# Patient Record
Sex: MALE | Race: WHITE | NOT HISPANIC OR LATINO | Employment: OTHER | URBAN - METROPOLITAN AREA
[De-identification: names, ages, dates, MRNs, and addresses within clinical notes are randomized per-mention and may not be internally consistent; named-entity substitution may affect disease eponyms.]

---

## 2017-01-06 ENCOUNTER — TELEPHONE (OUTPATIENT)
Dept: PALLIATIVE MEDICINE | Facility: CLINIC | Age: 77
End: 2017-01-06

## 2017-01-06 NOTE — TELEPHONE ENCOUNTER
12/28/16 MRI thoracic and lumbar spine results reviewed. Per radiology, there are chronic compression deformities at T6 and T12, as well as multilevel degenerative changes. Findings include disc bulging and facet arthropathy, associated with severe right and moderate-to-severe left neural foraminal narrowing at L5-S1 and likely impingement of left L4 and right L5 nerve roots.     No acute/urgent findings. Patient has follow up appointment on 01/09/17 to discuss results and management options.    Mariam Hale MD  [facility]

## 2017-01-09 ENCOUNTER — OFFICE VISIT (OUTPATIENT)
Dept: PALLIATIVE MEDICINE | Facility: CLINIC | Age: 77
End: 2017-01-09
Payer: COMMERCIAL

## 2017-01-09 VITALS
DIASTOLIC BLOOD PRESSURE: 56 MMHG | BODY MASS INDEX: 27.41 KG/M2 | HEART RATE: 61 BPM | WEIGHT: 191 LBS | SYSTOLIC BLOOD PRESSURE: 110 MMHG | OXYGEN SATURATION: 97 %

## 2017-01-09 DIAGNOSIS — M54.16 CHRONIC RADICULAR LOW BACK PAIN: Primary | ICD-10-CM

## 2017-01-09 DIAGNOSIS — G89.29 CHRONIC RADICULAR LOW BACK PAIN: Primary | ICD-10-CM

## 2017-01-09 PROCEDURE — 99214 OFFICE O/P EST MOD 30 MIN: CPT | Performed by: PAIN MEDICINE

## 2017-01-09 ASSESSMENT — PAIN SCALES - GENERAL: PAINLEVEL: NO PAIN (0)

## 2017-01-09 NOTE — PATIENT INSTRUCTIONS
"PATIENT INSTRUCTIONS:    1. We reviewed recent ultrasound of your right leg. There were no blood clots identified.     2. We reviewed imaging of your thoracic spine (mid back). You are seen to have two magnus of compression fractures (at T6, T12), which appear to be chronic/old. You were given a copy of your radiology report, for your records.      3. Will have you see orthopedics for input regarding your compression fractures.     4. We reviewed imaging of your lumbar spine (low back). You are seen to have many \"degenerative\" (arthritic) changes. 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 There is pinching of the right L5 nerve.     5. We talked about injection for your back/nerve pain, although you mentioned that you did not have significant relief with these in the past. May repeat injection in the future, if you are willing to do this.     6. Will have you do some physical therapy for your back/leg symptoms. This will be ordered through the rehabilitation department; they should call you to schedule.     7. [facility] 8 weeks to check in.    Thank you!    Scheduling number to the [facility]: [phone]. Call this number to schedule or change appointments.    Nurse Triage line: [phone]  Call this number with any questions or concerns. You can leave a message anytime. Please leave a detailed message. Calls are returned between 8 AM and 4 PM Monday through Thursday and from 8 AM to 12 Noon on Fridays. We usually get back to you within 24 to 48 hours depending on the issue/request.     We believe regular attendance is key to your success in our program.    Any time you are unable to keep your appointment we ask that you call us at least 24 hours in advance to let us know. This will allow us to offer the appointment time " to another patient.

## 2017-01-09 NOTE — MR AVS SNAPSHOT
"              After Visit Summary   1/9/2017    Vini Loya    MRN: [id number]           Patient Information     Date Of Birth          1940        Visit Information        Provider Department      1/9/2017 9:00 AM Mariam Hale MD [facility]        Today's Diagnoses     Compression fracture    -  1     Chronic radicular low back pain         History of Parkinson's disease           Care Instructions    PATIENT INSTRUCTIONS:    1. We reviewed recent ultrasound of your right leg. There were no blood clots identified.     2. We reviewed imaging of your thoracic spine (mid back). You are seen to have two magnus of compression fractures (at T6, T12), which appear to be chronic/old. You were given a copy of your radiology report, for your records.      3. Will have you see orthopedics for input regarding your compression fractures.     4. We reviewed imaging of your lumbar spine (low back). You are seen to have many \"degenerative\" (arthritic) changes. 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 There is pinching of the right L5 nerve.     5. We talked about injection for your back/nerve pain, although you mentioned that you did not have significant relief with these in the past. May repeat injection in the future, if you are willing to do this.     6. Will have you do some physical therapy for your back/leg symptoms. This will be ordered through the rehabilitation department; they should call you to schedule.     7. Return to [facility] in 8 weeks to check in.    Thank you!    Scheduling number to the [facility]: [phone]. Call this number to schedule or change appointments.    Nurse Triage line: [phone]  Call this number with any questions or concerns. You can leave a message anytime. Please leave a detailed " message. Calls are returned between 8 AM and 4 PM Monday through Thursday and from 8 AM to 12 Noon on Fridays. We usually get back to you within 24 to 48 hours depending on the issue/request.     We believe regular attendance is key to your success in our program.    Any time you are unable to keep your appointment we ask that you call us at least 24 hours in advance to let us know. This will allow us to offer the appointment time to another patient.           Follow-ups after your visit        Additional Services     ORTHO  REFERRAL       [facility] is referring you to the Orthopedic  Services at [facility].       The  Representative will assist you in the coordination of your Orthopedic and Musculoskeletal Care as prescribed by your physician.    The  Representative will call you within 1 business day to help schedule your appointment, or you may contact the  Representative at:    All areas ~ [phone]     Type of Referral : Surgical / Specialist - Orthopedics  Input regarding chronic T6, T12 compression fractures -- 60-70% loss with minimal retropulsion at T6      Timeframe requested: Routine    Coverage of these services is subject to the terms and limitations of your health insurance plan.  Please call member services at your health plan with any benefit or coverage questions.      If X-rays, CT or MRI's have been performed, please contact the facility where they were done to arrange for , prior to your scheduled appointment.  Please bring this referral request to your appointment and present it to your specialist.            PHYSICAL THERAPY REFERRAL       *This therapy referral will be filtered to a centralized scheduling office at [facility] and the patient will receive a call to schedule an appointment at a [place] location most convenient for them. *     [facility]  provides Physical Therapy evaluation and treatment and many specialty services across the [place] system.  If requesting a specialty program, please choose from the list below.    If you have not heard from the scheduling office within 2 business days, please call [phone] for all locations, with the exception of [place], please call [phone].  Treatment: Evaluation & Treatment  Special Instructions/Modalities:   Special Programs:    Chronic low back pain (nerve root compression)  History of thoracic compression fractures  Parkinson's disease    Please be aware that coverage of these services is subject to the terms and limitations of your health insurance plan.  Call member services at your health plan with any benefit or coverage questions.      **Note to Provider:  If you are referring outside of [place] for the therapy appointment, please list the name of the location in the  special instructions  above, print the referral and give to the patient to schedule the appointment.                  Your next 10 appointments already scheduled     Jan 31, 2017 11:15 AM   (Arrive by 11:00 AM)   Return Movement Disorder with KOTA Dutta [facility] ([facility])    [address]  [address]rd [facility] [address] [phone]            Oct 06, 2017 10:30 AM   RETURN GLAUCOMA with Yudith Mcdonnell MD   Eye Clinic ([facility])    Stefan Devine [address] [facility] [address] [place] [address] [phone]              Who to contact     If you have questions or need follow up information about today's clinic visit or your schedule please contact [facility] directly at [phone].  Normal or non-critical lab and imaging results will be communicated to you by MyChart, letter or phone within 4 business days after the clinic has received the results. If you do not hear from us within 7 days, please contact  "the clinic through [employer] or phone. If you have a critical or abnormal lab result, we will notify you by phone as soon as possible.  Submit refill requests through [employer] or call your pharmacy and they will forward the refill request to us. Please allow 3 business days for your refill to be completed.          Additional Information About Your Visit        [employer] Information     [employer] lets you send messages to your doctor, view your test results, renew your prescriptions, schedule appointments and more. To sign up, go to [email or web address] . Click on \"Log in\" on the left side of the screen, which will take you to the Welcome page. Then click on \"Sign up Now\" on the right side of the page.     You will be asked to enter the access code listed below, as well as some personal information. Please follow the directions to create your username and password.     Your access code is: [email or web address]  Expires: 3/21/2017  2:16 PM     Your access code will  in 90 days. If you need help or a new code, please call your [place] clinic or [phone].        Care EveryWhere ID     This is your Care EveryWhere ID. This could be used by other organizations to access your [place] medical records  [id number]        Your Vitals Were     Pulse Pulse Oximetry                61 97%           Blood Pressure from Last 3 Encounters:   17 110/56   16 118/70   16 92/59    Weight from Last 3 Encounters:   17 86.637 kg (191 lb)   16 79.379 kg (175 lb)   16 85.73 kg (189 lb)              We Performed the Following     ORTHO  REFERRAL     PHYSICAL THERAPY REFERRAL        Primary Care Provider Office Phone # Fax #    Selene Land [phone]       [facility] [address]  [facility] [address]        Thank you!     Thank you for choosing [facility]  for your care. Our goal is always to provide you with excellent care. Hearing " back from our patients is one way we can continue to improve our services. Please take a few minutes to complete the written survey that you may receive in the mail after your visit with us. Thank you!             Your Updated Medication List - Protect others around you: Learn how to safely use, store and throw away your medicines at www.disposemymeds.org.          This list is accurate as of: 1/9/17  9:41 AM.  Always use your most recent med list.                   Brand Name Dispense Instructions for use    aspirin 81 MG tablet      1 TABLET DAILY       Blood Pressure Monitor Pricilla     1 Device    1 Device daily.       carbidopa-levodopa  MG per tablet    SINEMET    540 tablet    Take 2 tablets 3 times a day 1 hour before each meal.  (Around 7 am, 11 am, & 4 pm.)       GAVILYTE-G 236 G suspension   Generic drug:  polyethylene glycol          lisinopril 40 MG tablet    PRINIVIL/ZESTRIL    90 tablet    Take 1 tablet (40 mg) by mouth daily Please profile       metoprolol 25 MG 24 hr tablet    TOPROL-XL    90 tablet    Take 1 tablet (25 mg) by mouth daily       PARoxetine 20 MG tablet    PAXIL    90 tablet    Take 1 tablet (20 mg) by mouth At Bedtime       simvastatin 40 MG tablet    ZOCOR    90 tablet    Take 1 tablet (40 mg) by mouth At Bedtime Please profile       tamsulosin 0.4 MG capsule    FLOMAX    90 capsule    Take 1 capsule (0.4 mg) by mouth daily

## 2017-01-09 NOTE — NURSING NOTE
"Chief Complaint   Patient presents with     Pain     Med Spine Follow up       Initial /56 mmHg  Pulse 61  Wt 86.637 kg (191 lb)  SpO2 97% Estimated body mass index is 27.41 kg/(m^2) as calculated from the following:    Height as of 12/21/16: 1.778 m (5' 10\").    Weight as of this encounter: 86.637 kg (191 lb).  BP completed using cuff size: jose Shea CMA   [facility]    "

## 2017-01-16 ENCOUNTER — RADIANT APPOINTMENT (OUTPATIENT)
Dept: GENERAL RADIOLOGY | Facility: CLINIC | Age: 77
End: 2017-01-16
Attending: NURSE PRACTITIONER
Payer: COMMERCIAL

## 2017-01-16 ENCOUNTER — OFFICE VISIT (OUTPATIENT)
Dept: NEUROSURGERY | Facility: CLINIC | Age: 77
End: 2017-01-16
Attending: FAMILY MEDICINE
Payer: COMMERCIAL

## 2017-01-16 VITALS
DIASTOLIC BLOOD PRESSURE: 77 MMHG | SYSTOLIC BLOOD PRESSURE: 139 MMHG | HEIGHT: 69 IN | OXYGEN SATURATION: 96 % | TEMPERATURE: 97.3 F | BODY MASS INDEX: 28.56 KG/M2 | WEIGHT: 192.8 LBS | HEART RATE: 53 BPM

## 2017-01-16 DIAGNOSIS — S22.000A THORACIC COMPRESSION FRACTURE, CLOSED, INITIAL ENCOUNTER (H): Primary | ICD-10-CM

## 2017-01-16 DIAGNOSIS — S22.000A THORACIC COMPRESSION FRACTURE, CLOSED, INITIAL ENCOUNTER (H): ICD-10-CM

## 2017-01-16 PROCEDURE — 99211 OFF/OP EST MAY X REQ PHY/QHP: CPT | Performed by: NURSE PRACTITIONER

## 2017-01-16 PROCEDURE — 99203 OFFICE O/P NEW LOW 30 MIN: CPT | Performed by: NURSE PRACTITIONER

## 2017-01-16 PROCEDURE — 72080 X-RAY EXAM THORACOLMB 2/> VW: CPT

## 2017-01-16 ASSESSMENT — PAIN SCALES - GENERAL: PAINLEVEL: NO PAIN (1)

## 2017-01-16 NOTE — MR AVS SNAPSHOT
After Visit Summary   1/16/2017    Vini Loya    MRN: [id number]           Patient Information     Date Of Birth          1940        Visit Information        Provider Department      1/16/2017 11:40 AM Bettie Mcginnis APRN CNP [facility]        Today's Diagnoses     Thoracic compression fracture, closed, initial encounter (H)    -  1       Care Instructions    1.  Orthotics for a brace. Wear for comfort as needed    2. No surgery indicated. Recommend physical therapy and injections     3.  Return to Dr. Yañez     4.  Xrays today         Follow-ups after your visit        Additional Services     [facility] PT, HAND, AND CHIROPRACTIC REFERRAL       **This order will print in the [facility] Scheduling Office**    Physical Therapy, Hand Therapy and Chiropractic Care are available through:    *[facility]  *[facility]  *[facility]    Call one number to schedule at any of the above locations: [phone].    Your provider has referred you to: Physical Therapy at [facility] or [facility]    Indication/Reason for Referral: back pain  Onset of Illness: chronic  Therapy Orders: Evaluate and Treat  Special Programs: None  Special Request: Mukesh Martin      Additional Comments for the Therapist or Chiropractor:         Please be aware that coverage of these services is subject to the terms and limitations of your health insurance plan.  Call member services at your health plan with any benefit or coverage questions.      Please bring the following to your appointment:    *Your personal calendar for scheduling future appointments  *Comfortable clothing            ORTHOTICS REFERRAL       **This referral order prints off in the [facility]  (Orthotics & Prosthetics) Central Scheduling Office**    The [facility] Scheduling Staff will contact the patient to schedule appointments.     Central Scheduling Contact  Information: [phone] ([place])    Elastic back brace    Please be aware that coverage of these services is subject to the terms and limitations of your health insurance plan.  Call member services at your health plan with any benefit or coverage questions.      Please bring the following to your appointment:    >>   Any x-rays, CTs or MRIs which have been performed.  Contact the facility where they were done to arrange for  prior to your scheduled appointment.    >>   List of current medications   >>   This referral request   >>   Any documents/labs given to you for this referral                  Your next 10 appointments already scheduled     Jan 31, 2017 11:15 AM   (Arrive by 11:00 AM)   Return Movement Disorder with KOTA Dutta [facility] ([email or web address] [place])    [address]  3rd [place] [facility] [address] [phone]            Oct 06, 2017 10:30 AM   RETURN GLAUCOMA with Yudith Mcdonnell MD   Eye Clinic ([facility])    Stefan Devine [facility]  [address]  [address] [address]  [facility] [id number] [phone]              Future tests that were ordered for you today     Open Future Orders        Priority Expected Expires Ordered    XR Thoracolumbar Spine 2 Views Routine 1/16/2017 1/16/2018 1/16/2017            Who to contact     If you have questions or need follow up information about today's clinic visit or your schedule please contact [facility] directly at [phone].  Normal or non-critical lab and imaging results will be communicated to you by MyChart, letter or phone within 4 business days after the clinic has received the results. If you do not hear from us within 7 days, please contact the clinic through MyChart or phone. If you have a critical or abnormal lab result, we will notify you by phone as soon as possible.  Submit refill requests through [employer] or call your pharmacy and they  "will forward the refill request to us. Please allow 3 business days for your refill to be completed.          Additional Information About Your Visit        [employer] Information     [employer] lets you send messages to your doctor, view your test results, renew your prescriptions, schedule appointments and more. To sign up, go to [email or web address] . Click on \"Log in\" on the left side of the screen, which will take you to the Welcome page. Then click on \"Sign up Now\" on the right side of the page.     You will be asked to enter the access code listed below, as well as some personal information. Please follow the directions to create your username and password.     Your access code is: [email or web address]  Expires: 3/21/2017  2:16 PM     Your access code will  in 90 days. If you need help or a new code, please call your [place] clinic or [phone].        Care EveryWhere ID     This is your [facility] EveryWhere ID. This could be used by other organizations to access your [place] medical records  [id number]        Your Vitals Were     Pulse Temperature Height BMI (Body Mass Index) Pulse Oximetry       53 97.3  F (36.3  C) (Oral) 5' 8.5\" (1.74 m) 28.89 kg/m2 96%        Blood Pressure from Last 3 Encounters:   17 139/77   17 110/56   16 118/70    Weight from Last 3 Encounters:   17 192 lb 12.8 oz (87.454 kg)   17 191 lb (86.637 kg)   16 175 lb (79.379 kg)              We Performed the Following     MALIKA PT, HAND, AND CHIROPRACTIC REFERRAL     ORTHOTICS REFERRAL        Primary Care Provider Office Phone # Fax #    Selene Land [phone]       [facility] [address]  [facility] [address]        Thank you!     Thank you for choosing [facility]  for your care. Our goal is always to provide you with excellent care. Hearing back from our patients is one way we can continue to improve our services. Please take a few minutes " to complete the written survey that you may receive in the mail after your visit with us. Thank you!             Your Updated Medication List - Protect others around you: Learn how to safely use, store and throw away your medicines at www.disposemymeds.org.          This list is accurate as of: 1/16/17 11:55 AM.  Always use your most recent med list.                   Brand Name Dispense Instructions for use    aspirin 81 MG tablet      1 TABLET DAILY       Blood Pressure Monitor Pricilla     1 Device    1 Device daily.       carbidopa-levodopa  MG per tablet    SINEMET    540 tablet    Take 2 tablets 3 times a day 1 hour before each meal.  (Around 7 am, 11 am, & 4 pm.)       GAVILYTE-G 236 G suspension   Generic drug:  polyethylene glycol          lisinopril 40 MG tablet    PRINIVIL/ZESTRIL    90 tablet    Take 1 tablet (40 mg) by mouth daily Please profile       metoprolol 25 MG 24 hr tablet    TOPROL-XL    90 tablet    Take 1 tablet (25 mg) by mouth daily       PARoxetine 20 MG tablet    PAXIL    90 tablet    Take 1 tablet (20 mg) by mouth At Bedtime       simvastatin 40 MG tablet    ZOCOR    90 tablet    Take 1 tablet (40 mg) by mouth At Bedtime Please profile       tamsulosin 0.4 MG capsule    FLOMAX    90 capsule    Take 1 capsule (0.4 mg) by mouth daily

## 2017-01-16 NOTE — NURSING NOTE
"Vini Loya is a 76 year old male who presents for:  Chief Complaint   Patient presents with     Neurologic Problem     Input regarding chronic T6, T12 compression fractures, standing for long patient gets right leg pain and swelling         Initial Vitals:  /77 mmHg  Pulse 53  Temp(Src) 97.3  F (36.3  C) (Oral)  Ht 5' 8.5\" (1.74 m)  Wt 192 lb 12.8 oz (87.454 kg)  BMI 28.89 kg/m2  SpO2 96% Estimated body mass index is 28.89 kg/(m^2) as calculated from the following:    Height as of this encounter: 5' 8.5\" (1.74 m).    Weight as of this encounter: 192 lb 12.8 oz (87.454 kg).. Body surface area is 2.06 meters squared. BP completed using cuff size: regular  No Pain (1)    Do you feel safe in your environment?  Yes  Do you need any refills today? No    Nursing Comments: Input regarding chronic T6, T12 compression fractures, standing for long patient gets right leg pain and swelling.  Patient rates his pain today as 1      5 min. nursing intake time  Angelica Longoria MA       Discharge plan: 1.  Orthotics for a brace. Wear for comfort as needed    2. No surgery indicated. Recommend physical therapy and injections     3.  Return to Dr. Yañez     4.  Xrays today   2 min. nursing discharge time  Angelica Longoria MA            "
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
## 2017-01-16 NOTE — PATIENT INSTRUCTIONS
1.  Orthotics for a brace. Wear for comfort as needed    2. No surgery indicated. Recommend physical therapy and injections     3.  Return to Dr. Yañez     4.  Xrays today

## 2017-01-17 ENCOUNTER — THERAPY VISIT (OUTPATIENT)
Dept: PHYSICAL THERAPY | Facility: CLINIC | Age: 77
End: 2017-01-17
Payer: COMMERCIAL

## 2017-01-17 DIAGNOSIS — G89.29 CHRONIC MIDLINE LOW BACK PAIN WITHOUT SCIATICA: Primary | ICD-10-CM

## 2017-01-17 DIAGNOSIS — M54.50 CHRONIC MIDLINE LOW BACK PAIN WITHOUT SCIATICA: Primary | ICD-10-CM

## 2017-01-17 PROCEDURE — 97530 THERAPEUTIC ACTIVITIES: CPT | Mod: GP | Performed by: PHYSICAL THERAPIST

## 2017-01-17 PROCEDURE — 97110 THERAPEUTIC EXERCISES: CPT | Mod: GP | Performed by: PHYSICAL THERAPIST

## 2017-01-17 PROCEDURE — 97161 PT EVAL LOW COMPLEX 20 MIN: CPT | Mod: GP | Performed by: PHYSICAL THERAPIST

## 2017-01-17 NOTE — PROGRESS NOTES
Physical Therapy Initial Evaluation  January 17, 2017     Precautions/Restrictions/MD instructions: PT eval and treat.     Subjective:   Date of Onset: 1/16/17 (MD orders, chronic condition)  Primary Symptoms/Location of Pain: Back pain (midline low back). Denies N/T, painful C/S/S, peripheral weakness. Quality of pain is dull and aching. Pains are described as intermittent in nature. Pain is worse: with more work. Pain is rated 1/10.   History of symptoms: Pains began gradually as the result of insidious onset. Does have history of T6 and T12 compression fractures. Long history of back pain when transferring a heavy box from one truck to another in 1967 - on and off back pain since then. Since onset, symptoms are unchanged. MD recommended trialing injections, PT, and possibly use of back brace.  Worsened by: Bending over doing yardwork and then going to stand up, picking weeds and trying to stand back up, getting up off of couch, sometimes bothered by doing crunch machine at gym, standing for a long time doing dishes.    Alleviated by: Sitting down, Advil.    General health as reported by patient: fair  Pertinent medical/surgical history: former smoker, Parkinson's disease (?), . Imaging: xray and MRI (chronic stable T6 and T12 compression fractures, diffuse degenerative changes most prominent at L5-S1). Current occupational status: Retired . Patient's goals are: decrease pain, establish home exercise program. Return to MD:  PRN.     Therapist Impression:   Vini Loya is a 76 year old male with chronic history of LBP. He presents with signs and symptoms consistent with chronic compression fractures and mechanical LBP. These impairments limit his ability to do yardwork and stand for prolonged periods. Skilled PT services are necessary in order to reduce impairments and improve independent function.    Objective:  LUMBAR EXAMINATION    Sitting posture: fair Standing posture: fair Lordosis: Red Lateral  "shift present: R lateral shift (possibly due to healed deformities). Correction of posture: NE    Movement Loss   Major Moderate Minimal Nil Pain   Flexion   X  +   Extension X X   -   Side Gliding R        Side Gliding L          Repeated movement testing:   (During: produces, abolishes, increases, decreases, no effect, centralizing, peripheralizing; After: better, worse, no better, no worse, no effect, centralized, peripheralized)  Pre-test Symptoms Standing: No sx's    Symptoms During Symptoms After ROM increased ROM decreased No Effect   FIS Prod NW      Rep FIS        EIS NE NE      Rep EIS        Pre-test Symptoms Lying: same as above    Symptoms During Symptoms After ROM increased ROM decreased No Effect   POLLO        Rep POLLO        EIL NE NE      Rep EIL NE NE   X   Lying ANNA: No effect (\"slight pulling\")    Provisional Classification: derangement vs. other  Principle of Management: trial extension    Neurological testing (myotomes, sensation, reflexes, nerve tension) not indicated at this time.    Therapeutic Activities (12 minutes): Discussed with patient postural correction with instruction in use of lumbar/towel roll and sitting posture when unsupported. Education provided regarding impact of posture and body mechanics on symptom production. Patient encouraged to monitor this correlation as part of overall self management. Also discussed goal of avoiding flexion-based postures/activities/stretches at this time as they may exacerbate current symptoms.     Assessment/Plan:    The patient is a 76 year old male with chief complaint of LBP.    The patient has the following significant findings with corresponding treatment plan.  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    Pain -  hot/cold therapy, US, electric stimulation, mechanical traction, manual therapy, splint/taping/bracing/orthotics, self management, education, directional preference exercise and home " program  Decreased ROM/flexibility - manual therapy, therapeutic exercise and home program  Decreased joint mobility - manual therapy and therapeutic exercise  Decreased strength - therapeutic exercise, therapeutic activities and home program  Impaired balance - neuro re-education and therapeutic activities  Decreased proprioception - neuro re-education and therapeutic activities  Impaired gait - gait training  Impaired muscle performance - neuro re-education  Decreased function - therapeutic activities  Impaired posture - neuro re-education    Therapy Evaluation Codes:   1) History comprised of:   Personal factors that impact the plan of care:      Time since onset of symptoms and Work status.    Comorbidity factors that impact the plan of care are:      Smoking.     Medications impacting care: None.  2) Examination of Body Systems comprised of:   Body structures and functions that impact the plan of care:      Lumbar spine.   Activity limitations that impact the plan of care are:      Standing.   Clinical presentation characteristics are:    Stable/Uncomplicated.  3) Presentation comprised of:   Presentation scored as Low complexity with uncomplicated characteristics..  4) Decision-Making    Low complexity using standardized patient assessment instrument and/or measureable assessment of functional outcome.  Cumulative Therapy Evaluation is: Low complexity.    Previous and current functional limitations:  (See Goal Flow Sheet for this information)    Short term and Long term goals: (See Goal Flow Sheet for this information)     Communication ability:  Patient appears to be able to clearly communicate and understand verbal and written communication and follow directions correctly.  Treatment Explanation - The following has been discussed with the patient: RX ordered/plan of care, anticipated outcomes, and possible risks and side effects.  This patient would benefit from PT intervention to resume normal activities.    Rehab potential is fair due to past work status and smoking history, as well as chronicity of symptoms.    Frequency:  1 X week, once daily  Duration:  for 5 weeks  Discharge Plan: Achieve all LTGs, be independent in home treatment program, and reach maximal therapeutic benefit.    Please refer to the daily flowsheet for treatment today, total treatment time and time spent performing 1:1 timed codes.

## 2017-01-17 NOTE — PROGRESS NOTES
Subjective:                                       Pertinent medical history includes:  High blood pressure.  Medical allergies: no.  Other surgeries include:  Heart surgery.  Current medications:  High blood pressure medication and pain medication.  Current occupation is Retired .        Barriers include:  None as reported by patient.    Red flags:  None as reported by patient.    Oswestry Score: 8 %                 Objective:    System    Physical Exam    General     ROS    Assessment/Plan:
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
## 2017-01-24 ENCOUNTER — THERAPY VISIT (OUTPATIENT)
Dept: PHYSICAL THERAPY | Facility: CLINIC | Age: 77
End: 2017-01-24
Payer: COMMERCIAL

## 2017-01-24 DIAGNOSIS — M54.50 CHRONIC MIDLINE LOW BACK PAIN WITHOUT SCIATICA: Primary | ICD-10-CM

## 2017-01-24 DIAGNOSIS — G89.29 CHRONIC MIDLINE LOW BACK PAIN WITHOUT SCIATICA: Primary | ICD-10-CM

## 2017-01-24 PROCEDURE — 97530 THERAPEUTIC ACTIVITIES: CPT | Mod: GP | Performed by: PHYSICAL THERAPIST

## 2017-01-24 PROCEDURE — 97112 NEUROMUSCULAR REEDUCATION: CPT | Mod: GP | Performed by: PHYSICAL THERAPIST

## 2017-01-24 PROCEDURE — 97110 THERAPEUTIC EXERCISES: CPT | Mod: GP | Performed by: PHYSICAL THERAPIST

## 2017-01-31 ENCOUNTER — OFFICE VISIT (OUTPATIENT)
Dept: NEUROLOGY | Facility: CLINIC | Age: 77
End: 2017-01-31

## 2017-01-31 VITALS — HEART RATE: 50 BPM | SYSTOLIC BLOOD PRESSURE: 125 MMHG | HEIGHT: 69 IN | DIASTOLIC BLOOD PRESSURE: 58 MMHG

## 2017-01-31 DIAGNOSIS — G20.A1 PARKINSON'S DISEASE (H): Primary | ICD-10-CM

## 2017-01-31 ASSESSMENT — PAIN SCALES - GENERAL: PAINLEVEL: NO PAIN (0)

## 2017-01-31 NOTE — MR AVS SNAPSHOT
After Visit Summary   1/31/2017    Vini Loya    MRN: [id number]           Patient Information     Date Of Birth          1940        Visit Information        Provider Department      1/31/2017 11:15 AM Dalila Staples APRN CNP [facility]        Care Instructions    January 31, 2017    Dear Mr. Vini Loya,    Thank you for coming today.  During your visit, we have discussed the following:     __  Consider doing gait study.  Call the research coordinator.   __  Stay on the same antiparkinsonian medications.   __  If you change your mid about doing the neuropsychological evaluation, call [phone].   __  Continue to exercise regularly.   __  Continue to go to the PD Support & exercise group.   __  Return in 4 months. You may return sooner as needed.      For questions, you may send us a [employer] message or call [phone]    Fax number: [phone]    KOTA Stone CNP  [facility]          Follow-ups after your visit        Your next 10 appointments already scheduled     May 31, 2017  1:15 PM   (Arrive by 1:00 PM)   Return Movement Disorder with KOTA Dutta CNP   [facility] ([facility])    [address]  [address] [facility] [address] [phone]            Oct 06, 2017 10:30 AM   RETURN GLAUCOMA with Yudith Mcdonnell MD   [facility] Clinic ([facility])    [facility]  [address]  [address]  [place] [place] [facility] [id number] [phone]              Who to contact     Please call your clinic at [phone] to:    Ask questions about your health    Make or cancel appointments    Discuss your medicines    Learn about your test results    Speak to your doctor   If you have compliments or concerns about an experience at your clinic, or if you wish to file a complaint, please contact [facility] Patient Relations at [phone] or email us at  [email or web address]         Additional Information About Your Visit        [employer] Information     [employer] is an electronic gateway that provides easy, online access to your medical records. With [employer], you can request a clinic appointment, read your test results, renew a prescription or communicate with your care team.     To sign up for [employer] visit the website at [email or web address]   You will be asked to enter the access code listed below, as well as some personal information. Please follow the directions to create your username and password.     Your access code is: [email or web address]  Expires: 3/21/2017  2:16 PM     Your access code will  in 90 days. If you need help or a new code, please contact your [facility] or call [phone] for assistance.        Care EveryWhere ID     This is your Care EveryWhere ID. This could be used by other organizations to access your [facility] medical records  [id number]        Your Vitals Were     Pulse Height                50 1.74 m (5' 8.5\")           Blood Pressure from Last 3 Encounters:   17 125/58   17 139/77   17 110/56    Weight from Last 3 Encounters:   17 87.454 kg (192 lb 12.8 oz)   17 86.637 kg (191 lb)   16 79.379 kg (175 lb)              Today, you had the following     No orders found for display       Primary Care Provider Office Phone # Fax #    Selene Land [phone]       [facility] [address]  [place] [address]        Thank you!     Thank you for choosing [facility]  for your care. Our goal is always to provide you with excellent care. Hearing back from our patients is one way we can continue to improve our services. Please take a few minutes to complete the written survey that you may receive in the mail after your visit with us. Thank you!             Your Updated Medication List - Protect others around " you: Learn how to safely use, store and throw away your medicines at www.disposemymeds.org.          This list is accurate as of: 1/31/17 11:53 AM.  Always use your most recent med list.                   Brand Name Dispense Instructions for use    aspirin 81 MG tablet      1 TABLET DAILY       Blood Pressure Monitor Pricilla     1 Device    1 Device daily.       carbidopa-levodopa  MG per tablet    SINEMET    540 tablet    Take 2 tablets 3 times a day 1 hour before each meal.  (Around 7 am, 11 am, & 4 pm.)       GAVILYTE-G 236 G suspension   Generic drug:  polyethylene glycol          lisinopril 40 MG tablet    PRINIVIL/ZESTRIL    90 tablet    Take 1 tablet (40 mg) by mouth daily Please profile       metoprolol 25 MG 24 hr tablet    TOPROL-XL    90 tablet    Take 1 tablet (25 mg) by mouth daily       PARoxetine 20 MG tablet    PAXIL    90 tablet    Take 1 tablet (20 mg) by mouth At Bedtime       simvastatin 40 MG tablet    ZOCOR    90 tablet    Take 1 tablet (40 mg) by mouth At Bedtime Please profile       tamsulosin 0.4 MG capsule    FLOMAX    90 capsule    Take 1 capsule (0.4 mg) by mouth daily

## 2017-01-31 NOTE — PROGRESS NOTES
"PATIENT: Vini Loya    : 1940    ALBINA: 2017      REASON FOR VISIT:  Parkinson's disease (PD) follow up.      HISTORY OF PRESENT ILLNESS:  Mr. Vini Loya is a 76-year-old left-handed  male who came to the [facility] accompanied by his wife for a follow up visit.  I saw him last in clinic on 2016 for a routine PD follow up visit.      Since his last visit, he reports having Right lower back & buttock has been seen by a pain clinic as well as spine clinic.  He has been told no surgery is indicated.  He is doing PT.     Rest tremor sometimes bothers him.  He has a cold that he is fighting. He reports taking his Sinemet 25/100 mg 2 tabs TID regularly. He continues to go to PD support group.  He reports getting dizzy.  No falls associated with dizziness.  He had a fall going down steps.  He didn't turn the light on & slipped.  He continues to exercise regularly.      During last visit, he was referred to do neuropsychological evaluation for a baseline.  He hasn't done the neuropsych.  \"I don't have memory problems. I've done the assessments when I did the yoga study.  They didn't see any problems.\"  His wife reports that she made the appointment, but he cancelled it.  Mood is good.     MEDICATIONS:   Medication Sig     tamsulosin (FLOMAX) 0.4 MG 24 hr Take 1 capsule (0.4 mg) by mouth daily     carbidopa-levodopa (SINEMET)  MG  Take 2 tablets 3 times a day 1 hour before each meal.  (Around 7 am, 11 am, & 4 pm.)     PARoxetine (PAXIL) 20 MG  Take 1 tablet (20 mg) by mouth At Bedtime     lisinopril (PRINIVIL,ZESTRIL) 40 MG  Take 1 tablet (40 mg) by mouth daily Please profile     simvastatin (ZOCOR) 40 MG tablet Take 1 tablet (40 mg) by mouth At Bedtime Please profile     metoprolol (TOPROL-XL) 25 MG 24 hr  Take 1 tablet (25 mg) by mouth daily     GAVILYTE-G 236 G suspension      Blood Pressure Monitor JERONIMO 1 Device daily.     ASPIRIN 81 MG OR TABS 1 TABLET DAILY " "      ALLERGIES: Vytorin    PAST MEDICAL HISTORY, PAST SURGICAL HISTORY, FAMILY HISTORY AND SOCIAL HISTORY:  Reviewed in Epic.      PHYSICAL EXAM:    VITAL SIGNS:  Blood pressure 125/58, pulse 50, height 1.74 m (5' 8.5\").      GENERAL:  Mr. Loya is a pleasant  male who is well-groomed and well-developed sitting comfortably in the exam room without any distress.  Affect is appropriate.     MOVEMENT DISORDERS ASSESSMENT:  (Last Sinemet was taken 1 hour ago.)   Speech shows mild loss of expression and volume.  Voice is congested & raspy due to cold symptoms.  He has abnormal diminution of facial expression; lips parted some of the time.   He has mild and persistent tremor in left upper and slight & persistent in lower extremity and in his mouth.  Tremor in Lt hand & mouth become moderate with mental activation.  Slight postural tremor in Rt hand.  Finger tapping shows mild slowing and reduction in amplitude in right side and moderately impaired in left.  Hand opening & closing and hand pronation & supination movements show mild slowing and reduction in amplitude bilaterally.  Leg agility shows mild slowing and reduction in amplitude in right side bilaterally with rhythm interruption in Rt.   He was able to arise from a chair slowly with arms folded across his chest.  Posture is mildly stooped & leaning to Rt side.  Gait is slow with normal steps.  Good arm swing.  He has mild body bradykinesia.        ASSESSMENT AND PLAN:     Parkinson's disease.   Mr. Loya is a 76-year-old  male with a history of tremor dominant PD since Dec 2011 who came to the clinic for a follow up visit.  He reports occasional bothersome tremor.    __  Discussed about increasing his medication.  He wanted to continue taking the same anti-parkinsonian medications as above.  __  Dicussed about participating in gait study.  Information was provided.  __  He will continue exercising regularly & participate in PD support group.   __  " Encouraged to do the formal neuropathological evaluation.  He was given the phone number to call if he changes his mind.    Dizziness.  Could be from PD or medication side effects.  __  Informed to stand up slowly & avoid making quick movements to avoid falls.     Back Pain.  __  Will do PT for back/Rt buttock pain.    Will return to clinic in 4 months.  May return sooner PRN      The total time spent with the patient was 35 minutes, greater than 50% of the time was spent in counseling and coordination of care.      Taina Staples, KOTA, CNP  [facility]

## 2017-01-31 NOTE — PATIENT INSTRUCTIONS
January 31, 2017    Dear Mr. Vini Loya,    Thank you for coming today.  During your visit, we have discussed the following:     __  Consider doing gait study.  Call the research coordinator.   __  Stay on the same antiparkinsonian medications.   __  If you change your mid about doing the neuropsychological evaluation, call [phone].   __  Continue to exercise regularly.   __  Continue to go to the PD Support & exercise group.   __  Return in 4 months. You may return sooner as needed.      For questions, you may send us a [employer] message or call [phone]    Fax number: [phone]    KOTA Stone, CNP  [facility]

## 2017-02-10 ENCOUNTER — TRANSFERRED RECORDS (OUTPATIENT)
Dept: HEALTH INFORMATION MANAGEMENT | Facility: CLINIC | Age: 77
End: 2017-02-10

## 2017-02-14 ENCOUNTER — THERAPY VISIT (OUTPATIENT)
Dept: PHYSICAL THERAPY | Facility: CLINIC | Age: 77
End: 2017-02-14
Payer: COMMERCIAL

## 2017-02-14 DIAGNOSIS — M54.50 CHRONIC MIDLINE LOW BACK PAIN WITHOUT SCIATICA: ICD-10-CM

## 2017-02-14 DIAGNOSIS — G89.29 CHRONIC MIDLINE LOW BACK PAIN WITHOUT SCIATICA: ICD-10-CM

## 2017-02-14 PROCEDURE — 97110 THERAPEUTIC EXERCISES: CPT | Mod: GP | Performed by: PHYSICAL THERAPIST

## 2017-02-14 PROCEDURE — 97112 NEUROMUSCULAR REEDUCATION: CPT | Mod: GP | Performed by: PHYSICAL THERAPIST

## 2017-02-14 NOTE — PROGRESS NOTES
Discharge Note    Progress reporting period is from initial eval to Feb 14, 2017.     Vini failed to return for next follow up visit and current status is unknown.  Please see information below for last relevant information on current status.  Patient seen for Rxs Used: 3 visits.    SUBJECTIVE  Subjective changes noted by patient:  Subjective: Reports overall his back is feeling much better unless he does something stupid. Occasionally bothered by standing too long or trying to bend forward to aggressively. .  Current pain level is Current Pain level: 0/10.     Previous pain level was  Initial Pain level: 1/10.   Changes in function:  Yes (See Goal flowsheet attached for changes in current functional level)  Adverse reaction to treatment or activity: None    OBJECTIVE  Changes noted in objective findings: Objective: Able to perform full flexion in standing painfree. Extension still limited but not painful. SEGUN 2%, Mario 0. .    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  Updated problem list and treatment plan:     Decreased ROM/flexibility - HEP  Decreased strength - HEP  Impaired posture - HEP    STG/LTGs have been met or progress has been made towards goals:  Yes, please see goal flowsheet for most current information.    Assessment of Progress: current status is unknown.  Last current status: Assessment of progress: Pt is progressing as expected.  Self Management Plans:  HEP    I have re-evaluated this patient and find that the nature, scope, duration and intensity of the therapy is appropriate for the medical condition of the patient.  Vini continues to require the following intervention to meet STG and LTG's:  HEP.    Recommendations:  Discharge with current home program.  Patient to follow up with MD as needed. Episode to be closed at this time and patient formally  discharged from therapy.    Ruddy Smith, PT, DPT      Please refer to the daily flowsheet for treatment today, total treatment time and time spent performing 1:1 timed codes.

## 2017-04-11 DIAGNOSIS — I10 HYPERTENSION, GOAL BELOW 150/90: ICD-10-CM

## 2017-04-11 RX ORDER — LISINOPRIL 40 MG/1
TABLET ORAL
Qty: 90 TABLET | Refills: 1 | Status: SHIPPED | OUTPATIENT
Start: 2017-04-11 | End: 2017-10-11
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
## 2017-04-25 DIAGNOSIS — I10 HYPERTENSION, GOAL BELOW 150/90: ICD-10-CM
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
## 2017-04-26 RX ORDER — METOPROLOL SUCCINATE 25 MG/1
TABLET, EXTENDED RELEASE ORAL
Qty: 90 TABLET | Refills: 0 | Status: SHIPPED | OUTPATIENT
Start: 2017-04-26 | End: 2017-07-24

## 2017-04-26 NOTE — TELEPHONE ENCOUNTER
Scheduled for CPE 5/4/17.    Last office visit : 12/6/16.      BP Readings from Last 6 Encounters:   01/31/17 125/58   01/16/17 139/77   01/09/17 110/56   12/21/16 118/70   12/06/16 92/59   11/26/16 139/70      Prescription approved per [facility] Refill Protocol.  Kristin Young RN
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
## 2017-05-03 NOTE — PATIENT INSTRUCTIONS
Low Back Pain:  Take Tylenol 1000 mg every night  Use heat pad in the morning    Start Calcitonin spray to see if this reduces pain.  Consider switching to a softer mattress (or back to a water bed).     Preventive Health Recommendations:   Male Ages 65 and over    Yearly exam:             See your health care provider every year in order to  o   Review health changes.   o   Discuss preventive care.    o   Review your medicines if your doctor has prescribed any.    Talk with your health care provider about whether you should have a test to screen for prostate cancer (PSA).    Every 3 years, have a diabetes test (fasting glucose). If you are at risk for diabetes, you should have this test more often.    Every 5 years, have a cholesterol test. Have this test more often if you are at risk for high cholesterol or heart disease.     Every 10 years, have a colonoscopy. Or, have a yearly FIT test (stool test). These exams will check for colon cancer.    Talk to with your health care provider about screening for Abdominal Aortic Aneurysm if you have a family history of AAA or have a history of smoking.    Shots:     Get a flu shot each year.     Get a tetanus shot every 10 years.     Talk to your doctor about your pneumonia vaccines. There are now two you should receive - Pneumovax (PPSV 23) and Prevnar (PCV 13).     Talk to your doctor about a shingles vaccine.     Talk to your doctor about the hepatitis B vaccine.  Nutrition:     Eat at least 5 servings of fruits and vegetables each day.     Eat whole-grain bread, whole-wheat pasta and brown rice instead of white grains and rice.     Talk to your provider about Calcium and Vitamin D.   Lifestyle    Exercise for at least 150 minutes a week (30 minutes a day, 5 days a week). This will help you control your weight and prevent disease.     Limit alcohol to one drink per day.     No smoking.     Wear sunscreen to prevent skin cancer.     See your dentist every six months for  an exam and cleaning.     See your eye doctor every 1 to 2 years to screen for conditions such as glaucoma, macular degeneration, cataracts, etc   Preventive Health Recommendations:       Male Ages 65 and over    Yearly exam:             See your health care provider every year in order to  o   Review health changes.   o   Discuss preventive care.    o   Review your medicines if your doctor has prescribed any.  Talk with your health care provider about whether you should have a test to screen for prostate cancer (PSA).  Every 3 years, have a diabetes test (fasting glucose). If you are at risk for diabetes, you should have this test more often.  Every 5 years, have a cholesterol test. Have this test more often if you are at risk for high cholesterol or heart disease.   Every 10 years, have a colonoscopy. Or, have a yearly FIT test (stool test). These exams will check for colon cancer.  Talk to with your health care provider about screening for Abdominal Aortic Aneurysm if you have a family history of AAA or have a history of smoking.  Shots:   Get a flu shot each year.   Get a tetanus shot every 10 years.   Talk to your doctor about your pneumonia vaccines. There are now two you should receive - Pneumovax (PPSV 23) and Prevnar (PCV 13).  Talk to your doctor about a shingles vaccine.   Talk to your doctor about the hepatitis B vaccine.  Nutrition:   Eat at least 5 servings of fruits and vegetables each day.   Eat whole-grain bread, whole-wheat pasta and brown rice instead of white grains and rice.   Talk to your doctor about Calcium and Vitamin D.   Lifestyle  Exercise for at least 150 minutes a week (30 minutes a day, 5 days a week). This will help you control your weight and prevent disease.   Limit alcohol to one drink per day.   No smoking.   Wear sunscreen to prevent skin cancer.   See your dentist every six months for an exam and cleaning.   See your eye doctor every 1 to 2 years to screen for conditions such as  glaucoma, macular degeneration and cataracts.

## 2017-05-04 ENCOUNTER — OFFICE VISIT (OUTPATIENT)
Dept: FAMILY MEDICINE | Facility: CLINIC | Age: 77
End: 2017-05-04
Payer: COMMERCIAL

## 2017-05-04 VITALS
OXYGEN SATURATION: 93 % | TEMPERATURE: 98.2 F | WEIGHT: 188.5 LBS | DIASTOLIC BLOOD PRESSURE: 70 MMHG | BODY MASS INDEX: 27.92 KG/M2 | HEART RATE: 61 BPM | HEIGHT: 69 IN | RESPIRATION RATE: 18 BRPM | SYSTOLIC BLOOD PRESSURE: 102 MMHG

## 2017-05-04 DIAGNOSIS — I10 HYPERTENSION, GOAL BELOW 150/90: Primary | ICD-10-CM

## 2017-05-04 DIAGNOSIS — M51.369 DEGENERATION OF L4-L5 INTERVERTEBRAL DISC: ICD-10-CM

## 2017-05-04 DIAGNOSIS — S22.000D COMPRESSION FRACTURE OF THORACIC VERTEBRA, WITH ROUTINE HEALING, SUBSEQUENT ENCOUNTER: ICD-10-CM

## 2017-05-04 DIAGNOSIS — R26.9 GAIT DISTURBANCE, POST-STROKE: ICD-10-CM

## 2017-05-04 DIAGNOSIS — I69.398 GAIT DISTURBANCE, POST-STROKE: ICD-10-CM

## 2017-05-04 DIAGNOSIS — M54.50 CHRONIC MIDLINE LOW BACK PAIN WITHOUT SCIATICA: ICD-10-CM

## 2017-05-04 DIAGNOSIS — R25.1 TREMOR: ICD-10-CM

## 2017-05-04 DIAGNOSIS — G20.A1 PARKINSON'S DISEASE (H): ICD-10-CM

## 2017-05-04 DIAGNOSIS — Z00.01 ENCOUNTER FOR ROUTINE ADULT PHYSICAL EXAM WITH ABNORMAL FINDINGS: ICD-10-CM

## 2017-05-04 DIAGNOSIS — G89.29 CHRONIC MIDLINE LOW BACK PAIN WITHOUT SCIATICA: ICD-10-CM

## 2017-05-04 PROCEDURE — 99213 OFFICE O/P EST LOW 20 MIN: CPT | Mod: 25 | Performed by: FAMILY MEDICINE

## 2017-05-04 PROCEDURE — 99397 PER PM REEVAL EST PAT 65+ YR: CPT | Performed by: FAMILY MEDICINE

## 2017-05-04 RX ORDER — CALCITONIN SALMON 200 [IU]/.09ML
1 SPRAY, METERED NASAL DAILY
Qty: 1 BOTTLE | Refills: 1 | Status: ON HOLD | OUTPATIENT
Start: 2017-05-04 | End: 2017-10-16

## 2017-05-04 NOTE — MR AVS SNAPSHOT
After Visit Summary   5/4/2017    Vini Loya    MRN: [id number]           Patient Information     Date Of Birth          1940        Visit Information        Provider Department      5/4/2017 8:40 AM Selene Land MD [facility]        Today's Diagnoses     Hypertension, goal below 150/90    -  1    Encounter for routine adult physical exam with abnormal findings        Parkinson's disease (H)        Tremors        Gait disturbance, post-stroke        Chronic midline low back pain without sciatica        Degeneration of L4-L5 intervertebral disc        Compression fracture of thoracic vertebra, with routine healing, subsequent encounter          Care Instructions    Low Back Pain:  Take Tylenol 1000 mg every night  Use heat pad in the morning    Start Calcitonin spray to see if this reduces pain.  Consider switching to a softer mattress (or back to a water bed).     Preventive Health Recommendations:   Male Ages 65 and over    Yearly exam:             See your health care provider every year in order to  o   Review health changes.   o   Discuss preventive care.    o   Review your medicines if your doctor has prescribed any.    Talk with your health care provider about whether you should have a test to screen for prostate cancer (PSA).    Every 3 years, have a diabetes test (fasting glucose). If you are at risk for diabetes, you should have this test more often.    Every 5 years, have a cholesterol test. Have this test more often if you are at risk for high cholesterol or heart disease.     Every 10 years, have a colonoscopy. Or, have a yearly FIT test (stool test). These exams will check for colon cancer.    Talk to with your health care provider about screening for Abdominal Aortic Aneurysm if you have a family history of AAA or have a history of smoking.    Shots:     Get a flu shot each year.     Get a tetanus shot every 10 years.     Talk to your doctor about your  pneumonia vaccines. There are now two you should receive - Pneumovax (PPSV 23) and Prevnar (PCV 13).     Talk to your doctor about a shingles vaccine.     Talk to your doctor about the hepatitis B vaccine.  Nutrition:     Eat at least 5 servings of fruits and vegetables each day.     Eat whole-grain bread, whole-wheat pasta and brown rice instead of white grains and rice.     Talk to your provider about Calcium and Vitamin D.   Lifestyle    Exercise for at least 150 minutes a week (30 minutes a day, 5 days a week). This will help you control your weight and prevent disease.     Limit alcohol to one drink per day.     No smoking.     Wear sunscreen to prevent skin cancer.     See your dentist every six months for an exam and cleaning.     See your eye doctor every 1 to 2 years to screen for conditions such as glaucoma, macular degeneration, cataracts, etc   Preventive Health Recommendations:       Male Ages 65 and over    Yearly exam:             See your health care provider every year in order to  o   Review health changes.   o   Discuss preventive care.    o   Review your medicines if your doctor has prescribed any.  Talk with your health care provider about whether you should have a test to screen for prostate cancer (PSA).  Every 3 years, have a diabetes test (fasting glucose). If you are at risk for diabetes, you should have this test more often.  Every 5 years, have a cholesterol test. Have this test more often if you are at risk for high cholesterol or heart disease.   Every 10 years, have a colonoscopy. Or, have a yearly FIT test (stool test). These exams will check for colon cancer.  Talk to with your health care provider about screening for Abdominal Aortic Aneurysm if you have a family history of AAA or have a history of smoking.  Shots:   Get a flu shot each year.   Get a tetanus shot every 10 years.   Talk to your doctor about your pneumonia vaccines. There are now two you should receive - Pneumovax  (PPSV 23) and Prevnar (PCV 13).  Talk to your doctor about a shingles vaccine.   Talk to your doctor about the hepatitis B vaccine.  Nutrition:   Eat at least 5 servings of fruits and vegetables each day.   Eat whole-grain bread, whole-wheat pasta and brown rice instead of white grains and rice.   Talk to your doctor about Calcium and Vitamin D.   Lifestyle  Exercise for at least 150 minutes a week (30 minutes a day, 5 days a week). This will help you control your weight and prevent disease.   Limit alcohol to one drink per day.   No smoking.   Wear sunscreen to prevent skin cancer.   See your dentist every six months for an exam and cleaning.   See your eye doctor every 1 to 2 years to screen for conditions such as glaucoma, macular degeneration and cataracts.        Follow-ups after your visit        Your next 10 appointments already scheduled     May 31, 2017  1:15 PM CDT   (Arrive by 1:00 PM)   Return Movement Disorder with KOTA Dutta [facility] ([facility])    [address]  [address]rd [facility] [id number] [phone]            Oct 06, 2017 10:30 AM CDT   RETURN GLAUCOMA with Yudith Mcdonnell MD   Eye Clinic ([facility])    [facility]  [address]  [facility] [id number] [phone]              Who to contact     If you have questions or need follow up information about today's clinic visit or your schedule please contact [facility] directly at [phone].  Normal or non-critical lab and imaging results will be communicated to you by MyChart, letter or phone within 4 business days after the clinic has received the results. If you do not hear from us within 7 days, please contact the clinic through MyChart or phone. If you have a critical or abnormal lab result, we will notify you by phone as soon as possible.  Submit refill requests through [employer] or call your pharmacy and  "they will forward the refill request to us. Please allow 3 business days for your refill to be completed.          Additional Information About Your Visit        InCytuharTractive Information     [employer] lets you send messages to your doctor, view your test results, renew your prescriptions, schedule appointments and more. To sign up, go to [email or web address] . Click on \"Log in\" on the left side of the screen, which will take you to the Welcome page. Then click on \"Sign up Now\" on the right side of the page.     You will be asked to enter the access code listed below, as well as some personal information. Please follow the directions to create your username and password.     Your access code is: [email or web address]  Expires: 2017  9:45 AM     Your access code will  in 90 days. If you need help or a new code, please call your [facility] clinic or [phone].        Care EveryWhere ID     This is your [facility] EveryWhere ID. This could be used by other organizations to access your [facility] medical records  [id number]        Your Vitals Were     Pulse Temperature Respirations Height Pulse Oximetry BMI (Body Mass Index)    61 98.2  F (36.8  C) (Tympanic) 18 5' 8.5\" (1.74 m) 93% 28.24 kg/m2       Blood Pressure from Last 3 Encounters:   17 102/70   17 125/58   17 139/77    Weight from Last 3 Encounters:   17 188 lb 8 oz (85.5 kg)   17 192 lb 12.8 oz (87.5 kg)   17 191 lb (86.6 kg)              We Performed the Following     Albumin Random Urine Quantitative          Today's Medication Changes          These changes are accurate as of: 17  9:45 AM.  If you have any questions, ask your nurse or doctor.               Start taking these medicines.        Dose/Directions    calcitonin (salmon) 200 UNIT/ACT nasal spray   Commonly known as:  MIACALCIN   Used for:  Compression fracture of thoracic vertebra, with routine healing, subsequent encounter   Started by:  Selene Land MD    "     Dose:  1 spray   Spray 1 spray into one nostril alternating nostrils daily Alternate nostril each day.   Quantity:  1 Bottle   Refills:  1            Where to get your medicines      These medications were sent to [facility] [place], [place] - [address]  [address], [facility] [address]     Phone:  [phone]     calcitonin (salmon) 200 UNIT/ACT nasal spray                Primary Care Provider Office Phone # Fax #    Selene Land [phone]       [facility] [address]  [facility] [address]        Thank you!     Thank you for choosing [facility]  for your care. Our goal is always to provide you with excellent care. Hearing back from our patients is one way we can continue to improve our services. Please take a few minutes to complete the written survey that you may receive in the mail after your visit with us. Thank you!             Your Updated Medication List - Protect others around you: Learn how to safely use, store and throw away your medicines at www.disposemymeds.org.          This list is accurate as of: 5/4/17  9:45 AM.  Always use your most recent med list.                   Brand Name Dispense Instructions for use    aspirin 81 MG tablet      1 TABLET DAILY       Blood Pressure Monitor Pricilla     1 Device    1 Device daily.       calcitonin (salmon) 200 UNIT/ACT nasal spray    MIACALCIN    1 Bottle    Spray 1 spray into one nostril alternating nostrils daily Alternate nostril each day.       carbidopa-levodopa  MG per tablet    SINEMET    540 tablet    Take 2 tablets 3 times a day 1 hour before each meal.  (Around 7 am, 11 am, & 4 pm.)       GAVILYTE-G 236 G suspension   Generic drug:  polyethylene glycol          lisinopril 40 MG tablet    PRINIVIL/ZESTRIL    90 tablet    TAKE ONE TABLET BY MOUTH EVERY DAY       metoprolol 25 MG 24 hr tablet    TOPROL-XL    90 tablet    TAKE ONE TABLET BY MOUTH EVERY DAY        PARoxetine 20 MG tablet    PAXIL    90 tablet    Take 1 tablet (20 mg) by mouth At Bedtime       simvastatin 40 MG tablet    ZOCOR    90 tablet    Take 1 tablet (40 mg) by mouth At Bedtime Please profile       tamsulosin 0.4 MG capsule    FLOMAX    90 capsule    Take 1 capsule (0.4 mg) by mouth daily

## 2017-05-04 NOTE — NURSING NOTE
"Chief Complaint   Patient presents with     Physical       Initial /70 (BP Location: Left arm, Patient Position: Chair, Cuff Size: Adult Regular)  Pulse 61  Temp 98.2  F (36.8  C) (Tympanic)  Resp 18  Ht 5' 8.5\" (1.74 m)  Wt 188 lb 8 oz (85.5 kg)  SpO2 93%  BMI 28.24 kg/m2 Estimated body mass index is 28.24 kg/(m^2) as calculated from the following:    Height as of this encounter: 5' 8.5\" (1.74 m).    Weight as of this encounter: 188 lb 8 oz (85.5 kg).  Medication Reconciliation: complete Bina Mcmullen/RAMAKRISHNA    "

## 2017-05-31 ENCOUNTER — OFFICE VISIT (OUTPATIENT)
Dept: NEUROLOGY | Facility: CLINIC | Age: 77
End: 2017-05-31

## 2017-05-31 VITALS
HEIGHT: 69 IN | HEART RATE: 49 BPM | BODY MASS INDEX: 27.85 KG/M2 | WEIGHT: 188 LBS | SYSTOLIC BLOOD PRESSURE: 140 MMHG | DIASTOLIC BLOOD PRESSURE: 70 MMHG

## 2017-05-31 DIAGNOSIS — R42 DIZZINESS: ICD-10-CM

## 2017-05-31 DIAGNOSIS — G20.A1 PARKINSON'S DISEASE (H): Primary | ICD-10-CM

## 2017-05-31 ASSESSMENT — PAIN SCALES - GENERAL: PAINLEVEL: NO PAIN (0)

## 2017-05-31 NOTE — PROGRESS NOTES
PATIENT: Vini Loya    : 1940    ALBINA: May 31, 2017    REASON FOR VISIT:  Follow up for Parkinson's disease (PD).      HISTORY OF PRESENT ILLNESS:  Mr. Vini Loya is a 76-year-old left-handed  male who came [facility] accompanied by his wife for a follow up visit.  I saw him last in clinic about 4 months ago for a routine PD follow up visit.     The patient reports that this past Memorial weekend he has been painting his garage.  He has been having frequent dizziness.  His wife reports noticing that he was having dizziness when he was bending down to get the paint and standing up to paint or scrape the wall.  She has told him to take breaks after working for an hour; but, he continued to work once he started working.  One day he stayed in bed due to dizziness & fatigue.  The patient reports that he also gets dizzy when he goes up stairs.  He has been on metoprolol for awhile and takes 25 mg daily.  He believes his dizziness gets worse after taking carbidopa/levodopa.  Currently, he is taking carbidopa/levodopa 25/100 mg 2 tablets t.i.d.  He has not had any syncope episode.  His wife reports that it is dangerous for him to go up and down ladder but he continues to do that regardless of her encouragement.     MEDICATIONS:   Medication Sig     calcitonin, salmon, (MIACALCIN) 200 UNIT/ACT nasal spray Spray 1 spray into one nostril alternating nostrils daily Alternate nostril each day.     metoprolol (TOPROL-XL) 25 MG 24 hr TAKE ONE TABLET BY MOUTH EVERY DAY     lisinopril (PRINIVIL/ZESTRIL) 40 MG TAKE ONE TABLET BY MOUTH EVERY DAY     tamsulosin (FLOMAX) 0.4 MG 24 hr Take 1 capsule (0.4 mg) by mouth daily     carbidopa-levodopa (SINEMET)  MG Take 2 tablets 3 times a day 1 hour before each meal.  (Around 7 am, 11 am, & 4 pm.)     PARoxetine (PAXIL) 20 MG  Take 1 tablet (20 mg) by mouth At Bedtime     simvastatin (ZOCOR) 40 MG  Take 1 tablet (40 mg) by mouth At Bedtime Please  "profile     GAVILYTE-G 236 G suspension      Blood Pressure Monitor JERONIMO 1 Device daily.     ASPIRIN 81 MG OR TABS 1 TABLET DAILY       ALLERGIES: Vytorin    PHYSICAL EXAM:    VITAL SIGNS:  Blood pressure 140/70, pulse (!) 49, height 1.74 m (5' 8.5\"), weight 85.3 kg (188 lb)., Body mass index is 28.17 kg/(m^2).     Rt arm orthostatic BP & Pulse:   Sitting BP = 117/67, Pulse = 48  Standing after 2 min BP =  131/69 Pulse = 48    GENERAL:  Mr. Loya is a pleasant  male who is well-groomed and well-developed, sitting comfortably in the exam room without any distress.  Affect is appropriate.     MOVEMENT DISORDERS ASSESSMENT:  (Last Sinemet was taken about 2 hours ago).   Speech shows slight loss of expression and volume; monotone.  Moderate hypomimia with lips parted some of the time.  Mild rest tremor in both hands, which become moderate with mental activation in left hand.  Mild chin tremor that becomes moderate with mental activation.  No rest tremor noted in right body.  No postural or action tremor bilaterally.  Slight to mild rigidity on both upper extremities and absent in lower extremities.  Finger tapping is normal in right side and shows mild slowing and reduction in amplitude in left.  Hand opening and closing and hand pronation and supination show mild slowing and reduction in amplitude bilaterally.  Leg agility is normal.  He was able to arise from a chair with arms folded across his chest quickly without difficulty.  Posture is slightly stooped leaning to one side.  Gait is slow with short steps.  He has mild body bradykinesia.        ASSESSMENT AND PLAN:      Parkinson's disease.  Overall tremor seems to be worse, however, he has had more dizzy spells as noted above.  His orthostatic blood pressure did not show significant systolic blood pressure drop.  It appears that he does not compensate as his heart rates has remained 48 when standing as well as sitting.     __  I recommended going back to " see his primary care to address low heart rate & dizziness and possibly adjusting metoprolol dose.   __  Will wait on increasing his carbidopa/levodopa as this might cause more dizziness.   __  I encouraged him to go back to exercising and resuming his routine.  I told him to be very cautious and avoid strenuous exercise on days that he is dizzy.      The total time spent with the patient was 45 minutes, greater than 50% of the time was spent in counseling and coordination of care.      KOTA Stone, CNP  [facility]           D: 2017 14:06   T: 2017 18:08   MT: nh      Name:     GRAZYNA POTTS   MRN:      [id number]        Account:      [id number]   :      1940           Service Date: 2017      Document: [id number]

## 2017-05-31 NOTE — PATIENT INSTRUCTIONS
May 31, 2017    Dear Mr. Vini Loya,    Thank you for coming today.  During your visit, we have discussed the following:     __  For now stay on the same dose of Sinemet.   __  Consider seeing Dr. Land to discuss low heart rate (46 - 49) & dizziness and if Metoprolol dose needs adjustment.   __  Resume going to the gym & exercise regularly.   __  Return in 3 months. You may return sooner as needed.    For questions, you may send us a [employer] message or call [phone]    Fax number: [phone]    KOTA Stone, CNP  [facility]

## 2017-05-31 NOTE — NURSING NOTE
Chief Complaint   Patient presents with     RECHECK     movement disorder    Maxim Carranza CMA

## 2017-05-31 NOTE — MR AVS SNAPSHOT
After Visit Summary   5/31/2017    Vini Loya    MRN: [id number]           Patient Information     Date Of Birth          1940        Visit Information        Provider Department      5/31/2017 1:15 PM Dalila Staples APRN CNP [facility]        Today's Diagnoses     Parkinson's disease (H)    -  1    Dizziness          Care Instructions    May 31, 2017    Dear Mr. Vini Loya,    Thank you for coming today.  During your visit, we have discussed the following:     __  For now stay on the same dose of Sinemet.   __  Consider seeing Dr. Land to discuss low heart rate (46 - 49) & dizziness and if Metoprolol dose needs adjustment.   __  Resume going to the gym & exercise regularly.   __  Return in 3 months. You may return sooner as needed.    For questions, you may send us a [employer] message or call [phone]    Fax number: [phone]    KOTA Stone CNP  [facility]            Follow-ups after your visit        Your next 10 appointments already scheduled     Sep 27, 2017  2:00 PM CDT   (Arrive by 1:45 PM)   Return Movement Disorder with KOTA Dutta CNP [facility] ([facility])    [address]  3rd [facility] [address] [phone]            Oct 06, 2017 10:30 AM CDT   RETURN GLAUCOMA with Yudith Mcdonnell MD   Eye Clinic ([facility])    [facility]  [address]  [address] [address]  [facility] [id number] [phone]              Who to contact     Please call your clinic at [phone] to:    Ask questions about your health    Make or cancel appointments    Discuss your medicines    Learn about your test results    Speak to your doctor   If you have compliments or concerns about an experience at your clinic, or if you wish to file a complaint, please contact [facility] Patient Relations at [phone] or email us at  "[email or web address]         Additional Information About Your Visit        [employer] Information     [employer] is an electronic gateway that provides easy, online access to your medical records. With [employer], you can request a clinic appointment, read your test results, renew a prescription or communicate with your care team.     To sign up for [employer] visit the website at [email or web address]   You will be asked to enter the access code listed below, as well as some personal information. Please follow the directions to create your username and password.     Your access code is: [email or web address]  Expires: 2017  9:45 AM     Your access code will  in 90 days. If you need help or a new code, please contact your [facility] or call [phone] for assistance.        Care EveryWhere ID     This is your Care EveryWhere ID. This could be used by other organizations to access your [facility] medical records  [id number]        Your Vitals Were     Pulse Height BMI (Body Mass Index)             49 1.74 m (5' 8.5\") 28.17 kg/m2          Blood Pressure from Last 3 Encounters:   17 140/70   17 102/70   17 125/58    Weight from Last 3 Encounters:   17 85.3 kg (188 lb)   17 85.5 kg (188 lb 8 oz)   17 87.5 kg (192 lb 12.8 oz)              Today, you had the following     No orders found for display       Primary Care Provider Office Phone # Fax #    Selene Land [phone]       [facility] [address]  [facility] [address]        Thank you!     Thank you for choosing [facility]  for your care. Our goal is always to provide you with excellent care. Hearing back from our patients is one way we can continue to improve our services. Please take a few minutes to complete the written survey that you may receive in the mail after your visit with us. Thank you!             Your Updated Medication " List - Protect others around you: Learn how to safely use, store and throw away your medicines at www.disposemymeds.org.          This list is accurate as of: 5/31/17  2:01 PM.  Always use your most recent med list.                   Brand Name Dispense Instructions for use    aspirin 81 MG tablet      1 TABLET DAILY       Blood Pressure Monitor Pricilla     1 Device    1 Device daily.       calcitonin (salmon) 200 UNIT/ACT nasal spray    MIACALCIN    1 Bottle    Spray 1 spray into one nostril alternating nostrils daily Alternate nostril each day.       carbidopa-levodopa  MG per tablet    SINEMET    540 tablet    Take 2 tablets 3 times a day 1 hour before each meal.  (Around 7 am, 11 am, & 4 pm.)       GAVILYTE-G 236 G suspension   Generic drug:  polyethylene glycol          lisinopril 40 MG tablet    PRINIVIL/ZESTRIL    90 tablet    TAKE ONE TABLET BY MOUTH EVERY DAY       metoprolol 25 MG 24 hr tablet    TOPROL-XL    90 tablet    TAKE ONE TABLET BY MOUTH EVERY DAY       PARoxetine 20 MG tablet    PAXIL    90 tablet    Take 1 tablet (20 mg) by mouth At Bedtime       simvastatin 40 MG tablet    ZOCOR    90 tablet    Take 1 tablet (40 mg) by mouth At Bedtime Please profile       tamsulosin 0.4 MG capsule    FLOMAX    90 capsule    Take 1 capsule (0.4 mg) by mouth daily

## 2017-06-20 ENCOUNTER — OFFICE VISIT (OUTPATIENT)
Dept: FAMILY MEDICINE | Facility: CLINIC | Age: 77
End: 2017-06-20
Payer: COMMERCIAL

## 2017-06-20 VITALS
DIASTOLIC BLOOD PRESSURE: 79 MMHG | OXYGEN SATURATION: 96 % | HEART RATE: 54 BPM | BODY MASS INDEX: 27.55 KG/M2 | HEIGHT: 69 IN | TEMPERATURE: 98.1 F | SYSTOLIC BLOOD PRESSURE: 139 MMHG | WEIGHT: 186 LBS

## 2017-06-20 DIAGNOSIS — I10 HYPERTENSION, GOAL BELOW 150/90: Primary | ICD-10-CM

## 2017-06-20 DIAGNOSIS — F41.1 GENERALIZED ANXIETY DISORDER: ICD-10-CM

## 2017-06-20 DIAGNOSIS — M51.27 HERNIATED NUCLEUS PULPOSUS, L5-S1, RIGHT: ICD-10-CM

## 2017-06-20 DIAGNOSIS — M51.369 DEGENERATION OF L4-L5 INTERVERTEBRAL DISC: ICD-10-CM

## 2017-06-20 DIAGNOSIS — S22.000D COMPRESSION FRACTURE OF THORACIC VERTEBRA, WITH ROUTINE HEALING, SUBSEQUENT ENCOUNTER: ICD-10-CM

## 2017-06-20 PROCEDURE — 99207 C PAF COMPLETED  NO CHARGE: CPT | Performed by: FAMILY MEDICINE

## 2017-06-20 PROCEDURE — 99213 OFFICE O/P EST LOW 20 MIN: CPT | Performed by: FAMILY MEDICINE

## 2017-06-20 ASSESSMENT — ANXIETY QUESTIONNAIRES
7. FEELING AFRAID AS IF SOMETHING AWFUL MIGHT HAPPEN: NOT AT ALL
2. NOT BEING ABLE TO STOP OR CONTROL WORRYING: NOT AT ALL
6. BECOMING EASILY ANNOYED OR IRRITABLE: NOT AT ALL
IF YOU CHECKED OFF ANY PROBLEMS ON THIS QUESTIONNAIRE, HOW DIFFICULT HAVE THESE PROBLEMS MADE IT FOR YOU TO DO YOUR WORK, TAKE CARE OF THINGS AT HOME, OR GET ALONG WITH OTHER PEOPLE: NOT DIFFICULT AT ALL
GAD7 TOTAL SCORE: 0
5. BEING SO RESTLESS THAT IT IS HARD TO SIT STILL: NOT AT ALL
1. FEELING NERVOUS, ANXIOUS, OR ON EDGE: NOT AT ALL
3. WORRYING TOO MUCH ABOUT DIFFERENT THINGS: NOT AT ALL

## 2017-06-20 ASSESSMENT — PATIENT HEALTH QUESTIONNAIRE - PHQ9: 5. POOR APPETITE OR OVEREATING: NOT AT ALL

## 2017-06-20 NOTE — NURSING NOTE
"Chief Complaint   Patient presents with     Hypertension       Initial /79  Pulse 54  Temp 98.1  F (36.7  C) (Tympanic)  Ht 5' 8.5\" (1.74 m)  Wt 186 lb (84.4 kg)  SpO2 96%  BMI 27.87 kg/m2 Estimated body mass index is 27.87 kg/(m^2) as calculated from the following:    Height as of this encounter: 5' 8.5\" (1.74 m).    Weight as of this encounter: 186 lb (84.4 kg).  Medication Reconciliation: complete     Antonieta Parada MA    "

## 2017-06-20 NOTE — MR AVS SNAPSHOT
After Visit Summary   6/20/2017    Vini Loya    MRN: [id number]           Patient Information     Date Of Birth          1940        Visit Information        Provider Department      6/20/2017 2:00 PM Selene Land MD [facility]        Today's Diagnoses     Hypertension, goal below 150/90    -  1    Compression fracture of thoracic vertebra, with routine healing, subsequent encounter        Herniated nucleus pulposus, L5-S1, right        Degeneration of L4-L5 intervertebral disc        Generalized anxiety disorder           Follow-ups after your visit        Additional Services     ORTHO  REFERRAL       [facility] is referring you to the Orthopedic  Services at [facility].       The  Representative will assist you in the coordination of your Orthopedic and Musculoskeletal Care as prescribed by your physician.    The  Representative will call you within 1 business day to help schedule your appointment, or you may contact the  Representative at:    All areas ~ [phone]     Type of Referral : Spine: Cervical / Thoracic: Medical Spine Specialist        Timeframe requested: Routine    Coverage of these services is subject to the terms and limitations of your health insurance plan.  Please call member services at your health plan with any benefit or coverage questions.      If X-rays, CT or MRI's have been performed, please contact the facility where they were done to arrange for , prior to your scheduled appointment.  Please bring this referral request to your appointment and present it to your specialist.                  Your next 10 appointments already scheduled     Sep 27, 2017  2:00 PM CDT   (Arrive by 1:45 PM)   Return Movement Disorder with KOTA [facility] Neurology ([facility])    [address] [place] [address]  "Floor  [facility] [address]   [phone]            Oct 06, 2017 10:30 AM CDT   RETURN GLAUCOMA with Yudith Mcdonnell MD   Eye Clinic ([facility])    [address]  [address] [facility]  [address]th [address]  [facility] [address]   [phone]              Who to contact     If you have questions or need follow up information about today's clinic visit or your schedule please contact [facility] directly at [phone].  Normal or non-critical lab and imaging results will be communicated to you by [employer], letter or phone within 4 business days after the clinic has received the results. If you do not hear from us within 7 days, please contact the clinic through [employer] or phone. If you have a critical or abnormal lab result, we will notify you by phone as soon as possible.  Submit refill requests through [employer] or call your pharmacy and they will forward the refill request to us. Please allow 3 business days for your refill to be completed.          Additional Information About Your Visit        [employer] Information     [employer] lets you send messages to your doctor, view your test results, renew your prescriptions, schedule appointments and more. To sign up, go to [email or web address] . Click on \"Log in\" on the left side of the screen, which will take you to the Welcome page. Then click on \"Sign up Now\" on the right side of the page.     You will be asked to enter the access code listed below, as well as some personal information. Please follow the directions to create your username and password.     Your access code is: [email or web address]  Expires: 2017  9:45 AM     Your access code will  in 90 days. If you need help or a new code, please call your [facility] clinic or [phone].        Care EveryWhere ID     This is your Care EveryWhere ID. This could be used by other organizations to access your [facility] medical records  [id number]        Your Vitals Were     Pulse " "Temperature Height Pulse Oximetry BMI (Body Mass Index)       54 98.1  F (36.7  C) (Tympanic) 5' 8.5\" (1.74 m) 96% 27.87 kg/m2        Blood Pressure from Last 3 Encounters:   06/20/17 139/79   05/31/17 140/70   05/04/17 102/70    Weight from Last 3 Encounters:   06/20/17 186 lb (84.4 kg)   05/31/17 188 lb (85.3 kg)   05/04/17 188 lb 8 oz (85.5 kg)              We Performed the Following     ORTHO  REFERRAL        Primary Care Provider Office Phone # Fax #    Selene Land [phone]       [facility] [address]  [facility] [address]        Thank you!     Thank you for choosing [facility]  for your care. Our goal is always to provide you with excellent care. Hearing back from our patients is one way we can continue to improve our services. Please take a few minutes to complete the written survey that you may receive in the mail after your visit with us. Thank you!             Your Updated Medication List - Protect others around you: Learn how to safely use, store and throw away your medicines at www.disposemymeds.org.          This list is accurate as of: 6/20/17  2:53 PM.  Always use your most recent med list.                   Brand Name Dispense Instructions for use    aspirin 81 MG tablet      1 TABLET DAILY       Blood Pressure Monitor Pricilla     1 Device    1 Device daily.       calcitonin (salmon) 200 UNIT/ACT nasal spray    MIACALCIN    1 Bottle    Spray 1 spray into one nostril alternating nostrils daily Alternate nostril each day.       carbidopa-levodopa  MG per tablet    SINEMET    540 tablet    Take 2 tablets 3 times a day 1 hour before each meal.  (Around 7 am, 11 am, & 4 pm.)       lisinopril 40 MG tablet    PRINIVIL/ZESTRIL    90 tablet    TAKE ONE TABLET BY MOUTH EVERY DAY       metoprolol 25 MG 24 hr tablet    TOPROL-XL    90 tablet    TAKE ONE TABLET BY MOUTH EVERY DAY       PARoxetine 20 MG tablet    PAXIL    90 tablet    " Take 1 tablet (20 mg) by mouth At Bedtime       simvastatin 40 MG tablet    ZOCOR    90 tablet    Take 1 tablet (40 mg) by mouth At Bedtime Please profile       tamsulosin 0.4 MG capsule    FLOMAX    90 capsule    Take 1 capsule (0.4 mg) by mouth daily

## 2017-06-20 NOTE — LETTER
My Depression Action Plan  Name: Vini Loya   Date of Birth 1940  Date: 6/20/2017    My doctor: Selene Land   My clinic: [address] [address] [phone]          GREEN    ZONE   Good Control    What it looks like:     Things are going generally well. You have normal up s and down s. You may even feel depressed from time to time, but bad moods usually last less than a day.   What you need to do:  1. Continue to care for yourself (see self care plan)  2. Check your depression survival kit and update it as needed  3. Follow your physician s recommendations including any medication.  4. Do not stop taking medication unless you consult with your physician first.           YELLOW         ZONE Getting Worse    What it looks like:     Depression is starting to interfere with your life.     It may be hard to get out of bed; you may be starting to isolate yourself from others.    Symptoms of depression are starting to last most all day and this has happened for several days.     You may have suicidal thoughts but they are not constant.   What you need to do:     1. Call your care team, your response to treatment will improve if you keep your care team informed of your progress. Yellow periods are signs an adjustment may need to be made.     2. Continue your self-care, even if you have to fake it!    3. Talk to someone in your support network    4. Open up your depression survival kit           RED    ZONE Medical Alert - Get Help    What it looks like:     Depression is seriously interfering with your life.     You may experience these or other symptoms: You can t get out of bed most days, can t work or engage in other necessary activities, you have trouble taking care of basic hygiene, or basic responsibilities, thoughts of suicide or death that will not go away, self-injurious behavior.     What you need to do:  1. Call your care  team and request a same-day appointment. If they are not available (weekends or after hours) call your local crisis line, emergency room or 911.      Electronically signed by: Antonieta Parada, June 20, 2017    Depression Self Care Plan / Survival Kit    Self-Care for Depression  Here s the deal. Your body and mind are really not as separate as most people think.  What you do and think affects how you feel and how you feel influences what you do and think. This means if you do things that people who feel good do, it will help you feel better.  Sometimes this is all it takes.  There is also a place for medication and therapy depending on how severe your depression is, so be sure to consult with your medical provider and/ or Behavioral Health Consultant if your symptoms are worsening or not improving.     In order to better manage my stress, I will:    Exercise  Get some form of exercise, every day. This will help reduce pain and release endorphins, the  feel good  chemicals in your brain. This is almost as good as taking antidepressants!  This is not the same as joining a gym and then never going! (they count on that by the way ) It can be as simple as just going for a walk or doing some gardening, anything that will get you moving.      Hygiene   Maintain good hygiene (Get out of bed in the morning, Make your bed, Brush your teeth, Take a shower, and Get dressed like you were going to work, even if you are unemployed).  If your clothes don't fit try to get ones that do.    Diet  I will strive to eat foods that are good for me, drink plenty of water, and avoid excessive sugar, caffeine, alcohol, and other mood-altering substances.  Some foods that are helpful in depression are: complex carbohydrates, B vitamins, flaxseed, fish or fish oil, fresh fruits and vegetables.    Psychotherapy  I agree to participate in Individual Therapy (if recommended).    Medication  If prescribed medications, I agree to take them.  Missing  doses can result in serious side effects.  I understand that drinking alcohol, or other illicit drug use, may cause potential side effects.  I will not stop my medication abruptly without first discussing it with my provider.    Staying Connected With Others  I will stay in touch with my friends, family members, and my primary care provider/team.    Use your imagination  Be creative.  We all have a creative side; it doesn t matter if it s oil painting, sand castles, or mud pies! This will also kick up the endorphins.    Witness Beauty  (AKA stop and smell the roses) Take a look outside, even in mid-winter. Notice colors, textures. Watch the squirrels and birds.     Service to others  Be of service to others.  There is always someone else in need.  By helping others we can  get out of ourselves  and remember the really important things.  This also provides opportunities for practicing all the other parts of the program.    Humor  Laugh and be silly!  Adjust your TV habits for less news and crime-drama and more comedy.    Control your stress  Try breathing deep, massage therapy, biofeedback, and meditation. Find time to relax each day.     My support system    Clinic Contact:  Phone number:    Contact 1:  Phone number:    Contact 2:  Phone number:    Orthodoxy/:  Phone number:    Therapist:  Phone number:    Local crisis center:    Phone number:    Other community support:  Phone number:
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
## 2017-06-21 ASSESSMENT — ANXIETY QUESTIONNAIRES: GAD7 TOTAL SCORE: 0

## 2017-06-21 ASSESSMENT — PATIENT HEALTH QUESTIONNAIRE - PHQ9: SUM OF ALL RESPONSES TO PHQ QUESTIONS 1-9: 5

## 2017-07-02 ENCOUNTER — HOSPITAL ENCOUNTER (EMERGENCY)
Facility: CLINIC | Age: 77
Discharge: HOME | End: 2017-07-02
Attending: EMERGENCY MEDICINE | Admitting: EMERGENCY MEDICINE
Payer: COMMERCIAL

## 2017-07-02 ENCOUNTER — APPOINTMENT (OUTPATIENT)
Dept: CT IMAGING | Facility: CLINIC | Age: 77
End: 2017-07-02
Attending: EMERGENCY MEDICINE
Payer: COMMERCIAL

## 2017-07-02 VITALS
TEMPERATURE: 98.4 F | OXYGEN SATURATION: 95 % | HEART RATE: 55 BPM | RESPIRATION RATE: 18 BRPM | DIASTOLIC BLOOD PRESSURE: 91 MMHG | SYSTOLIC BLOOD PRESSURE: 170 MMHG | HEIGHT: 69 IN | WEIGHT: 180 LBS | BODY MASS INDEX: 26.66 KG/M2

## 2017-07-02 DIAGNOSIS — S00.83XA FACIAL HEMATOMA, INITIAL ENCOUNTER: ICD-10-CM

## 2017-07-02 DIAGNOSIS — S00.81XA FACIAL ABRASION, INITIAL ENCOUNTER: ICD-10-CM
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
## 2017-07-02 LAB
ALBUMIN SERPL-MCNC: 3.6 G/DL (ref 3.4–5)
ALP SERPL-CCNC: 79 U/L (ref 40–150)
ALT SERPL W P-5'-P-CCNC: 8 U/L (ref 0–70)
ANION GAP SERPL CALCULATED.3IONS-SCNC: 6 MMOL/L (ref 3–14)
AST SERPL W P-5'-P-CCNC: 22 U/L (ref 0–45)
BASOPHILS # BLD AUTO: 0.1 10E9/L (ref 0–0.2)
BASOPHILS NFR BLD AUTO: 0.5 %
BILIRUB SERPL-MCNC: 1.3 MG/DL (ref 0.2–1.3)
BUN SERPL-MCNC: 17 MG/DL (ref 7–30)
CALCIUM SERPL-MCNC: 9.1 MG/DL (ref 8.5–10.1)
CHLORIDE SERPL-SCNC: 107 MMOL/L (ref 94–109)
CO2 SERPL-SCNC: 30 MMOL/L (ref 20–32)
CREAT SERPL-MCNC: 1.09 MG/DL (ref 0.66–1.25)
DIFFERENTIAL METHOD BLD: NORMAL
EOSINOPHIL # BLD AUTO: 0.1 10E9/L (ref 0–0.7)
EOSINOPHIL NFR BLD AUTO: 0.8 %
ERYTHROCYTE [DISTWIDTH] IN BLOOD BY AUTOMATED COUNT: 13.3 % (ref 10–15)
GFR SERPL CREATININE-BSD FRML MDRD: 66 ML/MIN/1.7M2
GLUCOSE SERPL-MCNC: 126 MG/DL (ref 70–99)
HCT VFR BLD AUTO: 45.3 % (ref 40–53)
HGB BLD-MCNC: 15.3 G/DL (ref 13.3–17.7)
IMM GRANULOCYTES # BLD: 0 10E9/L (ref 0–0.4)
IMM GRANULOCYTES NFR BLD: 0.2 %
INR PPP: 0.98 (ref 0.86–1.14)
LYMPHOCYTES # BLD AUTO: 1.7 10E9/L (ref 0.8–5.3)
LYMPHOCYTES NFR BLD AUTO: 18.6 %
MCH RBC QN AUTO: 30.9 PG (ref 26.5–33)
MCHC RBC AUTO-ENTMCNC: 33.8 G/DL (ref 31.5–36.5)
MCV RBC AUTO: 92 FL (ref 78–100)
MONOCYTES # BLD AUTO: 1.1 10E9/L (ref 0–1.3)
MONOCYTES NFR BLD AUTO: 12.1 %
NEUTROPHILS # BLD AUTO: 6.2 10E9/L (ref 1.6–8.3)
NEUTROPHILS NFR BLD AUTO: 67.8 %
NRBC # BLD AUTO: 0 10*3/UL
NRBC BLD AUTO-RTO: 0 /100
PLATELET # BLD AUTO: 211 10E9/L (ref 150–450)
POTASSIUM SERPL-SCNC: 4.2 MMOL/L (ref 3.4–5.3)
PROT SERPL-MCNC: 7.2 G/DL (ref 6.8–8.8)
RBC # BLD AUTO: 4.95 10E12/L (ref 4.4–5.9)
SODIUM SERPL-SCNC: 143 MMOL/L (ref 133–144)
WBC # BLD AUTO: 9.2 10E9/L (ref 4–11)

## 2017-07-02 PROCEDURE — 85610 PROTHROMBIN TIME: CPT | Performed by: EMERGENCY MEDICINE

## 2017-07-02 PROCEDURE — 99284 EMERGENCY DEPT VISIT MOD MDM: CPT | Mod: 25 | Performed by: EMERGENCY MEDICINE

## 2017-07-02 PROCEDURE — 99284 EMERGENCY DEPT VISIT MOD MDM: CPT | Mod: Z6 | Performed by: EMERGENCY MEDICINE

## 2017-07-02 PROCEDURE — 80053 COMPREHEN METABOLIC PANEL: CPT | Performed by: EMERGENCY MEDICINE

## 2017-07-02 PROCEDURE — 70450 CT HEAD/BRAIN W/O DYE: CPT

## 2017-07-02 PROCEDURE — 85025 COMPLETE CBC W/AUTO DIFF WBC: CPT | Performed by: EMERGENCY MEDICINE

## 2017-07-02 ASSESSMENT — ENCOUNTER SYMPTOMS
BACK PAIN: 0
NAUSEA: 0
VOMITING: 0
COUGH: 0
LIGHT-HEADEDNESS: 0
DIZZINESS: 0
WOUND: 1
WEAKNESS: 0
NUMBNESS: 0
HEADACHES: 0
FEVER: 0
NECK PAIN: 0
SHORTNESS OF BREATH: 0

## 2017-07-02 NOTE — ED NOTES
PTA  fell landing on cement face first. Abrasion above right eyebrow. Takes a daily ASA 81 mg. Denies LOC or neck pain.
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
## 2017-07-02 NOTE — ED AVS SNAPSHOT
[facility], Emergency Department    [address] [address]    Phone:  [phone]                                       Vini Loya   MRN: [id number]    Department:  [facility], Emergency Department   Date of Visit:  7/2/2017           Patient Information     Date Of Birth          1940        Your diagnoses for this visit were:     Fall, initial encounter     Facial abrasion, initial encounter     Facial hematoma, initial encounter        You were seen by Catalina Patino MD.        Discharge Instructions         Abrasions  Abrasions are skin scrapes. Their treatment depends on how large and deep the abrasion is.  Home care  You may be prescribed an antibiotic cream or ointment to apply to the wound. This helps prevent infection. Follow instructions when using this medication.  General care    To care for the abrasion, do the following each day for as long as directed by your health care provider.    If you were given a bandage, change it once a day. If your bandage sticks to the wound, soak it in warm water until it loosens.    Wash the area with soap and warm water. You may do this in a sink or under a tub faucet or shower. Rinse off the soap. Then pat the area dry with a clean towel.    If antibiotic ointment or cream was prescribed, reapply it to the wound as directed. Cover the wound with a fresh non-stick bandage. If the bandage becomes wet or dirty, change it as soon as possible.    You may use acetaminophen or ibuprofen to control pain unless another pain medication was prescribed. Note: If you have chronic liver or kidney disease or ever had a stomach ulcer or GI bleeding, talk with your health care provider before using these medications. Do not use ibuprofen in children under six months of age.    Most skin wounds heal within ten days. But an infection may occur despite treatment. Therefore, monitor the wound for signs of infection as listed below.  Follow-up  care  Follow up with your health care provider, or as advised.  When to seek medical advice  Call your health care provider right away if any of these occur:    Fever of 101 F (38.3 C) or higher, or as directed by your health care provider    Increasing pain, redness, swelling, or drainage from the wound    Bleeding from the wound that does not stop after a few minutes of steady, firm pressure    Decreased ability to move any body part near wound  Date Last Reviewed: 3/22/2015    7574-5858 [employer]. [address] [place] [address]. All rights reserved. This information is not intended as a substitute for professional medical care. Always follow your healthcare professional's instructions.          Preventing Falls: Make Your Health a Priority  Having a health problem can make you more likely to fall. Taking certain kinds of medicines may also increase your risk of falls. So, improving your health can help you avoid a fall. Work with your healthcare provider to manage health problems and to review your medicines. If you have your health under control, your risk of falling is lessened.    How chronic conditions increase your fall risk  Health problems like diabetes, high or low blood pressure, and arthritis are called chronic health conditions. They can be managed, but they don't go away. Chronic health problems put you at greater risk of a fall. This is because they can affect many parts of your body. They may cause problems with movement, balance, or vision. And certain medicines you take for them may have side effects, such as dizziness or drowsiness. These side effects also increase your risk.  Work with your healthcare provider  Your healthcare provider can work with you to help prevent a fall. See your healthcare provider for a medical exam each year. Go more often if you have symptoms, such as leg numbness or dizziness, that could raise your risk of falling. If you have a history of  falls, let your provider know. Bring a list of your medicines to review with your healthcare provider. Discuss your nutrition and exercise routine. And ask whether you need any tests to assess your risk of falling.    Review your medicines  Medicines (even ones you buy over the counter) can cause side effects that lead to a fall. Common medicines that cause these kinds of side effects are blood pressure, heart, or pain medicines, medicines for sleep, and antidepressants. Store pain medicine in a secure area, such as an upper cabinet in your kitchen or bathroom. Don't mix different pills in the same bottle. Always store and travel with medicines in their original containers with clear labels. This will reduce the chance of taking the wrong medicine or too much of a medicine. Taking too much of a medicine or taking the wrong medicine may cause side effects that can increase your risk of falling.  Also, the way your body reacts to medicines can change as you age. So, certain medicines that were fine in the past may cause side effects now. Your healthcare provider (such as your doctor or pharmacist) can help review your medicines and make changes if needed.  Get your eyes and ears checked  Problems with vision or hearing can lead to falls:    Get your eyes checked at least once a year. Take time to adjust to new glasses.    Get your hearing checked at least every other year.    Have your doctor check your inner ear for problems that may affect your balance.  Get the right nutrition  If you don't get enough to eat or drink, you can become dizzy and fall:    Your sense of thirst decreases with age. Drink water throughout the day.    Eat breakfast. Plan regular meals.    Ask your healthcare provider whether you need supplements. These can help strengthen your bones and muscles to help prevent falls. They can also help prevent fractures if you do fall.  Stay as active as you can  Staying active is one of the best things you  can do to prevent falls. Keep in mind that doing too little can be as risky as doing too much. That's because not being active can make you weaker and more likely to fall. Balance, flexibility, strength, and endurance all come from exercise. They all play a role in preventing falls. Ask your healthcare provider which types of activity are right for you.  When to call your healthcare provider  Be sure to call your healthcare provider if you fall. Also call if you have any of these signs or symptoms (someone else may need to point them out to you):    Feeling lightheaded or dizzy more than once a day    Losing your balance often or feeling unsteady on your feet    Feeling numbness in your legs or feet, or noticing a change in the way you walk    Having a steady decline in your memory or mental sharpness   Date Last Reviewed: 6/13/2015 2000-2017 [employer]. [address] [place] [address]. All rights reserved. This information is not intended as a substitute for professional medical care. Always follow your healthcare professional's instructions.      Please make an appointment to follow up with Your Primary Care Provider in 3-4 days if other concerns.  See you doctor if the forehead has increased redness or if fever or other concerns.       Future Appointments        Provider Department Dept Phone Center    9/27/2017 2:00 PM KOTA Carpenter [facility] Neurology [phone] [facility]    10/5/2017 2:00 PM Abel Stone MD [facility] [phone] [facility]    10/10/2017 1:45 PM Yudith Mcdonnell MD Eye Clinic [phone] [facility]      24 Hour Appointment Hotline       To make an appointment at any [facility], call [phone] ([phone]). If you don't have a family doctor or clinic, we will help you find one. [facility] clinics are conveniently located to serve the needs of you and your family.             Review of your medicines      Our records  show that you are taking the medicines listed below. If these are incorrect, please call your family doctor or clinic.        Dose / Directions Last dose taken    aspirin 81 MG tablet        1 TABLET DAILY   Refills:  0        Blood Pressure Monitor Pricilla   Dose:  1 Device   Quantity:  1 Device        1 Device daily.   Refills:  0        calcitonin (salmon) 200 UNIT/ACT nasal spray   Commonly known as:  MIACALCIN   Dose:  1 spray   Quantity:  1 Bottle        Spray 1 spray into one nostril alternating nostrils daily Alternate nostril each day.   Refills:  1        carbidopa-levodopa  MG per tablet   Commonly known as:  SINEMET   Quantity:  540 tablet        Take 2 tablets 3 times a day 1 hour before each meal.  (Around 7 am, 11 am, & 4 pm.)   Refills:  3        lisinopril 40 MG tablet   Commonly known as:  PRINIVIL/ZESTRIL   Quantity:  90 tablet        TAKE ONE TABLET BY MOUTH EVERY DAY   Refills:  1        metoprolol 25 MG 24 hr tablet   Commonly known as:  TOPROL-XL   Quantity:  90 tablet        TAKE ONE TABLET BY MOUTH EVERY DAY   Refills:  0        PARoxetine 20 MG tablet   Commonly known as:  PAXIL   Dose:  20 mg   Quantity:  90 tablet        Take 1 tablet (20 mg) by mouth At Bedtime   Refills:  3        simvastatin 40 MG tablet   Commonly known as:  ZOCOR   Dose:  40 mg   Quantity:  90 tablet        Take 1 tablet (40 mg) by mouth At Bedtime Please profile   Refills:  3        tamsulosin 0.4 MG capsule   Commonly known as:  FLOMAX   Dose:  0.4 mg   Quantity:  90 capsule        Take 1 capsule (0.4 mg) by mouth daily   Refills:  3                Procedures and tests performed during your visit     CBC with platelets differential    CT Head w/o Contrast    Comprehensive metabolic panel    INR      Orders Needing Specimen Collection     None      Pending Results     Date and Time Order Name Status Description    7/2/2017 1831 CT Head w/o Contrast Preliminary             Pending Culture Results     No orders  "found from 2017 to 7/3/2017.            Pending Results Instructions     If you had any lab results that were not finalized at the time of your Discharge, you can call the ED Lab Result RN at [phone]. You will be contacted by this team for any positive Lab results or changes in treatment. The nurses are available 7 days a week from 10A to 6:30P.  You can leave a message 24 hours per day and they will return your call.        Thank you for choosing [facility]       Thank you for choosing [facility] for your care. Our goal is always to provide you with excellent care. Hearing back from our patients is one way we can continue to improve our services. Please take a few minutes to complete the written survey that you may receive in the mail after you visit with us. Thank you!        [employer] Information     [employer] lets you send messages to your doctor, view your test results, renew your prescriptions, schedule appointments and more. To sign up, go to [email or web address] . Click on \"Log in\" on the left side of the screen, which will take you to the Welcome page. Then click on \"Sign up Now\" on the right side of the page.     You will be asked to enter the access code listed below, as well as some personal information. Please follow the directions to create your username and password.     Your access code is: [email or web address]  Expires: 2017  9:45 AM     Your access code will  in 90 days. If you need help or a new code, please call your [facility] clinic or [phone].        Care EveryWhere ID     This is your Care EveryWhere ID. This could be used by other organizations to access your [facility] medical records  [id number]        Equal Access to Services     CHRISTOPHE JORDAN : Hadii phill Kunz, eliz perez, claudia morales. So [facility] [phone].    ATENCIÓN: Si habla español, tiene a florez disposición servicios gratuitos de asistencia " henny Suesade al [phone].    We comply with applicable federal civil rights laws and [place] laws. We do not discriminate on the basis of race, color, national origin, age, disability sex, sexual orientation or gender identity.            After Visit Summary       This is your record. Keep this with you and show to your community pharmacist(s) and doctor(s) at your next visit.

## 2017-07-02 NOTE — ED AVS SNAPSHOT
[facility], [place], Emergency Department    [address] [place] [address]    Phone:  [phone]                                       Vini Loya   MRN: [id number]    Department:  [facility], Emergency Department   Date of Visit:  7/2/2017           After Visit Summary Signature Page     I have received my discharge instructions, and my questions have been answered. I have discussed any challenges I see with this plan with the nurse or doctor.    ..........................................................................................................................................  Patient/Patient Representative Signature      ..........................................................................................................................................  Patient Representative Print Name and Relationship to Patient    ..................................................               ................................................  Date                                            Time    ..........................................................................................................................................  Reviewed by Signature/Title    ...................................................              ..............................................  Date                                                            Time

## 2017-07-03 NOTE — DISCHARGE INSTRUCTIONS
Abrasions  Abrasions are skin scrapes. Their treatment depends on how large and deep the abrasion is.  Home care  You may be prescribed an antibiotic cream or ointment to apply to the wound. This helps prevent infection. Follow instructions when using this medication.  General care    To care for the abrasion, do the following each day for as long as directed by your health care provider.    If you were given a bandage, change it once a day. If your bandage sticks to the wound, soak it in warm water until it loosens.    Wash the area with soap and warm water. You may do this in a sink or under a tub faucet or shower. Rinse off the soap. Then pat the area dry with a clean towel.    If antibiotic ointment or cream was prescribed, reapply it to the wound as directed. Cover the wound with a fresh non-stick bandage. If the bandage becomes wet or dirty, change it as soon as possible.    You may use acetaminophen or ibuprofen to control pain unless another pain medication was prescribed. Note: If you have chronic liver or kidney disease or ever had a stomach ulcer or GI bleeding, talk with your health care provider before using these medications. Do not use ibuprofen in children under six months of age.    Most skin wounds heal within ten days. But an infection may occur despite treatment. Therefore, monitor the wound for signs of infection as listed below.  Follow-up care  Follow up with your health care provider, or as advised.  When to seek medical advice  Call your health care provider right away if any of these occur:    Fever of 101 F (38.3 C) or higher, or as directed by your health care provider    Increasing pain, redness, swelling, or drainage from the wound    Bleeding from the wound that does not stop after a few minutes of steady, firm pressure    Decreased ability to move any body part near wound  Date Last Reviewed: 3/22/2015    9407-2454 [employer]. [address], [place]  [address]. All rights reserved. This information is not intended as a substitute for professional medical care. Always follow your healthcare professional's instructions.          Preventing Falls: Make Your Health a Priority  Having a health problem can make you more likely to fall. Taking certain kinds of medicines may also increase your risk of falls. So, improving your health can help you avoid a fall. Work with your healthcare provider to manage health problems and to review your medicines. If you have your health under control, your risk of falling is lessened.    How chronic conditions increase your fall risk  Health problems like diabetes, high or low blood pressure, and arthritis are called chronic health conditions. They can be managed, but they don't go away. Chronic health problems put you at greater risk of a fall. This is because they can affect many parts of your body. They may cause problems with movement, balance, or vision. And certain medicines you take for them may have side effects, such as dizziness or drowsiness. These side effects also increase your risk.  Work with your healthcare provider  Your healthcare provider can work with you to help prevent a fall. See your healthcare provider for a medical exam each year. Go more often if you have symptoms, such as leg numbness or dizziness, that could raise your risk of falling. If you have a history of falls, let your provider know. Bring a list of your medicines to review with your healthcare provider. Discuss your nutrition and exercise routine. And ask whether you need any tests to assess your risk of falling.    Review your medicines  Medicines (even ones you buy over the counter) can cause side effects that lead to a fall. Common medicines that cause these kinds of side effects are blood pressure, heart, or pain medicines, medicines for sleep, and antidepressants. Store pain medicine in a secure area, such as an upper cabinet in your kitchen or  bathroom. Don't mix different pills in the same bottle. Always store and travel with medicines in their original containers with clear labels. This will reduce the chance of taking the wrong medicine or too much of a medicine. Taking too much of a medicine or taking the wrong medicine may cause side effects that can increase your risk of falling.  Also, the way your body reacts to medicines can change as you age. So, certain medicines that were fine in the past may cause side effects now. Your healthcare provider (such as your doctor or pharmacist) can help review your medicines and make changes if needed.  Get your eyes and ears checked  Problems with vision or hearing can lead to falls:    Get your eyes checked at least once a year. Take time to adjust to new glasses.    Get your hearing checked at least every other year.    Have your doctor check your inner ear for problems that may affect your balance.  Get the right nutrition  If you don't get enough to eat or drink, you can become dizzy and fall:    Your sense of thirst decreases with age. Drink water throughout the day.    Eat breakfast. Plan regular meals.    Ask your healthcare provider whether you need supplements. These can help strengthen your bones and muscles to help prevent falls. They can also help prevent fractures if you do fall.  Stay as active as you can  Staying active is one of the best things you can do to prevent falls. Keep in mind that doing too little can be as risky as doing too much. That's because not being active can make you weaker and more likely to fall. Balance, flexibility, strength, and endurance all come from exercise. They all play a role in preventing falls. Ask your healthcare provider which types of activity are right for you.  When to call your healthcare provider  Be sure to call your healthcare provider if you fall. Also call if you have any of these signs or symptoms (someone else may need to point them out to  you):    Feeling lightheaded or dizzy more than once a day    Losing your balance often or feeling unsteady on your feet    Feeling numbness in your legs or feet, or noticing a change in the way you walk    Having a steady decline in your memory or mental sharpness   Date Last Reviewed: 6/13/2015 2000-2017 [employer]. [address] [place] [address]. All rights reserved. This information is not intended as a substitute for professional medical care. Always follow your healthcare professional's instructions.      Please make an appointment to follow up with Your Primary Care Provider in 3-4 days if other concerns.  See you doctor if the forehead has increased redness or if fever or other concerns.

## 2017-08-15 ENCOUNTER — TRANSFERRED RECORDS (OUTPATIENT)
Dept: HEALTH INFORMATION MANAGEMENT | Facility: CLINIC | Age: 77
End: 2017-08-15

## 2017-09-08 DIAGNOSIS — E78.5 HYPERLIPIDEMIA LDL GOAL <100: ICD-10-CM

## 2017-09-10 NOTE — TELEPHONE ENCOUNTER
SIMVASTATIN 40MG       Last Written Prescription Date:Medication Detail      Disp Refills Start End SAUD   simvastatin (ZOCOR) 40 MG tablet 90 tablet 3 6/24/2016  No   Sig: Take 1 tablet (40 mg) by mouth At Bedtime Please profile     Last Office Visit with [facility], [facility] or [facility] prescribing provider:  6/20/17   Future Office Visit:      Cholesterol   Date Value Ref Range Status   12/06/2016 159 <200 mg/dL Final     HDL Cholesterol   Date Value Ref Range Status   12/06/2016 37 (L) >39 mg/dL Final     LDL Cholesterol Calculated   Date Value Ref Range Status   12/06/2016 85 <100 mg/dL Final     Comment:     Desirable:       <100 mg/dl     Triglycerides   Date Value Ref Range Status   12/06/2016 184 (H) <150 mg/dL Final     Comment:     Borderline high:  150-199 mg/dl   High:             200-499 mg/dl   Very high:       >499 mg/dl   Non Fasting       Cholesterol/HDL Ratio   Date Value Ref Range Status   10/09/2014 3.2 0.0 - 5.0 Final     ALT   Date Value Ref Range Status   07/02/2017 8 0 - 70 U/L Final

## 2017-09-11 RX ORDER — SIMVASTATIN 40 MG
TABLET ORAL
Qty: 90 TABLET | Refills: 3 | Status: SHIPPED | OUTPATIENT
Start: 2017-09-11 | End: 2018-09-10

## 2017-09-18 ENCOUNTER — PRE VISIT (OUTPATIENT)
Dept: ORTHOPEDICS | Facility: CLINIC | Age: 77
End: 2017-09-18

## 2017-09-18 NOTE — TELEPHONE ENCOUNTER
1.  Date/reason for appt: 10/5/17- Herniated nucleus pulposus, L5-S1 right, Degeneration of L4-L5 intervertebral disc, Compression fracture of thoracic vertebra    2.  Referring provider: Selene Land MD    3.  Call to patient (Yes / No - short description): No - pt is referred. All records/imaging in Epic and PACS.     4.  Previous care at / records requested from: (All records below are in Epic.)      1. [facility] - Dr. Land   2. [facility]   3. [facility] [place] [facility]   4. [facility] Neurosurgeon    5. [facility]   6. [facility] - Dr. Mcdaniel 2014    7. [facility] 2013   [facility]. [facility] Dr. Cardoso [facility]

## 2017-09-22 DIAGNOSIS — F41.9 ANXIETY: ICD-10-CM

## 2017-09-22 NOTE — TELEPHONE ENCOUNTER
PARoxetine (PAXIL) 20 MG tablet     Last Written Prescription Date: 6/24/16  Last Fill Quantity: 90, # refills: 3  Last Office Visit with [facility] primary care provider:  6/20/17        Last PHQ-9 score on record=   PHQ-9 SCORE 6/20/2017   Total Score -   Total Score 5

## 2017-09-26 RX ORDER — PAROXETINE 20 MG/1
TABLET, FILM COATED ORAL
Qty: 90 TABLET | Refills: 3 | Status: SHIPPED | OUTPATIENT
Start: 2017-09-26 | End: 2018-09-10

## 2017-10-10 ENCOUNTER — OFFICE VISIT (OUTPATIENT)
Dept: OPHTHALMOLOGY | Facility: CLINIC | Age: 77
End: 2017-10-10
Attending: OPHTHALMOLOGY
Payer: COMMERCIAL

## 2017-10-10 DIAGNOSIS — H40.003 GLAUCOMA SUSPECT OF BOTH EYES: Primary | ICD-10-CM

## 2017-10-10 DIAGNOSIS — H25.13 SENILE NUCLEAR SCLEROSIS, BILATERAL: ICD-10-CM

## 2017-10-10 PROCEDURE — 92133 CPTRZD OPH DX IMG PST SGM ON: CPT | Mod: ZF | Performed by: OPHTHALMOLOGY

## 2017-10-10 PROCEDURE — 99213 OFFICE O/P EST LOW 20 MIN: CPT | Mod: ZF

## 2017-10-10 ASSESSMENT — VISUAL ACUITY
OD_SC: 20/50
OS_SC+: -1
OS_SC: 20/30
OD_SC+: -3
METHOD: SNELLEN - LINEAR

## 2017-10-10 ASSESSMENT — TONOMETRY
IOP_METHOD: APPLANATION
OD_IOP_MMHG: 8
OS_IOP_MMHG: 11

## 2017-10-10 ASSESSMENT — SLIT LAMP EXAM - LIDS
COMMENTS: NORMAL
COMMENTS: NORMAL

## 2017-10-10 ASSESSMENT — EXTERNAL EXAM - RIGHT EYE: OD_EXAM: NORMAL

## 2017-10-10 ASSESSMENT — CONF VISUAL FIELD
OS_SUPERIOR_NASAL_RESTRICTION: 3
OD_SUPERIOR_NASAL_RESTRICTION: 3

## 2017-10-10 ASSESSMENT — REFRACTION_MANIFEST
OD_AXIS: 000
OS_ADD: +2.50
OS_SPHERE: -0.25
OD_ADD: +2.50
OS_AXIS: 090
OD_SPHERE: -0.25
OD_CYLINDER: +0.00
OS_CYLINDER: +0.75

## 2017-10-10 ASSESSMENT — CUP TO DISC RATIO
OD_RATIO: 0.7
OS_RATIO: 0.6

## 2017-10-10 ASSESSMENT — EXTERNAL EXAM - LEFT EYE: OS_EXAM: NORMAL

## 2017-10-10 NOTE — MR AVS SNAPSHOT
After Visit Summary   10/10/2017    Vini Loya    MRN: [id number]           Patient Information     Date Of Birth          1940        Visit Information        Provider Department      10/10/2017 1:45 PM [facility]ud[facility]cdonnell [facility]        Today's Diagnoses     Glaucoma suspect of both eyes    -  1    Senile nuclear sclerosis, bilateral          Care Instructions    Patient will return to clinic in 8-12 months with IOL master, dilated eye exam and OCT with RNFL analysis and return to clinic sooner if interested in proceeding with cataract surgery.  Patient will start warm compresses 2x/day, increase dietary flax seed/fish oil dietary supplementation, and start artificial tears 4-6x/day as needed for burning, tearing, mattering, foreign body sensation, blurred vision, etc.  Artifical tear drops are available over the counter. Below are a list of the some of the drops available:  Refresh   Systane  Theratears  Genteal  Blink  Optive      Please avoid Visine (unless Visine Puretears), Murine or Cleareyes or Puralube tear drops.    These have ingredients that take the red out and but actually increase dryness and redness of the eyes over time          Follow-ups after your visit        Follow-up notes from your care team     Return  8-12 months with IOL master, dilated eye exam and OCT with RNFL analysis.      Your next 10 appointments already scheduled     Jan 02, 2018 11:40 AM CST   (Arrive by 11:25 AM)   Return Movement Disorder with KOTA Dutta [facility] ([facility])    [address]  [address] [facility] [id number] [phone]            Apr 24, 2018 10:00 AM CDT   RETURN GLAUCOMA with Yudith Mcdonnell MD   Eye Clinic ([facility])    [address]  [address]  [address]  [facility] [address] [phone]              Future tests that were ordered for you today     Open Future Orders         Priority Expected Expires Ordered    DILATED FUNDUS EXAM Routine  10/10/2018 10/10/2017            Who to contact     Please call your clinic at [phone] to:    Ask questions about your health    Make or cancel appointments    Discuss your medicines    Learn about your test results    Speak to your doctor   If you have compliments or concerns about an experience at your clinic, or if you wish to file a complaint, please contact [facility] Patient Relations at [phone] or email us at [email or web address]         Additional Information About Your Visit        [employer] Information     [employer] is an electronic gateway that provides easy, online access to your medical records. With [employer], you can request a clinic appointment, read your test results, renew a prescription or communicate with your care team.     To sign up for [employer] visit the website at [email or web address]   You will be asked to enter the access code listed below, as well as some personal information. Please follow the directions to create your username and password.     Your access code is: [email or web address]  Expires: 2017  6:30 AM     Your access code will  in 90 days. If you need help or a new code, please contact your [facility] or call [phone] for assistance.        Care EveryWhere ID     This is your Care EveryWhere ID. This could be used by other organizations to access your [facility] medical records  [id number]         Blood Pressure from Last 3 Encounters:   17 (!) 170/91   17 139/79   17 140/70    Weight from Last 3 Encounters:   17 81.6 kg (180 lb)   17 84.4 kg (186 lb)   17 85.3 kg (188 lb)              We Performed the Following     OCT Optic Nerve RNFL Spectralis OU (both eyes)        Primary Care Provider Office Phone # Fax #    Selene Land [phone] [address]  [facility] [address]        Equal Access to Services     AMEENA JORDAN : Hadii aad ku haddeliagurdeep Gabrielayue, waisaida neiladaha, qaybta kaalmatimo novak, claudia jeong. So [facility] [phone].    ATENCIÓN: Si habla español, tiene a florez disposición servicios gratuitos de asistencia lingüística. Bishop al [phone].    We comply with applicable federal civil rights laws and [place] laws. We do not discriminate on the basis of race, color, national origin, age, disability, sex, sexual orientation, or gender identity.            Thank you!     Thank you for choosing [facility]  for your care. Our goal is always to provide you with excellent care. Hearing back from our patients is one way we can continue to improve our services. Please take a few minutes to complete the written survey that you may receive in the mail after your visit with us. Thank you!             Your Updated Medication List - Protect others around you: Learn how to safely use, store and throw away your medicines at www.disposemymeds.org.          This list is accurate as of: 10/10/17  3:37 PM.  Always use your most recent med list.                   Brand Name Dispense Instructions for use Diagnosis    aspirin 81 MG tablet      1 TABLET DAILY    Unspecified essential hypertension, Cerebral embolism with cerebral infarction (H), CAD (coronary artery disease), Mixed hyperlipidemia       Blood Pressure Monitor Pricilla     1 Device    1 Device daily.    Hypertension goal BP (blood pressure) < 130/80       calcitonin (salmon) 200 UNIT/ACT nasal spray    MIACALCIN    1 Bottle    Spray 1 spray into one nostril alternating nostrils daily Alternate nostril each day.    Compression fracture of thoracic vertebra, with routine healing, subsequent encounter       carbidopa-levodopa  MG per tablet    SINEMET    540 tablet    Take 2 tablets 3 times a day 1 hour before each meal.  (Around 7 am, 11 am, & 4 pm.)    Parkinson's disease (H)        lisinopril 40 MG tablet    PRINIVIL/ZESTRIL    90 tablet    TAKE ONE TABLET BY MOUTH EVERY DAY    Hypertension, goal below 150/90       metoprolol 25 MG 24 hr tablet    TOPROL-XL    90 tablet    TAKE ONE TABLET BY MOUTH EVERY DAY    Hypertension, goal below 150/90       PARoxetine 20 MG tablet    PAXIL    90 tablet    TAKE ONE TABLET BY MOUTH EVERY NIGHT AT BEDTIME    Anxiety       simvastatin 40 MG tablet    ZOCOR    90 tablet    TAKE ONE TABLET BY MOUTH AT BEDTIME    Hyperlipidemia LDL goal <100       tamsulosin 0.4 MG capsule    FLOMAX    90 capsule    Take 1 capsule (0.4 mg) by mouth daily    Benign non-nodular prostatic hyperplasia, presence of lower urinary tract symptoms unspecified

## 2017-10-10 NOTE — NURSING NOTE
Chief Complaints and History of Present Illnesses   Patient presents with     Follow Up For     Glaucoma Suspect      HPI    Last Eye Exam:  10/4/16   Affected eye(s):  Both   Symptoms:     Tearing   Dryness      Duration:  1 year   Frequency:  Constant       Do you have eye pain now?:  No      Comments:  Pt complains of some tearing and feeling of irritation. Using warm compresses which is helpful. Vision is stable. Denies any floaters or flashing lights. ULISSES MOLINA, COA 1:58 PM 10/10/2017

## 2017-10-10 NOTE — PATIENT INSTRUCTIONS
Patient will return to clinic in 8-12 months with IOL master, dilated eye exam and OCT with RNFL analysis and return to clinic sooner if interested in proceeding with cataract surgery.  Patient will start warm compresses 2x/day, increase dietary flax seed/fish oil dietary supplementation, and start artificial tears 4-6x/day as needed for burning, tearing, mattering, foreign body sensation, blurred vision, etc.  Artifical tear drops are available over the counter. Below are a list of the some of the drops available:  Refresh   Systane  Theratears  Genteal  Blink  Optive      Please avoid Visine (unless Visine Puretears), Murine or Cleareyes or Puralube tear drops.    These have ingredients that take the red out and but actually increase dryness and redness of the eyes over time

## 2017-10-10 NOTE — PROGRESS NOTES
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  Patient will start warm compresses 2x/day, increase dietary flax seed/fish oil dietary supplementation, and start artificial tears 4-6x/day as needed for burning, tearing, mattering, foreign body sensation, blurred vision, etc.  Artifical tear drops are available over the counter. Below are a list of the some of the drops available:  Refresh   Systane  Theratears  Genteal  Blink  Optive      Please avoid Visine (unless Visine Puretears), Murine or Cleareyes or Puralube tear drops.    These have ingredients that take the red out and but actually increase dryness and redness of the eyes over time      Attending Physician Attestation:  Complete documentation of historical and exam elements from today's encounter can be found in the full encounter summary report (not reduplicated in this progress note). I personally obtained the chief complaint(s) and history of present illness.  I confirmed and edited as necessary the review of systems, past medical/surgical history, family history, social history, and examination findings as documented by others; and I examined the patient myself. I personally reviewed the relevant tests, images, and reports as documented above. I formulated and edited as necessary the assessment and plan and discussed the findings and management plan with the patient and family.  - Yudith Mcdonnell MD

## 2017-10-11 DIAGNOSIS — I10 HYPERTENSION, GOAL BELOW 150/90: ICD-10-CM

## 2017-10-11 NOTE — TELEPHONE ENCOUNTER
Routing refill request to provider for review/approval because:  Labs out of range:  BP    HTN last addressed 6/20/2017    Thank you,  Rolly Lauren RN

## 2017-10-11 NOTE — TELEPHONE ENCOUNTER
Medication Detail      Disp Refills Start End SAUD   lisinopril (PRINIVIL/ZESTRIL) 40 MG tablet 90 tablet 1 4/11/2017  No   Sig: TAKE ONE TABLET BY MOUTH EVERY DAY       Last Office Visit with [facility], P or [facility] prescribing provider: 6/20/17       Potassium   Date Value Ref Range Status   07/02/2017 4.2 3.4 - 5.3 mmol/L Final     Creatinine   Date Value Ref Range Status   07/02/2017 1.09 0.66 - 1.25 mg/dL Final     BP Readings from Last 3 Encounters:   07/02/17 (!) 170/91   06/20/17 139/79   05/31/17 140/70

## 2017-10-12 RX ORDER — LISINOPRIL 40 MG/1
TABLET ORAL
Qty: 90 TABLET | Refills: 1 | Status: SHIPPED | OUTPATIENT
Start: 2017-10-12 | End: 2018-04-24

## 2017-10-15 ENCOUNTER — APPOINTMENT (OUTPATIENT)
Dept: GENERAL RADIOLOGY | Facility: CLINIC | Age: 77
End: 2017-10-15
Attending: EMERGENCY MEDICINE
Payer: COMMERCIAL

## 2017-10-15 ENCOUNTER — HOSPITAL ENCOUNTER (OUTPATIENT)
Facility: CLINIC | Age: 77
Setting detail: OBSERVATION
Discharge: HOME | End: 2017-10-16
Attending: EMERGENCY MEDICINE | Admitting: FAMILY MEDICINE
Payer: COMMERCIAL

## 2017-10-15 ENCOUNTER — APPOINTMENT (OUTPATIENT)
Dept: CT IMAGING | Facility: CLINIC | Age: 77
End: 2017-10-15
Attending: EMERGENCY MEDICINE
Payer: COMMERCIAL

## 2017-10-15 DIAGNOSIS — M25.532 LEFT WRIST PAIN: ICD-10-CM

## 2017-10-15 DIAGNOSIS — R00.1 BRADYCARDIA: ICD-10-CM

## 2017-10-15 DIAGNOSIS — N13.8 BENIGN PROSTATIC HYPERPLASIA WITH URINARY OBSTRUCTION: ICD-10-CM

## 2017-10-15 DIAGNOSIS — M25.512 PAIN, JOINT, SHOULDER, LEFT: ICD-10-CM

## 2017-10-15 DIAGNOSIS — N12 PYELONEPHRITIS: ICD-10-CM

## 2017-10-15 DIAGNOSIS — S52.515A CLOSED NONDISPLACED FRACTURE OF STYLOID PROCESS OF LEFT RADIUS, INITIAL ENCOUNTER: ICD-10-CM

## 2017-10-15 DIAGNOSIS — R31.9 URINARY TRACT INFECTION WITH HEMATURIA, SITE UNSPECIFIED: Primary | ICD-10-CM

## 2017-10-15 DIAGNOSIS — N39.0 URINARY TRACT INFECTION WITH HEMATURIA, SITE UNSPECIFIED: Primary | ICD-10-CM

## 2017-10-15 DIAGNOSIS — N40.1 BENIGN PROSTATIC HYPERPLASIA WITH URINARY OBSTRUCTION: ICD-10-CM

## 2017-10-15 DIAGNOSIS — G20.A1 PARKINSON'S DISEASE (H): ICD-10-CM

## 2017-10-15 LAB
ALBUMIN UR-MCNC: 300 MG/DL
ANION GAP SERPL CALCULATED.3IONS-SCNC: 8 MMOL/L (ref 3–14)
APPEARANCE UR: ABNORMAL
BACTERIA #/AREA URNS HPF: ABNORMAL /HPF
BASOPHILS # BLD AUTO: 0.1 10E9/L (ref 0–0.2)
BASOPHILS NFR BLD AUTO: 0.2 %
BILIRUB UR QL STRIP: NEGATIVE
BUN SERPL-MCNC: 17 MG/DL (ref 7–30)
CALCIUM SERPL-MCNC: 8.9 MG/DL (ref 8.5–10.1)
CHLORIDE SERPL-SCNC: 102 MMOL/L (ref 94–109)
CO2 SERPL-SCNC: 29 MMOL/L (ref 20–32)
COLOR UR AUTO: ABNORMAL
CREAT SERPL-MCNC: 1.12 MG/DL (ref 0.66–1.25)
CRP SERPL-MCNC: 27 MG/L (ref 0–8)
DIFFERENTIAL METHOD BLD: ABNORMAL
DIFFERENTIAL METHOD BLD: NORMAL
EOSINOPHIL # BLD AUTO: 0 10E9/L (ref 0–0.7)
EOSINOPHIL NFR BLD AUTO: 0 %
ERYTHROCYTE [DISTWIDTH] IN BLOOD BY AUTOMATED COUNT: 13.5 % (ref 10–15)
ERYTHROCYTE [DISTWIDTH] IN BLOOD BY AUTOMATED COUNT: NORMAL % (ref 10–15)
ERYTHROCYTE [SEDIMENTATION RATE] IN BLOOD BY WESTERGREN METHOD: 8 MM/H (ref 0–20)
ERYTHROCYTE [SEDIMENTATION RATE] IN BLOOD BY WESTERGREN METHOD: NORMAL MM/H (ref 0–20)
GFR SERPL CREATININE-BSD FRML MDRD: 63 ML/MIN/1.7M2
GLUCOSE SERPL-MCNC: 125 MG/DL (ref 70–99)
GLUCOSE UR STRIP-MCNC: NEGATIVE MG/DL
HCT VFR BLD AUTO: 46.8 % (ref 40–53)
HCT VFR BLD AUTO: NORMAL % (ref 40–53)
HGB BLD-MCNC: 15.9 G/DL (ref 13.3–17.7)
HGB BLD-MCNC: NORMAL G/DL (ref 13.3–17.7)
HGB UR QL STRIP: ABNORMAL
IMM GRANULOCYTES # BLD: 0.1 10E9/L (ref 0–0.4)
IMM GRANULOCYTES NFR BLD: 0.4 %
INR PPP: 0.96 (ref 0.86–1.14)
KETONES UR STRIP-MCNC: NEGATIVE MG/DL
LEUKOCYTE ESTERASE UR QL STRIP: NEGATIVE
LYMPHOCYTES # BLD AUTO: 2 10E9/L (ref 0.8–5.3)
LYMPHOCYTES NFR BLD AUTO: 9.6 %
MCH RBC QN AUTO: 31.9 PG (ref 26.5–33)
MCH RBC QN AUTO: NORMAL PG (ref 26.5–33)
MCHC RBC AUTO-ENTMCNC: 34 G/DL (ref 31.5–36.5)
MCHC RBC AUTO-ENTMCNC: NORMAL G/DL (ref 31.5–36.5)
MCV RBC AUTO: 94 FL (ref 78–100)
MCV RBC AUTO: NORMAL FL (ref 78–100)
MONOCYTES # BLD AUTO: 2.6 10E9/L (ref 0–1.3)
MONOCYTES NFR BLD AUTO: 12.6 %
MUCOUS THREADS #/AREA URNS LPF: PRESENT /LPF
NEUTROPHILS # BLD AUTO: 15.9 10E9/L (ref 1.6–8.3)
NEUTROPHILS NFR BLD AUTO: 77.2 %
NITRATE UR QL: POSITIVE
NRBC # BLD AUTO: 0 10*3/UL
NRBC BLD AUTO-RTO: 0 /100
PH UR STRIP: 7 PH (ref 5–7)
PLATELET # BLD AUTO: 211 10E9/L (ref 150–450)
PLATELET # BLD AUTO: NORMAL 10E9/L (ref 150–450)
POTASSIUM SERPL-SCNC: 4.5 MMOL/L (ref 3.4–5.3)
RBC # BLD AUTO: 4.98 10E12/L (ref 4.4–5.9)
RBC # BLD AUTO: NORMAL 10E12/L (ref 4.4–5.9)
RBC #/AREA URNS AUTO: >182 /HPF (ref 0–2)
SODIUM SERPL-SCNC: 138 MMOL/L (ref 133–144)
SOURCE: ABNORMAL
SP GR UR STRIP: 1.01 (ref 1–1.03)
SQUAMOUS #/AREA URNS AUTO: 1 /HPF (ref 0–1)
UROBILINOGEN UR STRIP-MCNC: NORMAL MG/DL (ref 0–2)
WBC # BLD AUTO: 20.6 10E9/L (ref 4–11)
WBC # BLD AUTO: NORMAL 10E9/L (ref 4–11)
WBC #/AREA URNS AUTO: 53 /HPF (ref 0–2)

## 2017-10-15 PROCEDURE — 71020 XR CHEST 2 VW: CPT

## 2017-10-15 PROCEDURE — 96365 THER/PROPH/DIAG IV INF INIT: CPT | Performed by: EMERGENCY MEDICINE

## 2017-10-15 PROCEDURE — 85652 RBC SED RATE AUTOMATED: CPT | Performed by: EMERGENCY MEDICINE

## 2017-10-15 PROCEDURE — 25000125 ZZHC RX 250: Performed by: EMERGENCY MEDICINE

## 2017-10-15 PROCEDURE — 85025 COMPLETE CBC W/AUTO DIFF WBC: CPT | Performed by: EMERGENCY MEDICINE

## 2017-10-15 PROCEDURE — 87086 URINE CULTURE/COLONY COUNT: CPT | Performed by: EMERGENCY MEDICINE

## 2017-10-15 PROCEDURE — 74177 CT ABD & PELVIS W/CONTRAST: CPT

## 2017-10-15 PROCEDURE — 73030 X-RAY EXAM OF SHOULDER: CPT | Mod: RT

## 2017-10-15 PROCEDURE — 80048 BASIC METABOLIC PNL TOTAL CA: CPT | Performed by: EMERGENCY MEDICINE

## 2017-10-15 PROCEDURE — 87186 SC STD MICRODIL/AGAR DIL: CPT | Performed by: EMERGENCY MEDICINE

## 2017-10-15 PROCEDURE — 87088 URINE BACTERIA CULTURE: CPT | Performed by: EMERGENCY MEDICINE

## 2017-10-15 PROCEDURE — 99285 EMERGENCY DEPT VISIT HI MDM: CPT | Mod: 25 | Performed by: EMERGENCY MEDICINE

## 2017-10-15 PROCEDURE — 25000128 H RX IP 250 OP 636: Performed by: EMERGENCY MEDICINE

## 2017-10-15 PROCEDURE — 86140 C-REACTIVE PROTEIN: CPT | Performed by: EMERGENCY MEDICINE

## 2017-10-15 PROCEDURE — 99285 EMERGENCY DEPT VISIT HI MDM: CPT | Mod: Z6 | Performed by: EMERGENCY MEDICINE

## 2017-10-15 PROCEDURE — 73110 X-RAY EXAM OF WRIST: CPT | Mod: LT

## 2017-10-15 PROCEDURE — 81001 URINALYSIS AUTO W/SCOPE: CPT | Performed by: EMERGENCY MEDICINE

## 2017-10-15 PROCEDURE — 87040 BLOOD CULTURE FOR BACTERIA: CPT | Mod: 91 | Performed by: EMERGENCY MEDICINE

## 2017-10-15 RX ORDER — IOPAMIDOL 755 MG/ML
99 INJECTION, SOLUTION INTRAVASCULAR ONCE
Status: COMPLETED | OUTPATIENT
Start: 2017-10-15 | End: 2017-10-15

## 2017-10-15 RX ORDER — CEFTRIAXONE 1 G/1
1 INJECTION, POWDER, FOR SOLUTION INTRAMUSCULAR; INTRAVENOUS ONCE
Status: COMPLETED | OUTPATIENT
Start: 2017-10-15 | End: 2017-10-15

## 2017-10-15 RX ADMIN — IOPAMIDOL 99 ML: 755 INJECTION, SOLUTION INTRAVENOUS at 23:57

## 2017-10-15 RX ADMIN — CEFTRIAXONE 1 G: 1 INJECTION, POWDER, FOR SOLUTION INTRAMUSCULAR; INTRAVENOUS at 22:47

## 2017-10-15 RX ADMIN — SODIUM CHLORIDE, PRESERVATIVE FREE 75 ML: 5 INJECTION INTRAVENOUS at 23:57

## 2017-10-15 ASSESSMENT — ENCOUNTER SYMPTOMS
SHORTNESS OF BREATH: 0
DYSURIA: 0
CHILLS: 1
FEVER: 0
FREQUENCY: 1
NAUSEA: 0
HEMATURIA: 1
ABDOMINAL PAIN: 0
CONSTIPATION: 1
VOMITING: 0

## 2017-10-15 NOTE — IP AVS SNAPSHOT
MRN:[id number]                      After Visit Summary   10/15/2017    Vini Loya    MRN: [id number]           Thank you!     Thank you for choosing [facility] for your care. Our goal is always to provide you with excellent care. Hearing back from our patients is one way we can continue to improve our services. Please take a few minutes to complete the written survey that you may receive in the mail after you visit with us. Thank you!        Patient Information     Date Of Birth          1940        Designated Caregiver       Most Recent Value    Caregiver    Will someone help with your care after discharge? no      About your hospital stay     You were admitted on:  October 16, 2017 You last received care in the:  Unit 6D Observation [facility]    You were discharged on:  October 16, 2017        Reason for your hospital stay       Bladder and possible early kidney infection, left wrist fracture                  Who to Call     For medical emergencies, please call 911.  For non-urgent questions about your medical care, please call your primary care provider or clinic, [phone]          Attending Provider     Provider Specialty    Radha Rosario MD Emergency Medicine    Sylvia, Yrn Carlos MD Emergency Medicine    Mary Grace Chaparro MD Internal Medicine       Primary Care Provider Office Phone # Fax #    Selene Land [phone]       When to contact your care team       Return to the ER if you have fever, chills, vomiting, back pain, fainting                  After Care Instructions     Activity       Your activity upon discharge: activity as tolerated            Diet       Follow this diet upon discharge: Orders Placed This Encounter      Regular Diet Adult                  Follow-up Appointments     Adult [facility] Follow-up and recommended labs and tests       Follow up with primary care provider, Selene Land, within 7 days for hospital follow-  up.     Appointments on [place] and/or [facility] (with [facility] or [facility] provider or service). Call [phone] if you haven't heard regarding these appointments within 7 days of discharge.            Follow Up and recommended labs and tests       UROLOGY FOLLOWUP:  For constipation:   - Start daily fiber (citrucel versus metamucil)  - Consider daily prune juice  - Continue fruit  - Consider adding Miralax (which can be purchased over the counter at any pharmacy) if the other strategies have not been effective     For better emptying:   - Always take your time at the toilet, void twice, gently strain to empty as completely as you can.  - Double your flomax (tamsulosin) to take two tablets (0.8mg) daily.  Monitor for dizziness because Flomax can sometimes cause dizziness - go back to one tablet daily if you get dizzy.   - Avoid constipation (which can worsen your ability to empty)     For the UTIs:    - Complete a 10 day course of antibiotics for the kidney infection.    - The day after you finish your antibiotics please start Bactrim SS (trimethoprim sulfamethoxazole) taken nightly just before bed .  This tiny dose is meant to prevent you from getting infections.    - Drink plenty of fluids (at least once daily your urine should look clear like water)      - Patient should followup in Urology in 6-8 weeks                  Your next 10 appointments already scheduled     Jan 02, 2018 11:40 AM CST   (Arrive by 11:25 AM)   Return Movement Disorder with [facility] ([facility])    [address]  3rd Floor  [facility] [id number] [phone]            Apr 24, 2018 10:00 AM CDT   RETURN GLAUCOMA with Yudith Mcdonnell MD   Eye Clinic ([facility])    [address]  [address]  9th [address]  [facility] [id number] [phone]              Future tests that were ordered for you     Titan wrist support w/thumb     "             Further instructions from your care team       [facility]  Floor 4  [address] [place] [address]  Appointments: [phone]    Pending Results     Date and Time Order Name Status Description    10/15/2017 2310 Blood culture Preliminary     10/15/2017 2310 Blood culture Preliminary     10/15/2017 1823 Urine Culture Aerobic Bacterial Preliminary             Statement of Approval     Ordered          10/16/17 8272  I have reviewed and agree with all the recommendations and orders detailed in this document.  EFFECTIVE NOW     Approved and electronically signed by:  Korin Win APRN CNP             Admission Information     Date & Time Provider Department Dept. Phone    10/15/2017 Mary Grace Chaparro MD Unit 6D Observation [facility] [phone]      Your Vitals Were     Blood Pressure Pulse Temperature Respirations Height Weight    144/74 (BP Location: Right arm) 58 97.4  F (36.3  C) (Oral) 16 1.727 m (5' 8\") 86 kg (189 lb 9.5 oz)    Pulse Oximetry BMI (Body Mass Index)                92% 28.83 kg/m2          Web Performance Information     Web [employer] lets you send messages to your doctor, view your test results, renew your prescriptions, schedule appointments and more. To sign up, go to [email or web address] . Click on \"Log in\" on the left side of the screen, which will take you to the Welcome page. Then click on \"Sign up Now\" on the right side of the page.     You will be asked to enter the access code listed below, as well as some personal information. Please follow the directions to create your username and password.     Your access code is: [email or web address]  Expires: 2017  6:30 AM     Your access code will  in 90 days. If you need help or a new code, please call your [facility] clinic or [phone].        Care EveryWhere ID     This is your Care EveryWhere ID. This could be used by other organizations to access your [facility] medical " records  [id number]        Equal Access to Services     [facility] Center[facility]ETTE : Hadii aad ku haddeliagurdeep Kunz, waisaida luqadaha, qaybta maribellwilnertimo novak, claudia jeong. So [facility] [phone].    ATENCIÓN: Si habla español, tiene a florez disposición servicios gratuitos de asistencia lingüística. Llame al [phone].    We comply with applicable federal civil rights laws and [place] laws. We do not discriminate on the basis of race, color, national origin, age, disability, sex, sexual orientation, or gender identity.               Review of your medicines      START taking        Dose / Directions    ciprofloxacin 500 MG tablet   Commonly known as:  CIPRO   Used for:  Urinary tract infection with hematuria, site unspecified        Dose:  500 mg   Take 1 tablet (500 mg) by mouth 2 times daily   Quantity:  20 tablet   Refills:  0       sulfamethoxazole-trimethoprim 400-80 MG per tablet   Commonly known as:  BACTRIM/SEPTRA   Used for:  Urinary tract infection with hematuria, site unspecified        Dose:  1 tablet   Take 1 tablet by mouth At Bedtime For UTI prevention   Quantity:  30 tablet   Refills:  11         CONTINUE these medicines which may have CHANGED, or have new prescriptions. If we are uncertain of the size of tablets/capsules you have at home, strength may be listed as something that might have changed.        Dose / Directions    * tamsulosin 0.4 MG capsule   Commonly known as:  FLOMAX   This may have changed:  how much to take   Used for:  Bradycardia        Dose:  0.8 mg   Take 2 capsules (0.8 mg) by mouth daily   Quantity:  60 capsule   Refills:  11       * tamsulosin 0.4 MG capsule   Commonly known as:  FLOMAX   This may have changed:  You were already taking a medication with the same name, and this prescription was added. Make sure you understand how and when to take each.   Used for:  Benign prostatic hyperplasia with urinary obstruction        Dose:  0.8 mg   Take 2 capsules  (0.8 mg) by mouth daily   Quantity:  60 capsule   Refills:  0       * Notice:  This list has 2 medication(s) that are the same as other medications prescribed for you. Read the directions carefully, and ask your doctor or other care provider to review them with you.      CONTINUE these medicines which have NOT CHANGED        Dose / Directions    aspirin 81 MG tablet   Used for:  Unspecified essential hypertension, Cerebral embolism with cerebral infarction (H), CAD (coronary artery disease), Mixed hyperlipidemia        1 TABLET DAILY   Refills:  0       Blood Pressure Monitor Pricilla   Used for:  Hypertension goal BP (blood pressure) < 130/80        Dose:  1 Device   1 Device daily.   Quantity:  1 Device   Refills:  0       carbidopa-levodopa  MG per tablet   Commonly known as:  SINEMET   Used for:  Parkinson's disease (H)        Take 2 tablets 3 times a day 1 hour before each meal.  (Around 7 am, 11 am, & 4 pm.)   Quantity:  540 tablet   Refills:  3       lisinopril 40 MG tablet   Commonly known as:  PRINIVIL/ZESTRIL   Used for:  Hypertension, goal below 150/90        TAKE ONE TABLET BY MOUTH EVERY DAY   Quantity:  90 tablet   Refills:  1       metoprolol 25 MG 24 hr tablet   Commonly known as:  TOPROL-XL   Used for:  Hypertension, goal below 150/90        TAKE ONE TABLET BY MOUTH EVERY DAY   Quantity:  90 tablet   Refills:  3       PARoxetine 20 MG tablet   Commonly known as:  PAXIL   Used for:  Anxiety        TAKE ONE TABLET BY MOUTH EVERY NIGHT AT BEDTIME   Quantity:  90 tablet   Refills:  3       simvastatin 40 MG tablet   Commonly known as:  ZOCOR   Used for:  Hyperlipidemia LDL goal <100        TAKE ONE TABLET BY MOUTH AT BEDTIME   Quantity:  90 tablet   Refills:  3            Where to get your medicines      These medications were sent to [facility] [place], [place] - [address]  [address], [facility] [address]     Phone:  [phone]     sulfamethoxazole-trimethoprim 400-80  MG per tablet    tamsulosin 0.4 MG capsule         These medications were sent to [facility] [place], [place] - [address]  [address] [address] [facility] [address]     Phone:  [phone]     ciprofloxacin 500 MG tablet    tamsulosin 0.4 MG capsule               ANTIBIOTIC INSTRUCTION     You've Been Prescribed an Antibiotic - Now What?  Your healthcare team thinks that you or your loved one might have an infection. Some infections can be treated with antibiotics, which are powerful, life-saving drugs. Like all medications, antibiotics have side effects and should only be used when necessary. There are some important things you should know about your antibiotic treatment.      Your healthcare team may run tests before you start taking an antibiotic.    Your team may take samples (e.g., from your blood, urine or other areas) to run tests to look for bacteria. These test can be important to determine if you need an antibiotic at all and, if you do, which antibiotic will work best.      Within a few days, your healthcare team might change or even stop your antibiotic.    Your team may start you on an antibiotic while they are working to find out what is making you sick.    Your team might change your antibiotic because test results show that a different antibiotic would be better to treat your infection.    In some cases, once your team has more information, they learn that you do not need an antibiotic at all. They may find out that you don't have an infection, or that the antibiotic you're taking won't work against your infection. For example, an infection caused by a virus can't be treated with antibiotics. Staying on an antibiotic when you don't need it is more likely to be harmful than helpful.      You may experience side effects from your antibiotic.    Like all medications, antibiotics have side effects. Some of these can be serious.    Let you healthcare team know if you have any  known allergies when you are admitted to the hospital.    One significant side effect of nearly all antibiotics is the risk of severe and sometimes deadly diarrhea caused by Clostridium difficile (C. Difficile). This occurs when a person takes antibiotics because some good germs are destroyed. Antibiotic use allows C. diificile to take over, putting patients at high risk for this serious infection.    As a patient or caregiver, it is important to understand your or your loved one's antibiotic treatment. It is especially important for caregivers to speak up when patients can't speak for themselves. Here are some important questions to ask your healthcare team.    What infection is this antibiotic treating and how do you know I have that infection?    What side effects might occur from this antibiotic?    How long will I need to take this antibiotic?    Is it safe to take this antibiotic with other medications or supplements (e.g., vitamins) that I am taking?     Are there any special directions I need to know about taking this antibiotic? For example, should I take it with food?    How will I be monitored to know whether my infection is responding to the antibiotic?    What tests may help to make sure the right antibiotic is prescribed for me?      Information provided by:  www.cdc.gov/getsmart  U.S. Department of Health and Human Services  Centers for disease Control and Prevention  National Center for Emerging and Zoonotic Infectious Diseases  Division of Healthcare Quality Promotion         Protect others around you: Learn how to safely use, store and throw away your medicines at www.disposemymeds.org.             Medication List: This is a list of all your medications and when to take them. Check marks below indicate your daily home schedule. Keep this list as a reference.      Medications           Morning Afternoon Evening Bedtime As Needed    aspirin 81 MG tablet   1 TABLET DAILY                                 Blood Pressure Monitor Pricilla   1 Device daily.                                carbidopa-levodopa  MG per tablet   Commonly known as:  SINEMET   Take 2 tablets 3 times a day 1 hour before each meal.  (Around 7 am, 11 am, & 4 pm.)   Last time this was given:  2 tablets on 10/16/2017 11:16 AM                                ciprofloxacin 500 MG tablet   Commonly known as:  CIPRO   Take 1 tablet (500 mg) by mouth 2 times daily                                lisinopril 40 MG tablet   Commonly known as:  PRINIVIL/ZESTRIL   TAKE ONE TABLET BY MOUTH EVERY DAY   Last time this was given:  40 mg on 10/16/2017  8:49 AM                                metoprolol 25 MG 24 hr tablet   Commonly known as:  TOPROL-XL   TAKE ONE TABLET BY MOUTH EVERY DAY   Last time this was given:  25 mg on 10/16/2017  8:49 AM                                PARoxetine 20 MG tablet   Commonly known as:  PAXIL   TAKE ONE TABLET BY MOUTH EVERY NIGHT AT BEDTIME                                simvastatin 40 MG tablet   Commonly known as:  ZOCOR   TAKE ONE TABLET BY MOUTH AT BEDTIME                                sulfamethoxazole-trimethoprim 400-80 MG per tablet   Commonly known as:  BACTRIM/SEPTRA   Take 1 tablet by mouth At Bedtime For UTI prevention                                * tamsulosin 0.4 MG capsule   Commonly known as:  FLOMAX   Take 2 capsules (0.8 mg) by mouth daily   Last time this was given:  0.4 mg on 10/16/2017  8:49 AM                                * tamsulosin 0.4 MG capsule   Commonly known as:  FLOMAX   Take 2 capsules (0.8 mg) by mouth daily   Last time this was given:  0.4 mg on 10/16/2017  8:49 AM                                * Notice:  This list has 2 medication(s) that are the same as other medications prescribed for you. Read the directions carefully, and ask your doctor or other care provider to review them with you.              More Information        Upper Extremity Fracture    You have a break (fracture) of the arm,  wrist, or hand. This may be a small crack in the bone. Or it may be a major break, with the broken parts pushed out of position. Most fractures will heal without surgery. But you may need surgery if the bones are far out of place or if the break is near the elbow. Treatment is with a special sling called a shoulder immobilizer, or a splint or cast, depending on the type of fracture and where the fracture is. This fracture takes 4 to 6 weeks or longer to heal. The cast may need to be changed in 2 to 3 weeks as swelling goes down.  Home care  Follow these guidelines when caring for yourself:    If you were given a shoulder immobilizer, leave it in place. This will support the injured arm at your side. This is the best position for bone healing. The shoulder immobilizer can be adjusted. If it becomes loose, adjust it so that your forearm is level with the ground (horizontal). Your hand should be level with your elbow.    Put an ice pack on the injured area. Do this for 20 minutes every 1 to 2 hours the first day for pain relief. You can make an ice pack by wrapping a plastic bag of ice cubes in a thin towel. As the ice melts, be careful that the cast/splint/sling doesn t get wet. You can put the ice pack inside the sling and directly over the splint or cast. Continue using the ice pack 3 to 4 times a day for the next 2 days. Then use the ice pack as needed to ease pain and swelling.    Keep the cast, splint, or sling completely dry at all times. Bathe with your cast, splint, or sling out of the water. Protect it with a large plastic bag, rubber-banded at the top end. If a fiberglass cast, splint, or sling gets wet, you can dry it with a hair dryer.    You may use acetaminophen or ibuprofen to control pain, unless another pain medicine was prescribed. If you have chronic liver or kidney disease, talk with your healthcare provider before using these medicines. Also talk with your provider if you ve had a stomach ulcer or  gastrointestinal bleeding.    Don t put creams or objects under the cast if you have itching.  Follow-up care  Follow up with your healthcare provider, or as advised. This is to make sure the bone is healing the way it should.  X-rays may be taken. You will be told of any new findings that may affect your care.  When to seek medical advice  Call your health care provider right away if any of these occur:    The cast or splint cracks    The plaster cast or splint becomes wet or soft    The fiberglass cast or splint stays wet for more than 24 hours    Bad odor from the cast or wound fluid stains the cast    Tightness or pain under the cast or splint gets worse    Fingers become swollen, cold, blue, numb, or tingly    You can t move your fingers    Skin around cast or splint becomes red    Fever of 100.4 F (38 C) or higher, or as directed by your healthcare provider  Date Last Reviewed: 2/1/2017 2000-2017 [employer]. [address] [place] [address], [place] [address]. All rights reserved. This information is not intended as a substitute for professional medical care. Always follow your healthcare professional's instructions.

## 2017-10-15 NOTE — ED NOTES
Pt reports increasingly frequent urination over past couple days, now every 5 min or so. Denies burning or pain. ? hematuria

## 2017-10-15 NOTE — IP AVS SNAPSHOT
Unit 6D Observation [address] [address]    Phone:  [phone]    Fax:  [phone]                                       After Visit Summary   10/15/2017    Vini Loya    MRN: [id number]           After Visit Summary Signature Page     I have received my discharge instructions, and my questions have been answered. I have discussed any challenges I see with this plan with the nurse or doctor.    ..........................................................................................................................................  Patient/Patient Representative Signature      ..........................................................................................................................................  Patient Representative Print Name and Relationship to Patient    ..................................................               ................................................  Date                                            Time    ..........................................................................................................................................  Reviewed by Signature/Title    ...................................................              ..............................................  Date                                                            Time

## 2017-10-16 VITALS
SYSTOLIC BLOOD PRESSURE: 144 MMHG | HEIGHT: 68 IN | RESPIRATION RATE: 16 BRPM | TEMPERATURE: 97.4 F | HEART RATE: 58 BPM | BODY MASS INDEX: 28.73 KG/M2 | WEIGHT: 189.6 LBS | DIASTOLIC BLOOD PRESSURE: 74 MMHG | OXYGEN SATURATION: 92 %

## 2017-10-16 PROBLEM — S52.515A CLOSED NONDISPLACED FRACTURE OF STYLOID PROCESS OF LEFT RADIUS: Status: ACTIVE | Noted: 2017-10-16

## 2017-10-16 PROBLEM — N12 PYELONEPHRITIS: Status: ACTIVE | Noted: 2017-10-16

## 2017-10-16 LAB
ANION GAP SERPL CALCULATED.3IONS-SCNC: 8 MMOL/L (ref 3–14)
BACTERIA SPEC CULT: ABNORMAL
BUN SERPL-MCNC: 18 MG/DL (ref 7–30)
CALCIUM SERPL-MCNC: 8.9 MG/DL (ref 8.5–10.1)
CHLORIDE SERPL-SCNC: 100 MMOL/L (ref 94–109)
CO2 SERPL-SCNC: 28 MMOL/L (ref 20–32)
CREAT SERPL-MCNC: 1.17 MG/DL (ref 0.66–1.25)
ERYTHROCYTE [DISTWIDTH] IN BLOOD BY AUTOMATED COUNT: 13.7 % (ref 10–15)
GFR SERPL CREATININE-BSD FRML MDRD: 60 ML/MIN/1.7M2
GLUCOSE SERPL-MCNC: 115 MG/DL (ref 70–99)
HCT VFR BLD AUTO: 42.9 % (ref 40–53)
HGB BLD-MCNC: 14.6 G/DL (ref 13.3–17.7)
Lab: ABNORMAL
MCH RBC QN AUTO: 31.4 PG (ref 26.5–33)
MCHC RBC AUTO-ENTMCNC: 34 G/DL (ref 31.5–36.5)
MCV RBC AUTO: 92 FL (ref 78–100)
PLATELET # BLD AUTO: 211 10E9/L (ref 150–450)
POTASSIUM SERPL-SCNC: 4.1 MMOL/L (ref 3.4–5.3)
RADIOLOGIST FLAGS: NORMAL
RBC # BLD AUTO: 4.65 10E12/L (ref 4.4–5.9)
SODIUM SERPL-SCNC: 136 MMOL/L (ref 133–144)
SPECIMEN SOURCE: ABNORMAL
WBC # BLD AUTO: 17.4 10E9/L (ref 4–11)

## 2017-10-16 PROCEDURE — G0378 HOSPITAL OBSERVATION PER HR: HCPCS

## 2017-10-16 PROCEDURE — 80048 BASIC METABOLIC PNL TOTAL CA: CPT | Performed by: NURSE PRACTITIONER

## 2017-10-16 PROCEDURE — 25000132 ZZH RX MED GY IP 250 OP 250 PS 637: Performed by: NURSE PRACTITIONER

## 2017-10-16 PROCEDURE — 96361 HYDRATE IV INFUSION ADD-ON: CPT

## 2017-10-16 PROCEDURE — 85027 COMPLETE CBC AUTOMATED: CPT | Performed by: NURSE PRACTITIONER

## 2017-10-16 PROCEDURE — 36415 COLL VENOUS BLD VENIPUNCTURE: CPT | Performed by: NURSE PRACTITIONER

## 2017-10-16 PROCEDURE — 99236 HOSP IP/OBS SAME DATE HI 85: CPT | Mod: Z6 | Performed by: INTERNAL MEDICINE

## 2017-10-16 RX ORDER — PAROXETINE 20 MG/1
20 TABLET, FILM COATED ORAL AT BEDTIME
Status: DISCONTINUED | OUTPATIENT
Start: 2017-10-16 | End: 2017-10-16 | Stop reason: HOSPADM

## 2017-10-16 RX ORDER — SIMVASTATIN 40 MG
40 TABLET ORAL AT BEDTIME
Status: DISCONTINUED | OUTPATIENT
Start: 2017-10-16 | End: 2017-10-16

## 2017-10-16 RX ORDER — IBUPROFEN 600 MG/1
600 TABLET, FILM COATED ORAL ONCE
Status: COMPLETED | OUTPATIENT
Start: 2017-10-16 | End: 2017-10-16

## 2017-10-16 RX ORDER — CEFTRIAXONE 1 G/1
1 INJECTION, POWDER, FOR SOLUTION INTRAMUSCULAR; INTRAVENOUS EVERY 24 HOURS
Status: DISCONTINUED | OUTPATIENT
Start: 2017-10-17 | End: 2017-10-16

## 2017-10-16 RX ORDER — TAMSULOSIN HYDROCHLORIDE 0.4 MG/1
0.4 CAPSULE ORAL DAILY
Status: DISCONTINUED | OUTPATIENT
Start: 2017-10-16 | End: 2017-10-16 | Stop reason: HOSPADM

## 2017-10-16 RX ORDER — ONDANSETRON 4 MG/1
4 TABLET, ORALLY DISINTEGRATING ORAL EVERY 6 HOURS PRN
Status: DISCONTINUED | OUTPATIENT
Start: 2017-10-16 | End: 2017-10-16 | Stop reason: HOSPADM

## 2017-10-16 RX ORDER — OXYCODONE HYDROCHLORIDE 5 MG/1
5 TABLET ORAL EVERY 4 HOURS PRN
Status: DISCONTINUED | OUTPATIENT
Start: 2017-10-16 | End: 2017-10-16 | Stop reason: HOSPADM

## 2017-10-16 RX ORDER — ASPIRIN 81 MG/1
81 TABLET, CHEWABLE ORAL DAILY
Status: DISCONTINUED | OUTPATIENT
Start: 2017-10-17 | End: 2017-10-16 | Stop reason: HOSPADM

## 2017-10-16 RX ORDER — ONDANSETRON 2 MG/ML
4 INJECTION INTRAMUSCULAR; INTRAVENOUS EVERY 6 HOURS PRN
Status: DISCONTINUED | OUTPATIENT
Start: 2017-10-16 | End: 2017-10-16 | Stop reason: HOSPADM

## 2017-10-16 RX ORDER — TAMSULOSIN HYDROCHLORIDE 0.4 MG/1
0.8 CAPSULE ORAL DAILY
Qty: 60 CAPSULE | Refills: 11 | Status: SHIPPED | OUTPATIENT
Start: 2017-10-16 | End: 2018-04-24

## 2017-10-16 RX ORDER — CIPROFLOXACIN 500 MG/1
500 TABLET, FILM COATED ORAL 2 TIMES DAILY
Qty: 20 TABLET | Refills: 0 | Status: SHIPPED | OUTPATIENT
Start: 2017-10-16 | End: 2018-04-24

## 2017-10-16 RX ORDER — SODIUM CHLORIDE 9 MG/ML
INJECTION, SOLUTION INTRAVENOUS CONTINUOUS
Status: DISCONTINUED | OUTPATIENT
Start: 2017-10-16 | End: 2017-10-16 | Stop reason: HOSPADM

## 2017-10-16 RX ORDER — SULFAMETHOXAZOLE AND TRIMETHOPRIM 400; 80 MG/1; MG/1
1 TABLET ORAL AT BEDTIME
Qty: 30 TABLET | Refills: 11 | Status: SHIPPED | OUTPATIENT
Start: 2017-10-16 | End: 2018-04-24

## 2017-10-16 RX ORDER — ACETAMINOPHEN 325 MG/1
650 TABLET ORAL EVERY 4 HOURS PRN
Status: DISCONTINUED | OUTPATIENT
Start: 2017-10-16 | End: 2017-10-16 | Stop reason: HOSPADM

## 2017-10-16 RX ORDER — CARBIDOPA AND LEVODOPA 25; 100 MG/1; MG/1
2 TABLET ORAL 3 TIMES DAILY
Status: DISCONTINUED | OUTPATIENT
Start: 2017-10-16 | End: 2017-10-16 | Stop reason: HOSPADM

## 2017-10-16 RX ORDER — CEFTRIAXONE 1 G/1
1 INJECTION, POWDER, FOR SOLUTION INTRAMUSCULAR; INTRAVENOUS EVERY 24 HOURS
Status: DISCONTINUED | OUTPATIENT
Start: 2017-10-16 | End: 2017-10-16 | Stop reason: HOSPADM

## 2017-10-16 RX ORDER — LISINOPRIL 20 MG/1
40 TABLET ORAL DAILY
Status: DISCONTINUED | OUTPATIENT
Start: 2017-10-16 | End: 2017-10-16 | Stop reason: HOSPADM

## 2017-10-16 RX ORDER — HYDRALAZINE HYDROCHLORIDE 20 MG/ML
10 INJECTION INTRAMUSCULAR; INTRAVENOUS EVERY 4 HOURS PRN
Status: DISCONTINUED | OUTPATIENT
Start: 2017-10-16 | End: 2017-10-16 | Stop reason: HOSPADM

## 2017-10-16 RX ORDER — SENNOSIDES 8.6 MG
2 TABLET ORAL 2 TIMES DAILY PRN
Status: DISCONTINUED | OUTPATIENT
Start: 2017-10-16 | End: 2017-10-16 | Stop reason: HOSPADM

## 2017-10-16 RX ORDER — METOPROLOL SUCCINATE 25 MG/1
25 TABLET, EXTENDED RELEASE ORAL DAILY
Status: DISCONTINUED | OUTPATIENT
Start: 2017-10-16 | End: 2017-10-16 | Stop reason: HOSPADM

## 2017-10-16 RX ORDER — NALOXONE HYDROCHLORIDE 0.4 MG/ML
.1-.4 INJECTION, SOLUTION INTRAMUSCULAR; INTRAVENOUS; SUBCUTANEOUS
Status: DISCONTINUED | OUTPATIENT
Start: 2017-10-16 | End: 2017-10-16 | Stop reason: HOSPADM

## 2017-10-16 RX ORDER — TAMSULOSIN HYDROCHLORIDE 0.4 MG/1
0.8 CAPSULE ORAL DAILY
Qty: 60 CAPSULE | Refills: 0 | Status: SHIPPED | OUTPATIENT
Start: 2017-10-16 | End: 2017-10-17

## 2017-10-16 RX ADMIN — IBUPROFEN 600 MG: 600 TABLET ORAL at 06:33

## 2017-10-16 RX ADMIN — CARBIDOPA AND LEVODOPA 2 TABLET: 25; 100 TABLET ORAL at 16:53

## 2017-10-16 RX ADMIN — TAMSULOSIN HYDROCHLORIDE 0.4 MG: 0.4 CAPSULE ORAL at 08:49

## 2017-10-16 RX ADMIN — CARBIDOPA AND LEVODOPA 2 TABLET: 25; 100 TABLET ORAL at 11:16

## 2017-10-16 RX ADMIN — CARBIDOPA AND LEVODOPA 2 TABLET: 25; 100 TABLET ORAL at 08:50

## 2017-10-16 RX ADMIN — LISINOPRIL 40 MG: 20 TABLET ORAL at 08:49

## 2017-10-16 RX ADMIN — METOPROLOL SUCCINATE 25 MG: 25 TABLET, EXTENDED RELEASE ORAL at 08:49

## 2017-10-16 ASSESSMENT — ACTIVITIES OF DAILY LIVING (ADL)
DRESS: 0-->INDEPENDENT
FALL_HISTORY_WITHIN_LAST_SIX_MONTHS: YES
RETIRED_COMMUNICATION: 0-->UNDERSTANDS/COMMUNICATES WITHOUT DIFFICULTY
TRANSFERRING: 0-->INDEPENDENT
SWALLOWING: 0-->SWALLOWS FOODS/LIQUIDS WITHOUT DIFFICULTY
TOILETING: 0-->INDEPENDENT
NUMBER_OF_TIMES_PATIENT_HAS_FALLEN_WITHIN_LAST_SIX_MONTHS: 2
BATHING: 0-->INDEPENDENT
COGNITION: 0 - NO COGNITION ISSUES REPORTED
AMBULATION: 0-->INDEPENDENT
RETIRED_EATING: 0-->INDEPENDENT

## 2017-10-16 ASSESSMENT — ENCOUNTER SYMPTOMS
DIETARY ISSUES: ADEQUATE INTAKE
NO PATIENT REPORTED PAIN: 1
ACTIVITY IMPAIRMENT: NORMAL
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
## 2017-10-16 NOTE — DISCHARGE INSTRUCTIONS
[facility]  [address]  [address] [place] [address]  Appointments: [phone]

## 2017-10-16 NOTE — PLAN OF CARE
Problem: Patient Care Overview  Goal: Plan of Care/Patient Progress Review  Outpatient/Observation goals to be met before discharge home:     Diagnostic tests and consults completed and resulted if ordered: No   - Vital signs normal or at patient baseline : yes   - Tolerating oral intake to maintain hydration Yes  - Adequate pain control on oral analgesia Yes  - Tolerating oral antibiotics or has plans for home infusion setup: Yes  - Infection is improving Yes; Pt states he does not feel as much urgency as he did prior.   - Safe disposition plan has been identified: Yes

## 2017-10-16 NOTE — DISCHARGE SUMMARY
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 enlarged with some calcification.     8 mm hypodensity in the medial left hepatic lobe is unchanged   since  2014. Subcentimeter hypodensity in the caudate lobe is too small   to  characterize with CT but also appears similar to CT from 2014.   The  gallbladder, pancreas, spleen, and adrenal glands are within   normal  limits.     No bowel obstruction. Mild thickening of the distal esophagus.   The  appendix is normal. Minimal scattered diverticulosis.     Lung bases: Small subpleural nodules in the right lower lobe are  stable since 2014 (series 8, image 6).  Small hiatal hernia.     Bones and soft tissues: No acute or suspicious osseous   abnormality.  Degenerative changes of the thoracolumbar spine with a chronic   wedge  compression deformity of T12, stable since the MRI dated   12/28/2016.  Multilevel disc degeneration and Schmorl's nodes.         Impression:  1. Diffuse thickening of the bladder wall with abnormal mucosal  enhancement concerning for cystitis. Enlarged prostate which may  contribute to chronic bladder outlet obstruction.  2. Mild thickening of the distal esophagus which can be seen in  esophagitis.  Consider direct  visualization.  3. Nonobstructive bilateral renal calculi.  4. Small hiatal hernia.            Impression and Plan:   Impression / Plan:   Vini Loya is a 76 year old male with PMH as above,   Parkinson's disease, and urologic history of BPH (on Flomax),   nephrolithiases (see CT above, BL, largest 3mm), occasional UTI   some requiring hospitalization, and constipation     For constipation:   - Start daily fiber (citrucel versus metamucil)  - Consider daily prune juice  - Continue fruit  - Consider adding Miralax (which can be purchased over the   counter at any pharmacy) if the other strategies have not been   effective    For better emptying:   - Always take your time at the toilet, void twice, gently strain   to empty as completely as you can.  - Double your flomax   (tamsulosin) to take two tablets (0.8mg) daily.  Monitor for   dizziness because Flomax can sometimes cause dizziness - go back   to one tablet daily if you get dizzy.   - Avoid constipation (which can worsen your ability to empty)    For the UTIs:    - Complete a 10 day course of antibiotics for the kidney   infection.    - The day after you finish your antibiotics please start Bactrim   SS (trimethoprim sulfamethoxazole) taken nightly just before bed   .  This tiny dose is meant to prevent you from getting   infections.    - Drink plenty of fluids (at least once daily your urine should   look clear like water)     - Patient should followup in Urology in 6-8 weeks (will send   inbasket to urology coordinators)  - Urology will prescribe the Flomax 0.8mg and the bactrim SS.     Urology will sign off but please call with questions    Thank you for the opportunity to participate in the care of   Vini Loya.     RAJWINDER DonaldsonC  Urology Physician Assistant  Personal Pager: [phone]    Please call Job Code:   [phone] to reach the Urology resident or PA on call - Weekdays  [phone] to reach the Urology resident or PA on call - Weeknights   and  weekends            Urine Culture Aerobic Bacterial   Result Value Ref Range    Specimen Description Midstream Urine     Special Requests Specimen received in preservative     Culture Micro >100,000 colonies/mL  Escherichia coli   (A)     Culture Micro Susceptibility testing in progress    Blood culture   Result Value Ref Range    Specimen Description Blood Left Hand     Culture Micro No growth after 15 hours    Blood culture   Result Value Ref Range    Specimen Description Blood Right Hand     Culture Micro No growth after 15 hours       Recent Results (from the past 48 hour(s))   XR Chest 2 Views    Narrative    XR CHEST 2 VW 10/15/2017 10:05 PM    History: right shoulder pain    Comparison: 6/25/2015    Findings: PA and lateral views the chest. The heart size is within  normal limits. No focal pulmonary opacities. No significant pleural  effusion or pneumothorax. Partially visualized bilateral glenohumeral  joints are normally aligned. Chronic compression deformity of the  upper thoracic spine.      Impression    Impression: No acute airspace opacities.     I have personally reviewed the examination and initial interpretation  and I agree with the findings.    HARMAN SU MD   Shoulder XR, G/E 3 views, right    Narrative    XR SHOULDER RT G/E 3 VW 10/15/2017 10:06 PM    History: right shoulder pain, fall x1 mo ago    Comparison: None.    Findings: 4 views including AP, Grashey, Y, and axillary views of the  right shoulder. Normal alignment. No evidence of a significant  Hill-Sachs deformity. No acute fracture or dislocation. Moderate  degenerative changes of the glenohumeral joint and acromioclavicular  joint.      Impression    Impression: Degenerative changes without acute fracture or  dislocation.    I have personally reviewed the examination and initial interpretation  and I agree with the findings.    LUIS NESBITT MD   Wrist XR, G/E 3 views, left   Result Value    Radiologist flags Fracture of the  radial styloid which appears to    Narrative    XR WRIST LEFT G/E 3 VIEWS 10/15/2017 10:06 PM    History: FOOSH x 1 mo ago, pain near radius/scaphoid    Comparison: None.    Findings: 3 views of the left wrist. The bones are mildly osteopenic.  There are well-corticated lucencies of the distal radius, and  irregularities of the articulating surface of the scaphoid bone,  findings which are likely chronic. No definite acute fractures  identified. No significant soft tissue swelling.      Impression    Impression:   1. Likely acute to subacute fracture of the distal radial styloid.  Irregularity along the mid to distal aspect of the scaphoid, uncertain  if this is due to a chronic or acute injury. MRI could be considered  if further evaluation is indicated.  2. Findings were called to the emergency room physician Dr. Yrn Ward at 0845 on 10/15/2017.    [Consider Follow Up: Fracture of the radial styloid which appears to  be acute to subacute, with question of irregularity at the scaphoid,  as above.]    This report will be copied to the [facility] to ensure a  provider acknowledges the finding.     I have personally reviewed the examination and initial interpretation  and I agree with the findings.    LUIS NESBITT MD   CT Abdomen Pelvis w Contrast    Narrative    Examination:  CT ABDOMEN PELVIS W CONTRAST 10/16/2017 12:04 AM     History: Question pyelonephritis.    Comparison: 11/26/2014    Technique: CT of the abdomen and pelvis were obtained with IV  contrast. Sagittal and coronal reconstructions created and reviewed.    Findings:   Bilateral punctate nonobstructive renal calculi. The largest calculus  on the right measures approximately 3 mm (series 8, image 96). On the  left, the largest measures approximately 3 mm (series 8, image 109).  No hydronephrosis. There is diffuse bladder wall thickening and  mucosal enhancement there is a small left lateral diverticulum (series  6, image 52). The  prostate is enlarged with some calcification.    8 mm hypodensity in the medial left hepatic lobe is unchanged since  2014. Subcentimeter hypodensity in the caudate lobe is too small to  characterize with CT but also appears similar to CT from 2014. The  gallbladder, pancreas, spleen, and adrenal glands are within normal  limits.    No bowel obstruction. Mild thickening of the distal esophagus. The  appendix is normal. Minimal scattered diverticulosis.    Lung bases: Small subpleural nodules in the right lower lobe are  stable since 2014 (series 8, image 6).  Small hiatal hernia.    Bones and soft tissues: No acute or suspicious osseous abnormality.  Degenerative changes of the thoracolumbar spine with a chronic wedge  compression deformity of T12, stable since the MRI dated 12/28/2016.  Multilevel disc degeneration and Schmorl's nodes.      Impression    Impression:  1. Diffuse thickening of the bladder wall with abnormal mucosal  enhancement concerning for cystitis. Enlarged prostate which may  contribute to chronic bladder outlet obstruction.  2. Mild thickening of the distal esophagus which can be seen in  esophagitis.  Consider direct visualization.  3. Nonobstructive bilateral renal calculi.  4. Small hiatal hernia.    I have personally reviewed the examination and initial interpretation  and I agree with the findings.    MATT BOUDREAUX MD                Pending Results:   Unresulted Labs Ordered in the Past 30 Days of this Admission     Date and Time Order Name Status Description    10/15/2017 2310 Blood culture Preliminary     10/15/2017 2310 Blood culture Preliminary     10/15/2017 1823 Urine Culture Aerobic Bacterial Preliminary                   Discharge Instructions and Follow-Up:     Discharge Procedure Orders  Titan wrist support w/thumb     Follow Up and recommended labs and tests   Order Comments: UROLOGY FOLLOWUP:  For constipation:   - Start daily fiber (citrucel versus metamucil)  - Consider daily  prune juice  - Continue fruit  - Consider adding Miralax (which can be purchased over the counter at any pharmacy) if the other strategies have not been effective     For better emptying:   - Always take your time at the toilet, void twice, gently strain to empty as completely as you can.  - Double your flomax (tamsulosin) to take two tablets (0.8mg) daily.  Monitor for dizziness because Flomax can sometimes cause dizziness - go back to one tablet daily if you get dizzy.   - Avoid constipation (which can worsen your ability to empty)     For the UTIs:    - Complete a 10 day course of antibiotics for the kidney infection.    - The day after you finish your antibiotics please start Bactrim SS (trimethoprim sulfamethoxazole) taken nightly just before bed .  This tiny dose is meant to prevent you from getting infections.    - Drink plenty of fluids (at least once daily your urine should look clear like water)      - Patient should followup in Urology in 6-8 weeks     Reason for your hospital stay   Order Comments: Bladder and possible early kidney infection, left wrist fracture     Adult [facility] Follow-up and recommended labs and tests   Order Comments: Follow up with primary care provider, Selene Land, within 7 days for hospital follow- up.     Appointments on [place] and/or [facility] (with [facility] or [facility] provider or service). Call [phone] if you haven't heard regarding these appointments within 7 days of discharge.     Activity   Order Comments: Your activity upon discharge: activity as tolerated   Order Specific Question Answer Comments   Is discharge order? Yes      When to contact your care team   Order Comments: Return to the ER if you have fever, chills, vomiting, back pain, fainting     Diet   Order Comments: Follow this diet upon discharge: Orders Placed This Encounter     Regular Diet Adult   Order Specific Question Answer Comments   Is discharge order? Yes              Attestation:  Korin Win.  APRN, CNP

## 2017-10-16 NOTE — PROGRESS NOTES
"Vini Loya is a 76 year old male patient.  1. 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     Allergies   Allergen Reactions     Vytorin      Unknown     Active Problems:    Pyelonephritis    Blood pressure 149/88, pulse 63, temperature 98.7  F (37.1  C), temperature source Oral, resp. rate 18, height 1.727 m (5' 8\"), weight 86 kg (189 lb 9.5 oz), SpO2 93 %.    Subjective:  Symptoms:  Improved.  (Urinary frequency and hematuria).    Diet:  Adequate intake.    Activity level: Normal.    Pain:  He reports no pain.      Objective:  General Appearance:  Comfortable.    Vital signs: (most recent): Blood pressure 149/88, pulse 63, temperature 98.7  F (37.1  C), temperature source Oral, resp. rate 18, height 1.727 m (5' 8\"), weight 86 kg (189 lb 9.5 oz), SpO2 93 %.  Vital signs are normal.    Output: Producing urine.    HEENT: Normal HEENT exam.    Lungs:  Normal respiratory rate and normal effort.  Breath sounds clear to auscultation.    Heart: Normal rate.  Regular rhythm.  S1 normal and S2 normal.    Extremities: Normal range of motion.    Neurological: Patient is alert and oriented to person, place and time.    Skin:  Warm.    Abdomen: Abdomen is soft.  Bowel sounds are normal.   There is no abdominal tenderness.     Pupils:  Pupils are equal, round, and reactive to light.    Pulses: Distal " pulses are intact.      Assessment:    Condition: In stable condition.  Improving.   (75 yo m with multiple medical problems presents with urinary sxs found to have UTI. Started rocephin iv with improvements. Plan to switch to po and DC to home later today. Tolerating po well.).       Mary Grace Chaparro MD  10/16/2017    The pt was seen and examined by myself. The case was reviewed and the plan was discussed with the [facility].
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
## 2017-10-16 NOTE — PROGRESS NOTES
Diagnostic tests and consults completed and resulted if ordered Yes  - Vital signs normal or at patient baseline Yes  - Tolerating oral intake to maintain hydration Yes  - Adequate pain control on oral analgesia Yes   - Tolerating oral antibiotics or has plans for home infusion setup Yes  - Infection is improving Yes  - Safe disposition plan has been identified Yes    Discharge instructions reviewed, understood, and signed by patient. VSS, PIV removed, new medications reviewed and understood, patient has all belongings. Patient left unit via ambulation with family.
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
## 2017-10-16 NOTE — PLAN OF CARE
"Problem: Patient Care Overview  Goal: Discharge Needs Assessment  Diagnostic tests and consults completed and resulted if ordered. No.   - Vital signs normal or at patient baseline. No. Blood pressure slightly elevated  - Tolerating oral intake to maintain hydration. Yes  - Adequate pain control on oral analgesia. Yes  - Tolerating oral antibiotics or has plans for home infusion setup. No  - Infection is improving. No. Pending  - Safe disposition plan has been identified. No.  Patient alert and oriented. Has history of parkinsons. Presented with urinary frequency. Dx UTI. /88 (BP Location: Left arm)  Pulse 63  Temp 98.7  F (37.1  C) (Oral)  Resp 18  Ht 1.727 m (5' 8\")  Wt 86 kg (189 lb 9.5 oz)  SpO2 93%  BMI 28.83 kg/m2          "

## 2017-10-16 NOTE — PLAN OF CARE
Problem: Patient Care Overview  Goal: Plan of Care/Patient Progress Review  Outpatient/Observation goals to be met before discharge home:     - Diagnostic tests and consults completed and resulted if ordered no; pt has urology consult   - Vital signs normal or at patient baseline Yes  - Tolerating oral intake to maintain hydration Yes  - Adequate pain control on oral analgesia Yes   - Tolerating oral antibiotics or has plans for home infusion setup Yes  - Infection is improving Yes  - Safe disposition plan has been identified Yes

## 2017-10-17 ENCOUNTER — TELEPHONE (OUTPATIENT)
Dept: FAMILY MEDICINE | Facility: CLINIC | Age: 77
End: 2017-10-17

## 2017-10-17 ASSESSMENT — ENCOUNTER SYMPTOMS
NECK STIFFNESS: 0
COLOR CHANGE: 0
HEADACHES: 1
NECK PAIN: 0
MYALGIAS: 0

## 2017-10-17 NOTE — TELEPHONE ENCOUNTER
"Hospital/TCU/ED for chronic condition Discharge Protocol    \"Hi, my name is Cheryl Petra, a registered nurse, and I am calling from [facility].  I am calling to follow up and see how things are going for you after your recent emergency visit/hospital/TCU stay.\"    Tell me how you are doing now that you are home?\" Thought he would be doing better, but is doing fine.    -PO intake is good.  Working on drinking plenty of water.    -Denied hematuria and dysuria.     -Patient reports still needs to urinate frequently    -Afebrile      Discharge Instructions    \"Let's review your discharge instructions.  What is/are the follow-up recommendations?  Pt. Response: Follow up with PCP within 1 week and with urology in 6-8 weeks    \"Has an appointment with your primary care provider been scheduled?\"   No (schedule appointment)    \"When you see the provider, I would recommend that you bring your medications with you.\"    Medications    \"Tell me what changed about your medicines when you discharged?\"    Changes to chronic meds?    0-1.  Clarified Flomax instructions with patient-he was unsure if should take BID or daily.  Reviewed with patient sig states: \"Take 2 capsules (0.8 mg) by mouth daily.\"  Recommended patient follow instructions and take the 2 capsules once daily.  Patient verbalized understanding and in agreement with plan.      \"What questions do you have about your medications?\"    None     New diagnoses of heart failure, COPD, diabetes, or MI?    No                  Medication reconciliation completed? Yes  Was MTM referral placed (*Make sure to put transitions as reason for referral)?   No    Call Summary    \"What questions or concerns do you have about your recent visit and your follow-up care?\"     none    \"If you have questions or things don't continue to improve, we encourage you contact us through the main clinic number (give number).  Even if the clinic is not open, triage nurses are available 24/7 to " "help you.     We would like you to know that our clinic has extended hours (provide information).  We also have urgent care (provide details on closest location and hours/contact info)\"      \"Thank you for your time and take care!\"         "

## 2017-10-18 ENCOUNTER — CARE COORDINATION (OUTPATIENT)
Dept: CARE COORDINATION | Facility: CLINIC | Age: 77
End: 2017-10-18

## 2017-10-18 NOTE — LETTER
[facility] CARE COORDINATION  [address] [address]  Phone: [phone]      October 19, 2017      Vini Loya  [address]  [facility] [address]    Dear Vini,  I am the Clinic Care Coordinator that works with your primary care provider's clinic. I wanted to thank you for spending the time to talk with me.  Below is a description of what Clinic Care Coordination is and how I can further assist you.     The Clinic Care Coordinator role is a Registered Nurse and/or  who understands the health care system. The goal of Clinic Care Coordination is to help you manage your health and improve access to the [facility] system in the most efficient manner.  The Registered Nurse can assist you in meeting your health care goals by providing education, coordinating services, and strengthening the communication among your providers. The  can assist you with financial, behavioral, psychosocial, and chemical dependency and counseling/psychiatric resources.    Please feel free to keep this letter and contact information to contact me at [phone] with any further questions or concerns that may arise. We at [facility] are focused on providing you with the highest-quality healthcare experience possible and that all starts with you.       Sincerely,     LUIS Mcgill  Care Navigator  [facility]  [phone]

## 2017-10-19 NOTE — PROGRESS NOTES
Clinic Care Coordination Contact  OUTREACH    Referral Information:  Referral Source: IP/TCU Report  Reason for Contact: UTI        [place] Utilization:   ED Visits in last year: 2  Hospital visits in last year: 1  Last PCP appointment: 06/20/17  Missed Appointments: 0  Concerns: N/A       Clinical Concerns:  Current Medical Concerns: Not yet 100% better still tired        Medication Management:  Medication reconciliation complete.      Functional Status:  Mobility Status: Independent  Equipment Currently Used at Home: none  Transportation: Drives and wife           Psychosocial:  Current living arrangement:: I live in a private home with spouse  Financial/Insurance: [facility]  No financial concerns at this time.     Resources and Interventions:  Current Resources:  ;          Advanced Care Plans/Directives on file:: No        Patient/Caregiver understanding: Yes     Upcoming appointment: 10/20/17     Plan: Patient will read care coordination information letter. Patient will follow up with Dr. Land as scheduled. This writer will include advance care planning with letter. Patient has good support from wife.    LUIS Mcgill  Care Navigator  [facility]  [phone]

## 2017-10-20 ENCOUNTER — OFFICE VISIT (OUTPATIENT)
Dept: FAMILY MEDICINE | Facility: CLINIC | Age: 77
End: 2017-10-20
Payer: COMMERCIAL

## 2017-10-20 VITALS
OXYGEN SATURATION: 94 % | WEIGHT: 185.5 LBS | RESPIRATION RATE: 16 BRPM | HEART RATE: 52 BPM | BODY MASS INDEX: 28.21 KG/M2 | TEMPERATURE: 98.3 F | DIASTOLIC BLOOD PRESSURE: 57 MMHG | SYSTOLIC BLOOD PRESSURE: 86 MMHG

## 2017-10-20 DIAGNOSIS — G20.A1 PARKINSON'S DISEASE (H): ICD-10-CM

## 2017-10-20 DIAGNOSIS — I10 HYPERTENSION, GOAL BELOW 150/90: ICD-10-CM

## 2017-10-20 DIAGNOSIS — I95.9 HYPOTENSION, UNSPECIFIED HYPOTENSION TYPE: ICD-10-CM

## 2017-10-20 DIAGNOSIS — N39.0 RECURRENT UTI: ICD-10-CM

## 2017-10-20 DIAGNOSIS — N12 PYELONEPHRITIS: Primary | ICD-10-CM

## 2017-10-20 PROCEDURE — 99495 TRANSJ CARE MGMT MOD F2F 14D: CPT | Performed by: FAMILY MEDICINE
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
## 2017-10-20 NOTE — MR AVS SNAPSHOT
After Visit Summary   10/20/2017    Vini Loya    MRN: [id number]           Patient Information     Date Of Birth          1940        Visit Information        Provider Department      10/20/2017 2:00 PM Wegener, Joel Daniel Irwin, MD [facility]        Today's Diagnoses     Pyelonephritis    -  1    Hypertension, goal below 150/90        Parkinson's disease (H)          Care Instructions    ASSESSMENT AND PLAN  1. Pyelonephritis  Complete cipro therapy then start bactrim ss.     2. Hypertension, goal below 150/90  Stop metoprolol now.  Lower blood pressue likely from imfection and/or flomax.     3. Parkinson's disease (H)  Impacting blood pressue as well.     Follow up with me 11:40 November 3rd for blood pressue re-check.         MYCHART SIGNUP FOR E-VISITS AND EASIER COMMUNICATION:  [email or web address]     Zipnosis:  [place].[email or web address].  Sign up for e-visits for common illnesses!     RADIOLOGY:   [facility]:  [phone] to schedule any radiology tests at [facility] [place]: [phone]    Mammograms/colonoscopies:  [phone]    CONSUMER PRICE LINE for estimates of test costs:  [phone]               Follow-ups after your visit        Your next 10 appointments already scheduled     Dec 05, 2017  2:00 PM CST   (Arrive by 1:45 PM)   Return Visit with CHANDA Singleton   [facility]rologic [facility] ([facility] [place])    [address]  4th Floor  [facility] [id number] [phone]            Jan 02, 2018 11:40 AM CST   (Arrive by 11:25 AM)   Return Movement Disorder with KOTA Dutta CNP   [facility] ([facility] [place])    [address]  3rd Floor  [place] [id number] [phone]            Apr 24, 2018 10:00 AM CDT   RETURN GLAUCOMA with Yudith Mcdonnell MD   [facility] Clinic ([facility])    [facility]  [address]  [facility] [address]   [phone]              Who to contact     If you have questions or need follow up information about today's clinic visit or your schedule please contact [facility] directly at [phone].  Normal or non-critical lab and imaging results will be communicated to you by MyChart, letter or phone within 4 business days after the clinic has received the results. If you do not hear from us within 7 days, please contact the clinic through MyChart or phone. If you have a critical or abnormal lab result, we will notify you by phone as soon as possible.  Submit refill requests through [facility] or call your pharmacy and they will forward the refill request to us. Please allow 3 business days for your refill to be completed.          Additional Information About Your Visit        MyChart Information     [facility] lets you send messages to your doctor, view your test results, renew your prescriptions, schedule appointments and more. To sign up, go to [email or web address] . Click on \"Log in\" on the left side of the screen, which will take you to the Welcome page. Then click on \"Sign up Now\" on the right side of the page.     You will be asked to enter the access code listed below, as well as some personal information. Please follow the directions to create your username and password.     Your access code is: [email or web address]  Expires: 2017  6:30 AM     Your access code will  in 90 days. If you need help or a new code, please call your [place] clinic or [phone].        Care EveryWhere ID     This is your Care EveryWhere ID. This could be used by other organizations to access your [place] medical records  [id number]        Your Vitals Were     Pulse Temperature Respirations Pulse Oximetry BMI (Body Mass Index)       52 98.3  F (36.8  C) (Oral) 16 94% 28.21 kg/m2        Blood Pressure from Last 3 Encounters:   10/20/17 (!) 86/57   10/16/17 " 144/74   07/02/17 (!) 170/91    Weight from Last 3 Encounters:   10/20/17 185 lb 8 oz (84.1 kg)   10/15/17 189 lb 9.5 oz (86 kg)   07/02/17 180 lb (81.6 kg)              Today, you had the following     No orders found for display         Today's Medication Changes          These changes are accurate as of: 10/20/17  2:23 PM.  If you have any questions, ask your nurse or doctor.               Stop taking these medicines if you haven't already. Please contact your care team if you have questions.     metoprolol 25 MG 24 hr tablet   Commonly known as:  TOPROL-XL   Stopped by:  Wegener, Joel Daniel Irwin, MD                    Primary Care Provider Office Phone # Fax #    Selene Lia Land [phone] [address]  [facility] [address]        Equal Access to Services     [facility]: Hadii phill jimenes hadasho Soyue, waaxda luqadaha, qaybta kaalmada ademichelleyada, claudia ugalde . So [facility] [phone].    ATENCIÓN: Si habla español, tiene a florez disposición servicios gratuitos de asistencia lingüística. Bishop al [phone].    We comply with applicable federal civil rights laws and [place] laws. We do not discriminate on the basis of race, color, national origin, age, disability, sex, sexual orientation, or gender identity.            Thank you!     Thank you for choosing [facility]  for your care. Our goal is always to provide you with excellent care. Hearing back from our patients is one way we can continue to improve our services. Please take a few minutes to complete the written survey that you may receive in the mail after your visit with us. Thank you!             Your Updated Medication List - Protect others around you: Learn how to safely use, store and throw away your medicines at www.disposemymeds.org.          This list is accurate as of: 10/20/17  2:23 PM.  Always use your most recent med list.                   Brand Name Dispense  Instructions for use Diagnosis    aspirin 81 MG tablet      1 TABLET DAILY    Unspecified essential hypertension, Cerebral embolism with cerebral infarction (H), CAD (coronary artery disease), Mixed hyperlipidemia       Blood Pressure Monitor Pricilla     1 Device    1 Device daily.    Hypertension goal BP (blood pressure) < 130/80       carbidopa-levodopa  MG per tablet    SINEMET    540 tablet    Take 2 tablets 3 times a day 1 hour before each meal.  (Around 7 am, 11 am, & 4 pm.)    Parkinson's disease (H)       ciprofloxacin 500 MG tablet    CIPRO    20 tablet    Take 1 tablet (500 mg) by mouth 2 times daily    Urinary tract infection with hematuria, site unspecified, Pyelonephritis       lisinopril 40 MG tablet    PRINIVIL/ZESTRIL    90 tablet    TAKE ONE TABLET BY MOUTH EVERY DAY    Hypertension, goal below 150/90       PARoxetine 20 MG tablet    PAXIL    90 tablet    TAKE ONE TABLET BY MOUTH EVERY NIGHT AT BEDTIME    Anxiety       simvastatin 40 MG tablet    ZOCOR    90 tablet    TAKE ONE TABLET BY MOUTH AT BEDTIME    Hyperlipidemia LDL goal <100       sulfamethoxazole-trimethoprim 400-80 MG per tablet    BACTRIM/SEPTRA    30 tablet    Take 1 tablet by mouth At Bedtime For UTI prevention    Urinary tract infection with hematuria, site unspecified       tamsulosin 0.4 MG capsule    FLOMAX    60 capsule    Take 2 capsules (0.8 mg) by mouth daily    Bradycardia

## 2017-10-20 NOTE — PATIENT INSTRUCTIONS
ASSESSMENT AND PLAN  1. Pyelonephritis  Complete cipro therapy then start bactrim ss.     2. Hypertension, goal below 150/90  Stop metoprolol now.  Lower blood pressue likely from imfection and/or flomax.     3. Parkinson's disease (H)  Impacting blood pressue as well.     Follow up with me 11:40 November 3rd for blood pressue re-check.         MYCHART SIGNUP FOR E-VISITS AND EASIER COMMUNICATION:  [email or web address]     Zipnosis:  [employer].  Sign up for e-visits for common illnesses!     RADIOLOGY:   [facility]:  [phone] to schedule any radiology tests at [facility] [place]: [phone]    Mammograms/colonoscopies:  [phone]    CONSUMER PRICE LINE for estimates of test costs:  [phone]

## 2017-10-22 LAB
BACTERIA SPEC CULT: NO GROWTH
BACTERIA SPEC CULT: NO GROWTH
SPECIMEN SOURCE: NORMAL
SPECIMEN SOURCE: NORMAL

## 2017-11-03 ENCOUNTER — OFFICE VISIT (OUTPATIENT)
Dept: FAMILY MEDICINE | Facility: CLINIC | Age: 77
End: 2017-11-03
Payer: COMMERCIAL

## 2017-11-03 VITALS
OXYGEN SATURATION: 95 % | DIASTOLIC BLOOD PRESSURE: 51 MMHG | SYSTOLIC BLOOD PRESSURE: 86 MMHG | HEART RATE: 97 BPM | WEIGHT: 185.5 LBS | TEMPERATURE: 98.5 F | BODY MASS INDEX: 28.21 KG/M2

## 2017-11-03 DIAGNOSIS — I10 HYPERTENSION, GOAL BELOW 150/90: ICD-10-CM

## 2017-11-03 DIAGNOSIS — N10 ACUTE PYELONEPHRITIS: ICD-10-CM

## 2017-11-03 DIAGNOSIS — Z23 NEED FOR PROPHYLACTIC VACCINATION AND INOCULATION AGAINST INFLUENZA: ICD-10-CM

## 2017-11-03 DIAGNOSIS — I95.1 ORTHOSTATIC HYPOTENSION: Primary | ICD-10-CM

## 2017-11-03 LAB
ALBUMIN UR-MCNC: NEGATIVE MG/DL
APPEARANCE UR: CLEAR
BACTERIA #/AREA URNS HPF: ABNORMAL /HPF
BILIRUB UR QL STRIP: NEGATIVE
COLOR UR AUTO: YELLOW
GLUCOSE UR STRIP-MCNC: NEGATIVE MG/DL
HGB UR QL STRIP: ABNORMAL
KETONES UR STRIP-MCNC: NEGATIVE MG/DL
LEUKOCYTE ESTERASE UR QL STRIP: ABNORMAL
MUCOUS THREADS #/AREA URNS LPF: PRESENT /LPF
NITRATE UR QL: NEGATIVE
NON-SQ EPI CELLS #/AREA URNS LPF: ABNORMAL /LPF
PH UR STRIP: 6.5 PH (ref 5–7)
RBC #/AREA URNS AUTO: ABNORMAL /HPF
SOURCE: ABNORMAL
SP GR UR STRIP: 1.01 (ref 1–1.03)
UROBILINOGEN UR STRIP-ACNC: 0.2 EU/DL (ref 0.2–1)
WBC #/AREA URNS AUTO: ABNORMAL /HPF

## 2017-11-03 PROCEDURE — 99213 OFFICE O/P EST LOW 20 MIN: CPT | Mod: 25 | Performed by: FAMILY MEDICINE

## 2017-11-03 PROCEDURE — 87086 URINE CULTURE/COLONY COUNT: CPT | Performed by: FAMILY MEDICINE

## 2017-11-03 PROCEDURE — 90662 IIV NO PRSV INCREASED AG IM: CPT | Performed by: FAMILY MEDICINE

## 2017-11-03 PROCEDURE — 81001 URINALYSIS AUTO W/SCOPE: CPT | Performed by: FAMILY MEDICINE

## 2017-11-03 PROCEDURE — G0008 ADMIN INFLUENZA VIRUS VAC: HCPCS | Performed by: FAMILY MEDICINE

## 2017-11-03 NOTE — MR AVS SNAPSHOT
After Visit Summary   11/3/2017    Sofya Kumar    MRN: [id number]           Patient Information     Date Of Birth          1940        Visit Information        Provider Department      11/3/2017 11:40 AM Wegener, Joel Daniel Irwin, MD [facility]        Today's Diagnoses     Orthostatic hypotension    -  1    Acute pyelonephritis        Hypertension, goal below 150/90          Care Instructions    ASSESSMENT AND PLAN  1. Orthostatic hypotension  Stop taking lisinopril.     please put in sofya kumar for bp check 9:40 on november 17th      2. Acute pyelonephritis  Completed therapy.  Continuing daily preventive antibiotic.     Re-check urine culture today.   - UA with Microscopic  - Urine Culture Aerobic Bacterial    3. Hypertension, goal below 150/90  As above.           MYCHART SIGNUP FOR E-VISITS AND EASIER COMMUNICATION:  [email or web address]     Zipnosis:  [email or web address].  Sign up for e-visits for common illnesses!     RADIOLOGY:   [facility]:  [phone] to schedule any radiology tests at [facility]: [phone]    Mammograms/colonoscopies:  [phone]    CONSUMER PRICE LINE for estimates of test costs:  [phone]               Follow-ups after your visit        Your next 10 appointments already scheduled     Dec 05, 2017  2:00 PM CST   (Arrive by 1:45 PM)   Return Visit with CHANDA Singleton   [facility] Prostate [facility] Urologic Cancers ([facility])    [address] [place] [address]  4th [facility] [id number] [phone]            Jan 02, 2018 11:40 AM CST   (Arrive by 11:25 AM)   Return Movement Disorder with KOTA Dutta CNP   [facility] Neurology ([facility])    [address]  3rd [facility] [address] [phone]            Apr 24, 2018 10:00 AM CDT   RETURN GLAUCOMA with Yudith Mcdonnell MD   Eye Clinic ([facility]  "Clinics)    [facility]  [address]  [address]  [facility] [id number] [phone]              Who to contact     If you have questions or need follow up information about today's clinic visit or your schedule please contact [facility] directly at [phone].  Normal or non-critical lab and imaging results will be communicated to you by MyChart, letter or phone within 4 business days after the clinic has received the results. If you do not hear from us within 7 days, please contact the clinic through MyChart or phone. If you have a critical or abnormal lab result, we will notify you by phone as soon as possible.  Submit refill requests through [employer] or call your pharmacy and they will forward the refill request to us. Please allow 3 business days for your refill to be completed.          Additional Information About Your Visit        [employer] Information     [employer] lets you send messages to your doctor, view your test results, renew your prescriptions, schedule appointments and more. To sign up, go to [email or web address] . Click on \"Log in\" on the left side of the screen, which will take you to the Welcome page. Then click on \"Sign up Now\" on the right side of the page.     You will be asked to enter the access code listed below, as well as some personal information. Please follow the directions to create your username and password.     Your access code is: [email or web address]  Expires: 2017  6:30 AM     Your access code will  in 90 days. If you need help or a new code, please call your [facility] clinic or [phone].        Care EveryWhere ID     This is your Care EveryWhere ID. This could be used by other organizations to access your [facility] medical records  [id number]        Your Vitals Were     Pulse Temperature Pulse Oximetry BMI (Body Mass Index)          97 98.5  F (36.9  C) (Oral) 95% 28.21 kg/m2         Blood Pressure from Last 3 Encounters: "   11/03/17 (!) 86/51   10/20/17 (!) 86/57   10/16/17 144/74    Weight from Last 3 Encounters:   11/03/17 185 lb 8 oz (84.1 kg)   10/20/17 185 lb 8 oz (84.1 kg)   10/15/17 189 lb 9.5 oz (86 kg)              We Performed the Following     UA with Microscopic     Urine Culture Aerobic Bacterial          Today's Medication Changes          These changes are accurate as of: 11/3/17 12:12 PM.  If you have any questions, ask your nurse or doctor.               Stop taking these medicines if you haven't already. Please contact your care team if you have questions.     lisinopril 40 MG tablet   Commonly known as:  PRINIVIL/ZESTRIL   Stopped by:  Wegener, Joel Daniel Irwin, MD                    Primary Care Provider Office Phone # Fax #    Selene Lia Land [phone]       [address]  [facility] [address]        Equal Access to Services     [facility] campusANETTE : Hadii aad ku hadasho Soomaali, waaxda luqadaha, qaybta kaalmada adeegyada, waxay idiin hayaan ademichelle ugalde . So [facility] [phone].    ATENCIÓN: Si habla español, tiene a florez disposición servicios gratuitos de asistencia lingüística. Llame al [phone].    We comply with applicable federal civil rights laws and [place] laws. We do not discriminate on the basis of race, color, national origin, age, disability, sex, sexual orientation, or gender identity.            Thank you!     Thank you for choosing [facility]  for your care. Our goal is always to provide you with excellent care. Hearing back from our patients is one way we can continue to improve our services. Please take a few minutes to complete the written survey that you may receive in the mail after your visit with us. Thank you!             Your Updated Medication List - Protect others around you: Learn how to safely use, store and throw away your medicines at www.disposemymeds.org.          This list is accurate as of: 11/3/17 12:12 PM.  Always use your most  recent med list.                   Brand Name Dispense Instructions for use Diagnosis    aspirin 81 MG tablet      1 TABLET DAILY    Unspecified essential hypertension, Cerebral embolism with cerebral infarction (H), CAD (coronary artery disease), Mixed hyperlipidemia       Blood Pressure Monitor Pricilla     1 Device    1 Device daily.    Hypertension goal BP (blood pressure) < 130/80       carbidopa-levodopa  MG per tablet    SINEMET    540 tablet    Take 2 tablets 3 times a day 1 hour before each meal.  (Around 7 am, 11 am, & 4 pm.)    Parkinson's disease (H)       ciprofloxacin 500 MG tablet    CIPRO    20 tablet    Take 1 tablet (500 mg) by mouth 2 times daily    Urinary tract infection with hematuria, site unspecified, Pyelonephritis       PARoxetine 20 MG tablet    PAXIL    90 tablet    TAKE ONE TABLET BY MOUTH EVERY NIGHT AT BEDTIME    Anxiety       simvastatin 40 MG tablet    ZOCOR    90 tablet    TAKE ONE TABLET BY MOUTH AT BEDTIME    Hyperlipidemia LDL goal <100       sulfamethoxazole-trimethoprim 400-80 MG per tablet    BACTRIM/SEPTRA    30 tablet    Take 1 tablet by mouth At Bedtime For UTI prevention    Urinary tract infection with hematuria, site unspecified       tamsulosin 0.4 MG capsule    FLOMAX    60 capsule    Take 2 capsules (0.8 mg) by mouth daily    Bradycardia

## 2017-11-03 NOTE — NURSING NOTE
"Chief Complaint   Patient presents with     RECHECK     bladder infection       Initial BP (!) 86/51  Pulse 97  Temp 98.5  F (36.9  C) (Oral)  Wt 185 lb 8 oz (84.1 kg)  SpO2 95%  BMI 28.21 kg/m2 Estimated body mass index is 28.21 kg/(m^2) as calculated from the following:    Height as of 10/15/17: 5' 8\" (1.727 m).    Weight as of this encounter: 185 lb 8 oz (84.1 kg).  Medication Reconciliation: complete   Sandra Davis MA      "

## 2017-11-03 NOTE — PATIENT INSTRUCTIONS
ASSESSMENT AND PLAN  1. Orthostatic hypotension  Stop taking lisinopril.     please put in sofya kumar for bp check 9:40 on november 17th      2. Acute pyelonephritis  Completed therapy.  Continuing daily preventive antibiotic.     Re-check urine culture today.   - UA with Microscopic  - Urine Culture Aerobic Bacterial    3. Hypertension, goal below 150/90  As above.           MYCHART SIGNUP FOR E-VISITS AND EASIER COMMUNICATION:  [email or web address]     Zipnosis:  [employer].  Sign up for e-visits for common illnesses!     RADIOLOGY:   [facility]:  [phone] to schedule any radiology tests at [facility]: [phone]    Mammograms/colonoscopies:  [phone]    CONSUMER PRICE LINE for estimates of test costs:  [phone]

## 2017-11-03 NOTE — LETTER
November 13, 2017      Vini Loya  [address] [address]        Dear ,    We are writing to inform you of your test results.    The urine culture did not show an infection so no other medication changes are needed    Resulted Orders   UA with Microscopic   Result Value Ref Range    Color Urine Yellow     Appearance Urine Clear     Glucose Urine Negative NEG^Negative mg/dL    Bilirubin Urine Negative NEG^Negative    Ketones Urine Negative NEG^Negative mg/dL    Specific Gravity Urine 1.015 1.003 - 1.035    pH Urine 6.5 5.0 - 7.0 pH    Protein Albumin Urine Negative NEG^Negative mg/dL    Urobilinogen Urine 0.2 0.2 - 1.0 EU/dL    Nitrite Urine Negative NEG^Negative    Blood Urine Trace (A) NEG^Negative    Leukocyte Esterase Urine Trace (A) NEG^Negative    Source Midstream Urine     WBC Urine 5-10 (A) OTO2^O - 2 /HPF    RBC Urine 2-5 (A) OTO2^O - 2 /HPF    Squamous Epithelial /LPF Urine Moderate (A) FEW^Few /LPF    Bacteria Urine Moderate (A) NEG^Negative /HPF    Mucous Urine Present (A) NEG^Negative /LPF   Urine Culture Aerobic Bacterial   Result Value Ref Range    Specimen Description Urine     Culture Micro No growth      If you have any questions or concerns, please call the clinic at the number listed above.   Sincerely,  Joel Daniel Wegener, MD/nr
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
## 2017-11-03 NOTE — PROGRESS NOTES

## 2017-11-04 LAB
BACTERIA SPEC CULT: NO GROWTH
SPECIMEN SOURCE: NORMAL

## 2017-11-17 ENCOUNTER — OFFICE VISIT (OUTPATIENT)
Dept: FAMILY MEDICINE | Facility: CLINIC | Age: 77
End: 2017-11-17
Payer: COMMERCIAL

## 2017-11-17 VITALS
RESPIRATION RATE: 16 BRPM | TEMPERATURE: 97 F | OXYGEN SATURATION: 93 % | HEART RATE: 84 BPM | SYSTOLIC BLOOD PRESSURE: 110 MMHG | DIASTOLIC BLOOD PRESSURE: 64 MMHG | BODY MASS INDEX: 28.43 KG/M2 | WEIGHT: 187 LBS

## 2017-11-17 DIAGNOSIS — I95.1 ORTHOSTATIC HYPOTENSION: Primary | ICD-10-CM

## 2017-11-17 PROCEDURE — 99213 OFFICE O/P EST LOW 20 MIN: CPT | Performed by: FAMILY MEDICINE

## 2017-11-17 NOTE — NURSING NOTE
"Chief Complaint   Patient presents with     Hypertension       Initial BP (!) 88/56  Pulse 84  Temp 97  F (36.1  C) (Tympanic)  Resp 16  Wt 187 lb (84.8 kg)  SpO2 93%  BMI 28.43 kg/m2 Estimated body mass index is 28.43 kg/(m^2) as calculated from the following:    Height as of 10/15/17: 5' 8\" (1.727 m).    Weight as of this encounter: 187 lb (84.8 kg).  Medication Reconciliation: complete     Antonieta Parada MA    "
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
## 2017-11-17 NOTE — MR AVS SNAPSHOT
After Visit Summary   11/17/2017    Vini Loya    MRN: [id number]           Patient Information     Date Of Birth          1940        Visit Information        Provider Department      11/17/2017 9:40 AM Wegener, Joel Daniel Irwin[facility]        Today's Diagnoses     Orthostatic hypotension    -  1       Follow-ups after your visit        Your next 10 appointments already scheduled     Dec 05, 2017  2:00 PM CST   (Arrive by 1:45 PM)   Return Visit with CHANDA Singleton   [facility]rology and Inst for Prostate and Urologic Cancers ([place] [facility])    [address]  [address] [facility] [address]   [phone]            Jan 02, 2018 11:40 AM CST   (Arrive by 11:25 AM)   Return Movement Disorder with KOTA Dutta Replaced by [facility] ([place] [facility])    [address]  [address] [facility] [address]   [phone]            Apr 24, 2018 10:00 AM CDT   RETURN GLAUCOMA with Yudith Mcdonnell MD   Eye Clinic ([facility])    [facility]  [address]  [address] [address]  [facility] [address] [phone]              Who to contact     If you have questions or need follow up information about today's clinic visit or your schedule please contact [facility] directly at [phone].  Normal or non-critical lab and imaging results will be communicated to you by MyChart, letter or phone within 4 business days after the clinic has received the results. If you do not hear from us within 7 days, please contact the clinic through MyChart or phone. If you have a critical or abnormal lab result, we will notify you by phone as soon as possible.  Submit refill requests through [employer] or call your pharmacy and they will forward the refill request to us. Please allow 3 business days for your refill to be completed.          Additional Information About Your  "Visit        [email or web address] Information     [employer] lets you send messages to your doctor, view your test results, renew your prescriptions, schedule appointments and more. To sign up, go to [email or web address] . Click on \"Log in\" on the left side of the screen, which will take you to the Welcome page. Then click on \"Sign up Now\" on the right side of the page.     You will be asked to enter the access code listed below, as well as some personal information. Please follow the directions to create your username and password.     Your access code is: [email or web address]  Expires: 2017  5:30 AM     Your access code will  in 90 days. If you need help or a new code, please call your [place] clinic or [phone].        Care EveryWhere ID     This is your Care EveryWhere ID. This could be used by other organizations to access your [place] medical records  [id number]        Your Vitals Were     Pulse Temperature Respirations Pulse Oximetry BMI (Body Mass Index)       84 97  F (36.1  C) (Tympanic) 16 93% 28.43 kg/m2        Blood Pressure from Last 3 Encounters:   17 110/64   17 (!) 86/51   10/20/17 (!) 86/57    Weight from Last 3 Encounters:   17 187 lb (84.8 kg)   17 185 lb 8 oz (84.1 kg)   10/20/17 185 lb 8 oz (84.1 kg)              Today, you had the following     No orders found for display         Today's Medication Changes          These changes are accurate as of: 17 10:20 AM.  If you have any questions, ask your nurse or doctor.               Stop taking these medicines if you haven't already. Please contact your care team if you have questions.     ciprofloxacin 500 MG tablet   Commonly known as:  CIPRO   Stopped by:  Wegener, Joel Daniel Irwin, MD                    Primary Care Provider Office Phone # Fax #    Selene Land [phone] [address]  [facility] [address]        Equal Access to Services     CHRISTOPHE JORDAN AH: Javy kim " Sofrancoisali, waisaida luqadaha, qaybta kaphuc novak, claudia schmidt rowenaprashanth munsonromelia adenike. So [facility] [phone].    ATENCIÓN: Si benjamín nguyen, tiene a florez disposición servicios gratuitos de asistencia lingüística. Bishop al [phone].    We comply with applicable federal civil rights laws and [place] laws. We do not discriminate on the basis of race, color, national origin, age, disability, sex, sexual orientation, or gender identity.            Thank you!     Thank you for choosing [facility]  for your care. Our goal is always to provide you with excellent care. Hearing back from our patients is one way we can continue to improve our services. Please take a few minutes to complete the written survey that you may receive in the mail after your visit with us. Thank you!             Your Updated Medication List - Protect others around you: Learn how to safely use, store and throw away your medicines at www.disposemymeds.org.          This list is accurate as of: 11/17/17 10:20 AM.  Always use your most recent med list.                   Brand Name Dispense Instructions for use Diagnosis    aspirin 81 MG tablet      1 TABLET DAILY    Unspecified essential hypertension, Cerebral embolism with cerebral infarction (H), CAD (coronary artery disease), Mixed hyperlipidemia       Blood Pressure Monitor Pricilla     1 Device    1 Device daily.    Hypertension goal BP (blood pressure) < 130/80       carbidopa-levodopa  MG per tablet    SINEMET    540 tablet    Take 2 tablets 3 times a day 1 hour before each meal.  (Around 7 am, 11 am, & 4 pm.)    Parkinson's disease (H)       PARoxetine 20 MG tablet    PAXIL    90 tablet    TAKE ONE TABLET BY MOUTH EVERY NIGHT AT BEDTIME    Anxiety       simvastatin 40 MG tablet    ZOCOR    90 tablet    TAKE ONE TABLET BY MOUTH AT BEDTIME    Hyperlipidemia LDL goal <100       sulfamethoxazole-trimethoprim 400-80 MG per tablet    BACTRIM/SEPTRA    30 tablet    Take 1  tablet by mouth At Bedtime For UTI prevention    Urinary tract infection with hematuria, site unspecified       tamsulosin 0.4 MG capsule    FLOMAX    60 capsule    Take 2 capsules (0.8 mg) by mouth daily    Bradycardia

## 2017-11-24 DIAGNOSIS — N40.1 BENIGN PROSTATIC HYPERPLASIA WITH URINARY OBSTRUCTION: ICD-10-CM

## 2017-11-24 DIAGNOSIS — N13.8 BENIGN PROSTATIC HYPERPLASIA WITH URINARY OBSTRUCTION: ICD-10-CM

## 2017-11-27 RX ORDER — TAMSULOSIN HYDROCHLORIDE 0.4 MG/1
CAPSULE ORAL
Qty: 60 CAPSULE | Refills: 0 | OUTPATIENT
Start: 2017-11-27

## 2017-11-28 ENCOUNTER — TELEPHONE (OUTPATIENT)
Dept: FAMILY MEDICINE | Facility: CLINIC | Age: 77
End: 2017-11-28

## 2017-11-28 DIAGNOSIS — I10 HYPERTENSION, GOAL BELOW 150/90: ICD-10-CM

## 2017-11-28 RX ORDER — LISINOPRIL 40 MG/1
40 TABLET ORAL DAILY
Qty: 90 TABLET | Refills: 1 | COMMUNITY
Start: 2017-11-28 | End: 2017-12-15

## 2017-11-28 NOTE — TELEPHONE ENCOUNTER
Patient called and spoke with writer.    Per patient:  1. Had been experiencing lower BP when evaluated by Dr. Wegener A. Lisinopril and Metoprolol discontinued  2. Last week got bit by a bug while at gym   A. Location: left ear  3. Since then, BP readings elevated  4. Most recent readings:  184/77  193/84  187/94  191/81  5. Afebrile  6. Headache present    Patient asked if he should restart Lisinopril 40 mg daily and Metoprolol 25 mg 24 hour tablet daily?    Dr. Wegener-Please review and advise.    Thank you!  MAXIME Gipson, MATEON, RN

## 2017-11-28 NOTE — TELEPHONE ENCOUNTER
I would re-start just the lisinopril for now and let me know blood pressue readings on Friday and we can decide if need to add back metoprolol.     Joel Wegener,MD

## 2017-11-28 NOTE — TELEPHONE ENCOUNTER
Gave pt this message.  Put med on med list.  Pt will call clinic with bp readings on Friday.  I asked pt to take bp bid.  JERI Carlson

## 2017-11-29 ENCOUNTER — PRE VISIT (OUTPATIENT)
Dept: UROLOGY | Facility: CLINIC | Age: 77
End: 2017-11-29

## 2017-11-29 NOTE — TELEPHONE ENCOUNTER
Dr.Wegener FYI,  --I am sending this only because Vini took the once a day lisinopril  last night at 7 pm and one this morning at 7 am.  (Kind of close together.)  --He will take it again tomorrow at 7 am.    --BP was 183/90 when first woke this morning.  --Blood pressure at 11:37 am today was 127/72.    --He will continue to take his blood pressure every 3-4 hrs and will let us know if it goes up or down too much or if he is not feeling well.  --He will call with readings tomorrow Friday as Dr.Wegener requested.    --He can feel the bug bite on tip of left ear but NO soreness, swelling, redness, drainage, heat.  --He says felt like a bee.  --He feels fine.  --He will call 24/7 if he has concerns.    Kristin Young RN

## 2017-11-29 NOTE — TELEPHONE ENCOUNTER
Patient called with readings yesterday:    193/86 & 213/94    Patient states today when woke up was 183/90 and checked again now was 127/72

## 2017-12-05 ENCOUNTER — OFFICE VISIT (OUTPATIENT)
Dept: UROLOGY | Facility: CLINIC | Age: 77
End: 2017-12-05

## 2017-12-05 VITALS
WEIGHT: 187 LBS | DIASTOLIC BLOOD PRESSURE: 77 MMHG | BODY MASS INDEX: 28.34 KG/M2 | HEART RATE: 62 BPM | SYSTOLIC BLOOD PRESSURE: 140 MMHG | HEIGHT: 68 IN

## 2017-12-05 DIAGNOSIS — N39.0 URINARY TRACT INFECTION WITH HEMATURIA, SITE UNSPECIFIED: ICD-10-CM

## 2017-12-05 DIAGNOSIS — R00.1 BRADYCARDIA: ICD-10-CM

## 2017-12-05 DIAGNOSIS — R31.9 URINARY TRACT INFECTION WITH HEMATURIA, SITE UNSPECIFIED: ICD-10-CM

## 2017-12-05 DIAGNOSIS — N39.0 RECURRENT URINARY TRACT INFECTION: Primary | ICD-10-CM

## 2017-12-05 LAB
ALBUMIN UR-MCNC: NEGATIVE MG/DL
APPEARANCE UR: ABNORMAL
BILIRUB UR QL STRIP: NEGATIVE
COLOR UR AUTO: YELLOW
GLUCOSE UR STRIP-MCNC: NEGATIVE MG/DL
HGB UR QL STRIP: NEGATIVE
HYALINE CASTS #/AREA URNS LPF: 1 /LPF (ref 0–2)
KETONES UR STRIP-MCNC: 5 MG/DL
LEUKOCYTE ESTERASE UR QL STRIP: NEGATIVE
MUCOUS THREADS #/AREA URNS LPF: PRESENT /LPF
NITRATE UR QL: NEGATIVE
PH UR STRIP: 5 PH (ref 5–7)
RBC #/AREA URNS AUTO: 1 /HPF (ref 0–2)
SOURCE: ABNORMAL
SP GR UR STRIP: 1.02 (ref 1–1.03)
SQUAMOUS #/AREA URNS AUTO: 1 /HPF (ref 0–1)
UROBILINOGEN UR STRIP-MCNC: 0 MG/DL (ref 0–2)
WBC #/AREA URNS AUTO: 1 /HPF (ref 0–2)

## 2017-12-05 RX ORDER — TAMSULOSIN HYDROCHLORIDE 0.4 MG/1
0.8 CAPSULE ORAL DAILY
Qty: 60 CAPSULE | Refills: 11 | Status: SHIPPED | OUTPATIENT
Start: 2017-12-05 | End: 2018-03-15

## 2017-12-05 RX ORDER — SULFAMETHOXAZOLE AND TRIMETHOPRIM 400; 80 MG/1; MG/1
1 TABLET ORAL AT BEDTIME
Qty: 30 TABLET | Refills: 11 | Status: SHIPPED | OUTPATIENT
Start: 2017-12-05 | End: 2018-06-06

## 2017-12-05 ASSESSMENT — PAIN SCALES - GENERAL: PAINLEVEL: NO PAIN (0)

## 2017-12-05 NOTE — MR AVS SNAPSHOT
After Visit Summary   12/5/2017    Vini Loya    MRN: [id number]           Patient Information     Date Of Birth          1940        Visit Information        Provider Department      12/5/2017 2:00 PM Evelyn Hairston PA [facility]rology and [facility] for Prostate and Urologic Cancers        Today's Diagnoses     Recurrent urinary tract infection    -  1    Bradycardia        Urinary tract infection with hematuria, site unspecified          Care Instructions    - urine sent for testing today  - Stick with prunes.  By a bunch of them!  - Stick with tamsulosin 0.8mg daily  - Stick with Bactrim SS taken nightly to prevent infections  - Take the blood pressure once daily - if its <140mmHg then you have no worries. If higher, would call the clinic for instructions.   - Return 6 months or sooner with problems    Lupe Hairston          Follow-ups after your visit        Your next 10 appointments already scheduled     Jan 02, 2018 11:40 AM CST   (Arrive by 11:25 AM)   Return Movement Disorder with KOTA Dutta CNP   [facility] Neurology ([facility])    [address] [address] [phone]            Adalid 15, 2018 10:40 AM CST   SHORT with Joel Daniel Irwin Wegener, MD   [facility] ([facility])    [address] [place] [address] [phone]            Apr 24, 2018 10:00 AM CDT   RETURN GLAUCOMA with Yudith Mcdonnell MD   Eye Clinic ([facility])    [facility] [facility] [id number] [phone]              Who to contact     Please call your clinic at [phone] to:    Ask questions about your health    Make or cancel appointments    Discuss your medicines    Learn about your test results    Speak to your doctor   If you have compliments or concerns about an experience at your clinic, or if you wish to file a complaint,  "please contact [facility] Patient Relations at [phone] or email us at [email or web address]         Additional Information About Your Visit        [employer] Information     [employer] is an electronic gateway that provides easy, online access to your medical records. With [employer], you can request a clinic appointment, read your test results, renew a prescription or communicate with your care team.     To sign up for [employer] visit the website at [email or web address]   You will be asked to enter the access code listed below, as well as some personal information. Please follow the directions to create your username and password.     Your access code is: [email or web address]  Expires: 3/15/2018  2:57 PM     Your access code will  in 90 days. If you need help or a new code, please contact your [facility] Clinic or call [phone] for assistance.        Care EveryWhere ID     This is your Care EveryWhere ID. This could be used by other organizations to access your [place] medical records  [id number]        Your Vitals Were     Pulse Height BMI (Body Mass Index)             62 1.727 m (5' 8\") 28.43 kg/m2          Blood Pressure from Last 3 Encounters:   12/15/17 135/80   17 140/77   17 110/64    Weight from Last 3 Encounters:   12/15/17 84.8 kg (187 lb)   17 84.8 kg (187 lb)   17 84.8 kg (187 lb)              We Performed the Following     UA with Microscopic reflex to Culture          Where to get your medicines      These medications were sent to [facility] [place], [place] - [address]  [address], [facility] [address]     Phone:  [phone]     sulfamethoxazole-trimethoprim 400-80 MG per tablet    tamsulosin 0.4 MG capsule          Primary Care Provider Office Phone # Fax #    Joel Daniel Irwin Wegener, [phone]       [address]  [facility] [address]        Equal Access to " Services     [facility]: Hadii phill jimenes pemagurdeep Amnaali, waaxda luqadaha, qaybta kaalmada clayannabellatimo, claudia ugalde . So [facility] [phone].    ATENCIÓN: Si lorenzala wendy, tiene a florez disposición servicios gratuitos de asistencia lingüística. Llame al [phone].    We comply with applicable federal civil rights laws and [place] laws. We do not discriminate on the basis of race, color, national origin, age, disability, sex, sexual orientation, or gender identity.            Thank you!     Thank you for choosing [facility] UROLOGY AND [facility] FOR PROSTATE AND UROLOGIC CANCERS  for your care. Our goal is always to provide you with excellent care. Hearing back from our patients is one way we can continue to improve our services. Please take a few minutes to complete the written survey that you may receive in the mail after your visit with us. Thank you!             Your Updated Medication List - Protect others around you: Learn how to safely use, store and throw away your medicines at www.disposemymeds.org.          This list is accurate as of: 12/5/17 11:59 PM.  Always use your most recent med list.                   Brand Name Dispense Instructions for use Diagnosis    aspirin 81 MG tablet      1 TABLET DAILY    Unspecified essential hypertension, Cerebral embolism with cerebral infarction (H), CAD (coronary artery disease), Mixed hyperlipidemia       Blood Pressure Monitor Pricilla     1 Device    1 Device daily.    Hypertension goal BP (blood pressure) < 130/80       carbidopa-levodopa  MG per tablet    SINEMET    540 tablet    Take 2 tablets 3 times a day 1 hour before each meal.  (Around 7 am, 11 am, & 4 pm.)    Parkinson's disease (H)       lisinopril 40 MG tablet    PRINIVIL/ZESTRIL    90 tablet    Take 1 tablet (40 mg) by mouth daily    Hypertension, goal below 150/90       PARoxetine 20 MG tablet    PAXIL    90 tablet    TAKE ONE TABLET BY MOUTH EVERY NIGHT AT BEDTIME    Anxiety        simvastatin 40 MG tablet    ZOCOR    90 tablet    TAKE ONE TABLET BY MOUTH AT BEDTIME    Hyperlipidemia LDL goal <100       sulfamethoxazole-trimethoprim 400-80 MG per tablet    BACTRIM/SEPTRA    30 tablet    Take 1 tablet by mouth At Bedtime For UTI prevention    Urinary tract infection with hematuria, site unspecified       tamsulosin 0.4 MG capsule    FLOMAX    60 capsule    Take 2 capsules (0.8 mg) by mouth daily    Bradycardia

## 2017-12-05 NOTE — PROGRESS NOTES
HPI: Mr. Vini Loya is a 77 year old year old male presenting today for evaluation of chief complaint(s): RECHECK (BPH/LUTS follow up)    Mr. Loya has PMH significant for HTN, HLD, Parkinson, depression, BPH and frequent UTIs. He was formerly seen by Dr. Morris in October 2015 after an episode of urosepsis in summer 2015 requiring hospitalization. At that time conservative mgmt was recommended. However when he met me in February 2016 he had continued having UTIs (pansensitive E Coli >100,000 cfu/mL) and he was begun on macrodantin qhs. He was last seen on 10/11/16 and on that date he had a little transient dysuria but his voiding symptoms were otherwise stable on Flomax and macrodantin.  His IPSS was 14. His PVR was zero.  His SUZANNE was benign.     More recently he developed voiding symptoms and went to the ED on 10/15/17 where he was diagnosed with a UTI.  His urine was sent for culture and grew >100K E Coli.  He ultimately required one night of hospitalization for this (10/15-10/16) on the Medicine service and I saw him on inpatient consult. He was discharged on a 10 day course of Cipro then was started on a nightly Bactrim SS tablet for prophylaxis. His dose of Flomax was doubled.   He presents today in routine hospital urology followup.  He is feeling well. Regarding voiding symptoms --> No dysuria, hematuria. No flank pain, abdominal pain. Feels he is voiding normally. Still having trouble with constipation. Had been prunes in the past which were very helpful.  Miralax doesn't work as well (as prunes) but he takes it only sporadically.     Also having hypotension.  Was taken off his lisinopril by his PCP and now BP is 140/77.     REVIEW OF DIAGNOSTICS:  12/5/17 - UA: Ketones 5, otherwise negative  11/3/17 - UA: WBC 5-10, RBC 2-5, otherwise negative --> No growth  10/15/17 - UA: WBC 53, RBC >182, nitrities positive, protein 300 --> >100K E Coli  12/6/16 - UA: WBC 0-2, RBC 0-2, trace protein      Current  "Outpatient Prescriptions   Medication Sig Dispense Refill     tamsulosin (FLOMAX) 0.4 MG capsule Take 2 capsules (0.8 mg) by mouth daily 60 capsule 11     sulfamethoxazole-trimethoprim (BACTRIM/SEPTRA) 400-80 MG per tablet Take 1 tablet by mouth At Bedtime For UTI prevention 30 tablet 11     PARoxetine (PAXIL) 20 MG tablet TAKE ONE TABLET BY MOUTH EVERY NIGHT AT BEDTIME 90 tablet 3     simvastatin (ZOCOR) 40 MG tablet TAKE ONE TABLET BY MOUTH AT BEDTIME 90 tablet 3     carbidopa-levodopa (SINEMET)  MG per tablet Take 2 tablets 3 times a day 1 hour before each meal.  (Around 7 am, 11 am, & 4 pm.) 540 tablet 3     Blood Pressure Monitor JERONIMO 1 Device daily. 1 Device 0     ASPIRIN 81 MG OR TABS 1 TABLET DAILY       lisinopril (PRINIVIL/ZESTRIL) 40 MG tablet Take 1 tablet (40 mg) by mouth daily 90 tablet 1       ALLERGIES: Vytorin      REVIEW OF SYSTEMS:  As above in HPI    GENERAL PHYSICAL EXAM:   Vitals: /77  Pulse 62  Ht 1.727 m (5' 8\")  Wt 84.8 kg (187 lb)  BMI 28.43 kg/m2  Body mass index is 28.43 kg/(m^2).    GENERAL: Well groomed, well developed, well nourished male in NAD. Somewhat masked fascies but otherwise quite smiley and animated today.   NEURO: Alert and oriented x 3.  PSYCH: Normal mood and affect, pleasant and agreeable during interview and exam.    LABS: The last test results for Mr. Vini Loya were reviewed:  PSA -   Lab Results   Component Value Date    PSA 1.92 06/26/2013    PSA 3.39 12/08/2011    PSA 2.07 02/01/2011    PSA 1.76 07/20/2009     BMP -   Recent Labs   Lab Test  10/16/17   0743  10/15/17   2125  07/02/17   1844   06/24/15   0834   10/01/09   0615   09/29/09   1701   NA  136  138  143   < >  135   < >  142   < >  144   POTASSIUM  4.1  4.5  4.2   < >  4.1   < >  4.4   < >  4.4   CHLORIDE  100  102  107   < >  102   < >  106   < >  103   CO2  28  29  30   < >  22   < >  28   < >  34*   BUN  18  17  17   < >  26   < >  19   < >  21   CR  1.17  1.12  1.09   < >  1.33*   < " >  1.14   < >  1.10   GLC  115*  125*  126*   < >  113*   < >  109*   < >  95   JEMMA  8.9  8.9  9.1   < >  8.5   < >  9.1   < >  9.4   MAG   --    --    --    --   1.6   --   2.2   --   2.2   PHOS   --    --    --    --   2.8   --   3.8   --   3.8    < > = values in this interval not displayed.       CBC -   Recent Labs   Lab Test  10/16/17   0743  10/15/17   2226  10/15/17   2125   WBC  17.4*  20.6*  Canceled, Test credited   HGB  14.6  15.9  Canceled, Test credited   PLT  211  211  Canceled, Test credited       ASSESSMENT:   1) Parkinson  2) BPH with obstructive symptoms  3) Hematuria (on 11/3 after recovering from UTI)  4) History of UTIs    PLAN:   - UA micro reflex from 11/3 was fairly benign (WBC 5-10, RBC 2-5) --> UCx no growth  - urine sent for repeat testing today to assure no hematuria or infection  - Stick with prunes taken regularly  - Stick with tamsulosin 0.8mg daily  - Stick with Bactrim SS taken nightly to prevent infections  - Take the blood pressure once daily - if its <140mmHg then you have no worries. If higher, would call the clinic for instructions.   - Return 6 months or sooner with problems    Lupe Hairston PA-C  Department of Urologic Surgery

## 2017-12-05 NOTE — PATIENT INSTRUCTIONS
- urine sent for testing today  - Stick with prunes.  By a bunch of them!  - Stick with tamsulosin 0.8mg daily  - Stick with Bactrim SS taken nightly to prevent infections  - Take the blood pressure once daily - if its <140mmHg then you have no worries. If higher, would call the clinic for instructions.   - Return 6 months or sooner with problems    Lupe Hairston

## 2017-12-11 ENCOUNTER — TELEPHONE (OUTPATIENT)
Dept: FAMILY MEDICINE | Facility: CLINIC | Age: 77
End: 2017-12-11

## 2017-12-11 DIAGNOSIS — I95.0 IDIOPATHIC HYPOTENSION: Primary | ICD-10-CM

## 2017-12-11 DIAGNOSIS — R42 DIZZINESS: ICD-10-CM

## 2017-12-11 NOTE — TELEPHONE ENCOUNTER
"Writer notes Lisinopril 40 mg daily on patient's active medication list.    Writer called patient who stated:  1. Has not taken blood pressure medication for 6 days   A. Last dose of Lisinopril was 6 days ago   B. Not taking Metoprolol  2. Today took baby Aspirin, that is it  3. Just eating lunch now, did not eat breakfast  4. Drank water after snow shoveling  5. Lightheadedness is intermittent   A. Starting to feel better now that he is eating  6. Prior to shoveling, BP: 123/64, after shoveling, BP:  80/49  80/60  80/51  7. Does not feel weak and has not fallen  8. Does not currently see a cardiologist    Dr. Wegener-Please review and advise.  Writer pended cardiology referral for \"New EP Consult.\"    Thank you!  MATEO RuffN, RN    "

## 2017-12-11 NOTE — TELEPHONE ENCOUNTER
Patient called with BP readings today:    First thing this morning BP was 162/64 and after shoveling the side walk patient checked it was 80/56 then again 80/51

## 2017-12-11 NOTE — TELEPHONE ENCOUNTER
If getting dizzy then got to EMERGENCY ROOM.     Otherwise , continue to hold all blood pressue medications and follow up with me in next 7 days, ok to use same day hold if needed.     Joel Wegener,MD

## 2017-12-11 NOTE — TELEPHONE ENCOUNTER
Called patient and reviewed message per below from Dr. Wegener.    Patient verbalized understanding and in agreement with plan.    Patient informed writer he is no longer dizzy.    Follow up appt with PCP scheduled on 12/15/17.  Appt date, time and location confirmed with patient.    MATEO RuffN, RN

## 2017-12-15 ENCOUNTER — OFFICE VISIT (OUTPATIENT)
Dept: FAMILY MEDICINE | Facility: CLINIC | Age: 77
End: 2017-12-15
Payer: COMMERCIAL

## 2017-12-15 VITALS
OXYGEN SATURATION: 97 % | SYSTOLIC BLOOD PRESSURE: 135 MMHG | RESPIRATION RATE: 16 BRPM | DIASTOLIC BLOOD PRESSURE: 80 MMHG | TEMPERATURE: 97.3 F | BODY MASS INDEX: 28.43 KG/M2 | WEIGHT: 187 LBS | HEART RATE: 73 BPM

## 2017-12-15 DIAGNOSIS — I10 HYPERTENSION, GOAL BELOW 150/90: ICD-10-CM

## 2017-12-15 DIAGNOSIS — I95.1 ORTHOSTATIC HYPOTENSION: Primary | ICD-10-CM

## 2017-12-15 PROCEDURE — 99214 OFFICE O/P EST MOD 30 MIN: CPT | Performed by: FAMILY MEDICINE

## 2017-12-15 NOTE — PATIENT INSTRUCTIONS
Hypotension/hypertension:  Difficulty finding good dose to balance these.  Likely has some autonomic dysfunction with the parkinson's and sinemet may affect the blood pressue as well. Does get dizzy when his blood pressue in 80s systolic.     Off all blood pressue meds now for one week.     Plan:  - for now do not re-start any blood pressure medication.   - if consistently above 160/100 let me know and we would re-start a lower dose of lisinopril at 5 or 10 mg.   - check blood pressure at home only after resting/relaxed for 5-10 minutes.         Follow up scheduled with me in one month.

## 2017-12-15 NOTE — MR AVS SNAPSHOT
After Visit Summary   12/15/2017    Vini Loya    MRN: [id number]           Patient Information     Date Of Birth          1940        Visit Information        Provider Department      12/15/2017 2:00 PM Wegener, Joel Daniel Irwin, MD [facility]        Today's Diagnoses     Hypertension, goal below 150/90          Care Instructions    Hypotension/hypertension:  Difficulty finding good dose to balance these.  Likely has some autonomic dysfunction with the parkinson's and sinemet may affect the blood pressue as well. Does get dizzy when his blood pressue in 80s systolic.     Off all blood pressue meds now for one week.     Plan:  - for now do not re-start any blood pressure medication.   - if consistently above 160/100 let me know and we would re-start a lower dose of lisinopril at 5 or 10 mg.   - check blood pressure at home only after resting/relaxed for 5-10 minutes.         Follow up scheduled with me in one month.           Follow-ups after your visit        Your next 10 appointments already scheduled     Jan 02, 2018 11:40 AM CST   (Arrive by 11:25 AM)   Return [facility] ([facility])    [address]  [address] [facility] [address] [phone]            Apr 24, 2018 10:00 AM CDT   RETURN GLAUCOMA with Yudith Mcdonnell MD   Eye Clinic ([facility])    [facility]  [address]  [address] [address]  [facility] [id number] [phone]              Who to contact     If you have questions or need follow up information about today's clinic visit or your schedule please contact [facility] directly at [phone].  Normal or non-critical lab and imaging results will be communicated to you by MyChart, letter or phone within 4 business days after the clinic has received the results. If you do not hear from us within 7 days, please contact  "the clinic through [employer] or phone. If you have a critical or abnormal lab result, we will notify you by phone as soon as possible.  Submit refill requests through [employer] or call your pharmacy and they will forward the refill request to us. Please allow 3 business days for your refill to be completed.          Additional Information About Your Visit        [employer] Information     [employer] lets you send messages to your doctor, view your test results, renew your prescriptions, schedule appointments and more. To sign up, go to [email or web address] . Click on \"Log in\" on the left side of the screen, which will take you to the Welcome page. Then click on \"Sign up Now\" on the right side of the page.     You will be asked to enter the access code listed below, as well as some personal information. Please follow the directions to create your username and password.     Your access code is: [email or web address]  Expires: 3/15/2018  2:57 PM     Your access code will  in 90 days. If you need help or a new code, please call your [place] clinic or [phone].        Care EveryWhere ID     This is your Care EveryWhere ID. This could be used by other organizations to access your [place] medical records  [id number]        Your Vitals Were     Pulse Temperature Respirations Pulse Oximetry BMI (Body Mass Index)       73 97.3  F (36.3  C) (Tympanic) 16 97% 28.43 kg/m2        Blood Pressure from Last 3 Encounters:   12/15/17 135/80   17 140/77   17 110/64    Weight from Last 3 Encounters:   12/15/17 187 lb (84.8 kg)   17 187 lb (84.8 kg)   17 187 lb (84.8 kg)              Today, you had the following     No orders found for display         Today's Medication Changes          These changes are accurate as of: 12/15/17  2:57 PM.  If you have any questions, ask your nurse or doctor.               Stop taking these medicines if you haven't already. Please contact your care team if you have questions.     " lisinopril 40 MG tablet   Commonly known as:  PRINIVIL/ZESTRIL   Stopped by:  Wegener, Joel Daniel Irwin, MD                    Primary Care Provider Office Phone # Fax #    Joel Daniel Irwin Wegener, [phone] [address]  [facility] [address]        Equal Access to Services     [facility]: Hadii aad ku hadasho Soomaali, waaxda luqadaha, qaybta kaalmada adeegyada, waxay idiin hayaan adeeg kharash la'aan . So [facility] [phone].    ATENCIÓN: Si habla español, tiene a florez disposición servicios gratuitos de asistencia lingüística. Llame al [phone].    We comply with applicable federal civil rights laws and [place] laws. We do not discriminate on the basis of race, color, national origin, age, disability, sex, sexual orientation, or gender identity.            Thank you!     Thank you for choosing [facility]  for your care. Our goal is always to provide you with excellent care. Hearing back from our patients is one way we can continue to improve our services. Please take a few minutes to complete the written survey that you may receive in the mail after your visit with us. Thank you!             Your Updated Medication List - Protect others around you: Learn how to safely use, store and throw away your medicines at www.disposemymeds.org.          This list is accurate as of: 12/15/17  2:57 PM.  Always use your most recent med list.                   Brand Name Dispense Instructions for use Diagnosis    aspirin 81 MG tablet      1 TABLET DAILY    Unspecified essential hypertension, Cerebral embolism with cerebral infarction (H), CAD (coronary artery disease), Mixed hyperlipidemia       Blood Pressure Monitor Pricilla     1 Device    1 Device daily.    Hypertension goal BP (blood pressure) < 130/80       carbidopa-levodopa  MG per tablet    SINEMET    540 tablet    Take 2 tablets 3 times a day 1 hour before each meal.  (Around 7 am, 11 am, & 4 pm.)    Parkinson's  disease (H)       PARoxetine 20 MG tablet    PAXIL    90 tablet    TAKE ONE TABLET BY MOUTH EVERY NIGHT AT BEDTIME    Anxiety       simvastatin 40 MG tablet    ZOCOR    90 tablet    TAKE ONE TABLET BY MOUTH AT BEDTIME    Hyperlipidemia LDL goal <100       sulfamethoxazole-trimethoprim 400-80 MG per tablet    BACTRIM/SEPTRA    30 tablet    Take 1 tablet by mouth At Bedtime For UTI prevention    Urinary tract infection with hematuria, site unspecified       tamsulosin 0.4 MG capsule    FLOMAX    60 capsule    Take 2 capsules (0.8 mg) by mouth daily    Bradycardia
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
## 2018-01-02 ENCOUNTER — OFFICE VISIT (OUTPATIENT)
Dept: NEUROLOGY | Facility: CLINIC | Age: 78
End: 2018-01-02
Payer: COMMERCIAL

## 2018-01-02 VITALS
HEART RATE: 66 BPM | WEIGHT: 191 LBS | HEIGHT: 68 IN | DIASTOLIC BLOOD PRESSURE: 80 MMHG | SYSTOLIC BLOOD PRESSURE: 153 MMHG | BODY MASS INDEX: 28.95 KG/M2

## 2018-01-02 DIAGNOSIS — R25.1 TREMOR: Primary | ICD-10-CM

## 2018-01-02 DIAGNOSIS — G20.A1 PARKINSON'S DISEASE (H): ICD-10-CM

## 2018-01-02 ASSESSMENT — PAIN SCALES - GENERAL: PAINLEVEL: NO PAIN (1)

## 2018-01-02 NOTE — PROGRESS NOTES
"PATIENT: Vini Loya    : 1940    ALBINA: 2018    REASON FOR VISIT:  Follow up for Parkinson's disease (PD).      HISTORY OF PRESENT ILLNESS:  Mr. Vini Loya is a 77-year-old left-handed  male who came to the [facility] accompanied by his wife for a follow up visit.  I saw him last in clinic on May 31, 2017 for a routine PD follow up visit.    He reports falling a couple of times due to lightheadedness.  He reports the dizziness has improved after he was taken off his antihypertensive.  He reports his BP has been stable.    In October he was in ER for UTI.  He is now on daily Bactrim.      He reports tremor is \"pretty good.\"  Sometimes, he forgets to take Sinemet.  He reports not taking it for 2 weeks, then resuming it.  He didn't see a big difference when he is taking or not taking Sinemet.   He doesn't take the medication consistently.   Today, he took the 7 am, but didn't take the 11 am dose.  \"I forgot.\"  Wife reports that his tremor is present all the time, more noticeable in mouth & hands.  \"He is not aware of it & doesn't care.\"      He is going to [facility] regularly except for the last 2 weeks due to the holidays.     MEDICATIONS:   Medication Sig     tamsulosin (FLOMAX) 0.4 MG capsule Take 2 capsules (0.8 mg) by mouth daily     sulfamethoxazole-trimethoprim (BACTRIM/SEPTRA) 400-80 MG Take 1 tablet by mouth At Bedtime For UTI prevention     PARoxetine (PAXIL) 20 MG tablet TAKE ONE TABLET BY MOUTH EVERY NIGHT AT BEDTIME     simvastatin (ZOCOR) 40 MG tablet TAKE ONE TABLET BY MOUTH AT BEDTIME     carbidopa-levodopa (SINEMET)  MG per tablet Take 2 tablets 3 times a day 1 hour before each meal.  (Around 7 am, 11 am, & 4 pm.)     Blood Pressure Monitor JERONIMO 1 Device daily.     ASPIRIN 81 MG OR TABS 1 TABLET DAILY       ALLERGIES: Vytorin    PHYSICAL EXAM:    VITAL SIGNS:  Blood pressure 153/80, pulse 66, height 1.727 m (5' 8\"), weight 86.6 kg (191 lb).  Body mass index is " 29.04 kg/(m^2).      GENERAL:  Mr. Loya is a pleasant  male who is well-groomed and well-developed, sitting comfortably in the exam room without any distress.  Affect is appropriate.     MOVEMENT DISORDERS ASSESSMENT:  (Last Sinemet was taken about 5 hours ago).   Speech shows slight loss of expression and volume; monotone.  Moderate hypomimia with lips parted some of the time.  Mild rest tremor in hands; Lt > Rt, which become moderate with mental activation in left hand.  Mild & intermittent rest tremor in LLE; Mild chin tremor that becomes moderate with mental activation.  No rest tremor noted in RLE.  Slight postural tremor in Lt; no action tremor bilaterally.  Mild to moderate rigidity on both upper extremities and slight in lower extremities.  Finger tapping is normal in right side and moderately impaired in left.  Hand opening and closing and hand pronation and supination show mild slowing and reduction in amplitude bilaterally.  Leg agility is normal.  He was able to arise from a chair with arms folded across his chest slowly without difficulty.  Pull test shows absence of postural response; would fall if not caught by examiner.  Posture is slightly stooped leaning to right side.  Gait is slow with short steps.  He has mild body bradykinesia.        ASSESSMENT AND PLAN:      Parkinson's disease.   Mr. Vini Loya is a 77-year-old left-handed  male with a history of tremor since 2009 - 2010 & a diagnosis of PD in early 2012 who came to the clinic for a f/u visit.  Pt hasn't been adhering to the recommended Levodopa dose.  He downplays his motor symptoms.  As mentioned above, he had stopped taking Sinemet for 2 weeks & had resumed it.  Today's exam show mild chin/mouth tremor that becomes worse with mental activation.  He also has rest tremor & fine motor movement difficulty in his extremities left body worse than right.     __  He was informed that since he is not adhering to his current  medication regimen, changing it wouldn't make any sense.  Therefore, he'll reman on the same dose as below.    PD Medications 7 am 11 am 4 pm   Sinemet 25/100 mg  2 2 2     __  Since chin/jaw/mouth tremor has gotten much worse, I recommended Botox injection.  Since dosing is not as frequent, he might stick to the plan & get some symptom relief.  Interestingly, he is not bothered by tremor; therefore, he might opt not to go forward with the plan.   He did agree to be referred & was referred to PMR or Dr. Dickinson.   __  He was encouraged to resume his exercise routine.   __  Will return in 3 - 4 months. May return sooner as needed.    The total time spent with the patient was 40 minutes, greater than 50% of the time was spent in counseling and coordination of care.      KOTA Stone, CNP  [facility]

## 2018-01-02 NOTE — NURSING NOTE
Chief Complaint   Patient presents with     RECHECK     Movement Disorder    Maxmi Carranza CMA

## 2018-01-02 NOTE — MR AVS SNAPSHOT
After Visit Summary   1/2/2018    Vini Loya    MRN: [id number]           Patient Information     Date Of Birth          1940        Visit Information        Provider Department      1/2/2018 11:40 AM Dalila Staples APRN CNP [facility]        Today's Diagnoses     Tremor    -  1    Parkinson's disease (H)          Care Instructions    January 2, 2018    Dear Mr. Vini Loya,    Thank you for coming today.  During your visit, we have discussed the following:     __  Continue taking Sinemet as below.     PD Medications 7 am 11 am 4 pm   Sinemet 25/100 mg  2 2 2     __  Referral to see Physical Medicine & Rehab for evaluation & possible Botox injection for jaw tremor.    __  Continue walking in the house to exercise & go to the gym regularly.     __  Return in 3 - 4 months. You may return sooner as needed.      For questions, you may send us a Watchsend message or call [phone]    Fax number: [phone]    KOTA Stone, CNP  [facility]            Follow-ups after your visit        Additional Services     PHYSIATRY REFERRAL       Your provider has referred you to: [facility]: Physical Medicine and Rehabilitation Clinic [facility] [phone]   [email or web address]     For evaluation & possible Botox injection of jaw tremor.     Please be aware that coverage of these services is subject to the terms and limitations of your health insurance plan.  Call member services at your health plan with any benefit or coverage questions.      Please bring the following to your appointment:  >>   Any x-rays, CTs or MRIs which have been performed.  Contact the facility where they were done to arrange for  prior to your scheduled appointment.  Any new CT, MRI or other procedures ordered by your specialist must be performed at a [facility] facility or coordinated by your clinic's referral office.    >>   List of current medications   >>   This referral request   >>   Any  documents/labs given to you for this referral                  Your next 10 appointments already scheduled     Adalid 15, 2018 10:40 AM CST   SHORT with Joel Daniel Irwin Wegener, MD   [facility] ([facility])    [address] [place] [id number] [phone]            Feb 01, 2018 11:00 AM CST   (Arrive by 10:45 AM)   New Botox with Sharee Dickinson MD   [facility] ([facility])    [address] [place] [id number] [phone]            Apr 16, 2018  2:20 PM CDT   (Arrive by 2:05 PM)   Return Movement Disorder with KOTA Dutta CNP   [facility] ([facility])    [address] [place] [id number] [phone]            Apr 24, 2018 10:00 AM CDT   RETURN GLAUCOMA with Yudith Mcdonnell MD   [facility]    Stefan Devine [address]  [facility] [id number] [phone]              Who to contact     Please call your clinic at [phone] to:    Ask questions about your health    Make or cancel appointments    Discuss your medicines    Learn about your test results    Speak to your doctor   If you have compliments or concerns about an experience at your clinic, or if you wish to file a complaint, please contact [facility] Patient Relations at [phone] or email us at [email or web address]         Additional Information About Your Visit        Whoochhart Information     Movatu is an electronic gateway that provides easy, online access to your medical records. With Movatu, you can request a clinic appointment, read your test results, renew a prescription or communicate with your care team.     To sign up for [email or web address] visit the website at [email or web address]   You will be asked to enter the access code listed below, as well as some  "personal information. Please follow the directions to create your username and password.     Your access code is: [email or web address]  Expires: 3/15/2018  2:57 PM     Your access code will  in 90 days. If you need help or a new code, please contact your [facility] or call [phone] for assistance.        Care EveryWhere ID     This is your Care EveryWhere ID. This could be used by other organizations to access your [facility] medical records  [id number]        Your Vitals Were     Pulse Height BMI (Body Mass Index)             66 1.727 m (5' 8\") 29.04 kg/m2          Blood Pressure from Last 3 Encounters:   18 153/80   12/15/17 135/80   17 140/77    Weight from Last 3 Encounters:   18 86.6 kg (191 lb)   12/15/17 84.8 kg (187 lb)   17 84.8 kg (187 lb)              We Performed the Following     PHYSIATRY REFERRAL        Primary Care Provider Office Phone # Fax #    Joel Daniel Irwin Wegener, [phone] [address] [place] [address]        Equal Access to Services     AMEENA JORDAN AH: Hadii phill ku hadasho Soomaali, waaxda luqadaha, qaybta kaalmada adeegyada, claudia guillermon brayan jeong. So [facility] [phone].    ATENCIÓN: Si habla español, tiene a florez disposición servicios gratuitos de asistencia lingüística. Llame al [phone].    We comply with applicable federal civil rights laws and [place] laws. We do not discriminate on the basis of race, color, national origin, age, disability, sex, sexual orientation, or gender identity.            Thank you!     Thank you for choosing [facility]  for your care. Our goal is always to provide you with excellent care. Hearing back from our patients is one way we can continue to improve our services. Please take a few minutes to complete the written survey that you may receive in the mail after your visit with us. Thank you!             Your Updated Medication List - " Protect others around you: Learn how to safely use, store and throw away your medicines at www.disposemymeds.org.          This list is accurate as of: 1/2/18 12:27 PM.  Always use your most recent med list.                   Brand Name Dispense Instructions for use Diagnosis    aspirin 81 MG tablet      1 TABLET DAILY    Unspecified essential hypertension, Cerebral embolism with cerebral infarction (H), CAD (coronary artery disease), Mixed hyperlipidemia       Blood Pressure Monitor Pricilla     1 Device    1 Device daily.    Hypertension goal BP (blood pressure) < 130/80       carbidopa-levodopa  MG per tablet    SINEMET    540 tablet    Take 2 tablets 3 times a day 1 hour before each meal.  (Around 7 am, 11 am, & 4 pm.)    Parkinson's disease (H)       PARoxetine 20 MG tablet    PAXIL    90 tablet    TAKE ONE TABLET BY MOUTH EVERY NIGHT AT BEDTIME    Anxiety       simvastatin 40 MG tablet    ZOCOR    90 tablet    TAKE ONE TABLET BY MOUTH AT BEDTIME    Hyperlipidemia LDL goal <100       sulfamethoxazole-trimethoprim 400-80 MG per tablet    BACTRIM/SEPTRA    30 tablet    Take 1 tablet by mouth At Bedtime For UTI prevention    Urinary tract infection with hematuria, site unspecified       tamsulosin 0.4 MG capsule    FLOMAX    60 capsule    Take 2 capsules (0.8 mg) by mouth daily    Bradycardia

## 2018-01-02 NOTE — PATIENT INSTRUCTIONS
January 2, 2018    Dear Mr. Vini Loya,    Thank you for coming today.  During your visit, we have discussed the following:     __  Continue taking Sinemet as below.     PD Medications 7 am 11 am 4 pm   Sinemet 25/100 mg  2 2 2     __  Referral to see Physical Medicine & Rehab for evaluation & possible Botox injection for jaw tremor.    __  Continue walking in the house to exercise & go to the gym regularly.     __  Return in 3 - 4 months. You may return sooner as needed.      For questions, you may send us a [employer] message or call [phone]    Fax number: [phone]    KOTA Stone, CNP  [facility]

## 2018-01-10 DIAGNOSIS — G20.A1 PARKINSON'S DISEASE (H): ICD-10-CM

## 2018-01-10 RX ORDER — CARBIDOPA AND LEVODOPA 25; 100 MG/1; MG/1
TABLET ORAL
Qty: 540 TABLET | Refills: 3 | Status: SHIPPED | OUTPATIENT
Start: 2018-01-10 | End: 2018-04-23

## 2018-01-15 ENCOUNTER — OFFICE VISIT (OUTPATIENT)
Dept: FAMILY MEDICINE | Facility: CLINIC | Age: 78
End: 2018-01-15
Payer: COMMERCIAL

## 2018-01-15 VITALS
TEMPERATURE: 97.6 F | DIASTOLIC BLOOD PRESSURE: 70 MMHG | RESPIRATION RATE: 16 BRPM | OXYGEN SATURATION: 95 % | SYSTOLIC BLOOD PRESSURE: 137 MMHG | WEIGHT: 190 LBS | BODY MASS INDEX: 28.89 KG/M2 | HEART RATE: 85 BPM

## 2018-01-15 DIAGNOSIS — L57.0 ACTINIC KERATOSIS: ICD-10-CM

## 2018-01-15 DIAGNOSIS — K59.01 SLOW TRANSIT CONSTIPATION: ICD-10-CM

## 2018-01-15 DIAGNOSIS — I10 HYPERTENSION, GOAL BELOW 150/90: Primary | ICD-10-CM

## 2018-01-15 DIAGNOSIS — G20.A1 PARKINSON'S DISEASE (H): ICD-10-CM

## 2018-01-15 PROCEDURE — 99213 OFFICE O/P EST LOW 20 MIN: CPT | Performed by: FAMILY MEDICINE

## 2018-01-15 ASSESSMENT — PATIENT HEALTH QUESTIONNAIRE - PHQ9: SUM OF ALL RESPONSES TO PHQ QUESTIONS 1-9: 1

## 2018-01-15 NOTE — PATIENT INSTRUCTIONS
ASSESSMENT AND PLAN  1. Hypertension, goal below 150/90  In good range.  No need to re-start medication.     2. Slow transit constipation  I recommend starting senna one tablet once or twice daily scheduled. If not adequate then add back miralax 1-2 times a day as well (non absorbable liquid).     3. Parkinson's disease (H)  Likely impacting parkinson's disease.     4.  Actinic keratoses:  dermatology referral given.         [facility] SIGNUP FOR E-VISITS AND EASIER COMMUNICATION:  [email or web address]     Zipnosis:  Leti [email or web address] [employer].  Sign up for e-visits for common illnesses!     RADIOLOGY:   [facility]:  [phone] to schedule any radiology tests at [facility]: [phone]    Mammograms/colonoscopies:  [phone]    CONSUMER PRICE LINE for estimates of test costs:  [phone]

## 2018-01-15 NOTE — NURSING NOTE
"Chief Complaint   Patient presents with     Hypertension       Initial /70  Pulse 85  Temp 97.6  F (36.4  C) (Oral)  Resp 16  Wt 190 lb (86.2 kg)  SpO2 95%  BMI 28.89 kg/m2 Estimated body mass index is 28.89 kg/(m^2) as calculated from the following:    Height as of 1/2/18: 5' 8\" (1.727 m).    Weight as of this encounter: 190 lb (86.2 kg).  Medication Reconciliation: complete     Omaira García MA      "
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
## 2018-01-15 NOTE — MR AVS SNAPSHOT
After Visit Summary   1/15/2018    Vini Loya    MRN: [id number]           Patient Information     Date Of Birth          1940        Visit Information        Provider Department      1/15/2018 10:40 AM Wegener, Joel Daniel Irwin, MD [facility]        Today's Diagnoses     Hypertension, goal below 150/90    -  1    Slow transit constipation        Parkinson's disease (H)        Actinic keratosis          Care Instructions    ASSESSMENT AND PLAN  1. Hypertension, goal below 150/90  In good range.  No need to re-start medication.     2. Slow transit constipation  I recommend starting senna one tablet once or twice daily scheduled. If not adequate then add back miralax 1-2 times a day as well (non absorbable liquid).     3. Parkinson's disease (H)  Likely impacting parkinson's disease.     4.  Actinic keratoses:  dermatology referral given.         MYCHART SIGNUP FOR E-VISITS AND EASIER COMMUNICATION:  [email or web address]     Zipnosis:  [email or web address].  Sign up for e-visits for common illnesses!     RADIOLOGY:   [facility]:  [phone] to schedule any radiology tests at [facility]: [phone]    Mammograms/colonoscopies:  [phone]    CONSUMER PRICE LINE for estimates of test costs:  [phone]               Follow-ups after your visit        Additional Services     DERMATOLOGY REFERRAL       Your provider has referred you to: [facility]: Dermatology Clinic [facility] [phone]   [email or web address]  N: [facility] Dermatology [facility] [phone]   [email or web address]  FHN: [facility] [phone]   [email or web address]    Please be aware that coverage of these services is subject to the terms and limitations of your health insurance plan.  Call member services at your health plan with any benefit or coverage questions.      Please bring  the following with you to your appointment:    (1) Any X-Rays, CTs or MRIs which have been performed.  Contact the facility where they were done to arrange for  prior to your scheduled appointment.    (2) List of current medications  (3) This referral request   (4) Any documents/labs given to you for this referral                  Your next 10 appointments already scheduled     Feb 01, 2018 11:00 AM CST   (Arrive by 10:45 AM)   New Botox with Sharee Dickinson MD   [facility] Neurology ([facility])    [address] [place] [address]   [phone]            Apr 16, 2018  2:20 PM CDT   (Arrive by 2:05 PM)   Return Movement Disorder with KOTA Dutta [facility] Neurology ([facility])    [address] [place] [address]   [phone]            Apr 24, 2018 10:00 AM CDT   RETURN GLAUCOMA with Yudith Mcdonnell MD   Eye Clinic ([facility])    Stefan Devine [address] Clin [address]  [facility] [address] [phone]              Who to contact     If you have questions or need follow up information about today's clinic visit or your schedule please contact [facility] directly at [phone].  Normal or non-critical lab and imaging results will be communicated to you by MyChart, letter or phone within 4 business days after the clinic has received the results. If you do not hear from us within 7 days, please contact the clinic through MyChart or phone. If you have a critical or abnormal lab result, we will notify you by phone as soon as possible.  Submit refill requests through [employer] or call your pharmacy and they will forward the refill request to us. Please allow 3 business days for your refill to be completed.          Additional Information About Your Visit        [employer] Information     [employer] lets you send messages to your doctor,  "view your test results, renew your prescriptions, schedule appointments and more. To sign up, go to [email or web address] . Click on \"Log in\" on the left side of the screen, which will take you to the Welcome page. Then click on \"Sign up Now\" on the right side of the page.     You will be asked to enter the access code listed below, as well as some personal information. Please follow the directions to create your username and password.     Your access code is: [email or web address]  Expires: 3/15/2018  2:57 PM     Your access code will  in 90 days. If you need help or a new code, please call your [place] clinic or [phone].        Care EveryWhere ID     This is your Care EveryWhere ID. This could be used by other organizations to access your [place] medical records  [id number]        Your Vitals Were     Pulse Temperature Respirations Pulse Oximetry BMI (Body Mass Index)       85 97.6  F (36.4  C) (Oral) 16 95% 28.89 kg/m2        Blood Pressure from Last 3 Encounters:   01/15/18 137/70   18 153/80   12/15/17 135/80    Weight from Last 3 Encounters:   01/15/18 190 lb (86.2 kg)   18 191 lb (86.6 kg)   12/15/17 187 lb (84.8 kg)              We Performed the Following     DERMATOLOGY REFERRAL        Primary Care Provider Office Phone # Fax #    Joel Daniel Irwin Wegener, [phone]       [facility] [address] [place], [place] [id number]        Equal Access to Services     [facility]: Hadii aad ku hadasho Soomaali, waaxda luqadaha, qaybta kaalmada kishore, claudia ugalde . So [facility] [phone].    ATENCIÓN: Si habla español, tiene a florez disposición servicios gratuitos de asistencia lingüística. Llame al [phone].    We comply with applicable federal civil rights laws and [place] laws. We do not discriminate on the basis of race, color, national origin, age, disability, sex, sexual orientation, or gender identity.            Thank you!     Thank you for choosing " [facility]  for your care. Our goal is always to provide you with excellent care. Hearing back from our patients is one way we can continue to improve our services. Please take a few minutes to complete the written survey that you may receive in the mail after your visit with us. Thank you!             Your Updated Medication List - Protect others around you: Learn how to safely use, store and throw away your medicines at www.disposemymeds.org.          This list is accurate as of: 1/15/18 11:59 AM.  Always use your most recent med list.                   Brand Name Dispense Instructions for use Diagnosis    aspirin 81 MG tablet      1 TABLET DAILY    Unspecified essential hypertension, Cerebral embolism with cerebral infarction (H), CAD (coronary artery disease), Mixed hyperlipidemia       Blood Pressure Monitor Pricilla     1 Device    1 Device daily.    Hypertension goal BP (blood pressure) < 130/80       carbidopa-levodopa  MG per tablet    SINEMET    540 tablet    Take 2 tablets 3 times a day 1 hour before each meal.  (Around 7 am, 11 am, & 4 pm.)    Parkinson's disease (H)       PARoxetine 20 MG tablet    PAXIL    90 tablet    TAKE ONE TABLET BY MOUTH EVERY NIGHT AT BEDTIME    Anxiety       simvastatin 40 MG tablet    ZOCOR    90 tablet    TAKE ONE TABLET BY MOUTH AT BEDTIME    Hyperlipidemia LDL goal <100       sulfamethoxazole-trimethoprim 400-80 MG per tablet    BACTRIM/SEPTRA    30 tablet    Take 1 tablet by mouth At Bedtime For UTI prevention    Urinary tract infection with hematuria, site unspecified       tamsulosin 0.4 MG capsule    FLOMAX    60 capsule    Take 2 capsules (0.8 mg) by mouth daily    Bradycardia

## 2018-01-24 DIAGNOSIS — G24.4 OROMANDIBULAR DYSTONIA: Primary | ICD-10-CM

## 2018-02-01 ENCOUNTER — OFFICE VISIT (OUTPATIENT)
Dept: NEUROLOGY | Facility: CLINIC | Age: 78
End: 2018-02-01
Payer: COMMERCIAL

## 2018-02-01 VITALS
DIASTOLIC BLOOD PRESSURE: 76 MMHG | SYSTOLIC BLOOD PRESSURE: 136 MMHG | WEIGHT: 190.9 LBS | BODY MASS INDEX: 28.93 KG/M2 | TEMPERATURE: 97.6 F | OXYGEN SATURATION: 96 % | HEART RATE: 70 BPM | RESPIRATION RATE: 24 BRPM | HEIGHT: 68 IN

## 2018-02-01 DIAGNOSIS — G24.8 ACQUIRED TORSION DYSTONIA: ICD-10-CM

## 2018-02-01 DIAGNOSIS — G20.A1 PARKINSON'S DISEASE (H): Primary | ICD-10-CM

## 2018-02-01 ASSESSMENT — ENCOUNTER SYMPTOMS
BOWEL INCONTINENCE: 0
EYE REDNESS: 1
NIGHT SWEATS: 0
POLYPHAGIA: 0
WEIGHT GAIN: 0
ABDOMINAL PAIN: 0
FATIGUE: 1
DECREASED APPETITE: 0
PARALYSIS: 0
BLOOD IN STOOL: 0
INCREASED ENERGY: 1
VOMITING: 0
CONSTIPATION: 1
EYE PAIN: 0
DIZZINESS: 1
NAIL CHANGES: 0
POLYDIPSIA: 0
EYE WATERING: 1
RECTAL PAIN: 0
DISTURBANCES IN COORDINATION: 0
LOSS OF CONSCIOUSNESS: 0
HEARTBURN: 0
MEMORY LOSS: 0
ALTERED TEMPERATURE REGULATION: 0
FEVER: 0
WEIGHT LOSS: 0
TREMORS: 1
JAUNDICE: 0
DOUBLE VISION: 0
NAUSEA: 0
TINGLING: 0
EYE IRRITATION: 1
HALLUCINATIONS: 0
SKIN CHANGES: 0
DIARRHEA: 0
HEADACHES: 1
SPEECH CHANGE: 0
POOR WOUND HEALING: 0
NUMBNESS: 0
SEIZURES: 0
WEAKNESS: 1
BLOATING: 0
CHILLS: 0

## 2018-02-01 ASSESSMENT — PAIN SCALES - GENERAL: PAINLEVEL: NO PAIN (0)

## 2018-02-01 NOTE — MR AVS SNAPSHOT
After Visit Summary   2/1/2018    Vini Loya    MRN: [id number]           Patient Information     Date Of Birth          1940        Visit Information        Provider Department      2/1/2018 11:00 AM Sharee Dickinson MD [facility]        Care Instructions    We will see you in 6 weeks to assess effect of injections.          Follow-ups after your visit        Your next 10 appointments already scheduled     Mar 15, 2018 10:30 AM CDT   (Arrive by 10:15 AM)   Return Movement Disorder with Sharee Dickinson MD   [facility] ([facility])    [address] [place] [address]   [phone]            Apr 23, 2018 11:40 AM CDT   (Arrive by 11:25 AM)   Return Movement Disorder with KOTA Dutta [facility] Neurology ([facility])    [address] [place] [address] [phone]            Apr 24, 2018 10:00 AM CDT   RETURN GLAUCOMA with Yudith Mcdonnell MD   Eye Clinic ([facility])    Stefan Devine [facility]  [address] [facility]  [address] [address]  [facility] [address]   [phone]            May 03, 2018 12:00 PM CDT   (Arrive by 11:45 AM)   Return Botox with Sharee Dickinson MD   [facility] ([facility])    [address] [place] [id number] [phone]              Who to contact     Please call your clinic at [phone] to:    Ask questions about your health    Make or cancel appointments    Discuss your medicines    Learn about your test results    Speak to your doctor   If you have compliments or concerns about an experience at your clinic, or if you wish to file a complaint, please contact [facility] Patient Relations at [phone] or email us at [email or web address]         Additional Information About Your Visit        Josel[employer]leonard  "Information     [employer] is an electronic gateway that provides easy, online access to your medical records. With [employer], you can request a clinic appointment, read your test results, renew a prescription or communicate with your care team.     To sign up for [employer] visit the website at [email or web address]   You will be asked to enter the access code listed below, as well as some personal information. Please follow the directions to create your username and password.     Your access code is: [email or web address]  Expires: 3/15/2018  2:57 PM     Your access code will  in 90 days. If you need help or a new code, please contact your [facility] or call [phone] for assistance.        Care EveryWhere ID     This is your Care EveryWhere ID. This could be used by other organizations to access your [place] medical records  [id number]        Your Vitals Were     Pulse Temperature Respirations Height Pulse Oximetry BMI (Body Mass Index)    70 97.6  F (36.4  C) (Oral) 24 1.727 m (5' 8\") 96% 29.03 kg/m2       Blood Pressure from Last 3 Encounters:   18 136/76   01/15/18 137/70   18 153/80    Weight from Last 3 Encounters:   18 86.6 kg (190 lb 14.4 oz)   01/15/18 86.2 kg (190 lb)   18 86.6 kg (191 lb)              Today, you had the following     No orders found for display       Primary Care Provider Office Phone # Fax #    Homeel Daniel Irwin Wegener, [phone]       [address] [place] [address]        Equal Access to Services     [facility] : Hadii phill Kunz, eliz perez, qaclaudia luciano . So [facility] [phone].    ATENCIÓN: Si habla español, tiene a florez disposición servicios gratuitos de asistencia lingüística. Llame al [phone].    We comply with applicable federal civil rights laws and [place] laws. We do not discriminate on the basis of race, color, " national origin, age, disability, sex, sexual orientation, or gender identity.            Thank you!     Thank you for choosing [facility]  for your care. Our goal is always to provide you with excellent care. Hearing back from our patients is one way we can continue to improve our services. Please take a few minutes to complete the written survey that you may receive in the mail after your visit with us. Thank you!             Your Updated Medication List - Protect others around you: Learn how to safely use, store and throw away your medicines at www.disposemymeds.org.          This list is accurate as of 2/1/18 12:39 PM.  Always use your most recent med list.                   Brand Name Dispense Instructions for use Diagnosis    aspirin 81 MG tablet      1 TABLET DAILY    Unspecified essential hypertension, Cerebral embolism with cerebral infarction (H), CAD (coronary artery disease), Mixed hyperlipidemia       Blood Pressure Monitor Pricilla     1 Device    1 Device daily.    Hypertension goal BP (blood pressure) < 130/80       botulinum toxin type A 100 UNITS injection    BOTOX    800 Units    200 units to be injected every 3 months [id number] [id number]    Oromandibular dystonia       carbidopa-levodopa  MG per tablet    SINEMET    540 tablet    Take 2 tablets 3 times a day 1 hour before each meal.  (Around 7 am, 11 am, & 4 pm.)    Parkinson's disease (H)       PARoxetine 20 MG tablet    PAXIL    90 tablet    TAKE ONE TABLET BY MOUTH EVERY NIGHT AT BEDTIME    Anxiety       simvastatin 40 MG tablet    ZOCOR    90 tablet    TAKE ONE TABLET BY MOUTH AT BEDTIME    Hyperlipidemia LDL goal <100       sulfamethoxazole-trimethoprim 400-80 MG per tablet    BACTRIM/SEPTRA    30 tablet    Take 1 tablet by mouth At Bedtime For UTI prevention    Urinary tract infection with hematuria, site unspecified       tamsulosin 0.4 MG capsule    FLOMAX    60 capsule    Take 2 capsules (0.8 mg) by mouth daily    Bradycardia
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
## 2018-02-19 ENCOUNTER — OFFICE VISIT (OUTPATIENT)
Dept: FAMILY MEDICINE | Facility: CLINIC | Age: 78
End: 2018-02-19
Payer: COMMERCIAL

## 2018-02-19 VITALS
RESPIRATION RATE: 14 BRPM | SYSTOLIC BLOOD PRESSURE: 90 MMHG | TEMPERATURE: 97.3 F | WEIGHT: 188 LBS | BODY MASS INDEX: 28.59 KG/M2 | OXYGEN SATURATION: 96 % | DIASTOLIC BLOOD PRESSURE: 63 MMHG | HEART RATE: 80 BPM

## 2018-02-19 DIAGNOSIS — I10 HYPERTENSION, GOAL BELOW 150/90: ICD-10-CM

## 2018-02-19 DIAGNOSIS — M54.6 ACUTE RIGHT-SIDED THORACIC BACK PAIN: Primary | ICD-10-CM

## 2018-02-19 PROCEDURE — 99214 OFFICE O/P EST MOD 30 MIN: CPT | Mod: 25 | Performed by: FAMILY MEDICINE

## 2018-02-19 PROCEDURE — 96372 THER/PROPH/DIAG INJ SC/IM: CPT | Performed by: FAMILY MEDICINE

## 2018-02-19 RX ORDER — KETOROLAC TROMETHAMINE 30 MG/ML
30 INJECTION, SOLUTION INTRAMUSCULAR; INTRAVENOUS ONCE
Qty: 1 ML | Refills: 0 | OUTPATIENT
Start: 2018-02-19 | End: 2018-02-19

## 2018-02-19 RX ORDER — CYCLOBENZAPRINE HCL 5 MG
5-10 TABLET ORAL
Qty: 20 TABLET | Refills: 0 | Status: SHIPPED | OUTPATIENT
Start: 2018-02-19 | End: 2018-05-16

## 2018-02-19 NOTE — NURSING NOTE
"Chief Complaint   Patient presents with     Musculoskeletal Problem     muscle spam in upper back        Initial BP 90/58  Pulse 80  Temp 97.3  F (36.3  C) (Oral)  Resp 14  Wt 188 lb (85.3 kg)  SpO2 96%  BMI 28.59 kg/m2 Estimated body mass index is 28.59 kg/(m^2) as calculated from the following:    Height as of 2/1/18: 5' 8\" (1.727 m).    Weight as of this encounter: 188 lb (85.3 kg).  Medication Reconciliation: complete     Omaira García MA      "

## 2018-02-19 NOTE — PROGRESS NOTES
SUBJECTIVE:   Vini Loya is a 77 year old male who presents to clinic today for the following health issues:      Musculoskeletal problem/pain      Duration: 3 days     Description  Location: right mid back     Intensity:  Mild to severe     Accompanying signs and symptoms: sharp/stiff pain    History  Previous similar problem: no   Previous evaluation:  none    Precipitating or alleviating factors:  Trauma or overuse: he thinks it is from exercising at the gym.  Aggravating factors include: lifting and overuse    Therapies tried and outcome: aleve BID PRN - not helpful then tried advil and not helpful     Due to pain it is hard to get out of the chair.   Last night he wanted to go to ER due to pain.  He wasn't able to sleep.   Last week pt went to the gym four days in a row.   No saddle anesthesia, fever, urinary retention, bowel incontinence or numbness/tingling of extremities.     Not on antihypertensive's. No dizziness or lightheadedness. Yesterday B/P was 132/70, pt checks every morning.     Problem list and histories reviewed & adjusted, as indicated.  Additional history: as documented    Labs reviewed in EPIC    Reviewed and updated as needed this visit by clinical staff  Tobacco  Allergies  Med Hx  Surg Hx  Fam Hx  Soc Hx      Reviewed and updated as needed this visit by Provider         ROS:  Constitutional, HEENT, cardiovascular, pulmonary, gi and gu systems are negative, except as otherwise noted.    OBJECTIVE:     BP 90/58  Pulse 80  Temp 97.3  F (36.3  C) (Oral)  Resp 14  Wt 188 lb (85.3 kg)  SpO2 96%  BMI 28.59 kg/m2  Body mass index is 28.59 kg/(m^2).  GENERAL: healthy, alert and no distress  EYES: Eyes grossly normal to inspection  HENT: nose and mouth without ulcers or lesions  BACK: + right parathoracic tenderness, normal ROM    Diagnostic Test Results:  none     ASSESSMENT/PLAN:       1. Acute right-sided thoracic back pain  - ketorolac (TORADOL) 30 MG/ML injection; Inject 1 mL (30  mg) into the muscle once for 1 dose  Dispense: 1 mL; Refill: 0  - cyclobenzaprine (FLEXERIL) 5 MG tablet; Take 1-2 tablets (5-10 mg) by mouth nightly as needed for muscle spasms  Dispense: 20 tablet; Refill: 0  No Advil, Aleve or Aspirin for the next 2 days  Flexeril 5 to 10 mg once at night as needed for muscle spasm, please do not drive or take with alcohol   Heat or cold packs for 5 to 10 minutes, every 4 to 6 hours  Follow if symptoms worsen or fail to improve.    2. Hypertension, goal below 150/90  - B/P low in clinic, pt asymptomatic. Yesterday home B/P was 132/70.   - Recommended to continue to monitor. Call clinic if experiencing chest pain, shortness of breath, palpitations, dizziness or light headiness.         Peg Walton MD  [facility]

## 2018-02-19 NOTE — MR AVS SNAPSHOT
After Visit Summary   2/19/2018    Vini Loya    MRN: [id number]           Patient Information     Date Of Birth          1940        Visit Information        Provider Department      2/19/2018 11:20 AM Peg Walton MD [facility]        Care Instructions    No Advil, Aleve or Aspirin for the next 2 days  Flexeril 5 to 10 mg once at night as needed for muscle spasm, please do not drive or take with alcohol   Heat or cold packs for 5 to 10 minutes, every 4 to 6 hours  Follow if symptoms worsen or fail to improve.          Follow-ups after your visit        Your next 10 appointments already scheduled     Mar 15, 2018 10:30 AM CDT   (Arrive by 10:15 AM)   Return Movement Disorder with Sharee Dickinson MD   [facility])    [address] [place] [id number] [phone]            Apr 23, 2018 11:40 AM CDT   (Arrive by 11:25 AM)   Return Movement Disorder with KOTA Dutta [facility] ([facility])    [address] [place] [id number] [phone]            Apr 24, 2018 10:00 AM CDT   RETURN GLAUCOMA with Yudith Mcdonnell MD   [facility] Clinic ([facility])    [address]  [facility] [address]   [phone]            May 03, 2018 12:00 PM CDT   (Arrive by 11:45 AM)   Return Botox with Sharee Dickinson MD   [facility] ([facility])    [address] [place] [id number] [phone]              Who to contact     If you have questions or need follow up information about today's clinic visit or your schedule please contact [facility] directly at [phone].  Normal or non-critical lab and imaging results will be communicated to you by MyChart, letter or phone within 4 business days  "after the clinic has received the results. If you do not hear from us within 7 days, please contact the clinic through [employer] or phone. If you have a critical or abnormal lab result, we will notify you by phone as soon as possible.  Submit refill requests through [employer] or call your pharmacy and they will forward the refill request to us. Please allow 3 business days for your refill to be completed.          Additional Information About Your Visit        [employer] Information     [employer] lets you send messages to your doctor, view your test results, renew your prescriptions, schedule appointments and more. To sign up, go to [email or web address] . Click on \"Log in\" on the left side of the screen, which will take you to the Welcome page. Then click on \"Sign up Now\" on the right side of the page.     You will be asked to enter the access code listed below, as well as some personal information. Please follow the directions to create your username and password.     Your access code is: [email or web address]  Expires: 3/15/2018  2:57 PM     Your access code will  in 90 days. If you need help or a new code, please call your [facility] [facility] clinic or [phone].        Care EveryWhere ID     This is your Care EveryWhere ID. This could be used by other organizations to access your [facility] medical records  [id number]        Your Vitals Were     Pulse Temperature Respirations Pulse Oximetry BMI (Body Mass Index)       80 97.3  F (36.3  C) (Oral) 14 96% 28.59 kg/m2        Blood Pressure from Last 3 Encounters:   18 90/58   18 136/76   01/15/18 137/70    Weight from Last 3 Encounters:   18 188 lb (85.3 kg)   18 190 lb 14.4 oz (86.6 kg)   01/15/18 190 lb (86.2 kg)              Today, you had the following     No orders found for display       Primary Care Provider Office Phone # Fax #    Joel Daniel Irwin Wegener, [phone] [address] [facility] [address]        Equal Access to " Services     [facility]: Hadii aad ku haddeliagurdeep Kunz, waaxda luqadaha, qaybta kaalmatimo novak, claudia ugalde . So [facility] [phone].    ATENCIÓN: Si lorenzala wendy, tiene a florez disposición servicios gratuitos de asistencia lingüística. Llame al [phone].    We comply with applicable federal civil rights laws and [place] laws. We do not discriminate on the basis of race, color, national origin, age, disability, sex, sexual orientation, or gender identity.            Thank you!     Thank you for choosing [facility]  for your care. Our goal is always to provide you with excellent care. Hearing back from our patients is one way we can continue to improve our services. Please take a few minutes to complete the written survey that you may receive in the mail after your visit with us. Thank you!             Your Updated Medication List - Protect others around you: Learn how to safely use, store and throw away your medicines at www.disposemymeds.org.          This list is accurate as of 2/19/18 12:09 PM.  Always use your most recent med list.                   Brand Name Dispense Instructions for use Diagnosis    aspirin 81 MG tablet      1 TABLET DAILY    Unspecified essential hypertension, Cerebral embolism with cerebral infarction (H), CAD (coronary artery disease), Mixed hyperlipidemia       Blood Pressure Monitor Pricilla     1 Device    1 Device daily.    Hypertension goal BP (blood pressure) < 130/80       botulinum toxin type A 100 UNITS injection    BOTOX    800 Units    200 units to be injected every 3 months [id number] [id number]    Oromandibular dystonia       carbidopa-levodopa  MG per tablet    SINEMET    540 tablet    Take 2 tablets 3 times a day 1 hour before each meal.  (Around 7 am, 11 am, & 4 pm.)    Parkinson's disease (H)       PARoxetine 20 MG tablet    PAXIL    90 tablet    TAKE ONE TABLET BY MOUTH EVERY NIGHT AT BEDTIME    Anxiety       simvastatin 40 MG  tablet    ZOCOR    90 tablet    TAKE ONE TABLET BY MOUTH AT BEDTIME    Hyperlipidemia LDL goal <100       sulfamethoxazole-trimethoprim 400-80 MG per tablet    BACTRIM/SEPTRA    30 tablet    Take 1 tablet by mouth At Bedtime For UTI prevention    Urinary tract infection with hematuria, site unspecified       tamsulosin 0.4 MG capsule    FLOMAX    60 capsule    Take 2 capsules (0.8 mg) by mouth daily    Bradycardia

## 2018-02-19 NOTE — NURSING NOTE
The following medication was given:     MEDICATION: Ketorolac Tromethamine  (30 mg/mL) (Toradol)  ROUTE: IM  SITE: Ventrogluteal - Right  DOSE: 30mg 1ml  LOT #: [id number]  :  [employer]  EXPIRATION DATE:  08/30/2019  NDC#: 70648-435-81    Sandra Davis MA

## 2018-02-19 NOTE — PATIENT INSTRUCTIONS
No Advil, Aleve or Aspirin for the next 2 days  Flexeril 5 to 10 mg once at night as needed for muscle spasm, please do not drive or take with alcohol   Heat or cold packs for 5 to 10 minutes, every 4 to 6 hours  Follow if symptoms worsen or fail to improve.

## 2018-03-06 ENCOUNTER — TRANSFERRED RECORDS (OUTPATIENT)
Dept: HEALTH INFORMATION MANAGEMENT | Facility: CLINIC | Age: 78
End: 2018-03-06

## 2018-03-15 ENCOUNTER — OFFICE VISIT (OUTPATIENT)
Dept: NEUROLOGY | Facility: CLINIC | Age: 78
End: 2018-03-15
Payer: COMMERCIAL

## 2018-03-15 VITALS
HEART RATE: 90 BPM | DIASTOLIC BLOOD PRESSURE: 68 MMHG | HEIGHT: 68 IN | SYSTOLIC BLOOD PRESSURE: 107 MMHG | TEMPERATURE: 97.8 F | BODY MASS INDEX: 28.57 KG/M2 | OXYGEN SATURATION: 95 % | WEIGHT: 188.5 LBS | RESPIRATION RATE: 24 BRPM

## 2018-03-15 DIAGNOSIS — R00.1 BRADYCARDIA: ICD-10-CM

## 2018-03-15 DIAGNOSIS — R55 PRE-SYNCOPE: ICD-10-CM

## 2018-03-15 DIAGNOSIS — G20.A1 PARALYSIS AGITANS (H): Primary | ICD-10-CM

## 2018-03-15 RX ORDER — TAMSULOSIN HYDROCHLORIDE 0.4 MG/1
0.4 CAPSULE ORAL DAILY
Qty: 30 CAPSULE | Refills: 11 | Status: SHIPPED | OUTPATIENT
Start: 2018-03-15 | End: 2018-10-01

## 2018-03-15 RX ORDER — RASAGILINE 1 MG/1
TABLET ORAL
Qty: 30 EACH | Refills: 3 | Status: SHIPPED | OUTPATIENT
Start: 2018-03-15 | End: 2018-04-23

## 2018-03-15 ASSESSMENT — ANXIETY QUESTIONNAIRES
1. FEELING NERVOUS, ANXIOUS, OR ON EDGE: NOT AT ALL
GAD7 TOTAL SCORE: 0
3. WORRYING TOO MUCH ABOUT DIFFERENT THINGS: NOT AT ALL
7. FEELING AFRAID AS IF SOMETHING AWFUL MIGHT HAPPEN: NOT AT ALL
IF YOU CHECKED OFF ANY PROBLEMS ON THIS QUESTIONNAIRE, HOW DIFFICULT HAVE THESE PROBLEMS MADE IT FOR YOU TO DO YOUR WORK, TAKE CARE OF THINGS AT HOME, OR GET ALONG WITH OTHER PEOPLE: NOT DIFFICULT AT ALL
2. NOT BEING ABLE TO STOP OR CONTROL WORRYING: NOT AT ALL
6. BECOMING EASILY ANNOYED OR IRRITABLE: NOT AT ALL
5. BEING SO RESTLESS THAT IT IS HARD TO SIT STILL: NOT AT ALL

## 2018-03-15 ASSESSMENT — PAIN SCALES - GENERAL: PAINLEVEL: NO PAIN (0)

## 2018-03-15 ASSESSMENT — PATIENT HEALTH QUESTIONNAIRE - PHQ9: 5. POOR APPETITE OR OVEREATING: NOT AT ALL

## 2018-03-15 NOTE — PROGRESS NOTES
Movement Disorders Clinic     CC: Parkinson disease, jaw tremor    HPI:  Vini Loya is a 77 year old male with PD who presents for follow-up of Botox injections. He developed left hand tremor around 5-6 years ago leading to diagnosis of PD. 3-4 years ago started to develop jaw tremor, has been worsening. The initial Botox injections were on 02/01/2018, received 60 units.     Response to Last Injection: He denies any adverse effects from the Botox, however no improvement either.      Onset of effect:  none     Benefit from last injections:  none     Wearing off: none     Side effects: none     Additional interval history:   He denies any changes in the tremors. The Sinemet does not improve the tremor. He denies any concerns with his gait. The Sinemet has overall helped his balance. He does get lightheadness after taking Sinemet, sometimes severe enough where he feels he is going to faint.  Of note, his Flomax was increased from 0.4 mg daily to 0.8 mg daily earlier this year.    Review of Systems:  Other than that mentioned above, the remainder of 12 systems reviewed were negative.       PD Medications                   7 11 4   C/L 25/100                                 2 2 2       Current Outpatient Prescriptions   Medication Sig Dispense Refill     cyclobenzaprine (FLEXERIL) 5 MG tablet Take 1-2 tablets (5-10 mg) by mouth nightly as needed for muscle spasms 20 tablet 0     botulinum toxin type A (BOTOX) 100 UNITS injection 200 units to be injected every 3 months  EMG [id number] 800 Units 0     carbidopa-levodopa (SINEMET)  MG per tablet Take 2 tablets 3 times a day 1 hour before each meal.  (Around 7 am, 11 am, & 4 pm.) 540 tablet 3     tamsulosin (FLOMAX) 0.4 MG capsule Take 2 capsules (0.8 mg) by mouth daily 60 capsule 11     sulfamethoxazole-trimethoprim (BACTRIM/SEPTRA) 400-80 MG per tablet Take 1 tablet by mouth At Bedtime For UTI prevention 30 tablet 11     PARoxetine (PAXIL) 20 MG tablet TAKE ONE TABLET BY  "MOUTH EVERY NIGHT AT BEDTIME 90 tablet 3     simvastatin (ZOCOR) 40 MG tablet TAKE ONE TABLET BY MOUTH AT BEDTIME 90 tablet 3     Blood Pressure Monitor JERONIMO 1 Device daily. 1 Device 0     ASPIRIN 81 MG OR TABS 1 TABLET DAILY          Allergies   Allergen Reactions     Vytorin      Unknown     Patient Active Problem List   Diagnosis     Cerebral embolism with cerebral infarction (H)     Generalized anxiety disorder     CAD (coronary artery disease)     HYPERLIPIDEMIA LDL GOAL <100     Marital conflict     Tremors     UTI (urinary tract infection)     BPH (benign prostatic hypertrophy) with urinary obstruction     Bradycardia     Parkinson's disease (H)     Advanced care planning/counseling discussion     Gait disturbance, post-stroke     Herniated nucleus pulposus, L5-S1, right     Right hip pain     Bunion     Other seborrheic keratosis     Allergic conjunctivitis     Diverticulosis     At high risk for falls     Glaucoma suspect, bilateral     Senile nuclear sclerosis, bilateral     Dry eye, bilateral     History of corneal transplant     Hypertension, goal below 150/90     Major depressive disorder, single episode, moderate (H)     Actinic keratosis     Watering of eye     Lactose intolerance in adult     Degeneration of L4-L5 intervertebral disc     Compression fracture of thoracic vertebra, with routine healing, subsequent encounter     Chronic midline low back pain without sciatica     Pyelonephritis     Closed nondisplaced fracture of styloid process of left radius       Examination   188 lbs 8 oz  Blood pressure 107/68, pulse 90, temperature 97.8  F (36.6  C), temperature source Oral, resp. rate 24, height 1.727 m (5' 8\"), weight 85.5 kg (188 lb 8 oz), SpO2 95 %., Body mass index is 28.66 kg/(m^2).    General examination: no apparent distress     Neuro exam:   Mental status:  Alert, oriented x3.  Answers questions and follows commands appropriate  Cranial nerves: Extraocular movements intact, face symmetric, " prominent jaw tremor noted, hypomimia present, voice hypophonic  Motor: Tone: rigidity in neck-2, right upper extremity rigidity-0, right leg rigidity-2, left arm rigidity-1, left leg rigidity-0.  Slowed finger tapping on left although this is likely worsened by his rest tremor  Arrhythmic toetapping bilaterally, more prominent on the right  Left upper extremity rest tremor present  Gait: Gait appears steady, left upper extremity tremor present with gait, no shuffling or freezing    Assessment/plan:   77-year-old gentleman with tremor predominant Parkinson's disease presenting for follow-up.  Unfortunately, he did not have any improvement with his most recent Botox injections.  Tremor can be very difficult to treat with the Botox.  Overall, there is room to increase his Parkinson's meds however he is reporting, at times, presyncope with the Sinemet.  As the higher dose of Flomax may be contributing to his symptoms, we recommended trying going back to 0.4 mg daily of Flomax and see if this is sufficient.     -Decrease Flomax to 0.4 mg daily, will CC his urology providers about this change  -We will start Azilect 0.5 mg daily for 2 weeks then increase to 1 mg daily thereafter  -Continue Sinemet 25/100 mg 2 tablets 3 times a day, if he tolerates the Azilect and tremors not well controlled, in the future his Sinemet could be uptitrated  -we will not plan on repeating Botox at this time     RTC as scheduled with Taina Staples NP in April    .The case and recommendations were discussed with Dr. Dickisnon, who has spoken with and examined the patient and helped to formulate the impression and recommendations.    Yamile De La Rosa MD  Movement disorders fellow     Neurology Attending Attestation:     I, Sharee Dickinson, personally saw this patient with our Movement Disorders Fellow and agree with the fellow's findings and plan of care as documented in the movement disorder fellow's note, with my personal summary below. I  personally performed salient aspects of the history and neurological examination.     I personally reviewed the vital signs, medications, and labs. I personally viewed the imaging, and agree with the interpretation documented by the fellow.    I personally performed or supervised all procedures.    Time spent with patient: Greater than 50% of this 45 minute visit was spent in counseling and coordination of care related to the above issues.        Sharee Dickinson MD    of Neurology

## 2018-03-15 NOTE — MR AVS SNAPSHOT
After Visit Summary   3/15/2018    Vini Loya    MRN: [id number]           Patient Information     Date Of Birth          1940        Visit Information        Provider Department      3/15/2018 10:30 AM Sharee Dickinson MD [facility]        Today's Diagnoses     Paralysis agitans (H)    -  1    Bradycardia          Care Instructions    1. We would recommend decreasing the Flomax from 2 tab to 1 tab daily.     2. We will start a medication called Azilect to help control tremors. We will have you take 1/2 tab daily for two weeks then increase to 1 tab daily after that.    3. Followup as scheduled with Taina in April. We will cancel the May appointment.           Follow-ups after your visit        Your next 10 appointments already scheduled     Apr 23, 2018 11:40 AM CDT   (Arrive by 11:25 AM)   Return Movement Disorder with KOTA Dutta [facility] ([facility] [place])    [address] [place] [address] [id number] [phone]            Apr 24, 2018 10:00 AM CDT   RETURN GLAUCOMA with Yudith Mcdonnell MD   Eye Clinic ([facility])    [address]  [facility] [address] [phone]            May 03, 2018 12:00 PM CDT   (Arrive by 11:45 AM)   Return Botox with Sharee Dickinson MD   [facility] ([facility] [place])    [address] [place] [address] [id number] [phone]              Who to contact     Please call your clinic at [phone] to:    Ask questions about your health    Make or cancel appointments    Discuss your medicines    Learn about your test results    Speak to your doctor            Additional Information About Your Visit        [employer] Information     [employer] is an electronic gateway that provides easy, online access to your medical records. With [employer], you can request a clinic  "appointment, read your test results, renew a prescription or communicate with your care team.     To sign up for [employer] visit the website at [email or web address]   You will be asked to enter the access code listed below, as well as some personal information. Please follow the directions to create your username and password.     Your access code is: [email or web address]  Expires: 3/15/2018  3:57 PM     Your access code will  in 90 days. If you need help or a new code, please contact your [facility] or call [phone] for assistance.        Care EveryWhere ID     This is your Care EveryWhere ID. This could be used by other organizations to access your [place] medical records  [id number]        Your Vitals Were     Pulse Temperature Respirations Height Pulse Oximetry BMI (Body Mass Index)    90 97.8  F (36.6  C) (Oral) 24 1.727 m (5' 8\") 95% 28.66 kg/m2       Blood Pressure from Last 3 Encounters:   03/15/18 107/68   18 90/63   18 136/76    Weight from Last 3 Encounters:   03/15/18 85.5 kg (188 lb 8 oz)   18 85.3 kg (188 lb)   18 86.6 kg (190 lb 14.4 oz)              Today, you had the following     No orders found for display         Today's Medication Changes          These changes are accurate as of 3/15/18 11:14 AM.  If you have any questions, ask your nurse or doctor.               Start taking these medicines.        Dose/Directions    rasagiline 1 MG Tabs tablet   Commonly known as:  AZILECT   Used for:  Paralysis agitans (H)   Started by:  Sharee Dickinson MD        We will have you take 1/2 tab daily for two weeks then increase to 1 tab daily after that.   Quantity:  30 each   Refills:  3         These medicines have changed or have updated prescriptions.        Dose/Directions    tamsulosin 0.4 MG capsule   Commonly known as:  FLOMAX   This may have changed:  how much to take   Used for:  Bradycardia   Changed by:  Sharee Dickinson " MD [place]        Dose:  0.4 mg   Take 1 capsule (0.4 mg) by mouth daily   Quantity:  30 capsule   Refills:  11            Where to get your medicines      These medications were sent to [facility] [place], [place] - [address]  [address], [facility] [address]     Phone:  [phone]     rasagiline 1 MG Tabs tablet    tamsulosin 0.4 MG capsule                Primary Care Provider Office Phone # Fax #    Home Daniel Irwin Wegener, [phone]       [address]  [facility] [address]        Equal Access to Services     [facility]: Hadii aad ku hadasho Sofrancoisali, waaxda luqadaha, qaybta kaalmada adeegyada, claudia renteria haygrabiel ugalde . So [facility] [phone].    ATENCIÓN: Si habla español, tiene a florez disposición servicios gratuitos de asistencia lingüística. Ll[facility]leveland [facility] Foundation [phone].    We comply with applicable federal civil rights laws and [place] laws. We do not discriminate on the basis of race, color, national origin, age, disability, sex, sexual orientation, or gender identity.            Thank you!     Thank you for choosing [facility]  for your care. Our goal is always to provide you with excellent care. Hearing back from our patients is one way we can continue to improve our services. Please take a few minutes to complete the written survey that you may receive in the mail after your visit with us. Thank you!             Your Updated Medication List - Protect others around you: Learn how to safely use, store and throw away your medicines at www.disposemymeds.org.          This list is accurate as of 3/15/18 11:14 AM.  Always use your most recent med list.                   Brand Name Dispense Instructions for use Diagnosis    aspirin 81 MG tablet      1 TABLET DAILY    Unspecified essential hypertension, Cerebral embolism with cerebral infarction (H), CAD (coronary artery disease), Mixed hyperlipidemia       Blood Pressure Monitor Pricilla     1  Device    1 Device daily.    Hypertension goal BP (blood pressure) < 130/80       botulinum toxin type A 100 UNITS injection    BOTOX    800 Units    200 units to be injected every 3 months EMG [id number]    Oromandibular dystonia       carbidopa-levodopa  MG per tablet    SINEMET    540 tablet    Take 2 tablets 3 times a day 1 hour before each meal.  (Around 7 am, 11 am, & 4 pm.)    Parkinson's disease (H)       cyclobenzaprine 5 MG tablet    FLEXERIL    20 tablet    Take 1-2 tablets (5-10 mg) by mouth nightly as needed for muscle spasms    Acute right-sided thoracic back pain       PARoxetine 20 MG tablet    PAXIL    90 tablet    TAKE ONE TABLET BY MOUTH EVERY NIGHT AT BEDTIME    Anxiety       rasagiline 1 MG Tabs tablet    AZILECT    30 each    We will have you take 1/2 tab daily for two weeks then increase to 1 tab daily after that.    Paralysis agitans (H)       simvastatin 40 MG tablet    ZOCOR    90 tablet    TAKE ONE TABLET BY MOUTH AT BEDTIME    Hyperlipidemia LDL goal <100       sulfamethoxazole-trimethoprim 400-80 MG per tablet    BACTRIM/SEPTRA    30 tablet    Take 1 tablet by mouth At Bedtime For UTI prevention    Urinary tract infection with hematuria, site unspecified       tamsulosin 0.4 MG capsule    FLOMAX    30 capsule    Take 1 capsule (0.4 mg) by mouth daily    Bradycardia

## 2018-03-15 NOTE — LETTER
3/15/2018       RE: Vini Loya  [address]  [facility] [address]     Dear Colleague,    Thank you for referring your patient, Vini Loya, to the [facility] NEUROLOGY at [facility]. Please see a copy of my visit note below.    Movement Disorders Clinic     CC: Parkinson disease, jaw tremor    HPI:  Vini Loya is a 77 year old male with PD who presents for follow-up of Botox injections. He developed left hand tremor around 5-6 years ago leading to diagnosis of PD. 3-4 years ago started to develop jaw tremor, has been worsening. The initial Botox injections were on 02/01/2018, received 60 units.     Response to Last Injection: He denies any adverse effects from the Botox, however no improvement either.      Onset of effect:   none     Benefit from last injections:   none     Wearing off: none     Side effects: none     Additional interval history:   He denies any changes in the tremors. The Sinemet does not improve the tremor. He denies any concerns with his gait. The Sinemet has overall helped his balance. He does get lightheadness after taking Sinemet, sometimes severe enough where he feels he is going to faint.  Of note, his Flomax was increased from 0.4 mg daily to 0.8 mg daily earlier this year.    Review of Systems:  Other than that mentioned above, the remainder of 12 systems reviewed were negative.       PD Medications                   7 11 4   C/L 25/100                                 2 2 2       Current Outpatient Prescriptions   Medication Sig Dispense Refill     cyclobenzaprine (FLEXERIL) 5 MG tablet Take 1-2 tablets (5-10 mg) by mouth nightly as needed for muscle spasms 20 tablet 0     botulinum toxin type A (BOTOX) 100 UNITS injection 200 units to be injected every 3 months  EMG 00922 800 Units 0     carbidopa-levodopa (SINEMET)  MG per tablet Take 2 tablets 3 times a day 1 hour before each meal.  (Around 7 am, 11 am, & 4 pm.) 540 tablet 3      "tamsulosin (FLOMAX) 0.4 MG capsule Take 2 capsules (0.8 mg) by mouth daily 60 capsule 11     sulfamethoxazole-trimethoprim (BACTRIM/SEPTRA) 400-80 MG per tablet Take 1 tablet by mouth At Bedtime For UTI prevention 30 tablet 11     PARoxetine (PAXIL) 20 MG tablet TAKE ONE TABLET BY MOUTH EVERY NIGHT AT BEDTIME 90 tablet 3     simvastatin (ZOCOR) 40 MG tablet TAKE ONE TABLET BY MOUTH AT BEDTIME 90 tablet 3     Blood Pressure Monitor JERONIMO 1 Device daily. 1 Device 0     ASPIRIN 81 MG OR TABS 1 TABLET DAILY          Allergies   Allergen Reactions     Vytorin      Unknown     Patient Active Problem List   Diagnosis     Cerebral embolism with cerebral infarction (H)     Generalized anxiety disorder     CAD (coronary artery disease)     HYPERLIPIDEMIA LDL GOAL <100     Marital conflict     Tremors     UTI (urinary tract infection)     BPH (benign prostatic hypertrophy) with urinary obstruction     Bradycardia     Parkinson's disease (H)     Advanced care planning/counseling discussion     Gait disturbance, post-stroke     Herniated nucleus pulposus, L5-S1, right     Right hip pain     Bunion     Other seborrheic keratosis     Allergic conjunctivitis     Diverticulosis     At high risk for falls     Glaucoma suspect, bilateral     Senile nuclear sclerosis, bilateral     Dry eye, bilateral     History of corneal transplant     Hypertension, goal below 150/90     Major depressive disorder, single episode, moderate (H)     Actinic keratosis     Watering of eye     Lactose intolerance in adult     Degeneration of L4-L5 intervertebral disc     Compression fracture of thoracic vertebra, with routine healing, subsequent encounter     Chronic midline low back pain without sciatica     Pyelonephritis     Closed nondisplaced fracture of styloid process of left radius       Examination   188 lbs 8 oz  Blood pressure 107/68, pulse 90, temperature 97.8  F (36.6  C), temperature source Oral, resp. rate 24, height 1.727 m (5' 8\"), weight " 85.5 kg (188 lb 8 oz), SpO2 95 %., Body mass index is 28.66 kg/(m^2).    General examination: no apparent distress     Neuro exam:   Mental status:  Alert, oriented x3.  Answers questions and follows commands appropriate  Cranial nerves: Extraocular movements intact, face symmetric, prominent jaw tremor noted, hypomimia present, voice hypophonic  Motor: Tone: rigidity in neck-2, right upper extremity rigidity-0, right leg rigidity-2, left arm rigidity-1, left leg rigidity-0.  Slowed finger tapping on left although this is likely worsened by his rest tremor  Arrhythmic toetapping bilaterally, more prominent on the right  Left upper extremity rest tremor present  Gait: Gait appears steady, left upper extremity tremor present with gait, no shuffling or freezing    Assessment/plan:   77-year-old gentleman with tremor predominant Parkinson's disease presenting for follow-up.  Unfortunately, he did not have any improvement with his most recent Botox injections.  Tremor can be very difficult to treat with the Botox.  Overall, there is room to increase his Parkinson's meds however he is reporting, at times, presyncope with the Sinemet.  As the higher dose of Flomax may be contributing to his symptoms, we recommended trying going back to 0.4 mg daily of Flomax and see if this is sufficient.     -Decrease Flomax to 0.4 mg daily, will CC his urology providers about this change  -We will start Azilect 0.5 mg daily for 2 weeks then increase to 1 mg daily thereafter  -Continue Sinemet 25/100 mg 2 tablets 3 times a day, if he tolerates the Azilect and tremors not well controlled, in the future his Sinemet could be uptitrated  -we will not plan on repeating Botox at this time     RTC as scheduled with Taina Staples NP in April    .The case and recommendations were discussed with Dr. Dickinson, who has spoken with and examined the patient and helped to formulate the impression and recommendations.    Yamile De La Rosa MD  Movement  disorders fellow     Neurology Attending Attestation:     I, Sharee Dickinson, personally saw this patient with our Movement Disorders Fellow and agree with the fellow's findings and plan of care as documented in the movement disorder fellow's note, with my personal summary below. I personally performed salient aspects of the history and neurological examination.     I personally reviewed the vital signs, medications, and labs. I personally viewed the imaging, and agree with the interpretation documented by the fellow.    I personally performed or supervised all procedures.    Time spent with patient: Greater than 50% of this 45 minute visit was spent in counseling and coordination of care related to the above issues.      Again, thank you for allowing me to participate in the care of your patient.      Sincerely,    Sharee Dickinson MD

## 2018-03-15 NOTE — PATIENT INSTRUCTIONS
1. We would recommend decreasing the Flomax from 2 tab to 1 tab daily.     2. We will start a medication called Azilect to help control tremors. We will have you take 1/2 tab daily for two weeks then increase to 1 tab daily after that.    3. Followup as scheduled with Taina in April. We will cancel the May appointment.

## 2018-03-16 ASSESSMENT — PATIENT HEALTH QUESTIONNAIRE - PHQ9: SUM OF ALL RESPONSES TO PHQ QUESTIONS 1-9: 0

## 2018-03-16 ASSESSMENT — ANXIETY QUESTIONNAIRES: GAD7 TOTAL SCORE: 0

## 2018-04-23 ENCOUNTER — OFFICE VISIT (OUTPATIENT)
Dept: NEUROLOGY | Facility: CLINIC | Age: 78
End: 2018-04-23
Payer: COMMERCIAL

## 2018-04-23 VITALS
HEART RATE: 70 BPM | SYSTOLIC BLOOD PRESSURE: 119 MMHG | WEIGHT: 188 LBS | DIASTOLIC BLOOD PRESSURE: 65 MMHG | HEIGHT: 68 IN | BODY MASS INDEX: 28.49 KG/M2

## 2018-04-23 DIAGNOSIS — G20.A1 PARKINSON'S DISEASE (H): ICD-10-CM

## 2018-04-23 RX ORDER — RASAGILINE 1 MG/1
TABLET ORAL
Qty: 30 EACH | Refills: 11 | Status: SHIPPED | OUTPATIENT
Start: 2018-04-23 | End: 2018-07-24

## 2018-04-23 RX ORDER — CARBIDOPA AND LEVODOPA 25; 100 MG/1; MG/1
TABLET ORAL
Qty: 540 TABLET | Refills: 3 | Status: SHIPPED | OUTPATIENT
Start: 2018-04-23 | End: 2019-03-04

## 2018-04-23 ASSESSMENT — PAIN SCALES - GENERAL: PAINLEVEL: NO PAIN (0)

## 2018-04-23 NOTE — MR AVS SNAPSHOT
After Visit Summary   4/23/2018    Vini Loya    MRN: [id number]           Patient Information     Date Of Birth          1940        Visit Information        Provider Department      4/23/2018 11:40 AM Dalila Staples APRN CNP [facility] Neurology        Today's Diagnoses     Parkinson's disease (H)          Care Instructions    April 23, 2018    Dear Mr. Vini Loya,    Thank you for coming today.  During your visit, we have discussed the following:     __  Taking your medication as prescribed is important for tremor control.  __  Currently, you've been taking only 1 out of the 3 doses.  Set an alarm reminder to take medication as below.   __  [facility] restart [facility] Azilect & see if you would get added benefit.     PD Medications 7 am 11 am, 4 pm   Sinemet 25/100 mg  2 2 2   Azilect 1 mg 1       __  Continue to exercise regularly.   __  Make a follow up appointment with Urology Clinic (for June 2018) with Lupe Hairston PA-C  __  [facility] & [facility].     __  Since you didn't like the Botox injections, it's okay to cancel your appointment with Dr. Dickinson.   __  Return in 3 months. You may return sooner as needed.      For questions, you may send us a [email or web address] message or call [phone]    Fax number: [phone]    KOTA Stone, CNP  [facility]            Follow-ups after your visit        Your next 10 appointments already scheduled     Apr 24, 2018 10:00 AM CDT   RETURN GLAUCOMA with Yudith Mcdonnell MD   Eye Clinic ([facility])    [place] [address]  [address]  [facility] [id number] [phone]            May 03, 2018 12:00 PM CDT   (Arrive by 11:45 AM)   Return Botox with Sharee Dickinson MD   [facility] ([facility])    [address] [facility]  [address] [facility] [address]  "  [phone]            Aug 15, 2018  1:15 PM CDT   (Arrive by 1:00 PM)   Return Movement Disorder with KOTA Dutta [facility] Neurology ([facility])    [address] [place] [id number] [phone]              Who to contact     Please call your clinic at [phone] to:    Ask questions about your health    Make or cancel appointments    Discuss your medicines    Learn about your test results    Speak to your doctor            Additional Information About Your Visit        Wecashhart Information     Meiaoju is an electronic gateway that provides easy, online access to your medical records. With Meiaoju, you can request a clinic appointment, read your test results, renew a prescription or communicate with your care team.     To sign up for Meiaoju visit the website at [email or web address]   You will be asked to enter the access code listed below, as well as some personal information. Please follow the directions to create your username and password.     Your access code is: [email or web address]  Expires: 2018  6:31 AM     Your access code will  in 90 days. If you need help or a new code, please contact your [facility] or call [phone] for assistance.        Care EveryWhere ID     This is your Care EveryWhere ID. This could be used by other organizations to access your [place] medical records  [id number]        Your Vitals Were     Pulse Height BMI (Body Mass Index)             70 1.727 m (5' 8\") 28.59 kg/m2          Blood Pressure from Last 3 Encounters:   18 119/65   03/15/18 107/68   18 90/63    Weight from Last 3 Encounters:   18 85.3 kg (188 lb)   03/15/18 85.5 kg (188 lb 8 oz)   18 85.3 kg (188 lb)              Today, you had the following     No orders found for display         Where to get your medicines      Some of these will need a paper prescription and others can " be bought over the counter.  Ask your nurse if you have questions.     Bring a paper prescription for each of these medications     carbidopa-levodopa  MG per tablet    rasagiline 1 MG Tabs tablet          Primary Care Provider Office Phone # Fax #    Joel Daniel Irwin Wegener, [phone] [address]  [facility] [address]        Equal Access to Services     CHRISTOPHE JORDAN : Hadii aad ku hadasho Soomaali, waaxda luqadaha, qaybta kaalmada adeegyada, waxay idiin hayaan adeeg kharash la'aan ah. So [facility] [phone].    ATENCIÓN: Si habla español, tiene a florez disposición servicios gratuitos de asistencia lingüística. Bishop al [phone].    We comply with applicable federal civil rights laws and [place] laws. We do not discriminate on the basis of race, color, national origin, age, disability, sex, sexual orientation, or gender identity.            Thank you!     Thank you for choosing [facility] NEUROLOGY  for your care. Our goal is always to provide you with excellent care. Hearing back from our patients is one way we can continue to improve our services. Please take a few minutes to complete the written survey that you may receive in the mail after your visit with us. Thank you!             Your Updated Medication List - Protect others around you: Learn how to safely use, store and throw away your medicines at www.disposemymeds.org.          This list is accurate as of 4/23/18 12:38 PM.  Always use your most recent med list.                   Brand Name Dispense Instructions for use Diagnosis    aspirin 81 MG tablet      1 TABLET DAILY    Unspecified essential hypertension, Cerebral embolism with cerebral infarction (H), CAD (coronary artery disease), Mixed hyperlipidemia       Blood Pressure Monitor Pricilla     1 Device    1 Device daily.    Hypertension goal BP (blood pressure) < 130/80       botulinum toxin type A 100 units injection    BOTOX    800 Units    200 units to be injected every  3 months EMG [id number]    Oromandibular dystonia       carbidopa-levodopa  MG per tablet    SINEMET    540 tablet    Take 2 tablets 3 times a day 1 hour before each meal.  (Around 7 am, 11 am, & 4 pm.)    Parkinson's disease (H)       cyclobenzaprine 5 MG tablet    FLEXERIL    20 tablet    Take 1-2 tablets (5-10 mg) by mouth nightly as needed for muscle spasms    Acute right-sided thoracic back pain       PARoxetine 20 MG tablet    PAXIL    90 tablet    TAKE ONE TABLET BY MOUTH EVERY NIGHT AT BEDTIME    Anxiety       rasagiline 1 MG Tabs tablet    AZILECT    30 each    We will have you take 1/2 tab daily for two weeks then increase to 1 tab daily after that.    Parkinson's disease (H)       simvastatin 40 MG tablet    ZOCOR    90 tablet    TAKE ONE TABLET BY MOUTH AT BEDTIME    Hyperlipidemia LDL goal <100       sulfamethoxazole-trimethoprim 400-80 MG per tablet    BACTRIM/SEPTRA    30 tablet    Take 1 tablet by mouth At Bedtime For UTI prevention    Urinary tract infection with hematuria, site unspecified       tamsulosin 0.4 MG capsule    FLOMAX    30 capsule    Take 1 capsule (0.4 mg) by mouth daily    Bradycardia

## 2018-04-23 NOTE — Clinical Note
2018       RE: Vini Loya  [address]  [facility] [address]     Dear Colleague,    Thank you for referring your patient, Vini Loya, to the [facility] NEUROLOGY at [facility]. Please see a copy of my visit note below.    PATIENT: Vini Loya    : 1940    ALBINA: 2018    REASON FOR VISIT: Parkinson's disease follow up.    HPI: Mr. Vini Loya is a 77 year old *** handed *** male who came to the [facility] neurology clinic accompanied by his *** for a follow up visit.  He was last seen in the clinic on *** by *** for ***.  During that visit, the following were discussed: -    ASSESSMENT AND PLAN:       Parkinson's disease.   Mr. Vini Loya is a 77-year-old left-handed  male with a history of tremor since  -  & a diagnosis of PD in early  who came to the clinic for a f/u visit.  Pt hasn't been adhering to the recommended Levodopa dose.  He downplays his motor symptoms.  As mentioned above, he had stopped taking Sinemet for 2 weeks & had resumed it.  Today's exam show mild chin/mouth tremor that becomes worse with mental activation.  He also has rest tremor & fine motor movement difficulty in his extremities left body worse than right.      __  He was informed that since he is not adhering to his current medication regimen, changing it wouldn't make any sense.  Therefore, he'll reman on the same dose as below.     PD Medications 7 am 11 am 4 pm   Sinemet 25/100 mg  2 2 2      __  Since chin/jaw/mouth tremor has gotten much worse, I recommended Botox injection.  Since dosing is not as frequent, he might stick to the plan & get some symptom relief.  Interestingly, he is not bothered by tremor; therefore, he might opt not to go forward with the plan.   He did agree to be referred & was referred to PMR or Dr. Dickinson.   __  He was encouraged to resume his exercise routine.   __  Will return in 3 - 4 months. May return sooner as  needed.    Since then, he was seen by Dr. Dickinson a couple of times for Botox injection. During the last visit, which was on 3/15/2018 the following were discussed:     Assessment/plan:     77-year-old gentleman with tremor predominant Parkinson's disease presenting for follow-up.  Unfortunately, he did not have any improvement with his most recent Botox injections.  Tremor can be very difficult to treat with the Botox.  Overall, there is room to increase his Parkinson's meds however he is reporting, at times, presyncope with the Sinemet.  As the higher dose of Flomax may be contributing to his symptoms, we recommended trying going back to 0.4 mg daily of Flomax and see if this is sufficient.      -Decrease Flomax to 0.4 mg daily, will CC his urology providers about this change  -We will start Azilect 0.5 mg daily for 2 weeks then increase to 1 mg daily thereafter  -Continue Sinemet 25/100 mg 2 tablets 3 times a day, if he tolerates the Azilect and tremors not well controlled, in the future his Sinemet could be uptitrated  -we will not plan on repeating Botox at this time      RTC as scheduled with Taina Staples NP in April     .The case and recommendations were discussed with Dr. Dickinson, who has spoken with and examined the patient and helped to formulate the impression and recommendations.     Yamile De La Rosa MD  Movement disorders fellow        He reports that dizziness were associated with blood pressure medication & not from Sinemet.  Since he has been off one of the BP medications, he hasn't had any syncope episode, in Sep 2017.      He has been taking Azilect 1 mg since he saw Dr. Dickinson.  No significant benefit, but he only got a one time 30 tab Rx.  He didn't refill the Rx.  It's costing them $90.00 for a month.  Wife is thinking about     He is taking Flomax 0.4 mg.  No issue with overactive bladder or UTI after the dose decrease from 0.8 mg to 0.4 mg.      PD Medications 7 am 11 am, 4 pm   Sinemet 25/100  "mg  2 Skips  Skips    Azilect 1 mg Not taking       As far as Botox injections, \"I didn't like the shots.\" He reports feeling like a \"mummuy\" with no facial expression.\"  \"My smiles changed.\"    He reports taking Sinemet 25/100 mg 2 tabs daily.  He usually skips the 2nd & 3rd dose.    He is exercising.  He does 35 min treadmill & 45 min weightlifting.      MEDICATIONS:   Medication Sig     ASPIRIN 81 MG OR TABS 1 TABLET DAILY     Blood Pressure Monitor JERONIMO 1 Device daily.     botulinum toxin type A (BOTOX) 100 UNITS injection 200 units to be injected every 3 months  EMG 78479     carbidopa-levodopa (SINEMET)  MG per tablet Take 2 tablets 3 times a day 1 hour before each meal.  (Around 7 am, 11 am, & 4 pm.)     cyclobenzaprine (FLEXERIL) 5 MG tablet Take 1-2 tablets (5-10 mg) by mouth nightly as needed for muscle spasms     PARoxetine (PAXIL) 20 MG tablet TAKE ONE TABLET BY MOUTH EVERY NIGHT AT BEDTIME     rasagiline (AZILECT) 1 MG TABS tablet We will have you take 1/2 tab daily for two weeks then increase to 1 tab daily after that.     simvastatin (ZOCOR) 40 MG tablet TAKE ONE TABLET BY MOUTH AT BEDTIME     sulfamethoxazole-trimethoprim (BACTRIM/SEPTRA) 400-80 MG per tablet Take 1 tablet by mouth At Bedtime For UTI prevention     tamsulosin (FLOMAX) 0.4 MG capsule Take 1 capsule (0.4 mg) by mouth daily       ALLERGIES: Vytorin    PHYSICAL EXAM:    VITAL SIGNS:  Blood pressure 119/65, pulse 70, height 1.727 m (5' 8\"), weight 85.3 kg (188 lb). Body mass index is 28.59 kg/(m^2).    GENERAL:  Mr. Lyoa is a pleasant  male who is well-groomed and well-developed sitting comfortably in the exam room without any distress.  Affect is appropriate.    MOVEMENT DISORDERS ASSESSMENT: (Last Sinemet was about 5.5 hrs ago)  Speech: Monotone, slurred but understandable; moderately impaired.  Facial Expression: Slight, abnormal diminution of facial expression.  Rest Tremor: Mild in chin & Lt body, which become moderate " with mental activation {Rest Tremor:9090366}  Action/Postural Tremor: Slight postural & action tremor bilaterally.   Rigidity: Slight to mild in all extremities.   Finger Taps: Mild slowing and/or reduction in amplitude in Rt;  Moderately impaired; early fatiguing; occasional arrests in movement in Lt.   Hand opening & closing: Mild slowing and reduction in amplitude bilaterally.   Hand pronation & supination: Mild slowing and reduction in amplitude bilaterallyLt > Rt.    Leg Agility: Normal.   Arising from chair - arms folded across chest: Normal.  Posture: Moderately stooped posture, slightly leaning to the right.  Gait: Walks slowly, with short steps. No festination or propulsion.  Good arm swing.   Postural Stability: Retropulsion, but recovers unaided.  Body Bradykinesia: Mild degree of slowness and poverty of movement.     ASSESSMENT/PLAN:    Parkinson's Disease:  Patient has a *** year history of PD.        __  Taking your medication as prescribed is important for tremor control.  __  Currently, you've been taking only 1 out of the 3 doses.  Set an alarm reminder to take medication as below.   __  [facility], restart taking Azilect & see if you would get added benefit.     PD Medications 7 am 11 am, 4 pm   Sinemet 25/100 mg  2 2 2   Azilect 1 mg 1       __  Continue to exercise regularly.   __  Make a follow up appointment with Urology Clinic (for June 2018) with Lupe Hairston PA-C  __  [facility].     __  Since you didn't like the Botox injections, it's okay to cancel your appointment with Dr. Dickinson.   __  Return in 3 months. You may return sooner as needed.      Will return to our clinic in *** months or sooner as needed.    The total time spent with the patient was *** minutes, and greater than 50% of this time was spent in counseling and coordination of care.    Taina Staples, KOTA,  CNP  [facility]  Neurology Clinic    11:47 AM    Again, thank you for allowing me to participate in the care of your patient.      Sincerely,    KOTA Carpenter CNP

## 2018-04-23 NOTE — PATIENT INSTRUCTIONS
April 23, 2018    Dear Mr. Vini Loya,    Thank you for coming today.  During your visit, we have discussed the following:     __  Taking your medication as prescribed is important for tremor control.  __  Currently, you've been taking only 1 out of the 3 doses.  Set an alarm reminder to take medication as below.   __  [facility], restart taking Azilect & see if you would get added benefit.     PD Medications 7 am 11 am, 4 pm   Sinemet 25/100 mg  2 2 2   Azilect 1 mg 1       __  Continue to exercise regularly.   __  Make a follow up appointment with Urology Clinic (for June 2018) with Lupe Hairston PA-C  __  [facility].     __  Since you didn't like the Botox injections, it's okay to cancel your appointment with Dr. Dickinson.   __  Return in 3 months. You may return sooner as needed.      For questions, you may send us a CipherCloud message or call [phone]    Fax number: [phone]    KOTA Stone, CNP  [facility] Neurology Clinic

## 2018-04-23 NOTE — PROGRESS NOTES
PATIENT: Vini Loya    : 1940    ALBINA: 2018    REASON FOR VISIT: Parkinson's disease (PD) follow up.    HPI: Mr. Vini Loya is a 77 year old  male who came to the [facility] neurology clinic accompanied by his wife, Kristi, for a follow up visit.  I saw him last in the clinic on 2018 for a routine f/u visit.  During that visit, the following were discussed: -    ASSESSMENT AND PLAN:       Parkinson's disease.   Mr. Vini Loya is a 77-year-old left-handed  male with a history of tremor since  -  & a diagnosis of PD in early  who came to the clinic for a f/u visit.  Pt hasn't been adhering to the recommended Levodopa dose.  He downplays his motor symptoms.  As mentioned above, he had stopped taking Sinemet for 2 weeks & had resumed it.  Today's exam show mild chin/mouth tremor that becomes worse with mental activation.  He also has rest tremor & fine motor movement difficulty in his extremities left body worse than right.      __  He was informed that since he is not adhering to his current medication regimen, changing it wouldn't make any sense.  Therefore, he'll reman on the same dose as below.     PD Medications 7 am 11 am 4 pm   Sinemet 25/100 mg  2 2 2      __  Since chin/jaw/mouth tremor has gotten much worse, I recommended Botox injection.  Since dosing is not as frequent, he might stick to the plan & get some symptom relief.  Interestingly, he is not bothered by tremor; therefore, he might opt not to go forward with the plan.   He did agree to be referred & was referred to PMR or Dr. Dickinson.   __  He was encouraged to resume his exercise routine.   __  Will return in 3 - 4 months. May return sooner as needed.    Since then, he was seen by Dr. Dickinson a couple of times for Botox injection. His last visit was on 3/15/2018 & the following were discussed:     Assessment/plan:     77-year-old gentleman with tremor predominant Parkinson's disease presenting for  "follow-up.  Unfortunately, he did not have any improvement with his most recent Botox injections.  Tremor can be very difficult to treat with the Botox.  Overall, there is room to increase his Parkinson's meds however he is reporting, at times, presyncope with the Sinemet.  As the higher dose of Flomax may be contributing to his symptoms, we recommended trying going back to 0.4 mg daily of Flomax and see if this is sufficient.      -Decrease Flomax to 0.4 mg daily, will CC his urology providers about this change  -We will start Azilect 0.5 mg daily for 2 weeks then increase to 1 mg daily thereafter  -Continue Sinemet 25/100 mg 2 tablets 3 times a day, if he tolerates the Azilect and tremors not well controlled, in the future his Sinemet could be uptitrated  -we will not plan on repeating Botox at this time      RTC as scheduled with Taina Staples NP in April     The case and recommendations were discussed with Dr. Dickinson, who has spoken with and examined the patient and helped to formulate the impression and recommendations.     Yamile De La Rosa MD  Movement disorders fellow        He reports that dizziness was associated with blood pressure medication & not from Sinemet.  Kristi doesn't recall him having dizziness from his meds.  Since he has been off one of the BP medications in Sep 2017, he hasn't had any syncope episode.      He has been taking Azilect 1 mg since he saw Dr. Dickinson.  No significant benefit, but he only got a one time 30 tab Rx.  He didn't refill the Rx.  It's costing them $90.00 for a month.  [facility]     He is taking Flomax 0.4 mg.  No issue with overactive bladder or UTI after the dose decrease from 0.8 mg to 0.4 mg.    He reports tremor doesn't bother him.  He skips most of his pills as bellow.      PD Medications 7 am 11 am, 4 pm   Sinemet 25/100 mg  2 Skips  Skips    Azilect 1 mg Not taking       As far as Botox injections, \"I didn't like " "the shots.\" He reports feeling like a \"mummy\" with no facial expression.\"  \"My smile changed.\"  He didn't want to get another injection.  He doesn't think it helped his chin tremor.     He is exercising.  He does 35 min treadmill & 45 min weight-lifting.      MEDICATIONS:   Medication Sig     ASPIRIN 81 MG OR TABS 1 TABLET DAILY     Blood Pressure Monitor JERONIMO 1 Device daily.     botulinum toxin type A (BOTOX) 100 UNITS injection 200 units to be injected every 3 months  EMG 39423     carbidopa-levodopa (SINEMET)  MG per tablet Take 2 tablets 3 times a day 1 hour before each meal.  (Around 7 am, 11 am, & 4 pm.)     cyclobenzaprine (FLEXERIL) 5 MG tablet Take 1-2 tablets (5-10 mg) by mouth nightly as needed for muscle spasms     PARoxetine (PAXIL) 20 MG tablet TAKE ONE TABLET BY MOUTH EVERY NIGHT AT BEDTIME     rasagiline (AZILECT) 1 MG TABS tablet We will have you take 1/2 tab daily for two weeks then increase to 1 tab daily after that.     simvastatin (ZOCOR) 40 MG tablet TAKE ONE TABLET BY MOUTH AT BEDTIME     sulfamethoxazole-trimethoprim (BACTRIM/SEPTRA) 400-80 MG per tablet Take 1 tablet by mouth At Bedtime For UTI prevention     tamsulosin (FLOMAX) 0.4 MG capsule Take 1 capsule (0.4 mg) by mouth daily       ALLERGIES: Vytorin    PHYSICAL EXAM:    VITAL SIGNS:  Blood pressure 119/65, pulse 70, height 1.727 m (5' 8\"), weight 85.3 kg (188 lb). Body mass index is 28.59 kg/(m^2).    GENERAL:  Mr. Loya is a pleasant  male who is well-groomed and well-developed sitting comfortably in the exam room without any distress.  Affect is appropriate.    MOVEMENT DISORDERS ASSESSMENT: (Last Sinemet was about 5.5 hrs ago)  Speech: Monotone, slurred but understandable; moderately impaired.  Facial Expression: Slight, abnormal diminution of facial expression.  Rest Tremor: Mild in chin & Lt body, which become moderate with mental activation; Absent in other extremities.   Action/Postural Tremor: Slight postural & " action tremor bilaterally.   Rigidity: Slight to mild in all extremities.   Finger Taps: Mild slowing and reduction in amplitude in Rt;  Moderately impaired; early fatiguing; occasional arrests in movement in Lt.   Hand opening & closing: Mild slowing and reduction in amplitude bilaterally.   Hand pronation & supination: Mild slowing and reduction in amplitude bilaterally; Lt > Rt.    Leg Agility: Normal.   Arising from chair - arms folded across chest: Normal.  Posture: Moderately stooped posture, slightly leaning to the right.  Gait: Walks slowly, with short steps. No festination or propulsion.  Good arm swing.   Postural Stability: Retropulsion, but recovers unaided.  Body Bradykinesia: Mild degree of slowness and poverty of movement.     ASSESSMENT/PLAN:    Parkinson's Disease:  Patient has about a 9 year history of PD.  According to him, his symptoms (mainly termor) is well controlled.  His wife report he's shaking all the time.  He is not taking his medications as recommended.     __  We had a long discussion about medication adherence for tremor control.  __  Since he has been taking only 1 out of the 3 doses, he was encouraged to set an alarm reminder to take medications as prescribed.    __  [facility], will restart taking Azilect.  Printed Rx for Sinemet & Azilect given.     PD Medications 7 am 11 am, 4 pm   Sinemet 25/100 mg  2 2 2   Azilect 1 mg 1       __  Will continue to exercise regularly.   __  Encouraged to make a follow up appointment with Urology Clinic (for June 2018) with Lupe Hairston PA-C.  He hasn't seen provider since his Flomax dose was reduced by Dr. Dickinson.   __  [facility].     __  Okay to cancel f/u appointment with Dr. Dickinson for Botox injections pre patient's request as he didn't like the injection.    Will return to our clinic in 3 months or sooner as needed.    The total  time spent with the patient was 45 minutes, and greater than 50% of this time was spent in counseling and coordination of care.    KOTA Stone,  CNP  [facility]

## 2018-04-24 ENCOUNTER — OFFICE VISIT (OUTPATIENT)
Dept: OPHTHALMOLOGY | Facility: CLINIC | Age: 78
End: 2018-04-24
Attending: OPHTHALMOLOGY
Payer: COMMERCIAL

## 2018-04-24 DIAGNOSIS — H52.213 IRREGULAR ASTIGMATISM OF BOTH EYES: ICD-10-CM

## 2018-04-24 DIAGNOSIS — H40.003 GLAUCOMA SUSPECT, BILATERAL: Primary | ICD-10-CM

## 2018-04-24 DIAGNOSIS — H40.003 GLAUCOMA SUSPECT OF BOTH EYES: ICD-10-CM

## 2018-04-24 DIAGNOSIS — H25.13 SENILE NUCLEAR SCLEROSIS, BILATERAL: ICD-10-CM

## 2018-04-24 PROCEDURE — G0463 HOSPITAL OUTPT CLINIC VISIT: HCPCS | Mod: ZF

## 2018-04-24 PROCEDURE — 92133 CPTRZD OPH DX IMG PST SGM ON: CPT | Mod: ZF | Performed by: OPHTHALMOLOGY

## 2018-04-24 ASSESSMENT — VISUAL ACUITY
OD_SC+: +1
OS_SC+: -1
OS_SC: 20/20
METHOD: SNELLEN - LINEAR
OD_SC: 20/80
OD_PH_SC: 20/40

## 2018-04-24 ASSESSMENT — SLIT LAMP EXAM - LIDS
COMMENTS: NORMAL
COMMENTS: NORMAL

## 2018-04-24 ASSESSMENT — TONOMETRY
OD_IOP_MMHG: 06
OS_IOP_MMHG: 10
IOP_METHOD: TONOPEN

## 2018-04-24 ASSESSMENT — CONF VISUAL FIELD
OS_NORMAL: 1
OD_NORMAL: 1

## 2018-04-24 ASSESSMENT — CUP TO DISC RATIO
OS_RATIO: 0.6
OD_RATIO: 0.7

## 2018-04-24 ASSESSMENT — EXTERNAL EXAM - RIGHT EYE: OD_EXAM: NORMAL

## 2018-04-24 ASSESSMENT — EXTERNAL EXAM - LEFT EYE: OS_EXAM: NORMAL

## 2018-04-24 NOTE — PROGRESS NOTES
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 Torres did PKP done at [facility]  5)H/O CVA -- Left HH on HVF in 2007 (MRIs and CTs done previously)    Patient will obtain corneal pentacam measurements at [facility] (will call patient to schedule) and return to clinic in 1 month with refraction (RIght eye only) and  repeat discussion regarding possible cataract surgery in the right eye.      Patient will start warm compresses 2x/day, increase dietary flax seed/fish oil dietary supplementation, and start artificial tears 4-6x/day as needed for burning, tearing, mattering, foreign body sensation, blurred vision, etc.  Artifical tear drops are available over the counter. Below are a list of the some of the drops available:  Refresh   Systane  Theratears  Genteal  Blink  Optive      Please avoid Visine (unless Visine Puretears), Murine or Cleareyes or Puralube tear drops.    These have ingredients that take the red out and but actually increase dryness and redness of the eyes over time    Resident Note:  Notes that his vision is stable. Blurry vision when watching TV for a long time at night.  OCT stable  IOL calculations performed today  Irregular astigmatism on nathan  Observe cataracts       Michael Farfan MD  Ophthalmology, PGY-3    Attending Physician Attestation:  Complete documentation of historical and exam elements from today's encounter can be found in the full encounter summary report (not reduplicated in this progress note). I personally obtained the chief complaint(s) and history of present illness.  I confirmed and edited as necessary the review of systems, past medical/surgical history, family history, social history,  and examination findings as documented by others; and I examined the patient myself. I personally reviewed the relevant tests, images, and reports as documented above. I formulated and edited as necessary the assessment and plan and discussed the findings and management plan with the patient and family.  - Yudith Mcdonnell MD

## 2018-04-24 NOTE — NURSING NOTE
Chief Complaints and History of Present Illnesses   Patient presents with     Follow Up For     Glaucoma Suspect     HPI    Affected eye(s):  Both   Symptoms:        Duration:  6 months   Frequency:  Constant       Do you have eye pain now?:  No      Comments:  Pt. States that he is doing well.  No change in [facility] BE.  Occasional dryness BE.  Shaina EMERY 10:07 AM April 24, 2018

## 2018-04-24 NOTE — MR AVS SNAPSHOT
After Visit Summary   4/24/2018    Vini Loya    MRN: [id number]           Patient Information     Date Of Birth          1940        Visit Information        Provider Department      4/24/2018 10:00 AM Yudith Mcdonnell MD [facility]        Today's Diagnoses     Glaucoma suspect, bilateral    -  1    Glaucoma suspect of both eyes        Senile nuclear sclerosis, bilateral        Irregular astigmatism of both eyes          Care Instructions    Patient will obtain corneal pentacam measurements at [facility] and return to clinic in 1 month with refraction (RIght eye only) and  repeat discussion regarding possible cataract surgery in the right eye.      Patient will start warm compresses 2x/day, increase dietary flax seed/fish oil dietary supplementation, and start artificial tears 4-6x/day as needed for burning, tearing, mattering, foreign body sensation, blurred vision, etc.  Artifical tear drops are available over the counter. Below are a list of the some of the drops available:  Refresh   Systane  Theratears  Genteal  Blink  Optive      Please avoid Visine (unless Visine Puretears), Murine or Cleareyes or Puralube tear drops.    These have ingredients that take the red out and but actually increase dryness and redness of the eyes over time          Follow-ups after your visit        Your next 10 appointments already scheduled     Jun 06, 2018 10:30 AM CDT   (Arrive by 10:15 AM)   Return Visit with CHANDA Singleton   [facility] ([facility])    [address] [address] [phone]            Aug 15, 2018  1:15 PM CDT   (Arrive by 1:00 PM)   Return Movement Disorder with KOTA Dutta CNP   [facility] ([facility])    [address] [address] [phone]              Future tests that were ordered for you  today     Open Future Orders        Priority Expected Expires Ordered    DILATED FUNDUS EXAM Routine  2019    IOL Biometry w/ IOL calc OU (both eye) Routine  10/22/2019 2018            Who to contact     Please call your clinic at [phone] to:    Ask questions about your health    Make or cancel appointments    Discuss your medicines    Learn about your test results    Speak to your doctor            Additional Information About Your Visit        MyChart Information     Talyst is an electronic gateway that provides easy, online access to your medical records. With Talyst, you can request a clinic appointment, read your test results, renew a prescription or communicate with your care team.     To sign up for Talyst visit the website at [email or web address]   You will be asked to enter the access code listed below, as well as some personal information. Please follow the directions to create your username and password.     Your access code is: [email or web address]  Expires: 2018  6:31 AM     Your access code will  in 90 days. If you need help or a new code, please contact your [facility] or call [phone] for assistance.        Care EveryWhere ID     This is your Care EveryWhere ID. This could be used by other organizations to access your [facility] medical records  [id number]         Blood Pressure from Last 3 Encounters:   18 119/65   03/15/18 107/68   18 90/63    Weight from Last 3 Encounters:   18 85.3 kg (188 lb)   03/15/18 85.5 kg (188 lb 8 oz)   18 85.3 kg (188 lb)              We Performed the Following     OCT Optic Nerve RNFL Spectralis OU (both eyes)     Mira-Operative Worksheet (Glaucoma)        Primary Care Provider Office Phone # Fax #    Joel Daniel Irwin Wegener, [phone] [address] [place] [address]        Equal Access to Services     CHRISTOPHE JORDAN AH: Javy kim  Joaquina, waisaida lukassidyadaha, qajuanta kaphuc novak, claudia schmidt rowenaprashanth laMaria Teresagrabiel adenike. So [facility] [phone].    ATENCIÓN: Si benjamín nguyen, tiene a florez disposición servicios gratuitos de asistencia lingüística. Bishop al [phone].    We comply with applicable federal civil rights laws and [place] laws. We do not discriminate on the basis of race, color, national origin, age, disability, sex, sexual orientation, or gender identity.            Thank you!     Thank you for choosing EYE CLINIC  for your care. Our goal is always to provide you with excellent care. Hearing back from our patients is one way we can continue to improve our services. Please take a few minutes to complete the written survey that you may receive in the mail after your visit with us. Thank you!             Your Updated Medication List - Protect others around you: Learn how to safely use, store and throw away your medicines at www.disposemymeds.org.          This list is accurate as of 4/24/18  1:02 PM.  Always use your most recent med list.                   Brand Name Dispense Instructions for use Diagnosis    aspirin 81 MG tablet      1 TABLET DAILY    Unspecified essential hypertension, Cerebral embolism with cerebral infarction (H), CAD (coronary artery disease), Mixed hyperlipidemia       Blood Pressure Monitor Pricilla     1 Device    1 Device daily.    Hypertension goal BP (blood pressure) < 130/80       botulinum toxin type A 100 units injection    BOTOX    800 Units    200 units to be injected every 3 months EMG 72192    Oromandibular dystonia       carbidopa-levodopa  MG per tablet    SINEMET    540 tablet    Take 2 tablets 3 times a day 1 hour before each meal.  (Around 7 am, 11 am, & 4 pm.)    Parkinson's disease (H)       cyclobenzaprine 5 MG tablet    FLEXERIL    20 tablet    Take 1-2 tablets (5-10 mg) by mouth nightly as needed for muscle spasms    Acute right-sided thoracic back pain       PARoxetine 20 MG tablet     PAXIL    90 tablet    TAKE ONE TABLET BY MOUTH EVERY NIGHT AT BEDTIME    Anxiety       rasagiline 1 MG Tabs tablet    AZILECT    30 each    We will have you take 1/2 tab daily for two weeks then increase to 1 tab daily after that.    Parkinson's disease (H)       simvastatin 40 MG tablet    ZOCOR    90 tablet    TAKE ONE TABLET BY MOUTH AT BEDTIME    Hyperlipidemia LDL goal <100       sulfamethoxazole-trimethoprim 400-80 MG per tablet    BACTRIM/SEPTRA    30 tablet    Take 1 tablet by mouth At Bedtime For UTI prevention    Urinary tract infection with hematuria, site unspecified       tamsulosin 0.4 MG capsule    FLOMAX    30 capsule    Take 1 capsule (0.4 mg) by mouth daily    Bradycardia

## 2018-04-24 NOTE — PATIENT INSTRUCTIONS
Patient will obtain corneal pentacam measurements at [facility] and return to clinic in 1 month with refraction (RIght eye only) and  repeat discussion regarding possible cataract surgery in the right eye.      Patient will start warm compresses 2x/day, increase dietary flax seed/fish oil dietary supplementation, and start artificial tears 4-6x/day as needed for burning, tearing, mattering, foreign body sensation, blurred vision, etc.  Artifical tear drops are available over the counter. Below are a list of the some of the drops available:  Refresh   Systane  Theratears  Genteal  Blink  Optive      Please avoid Visine (unless Visine Puretears), Murine or Cleareyes or Puralube tear drops.    These have ingredients that take the red out and but actually increase dryness and redness of the eyes over time

## 2018-05-08 ENCOUNTER — OFFICE VISIT (OUTPATIENT)
Dept: OPHTHALMOLOGY | Facility: CLINIC | Age: 78
End: 2018-05-08
Attending: OPHTHALMOLOGY
Payer: COMMERCIAL

## 2018-05-08 DIAGNOSIS — H25.13 SENILE NUCLEAR SCLEROSIS, BILATERAL: Primary | ICD-10-CM

## 2018-05-08 PROCEDURE — G0463 HOSPITAL OUTPT CLINIC VISIT: HCPCS | Mod: ZF

## 2018-05-08 ASSESSMENT — REFRACTION_MANIFEST
OD_ADD: +2.00
OD_CYLINDER: SPHERE
OD_SPHERE: -1.00

## 2018-05-08 ASSESSMENT — CONF VISUAL FIELD
OD_NORMAL: 1
OS_NORMAL: 1

## 2018-05-08 ASSESSMENT — EXTERNAL EXAM - LEFT EYE: OS_EXAM: NORMAL

## 2018-05-08 ASSESSMENT — TONOMETRY
IOP_METHOD: APPLANATION
OD_IOP_MMHG: 08
OS_IOP_MMHG: 11

## 2018-05-08 ASSESSMENT — VISUAL ACUITY
OD_SC: 20/80
METHOD: SNELLEN - LINEAR
OD_PH_SC: 20/50
OS_SC: 20/25

## 2018-05-08 ASSESSMENT — SLIT LAMP EXAM - LIDS
COMMENTS: NORMAL
COMMENTS: NORMAL

## 2018-05-08 ASSESSMENT — EXTERNAL EXAM - RIGHT EYE: OD_EXAM: NORMAL

## 2018-05-08 NOTE — NURSING NOTE
Chief Complaints and History of Present Illnesses   Patient presents with     Follow Up For     Glaucoma suspect, bilateral (Primary Dx);      HPI    Affected eye(s):  Right   Symptoms:     Blurred vision   No decreased vision   No floaters   No flashes      Frequency:  Constant       Do you have eye pain now?:  No      Comments:  States va is the same since last visit REANA no issues.  Wander Issa  3:47 PM May 8, 2018

## 2018-05-08 NOTE — PATIENT INSTRUCTIONS
A long discussion of the risks, benefits, and alternatives including potential treatment and management options were had with patient and a decision was made to proceed cataract surgery in the right eye.      Patient will start warm compresses 2x/day, increase dietary flax seed/fish oil dietary supplementation, and start artificial tears 4-6x/day as needed for burning, tearing, mattering, foreign body sensation, blurred vision, etc.  Artifical tear drops are available over the counter. Below are a list of the some of the drops available:  Refresh   Systane  Theratears  Genteal  Blink  Optive      Please avoid Visine (unless Visine Puretears), Murine or Cleareyes or Puralube tear drops.    These have ingredients that take the red out and but actually increase dryness and redness of the eyes over time

## 2018-05-08 NOTE — PROGRESS NOTES
1)Glaucoma Suspect -- K pachy: 516/499   Tmax:     HVF:Left HH      CDR:0.7/0.65     HRT/OCT:  RNFL WNL     FHX of Glc: No      Gonio:       Intolerant to:      Asthma/COPD: No, on po BB   Steroid Use: No    Kidney Stones:No     Sulfa Allergy: No     IOP targets: -- IOP good  2)NS OU -- on Flomax  3)MARLA  4)s/p PKP and LRIs OD -- ?Hx of HZ Keratitis -- Dr. Torres did PKP done at [facility] -- Pentacam done at [facility]   5)H/O CVA -- Left HH on HVF in 2007 (MRIs and CTs done previously)  6)Post PKP Irregular Astigmatism -- will plan to proceed with standard monofocal IOL and refer to Dr. Hayes for correction of residual astigmatism after CE      MD:pt with intention tremor     A long discussion of the risks, benefits, and alternatives including potential treatment and management options were had with patient and a decision was made to proceed cataract surgery in the right eye.      Patient will start warm compresses 2x/day, increase dietary flax seed/fish oil dietary supplementation, and start artificial tears 4-6x/day as needed for burning, tearing, mattering, foreign body sensation, blurred vision, etc.  Artifical tear drops are available over the counter. Below are a list of the some of the drops available:  Refresh   Systane  Theratears  Genteal  Blink  Optive      Please avoid Visine (unless Visine Puretears), Murine or Cleareyes or Puralube tear drops.    These have ingredients that take the red out and but actually increase dryness and redness of the eyes over time        Attending Physician Attestation:  Complete documentation of historical and exam elements from today's encounter can be found in the full encounter summary report (not reduplicated in this progress note). I personally obtained the chief complaint(s) and history of present illness.  I confirmed and edited as necessary the review of systems, past medical/surgical history, family history, social history, and examination findings as documented by others; and  I examined the patient myself. I personally reviewed the relevant tests, images, and reports as documented above. I formulated and edited as necessary the assessment and plan and discussed the findings and management plan with the patient and family.  - Yudith Mcdonnell MD

## 2018-05-08 NOTE — MR AVS SNAPSHOT
After Visit Summary   5/8/2018    Vini Loya    MRN: [id number]           Patient Information     Date Of Birth          1940        Visit Information        Provider Department      5/8/2018 2:30 PM Yudith Mcdonnell MD [facility]        Today's Diagnoses     Senile nuclear sclerosis, bilateral    -  1      Care Instructions    A long discussion of the risks, benefits, and alternatives including potential treatment and management options were had with patient and a decision was made to proceed cataract surgery in the right eye.      Patient will start warm compresses 2x/day, increase dietary flax seed/fish oil dietary supplementation, and start artificial tears 4-6x/day as needed for burning, tearing, mattering, foreign body sensation, blurred vision, etc.  Artifical tear drops are available over the counter. Below are a list of the some of the drops available:  Refresh   Systane  Theratears  Genteal  Blink  Optive      Please avoid Visine (unless Visine Puretears), Murine or Cleareyes or Puralube tear drops.    These have ingredients that take the red out and but actually increase dryness and redness of the eyes over time          Follow-ups after your visit        Your next 10 appointments already scheduled     Jun 06, 2018 10:30 AM CDT   (Arrive by 10:15 AM)   Return Visit with CHANDA Singleton   [facility] ([facility] [place])    [address] [place] [address]  4th [facility] [address] [phone]            Aug 15, 2018  1:15 PM CDT   (Arrive by 1:00 PM)   Return Movement Disorder with KOTA Dutta CNP   [facility]rology ([facility])    [address] [place] [address] [phone]              Who to contact     Please call your clinic at [phone] to:    Ask questions about your health    Make or cancel appointments    Discuss your  medicines    Learn about your test results    Speak to your doctor            Additional Information About Your Visit        MyChart Information     Vivonethart is an electronic gateway that provides easy, online access to your medical records. With Vivonethart, you can request a clinic appointment, read your test results, renew a prescription or communicate with your care team.     To sign up for Yoink Gamest visit the website at [email or web address]   You will be asked to enter the access code listed below, as well as some personal information. Please follow the directions to create your username and password.     Your access code is: [email or web address]  Expires: 2018  6:31 AM     Your access code will  in 90 days. If you need help or a new code, please contact your [facility] or call [phone] for assistance.        Care EveryWhere ID     This is your Care EveryWhere ID. This could be used by other organizations to access your [facility] medical records  [id number]         Blood Pressure from Last 3 Encounters:   18 119/65   03/15/18 107/68   18 90/63    Weight from Last 3 Encounters:   18 85.3 kg (188 lb)   03/15/18 85.5 kg (188 lb 8 oz)   18 85.3 kg (188 lb)              We Performed the Following     Mira-Operative Worksheet (Glaucoma)        Primary Care Provider Office Phone # Fax #    Joel Daniel Irwin Wegener, [phone]       [facility] [address] [address]        Equal Access to Services     CHRISTOPHE JORDAN AH: Hadii aad ku hadasho Soomaali, waaxda luqadaha, qaybta kaalmada adeegyada, waxay idiin hayaan brayan tellez la'grabiel . So [facility] [phone].    ATENCIÓN: Si habla español, tiene a florez disposición servicios gratuitos de asistencia lingüística. Llame al [phone].    We comply with applicable federal civil rights laws and [place] laws. We do not discriminate on the basis of race, color, national origin, age, disability, sex,  sexual orientation, or gender identity.            Thank you!     Thank you for choosing [facility] CLINIC  for your care. Our goal is always to provide you with excellent care. Hearing back from our patients is one way we can continue to improve our services. Please take a few minutes to complete the written survey that you may receive in the mail after your visit with us. Thank you!             Your Updated Medication List - Protect others around you: Learn how to safely use, store and throw away your medicines at www.disposemymeds.org.          This list is accurate as of 5/8/18 11:59 PM.  Always use your most recent med list.                   Brand Name Dispense Instructions for use Diagnosis    aspirin 81 MG tablet      1 TABLET DAILY    Unspecified essential hypertension, Cerebral embolism with cerebral infarction (H), CAD (coronary artery disease), Mixed hyperlipidemia       Blood Pressure Monitor Pricilla     1 Device    1 Device daily.    Hypertension goal BP (blood pressure) < 130/80       botulinum toxin type A 100 units injection    BOTOX    800 Units    200 units to be injected every 3 months [id number]    Oromandibular dystonia       carbidopa-levodopa  MG per tablet    SINEMET    540 tablet    Take 2 tablets 3 times a day 1 hour before each meal.  (Around 7 am, 11 am, & 4 pm.)    Parkinson's disease (H)       cyclobenzaprine 5 MG tablet    FLEXERIL    20 tablet    Take 1-2 tablets (5-10 mg) by mouth nightly as needed for muscle spasms    Acute right-sided thoracic back pain       PARoxetine 20 MG tablet    PAXIL    90 tablet    TAKE ONE TABLET BY MOUTH EVERY NIGHT AT BEDTIME    Anxiety       rasagiline 1 MG Tabs tablet    AZILECT    30 each    We will have you take 1/2 tab daily for two weeks then increase to 1 tab daily after that.    Parkinson's disease (H)       simvastatin 40 MG tablet    ZOCOR    90 tablet    TAKE ONE TABLET BY MOUTH AT BEDTIME    Hyperlipidemia LDL goal <100        sulfamethoxazole-trimethoprim 400-80 MG per tablet    BACTRIM/SEPTRA    30 tablet    Take 1 tablet by mouth At Bedtime For UTI prevention    Urinary tract infection with hematuria, site unspecified       tamsulosin 0.4 MG capsule    FLOMAX    30 capsule    Take 1 capsule (0.4 mg) by mouth daily    Bradycardia

## 2018-05-16 ENCOUNTER — OFFICE VISIT (OUTPATIENT)
Dept: FAMILY MEDICINE | Facility: CLINIC | Age: 78
End: 2018-05-16
Payer: COMMERCIAL

## 2018-05-16 VITALS
TEMPERATURE: 97.9 F | DIASTOLIC BLOOD PRESSURE: 65 MMHG | RESPIRATION RATE: 16 BRPM | OXYGEN SATURATION: 93 % | HEART RATE: 65 BPM | BODY MASS INDEX: 28.13 KG/M2 | WEIGHT: 185 LBS | SYSTOLIC BLOOD PRESSURE: 111 MMHG

## 2018-05-16 DIAGNOSIS — Z01.818 PREOP GENERAL PHYSICAL EXAM: Primary | ICD-10-CM

## 2018-05-16 DIAGNOSIS — G20.A1 PARKINSON'S DISEASE (H): ICD-10-CM

## 2018-05-16 DIAGNOSIS — H26.9 CATARACT OF RIGHT EYE, UNSPECIFIED CATARACT TYPE: ICD-10-CM

## 2018-05-16 DIAGNOSIS — I25.10 CORONARY ARTERY DISEASE INVOLVING NATIVE HEART WITHOUT ANGINA PECTORIS, UNSPECIFIED VESSEL OR LESION TYPE: ICD-10-CM

## 2018-05-16 DIAGNOSIS — K59.00 CONSTIPATION, UNSPECIFIED CONSTIPATION TYPE: ICD-10-CM

## 2018-05-16 PROCEDURE — 93000 ELECTROCARDIOGRAM COMPLETE: CPT | Performed by: FAMILY MEDICINE

## 2018-05-16 PROCEDURE — 99214 OFFICE O/P EST MOD 30 MIN: CPT | Performed by: FAMILY MEDICINE

## 2018-05-16 PROCEDURE — 99207 C PAF COMPLETED  NO CHARGE: CPT | Performed by: FAMILY MEDICINE

## 2018-05-16 RX ORDER — POLYETHYLENE GLYCOL 3350 17 G/17G
1 POWDER, FOR SOLUTION ORAL DAILY
Qty: 510 G | Refills: 1 | Status: SHIPPED | OUTPATIENT
Start: 2018-05-16 | End: 2019-08-20

## 2018-05-16 RX ORDER — SENNOSIDES A AND B 8.6 MG/1
1 TABLET, FILM COATED ORAL 2 TIMES DAILY
Qty: 120 TABLET | Refills: 11 | Status: SHIPPED | OUTPATIENT
Start: 2018-05-16 | End: 2018-09-27

## 2018-05-16 NOTE — MR AVS SNAPSHOT
After Visit Summary   5/16/2018    Vini Loya    MRN: [id number]           Patient Information     Date Of Birth          1940        Visit Information        Provider Department      5/16/2018 12:00 PM Wegener, Joel Daniel Irwin, MD [facility]        Today's Diagnoses     Preop general physical exam    -  1    Cataract of right eye, unspecified cataract type        Coronary artery disease involving native heart without angina pectoris, unspecified vessel or lesion type        Parkinson's disease (H)        Constipation, unspecified constipation type          Care Instructions      Before Your Surgery      Call your surgeon if there is any change in your health. This includes signs of a cold or flu (such as a sore throat, runny nose, cough, rash or fever).    Do not smoke, drink alcohol or take over the counter medicine (unless your surgeon or primary care doctor tells you to) for the 24 hours before and after surgery.    If you take prescribed drugs: Follow your doctor s orders about which medicines to take and which to stop until after surgery.    Eating and drinking prior to surgery: follow the instructions from your surgeon    Take a shower or bath the night before surgery. Use the soap your surgeon gave you to gently clean your skin. If you do not have soap from your surgeon, use your regular soap. Do not shave or scrub the surgery site.  Wear clean pajamas and have clean sheets on your bed.           Follow-ups after your visit        Your next 10 appointments already scheduled     May 30, 2018   Procedure with Yudith Mcdonnell MD   [facility] ([facility])    [address] [place], [place] [address] [phone]           Located in the Clinics and Surgery Center at [address] [place] [address].   parking is very convenient and highly recommended.  is a $6 flat rate fee.  Both   and self parkers should enter the main arrival plaza from [facility]; parking attendants will direct you based on your parking preference.            May 31, 2018  2:30 PM CDT   Post-Op with Yudith Mcdonnell MD   Eye Clinic ([facility])    [address] [address]   [phone]            Jun 06, 2018 10:30 AM CDT   (Arrive by 10:15 AM)   Return Visit with CHANDA Singleton   [facility] and [facility]rolog[facility] ([facility] [place])    [address] [place] [address]  4th [facility] [address] [phone]            Jun 12, 2018  2:45 PM CDT   Post-Op with Yudith Mcdonnell MD   Eye Clinic ([facility])    [address] [address]   [phone]            Aug 15, 2018  1:15 PM CDT   (Arrive by 1:00 PM)   Return Movement Disorder with KOTA Dutta CNP   [facility] ([facility])    [address] [place] [address]  3rd [facility] [id number] [phone]              Who to contact     If you have questions or need follow up information about today's clinic visit or your schedule please contact [facility] directly at [phone].  Normal or non-critical lab and imaging results will be communicated to you by HealthFusionhart, letter or phone within 4 business days after the clinic has received the results. If you do not hear from us within 7 days, please contact the clinic through MyChart or phone. If you have a critical or abnormal lab result, we will notify you by phone as soon as possible.  Submit refill requests through BuyVIP or call your pharmacy and they will forward the refill request to us. Please allow 3 business days for your refill to be completed.          Additional Information About Your Visit        BuyVIP Information     BuyVIP lets you send  "messages to your doctor, view your test results, renew your prescriptions, schedule appointments and more. To sign up, go to [email or web address] . Click on \"Log in\" on the left side of the screen, which will take you to the Welcome page. Then click on \"Sign up Now\" on the right side of the page.     You will be asked to enter the access code listed below, as well as some personal information. Please follow the directions to create your username and password.     Your access code is: [email or web address]  Expires: 2018  6:31 AM     Your access code will  in 90 days. If you need help or a new code, please call your [facility] clinic or [phone].        Care EveryWhere ID     This is your [facility] EveryWhere ID. This could be used by other organizations to access your [facility] medical records  [id number]        Your Vitals Were     Pulse Temperature Respirations Pulse Oximetry BMI (Body Mass Index)       65 97.9  F (36.6  C) (Tympanic) 16 93% 28.13 kg/m2        Blood Pressure from Last 3 Encounters:   18 111/65   18 119/65   03/15/18 107/68    Weight from Last 3 Encounters:   18 185 lb (83.9 kg)   18 188 lb (85.3 kg)   03/15/18 188 lb 8 oz (85.5 kg)              We Performed the Following     EKG 12-lead complete w/read - Clinics          Today's Medication Changes          These changes are accurate as of 18 12:41 PM.  If you have any questions, ask your nurse or doctor.               Start taking these medicines.        Dose/Directions    polyethylene glycol powder   Commonly known as:  MIRALAX   Used for:  Constipation, unspecified constipation type   Started by:  Wegener, Joel Daniel Irwin, MD        Dose:  1 capful   Take 17 g (1 capful) by mouth daily   Quantity:  510 g   Refills:  1       senna 8.6 MG tablet   Commonly known as:  SENOKOT   Used for:  Constipation, unspecified constipation type   Started by:  Wegener, Joel Daniel Irwin, MD        Dose:  1 tablet   Take 1 tablet " by mouth 2 times daily And can increase to two tablets twice daily if needed .   Quantity:  120 tablet   Refills:  11            Where to get your medicines      These medications were sent to [facility] [place], [place] - [address]  [address], [facility] [address]     Phone:  [phone]     polyethylene glycol powder    senna 8.6 MG tablet                Primary Care Provider Office Phone # Fax #    Joel Daniel Irwin Wegener, [phone]       [address]  [facility] [address]        Equal Access to Services     [facility]: Hadii aad ku hadasho Soomaali, waaxda luqadaha, qaybta kaalmada adeegyada, waxromi renteria haygrabiel ugalde . So [facility] [phone].    ATENCIÓN: Si habla español, tiene a florez disposición servicios gratuitos de asistencia lingüística. Llame al [phone].    We comply with applicable federal civil rights laws and [place] laws. We do not discriminate on the basis of race, color, national origin, age, disability, sex, sexual orientation, or gender identity.            Thank you!     Thank you for choosing [facility][note 3]  for your care. Our goal is always to provide you with excellent care. Hearing back from our patients is one way we can continue to improve our services. Please take a few minutes to complete the written survey that you may receive in the mail after your visit with us. Thank you!             Your Updated Medication List - Protect others around you: Learn how to safely use, store and throw away your medicines at www.disposemymeds.org.          This list is accurate as of 5/16/18 12:41 PM.  Always use your most recent med list.                   Brand Name Dispense Instructions for use Diagnosis    aspirin 81 MG tablet      1 TABLET DAILY    Unspecified essential hypertension, Cerebral embolism with cerebral infarction (H), CAD (coronary artery disease), Mixed hyperlipidemia       Blood Pressure Monitor Pricilla      1 Device    1 Device daily.    Hypertension goal BP (blood pressure) < 130/80       botulinum toxin type A 100 units injection    BOTOX    800 Units    200 units to be injected every 3 months EMG [id number]    Oromandibular dystonia       carbidopa-levodopa  MG per tablet    SINEMET    540 tablet    Take 2 tablets 3 times a day 1 hour before each meal.  (Around 7 am, 11 am, & 4 pm.)    Parkinson's disease (H)       cyclobenzaprine 5 MG tablet    FLEXERIL    20 tablet    Take 1-2 tablets (5-10 mg) by mouth nightly as needed for muscle spasms    Acute right-sided thoracic back pain       PARoxetine 20 MG tablet    PAXIL    90 tablet    TAKE ONE TABLET BY MOUTH EVERY NIGHT AT BEDTIME    Anxiety       polyethylene glycol powder    MIRALAX    510 g    Take 17 g (1 capful) by mouth daily    Constipation, unspecified constipation type       rasagiline 1 MG Tabs tablet    AZILECT    30 each    We will have you take 1/2 tab daily for two weeks then increase to 1 tab daily after that.    Parkinson's disease (H)       senna 8.6 MG tablet    SENOKOT    120 tablet    Take 1 tablet by mouth 2 times daily And can increase to two tablets twice daily if needed .    Constipation, unspecified constipation type       simvastatin 40 MG tablet    ZOCOR    90 tablet    TAKE ONE TABLET BY MOUTH AT BEDTIME    Hyperlipidemia LDL goal <100       sulfamethoxazole-trimethoprim 400-80 MG per tablet    BACTRIM/SEPTRA    30 tablet    Take 1 tablet by mouth At Bedtime For UTI prevention    Urinary tract infection with hematuria, site unspecified       tamsulosin 0.4 MG capsule    FLOMAX    30 capsule    Take 1 capsule (0.4 mg) by mouth daily    Bradycardia
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
## 2018-05-29 ENCOUNTER — ANESTHESIA EVENT (OUTPATIENT)
Dept: SURGERY | Facility: AMBULATORY SURGERY CENTER | Age: 78
End: 2018-05-29

## 2018-05-30 ENCOUNTER — PRE VISIT (OUTPATIENT)
Dept: UROLOGY | Facility: CLINIC | Age: 78
End: 2018-05-30

## 2018-05-30 ENCOUNTER — SURGERY (OUTPATIENT)
Age: 78
End: 2018-05-30

## 2018-05-30 ENCOUNTER — ANESTHESIA (OUTPATIENT)
Dept: SURGERY | Facility: AMBULATORY SURGERY CENTER | Age: 78
End: 2018-05-30

## 2018-05-30 ENCOUNTER — HOSPITAL ENCOUNTER (OUTPATIENT)
Facility: AMBULATORY SURGERY CENTER | Age: 78
End: 2018-05-30
Attending: OPHTHALMOLOGY
Payer: COMMERCIAL

## 2018-05-30 VITALS
RESPIRATION RATE: 16 BRPM | DIASTOLIC BLOOD PRESSURE: 86 MMHG | HEIGHT: 68 IN | OXYGEN SATURATION: 97 % | SYSTOLIC BLOOD PRESSURE: 143 MMHG | BODY MASS INDEX: 28.04 KG/M2 | TEMPERATURE: 97.2 F | WEIGHT: 185 LBS

## 2018-05-30 DIAGNOSIS — H25.11 NUCLEAR SCLEROSIS OF RIGHT EYE: Primary | ICD-10-CM

## 2018-05-30 DEVICE — EYE IMP IOL AMO PCL TECNIS ZCB00 21.0: Type: IMPLANTABLE DEVICE | Site: EYE | Status: FUNCTIONAL

## 2018-05-30 RX ORDER — OFLOXACIN 3 MG/ML
1 SOLUTION/ DROPS OPHTHALMIC
Status: COMPLETED | OUTPATIENT
Start: 2018-05-30 | End: 2018-05-30

## 2018-05-30 RX ORDER — CYCLOPENTOLATE HYDROCHLORIDE 10 MG/ML
1 SOLUTION/ DROPS OPHTHALMIC
Status: COMPLETED | OUTPATIENT
Start: 2018-05-30 | End: 2018-05-30

## 2018-05-30 RX ORDER — MOXIFLOXACIN 5 MG/ML
1 SOLUTION/ DROPS OPHTHALMIC 4 TIMES DAILY
Qty: 1 BOTTLE | Refills: 0 | Status: SHIPPED | OUTPATIENT
Start: 2018-05-30 | End: 2018-08-15

## 2018-05-30 RX ORDER — FLURBIPROFEN SODIUM 0.3 MG/ML
1 SOLUTION/ DROPS OPHTHALMIC
Status: DISCONTINUED | OUTPATIENT
Start: 2018-05-30 | End: 2018-05-31 | Stop reason: HOSPADM

## 2018-05-30 RX ORDER — PREDNISOLONE ACETATE 10 MG/ML
1 SUSPENSION/ DROPS OPHTHALMIC 4 TIMES DAILY
Qty: 1 BOTTLE | Refills: 0 | Status: SHIPPED | OUTPATIENT
Start: 2018-05-30 | End: 2018-08-15

## 2018-05-30 RX ORDER — PROPOFOL 10 MG/ML
INJECTION, EMULSION INTRAVENOUS CONTINUOUS PRN
Status: DISCONTINUED | OUTPATIENT
Start: 2018-05-30 | End: 2018-05-30

## 2018-05-30 RX ORDER — PHENYLEPHRINE HYDROCHLORIDE 25 MG/ML
1 SOLUTION/ DROPS OPHTHALMIC
Status: COMPLETED | OUTPATIENT
Start: 2018-05-30 | End: 2018-05-30

## 2018-05-30 RX ORDER — ONDANSETRON 2 MG/ML
4 INJECTION INTRAMUSCULAR; INTRAVENOUS EVERY 30 MIN PRN
Status: DISCONTINUED | OUTPATIENT
Start: 2018-05-30 | End: 2018-05-31 | Stop reason: HOSPADM

## 2018-05-30 RX ORDER — BALANCED SALT SOLUTION 6.4; .75; .48; .3; 3.9; 1.7 MG/ML; MG/ML; MG/ML; MG/ML; MG/ML; MG/ML
SOLUTION OPHTHALMIC PRN
Status: DISCONTINUED | OUTPATIENT
Start: 2018-05-30 | End: 2018-05-30 | Stop reason: HOSPADM

## 2018-05-30 RX ORDER — PROPOFOL 10 MG/ML
INJECTION, EMULSION INTRAVENOUS PRN
Status: DISCONTINUED | OUTPATIENT
Start: 2018-05-30 | End: 2018-05-30

## 2018-05-30 RX ORDER — SODIUM CHLORIDE, SODIUM LACTATE, POTASSIUM CHLORIDE, CALCIUM CHLORIDE 600; 310; 30; 20 MG/100ML; MG/100ML; MG/100ML; MG/100ML
INJECTION, SOLUTION INTRAVENOUS CONTINUOUS
Status: DISCONTINUED | OUTPATIENT
Start: 2018-05-30 | End: 2018-05-31 | Stop reason: HOSPADM

## 2018-05-30 RX ORDER — ONDANSETRON 4 MG/1
4 TABLET, ORALLY DISINTEGRATING ORAL EVERY 30 MIN PRN
Status: DISCONTINUED | OUTPATIENT
Start: 2018-05-30 | End: 2018-05-31 | Stop reason: HOSPADM

## 2018-05-30 RX ORDER — ACETAMINOPHEN 325 MG/1
975 TABLET ORAL ONCE
Status: COMPLETED | OUTPATIENT
Start: 2018-05-30 | End: 2018-05-30

## 2018-05-30 RX ORDER — PROPARACAINE HYDROCHLORIDE 5 MG/ML
1 SOLUTION/ DROPS OPHTHALMIC ONCE
Status: COMPLETED | OUTPATIENT
Start: 2018-05-30 | End: 2018-05-30

## 2018-05-30 RX ORDER — NALOXONE HYDROCHLORIDE 0.4 MG/ML
.1-.4 INJECTION, SOLUTION INTRAMUSCULAR; INTRAVENOUS; SUBCUTANEOUS
Status: DISCONTINUED | OUTPATIENT
Start: 2018-05-30 | End: 2018-05-31 | Stop reason: HOSPADM

## 2018-05-30 RX ORDER — DICLOFENAC SODIUM 1 MG/ML
1 SOLUTION/ DROPS OPHTHALMIC
Status: DISCONTINUED | OUTPATIENT
Start: 2018-05-30 | End: 2018-05-30 | Stop reason: CLARIF

## 2018-05-30 RX ORDER — KETOROLAC TROMETHAMINE 5 MG/ML
1 SOLUTION OPHTHALMIC 3 TIMES DAILY
Qty: 1 BOTTLE | Refills: 0 | Status: SHIPPED | OUTPATIENT
Start: 2018-05-30 | End: 2018-08-15

## 2018-05-30 RX ORDER — TROPICAMIDE 10 MG/ML
1 SOLUTION/ DROPS OPHTHALMIC
Status: DISCONTINUED | OUTPATIENT
Start: 2018-05-30 | End: 2018-05-31 | Stop reason: HOSPADM

## 2018-05-30 RX ADMIN — PROPARACAINE HYDROCHLORIDE 1 DROP: 5 SOLUTION/ DROPS OPHTHALMIC at 09:16

## 2018-05-30 RX ADMIN — CYCLOPENTOLATE HYDROCHLORIDE 1 DROP: 10 SOLUTION/ DROPS OPHTHALMIC at 09:27

## 2018-05-30 RX ADMIN — CYCLOPENTOLATE HYDROCHLORIDE 1 DROP: 10 SOLUTION/ DROPS OPHTHALMIC at 09:17

## 2018-05-30 RX ADMIN — SODIUM CHLORIDE, SODIUM LACTATE, POTASSIUM CHLORIDE, CALCIUM CHLORIDE: 600; 310; 30; 20 INJECTION, SOLUTION INTRAVENOUS at 09:51

## 2018-05-30 RX ADMIN — CYCLOPENTOLATE HYDROCHLORIDE 1 DROP: 10 SOLUTION/ DROPS OPHTHALMIC at 09:22

## 2018-05-30 RX ADMIN — PHENYLEPHRINE HYDROCHLORIDE 1 DROP: 25 SOLUTION/ DROPS OPHTHALMIC at 09:17

## 2018-05-30 RX ADMIN — OFLOXACIN 1 DROP: 3 SOLUTION/ DROPS OPHTHALMIC at 09:18

## 2018-05-30 RX ADMIN — BALANCED SALT SOLUTION 15 ML: 6.4; .75; .48; .3; 3.9; 1.7 SOLUTION OPHTHALMIC at 10:07

## 2018-05-30 RX ADMIN — ACETAMINOPHEN 975 MG: 325 TABLET ORAL at 09:12

## 2018-05-30 RX ADMIN — PHENYLEPHRINE HYDROCHLORIDE 1 DROP: 25 SOLUTION/ DROPS OPHTHALMIC at 09:28

## 2018-05-30 RX ADMIN — PROPOFOL 20 MG: 10 INJECTION, EMULSION INTRAVENOUS at 09:56

## 2018-05-30 RX ADMIN — PROPOFOL 10 MG: 10 INJECTION, EMULSION INTRAVENOUS at 09:59

## 2018-05-30 RX ADMIN — OFLOXACIN 1 DROP: 3 SOLUTION/ DROPS OPHTHALMIC at 09:27

## 2018-05-30 RX ADMIN — Medication 500 ML: at 10:08

## 2018-05-30 RX ADMIN — OFLOXACIN 1 DROP: 3 SOLUTION/ DROPS OPHTHALMIC at 09:23

## 2018-05-30 RX ADMIN — PHENYLEPHRINE HYDROCHLORIDE 1 DROP: 25 SOLUTION/ DROPS OPHTHALMIC at 09:22

## 2018-05-30 RX ADMIN — PROPOFOL 100 MCG/KG/MIN: 10 INJECTION, EMULSION INTRAVENOUS at 09:57

## 2018-05-30 RX ADMIN — FLURBIPROFEN SODIUM 1 DROP: 0.3 SOLUTION/ DROPS OPHTHALMIC at 09:22

## 2018-05-30 RX ADMIN — FLURBIPROFEN SODIUM 1 DROP: 0.3 SOLUTION/ DROPS OPHTHALMIC at 09:18

## 2018-05-30 ASSESSMENT — LIFESTYLE VARIABLES: TOBACCO_USE: 1

## 2018-05-30 NOTE — ANESTHESIA PREPROCEDURE EVALUATION
Anesthesia Evaluation     . Pt has had prior anesthetic.     No history of anesthetic complications          ROS/MED HX    ENT/Pulmonary:     (+)tobacco use, Past use , . .    Neurologic:     (+)TIA Parkinson's disease     Cardiovascular:     (+) hypertension----. : . . . :. .       METS/Exercise Tolerance:     Hematologic:         Musculoskeletal:         GI/Hepatic:  - neg GI/hepatic ROS       Renal/Genitourinary:  - ROS Renal section negative       Endo:  - neg endo ROS       Psychiatric:     (+) psychiatric history depression      Infectious Disease:  - neg infectious disease ROS       Malignancy:         Other:                     Physical Exam  Normal systems: dental    Airway   Mallampati: II  TM distance: >3 FB  Neck ROM: full    Dental     Cardiovascular       Pulmonary                     Anesthesia Plan      History & Physical Review  History and physical reviewed and following examination; no interval change.    ASA Status:  3 .    NPO Status:  > 2 hours and > 8 hours    Plan for MAC Reason for MAC:  Procedure to face, neck, head or breast    Risks, benefits, and alternatives of MAC discussed with patient. They understand the risks including possible recall of events in the room. They understand there is a possibility that conversion to general anesthesia may be needed. Patient consents.        Postoperative Care      Consents  Anesthetic plan, risks, benefits and alternatives discussed with:  Patient..                          .

## 2018-05-30 NOTE — DISCHARGE INSTRUCTIONS
[facility]  Home Care Following Anesthesia  For 24 hours after surgery:  1. Get plenty of rest.  A responsible adult must stay with you for at least 24 hours after you leave the surgery center.  2. Do not drive or use heavy equipment.  If you have weakness or tingling, don't drive or use heavy equipment until this feeling goes away.   3. Do not drink alcohol.   4. Avoid strenuous or risky activities.  Ask for help when climbing stairs.  5. You may feel lightheaded.  IF so, sit for a few minutes before standing.  Have someone help you get up.   6. If you have nausea (feel sick to your stomach): Drink only clear liquids such as apple juice, ginger ale, broth or 7-Up.  Rest may also help.  Be sure to drink enough fluids.  Move to a regular diet as you feel able.   7. You may have a slight fever.  Call the doctor if your fever is over 100 F (37.7 C) (taken under the tongue) or lasts longer than 24 hours.  8. You may have a dry mouth, a sore throat, muscle aches or trouble sleeping. These should go away after 24 hours.  9. Do not make important or legal decisions.               Tips for taking pain medications  To get the best pain relief possible, remember these points:    Take pain medications as directed, before pain becomes severe.    Pain medication can upset your stomach: taking it with food may help.    Constipation is a common side effect of pain medication. Drink plenty of  fluids.    Eat foods high in fiber. Take a stool softener if recommended by your doctor or pharmacist.    Do not drink alcohol, drive or operate machinery while taking pain medications.    Ask about other ways to control pain, such as with heat, ice or relaxation.    Tylenol/Acetaminophen Consumption  To help encourage the safe use of acetaminophen, the makers of TYLENOL  have lowered the maximum daily dose for single-ingredient Extra Strength TYLENOL  (acetaminophen) products sold in the [place] from 8  pills per day (4,000 mg) to 6 pills per day (3,000 mg). The dosing interval has also changed from 2 pills every 4-6 hours to 2 pills every 6 hours.    If you feel your pain relief is insufficient, you may take Tylenol/Acetaminophen in addition to your narcotic pain medication.     Be careful not to exceed 3,000 mg of Tylenol/Acetaminophen in a 24 hour period from all sources.    If you are taking extra strength Tylenol/acetaminophen (500 mg), the maximum dose is 6 tablets in 24 hours.    If you are taking regular strength acetaminophen (325 mg), the maximum dose is 9 tablets in 24 hours.    Call a doctor for any of the followin. Signs of infection (fever, growing tenderness at the surgery site, a large amount of drainage or bleeding, severe pain, foul-smelling drainage, redness, swelling).  2. It has been over 8 to 10 hours since surgery and you are still not able to urinate (pass water).  3. Headache for over 24 hours.  4. Numbness, tingling or weakness the day after surgery (if you had spinal anesthesia).  Your doctor is:    Dr. Romano                        Or dial [phone] and ask for the resident on call for:  Ophthalmology  For emergency care, call the:  [place] [place] [facility]:  [phone] (TTY for hearing impaired: [phone])  Discharge Instructions after Eye Surgery (Dr. Yudith Mcdonnell & Dr. Maria Isabel Calabrese)      Take your post-operative drops according to the instructions    Wear a shield at bedtime    For tube and cataract surgery wear your shield for one week    For trabeculectomy surgery wear your shield for two weeks    Proper placement of the shield: place the bump of the shield on the bridge of your nose, resting the shield on the orbital bones. Secure the shield with one piece of tape, from forehead to cheek.      Approved activities (OK to do the following):    Please clean around the eye(s) gently with tissue or washcloth    Leaning over the sink to brush your teeth    Going up and  "down stairs    Walking for exercise    Watching TV or reading    After your clinic appointment tomorrow, you may shower, including putting your head under the shower, making sure to close your eyes      Restricted activities:     No bending such as \"toe-touching\"    Keep head above level of heart    Sit down to put on shoes    No heavy lifting (10 pound restriction, equal to the weight of a gallon of milk)    Do not push or rub eye      Call for any problems or questions [phone]    There is always someone on call, even on weekends.                         "

## 2018-05-30 NOTE — OP NOTE
Pre-operative diagnosis: Right Eye Cataract   Post-operative diagnosis Same   Procedure:    Anesthesia: Procedure(s):  Cataract Extraction with Intraocular Lens Implant, Right  MAC/Retrobulbar Block   Surgeon: Yudith Mcdonnell MD   Assistants(s): None   Estimated blood loss: Minimal                         Specimens: None   Findings:  Complications: None  None       Indication: Patient was noted to have a visually significant cataract causing blurred vision and glare which affects their activities of daily living.    After informed consent was obtained, the patient was wheeled to the operative suite. Preoperatively, the patient received a retrobulbar block consisting of 2% Lidocaine under propofol anesthsia.  Patient was then prepped and draped in the usual sterile fashion. A lid speculum was placed between the upper and lower eyelids.  A Thorton-Fine ring was used to stabilize the globe and a 1mm sideport blade was used to make an superior paracentesis through the clear cornea.   Viscoat was then injected through the paracentesis.  The Thorton-Fine ring was once again used to stabilize the globe and a 2.5mm steel keratome blade was used to make a temporal clear corneal incision extending 1.5mm prior to anterior chamber entry.  A bent cystitome needle and Utrata forceps were then used to create a continuous curvilinear capsulorrhexis.  BSS on a nicchamin cannula was then used to flush out some of the viscoat and perform hydrodissection and hydrodelineation.  The lens was noted to be freely moveable within the capsular bag.  Phacoemulsification was then used to remove the lens nucleus and epinucleus.  Irrigation and aspiration was then used to remove the cortical remnants.  Provisc was then instilled through the temporal wound into the capsular bag.  Next, a ZCBOO 21.0D single piece IOL was injected through the temporal wound and allowed to unfold within the capsular bag.  A sinskey hook was then used to center  the IOL within the capsular bag.  Irrigation and aspiration was then used to remove the viscoelastic.  BSS on a 27 gauge cannula was then used to inflate the anterior chamber to normal tension and hydrate the stromal portions of the wounds.  Weck-cells were used to check the wounds and they were found to be well secured.  Maxitrol ointment was then placed on the ocular surface of the right eye.  A pressure patch and paredes eye shield was then placed over the operative eye before being wheeled to the recovery area.

## 2018-05-30 NOTE — IP AVS SNAPSHOT
[facility]    [address] [place] [address]    Phone:  [phone]    Fax:  [phone]                                       After Visit Summary   5/30/2018    Vini Loya    MRN: [id number]           After Visit Summary Signature Page     I have received my discharge instructions, and my questions have been answered. I have discussed any challenges I see with this plan with the nurse or doctor.    ..........................................................................................................................................  Patient/Patient Representative Signature      ..........................................................................................................................................  Patient Representative Print Name and Relationship to Patient    ..................................................               ................................................  Date                                            Time    ..........................................................................................................................................  Reviewed by Signature/Title    ...................................................              ..............................................  Date                                                            Time

## 2018-05-30 NOTE — IP AVS SNAPSHOT
MRN:[id number]                      After Visit Summary   5/30/2018    Vini Loya    MRN: [id number]           Thank you!     Thank you for choosing [facility] for your care. Our goal is always to provide you with excellent care. Hearing back from our patients is one way we can continue to improve our services. Please take a few minutes to complete the written survey that you may receive in the mail after you visit with us. Thank you!        Patient Information     Date Of Birth          1940        About your hospital stay     You were admitted on:  May 30, 2018 [facility]    You were discharged on:  May 30, 2018       Who to Call     For medical emergencies, please call 911.  For non-urgent questions about your medical care, please call your primary care provider or clinic, [phone]  For questions related to your surgery, please call your surgery clinic        Attending Provider     Provider Specialty    Yudith Mcdonnell MD Ophthalmology       Primary Care Provider Office Phone # Fax #    Home Sharif Irwin Wegener, [phone]      After Care Instructions     Eye drops as prescribed by physician.  Start drops today unless told otherwise by the physician           May use Tylenol or Advil for pain as directed by the physician           Notify Physician if you have severe headache or nausea           Remove patch per physician instruction           Return to clinic as instructed by physician           Wear eye shield or patch as directed                 Your next 10 appointments already scheduled     May 31, 2018  2:30 PM CDT   Post-Op with Yudith Mcdonnell MD   Eye Clinic ([facility])    [address] [place] [address]   [phone]            Jun 06, 2018 10:30 AM CDT   (Arrive by 10:15 AM)   Return Visit with CHANDA Singleton  [facility] ([facility])    [address]  4th Floor  [facility] [address] [phone]            Jun 12, 2018  2:45 PM CDT   Post-Op with Yudith Mcdonnell MD   Eye Clinic ([facility])    [facility]  [address] [place] [address]  9th Fl Clin 9a  [facility] [address]   [phone]            Aug 15, 2018  1:15 PM CDT   (Arrive by 1:00 PM)   Return Movement Disorder with KOTA Dutta [facility] ([facility])    [address]  3rd Floor  [facility] [id number] [phone]              Further instructions from your care team            [facility]  Home Care Following Anesthesia  For 24 hours after surgery:  1. Get plenty of rest.  A responsible adult must stay with you for at least 24 hours after you leave the surgery center.  2. Do not drive or use heavy equipment.  If you have weakness or tingling, don't drive or use heavy equipment until this feeling goes away.   3. Do not drink alcohol.   4. Avoid strenuous or risky activities.  Ask for help when climbing stairs.  5. You may feel lightheaded.  IF so, sit for a few minutes before standing.  Have someone help you get up.   6. If you have nausea (feel sick to your stomach): Drink only clear liquids such as apple juice, ginger ale, broth or 7-Up.  Rest may also help.  Be sure to drink enough fluids.  Move to a regular diet as you feel able.   7. You may have a slight fever.  Call the doctor if your fever is over 100 F (37.7 C) (taken under the tongue) or lasts longer than 24 hours.  8. You may have a dry mouth, a sore throat, muscle aches or trouble sleeping. These should go away after 24 hours.  9. Do not make important or legal decisions.               Tips for taking pain medications  To get the best pain relief possible, remember these points:    Take pain  medications as directed, before pain becomes severe.    Pain medication can upset your stomach: taking it with food may help.    Constipation is a common side effect of pain medication. Drink plenty of  fluids.    Eat foods high in fiber. Take a stool softener if recommended by your doctor or pharmacist.    Do not drink alcohol, drive or operate machinery while taking pain medications.    Ask about other ways to control pain, such as with heat, ice or relaxation.    Tylenol/Acetaminophen Consumption  To help encourage the safe use of acetaminophen, the makers of TYLENOL  have lowered the maximum daily dose for single-ingredient Extra Strength TYLENOL  (acetaminophen) products sold in the [place] from 8 pills per day (4,000 mg) to 6 pills per day (3,000 mg). The dosing interval has also changed from 2 pills every 4-6 hours to 2 pills every 6 hours.    If you feel your pain relief is insufficient, you may take Tylenol/Acetaminophen in addition to your narcotic pain medication.     Be careful not to exceed 3,000 mg of Tylenol/Acetaminophen in a 24 hour period from all sources.    If you are taking extra strength Tylenol/acetaminophen (500 mg), the maximum dose is 6 tablets in 24 hours.    If you are taking regular strength acetaminophen (325 mg), the maximum dose is 9 tablets in 24 hours.    Call a doctor for any of the followin. Signs of infection (fever, growing tenderness at the surgery site, a large amount of drainage or bleeding, severe pain, foul-smelling drainage, redness, swelling).  2. It has been over 8 to 10 hours since surgery and you are still not able to urinate (pass water).  3. Headache for over 24 hours.  4. Numbness, tingling or weakness the day after surgery (if you had spinal anesthesia).  Your doctor is:    Dr. Romano                        Or dial [phone] and ask for the resident on call for:  Ophthalmology  For emergency care, call the:  [facility]:  [phone] (TTY  "for hearing impaired: [phone])  Discharge Instructions after Eye Surgery (Dr. Yudith Mcdonnell & Dr. Maria Isabel Calabrese)      Take your post-operative drops according to the instructions    Wear a shield at bedtime    For tube and cataract surgery wear your shield for one week    For trabeculectomy surgery wear your shield for two weeks    Proper placement of the shield: place the bump of the shield on the bridge of your nose, resting the shield on the orbital bones. Secure the shield with one piece of tape, from forehead to cheek.      Approved activities (OK to do the following):    Please clean around the eye(s) gently with tissue or washcloth    Leaning over the sink to brush your teeth    Going up and down stairs    Walking for exercise    Watching TV or reading    After your clinic appointment tomorrow, you may shower, including putting your head under the shower, making sure to close your eyes      Restricted activities:     No bending such as \"toe-touching\"    Keep head above level of heart    Sit down to put on shoes    No heavy lifting (10 pound restriction, equal to the weight of a gallon of milk)    Do not push or rub eye      Call for any problems or questions [phone]    There is always someone on call, even on weekends.                           Pending Results     No orders found from 5/28/2018 to 5/31/2018.            Admission Information     Date & Time Provider Department Dept. Phone    5/30/2018 Yudith Mcdonnell MD [facility] [phone]      Your Vitals Were     Blood Pressure Temperature Respirations Height Weight Pulse Oximetry    106/68 97.8  F (36.6  C) (Oral) 16 1.727 m (5' 8\") 83.9 kg (185 lb) 93%    BMI (Body Mass Index)                   28.13 kg/m2           DocsInk Information     DocsInk is an electronic gateway that provides easy, online access to your medical records. With DocsInk, you can request a clinic appointment, read your test results, renew " a prescription or communicate with your care team.     To sign up for [employer] visit the website at [email or web address]   You will be asked to enter the access code listed below, as well as some personal information. Please follow the directions to create your username and password.     Your access code is: [email or web address]  Expires: 2018  6:31 AM     Your access code will  in 90 days. If you need help or a new code, please contact your [facility] or call [phone] for assistance.        Care EveryWhere ID     This is your Care EveryWhere ID. This could be used by other organizations to access your [facility] medical records  [id number]        Equal Access to Services     CHRISTOPHE JORDAN : Javy Kunz, wagricelda perez, qajonathan kaalmada kishore, claudia jeong. So [facility] [phone].    ATENCIÓN: Si habla español, tiene a florez disposición servicios gratuitos de asistencia lingüística. Llame al [phone].    We comply with applicable federal civil rights laws and [place] laws. We do not discriminate on the basis of race, color, national origin, age, disability, sex, sexual orientation, or gender identity.               Review of your medicines      START taking        Dose / Directions    ketorolac 0.5 % ophthalmic solution   Commonly known as:  ACULAR   Used for:  Nuclear sclerosis of right eye        Dose:  1 drop   Place 1 drop into the right eye 3 times daily   Quantity:  1 Bottle   Refills:  0       moxifloxacin 0.5 % ophthalmic solution   Commonly known as:  VIGAMOX   Used for:  Nuclear sclerosis of right eye        Dose:  1 drop   Place 1 drop into the right eye 4 times daily   Quantity:  1 Bottle   Refills:  0       prednisoLONE acetate 1 % ophthalmic susp   Commonly known as:  PRED FORTE   Used for:  Nuclear sclerosis of right eye        Dose:  1 drop   Place 1 drop into the right eye 4 times daily   Quantity:  1  Bottle   Refills:  0         CONTINUE these medicines which have NOT CHANGED        Dose / Directions    aspirin 81 MG tablet   Used for:  Unspecified essential hypertension, Cerebral embolism with cerebral infarction (H), CAD (coronary artery disease), Mixed hyperlipidemia        1 TABLET DAILY   Refills:  0       Blood Pressure Monitor Pricilla   Used for:  Hypertension goal BP (blood pressure) < 130/80        Dose:  1 Device   1 Device daily.   Quantity:  1 Device   Refills:  0       carbidopa-levodopa  MG per tablet   Commonly known as:  SINEMET   Used for:  Parkinson's disease (H)        Take 2 tablets 3 times a day 1 hour before each meal.  (Around 7 am, 11 am, & 4 pm.)   Quantity:  540 tablet   Refills:  3       PARoxetine 20 MG tablet   Commonly known as:  PAXIL   Used for:  Anxiety        TAKE ONE TABLET BY MOUTH EVERY NIGHT AT BEDTIME   Quantity:  90 tablet   Refills:  3       polyethylene glycol powder   Commonly known as:  MIRALAX   Used for:  Constipation, unspecified constipation type        Dose:  1 capful   Take 17 g (1 capful) by mouth daily   Quantity:  510 g   Refills:  1       rasagiline 1 MG Tabs tablet   Commonly known as:  AZILECT   Used for:  Parkinson's disease (H)        We will have you take 1/2 tab daily for two weeks then increase to 1 tab daily after that.   Quantity:  30 each   Refills:  11       senna 8.6 MG tablet   Commonly known as:  SENOKOT   Used for:  Constipation, unspecified constipation type        Dose:  1 tablet   Take 1 tablet by mouth 2 times daily And can increase to two tablets twice daily if needed .   Quantity:  120 tablet   Refills:  11       simvastatin 40 MG tablet   Commonly known as:  ZOCOR   Used for:  Hyperlipidemia LDL goal <100        TAKE ONE TABLET BY MOUTH AT BEDTIME   Quantity:  90 tablet   Refills:  3       sulfamethoxazole-trimethoprim 400-80 MG per tablet   Commonly known as:  BACTRIM/SEPTRA   Used for:  Urinary tract infection with hematuria, site  unspecified        Dose:  1 tablet   Take 1 tablet by mouth At Bedtime For UTI prevention   Quantity:  30 tablet   Refills:  11       tamsulosin 0.4 MG capsule   Commonly known as:  FLOMAX   Used for:  Bradycardia        Dose:  0.4 mg   Take 1 capsule (0.4 mg) by mouth daily   Quantity:  30 capsule   Refills:  11            Where to get your medicines      These medications were sent to [facility] [address] [facility]  [address] [place] [address] [address] [place] [address]    Hours:  TRANSPLANT PHONE NUMBER [phone] Phone:  [phone]     ketorolac 0.5 % ophthalmic solution    moxifloxacin 0.5 % ophthalmic solution    prednisoLONE acetate 1 % ophthalmic susp                Protect others around you: Learn how to safely use, store and throw away your medicines at www.disposemymeds.org.             Medication List: This is a list of all your medications and when to take them. Check marks below indicate your daily home schedule. Keep this list as a reference.      Medications           Morning Afternoon Evening Bedtime As Needed    aspirin 81 MG tablet   1 TABLET DAILY                                Blood Pressure Monitor Pricilla   1 Device daily.                                carbidopa-levodopa  MG per tablet   Commonly known as:  SINEMET   Take 2 tablets 3 times a day 1 hour before each meal.  (Around 7 am, 11 am, & 4 pm.)                                ketorolac 0.5 % ophthalmic solution   Commonly known as:  ACULAR   Place 1 drop into the right eye 3 times daily                                moxifloxacin 0.5 % ophthalmic solution   Commonly known as:  VIGAMOX   Place 1 drop into the right eye 4 times daily                                PARoxetine 20 MG tablet   Commonly known as:  PAXIL   TAKE ONE TABLET BY MOUTH EVERY NIGHT AT BEDTIME                                polyethylene glycol powder   Commonly known as:  MIRALAX   Take 17 g (1 capful) by mouth daily                                 prednisoLONE acetate 1 % ophthalmic susp   Commonly known as:  PRED FORTE   Place 1 drop into the right eye 4 times daily                                rasagiline 1 MG Tabs tablet   Commonly known as:  AZILECT   We will have you take 1/2 tab daily for two weeks then increase to 1 tab daily after that.                                senna 8.6 MG tablet   Commonly known as:  SENOKOT   Take 1 tablet by mouth 2 times daily And can increase to two tablets twice daily if needed .                                simvastatin 40 MG tablet   Commonly known as:  ZOCOR   TAKE ONE TABLET BY MOUTH AT BEDTIME                                sulfamethoxazole-trimethoprim 400-80 MG per tablet   Commonly known as:  BACTRIM/SEPTRA   Take 1 tablet by mouth At Bedtime For UTI prevention                                tamsulosin 0.4 MG capsule   Commonly known as:  FLOMAX   Take 1 capsule (0.4 mg) by mouth daily

## 2018-05-30 NOTE — ANESTHESIA CARE TRANSFER NOTE
Patient: Vini Loya    Procedure(s):  Right Eye Phacoemulsification with Standard Intraocular Lens - Wound Class: I-Clean    Diagnosis: Right Eye Cataract  Diagnosis Additional Information: No value filed.    Anesthesia Type:   MAC     Note:  Airway :Room Air  Patient transferred to:Phase II  Comments: 157/79  96%  97.5--52-16  Handoff Report: Identifed the Patient, Identified the Reponsible Provider, Reviewed the pertinent medical history, Discussed the surgical course, Reviewed Intra-OP anesthesia mangement and issues during anesthesia, Set expectations for post-procedure period and Allowed opportunity for questions and acknowledgement of understanding      Vitals: (Last set prior to Anesthesia Care Transfer)    CRNA VITALS  5/30/2018 1003 - 5/30/2018 1038      5/30/2018             Pulse: 56    SpO2: 97 %    Resp Rate (observed): (!)  1    Resp Rate (set): 10                Electronically Signed By: KOTA Hess CRNA  May 30, 2018  10:38 AM

## 2018-05-30 NOTE — ANESTHESIA POSTPROCEDURE EVALUATION
Patient: Vini Loya    Procedure(s):  Right Eye Phacoemulsification with Standard Intraocular Lens - Wound Class: I-Clean    Diagnosis:Right Eye Cataract  Diagnosis Additional Information: No value filed.    Anesthesia Type:  MAC    Note:  Anesthesia Post Evaluation    Patient location during evaluation: Phase 2  Patient participation: Able to fully participate in evaluation  Level of consciousness: awake and alert  Pain management: adequate  Airway patency: patent  Cardiovascular status: acceptable and stable  Respiratory status: acceptable and room air  Hydration status: acceptable  PONV: none     Anesthetic complications: None          Last vitals:  Vitals:    05/30/18 0844 05/30/18 1042 05/30/18 1059   BP: 106/68 140/79 143/86   Resp: 16 16 16   Temp: 36.6  C (97.8  F) 36.2  C (97.2  F) 36.2  C (97.2  F)   SpO2: 93% 96% 97%         Electronically Signed By: Kiersten Arreola MD  May 30, 2018  11:23 AM

## 2018-05-31 ENCOUNTER — OFFICE VISIT (OUTPATIENT)
Dept: OPHTHALMOLOGY | Facility: CLINIC | Age: 78
End: 2018-05-31
Attending: OPHTHALMOLOGY
Payer: COMMERCIAL

## 2018-05-31 DIAGNOSIS — Z96.1 PSEUDOPHAKIA OF RIGHT EYE: Primary | ICD-10-CM

## 2018-05-31 PROCEDURE — G0463 HOSPITAL OUTPT CLINIC VISIT: HCPCS | Mod: ZF

## 2018-05-31 ASSESSMENT — TONOMETRY
OD_IOP_MMHG: 8
OS_IOP_MMHG: 11
IOP_METHOD: ICARE

## 2018-05-31 ASSESSMENT — SLIT LAMP EXAM - LIDS
COMMENTS: NORMAL
COMMENTS: NORMAL

## 2018-05-31 ASSESSMENT — VISUAL ACUITY
METHOD: SNELLEN - LINEAR
OD_PH_SC: 20/80
OS_SC: 20/20
OD_SC: 20/100

## 2018-05-31 ASSESSMENT — EXTERNAL EXAM - RIGHT EYE: OD_EXAM: NORMAL

## 2018-05-31 ASSESSMENT — EXTERNAL EXAM - LEFT EYE: OS_EXAM: NORMAL

## 2018-05-31 NOTE — PATIENT INSTRUCTIONS
No heavy lifting, bending, stooping, straining, rubbing the eye, or getting water in the eye.  Please wear protective eyewear over the eye at all times including glasses during the day and the protective shield at bedtime.  Patient will return to clinic in 1-2 weeks with refraction.    Use the following drops (RIGHT EYE ONLY):  Antibiotic --  Vigamox: 4x/day until finished (use for at least 5-7 days after surgery)    NSAID -- Acular LS (Ketorolac) 3x/day: for approximately 4 weeks after surgery (or until gone)    Steroid -- Pred Forte/Prednisolone Acetate:  4x/day for 1 week then, 3x/day for 1 week then, 2x/day for 1 week then, 1x/day for 1 week then, stop.    Please be sure to call if you have any decrease in vision, increase in pain, flashing lights, redness, or a lot of drainage.

## 2018-05-31 NOTE — PROGRESS NOTES
1)Glaucoma Suspect -- K pachy: 516/499   Tmax:     HVF:Left HH      CDR:0.7/0.65     HRT/OCT:  RNFL WNL     FHX of Glc: No      Gonio:       Intolerant to:      Asthma/COPD: No, on po BB   Steroid Use: No    Kidney Stones:No     Sulfa Allergy: No     IOP targets: -- IOP good  2)NS OS -- on Flomax  3)MARLA  4)s/p PKP and LRIs OD -- ?Hx of HZ Keratitis -- Dr. Torres did PKP done at [facility] -- Pentacam done at [facility]   5)H/O CVA -- Left HH on HVF in 2007 (MRIs and CTs done previously)  6)Post PKP Irregular Astigmatism -- will plan to proceed with standard monofocal IOL and refer to Dr. Hayes for correction of residual astigmatism after CE  7)PCIOL OD    MD:pt with intention tremor     No heavy lifting, bending, stooping, straining, rubbing the eye, or getting water in the eye.  Please wear protective eyewear over the eye at all times including glasses during the day and the protective shield at bedtime.  Patient will return to clinic in 1-2 weeks with refraction.    Use the following drops (RIGHT EYE ONLY):  Antibiotic --  Vigamox: 4x/day until finished (use for at least 5-7 days after surgery)    NSAID -- Acular LS (Ketorolac) 3x/day: for approximately 4 weeks after surgery (or until gone)    Steroid -- Pred Forte/Prednisolone Acetate:  4x/day for 1 week then, 3x/day for 1 week then, 2x/day for 1 week then, 1x/day for 1 week then, stop.    Please be sure to call if you have any decrease in vision, increase in pain, flashing lights, redness, or a lot of drainage.      Resident Note:  POD #1 s/p right eye CEIOL  IOP 8. Doing well  Start moxifloxacin, pred forte and acular right eye  Refer to cornea for evaluation of irregular astigmatism and possible scleral CTL  Return to clinic in 1 week    Michael Farfan MD  Ophthalmology, PGY-3    Attending Physician Attestation:  Complete documentation of historical and exam elements from today's encounter can be found in the full encounter summary report (not reduplicated in this  progress note). I personally obtained the chief complaint(s) and history of present illness.  I confirmed and edited as necessary the review of systems, past medical/surgical history, family history, social history, and examination findings as documented by others; and I examined the patient myself. I personally reviewed the relevant tests, images, and reports as documented above. I formulated and edited as necessary the assessment and plan and discussed the findings and management plan with the patient and family.  - Yudith Mcdonnell MD

## 2018-05-31 NOTE — NURSING NOTE
Chief Complaints and History of Present Illnesses   Patient presents with     Follow Up For     1 day post op.      HPI    Symptoms:              Comments:  1 day post op of cataract extraction, IOL placement in the right eye.  Patient says no concerns of eyes today. Denies pain or uncomfortableness. Brought eye drops with today.    NATHALIE Price 5/31/2018 2:45 PM

## 2018-05-31 NOTE — MR AVS SNAPSHOT
After Visit Summary   5/31/2018    Vini Loya    MRN: [id number]           Patient Information     Date Of Birth          1940        Visit Information        Provider Department      5/31/2018 2:30 PM Yudith Mcdonnell MD [facility]        Today's Diagnoses     Pseudophakia of right eye    -  1      Care Instructions    No heavy lifting, bending, stooping, straining, rubbing the eye, or getting water in the eye.  Please wear protective eyewear over the eye at all times including glasses during the day and the protective shield at bedtime.  Patient will return to clinic in 1-2 weeks with refraction.    Use the following drops (RIGHT EYE ONLY):  Antibiotic --  Vigamox: 4x/day until finished (use for at least 5-7 days after surgery)    NSAID -- Acular LS (Ketorolac) 3x/day: for approximately 4 weeks after surgery (or until gone)    Steroid -- Pred Forte/Prednisolone Acetate:  4x/day for 1 week then, 3x/day for 1 week then, 2x/day for 1 week then, 1x/day for 1 week then, stop.    Please be sure to call if you have any decrease in vision, increase in pain, flashing lights, redness, or a lot of drainage.            Follow-ups after your visit        Your next 10 appointments already scheduled     Jun 06, 2018 10:30 AM CDT   (Arrive by 10:15 AM)   Return Visit with CHANDA Singleton   [facility] Urology and Inst for Prostate and Urologic Cancers ([facility] [place])    [address] [facility]  4th [facility] [address]   [phone]            Jun 12, 2018  2:45 PM CDT   Post-Op with Yudith Mcdonnell MD   Eye Clinic ([facility])    [address]  [address] [address]  [facility] [address]   [phone]            Aug 15, 2018  1:15 PM CDT   (Arrive by 1:00 PM)   Return Movement Disorder with KOTA Dutta [facility] ([facility])    [address] [place] [address]  3rd [facility]  [place] [address] [phone]              Who to contact     Please call your clinic at [phone] to:    Ask questions about your health    Make or cancel appointments    Discuss your medicines    Learn about your test results    Speak to your doctor            Additional Information About Your Visit        MyChart Information     Conductricst is an electronic gateway that provides easy, online access to your medical records. With ProxToMe, you can request a clinic appointment, read your test results, renew a prescription or communicate with your care team.     To sign up for ProxToMe visit the website at [email or web address]   You will be asked to enter the access code listed below, as well as some personal information. Please follow the directions to create your username and password.     Your access code is: [email or web address]  Expires: 2018  6:31 AM     Your access code will  in 90 days. If you need help or a new code, please contact your [facility] or call [phone] for assistance.        Care EveryWhere ID     This is your Care EveryWhere ID. This could be used by other organizations to access your [place] medical records  [id number]         Blood Pressure from Last 3 Encounters:   18 143/86   18 111/65   18 119/65    Weight from Last 3 Encounters:   18 83.9 kg (185 lb)   18 83.9 kg (185 lb)   18 85.3 kg (188 lb)              Today, you had the following     No orders found for display       Primary Care Provider Office Phone # Fax #    Joel Daniel Irwin Wegener, [phone] [address]  [facility] [address]        Equal Access to Services     [facility] : Hadii aad ku hadasho Sofrancoisali, waaxda luqadaha, qaybta kaalmada adeegyada, claudia guillermon brayan jeong. So [facility] [phone].    ATENCIÓN: Si habla español, tiene a florez disposición servicios gratuitos de asistencia lingüística. Llame al  [phone].    We comply with applicable federal civil rights laws and [place] laws. We do not discriminate on the basis of race, color, national origin, age, disability, sex, sexual orientation, or gender identity.            Thank you!     Thank you for choosing EYE CLINIC  for your care. Our goal is always to provide you with excellent care. Hearing back from our patients is one way we can continue to improve our services. Please take a few minutes to complete the written survey that you may receive in the mail after your visit with us. Thank you!             Your Updated Medication List - Protect others around you: Learn how to safely use, store and throw away your medicines at www.disposemymeds.org.          This list is accurate as of 5/31/18  4:27 PM.  Always use your most recent med list.                   Brand Name Dispense Instructions for use Diagnosis    aspirin 81 MG tablet      1 TABLET DAILY    Unspecified essential hypertension, Cerebral embolism with cerebral infarction (H), CAD (coronary artery disease), Mixed hyperlipidemia       Blood Pressure Monitor Pricilla     1 Device    1 Device daily.    Hypertension goal BP (blood pressure) < 130/80       carbidopa-levodopa  MG per tablet    SINEMET    540 tablet    Take 2 tablets 3 times a day 1 hour before each meal.  (Around 7 am, 11 am, & 4 pm.)    Parkinson's disease (H)       ketorolac 0.5 % ophthalmic solution    ACULAR    1 Bottle    Place 1 drop into the right eye 3 times daily    Nuclear sclerosis of right eye       moxifloxacin 0.5 % ophthalmic solution    VIGAMOX    1 Bottle    Place 1 drop into the right eye 4 times daily    Nuclear sclerosis of right eye       PARoxetine 20 MG tablet    PAXIL    90 tablet    TAKE ONE TABLET BY MOUTH EVERY NIGHT AT BEDTIME    Anxiety       polyethylene glycol powder    MIRALAX    510 g    Take 17 g (1 capful) by mouth daily    Constipation, unspecified constipation type       prednisoLONE acetate 1 %  ophthalmic susp    PRED FORTE    1 Bottle    Place 1 drop into the right eye 4 times daily    Nuclear sclerosis of right eye       rasagiline 1 MG Tabs tablet    AZILECT    30 each    We will have you take 1/2 tab daily for two weeks then increase to 1 tab daily after that.    Parkinson's disease (H)       senna 8.6 MG tablet    SENOKOT    120 tablet    Take 1 tablet by mouth 2 times daily And can increase to two tablets twice daily if needed .    Constipation, unspecified constipation type       simvastatin 40 MG tablet    ZOCOR    90 tablet    TAKE ONE TABLET BY MOUTH AT BEDTIME    Hyperlipidemia LDL goal <100       sulfamethoxazole-trimethoprim 400-80 MG per tablet    BACTRIM/SEPTRA    30 tablet    Take 1 tablet by mouth At Bedtime For UTI prevention    Urinary tract infection with hematuria, site unspecified       tamsulosin 0.4 MG capsule    FLOMAX    30 capsule    Take 1 capsule (0.4 mg) by mouth daily    Bradycardia

## 2018-06-06 ENCOUNTER — OFFICE VISIT (OUTPATIENT)
Dept: UROLOGY | Facility: CLINIC | Age: 78
End: 2018-06-06
Payer: COMMERCIAL

## 2018-06-06 VITALS
HEART RATE: 60 BPM | SYSTOLIC BLOOD PRESSURE: 101 MMHG | DIASTOLIC BLOOD PRESSURE: 62 MMHG | HEIGHT: 68 IN | WEIGHT: 185 LBS | BODY MASS INDEX: 28.04 KG/M2

## 2018-06-06 DIAGNOSIS — N39.0 URINARY TRACT INFECTION WITH HEMATURIA, SITE UNSPECIFIED: ICD-10-CM

## 2018-06-06 DIAGNOSIS — R31.9 URINARY TRACT INFECTION WITH HEMATURIA, SITE UNSPECIFIED: ICD-10-CM

## 2018-06-06 DIAGNOSIS — N39.0 RECURRENT URINARY TRACT INFECTION: Primary | ICD-10-CM

## 2018-06-06 LAB
ALBUMIN UR-MCNC: 30 MG/DL
APPEARANCE UR: ABNORMAL
BILIRUB UR QL STRIP: NEGATIVE
COLOR UR AUTO: YELLOW
GLUCOSE UR STRIP-MCNC: NEGATIVE MG/DL
HGB UR QL STRIP: NEGATIVE
HYALINE CASTS #/AREA URNS LPF: 9 /LPF (ref 0–2)
KETONES UR STRIP-MCNC: 5 MG/DL
LEUKOCYTE ESTERASE UR QL STRIP: NEGATIVE
MUCOUS THREADS #/AREA URNS LPF: PRESENT /LPF
NITRATE UR QL: NEGATIVE
PH UR STRIP: 6 PH (ref 5–7)
RBC #/AREA URNS AUTO: 1 /HPF (ref 0–2)
SOURCE: ABNORMAL
SP GR UR STRIP: 1.02 (ref 1–1.03)
SQUAMOUS #/AREA URNS AUTO: 3 /HPF (ref 0–1)
UROBILINOGEN UR STRIP-MCNC: 0 MG/DL (ref 0–2)
WBC #/AREA URNS AUTO: 1 /HPF (ref 0–5)

## 2018-06-06 RX ORDER — SULFAMETHOXAZOLE AND TRIMETHOPRIM 400; 80 MG/1; MG/1
1 TABLET ORAL AT BEDTIME
Qty: 90 TABLET | Refills: 4 | Status: SHIPPED | OUTPATIENT
Start: 2018-06-06 | End: 2019-06-10

## 2018-06-06 ASSESSMENT — PAIN SCALES - GENERAL: PAINLEVEL: NO PAIN (0)

## 2018-06-06 NOTE — MR AVS SNAPSHOT
"              After Visit Summary   6/6/2018    Vini Loya    MRN: [id number]           Patient Information     Date Of Birth          1940        Visit Information        Provider Department      6/6/2018 10:30 AM Evelyn Hairston PA [facility] Urology and Inst for Prostate and Urologic Cancers        Today's Diagnoses     Recurrent urinary tract infection    -  1    Urinary tract infection with hematuria, site unspecified          Care Instructions    - Continue Flomax once daily.   - Will send urine for testing  - Stick with prunes taken regularly  - Continue Bactrim SS taken nightly to prevent infections  - Return 1 year or sooner with problems    Lupe Hairston PA-C            Follow-ups after your visit        Your next 10 appointments already scheduled     Jun 12, 2018  2:45 PM CDT   Post-Op with Yudith Mcdonnell MD   Eye Clinic ([facility])    [address] [id number] [phone]            Aug 15, 2018  1:15 PM CDT   (Arrive by 1:00 PM)   Return Movement Disorder with KOTA Dutta CNP   [facility]rology ([facility])    [address]  3rd [facility] [address] [phone]              Who to contact     Please call your clinic at [phone] to:    Ask questions about your health    Make or cancel appointments    Discuss your medicines    Learn about your test results    Speak to your doctor            Additional Information About Your Visit        Care EveryWhere ID     This is your Care EveryWhere ID. This could be used by other organizations to access your [place] medical records  [id number]        Your Vitals Were     Pulse Height BMI (Body Mass Index)             60 1.727 m (5' 8\") 28.13 kg/m2          Blood Pressure from Last 3 Encounters:   06/06/18 101/62   05/30/18 143/86   05/16/18 111/65    Weight from Last 3 Encounters:   06/06/18 83.9 kg (185 lb) "   05/30/18 83.9 kg (185 lb)   05/16/18 83.9 kg (185 lb)              We Performed the Following     POST-VOID RESIDUAL BLADDER SCAN     UA with Microscopic reflex to Culture          Where to get your medicines      These medications were sent to [facility] [place], [place] - [address]  [address], [facility] [address]     Phone:  [phone]     sulfamethoxazole-trimethoprim 400-80 MG per tablet          Primary Care Provider Office Phone # Fax #    Joel Daniel Irwin Wegener, [phone]       [address]  [facility] [address]        Equal Access to Services     CHRISTOPHE JORDAN : Hadii phill jimenes hadasho Sofrancoisali, waaxda luqadaha, qaybta kaalmada adeegyada, claudia ugalde . So [facility] [phone].    ATENCIÓN: Si habla español, tiene a florez disposición servicios gratuitos de asistencia lingüística. Llame al [phone].    We comply with applicable federal civil rights laws and [place] laws. We do not discriminate on the basis of race, color, national origin, age, disability, sex, sexual orientation, or gender identity.            Thank you!     Thank you for choosing [facility] UROLOGY AND [facility] FOR PROSTATE AND UROLOGIC CANCERS  for your care. Our goal is always to provide you with excellent care. Hearing back from our patients is one way we can continue to improve our services. Please take a few minutes to complete the written survey that you may receive in the mail after your visit with us. Thank you!             Your Updated Medication List - Protect others around you: Learn how to safely use, store and throw away your medicines at www.disposemymeds.org.          This list is accurate as of 6/6/18 11:18 AM.  Always use your most recent med list.                   Brand Name Dispense Instructions for use Diagnosis    aspirin 81 MG tablet      1 TABLET DAILY    Unspecified essential hypertension, Cerebral embolism with cerebral infarction (H), CAD  (coronary artery disease), Mixed hyperlipidemia       Blood Pressure Monitor Pricilla     1 Device    1 Device daily.    Hypertension goal BP (blood pressure) < 130/80       carbidopa-levodopa  MG per tablet    SINEMET    540 tablet    Take 2 tablets 3 times a day 1 hour before each meal.  (Around 7 am, 11 am, & 4 pm.)    Parkinson's disease (H)       ketorolac 0.5 % ophthalmic solution    ACULAR    1 Bottle    Place 1 drop into the right eye 3 times daily    Nuclear sclerosis of right eye       moxifloxacin 0.5 % ophthalmic solution    VIGAMOX    1 Bottle    Place 1 drop into the right eye 4 times daily    Nuclear sclerosis of right eye       PARoxetine 20 MG tablet    PAXIL    90 tablet    TAKE ONE TABLET BY MOUTH EVERY NIGHT AT BEDTIME    Anxiety       polyethylene glycol powder    MIRALAX    510 g    Take 17 g (1 capful) by mouth daily    Constipation, unspecified constipation type       prednisoLONE acetate 1 % ophthalmic susp    PRED FORTE    1 Bottle    Place 1 drop into the right eye 4 times daily    Nuclear sclerosis of right eye       rasagiline 1 MG Tabs tablet    AZILECT    30 each    We will have you take 1/2 tab daily for two weeks then increase to 1 tab daily after that.    Parkinson's disease (H)       senna 8.6 MG tablet    SENOKOT    120 tablet    Take 1 tablet by mouth 2 times daily And can increase to two tablets twice daily if needed .    Constipation, unspecified constipation type       simvastatin 40 MG tablet    ZOCOR    90 tablet    TAKE ONE TABLET BY MOUTH AT BEDTIME    Hyperlipidemia LDL goal <100       sulfamethoxazole-trimethoprim 400-80 MG per tablet    BACTRIM/SEPTRA    90 tablet    Take 1 tablet by mouth At Bedtime For UTI prevention    Urinary tract infection with hematuria, site unspecified       tamsulosin 0.4 MG capsule    FLOMAX    30 capsule    Take 1 capsule (0.4 mg) by mouth daily    Bradycardia

## 2018-06-06 NOTE — NURSING NOTE
Chief Complaint   Patient presents with     RECHECK     Follow up- BPH/recurring UTI     Olga Verma, REEDA

## 2018-06-06 NOTE — PROGRESS NOTES
"HPI: Mr. Vini Loya is a 77 year old year old male presenting today for evaluation of chief complaint(s): No chief complaint on file.    He was last seen on 12/5/17:  Mr. Loya has PMH significant for HTN, HLD, Parkinson, depression, BPH and frequent UTIs. He was formerly seen by Dr. Morris in October 2015 after an episode of urosepsis in summer 2015 requiring hospitalization. He met me in 2016, again for UTIs requiring hospitalization.  His last hospitalization for pyelonephritis vs. Urosepsis was in October 2017.  Since then he has been continued on Flomax as well as suppressive Bactrim SS.  He has had no symptomatic UTIs.  He denies fevers and chills. His last UA on 12/5/17 was negative.  He is also pleased with voiding symptoms.  He states his PCP recently reduced FLomax dose from 0.8mg to 0.4mg daily.  He has notice no difference in his stream or ability to empty. His PVR today is 58mL. His AUA SS today is 4 \"pleased\". No dysuria, hematuria. He drinks plenty of fluids.     He is otherwise doing well.  He tried Botox injected around the mouth (for involuntary movements) but he isn't sure it helped.    Last week he underwent right eye cataract surgery.  Now he can read without glasses.   He presents with his wife.     REVIEW OF DIAGNOSTICS:  12/5/17 - UA: Ketones 5, otherwise negative  11/3/17 - UA: WBC 5-10, RBC 2-5, otherwise negative --> No growth  10/16/17- Cr: 1.17mg/dL  10/15/17 - UA: WBC 53, RBC >182, nitrities positive, protein 300 --> >100K E Coli  12/6/16 - UA: WBC 0-2, RBC 0-2, trace protein    Current Outpatient Prescriptions   Medication Sig Dispense Refill     ASPIRIN 81 MG OR TABS 1 TABLET DAILY       Blood Pressure Monitor JERONIMO 1 Device daily. 1 Device 0     carbidopa-levodopa (SINEMET)  MG per tablet Take 2 tablets 3 times a day 1 hour before each meal.  (Around 7 am, 11 am, & 4 pm.) 540 tablet 3     ketorolac (ACULAR) 0.5 % ophthalmic solution Place 1 drop into the right eye 3 times daily " "1 Bottle 0     moxifloxacin (VIGAMOX) 0.5 % ophthalmic solution Place 1 drop into the right eye 4 times daily 1 Bottle 0     PARoxetine (PAXIL) 20 MG tablet TAKE ONE TABLET BY MOUTH EVERY NIGHT AT BEDTIME 90 tablet 3     polyethylene glycol (MIRALAX) powder Take 17 g (1 capful) by mouth daily 510 g 1     prednisoLONE acetate (PRED FORTE) 1 % ophthalmic susp Place 1 drop into the right eye 4 times daily 1 Bottle 0     rasagiline (AZILECT) 1 MG TABS tablet We will have you take 1/2 tab daily for two weeks then increase to 1 tab daily after that. 30 each 11     senna (SENOKOT) 8.6 MG tablet Take 1 tablet by mouth 2 times daily And can increase to two tablets twice daily if needed . 120 tablet 11     simvastatin (ZOCOR) 40 MG tablet TAKE ONE TABLET BY MOUTH AT BEDTIME 90 tablet 3     sulfamethoxazole-trimethoprim (BACTRIM/SEPTRA) 400-80 MG per tablet Take 1 tablet by mouth At Bedtime For UTI prevention 30 tablet 11     tamsulosin (FLOMAX) 0.4 MG capsule Take 1 capsule (0.4 mg) by mouth daily 30 capsule 11       ALLERGIES: Vytorin      REVIEW OF SYSTEMS:  As above in HPI    GENERAL PHYSICAL EXAM:   Vitals: /62  Pulse 60  Ht 1.727 m (5' 8\")  Wt 83.9 kg (185 lb)  BMI 28.13 kg/m2  Body mass index is 28.13 kg/(m^2).    GENERAL: Well groomed, well developed, well nourished male in NAD. + masked facies with involuntary lip twitches.   NEURO: Alert and oriented x 3.  PSYCH: Normal mood and affect, pleasant and agreeable during interview and exam.    PVR: Residual urine by ultrasound was 58 ml.      RADIOLOGY:  Examination:  CT ABDOMEN PELVIS W CONTRAST 10/16/2017 12:04 AM      History: Question pyelonephritis.     Comparison: 11/26/2014     Technique: CT of the abdomen and pelvis were obtained with IV  contrast. Sagittal and coronal reconstructions created and reviewed.     Findings:   Bilateral punctate nonobstructive renal calculi. The largest calculus  on the right measures approximately 3 mm (series 8, image 96). " On the  left, the largest measures approximately 3 mm (series 8, image 109).  No hydronephrosis. There is diffuse bladder wall thickening and  mucosal enhancement there is a small left lateral diverticulum (series  6, image 52). The prostate is enlarged with some calcification.     8 mm hypodensity in the medial left hepatic lobe is unchanged since  2014. Subcentimeter hypodensity in the caudate lobe is too small to  characterize with CT but also appears similar to CT from 2014. The  gallbladder, pancreas, spleen, and adrenal glands are within normal  limits.     No bowel obstruction. Mild thickening of the distal esophagus. The  appendix is normal. Minimal scattered diverticulosis.     Lung bases: Small subpleural nodules in the right lower lobe are  stable since 2014 (series 8, image 6).  Small hiatal hernia.     Bones and soft tissues: No acute or suspicious osseous abnormality.  Degenerative changes of the thoracolumbar spine with a chronic wedge  compression deformity of T12, stable since the MRI dated 12/28/2016.  Multilevel disc degeneration and Schmorl's nodes.         Impression:  1. Diffuse thickening of the bladder wall with abnormal mucosal  enhancement concerning for cystitis. Enlarged prostate which may  contribute to chronic bladder outlet obstruction.  2. Mild thickening of the distal esophagus which can be seen in  esophagitis.  Consider direct visualization.  3. Nonobstructive bilateral renal calculi.  4. Small hiatal hernia.    LABS: The last test results for Mr. Vini Loya were reviewed:  PSA -   Lab Results   Component Value Date    PSA 1.92 06/26/2013    PSA 3.39 12/08/2011    PSA 2.07 02/01/2011    PSA 1.76 07/20/2009     BMP -   Recent Labs   Lab Test  10/16/17   0743  10/15/17   2125  07/02/17   1844   06/24/15   0834   NA  136  138  143   < >  135   POTASSIUM  4.1  4.5  4.2   < >  4.1   CHLORIDE  100  102  107   < >  102   CO2  28  29  30   < >  22   BUN  18  17  17   < >  26   CR  1.17   1.12  1.09   < >  1.33*   GLC  115*  125*  126*   < >  113*   JEMMA  8.9  8.9  9.1   < >  8.5   MAG   --    --    --    --   1.6   PHOS   --    --    --    --   2.8    < > = values in this interval not displayed.       CBC -   Recent Labs   Lab Test  10/16/17   0743  10/15/17   2226  10/15/17   2125   WBC  17.4*  20.6*  Canceled, Test credited   HGB  14.6  15.9  Canceled, Test credited   PLT  211  211  Canceled, Test credited       ASSESSMENT:   1) Parkinson  2) BPH with obstructive symptoms  3) Hematuria (on 11/3 after recovering from UTI)  4) History of UTIs    PLAN:   - Continue FLomax once daily.   - UA micro reflex from 11/3 was fairly benign (WBC 5-10, RBC 2-5) --> UCx no growth  - urine sent for repeat testing today to assure no hematuria or infection.  If he is asymptomatic we would not necessarily need to treat bacteriuria, but this would need to be a discussion.   - Stick with Bactrim SS taken nightly to prevent infections - refilled today  - Stick with prunes taken regularly  - Return 1 year or sooner with problems    Lupe Hairston PA-C  Department of Urologic Surgery

## 2018-06-06 NOTE — PATIENT INSTRUCTIONS
- Continue Flomax once daily.   - Will send urine for testing  - Stick with prunes taken regularly  - Continue Bactrim SS taken nightly to prevent infections  - Return 1 year or sooner with problems    Lupe Hairston PA-C

## 2018-06-06 NOTE — LETTER
Vini Loya   [address]  [facility] [address]        Dear Mr. Loya:    I am writing you concerning your recent test results:    Results for orders placed or performed in visit on 06/06/18   UA with Microscopic reflex to Culture   Result Value Ref Range    Color Urine Yellow     Appearance Urine Slightly Cloudy     Glucose Urine Negative NEG^Negative mg/dL    Bilirubin Urine Negative NEG^Negative    Ketones Urine 5 (A) NEG^Negative mg/dL    Specific Gravity Urine 1.018 1.003 - 1.035    Blood Urine Negative NEG^Negative    pH Urine 6.0 5.0 - 7.0 pH    Protein Albumin Urine 30 (A) NEG^Negative mg/dL    Urobilinogen mg/dL 0.0 0.0 - 2.0 mg/dL    Nitrite Urine Negative NEG^Negative    Leukocyte Esterase Urine Negative NEG^Negative    Source Midstream Urine     WBC Urine 1 0 - 5 /HPF    RBC Urine 1 0 - 2 /HPF    Squamous Epithelial /HPF Urine 3 (H) 0 - 1 /HPF    Mucous Urine Present (A) NEG^Negative /LPF    Hyaline Casts 9 (H) 0 - 2 /LPF         This urine looks just fine.  There are absolutely no signs of infection.      Keep up the good work!    Please let me know if you have any questions.      Sincerely,      Evelyn Hairston PA-C  Physician Assistant Urology        [facility] UROLOGY AND [facility] FOR PROSTATE AND UROLOGIC CANCERS  [address] [address]  Phone: [phone]  Fax: [phone]

## 2018-06-12 ENCOUNTER — OFFICE VISIT (OUTPATIENT)
Dept: OPHTHALMOLOGY | Facility: CLINIC | Age: 78
End: 2018-06-12
Attending: OPHTHALMOLOGY
Payer: COMMERCIAL

## 2018-06-12 DIAGNOSIS — Z96.1 PSEUDOPHAKIA OF RIGHT EYE: Primary | ICD-10-CM

## 2018-06-12 PROCEDURE — G0463 HOSPITAL OUTPT CLINIC VISIT: HCPCS | Mod: ZF

## 2018-06-12 ASSESSMENT — CONF VISUAL FIELD
OD_SUPERIOR_NASAL_RESTRICTION: 3
OS_INFERIOR_TEMPORAL_RESTRICTION: 3
OS_SUPERIOR_TEMPORAL_RESTRICTION: 3
OD_INFERIOR_NASAL_RESTRICTION: 3
OD_SUPERIOR_TEMPORAL_RESTRICTION: 3
METHOD: COUNTING FINGERS
OD_INFERIOR_TEMPORAL_RESTRICTION: 3

## 2018-06-12 ASSESSMENT — VISUAL ACUITY
METHOD: SNELLEN - LINEAR
OD_SC: 20/70
OD_SC+: -2
OS_SC: 20/20
OS_SC+: -1
OD_PH_SC: 20/40-2

## 2018-06-12 ASSESSMENT — TONOMETRY
IOP_METHOD: APPLANATION
OS_IOP_MMHG: 11
OD_IOP_MMHG: 08

## 2018-06-12 ASSESSMENT — EXTERNAL EXAM - RIGHT EYE: OD_EXAM: NORMAL

## 2018-06-12 ASSESSMENT — SLIT LAMP EXAM - LIDS
COMMENTS: NORMAL
COMMENTS: NORMAL

## 2018-06-12 ASSESSMENT — EXTERNAL EXAM - LEFT EYE: OS_EXAM: NORMAL

## 2018-06-12 NOTE — PATIENT INSTRUCTIONS
No heavy lifting, bending, stooping, straining, rubbing the eye, or getting water in the eye.  Please wear protective eyewear over the eye at all times including glasses during the day and the protective shield at bedtime.  Patient will follow up with Dr. Cortez for glasses check prior to determining need for referral to cornea clinic.      Use the following drops (RIGHT EYE ONLY):  Antibiotic --  Vigamox: Discontinue    NSAID -- Acular LS (Ketorolac) 3x/day: for approximately 2 more more weeks after surgery (or until gone)    Steroid -- Pred Forte/Prednisolone Acetate:  3x/day for 1 week then, 2x/day for 1 week then, 1x/day for 1 week then, stop.    Please be sure to call if you have any decrease in vision, increase in pain, flashing lights, redness, or a lot of drainage.

## 2018-06-12 NOTE — NURSING NOTE
Chief Complaints and History of Present Illnesses   Patient presents with     Post Op (Ophthalmology) Right Eye     2 week post op CE/IOL RE     HPI    Affected eye(s):  Right   Symptoms:           Do you have eye pain now?:  No      Comments:  Per pt vision in RE is about the same as it was 2 weeks ago.     Migdalia Sims@ [facility] 2:58 PM June 12, 2018

## 2018-06-12 NOTE — MR AVS SNAPSHOT
After Visit Summary   6/12/2018    Vini Loya    MRN: [id number]           Patient Information     Date Of Birth          1940        Visit Information        Provider Department      6/12/2018 2:45 PM Yudith Mcdonnell MD [facility]        Today's Diagnoses     Pseudophakia of right eye    -  1      Care Instructions    No heavy lifting, bending, stooping, straining, rubbing the eye, or getting water in the eye.  Please wear protective eyewear over the eye at all times including glasses during the day and the protective shield at bedtime.  Patient will follow up with Dr. Cortez for glasses check prior to determining need for referral to cornea clinic.      Use the following drops (RIGHT EYE ONLY):  Antibiotic --  Vigamox: Discontinue    NSAID -- Acular LS (Ketorolac) 3x/day: for approximately 2 more more weeks after surgery (or until gone)    Steroid -- Pred Forte/Prednisolone Acetate:  3x/day for 1 week then, 2x/day for 1 week then, 1x/day for 1 week then, stop.    Please be sure to call if you have any decrease in vision, increase in pain, flashing lights, redness, or a lot of drainage.            Follow-ups after your visit        Your next 10 appointments already scheduled     Aug 15, 2018  1:15 PM CDT   (Arrive by 1:00 PM)   Return Movement Disorder with KOTA Dutta CNP   [facility] ([facility])    [address] [place] [address] [phone]              Who to contact     Please call your clinic at [phone] to:    Ask questions about your health    Make or cancel appointments    Discuss your medicines    Learn about your test results    Speak to your doctor            Additional Information About Your Visit        Care EveryWhere ID     This is your Care EveryWhere ID. This could be used by other organizations to access your [place] medical records  [id number]         Blood Pressure from Last 3  Encounters:   06/06/18 101/62   05/30/18 143/86   05/16/18 111/65    Weight from Last 3 Encounters:   06/06/18 83.9 kg (185 lb)   05/30/18 83.9 kg (185 lb)   05/16/18 83.9 kg (185 lb)              Today, you had the following     No orders found for display       Primary Care Provider Office Phone # Fax #    Home Daniel Irwin Wegener, [phone] [address]  [facility] [address]        Equal Access to Services     [facility]: Hadii aad ku hadasho Soomaali, waaxda luqadaha, qaybta kaalmada adeegyada, claudia ugalde . So [facility] [phone].    ATENCIÓN: Si habla español, tiene a florez disposición servicios gratuitos de asistencia lingüística. [facility] [phone].    We comply with applicable federal civil rights laws and [place] laws. We do not discriminate on the basis of race, color, national origin, age, disability, sex, sexual orientation, or gender identity.            Thank you!     Thank you for choosing [facility]  for your care. Our goal is always to provide you with excellent care. Hearing back from our patients is one way we can continue to improve our services. Please take a few minutes to complete the written survey that you may receive in the mail after your visit with us. Thank you!             Your Updated Medication List - Protect others around you: Learn how to safely use, store and throw away your medicines at www.disposemymeds.org.          This list is accurate as of 6/12/18  4:42 PM.  Always use your most recent med list.                   Brand Name Dispense Instructions for use Diagnosis    aspirin 81 MG tablet      1 TABLET DAILY    Unspecified essential hypertension, Cerebral embolism with cerebral infarction (H), CAD (coronary artery disease), Mixed hyperlipidemia       Blood Pressure Monitor Pricilla     1 Device    1 Device daily.    Hypertension goal BP (blood pressure) < 130/80       carbidopa-levodopa  MG per tablet    SINEMET     540 tablet    Take 2 tablets 3 times a day 1 hour before each meal.  (Around 7 am, 11 am, & 4 pm.)    Parkinson's disease (H)       ketorolac 0.5 % ophthalmic solution    ACULAR    1 Bottle    Place 1 drop into the right eye 3 times daily    Nuclear sclerosis of right eye       moxifloxacin 0.5 % ophthalmic solution    VIGAMOX    1 Bottle    Place 1 drop into the right eye 4 times daily    Nuclear sclerosis of right eye       PARoxetine 20 MG tablet    PAXIL    90 tablet    TAKE ONE TABLET BY MOUTH EVERY NIGHT AT BEDTIME    Anxiety       polyethylene glycol powder    MIRALAX    510 g    Take 17 g (1 capful) by mouth daily    Constipation, unspecified constipation type       prednisoLONE acetate 1 % ophthalmic susp    PRED FORTE    1 Bottle    Place 1 drop into the right eye 4 times daily    Nuclear sclerosis of right eye       rasagiline 1 MG Tabs tablet    AZILECT    30 each    We will have you take 1/2 tab daily for two weeks then increase to 1 tab daily after that.    Parkinson's disease (H)       senna 8.6 MG tablet    SENOKOT    120 tablet    Take 1 tablet by mouth 2 times daily And can increase to two tablets twice daily if needed .    Constipation, unspecified constipation type       simvastatin 40 MG tablet    ZOCOR    90 tablet    TAKE ONE TABLET BY MOUTH AT BEDTIME    Hyperlipidemia LDL goal <100       sulfamethoxazole-trimethoprim 400-80 MG per tablet    BACTRIM/SEPTRA    90 tablet    Take 1 tablet by mouth At Bedtime For UTI prevention    Urinary tract infection with hematuria, site unspecified       tamsulosin 0.4 MG capsule    FLOMAX    30 capsule    Take 1 capsule (0.4 mg) by mouth daily    Bradycardia

## 2018-06-12 NOTE — PROGRESS NOTES
1)Glaucoma Suspect -- K pachy: 516/499   Tmax:     HVF:Left HH      CDR:0.7/0.65     HRT/OCT:  RNFL WNL     FHX of Glc: No      Gonio:       Intolerant to:      Asthma/COPD: No, on po BB   Steroid Use: No    Kidney Stones:No     Sulfa Allergy: No     IOP targets: -- IOP good  2)NS OS -- on Flomax  3)MARLA  4)s/p PKP and LRIs OD -- ?Hx of HZ Keratitis -- Dr. Torres did PKP done at [facility] -- Pentacam done at [facility]   5)H/O CVA -- Left HH on HVF in 2007 (MRIs and CTs done previously)  6)Post PKP Irregular Astigmatism -- will plan to proceed with standard monofocal IOL and refer to Dr. Hayes for correction of residual astigmatism after CE  7)PCIOL OD    MD:pt with intention tremor     MD:Will call patient for follow up plan after visit and refraction with Dr. Cortez    No heavy lifting, bending, stooping, straining, rubbing the eye, or getting water in the eye.  Please wear protective eyewear over the eye at all times including glasses during the day and the protective shield at bedtime.  Patient will follow up with Dr. Cortez for glasses check prior to determining need for referral to cornea clinic.      Use the following drops (RIGHT EYE ONLY):  Antibiotic --  Vigamox: Discontinue    NSAID -- Acular LS (Ketorolac) 3x/day: for approximately 2 more more weeks after surgery (or until gone)    Steroid -- Pred Forte/Prednisolone Acetate:  3x/day for 1 week then, 2x/day for 1 week then, 1x/day for 1 week then, stop.    Please be sure to call if you have any decrease in vision, increase in pain, flashing lights, redness, or a lot of drainage.        Attending Physician Attestation:  Complete documentation of historical and exam elements from today's encounter can be found in the full encounter summary report (not reduplicated in this progress note). I personally obtained the chief complaint(s) and history of present illness.  I confirmed and edited as necessary the review of systems, past medical/surgical history, family  history, social history, and examination findings as documented by others; and I examined the patient myself. I personally reviewed the relevant tests, images, and reports as documented above. I formulated and edited as necessary the assessment and plan and discussed the findings and management plan with the patient and family.  - Yudith Mcdonnell MD

## 2018-06-27 ENCOUNTER — OFFICE VISIT (OUTPATIENT)
Dept: OPTOMETRY | Facility: CLINIC | Age: 78
End: 2018-06-27
Payer: COMMERCIAL

## 2018-06-27 DIAGNOSIS — H52.4 PRESBYOPIA: ICD-10-CM

## 2018-06-27 DIAGNOSIS — H52.211 IRREGULAR ASTIGMATISM OF RIGHT EYE: Primary | ICD-10-CM

## 2018-06-27 ASSESSMENT — VISUAL ACUITY
OD_SC: 20/40-1
METHOD: SNELLEN - LINEAR
OS_SC: 20/20-2

## 2018-06-27 ASSESSMENT — REFRACTION_MANIFEST
OD_AXIS: 170
OD_SPHERE: -3.00
OS_CYLINDER: SPHERE
OS_SPHERE: -0.25
OD_CYLINDER: +1.50

## 2018-06-27 NOTE — MR AVS SNAPSHOT
After Visit Summary   6/27/2018    Vini Loya    MRN: [id number]           Patient Information     Date Of Birth          1940        Visit Information        Provider Department      6/27/2018 10:00 AM Steph Cortez OD [facility]        Today's Diagnoses     Irregular astigmatism of right eye    -  1    Presbyopia           Follow-ups after your visit        Your next 10 appointments already scheduled     Aug 15, 2018  1:15 PM CDT   (Arrive by 1:00 PM)   Return Movement Disorder with KOTA Dutta [facility] ([facility])    [address] [address] [phone]              Who to contact     Please call your clinic at [phone] to:    Ask questions about your health    Make or cancel appointments    Discuss your medicines    Learn about your test results    Speak to your doctor            Additional Information About Your Visit        Care EveryWhere ID     This is your Care EveryWhere ID. This could be used by other organizations to access your [place] medical records  [id number]         Blood Pressure from Last 3 Encounters:   06/06/18 101/62   05/30/18 143/86   05/16/18 111/65    Weight from Last 3 Encounters:   06/06/18 83.9 kg (185 lb)   05/30/18 83.9 kg (185 lb)   05/16/18 83.9 kg (185 lb)              We Performed the Following     REFRACTION [[id number]]        Primary Care Provider Office Phone # Fax #    Joel Daniel Irwin Wegener, [phone] [address]  [facility] [address]        Equal Access to Services     CHRISTOPHE JORDAN AH: Hadii aad ku hadasho Soomaali, waaxda luqadaha, qaybta kaalmada adeegyada, waxay christiin haysheryln brayan jeong. So [facility] [phone].    ATENCIÓN: Si habla español, tiene a florez disposición servicios gratuitos de asistencia lingüística. Llame al [phone].    We comply with applicable federal civil rights laws and [place] laws. We  do not discriminate on the basis of race, color, national origin, age, disability, sex, sexual orientation, or gender identity.            Thank you!     Thank you for choosing [facility]  for your care. Our goal is always to provide you with excellent care. Hearing back from our patients is one way we can continue to improve our services. Please take a few minutes to complete the written survey that you may receive in the mail after your visit with us. Thank you!             Your Updated Medication List - Protect others around you: Learn how to safely use, store and throw away your medicines at www.disposemymeds.org.          This list is accurate as of 6/27/18 12:26 PM.  Always use your most recent med list.                   Brand Name Dispense Instructions for use Diagnosis    aspirin 81 MG tablet      1 TABLET DAILY    Unspecified essential hypertension, Cerebral embolism with cerebral infarction (H), CAD (coronary artery disease), Mixed hyperlipidemia       Blood Pressure Monitor Pricilla     1 Device    1 Device daily.    Hypertension goal BP (blood pressure) < 130/80       carbidopa-levodopa  MG per tablet    SINEMET    540 tablet    Take 2 tablets 3 times a day 1 hour before each meal.  (Around 7 am, 11 am, & 4 pm.)    Parkinson's disease (H)       ketorolac 0.5 % ophthalmic solution    ACULAR    1 Bottle    Place 1 drop into the right eye 3 times daily    Nuclear sclerosis of right eye       moxifloxacin 0.5 % ophthalmic solution    VIGAMOX    1 Bottle    Place 1 drop into the right eye 4 times daily    Nuclear sclerosis of right eye       PARoxetine 20 MG tablet    PAXIL    90 tablet    TAKE ONE TABLET BY MOUTH EVERY NIGHT AT BEDTIME    Anxiety       polyethylene glycol powder    MIRALAX    510 g    Take 17 g (1 capful) by mouth daily    Constipation, unspecified constipation type       prednisoLONE acetate 1 % ophthalmic susp    PRED FORTE    1 Bottle    Place 1 drop into the right eye 4 times daily     Nuclear sclerosis of right eye       rasagiline 1 MG Tabs tablet    AZILECT    30 each    We will have you take 1/2 tab daily for two weeks then increase to 1 tab daily after that.    Parkinson's disease (H)       senna 8.6 MG tablet    SENOKOT    120 tablet    Take 1 tablet by mouth 2 times daily And can increase to two tablets twice daily if needed .    Constipation, unspecified constipation type       simvastatin 40 MG tablet    ZOCOR    90 tablet    TAKE ONE TABLET BY MOUTH AT BEDTIME    Hyperlipidemia LDL goal <100       sulfamethoxazole-trimethoprim 400-80 MG per tablet    BACTRIM/SEPTRA    90 tablet    Take 1 tablet by mouth At Bedtime For UTI prevention    Urinary tract infection with hematuria, site unspecified       tamsulosin 0.4 MG capsule    FLOMAX    30 capsule    Take 1 capsule (0.4 mg) by mouth daily    Bradycardia

## 2018-06-27 NOTE — PROGRESS NOTES
A/P  1.) Irregular astigmatism OD s/p PK   -BCVA with updated spec correction 20/30+2  -Pt is strongly left eye dominant, does not notice much difference with right eye correction  -He is also interestingly doing well at distance and near without correction today  -Can start with +1.50 OTC readers for small print/extended reading. If eyestrain, would recommend getting prescription glasses and giving 2 weeks to get used to them.   -Discussed possible use of CL but he is happy with current level of vision and prefers glasses if anything at this time    Continue f/u care with Dr. Mcdonnell.

## 2018-07-24 DIAGNOSIS — G20.A1 PARKINSON'S DISEASE (H): ICD-10-CM

## 2018-07-24 RX ORDER — RASAGILINE 1 MG/1
TABLET ORAL
Qty: 30 EACH | Refills: 0 | Status: SHIPPED | OUTPATIENT
Start: 2018-07-24 | End: 2018-08-20

## 2018-08-10 ENCOUNTER — OFFICE VISIT (OUTPATIENT)
Dept: FAMILY MEDICINE | Facility: CLINIC | Age: 78
End: 2018-08-10
Payer: COMMERCIAL

## 2018-08-10 ENCOUNTER — TELEPHONE (OUTPATIENT)
Dept: FAMILY MEDICINE | Facility: CLINIC | Age: 78
End: 2018-08-10

## 2018-08-10 VITALS
DIASTOLIC BLOOD PRESSURE: 65 MMHG | RESPIRATION RATE: 18 BRPM | WEIGHT: 181 LBS | SYSTOLIC BLOOD PRESSURE: 105 MMHG | HEART RATE: 75 BPM | BODY MASS INDEX: 27.52 KG/M2 | OXYGEN SATURATION: 94 % | TEMPERATURE: 97.5 F

## 2018-08-10 DIAGNOSIS — R42 DIZZINESS: Primary | ICD-10-CM

## 2018-08-10 LAB
ALBUMIN UR-MCNC: 30 MG/DL
ANION GAP SERPL CALCULATED.3IONS-SCNC: 10 MMOL/L (ref 3–14)
APPEARANCE UR: CLEAR
BACTERIA #/AREA URNS HPF: ABNORMAL /HPF
BASOPHILS # BLD AUTO: 0 10E9/L (ref 0–0.2)
BASOPHILS NFR BLD AUTO: 0.2 %
BILIRUB UR QL STRIP: ABNORMAL
BUN SERPL-MCNC: 24 MG/DL (ref 7–30)
CALCIUM SERPL-MCNC: 8.8 MG/DL (ref 8.5–10.1)
CAOX CRY #/AREA URNS HPF: ABNORMAL /HPF
CHLORIDE SERPL-SCNC: 103 MMOL/L (ref 94–109)
CO2 SERPL-SCNC: 26 MMOL/L (ref 20–32)
COLOR UR AUTO: YELLOW
CREAT SERPL-MCNC: 1.62 MG/DL (ref 0.66–1.25)
DIFFERENTIAL METHOD BLD: NORMAL
EOSINOPHIL # BLD AUTO: 0.1 10E9/L (ref 0–0.7)
EOSINOPHIL NFR BLD AUTO: 1 %
ERYTHROCYTE [DISTWIDTH] IN BLOOD BY AUTOMATED COUNT: 13.6 % (ref 10–15)
GFR SERPL CREATININE-BSD FRML MDRD: 41 ML/MIN/1.7M2
GLUCOSE SERPL-MCNC: 162 MG/DL (ref 70–99)
GLUCOSE UR STRIP-MCNC: NEGATIVE MG/DL
HCT VFR BLD AUTO: 44.7 % (ref 40–53)
HGB BLD-MCNC: 14.7 G/DL (ref 13.3–17.7)
HGB UR QL STRIP: NEGATIVE
KETONES UR STRIP-MCNC: ABNORMAL MG/DL
LEUKOCYTE ESTERASE UR QL STRIP: NEGATIVE
LYMPHOCYTES # BLD AUTO: 1.6 10E9/L (ref 0.8–5.3)
LYMPHOCYTES NFR BLD AUTO: 17.5 %
MCH RBC QN AUTO: 30.8 PG (ref 26.5–33)
MCHC RBC AUTO-ENTMCNC: 32.9 G/DL (ref 31.5–36.5)
MCV RBC AUTO: 94 FL (ref 78–100)
MONOCYTES # BLD AUTO: 0.9 10E9/L (ref 0–1.3)
MONOCYTES NFR BLD AUTO: 10.2 %
NEUTROPHILS # BLD AUTO: 6.4 10E9/L (ref 1.6–8.3)
NEUTROPHILS NFR BLD AUTO: 71.1 %
NITRATE UR QL: NEGATIVE
NON-SQ EPI CELLS #/AREA URNS LPF: ABNORMAL /LPF
PH UR STRIP: 6 PH (ref 5–7)
PLATELET # BLD AUTO: 234 10E9/L (ref 150–450)
POTASSIUM SERPL-SCNC: 4.1 MMOL/L (ref 3.4–5.3)
RBC # BLD AUTO: 4.77 10E12/L (ref 4.4–5.9)
RBC #/AREA URNS AUTO: ABNORMAL /HPF
SODIUM SERPL-SCNC: 139 MMOL/L (ref 133–144)
SOURCE: ABNORMAL
SP GR UR STRIP: 1.02 (ref 1–1.03)
UROBILINOGEN UR STRIP-ACNC: 0.2 EU/DL (ref 0.2–1)
WBC # BLD AUTO: 9 10E9/L (ref 4–11)
WBC #/AREA URNS AUTO: ABNORMAL /HPF

## 2018-08-10 PROCEDURE — 81001 URINALYSIS AUTO W/SCOPE: CPT | Performed by: FAMILY MEDICINE

## 2018-08-10 PROCEDURE — 85025 COMPLETE CBC W/AUTO DIFF WBC: CPT | Performed by: FAMILY MEDICINE

## 2018-08-10 PROCEDURE — 36415 COLL VENOUS BLD VENIPUNCTURE: CPT | Performed by: FAMILY MEDICINE

## 2018-08-10 PROCEDURE — 99207 ZZC FOR CODING REVIEW: CPT | Performed by: FAMILY MEDICINE

## 2018-08-10 PROCEDURE — 80048 BASIC METABOLIC PNL TOTAL CA: CPT | Performed by: FAMILY MEDICINE

## 2018-08-10 PROCEDURE — 99214 OFFICE O/P EST MOD 30 MIN: CPT | Performed by: FAMILY MEDICINE

## 2018-08-10 NOTE — MR AVS SNAPSHOT
After Visit Summary   8/10/2018    Vini Loya    MRN: [id number]           Patient Information     Date Of Birth          1940        Visit Information        Provider Department      8/10/2018 1:00 PM Marianne French MD [facility]        Today's Diagnoses     Dizziness    -  1       Follow-ups after your visit        Your next 10 appointments already scheduled     Aug 15, 2018  1:15 PM CDT   (Arrive by 1:00 PM)   Return Movement Disorder with KOTA Dutta [facility] ([facility])    [address] [place] [address] [phone]              Who to contact     If you have questions or need follow up information about today's clinic visit or your schedule please contact [facility] directly at [phone].  Normal or non-critical lab and imaging results will be communicated to you by MyChart, letter or phone within 4 business days after the clinic has received the results. If you do not hear from us within 7 days, please contact the clinic through MyChart or phone. If you have a critical or abnormal lab result, we will notify you by phone as soon as possible.  Submit refill requests through Appdra or call your pharmacy and they will forward the refill request to us. Please allow 3 business days for your refill to be completed.          Additional Information About Your Visit        Care EveryWhere ID     This is your Care EveryWhere ID. This could be used by other organizations to access your [place] medical records  [id number]        Your Vitals Were     Pulse Temperature Respirations Pulse Oximetry BMI (Body Mass Index)       75 97.5  F (36.4  C) (Oral) 18 94% 27.52 kg/m2        Blood Pressure from Last 3 Encounters:   08/10/18 105/65   06/06/18 101/62   05/30/18 143/86    Weight from Last 3 Encounters:   08/10/18 181 lb (82.1 kg)   06/06/18 185 lb (83.9 kg)   05/30/18  185 lb (83.9 kg)              We Performed the Following     *UA reflex to Microscopic and Culture ([facility] (except [place] and [place])     Basic metabolic panel     CBC with platelets and differential     Urine Microscopic        Primary Care Provider Office Phone # Fax #    Joel Daniel Irwin Wegener, [phone]       [address]  [facility] [address]        Equal Access to Services     AMEENA JORDAN : Hadii aad ku hadasho Soomaali, waaxda luqadaha, qaybta kaalmada adeegyada, waxay idiin hayaan adeeg kharash la'aan ah. So [facility] [phone].    ATENCIÓN: Si habla español, tiene a florez disposición servicios gratuitos de asistencia lingüística. Llame al [phone].    We comply with applicable federal civil rights laws and [place] laws. We do not discriminate on the basis of race, color, national origin, age, disability, sex, sexual orientation, or gender identity.            Thank you!     Thank you for choosing [facility]  for your care. Our goal is always to provide you with excellent care. Hearing back from our patients is one way we can continue to improve our services. Please take a few minutes to complete the written survey that you may receive in the mail after your visit with us. Thank you!             Your Updated Medication List - Protect others around you: Learn how to safely use, store and throw away your medicines at www.disposemymeds.org.          This list is accurate as of 8/10/18  5:39 PM.  Always use your most recent med list.                   Brand Name Dispense Instructions for use Diagnosis    aspirin 81 MG tablet      1 TABLET DAILY    Unspecified essential hypertension, Cerebral embolism with cerebral infarction (H), CAD (coronary artery disease), Mixed hyperlipidemia       Blood Pressure Monitor Pricilla     1 Device    1 Device daily.    Hypertension goal BP (blood pressure) < 130/80       carbidopa-levodopa  MG per tablet     SINEMET    540 tablet    Take 2 tablets 3 times a day 1 hour before each meal.  (Around 7 am, 11 am, & 4 pm.)    Parkinson's disease (H)       ketorolac 0.5 % ophthalmic solution    ACULAR    1 Bottle    Place 1 drop into the right eye 3 times daily    Nuclear sclerosis of right eye       moxifloxacin 0.5 % ophthalmic solution    VIGAMOX    1 Bottle    Place 1 drop into the right eye 4 times daily    Nuclear sclerosis of right eye       PARoxetine 20 MG tablet    PAXIL    90 tablet    TAKE ONE TABLET BY MOUTH EVERY NIGHT AT BEDTIME    Anxiety       polyethylene glycol powder    MIRALAX    510 g    Take 17 g (1 capful) by mouth daily    Constipation, unspecified constipation type       prednisoLONE acetate 1 % ophthalmic susp    PRED FORTE    1 Bottle    Place 1 drop into the right eye 4 times daily    Nuclear sclerosis of right eye       rasagiline 1 MG Tabs tablet    AZILECT    30 each    We will have you take 1/2 tab daily for two weeks then increase to 1 tab daily after that.    Parkinson's disease (H)       senna 8.6 MG tablet    SENOKOT    120 tablet    Take 1 tablet by mouth 2 times daily And can increase to two tablets twice daily if needed .    Constipation, unspecified constipation type       simvastatin 40 MG tablet    ZOCOR    90 tablet    TAKE ONE TABLET BY MOUTH AT BEDTIME    Hyperlipidemia LDL goal <100       sulfamethoxazole-trimethoprim 400-80 MG per tablet    BACTRIM/SEPTRA    90 tablet    Take 1 tablet by mouth At Bedtime For UTI prevention    Urinary tract infection with hematuria, site unspecified       tamsulosin 0.4 MG capsule    FLOMAX    30 capsule    Take 1 capsule (0.4 mg) by mouth daily    Bradycardia

## 2018-08-10 NOTE — NURSING NOTE
Vitals:    08/10/18 1259 08/10/18 1310 08/10/18 1311 08/10/18 1312   BP: 96/59 130/74 113/72 105/65   BP Location: Right arm Right arm Right arm Right arm   Patient Position: Sitting Supine Sitting Standing   Cuff Size: Adult Regular Adult Regular Adult Regular Adult Regular   Pulse: 71 74 80 75   Resp: 18      Temp: 97.5  F (36.4  C)      TempSrc: Oral      SpO2: 94%      Weight: 181 lb (82.1 kg)            Roxanna García MA

## 2018-08-10 NOTE — LETTER
August 13, 2018      Vini Loya  [address]  [facility] [address]        Dear Vini and Kristi,    Here are Vini's recent lab results, as we discussed over the phone.  His kidney function tests are abnormal (creatinine and GFR).  This may be related to the low blood pressures he has been having.  This should be rechecked with Dr. Wegener or your neurologist within a week.  Stay well-hydrated, and avoid any ibuprofen-type medications for now.  The blood counts and urine sample were fine.       Results for orders placed or performed in visit on 08/10/18   *UA reflex to Microscopic and Culture ([facility] [place] [facility] (except [place] and [place])   Result Value Ref Range    Color Urine Yellow     Appearance Urine Clear     Glucose Urine Negative NEG^Negative mg/dL    Bilirubin Urine Small (A) NEG^Negative    Ketones Urine Trace (A) NEG^Negative mg/dL    Specific Gravity Urine 1.025 1.003 - 1.035    Blood Urine Negative NEG^Negative    pH Urine 6.0 5.0 - 7.0 pH    Protein Albumin Urine 30 (A) NEG^Negative mg/dL    Urobilinogen Urine 0.2 0.2 - 1.0 EU/dL    Nitrite Urine Negative NEG^Negative    Leukocyte Esterase Urine Negative NEG^Negative    Source Midstream Urine    CBC with platelets and differential   Result Value Ref Range    WBC 9.0 4.0 - 11.0 10e9/L    RBC Count 4.77 4.4 - 5.9 10e12/L    Hemoglobin 14.7 13.3 - 17.7 g/dL    Hematocrit 44.7 40.0 - 53.0 %    MCV 94 78 - 100 fl    MCH 30.8 26.5 - 33.0 pg    MCHC 32.9 31.5 - 36.5 g/dL    RDW 13.6 10.0 - 15.0 %    Platelet Count 234 150 - 450 10e9/L    Diff Method Automated Method     % Neutrophils 71.1 %    % Lymphocytes 17.5 %    % Monocytes 10.2 %    % Eosinophils 1.0 %    % Basophils 0.2 %    Absolute Neutrophil 6.4 1.6 - 8.3 10e9/L    Absolute Lymphocytes 1.6 0.8 - 5.3 10e9/L    Absolute Monocytes 0.9 0.0 - 1.3 10e9/L    Absolute Eosinophils 0.1 0.0 - 0.7 10e9/L    Absolute Basophils 0.0 0.0 - 0.2 10e9/L   Basic metabolic panel   Result  Value Ref Range    Sodium 139 133 - 144 mmol/L    Potassium 4.1 3.4 - 5.3 mmol/L    Chloride 103 94 - 109 mmol/L    Carbon Dioxide 26 20 - 32 mmol/L    Anion Gap 10 3 - 14 mmol/L    Glucose 162 (H) 70 - 99 mg/dL    Urea Nitrogen 24 7 - 30 mg/dL    Creatinine 1.62 (H) 0.66 - 1.25 mg/dL    GFR Estimate 41 (L) >60 mL/min/1.7m2    GFR Estimate If Black 50 (L) >60 mL/min/1.7m2    Calcium 8.8 8.5 - 10.1 mg/dL   Urine Microscopic   Result Value Ref Range    WBC Urine 0 - 5 OTO5^0 - 5 /HPF    RBC Urine O - 2 OTO2^O - 2 /HPF    Squamous Epithelial /LPF Urine Moderate (A) FEW^Few /LPF    Bacteria Urine Few (A) NEG^Negative /HPF    Calcium Oxalate Few (A) NEG^Negative /HPF           Sincerely,        Marianne French MD/yumikon

## 2018-08-10 NOTE — TELEPHONE ENCOUNTER
Patient c/o dizziness and lightheadedness for the past couple of days when ambulating. Took his BP this morning 94/49. I asked him to take one while on the phone 109/59. Is not dizzy or lightheaded when sitting down. Denies chest pain, SOB, palpitations, elevated HR. Denies feeling dehydrated, drinking 6-8 glasses water/ juice. Urine is light yellow. Denies drinking alcohol. Denies headache. Fell a couple of days ago, did not hit his head. A/o. Recently taken off his HTN BP medication d/t hypotension. Recently started on rasagiline (AZILECT) 1 MG TABS tablet for Parkinson's. Originally made appt for Monday, rescheduled to today with Manolo at 1 PM. Patient in agreement with plan and verbalizes understanding.     I advised patient to take it slow, especially when standing and walking. Does not have a walker. Has his wife with him.     Estrella Art RN, BSN
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
## 2018-08-15 ENCOUNTER — OFFICE VISIT (OUTPATIENT)
Dept: NEUROLOGY | Facility: CLINIC | Age: 78
End: 2018-08-15
Payer: COMMERCIAL

## 2018-08-15 VITALS
WEIGHT: 183.2 LBS | OXYGEN SATURATION: 94 % | DIASTOLIC BLOOD PRESSURE: 76 MMHG | BODY MASS INDEX: 27.13 KG/M2 | HEART RATE: 97 BPM | TEMPERATURE: 97.3 F | RESPIRATION RATE: 17 BRPM | HEIGHT: 69 IN | SYSTOLIC BLOOD PRESSURE: 120 MMHG

## 2018-08-15 DIAGNOSIS — I95.1 ORTHOSTATIC HYPOTENSION: ICD-10-CM

## 2018-08-15 DIAGNOSIS — G20.A1 PARKINSON'S DISEASE (H): Primary | ICD-10-CM

## 2018-08-15 ASSESSMENT — UNIFIED PARKINSONS DISEASE RATING SCALE (UPDRS)
RIGIDITY_NECK: MILD: RIGIDITY DETECTED WITHOUT THE ACTIVATION MANEUVER.  FULL RANGE OF MOTION IS EASILY ACHIEVED.
HANDMOVEMENTS_LEFT: MILD: ANY OF THE FOLLOWING: A) 3 TO 5 INTERRUPTIONS DURING TAPPING B) MILD SLOWING C) THE AMPLITUDE DECREMENTS MIDWAY IN THE 10-MOVEMENT SEQUENCE
SPONTANEITY_OF_MOVEMENT: 2: MILD: MILD GLOBAL SLOWNESS AND POVERTY OF SPONTANEOUS MOVEMENTS.
GAIT: NORMAL
PRONATION_SUPINATION_RIGHT: SLIGHT: ANY OF THE FOLLOWING: A) THE REGULAR RHYTHM IS BROKEN WITH ONE WITH ONE OR TWO INTERRUPTIONS OR HESITATIONS OF THE MOVEMENT B) SLIGHT SLOWING C) THE AMPLITUDE DECREMENTS NEAR THE END OF THE 10 MOVEMENTS.
AMPLITUDE_RLE: NORMAL: NO TREMOR.
RIGIDITY_LLE: MILD: RIGIDITY DETECTED WITHOUT THE ACTIVATION MANEUVER.  FULL RANGE OF MOTION IS EASILY ACHIEVED.
TOTAL_SCORE: 41
ARISING_CHAIR: NORMAL: ABLE TO ARISE QUICKLY WITHOUT HESITATION.
RIGIDITY_LUE: MILD: RIGIDITY DETECTED WITHOUT THE ACTIVATION MANEUVER.  FULL RANGE OF MOTION IS EASILY ACHIEVED.
FINGER_TAPPING_RIGHT: SLIGHT: ANY OF THE FOLLOWING: A) THE REGULAR RHYTHM IS BROKEN WITH ONE WITH ONE OR TWO INTERRUPTIONS OR HESITATIONS OF THE MOVEMENT B) SLIGHT SLOWING C) THE AMPLITUDE DECREMENTS NEAR THE END OF THE 10 MOVEMENTS.
POSTURAL_STABILITY: SLIGHT: 3-5 STEPS, BUT RECOVERS UNAIDED.
FINGER_TAPPING_LEFT: MILD: ANY OF THE FOLLOWING: A) 3 TO 5 INTERRUPTIONS DURING TAPPING B) MILD SLOWING C) THE AMPLITUDE DECREMENTS MIDWAY IN THE 10-MOVEMENT SEQUENCE
FREEZING_GAIT: NORMAL
AMPLITUDE_LUE: MILD > 1 CM BUT < 3 CM IN MAXIMAL AMPLITUDE.
TOTAL_SCORE_LEFT: 15
TOETAPPING_RIGHT: SLIGHT: ANY OF THE FOLLOWING: A) THE REGULAR RHYTHM IS BROKEN WITH ONE WITH ONE OR TWO INTERRUPTIONS OR HESITATIONS OF THE MOVEMENT B) SLIGHT SLOWING C) THE AMPLITUDE DECREMENTS NEAR THE END OF THE 10 MOVEMENTS.
CONSTANCY_TREMOR_ATREST: MODERATE: TREMOR AT REST IS PRESENT 51-75% OF THE ENTIRE EXAMINATION PERIOD.
POSTURE: 3 MODERATE.  STOOPED POSTURE, SCOLIOSIS, OR LEANING TO ONE SIDE THAT CANNOT BE CORRECTED VOLITIONALLY TO A NORMAL POSTURE BY THE PATIENT.
PRONATION_SUPINATION_LEFT: SLIGHT: ANY OF THE FOLLOWING: A) THE REGULAR RHYTHM IS BROKEN WITH ONE WITH ONE OR TWO INTERRUPTIONS OR HESITATIONS OF THE MOVEMENT B) SLIGHT SLOWING C) THE AMPLITUDE DECREMENTS NEAR THE END OF THE 10 MOVEMENTS.
RIGIDITY_RUE: MILD: RIGIDITY DETECTED WITHOUT THE ACTIVATION MANEUVER.  FULL RANGE OF MOTION IS EASILY ACHIEVED.
RIGIDITY_RLE: SLIGHT: RIGIDITY ONLY DETECTED WITH ACTIVATION MANEUVER.
PARKINSONS_MEDS: ON
AXIAL_SCORE: 11
TOTAL_SCORE: 9
AMPLITUDE_LLE: MODERATE: 3 - 10 CM IN MAXIMAL AMPLITUDE.
LEG_AGILITY_LEFT: NORMAL
LEG_AGILITY_RIGHT: SLIGHT: ANY OF THE FOLLOWING: A) THE REGULAR RHYTHM IS BROKEN WITH ONE WITH ONE OR TWO INTERRUPTIONS OR HESITATIONS OF THE MOVEMENT B) SLIGHT SLOWING C) THE AMPLITUDE DECREMENTS NEAR THE END OF THE 10 MOVEMENTS.
FACIAL_EXPRESSION: MILD: IN ADDITION TO DECREASED EYE-BLINK FREQUENCY, MASKED FACIES PRESENT IN THE LOWER FACE AS WELL, NAMELY FEWER MOVEMENTS AROUND THE MOUTH, SUCH AS LESS SPONTANEOUS SMILING, BUT LIPS NOT PARTED.
TOETAPPING_LEFT: NORMAL
AMPLITUDE_LIP_JAW: MODERATE: > 2 CM BUT < 3 CM IN MAXIMAL AMPLITUDE.
SPEECH: SLIGHT: LOSS OF MODULATION, DICTION OR VOLUME, BUT STILL ALL WORDS EASY TO UNDERSTAND.
AMPLITUDE_RUE: NORMAL: NO TREMOR.
HANDMOVEMENTS_RIGHT: SLIGHT: ANY OF THE FOLLOWING: A) THE REGULAR RHYTHM IS BROKEN WITH ONE WITH ONE OR TWO INTERRUPTIONS OR HESITATIONS OF THE MOVEMENT B) SLIGHT SLOWING C) THE AMPLITUDE DECREMENTS NEAR THE END OF THE 10 MOVEMENTS.

## 2018-08-15 ASSESSMENT — MOVEMENT DISORDERS SOCIETY - UNIFIED PARKINSONS DISEASE RATING SCALE (MDS-UPDRS)
HOBBIES_AND_OTHER_ACTIVITIES: NORMAL:  NOT AT ALL (NO PROBLEMS).
SALIVA_AND_DROOLING: NORMAL: NOT AT ALL (NO PROBLEMS).
CHEWING_AND_SWALLOWING: NORMAL: NO PROBLEMS.
HANDWRITING: NORMAL: NOT AT ALL (NO PROBLEMS).
SPEECH: SLIGHT: MY SPEECH IS SOFT, SLURRED OR UNEVEN, BUT IT DOES NOT CAUSE OTHERS TO ASK ME TO REPEAT MYSELF.
EATING_TASKS: NORMAL: NOT AT ALL (NO PROBLEMS).
DRESSING: NORMAL: NOT AT ALL (NO PROBLEMS).
HYGIENE: NORMAL: NOT AT ALL (NO PROBLEMS).

## 2018-08-15 ASSESSMENT — PAIN SCALES - GENERAL: PAINLEVEL: NO PAIN (0)

## 2018-08-15 NOTE — PATIENT INSTRUCTIONS
August 15, 2018    Dear Mr. Vini Loya,    Thank you for coming today.  During your visit, we have discussed the following:     __  Stop the Azilect.     __  Continue with Sinemet.    PD Medications 7 am  11 am  4 pm   Sinemet 25/100 mg  2 2 2         __  Take your Blood pressure & pulse sitting, then stand 3 times a day for 7 days & mail it.  When you take your standing blood pressure, wait 2 minutes & take it.  Also, when you feel dizzy check your sitting & standing blood pressure & pulse the same way as needed.     __  Return in 3 months. You may return sooner as needed.      For questions, you may send us a vocaltap message or call [phone]    Fax number: [phone]    KOTA Stone, [facility]      _________________________________________________________________________    ORTHOSTATIC HYPOTENSION (LOW BLOOD PRESSURE)    Orthostatic hypotension (OH) is a substantial drop in blood pressure upon standing as compared to seated or lying pressures. Classically OH is present when there is a drop greater than 30 mm Hg systolic or 15 mg diastolic from lying to standing position. Crucial to OH is whether there are symptoms that accompany the drop and these most often consist of light-headedness, or dizziness, and fainting. Other symptoms include fatigue and confusion. In PD, blood pressure changes are most often due to autonomic nervous system impairment and /or the use of anti-Parkinson medications. Some individuals who have OH may also experience intermittent high blood pressure, usually while in the supine position, and at night.    A milde form of OH can be seen in some patients with PD. More commonly, OH in PD is due to medication side effects. Most medications have been implicated, but in particular, dopamine agonists, levodopa, amantadine, and anticholinergic medications.    Blood pressure monitoring is important to ensure that OH is being treated adequately but not so overtreated  that the blood pressure goes too high. Blood pressure is lowest in the morning and drops in blood pressure are typically the greatest at this time, so it is a good time to do the readings. A pulse measurement should also be recorded. Consider checking the blood pressure and pulse after breakfast, in the afternoon after lunch, and at bedtime. This consistent checking may give a sense of the degree of variability in blood pressure that occurs throughout the day. In addition to just recording numbers it is important also to record how the person feels when blood pressure is measured. Not everyone who has a large drop in blood pressure feels dizzy or has other symptoms from OH, and therefore not everyone needs to be treated.    Nonpharmacological Management      Purchase and correctly use a home blood pressure monitor.    Check blood pressure both the lying and standing. A common mistake is to record only a seated pressure.    Properly rise from a seated position. The patient should sit on the edge of the bed or a chair for a few minutes, and then stand up slowly, holding onto something stable for a few seconds before attempting to walk.    Sleep with the head of the bed elevated about 6 -8 inches.    Use compression /support hose (Jobst ) support stockings during the day.    Elevate legs periodically during the day.    Increase fluids (at least 8 glasses of water per day).    Increase salt and caffeine in the diet.    Eat five small meals a day because blood pressure is lowered after a large meal.    Avoid hot showers or excessive heat.    Information extracted from a book by Yasmany Raza MD: Kyung Snyder RN, MS: Dea Stafford, HennyD, [facility]: Jose Carroll MD. 2009.  Parkinson s Disease: A Guide to Patient Care.

## 2018-08-15 NOTE — PROGRESS NOTES
PATIENT: Vini Loya    : 1940    ALBINA: August 15, 2018    REASON FOR VISIT: Discuss dizziness, and follow up for parkinson's disease (PD.)    HPI: Mr. Vini Loya is a 77 year old  male who came to the [facility] neurology clinic accompanied by his wife for a follow up visit. I saw him last in clinic on 2018 for a routine follow-up visit.  During that visit, the following were discussed: -    ASSESSMENT/PLAN:     Parkinson's Disease:  Patient has about a 9 year history of PD.  According to him, his symptoms (mainly termor) is well controlled.  His wife report he's shaking all the time.  He is not taking his medications as recommended.      __  We had a long discussion about medication adherence for tremor control.  __  Since he has been taking only 1 out of the 3 doses, he was encouraged to set an alarm reminder to take medications as prescribed.    __  [facility] will [facility]ilect.  Printed Rx for Sinemet & Azilect given.      PD Medications 7 am 11 am, 4 pm   Sinemet 25/100 mg  2 2 2   Azilect 1 mg 1          __  Will continue to exercise regularly.   __  Encouraged to make a follow up appointment with [facility]rology Clinic (for 2018) with Lupe Hairston PA-C.  He hasn't seen provider since his Flomax dose was reduced by Dr. Dickinson.   __  [facility].     __  Okay to cancel f/u appointment with Dr. Dickinson for Botox injections pre patient's request as he didn't like the injection.      Today, he comes to clinic and reports having intermittent dizziness & low BP.  Last week, he was getting more dizzy and his blood pressure in the 95's/45.  He went to see a different family practice provider as his PCP didn't have opening.   He was found to have orthostatic hypotension.   Pt decided to stop taking Azilect since Friday 8/10/2018.  He has checked his BP in the last 2 days and it was in  "the 130/70's.  Pt reports taking his Sinemet as prescribed.       PD Medications 7 am  11 am  4 pm   Sinemet 25/100 mg  2 2 2     [facility] reports that [facility] for Rx refill.     He reports having fatigue.   \"I don't have energy.\"  He reports his wife thinks \"I'm lazy.\"  She responded and said that she didn't.  He is taking a nap daily.    He has been riding his bike every other day.  He is going to the gym once a week.  He is walking daily.        MEDICATIONS:   Medication Sig     ASPIRIN 81 MG OR TABS 1 TABLET DAILY     Blood Pressure Monitor JERONIMO 1 Device daily.     carbidopa-levodopa (SINEMET)  MG per tablet Take 2 tablets 3 times a day 1 hour before each meal.  (Around 7 am, 11 am, & 4 pm.)     PARoxetine (PAXIL) 20 MG tablet TAKE ONE TABLET BY MOUTH EVERY NIGHT AT BEDTIME     polyethylene glycol (MIRALAX) powder Take 17 g (1 capful) by mouth daily     senna (SENOKOT) 8.6 MG tablet Take 1 tablet by mouth 2 times daily And can increase to two tablets twice daily if needed .     simvastatin (ZOCOR) 40 MG tablet TAKE ONE TABLET BY MOUTH AT BEDTIME     sulfamethoxazole-trimethoprim (BACTRIM/SEPTRA) 400-80 MG per tablet Take 1 tablet by mouth At Bedtime For UTI prevention     tamsulosin (FLOMAX) 0.4 MG capsule Take 1 capsule (0.4 mg) by mouth daily       ALLERGIES: Vytorin    PHYSICAL EXAM:    VITAL SIGNS:  Blood pressure 130/79, pulse 69, temperature 97.3  F (36.3  C), temperature source Oral, resp. rate 17, height 1.746 m (5' 8.75\"), weight 83.1 kg (183 lb 3.2 oz), SpO2 94 %., Body mass index is 27.25 kg/(m^2).    Orthostatic Vital Signs:    Sitting: Blood pressure 126/71, pulse 103   Standing: Blood pressure 120/76, pulse 97     GENERAL:  Mr. Loya is a pleasant  male who is well-groomed and well-developed sitting comfortably in the exam room without any distress.  Affect is appropriate. There was some tension b/n pt & his wife.  There were constantly " arguing about his meds, activity, answering questions . . .     MOVEMENT DISORDERS ASSESSMENT: (Last Sinemet was about 3.5 hrs ago)  UPDRS Values 8/15/2018   Time: 2:05 PM   Medication On   R Brain DBS: None   L Brain DBS: None   Speech 1   Facial Expression 2   Rigidity Neck 2   Rigidity RUE 2   Rigidity LUE 2   Rigidity RLE 1   Rigidity LLE 2   Finger Taps R 1   Finger Taps L 2   Hand Mvt R 1   Hand Mvt L 2   Pron-/Supinate R 1   Pron-/Supinate L 1   Toe Tap R 1   Toe Tap L 0   Leg Agility R 1   Leg Agility L 0   Arise From Chair 0   Gait 0   Gait Freezing 0   Postural Stability 1   Posture 3   Global Spont Mvt 2   Postural Tremor RUE 1   Postural Tremor LUE 1   Kinetic Tremor RUE 0   Kinetic Tremor LUE 0   Rest Tremor RUE 0   Rest Tremor LUE 2   Rest Tremor RLE 0   Rest Tremor LLE 3   Rest Tremor Lip/Jaw 3   Rest Tremor Constancy 3   Total Right 9   Total Left 15   Axial Total 11   Total 41     Dyskinesias:  Absent.     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  He has tremor, but it's not bothersome to him.   Since Azilect was added, he is experiencing more dizziness & has stopped taking it.     2)  Orthostatic hypotension:  This is intermittent.  Today's BP didn't show significant BP drop.   After he stopped the Azilect, dizziness has improved.     3)  Fatigue:  Ongoing issues.  Taking naps as needed.         PLAN:    1)    Okay to stop the Azilect.   __  Will continue with Sinemet.    PD Medications 7 am  11 am  4 pm   Sinemet 25/100 mg  2 2 2       2)  Discussed about orthostatic hypotension.  Written material given.   __  He was given a 7 day orthostatic vitals logging sheet.  He was instructed how to take his BP & pulse.  After he completes the 7 days diary, he'll  mail it.    __  He was also encouraged to take his orthostatic vitals when he feels dizzy PRN, that is in addition to his diary above.    3)  Discussed fatigue associated with PD.   __   Encouraged to continue exercising as well as resting as needed.   __  No medication treatment at this time.   __  Might consider adding Amantadine to help with PD symptoms as well as boost his energy.     Will return to our clinic in 3 months or sooner as needed.    The total time spent with the patient was 45 minutes, and greater than 50% of this time was spent in counseling and coordination of care.    KOTA Stone,  CNP  [facility]    1:27 PM  2:22 PM

## 2018-08-15 NOTE — MR AVS SNAPSHOT
After Visit Summary   8/15/2018    Vini Loya    MRN: [id number]           Patient Information     Date Of Birth          1940        Visit Information        Provider Department      8/15/2018 1:15 PM Dalila Staples APRN [facility]        Care Instructions    August 15, 2018    Dear Mr. Vini Loya,    Thank you for coming today.  During your visit, we have discussed the following:     __  Stop the Azilect.     __  Continue with Sinemet.    PD Medications 7 am  11 am  4 pm   Sinemet 25/100 mg  2 2 2     __  Take your Blood pressure & pulse sitting, then stand 3 times a day for 7 days & mail it.  When you take your standing blood pressure, wait 2 minutes & take it.  Also, when you feel dizzy check your sitting & standing blood pressure & pulse the same way as needed.       __  Return in 3 months. You may return sooner as needed.      For questions, you may send us a [employer]ent message or call [phone]    Fax number: [phone]    KOTA Stone, CNP  [facility]      _________________________________________________________________________    ORTHOSTATIC HYPOTENSION (LOW BLOOD PRESSURE)    Orthostatic hypotension (OH) is a substantial drop in blood pressure upon standing as compared to seated or lying pressures. Classically OH is present when there is a drop greater than 30 mm Hg systolic or 15 mg diastolic from lying to standing position. Crucial to OH is whether there are symptoms that accompany the drop and these most often consist of light-headedness, or dizziness, and fainting. Other symptoms include fatigue and confusion. In PD, blood pressure changes are most often due to autonomic nervous system impairment and /or the use of anti-Parkinson medications. Some individuals who have OH may also experience intermittent high blood pressure, usually while in the supine position, and at night.    A milde form of OH can be seen in some patients with PD. More  commonly, OH in PD is due to medication side effects. Most medications have been implicated, but in particular, dopamine agonists, levodopa, amantadine, and anticholinergic medications.    Blood pressure monitoring is important to ensure that OH is being treated adequately but not so overtreated that the blood pressure goes too high. Blood pressure is lowest in the morning and drops in blood pressure are typically the greatest at this time, so it is a good time to do the readings. A pulse measurement should also be recorded. Consider checking the blood pressure and pulse after breakfast, in the afternoon after lunch, and at bedtime. This consistent checking may give a sense of the degree of variability in blood pressure that occurs throughout the day. In addition to just recording numbers it is important also to record how the person feels when blood pressure is measured. Not everyone who has a large drop in blood pressure feels dizzy or has other symptoms from OH, and therefore not everyone needs to be treated.    Nonpharmacological Management      Purchase and correctly use a home blood pressure monitor.    Check blood pressure both the lying and standing. A common mistake is to record only a seated pressure.    Properly rise from a seated position. The patient should sit on the edge of the bed or a chair for a few minutes, and then stand up slowly, holding onto something stable for a few seconds before attempting to walk.    Sleep with the head of the bed elevated about 6 -8 inches.    Use compression /support hose (Jobst ) support stockings during the day.    Elevate legs periodically during the day.    Increase fluids (at least 8 glasses of water per day).    Increase salt and caffeine in the diet.    Eat five small meals a day because blood pressure is lowered after a large meal.    Avoid hot showers or excessive heat.    Information extracted from a book by Yasmany Raza MD: Kyung Snyder RN, MS: Dea Stafford,  "PharmD, [facility]: Jose Carroll MD. 2009.  Parkinson s Disease: A Guide to Patient Care.              Follow-ups after your visit        Your next 10 appointments already scheduled     Dec 04, 2018  1:10 PM CST   (Arrive by 12:55 PM)   Return Movement Disorder with KOTA Dutta [facility] ([facility] [place])    [address]  3rd Floor  [facility] [address] [phone]              Who to contact     Please call your clinic at [phone] to:    Ask questions about your health    Make or cancel appointments    Discuss your medicines    Learn about your test results    Speak to your doctor            Additional Information About Your Visit        Care EveryWhere ID     This is your Care EveryWhere ID. This could be used by other organizations to access your [place] medical records  [id number]        Your Vitals Were     Pulse Temperature Respirations Height Pulse Oximetry BMI (Body Mass Index)    97 97.3  F (36.3  C) (Oral) 17 1.746 m (5' 8.75\") 94% 27.25 kg/m2       Blood Pressure from Last 3 Encounters:   08/15/18 120/76   08/10/18 105/65   06/06/18 101/62    Weight from Last 3 Encounters:   08/15/18 83.1 kg (183 lb 3.2 oz)   08/10/18 82.1 kg (181 lb)   06/06/18 83.9 kg (185 lb)              Today, you had the following     No orders found for display       Primary Care Provider Office Phone # Fax #    Joel Daniel Irwin Wegener, [phone]       [address]  [facility] [address]        Equal Access to Services     [facility]: Hadii phill jimenes hadasho Soyue, waaxda luqadaha, qaybta kaalmada claudia novak . So [facility] [phone].    ATENCIÓN: Si habla español, tiene a florez disposición servicios gratuitos de asistencia lingüística. Llame al [phone].    We comply with applicable federal civil rights laws and [place] laws. We do not discriminate on the basis of race, color, national origin, age, " disability, sex, sexual orientation, or gender identity.            Thank you!     Thank you for choosing [facility]  for your care. Our goal is always to provide you with excellent care. Hearing back from our patients is one way we can continue to improve our services. Please take a few minutes to complete the written survey that you may receive in the mail after your visit with us. Thank you!             Your Updated Medication List - Protect others around you: Learn how to safely use, store and throw away your medicines at www.disposemymeds.org.          This list is accurate as of 8/15/18  2:37 PM.  Always use your most recent med list.                   Brand Name Dispense Instructions for use Diagnosis    aspirin 81 MG tablet      1 TABLET DAILY    Unspecified essential hypertension, Cerebral embolism with cerebral infarction (H), CAD (coronary artery disease), Mixed hyperlipidemia       Blood Pressure Monitor Pricilla     1 Device    1 Device daily.    Hypertension goal BP (blood pressure) < 130/80       carbidopa-levodopa  MG per tablet    SINEMET    540 tablet    Take 2 tablets 3 times a day 1 hour before each meal.  (Around 7 am, 11 am, & 4 pm.)    Parkinson's disease (H)       PARoxetine 20 MG tablet    PAXIL    90 tablet    TAKE ONE TABLET BY MOUTH EVERY NIGHT AT BEDTIME    Anxiety       polyethylene glycol powder    MIRALAX    510 g    Take 17 g (1 capful) by mouth daily    Constipation, unspecified constipation type       rasagiline 1 MG Tabs tablet    AZILECT    30 each    We will have you take 1/2 tab daily for two weeks then increase to 1 tab daily after that.    Parkinson's disease (H)       senna 8.6 MG tablet    SENOKOT    120 tablet    Take 1 tablet by mouth 2 times daily And can increase to two tablets twice daily if needed .    Constipation, unspecified constipation type       simvastatin 40 MG tablet    ZOCOR    90 tablet    TAKE ONE TABLET BY MOUTH AT BEDTIME    Hyperlipidemia LDL  goal <100       sulfamethoxazole-trimethoprim 400-80 MG per tablet    BACTRIM/SEPTRA    90 tablet    Take 1 tablet by mouth At Bedtime For UTI prevention    Urinary tract infection with hematuria, site unspecified       tamsulosin 0.4 MG capsule    FLOMAX    30 capsule    Take 1 capsule (0.4 mg) by mouth daily    Bradycardia

## 2018-08-15 NOTE — NURSING NOTE
Chief Complaint   Patient presents with     RECHECK     P RETURN MOVEMENT DISORDER, 3 MONTH F/U     Martin Vang, EMT

## 2018-08-22 ENCOUNTER — TRANSFERRED RECORDS (OUTPATIENT)
Dept: HEALTH INFORMATION MANAGEMENT | Facility: CLINIC | Age: 78
End: 2018-08-22

## 2018-09-10 DIAGNOSIS — E78.5 HYPERLIPIDEMIA LDL GOAL <100: ICD-10-CM

## 2018-09-10 DIAGNOSIS — F41.9 ANXIETY: ICD-10-CM

## 2018-09-10 NOTE — TELEPHONE ENCOUNTER
"Requested Prescriptions   Pending Prescriptions Disp Refills     simvastatin (ZOCOR) 40 MG tablet [Pharmacy Med Name: SIMVASTATIN 40MG TABS]  Last Written Prescription Date:  9/11/2017  Last Fill Quantity: 90 tablet,  # refills: 4   Last Office Visit: 8/10/2018   Future Office Visit:      90 tablet 3     Sig: TAKE ONE TABLET BY MOUTH AT BEDTIME    Statins Protocol Failed    9/10/2018  9:36 AM       Failed - LDL on file in past 12 months    Recent Labs   Lab Test  12/06/16   1218   LDL  85          Passed - No abnormal creatine kinase in past 12 months    No lab results found.          Passed - Recent (12 mo) or future (30 days) visit within the authorizing provider's specialty    Patient had office visit in the last 12 months or has a visit in the next 30 days with authorizing provider or within the authorizing provider's specialty.  See \"Patient Info\" tab in inInspherionet, or \"Choose Columns\" in Meds & Orders section of the refill encounter.           Passed - Patient is age 18 or older     _________________________________________________________________________________________________________________________         PARoxetine (PAXIL) 20 MG tablet [Pharmacy Med Name: PAROXETINE HCL 20MG TABS]  Last Written Prescription Date:  9/26/2017  Last Fill Quantity: 90 tablet,  # refills: 3   Last Office Visit: 8/10/2018   Future Office Visit:      90 tablet 3     Sig: TAKE ONE TABLET BY MOUTH EVERY NIGHT AT BEDTIME    SSRIs Protocol Passed    9/10/2018  9:36 AM       Passed - Recent (12 mo) or future (30 days) visit within the authorizing provider's specialty    Patient had office visit in the last 12 months or has a visit in the next 30 days with authorizing provider or within the authorizing provider's specialty.  See \"Patient Info\" tab in inInspherionet, or \"Choose Columns\" in Meds & Orders section of the refill encounter.           Passed - Patient is age 18 or older          "

## 2018-09-12 RX ORDER — SIMVASTATIN 40 MG
TABLET ORAL
Qty: 30 TABLET | Refills: 0 | Status: SHIPPED | OUTPATIENT
Start: 2018-09-12 | End: 2018-10-01

## 2018-09-12 NOTE — TELEPHONE ENCOUNTER
Routing refill request to provider for review/approval because:  -Paxil prescribed for anxiety, but depression on problem list    Overdue for lipid panel.  Lab ordered.    One month supply Simvastatin.    Dr. Wegener-Please sign if agree.    Team coordinators-Please contact patient to schedule fasting lab appt.  Patient to please fast 10 hours.  Sips of water and taking usual morning medications is fine.  Patient to please avoid drinking alcoholic beverages and eating fatty foods 24 hours before lab appt.    Thank you!  CONCHA Ruff, RN      Warnings Override History for simvastatin (ZOCOR) 40 MG tablet [[id number]]        Overridden by Selene Land MD on 06/24/16 0950       Drug-Allergy (Active and Inactive Ingredients)       1. VYTORIN [Level: Ingredient Match] [Reason: Low risk]

## 2018-09-13 RX ORDER — PAROXETINE 20 MG/1
TABLET, FILM COATED ORAL
Qty: 90 TABLET | Refills: 3 | Status: SHIPPED | OUTPATIENT
Start: 2018-09-13 | End: 2018-10-01

## 2018-09-25 ENCOUNTER — TELEPHONE (OUTPATIENT)
Dept: NEUROLOGY | Facility: CLINIC | Age: 78
End: 2018-09-25

## 2018-09-25 DIAGNOSIS — G20.A1 PARKINSON'S DISEASE (H): ICD-10-CM

## 2018-09-25 RX ORDER — RASAGILINE 1 MG/1
TABLET ORAL
Qty: 30 EACH | Refills: 11 | Status: SHIPPED | OUTPATIENT
Start: 2018-09-25 | End: 2018-12-04 | Stop reason: ALTCHOICE

## 2018-09-25 NOTE — TELEPHONE ENCOUNTER
I called Mr. & Mrs. Loya & devan to both of them regarding his orthostatic vitals they mailed.    In summary, out of the 21 BP readings, 2/21 showed >40 mmHg SBP drop; 1/21 showed >20 mmHg SBP drop; and 1/21 showed > 10 mmHg SBP drop.  The rest of his BP readings were unremarkable.  He didn't write the pulse.     __  He was informed to stand up slowly, specially in the mornings when he usually had significant SBP drop.    Pt & wife reported that he had a backward fall on the toilet today & broke the tank.  Fall wasn't due to dizziness, but loss of balance.  His wife reports noticing some difficulty with mobility and getting out of bed/car.    __  He is not taking Azilect.  He was informed to resume Azilect.  Rx sent for Azilect 1 mg to take 1/2 tab x 2 weeks, then 1 tab.    __  Encouraged to call if balance continues to get worse.  Will refer him to PT or adjust his medications.     Diary sent to be scanned.

## 2018-09-27 ENCOUNTER — OFFICE VISIT (OUTPATIENT)
Dept: FAMILY MEDICINE | Facility: CLINIC | Age: 78
End: 2018-09-27
Payer: COMMERCIAL

## 2018-09-27 VITALS
OXYGEN SATURATION: 94 % | SYSTOLIC BLOOD PRESSURE: 146 MMHG | WEIGHT: 186.75 LBS | HEART RATE: 90 BPM | BODY MASS INDEX: 27.78 KG/M2 | DIASTOLIC BLOOD PRESSURE: 74 MMHG | RESPIRATION RATE: 17 BRPM | TEMPERATURE: 98.9 F

## 2018-09-27 DIAGNOSIS — N40.0 BENIGN PROSTATIC HYPERPLASIA, UNSPECIFIED WHETHER LOWER URINARY TRACT SYMPTOMS PRESENT: ICD-10-CM

## 2018-09-27 DIAGNOSIS — F41.1 GENERALIZED ANXIETY DISORDER: ICD-10-CM

## 2018-09-27 DIAGNOSIS — R82.90 NONSPECIFIC FINDING ON EXAMINATION OF URINE: ICD-10-CM

## 2018-09-27 DIAGNOSIS — N39.0 URINARY TRACT INFECTION, ACUTE: Primary | ICD-10-CM

## 2018-09-27 DIAGNOSIS — Z87.440 PERSONAL HISTORY OF URINARY TRACT INFECTION: ICD-10-CM

## 2018-09-27 LAB
ALBUMIN UR-MCNC: 100 MG/DL
APPEARANCE UR: ABNORMAL
BACTERIA #/AREA URNS HPF: ABNORMAL /HPF
BILIRUB UR QL STRIP: NEGATIVE
COLOR UR AUTO: YELLOW
GLUCOSE UR STRIP-MCNC: 100 MG/DL
HGB UR QL STRIP: ABNORMAL
KETONES UR STRIP-MCNC: NEGATIVE MG/DL
LEUKOCYTE ESTERASE UR QL STRIP: ABNORMAL
NITRATE UR QL: NEGATIVE
NON-SQ EPI CELLS #/AREA URNS LPF: ABNORMAL /LPF
PH UR STRIP: 7 PH (ref 5–7)
RBC #/AREA URNS AUTO: ABNORMAL /HPF
SOURCE: ABNORMAL
SP GR UR STRIP: 1.02 (ref 1–1.03)
UROBILINOGEN UR STRIP-ACNC: 1 EU/DL (ref 0.2–1)
WBC #/AREA URNS AUTO: ABNORMAL /HPF

## 2018-09-27 PROCEDURE — 87186 SC STD MICRODIL/AGAR DIL: CPT | Performed by: FAMILY MEDICINE

## 2018-09-27 PROCEDURE — 87086 URINE CULTURE/COLONY COUNT: CPT | Performed by: FAMILY MEDICINE

## 2018-09-27 PROCEDURE — 87088 URINE BACTERIA CULTURE: CPT | Performed by: FAMILY MEDICINE

## 2018-09-27 PROCEDURE — 99214 OFFICE O/P EST MOD 30 MIN: CPT | Performed by: FAMILY MEDICINE

## 2018-09-27 PROCEDURE — 81001 URINALYSIS AUTO W/SCOPE: CPT | Performed by: FAMILY MEDICINE

## 2018-09-27 RX ORDER — CIPROFLOXACIN 500 MG/1
500 TABLET, FILM COATED ORAL 2 TIMES DAILY
Qty: 14 TABLET | Refills: 0 | Status: SHIPPED | OUTPATIENT
Start: 2018-09-27 | End: 2018-10-01

## 2018-09-27 NOTE — MR AVS SNAPSHOT
After Visit Summary   9/27/2018    Vini Loya    MRN: [id number]           Patient Information     Date Of Birth          1940        Visit Information        Provider Department      9/27/2018 2:20 PM Mercy Jade MD [facility]        Today's Diagnoses     Urinary tract infection, acute    -  1    Benign prostatic hyperplasia, unspecified whether lower urinary tract symptoms present        Personal history of urinary tract infection        Generalized anxiety disorder        Nonspecific finding on examination of urine          Care Instructions    Urine today suggestive of Urine infection  Start cipro 5000 mg twice a day for 7 days   Will call if culture shows it needs to be changed  cipro can put you at risk of tendionitis and tendon rupture  avoid heavy lifting  Do not take any suppliments while on cipro  Take with food, increase fluids and eat a probiotic for durtaion of antibiotic  Go to the Er if worse  See Dr Wegener 10/1 as planned for a physical and preventive labs  recommend flu shot yearly  Continue care with neurology  See urology if not better           Follow-ups after your visit        Your next 10 appointments already scheduled     Oct 01, 2018  9:00 AM CDT   PHYSICAL with Joel Daniel Irwin Wegener, MD   [facility] ([facility])    [address] [place] [address] [phone]            Dec 04, 2018  1:10 PM CST   (Arrive by 12:55 PM)   Return Movement Disorder with KOTA Dutta [facility] ([facility])    [address] [address] [phone]              Who to contact     If you have questions or need follow up information about today's clinic visit or your schedule please contact [facility] directly at [phone].  Normal or non-critical lab and imaging results will be communicated to you by MyChart, letter or  phone within 4 business days after the clinic has received the results. If you do not hear from us within 7 days, please contact the clinic through Searchleshart or phone. If you have a critical or abnormal lab result, we will notify you by phone as soon as possible.  Submit refill requests through ContactMonkey or call your pharmacy and they will forward the refill request to us. Please allow 3 business days for your refill to be completed.          Additional Information About Your Visit        Care EveryWhere ID     This is your Care EveryWhere ID. This could be used by other organizations to access your [facility] medical records  [id number]        Your Vitals Were     Pulse Temperature Respirations Pulse Oximetry BMI (Body Mass Index)       90 98.9  F (37.2  C) (Oral) 17 94% 27.78 kg/m2        Blood Pressure from Last 3 Encounters:   09/27/18 146/74   08/15/18 120/76   08/10/18 105/65    Weight from Last 3 Encounters:   09/27/18 186 lb 12 oz (84.7 kg)   08/15/18 183 lb 3.2 oz (83.1 kg)   08/10/18 181 lb (82.1 kg)              We Performed the Following     UA reflex to Microscopic and Culture     Urine Culture Aerobic Bacterial     Urine Microscopic          Today's Medication Changes          These changes are accurate as of 9/27/18  2:49 PM.  If you have any questions, ask your nurse or doctor.               Start taking these medicines.        Dose/Directions    ciprofloxacin 500 MG tablet   Commonly known as:  CIPRO   Used for:  Urinary tract infection, acute   Started by:  Mercy Jade MD        Dose:  500 mg   Take 1 tablet (500 mg) by mouth 2 times daily   Quantity:  14 tablet   Refills:  0            Where to get your medicines      These medications were sent to [facility] [place], [place] - [address]  [address], [facility] [address]     Phone:  [phone]     ciprofloxacin 500 MG tablet                Primary Care Provider Office Phone # Fax #    Joel Daniel Irwin Wegener, MD  [phone]       [address]  [facility] [address]        Equal Access to Services     CHRISTOPHE JORDAN : Hadii phill jimenes julio Kunz, waisaida ludavon, qajuanta kawilnerda kishore, claudia pedrazagrabiel jeong. So [facility] [phone].    ATENCIÓN: Si habla español, tiene a florez disposición servicios gratuitos de asistencia lingüística. Llame al [phone].    We comply with applicable federal civil rights laws and [place] laws. We do not discriminate on the basis of race, color, national origin, age, disability, sex, sexual orientation, or gender identity.            Thank you!     Thank you for choosing [facility]  for your care. Our goal is always to provide you with excellent care. Hearing back from our patients is one way we can continue to improve our services. Please take a few minutes to complete the written survey that you may receive in the mail after your visit with us. Thank you!             Your Updated Medication List - Protect others around you: Learn how to safely use, store and throw away your medicines at www.disposemymeds.org.          This list is accurate as of 9/27/18  2:49 PM.  Always use your most recent med list.                   Brand Name Dispense Instructions for use Diagnosis    aspirin 81 MG tablet      1 TABLET DAILY    Unspecified essential hypertension, Cerebral embolism with cerebral infarction (H), CAD (coronary artery disease), Mixed hyperlipidemia       Blood Pressure Monitor Pricilla     1 Device    1 Device daily.    Hypertension goal BP (blood pressure) < 130/80       carbidopa-levodopa  MG per tablet    SINEMET    540 tablet    Take 2 tablets 3 times a day 1 hour before each meal.  (Around 7 am, 11 am, & 4 pm.)    Parkinson's disease (H)       ciprofloxacin 500 MG tablet    CIPRO    14 tablet    Take 1 tablet (500 mg) by mouth 2 times daily    Urinary tract infection, acute       PARoxetine 20 MG tablet    PAXIL    90 tablet    TAKE  ONE TABLET BY MOUTH EVERY NIGHT AT BEDTIME    Anxiety       polyethylene glycol powder    MIRALAX    510 g    Take 17 g (1 capful) by mouth daily    Constipation, unspecified constipation type       rasagiline 1 MG Tabs tablet    AZILECT    30 each    Take 1/2 tab daily for two weeks then increase to 1 tab daily after that.    Parkinson's disease (H)       simvastatin 40 MG tablet    ZOCOR    30 tablet    TAKE ONE TABLET BY MOUTH AT BEDTIME    Hyperlipidemia LDL goal <100       sulfamethoxazole-trimethoprim 400-80 MG per tablet    BACTRIM/SEPTRA    90 tablet    Take 1 tablet by mouth At Bedtime For UTI prevention    Urinary tract infection with hematuria, site unspecified       tamsulosin 0.4 MG capsule    FLOMAX    30 capsule    Take 1 capsule (0.4 mg) by mouth daily    Bradycardia

## 2018-09-27 NOTE — PATIENT INSTRUCTIONS
Urine today suggestive of Urine infection  Start cipro 500 mg twice a day for 7 days   Will call if culture shows it needs to be changed  cipro can put you at risk of tendionitis and tendon rupture  avoid heavy lifting  Do not take any suppliments while on cipro  Take with food, increase fluids and eat a probiotic for durtaion of antibiotic  Go to the Er if worse  See Dr Wegener 10/1 as planned for a physical and preventive labs  recommend flu shot yearly  Continue care with neurology  See urology if not better
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
## 2018-09-28 ENCOUNTER — NURSE TRIAGE (OUTPATIENT)
Dept: NURSING | Facility: CLINIC | Age: 78
End: 2018-09-28

## 2018-09-28 ASSESSMENT — PATIENT HEALTH QUESTIONNAIRE - PHQ9: SUM OF ALL RESPONSES TO PHQ QUESTIONS 1-9: 1

## 2018-09-29 ENCOUNTER — NURSE TRIAGE (OUTPATIENT)
Dept: NURSING | Facility: CLINIC | Age: 78
End: 2018-09-29

## 2018-09-29 LAB
BACTERIA SPEC CULT: ABNORMAL
SPECIMEN SOURCE: ABNORMAL

## 2018-09-29 NOTE — PROGRESS NOTES
E coli urine infection sensitive to cipro given resistant to bactrim so please discuss with your urologist & PCP if worth continuing bactrim as it may not be worth it if there is resistance to it it may not be serving the purpose it was given for

## 2018-09-29 NOTE — TELEPHONE ENCOUNTER
"  Reason for Disposition    [1] Fever > 100.5 F (38.1 C) AND [2] new onset since starting antibiotics     Wife calling\" My  was seen for a UTI and given ciprofloxacin (CIPRO) 500 MG tablet 14 tablet 0 9/27/2018  --  Sig - Route: Take 1 tablet (500 mg) by mouth 2 times daily - Oral    Since he's been taking the medication he now has a fever 100.4 and very nauseated and tired. Also has aches all over. Denies flank pain. Triaged and advised ER.    Additional Information    Negative: Shock suspected (e.g., cold/pale/clammy skin, too weak to stand, low BP, rapid pulse)    Negative: Sounds like a life-threatening emergency to the triager    Negative: Urinary tract infection suspected, but not taking antibiotics    Negative: [1] Unable to urinate (or only a few drops) > 4 hours AND     [2] bladder feels very full (e.g., palpable bladder or strong urge to urinate)    Negative: Passing pure blood or large blood clots (i.e., size > a dime)  (Exceptions: flecks, small strands, or pinkish-red color)    Negative: Patient sounds very sick or weak to the triager    Negative: [1] SEVERE pain (e.g., excruciating) AND [2] no improvement 2 hours after pain medications    Protocols used: URINARY TRACT INFECTION ON ANTIBIOTIC FOLLOW-UP CALL - MALE-ADULT-    "

## 2018-09-29 NOTE — TELEPHONE ENCOUNTER
"\"He went in on 9/27/18 for urinary tract infection.\" \"He has been running a fever.\" Patient started Cipro 48 hours ago. Temp during triage was 97 (O). Patient stating symptoms are improving. Denies nausea or aches today. Ongoing urinary frequency. Denies back or flank pain. Tylenol last taken 3 hours ago. Advised to recheck temp prior to giving further Tylenol.  Per guidelines advised home care. Caller verbalized understanding. Denies further questions.   Reviewed with caller for any fever today patient should be re checked. Advised to call back with any change or increase in symptoms.     Venita Marino RN  [facility]          Additional Information    Negative: Shock suspected (e.g., cold/pale/clammy skin, too weak to stand, low BP, rapid pulse)    Negative: Sounds like a life-threatening emergency to the triager    Negative: Urinary tract infection suspected, but not taking antibiotics    Negative: [1] Unable to urinate (or only a few drops) > 4 hours AND     [2] bladder feels very full (e.g., palpable bladder or strong urge to urinate)    Negative: Passing pure blood or large blood clots (i.e., size > a dime)  (Exceptions: flecks, small strands, or pinkish-red color)    Negative: Patient sounds very sick or weak to the triager    Negative: [1] SEVERE pain (e.g., excruciating) AND [2] no improvement 2 hours after pain medications    Negative: [1] Fever > 100.5 F (38.1 C) AND [2] new onset since starting antibiotics    Negative: [1] Side (flank) or lower back pain AND [2] new onset since starting antibiotics    Negative: [1] Taking antibiotic > 24 hours for UTI AND [2] flank or lower back pain worsening    Negative: [1] Vomiting 2 or more times AND [2] interferes with taking oral antibiotic    Negative: [1] Taking antibiotic > 72 hours (3 days) for UTI AND [2] painful urination or frequency not improved    Negative: [1] Taking antibiotic > 72 hours (3 days) for UTI AND [2] pain (e.g., flank, scrotum) " not improved    Negative: [1] Taking antibiotic > 72 hours (3 days) for UTI-STD AND [2] penile discharge (yellow or white pus) not improved    Negative: Diabetes mellitus or weak immune system (e.g., HIV positive, cancer chemotherapy, transplant patient)    Negative: Sickle cell disease    Negative: [1] Taking antibiotic < 24 hours for UTI AND [2] fever persists   (all triage questions negative)    Negative: [1] Taking antibiotic > 24 hours for UTI AND [2] fever persists    [1] Taking antibiotic < 72 hours (3 days) for UTI AND [2] painful urination or frequency not improved   (all triage questions negative)    Protocols used: URINARY TRACT INFECTION ON ANTIBIOTIC FOLLOW-UP CALL - [facility] [place]

## 2018-10-01 ENCOUNTER — OFFICE VISIT (OUTPATIENT)
Dept: FAMILY MEDICINE | Facility: CLINIC | Age: 78
End: 2018-10-01
Payer: COMMERCIAL

## 2018-10-01 VITALS
TEMPERATURE: 97.9 F | HEART RATE: 66 BPM | OXYGEN SATURATION: 93 % | WEIGHT: 186 LBS | HEIGHT: 68 IN | BODY MASS INDEX: 28.19 KG/M2 | RESPIRATION RATE: 18 BRPM | DIASTOLIC BLOOD PRESSURE: 82 MMHG | SYSTOLIC BLOOD PRESSURE: 110 MMHG

## 2018-10-01 DIAGNOSIS — N39.0 URINARY TRACT INFECTION, ACUTE: ICD-10-CM

## 2018-10-01 DIAGNOSIS — I25.10 CORONARY ARTERY DISEASE INVOLVING NATIVE HEART WITHOUT ANGINA PECTORIS, UNSPECIFIED VESSEL OR LESION TYPE: ICD-10-CM

## 2018-10-01 DIAGNOSIS — N39.0 RECURRENT UTI: ICD-10-CM

## 2018-10-01 DIAGNOSIS — R35.1 BENIGN PROSTATIC HYPERPLASIA WITH NOCTURIA: ICD-10-CM

## 2018-10-01 DIAGNOSIS — Z23 NEED FOR TETANUS, DIPHTHERIA, AND ACELLULAR PERTUSSIS (TDAP) VACCINE: ICD-10-CM

## 2018-10-01 DIAGNOSIS — K59.01 SLOW TRANSIT CONSTIPATION: ICD-10-CM

## 2018-10-01 DIAGNOSIS — F41.9 ANXIETY: ICD-10-CM

## 2018-10-01 DIAGNOSIS — E78.5 HYPERLIPIDEMIA LDL GOAL <100: ICD-10-CM

## 2018-10-01 DIAGNOSIS — Z00.01 ENCOUNTER FOR GENERAL ADULT MEDICAL EXAMINATION WITH ABNORMAL FINDINGS: Primary | ICD-10-CM

## 2018-10-01 DIAGNOSIS — Z23 NEED FOR PROPHYLACTIC VACCINATION AND INOCULATION AGAINST INFLUENZA: ICD-10-CM

## 2018-10-01 DIAGNOSIS — N40.1 BENIGN PROSTATIC HYPERPLASIA WITH NOCTURIA: ICD-10-CM

## 2018-10-01 LAB
ALBUMIN SERPL-MCNC: 3.4 G/DL (ref 3.4–5)
ALP SERPL-CCNC: 90 U/L (ref 40–150)
ALT SERPL W P-5'-P-CCNC: 18 U/L (ref 0–70)
ANION GAP SERPL CALCULATED.3IONS-SCNC: 6 MMOL/L (ref 3–14)
AST SERPL W P-5'-P-CCNC: 35 U/L (ref 0–45)
BILIRUB SERPL-MCNC: 0.7 MG/DL (ref 0.2–1.3)
BUN SERPL-MCNC: 18 MG/DL (ref 7–30)
CALCIUM SERPL-MCNC: 8.6 MG/DL (ref 8.5–10.1)
CHLORIDE SERPL-SCNC: 103 MMOL/L (ref 94–109)
CHOLEST SERPL-MCNC: 115 MG/DL
CO2 SERPL-SCNC: 27 MMOL/L (ref 20–32)
CREAT SERPL-MCNC: 1.38 MG/DL (ref 0.66–1.25)
GFR SERPL CREATININE-BSD FRML MDRD: 50 ML/MIN/1.7M2
GLUCOSE SERPL-MCNC: 104 MG/DL (ref 70–99)
HDLC SERPL-MCNC: 30 MG/DL
LDLC SERPL CALC-MCNC: 60 MG/DL
NONHDLC SERPL-MCNC: 85 MG/DL
POTASSIUM SERPL-SCNC: 4.1 MMOL/L (ref 3.4–5.3)
PROT SERPL-MCNC: 7.4 G/DL (ref 6.8–8.8)
SODIUM SERPL-SCNC: 136 MMOL/L (ref 133–144)
TRIGL SERPL-MCNC: 123 MG/DL

## 2018-10-01 PROCEDURE — 90471 IMMUNIZATION ADMIN: CPT | Performed by: FAMILY MEDICINE

## 2018-10-01 PROCEDURE — 80061 LIPID PANEL: CPT | Performed by: FAMILY MEDICINE

## 2018-10-01 PROCEDURE — 90662 IIV NO PRSV INCREASED AG IM: CPT | Performed by: FAMILY MEDICINE

## 2018-10-01 PROCEDURE — 99214 OFFICE O/P EST MOD 30 MIN: CPT | Mod: 25 | Performed by: FAMILY MEDICINE

## 2018-10-01 PROCEDURE — G0008 ADMIN INFLUENZA VIRUS VAC: HCPCS | Mod: 59 | Performed by: FAMILY MEDICINE

## 2018-10-01 PROCEDURE — 80053 COMPREHEN METABOLIC PANEL: CPT | Performed by: FAMILY MEDICINE

## 2018-10-01 PROCEDURE — 99207 C PAF COMPLETED  NO CHARGE: CPT | Performed by: FAMILY MEDICINE

## 2018-10-01 PROCEDURE — G0439 PPPS, SUBSEQ VISIT: HCPCS | Performed by: FAMILY MEDICINE

## 2018-10-01 PROCEDURE — 36415 COLL VENOUS BLD VENIPUNCTURE: CPT | Performed by: FAMILY MEDICINE

## 2018-10-01 PROCEDURE — 90715 TDAP VACCINE 7 YRS/> IM: CPT | Performed by: FAMILY MEDICINE

## 2018-10-01 RX ORDER — CIPROFLOXACIN 500 MG/1
500 TABLET, FILM COATED ORAL 2 TIMES DAILY
Qty: 14 TABLET | Refills: 1 | Status: SHIPPED | OUTPATIENT
Start: 2018-10-01 | End: 2019-02-01

## 2018-10-01 RX ORDER — PAROXETINE 20 MG/1
TABLET, FILM COATED ORAL
Qty: 90 TABLET | Refills: 3 | Status: SHIPPED | OUTPATIENT
Start: 2018-10-01 | End: 2019-12-11

## 2018-10-01 RX ORDER — SENNOSIDES A AND B 8.6 MG/1
1 TABLET, FILM COATED ORAL 2 TIMES DAILY
Qty: 180 TABLET | Refills: 3 | Status: SHIPPED | OUTPATIENT
Start: 2018-10-01 | End: 2019-08-20

## 2018-10-01 RX ORDER — TAMSULOSIN HYDROCHLORIDE 0.4 MG/1
0.4 CAPSULE ORAL DAILY
Qty: 90 CAPSULE | Refills: 3 | Status: SHIPPED | OUTPATIENT
Start: 2018-10-01 | End: 2020-01-03

## 2018-10-01 RX ORDER — SIMVASTATIN 40 MG
TABLET ORAL
Qty: 30 TABLET | Refills: 3 | Status: SHIPPED | OUTPATIENT
Start: 2018-10-01 | End: 2019-04-08

## 2018-10-01 NOTE — MR AVS SNAPSHOT
After Visit Summary   10/1/2018    Vini Loya    MRN: [id number]           Patient Information     Date Of Birth          1940        Visit Information        Provider Department      10/1/2018 9:00 AM Wegener, Joel Daniel Irwin, MD [facility]        Today's Diagnoses     Slow transit constipation    -  1      Care Instructions    Labs today, refilled all medications.     For constipation I recommend senna twice daily and miralax twice daily.     Gave prescription for cipro to have for future to treat uti early.  Please come for urine culture first.     Decided to continue bactrim uti prophylaxis since overall working quite well     Gave advanced directive to review/return.     Will give flu and tdap today.  Will consider shingrix.         Preventive Health Recommendations:       Male Ages 65 and over    Yearly exam:             See your health care provider every year in order to  o   Review health changes.   o   Discuss preventive care.    o   Review your medicines if your doctor has prescribed any.    Talk with your health care provider about whether you should have a test to screen for prostate cancer (PSA).    Every 3 years, have a diabetes test (fasting glucose). If you are at risk for diabetes, you should have this test more often.    Every 5 years, have a cholesterol test. Have this test more often if you are at risk for high cholesterol or heart disease.     Every 10 years, have a colonoscopy. Or, have a yearly FIT test (stool test). These exams will check for colon cancer.    Talk to with your health care provider about screening for Abdominal Aortic Aneurysm if you have a family history of AAA or have a history of smoking.  Shots:     Get a flu shot each year.     Get a tetanus shot every 10 years.     Talk to your doctor about your pneumonia vaccines. There are now two you should receive - Pneumovax (PPSV 23) and Prevnar (PCV 13).    Talk to your pharmacist about a  shingles vaccine.     Talk to your doctor about the hepatitis B vaccine.  Nutrition:     Eat at least 5 servings of fruits and vegetables each day.     Eat whole-grain bread, whole-wheat pasta and brown rice instead of white grains and rice.     Get adequate Calcium and Vitamin D.   Lifestyle    Exercise for at least 150 minutes a week (30 minutes a day, 5 days a week). This will help you control your weight and prevent disease.     Limit alcohol to one drink per day.     No smoking.     Wear sunscreen to prevent skin cancer.     See your dentist every six months for an exam and cleaning.     See your eye doctor every 1 to 2 years to screen for conditions such as glaucoma, macular degeneration and cataracts.    Preventive Health Recommendations:       Male Ages 65 and over    Yearly exam:             See your health care provider every year in order to  o   Review health changes.   o   Discuss preventive care.    o   Review your medicines if your doctor has prescribed any.    Talk with your health care provider about whether you should have a test to screen for prostate cancer (PSA).    Every 3 years, have a diabetes test (fasting glucose). If you are at risk for diabetes, you should have this test more often.    Every 5 years, have a cholesterol test. Have this test more often if you are at risk for high cholesterol or heart disease.     Every 10 years, have a colonoscopy. Or, have a yearly FIT test (stool test). These exams will check for colon cancer.    Talk to with your health care provider about screening for Abdominal Aortic Aneurysm if you have a family history of AAA or have a history of smoking.  Shots:     Get a flu shot each year.     Get a tetanus shot every 10 years.     Talk to your doctor about your pneumonia vaccines. There are now two you should receive - Pneumovax (PPSV 23) and Prevnar (PCV 13).    Talk to your pharmacist about a shingles vaccine.     Talk to your doctor about the hepatitis B  vaccine.  Nutrition:     Eat at least 5 servings of fruits and vegetables each day.     Eat whole-grain bread, whole-wheat pasta and brown rice instead of white grains and rice.     Get adequate Calcium and Vitamin D.   Lifestyle    Exercise for at least 150 minutes a week (30 minutes a day, 5 days a week). This will help you control your weight and prevent disease.     Limit alcohol to one drink per day.     No smoking.     Wear sunscreen to prevent skin cancer.     See your dentist every six months for an exam and cleaning.     See your eye doctor every 1 to 2 years to screen for conditions such as glaucoma, macular degeneration and cataracts.          Follow-ups after your visit        Your next 10 appointments already scheduled     Dec 04, 2018  1:10 PM CST   (Arrive by 12:55 PM)   Return Movement Disorder with KOTA Dutta [facility] Neurology ([facility] [place])    [address] [place] [id number] [phone]              Who to contact     If you have questions or need follow up information about today's clinic visit or your schedule please contact [facility] directly at [phone].  Normal or non-critical lab and imaging results will be communicated to you by MyChart, letter or phone within 4 business days after the clinic has received the results. If you do not hear from us within 7 days, please contact the clinic through MyChart or phone. If you have a critical or abnormal lab result, we will notify you by phone as soon as possible.  Submit refill requests through [employer] or call your pharmacy and they will forward the refill request to us. Please allow 3 business days for your refill to be completed.          Additional Information About Your Visit        Care EveryWhere ID     This is your Care EveryWhere ID. This could be used by other organizations to access your [place] medical records  [id number]        Your Vitals  "Were     Pulse Temperature Respirations Height Pulse Oximetry BMI (Body Mass Index)    66 97.9  F (36.6  C) (Oral) 18 5' 8\" (1.727 m) 93% 28.28 kg/m2       Blood Pressure from Last 3 Encounters:   10/01/18 110/82   09/27/18 146/74   08/15/18 120/76    Weight from Last 3 Encounters:   10/01/18 186 lb (84.4 kg)   09/27/18 186 lb 12 oz (84.7 kg)   08/15/18 183 lb 3.2 oz (83.1 kg)              Today, you had the following     No orders found for display         Today's Medication Changes          These changes are accurate as of 10/1/18  9:46 AM.  If you have any questions, ask your nurse or doctor.               Start taking these medicines.        Dose/Directions    senna 8.6 MG tablet   Commonly known as:  SENOKOT   Used for:  Slow transit constipation   Started by:  Wegener, Joel Daniel Irwin, MD        Dose:  1 tablet   Take 1 tablet by mouth 2 times daily   Quantity:  180 tablet   Refills:  3            Where to get your medicines      These medications were sent to [facility] [place], [place] - [address]  [address], [facility] [address]     Phone:  [phone]     senna 8.6 MG tablet                Primary Care Provider Office Phone # Fax #    Joel Daniel Irwin Wegener, [phone]       [address]  [facility] [address]        Equal Access to Services     [facility] AH: Hadii phill jimenes hadasho Soomaali, waaxda luqadaha, qaybta kaalmada adeegyada, waxay myra ugalde . So [facility] [phone].    ATENCIÓN: Si habla español, tiene a florez disposición servicios gratuitos de asistencia lingüística. Llame al [phone].    We comply with applicable federal civil rights laws and [place] laws. We do not discriminate on the basis of race, color, national origin, age, disability, sex, sexual orientation, or gender identity.            Thank you!     Thank you for choosing [facility]  for your care. Our goal is always to provide you with " excellent care. Hearing back from our patients is one way we can continue to improve our services. Please take a few minutes to complete the written survey that you may receive in the mail after your visit with us. Thank you!             Your Updated Medication List - Protect others around you: Learn how to safely use, store and throw away your medicines at www.disposemymeds.org.          This list is accurate as of 10/1/18  9:46 AM.  Always use your most recent med list.                   Brand Name Dispense Instructions for use Diagnosis    aspirin 81 MG tablet      1 TABLET DAILY    Unspecified essential hypertension, Cerebral embolism with cerebral infarction (H), CAD (coronary artery disease), Mixed hyperlipidemia       Blood Pressure Monitor Pricilla     1 Device    1 Device daily.    Hypertension goal BP (blood pressure) < 130/80       carbidopa-levodopa  MG per tablet    SINEMET    540 tablet    Take 2 tablets 3 times a day 1 hour before each meal.  (Around 7 am, 11 am, & 4 pm.)    Parkinson's disease (H)       ciprofloxacin 500 MG tablet    CIPRO    14 tablet    Take 1 tablet (500 mg) by mouth 2 times daily    Urinary tract infection, acute       PARoxetine 20 MG tablet    PAXIL    90 tablet    TAKE ONE TABLET BY MOUTH EVERY NIGHT AT BEDTIME    Anxiety       polyethylene glycol powder    MIRALAX    510 g    Take 17 g (1 capful) by mouth daily    Constipation, unspecified constipation type       rasagiline 1 MG Tabs tablet    AZILECT    30 each    Take 1/2 tab daily for two weeks then increase to 1 tab daily after that.    Parkinson's disease (H)       senna 8.6 MG tablet    SENOKOT    180 tablet    Take 1 tablet by mouth 2 times daily    Slow transit constipation       simvastatin 40 MG tablet    ZOCOR    30 tablet    TAKE ONE TABLET BY MOUTH AT BEDTIME    Hyperlipidemia LDL goal <100       sulfamethoxazole-trimethoprim 400-80 MG per tablet    BACTRIM/SEPTRA    90 tablet    Take 1 tablet by mouth At  Bedtime For UTI prevention    Urinary tract infection with hematuria, site unspecified       tamsulosin 0.4 MG capsule    FLOMAX    30 capsule    Take 1 capsule (0.4 mg) by mouth daily    Bradycardia

## 2018-10-01 NOTE — PROGRESS NOTES

## 2018-10-01 NOTE — LETTER
October 12, 2018      Vini Loya  [address] [facility] [address]        Dear ,    We are writing to inform you of your test results.    Results stable/normal, no further med changes needed.    Resulted Orders   Comprehensive metabolic panel   Result Value Ref Range    Sodium 136 133 - 144 mmol/L    Potassium 4.1 3.4 - 5.3 mmol/L    Chloride 103 94 - 109 mmol/L    Carbon Dioxide 27 20 - 32 mmol/L    Anion Gap 6 3 - 14 mmol/L    Glucose 104 (H) 70 - 99 mg/dL      Comment:      Fasting specimen    Urea Nitrogen 18 7 - 30 mg/dL    Creatinine 1.38 (H) 0.66 - 1.25 mg/dL    GFR Estimate 50 (L) >60 mL/min/1.7m2      Comment:      Non  GFR Calc    GFR Estimate If Black 60 (L) >60 mL/min/1.7m2      Comment:       GFR Calc    Calcium 8.6 8.5 - 10.1 mg/dL    Bilirubin Total 0.7 0.2 - 1.3 mg/dL    Albumin 3.4 3.4 - 5.0 g/dL    Protein Total 7.4 6.8 - 8.8 g/dL    Alkaline Phosphatase 90 40 - 150 U/L    ALT 18 0 - 70 U/L    AST 35 0 - 45 U/L   Lipid panel reflex to direct LDL Fasting   Result Value Ref Range    Cholesterol 115 <200 mg/dL    Triglycerides 123 <150 mg/dL      Comment:      Fasting specimen    HDL Cholesterol 30 (L) >39 mg/dL    LDL Cholesterol Calculated 60 <100 mg/dL      Comment:      Desirable:       <100 mg/dl    Non HDL Cholesterol 85 <130 mg/dL       If you have any questions or concerns, please call the clinic at the number listed above.       Sincerely,        Joel Daniel Wegener, MD/nr

## 2018-10-01 NOTE — PATIENT INSTRUCTIONS
Preventive Health Recommendations:       Male Ages 65 and over    Yearly exam:             See your health care provider every year in order to  o   Review health changes.   o   Discuss preventive care.    o   Review your medicines if your doctor has prescribed any.    Talk with your health care provider about whether you should have a test to screen for prostate cancer (PSA).    Every 3 years, have a diabetes test (fasting glucose). If you are at risk for diabetes, you should have this test more often.    Every 5 years, have a cholesterol test. Have this test more often if you are at risk for high cholesterol or heart disease.     Every 10 years, have a colonoscopy. Or, have a yearly FIT test (stool test). These exams will check for colon cancer.    Talk to with your health care provider about screening for Abdominal Aortic Aneurysm if you have a family history of AAA or have a history of smoking.  Shots:     Get a flu shot each year.     Get a tetanus shot every 10 years.     Talk to your doctor about your pneumonia vaccines. There are now two you should receive - Pneumovax (PPSV 23) and Prevnar (PCV 13).    Talk to your pharmacist about a shingles vaccine.     Talk to your doctor about the hepatitis B vaccine.  Nutrition:     Eat at least 5 servings of fruits and vegetables each day.     Eat whole-grain bread, whole-wheat pasta and brown rice instead of white grains and rice.     Get adequate Calcium and Vitamin D.   Lifestyle    Exercise for at least 150 minutes a week (30 minutes a day, 5 days a week). This will help you control your weight and prevent disease.     Limit alcohol to one drink per day.     No smoking.     Wear sunscreen to prevent skin cancer.     See your dentist every six months for an exam and cleaning.     See your eye doctor every 1 to 2 years to screen for conditions such as glaucoma, macular degeneration and cataracts.    Preventive Health Recommendations:       Male Ages 65 and  over    Yearly exam:             See your health care provider every year in order to  o   Review health changes.   o   Discuss preventive care.    o   Review your medicines if your doctor has prescribed any.    Talk with your health care provider about whether you should have a test to screen for prostate cancer (PSA).    Every 3 years, have a diabetes test (fasting glucose). If you are at risk for diabetes, you should have this test more often.    Every 5 years, have a cholesterol test. Have this test more often if you are at risk for high cholesterol or heart disease.     Every 10 years, have a colonoscopy. Or, have a yearly FIT test (stool test). These exams will check for colon cancer.    Talk to with your health care provider about screening for Abdominal Aortic Aneurysm if you have a family history of AAA or have a history of smoking.  Shots:     Get a flu shot each year.     Get a tetanus shot every 10 years.     Talk to your doctor about your pneumonia vaccines. There are now two you should receive - Pneumovax (PPSV 23) and Prevnar (PCV 13).    Talk to your pharmacist about a shingles vaccine.     Talk to your doctor about the hepatitis B vaccine.  Nutrition:     Eat at least 5 servings of fruits and vegetables each day.     Eat whole-grain bread, whole-wheat pasta and brown rice instead of white grains and rice.     Get adequate Calcium and Vitamin D.   Lifestyle    Exercise for at least 150 minutes a week (30 minutes a day, 5 days a week). This will help you control your weight and prevent disease.     Limit alcohol to one drink per day.     No smoking.     Wear sunscreen to prevent skin cancer.     See your dentist every six months for an exam and cleaning.     See your eye doctor every 1 to 2 years to screen for conditions such as glaucoma, macular degeneration and cataracts.

## 2018-10-03 ENCOUNTER — TELEPHONE (OUTPATIENT)
Dept: LAB | Facility: CLINIC | Age: 78
End: 2018-10-03

## 2018-10-03 NOTE — TELEPHONE ENCOUNTER
Patient has lab only appt TOMORROW, Thurs 10/4/18, no orders in chart.  Please place FUTURE orders in Epic if appropriate.    Thanks, lab

## 2018-11-15 ENCOUNTER — TELEPHONE (OUTPATIENT)
Dept: FAMILY MEDICINE | Facility: CLINIC | Age: 78
End: 2018-11-15

## 2018-11-15 NOTE — TELEPHONE ENCOUNTER
"Phone call from patient     1. Checked blood pressure this am and was 106/54 - he has had this problem in the past   2. Currently has some dizziness with standing fast - discussed rising slowly to prevent falls   3. Patient DENIES chest pain/sob   4. Ears feel fine   5. NOT taking blood pressure medications - they have been discontinued   6. Patient states he is taking atorvastatin - advised this is a cholesterol medication however this is not on patient's med list - he seemed a bit confused, after reviewing the dose/bottle directions and writer asking for provider on bottle patient noted that this bottle was actually his wife's and she placed it on the counter as it needed a refill   7. Writer asked patient if he could have mistakenly take any of his wifes medications? And he states no his medications are in a different spot than his wife's  8. Writer inquired about fluid intake today - patient states that he has had approx 3 glasses of fluids - \"regular glasses\"  9. Patient is agreeable to appointment today for assessment of dizziness/lightheadedness - scheduled with Dr. Sheets today     Liseth Martinez Registered Nurse   [facility]        "

## 2018-11-21 ENCOUNTER — OFFICE VISIT (OUTPATIENT)
Dept: UROLOGY | Facility: CLINIC | Age: 78
End: 2018-11-21
Payer: COMMERCIAL

## 2018-11-21 VITALS
WEIGHT: 182.7 LBS | BODY MASS INDEX: 27.69 KG/M2 | HEART RATE: 65 BPM | SYSTOLIC BLOOD PRESSURE: 110 MMHG | DIASTOLIC BLOOD PRESSURE: 67 MMHG | HEIGHT: 68 IN

## 2018-11-21 DIAGNOSIS — R33.9 INCOMPLETE BLADDER EMPTYING: Primary | ICD-10-CM

## 2018-11-21 LAB
ALBUMIN UR-MCNC: 30 MG/DL
APPEARANCE UR: ABNORMAL
BILIRUB UR QL STRIP: NEGATIVE
COLOR UR AUTO: YELLOW
GLUCOSE UR STRIP-MCNC: NEGATIVE MG/DL
HGB UR QL STRIP: NEGATIVE
KETONES UR STRIP-MCNC: 5 MG/DL
LEUKOCYTE ESTERASE UR QL STRIP: NEGATIVE
MUCOUS THREADS #/AREA URNS LPF: PRESENT /LPF
NITRATE UR QL: NEGATIVE
PH UR STRIP: 6 PH (ref 5–7)
RBC #/AREA URNS AUTO: 2 /HPF (ref 0–2)
SOURCE: ABNORMAL
SP GR UR STRIP: 1.02 (ref 1–1.03)
SQUAMOUS #/AREA URNS AUTO: 2 /HPF (ref 0–1)
UROBILINOGEN UR STRIP-MCNC: 0 MG/DL (ref 0–2)
WBC #/AREA URNS AUTO: 1 /HPF (ref 0–5)

## 2018-11-21 ASSESSMENT — PAIN SCALES - GENERAL: PAINLEVEL: NO PAIN (0)

## 2018-11-21 NOTE — MR AVS SNAPSHOT
After Visit Summary   11/21/2018    Vini Loya    MRN: [id number]           Patient Information     Date Of Birth          1940        Visit Information        Provider Department      11/21/2018 1:30 PM Evelyn Hairston PA M [facility] and [facility]        Today's Diagnoses     Incomplete bladder emptying    -  1      Care Instructions    - You are emptying your bladder fine  - Start finasteride (which shrinks the prostate but this may take up to 2 years).  We should start this in case you need to stop Flomax at some point because of dizziness  - For now, continue Flomax (tamsulosin)  - Continue managing your bowels aggressively.  Constipation will lead to urinary retention which will lead to urinary tract infections  - We will send your urine for culture today (to assure there is no infection)  - Continue Bactrim SS (trimethoprim sulfamethoxazole) nightly before bed  - Fill your CIpro prescription and keep it at home with you, just in case.   - If you ever suspect a urinary tract infection call the clinic [phone] and we can order a urine culture for you.  If at all possible, try to start the culture before taking any antibiotics.   - Return 6 months or sooner with problems.     Lupe Hairston            Follow-ups after your visit        Your next 10 appointments already scheduled     Dec 04, 2018  1:10 PM CST   (Arrive by 12:55 PM)   Return Movement Disorder with KOTA Dutta CNP [facility] Neurology ([facility])    [address] [place] [address] [phone]              Who to contact     Please call your clinic at [phone] to:    Ask questions about your health    Make or cancel appointments    Discuss your medicines    Learn about your test results    Speak to your doctor            Additional Information About Your Visit        Care EveryWhere ID     This is your Care  "EveryWhere ID. This could be used by other organizations to access your [place] medical records  [id number]        Your Vitals Were     Pulse Height BMI (Body Mass Index)             65 1.727 m (5' 8\") 27.78 kg/m2          Blood Pressure from Last 3 Encounters:   11/21/18 110/67   10/01/18 110/82   09/27/18 146/74    Weight from Last 3 Encounters:   11/21/18 82.9 kg (182 lb 11.2 oz)   10/01/18 84.4 kg (186 lb)   09/27/18 84.7 kg (186 lb 12 oz)              We Performed the Following     POST-VOID RESIDUAL BLADDER SCAN     UA with Microscopic     Urine Culture Aerobic Bacterial        Primary Care Provider Office Phone # Fax #    Joel Daniel Irwin Wegener, [phone] [address]  [facility] [address]        Equal Access to Services     [facility] : Hadii aad ku hadasho Soomaali, waaxda luqadaha, qaybta kaalmada adeegyada, claudia barrazain haygrabiel ugalde . So [facility] [phone].    ATENCIÓN: Si habla español, tiene a florez disposición servicios gratuitos de asistencia lingüística. Llame al [phone].    We comply with applicable federal civil rights laws and [place] laws. We do not discriminate on the basis of race, color, national origin, age, disability, sex, sexual orientation, or gender identity.            Thank you!     Thank you for choosing [facility] UROLOGY AND [facility]  for your care. Our goal is always to provide you with excellent care. Hearing back from our patients is one way we can continue to improve our services. Please take a few minutes to complete the written survey that you may receive in the mail after your visit with us. Thank you!             Your Updated Medication List - Protect others around you: Learn how to safely use, store and throw away your medicines at www.disposemymeds.org.          This list is accurate as of 11/21/18  2:34 PM.  Always use your most recent med list.                   Brand Name Dispense " Instructions for use Diagnosis    aspirin 81 MG tablet    ASA    90 tablet    Take 1 tablet (81 mg) by mouth daily    Coronary artery disease involving native heart without angina pectoris, unspecified vessel or lesion type       Blood Pressure Monitor Pricilla     1 Device    1 Device daily.    Hypertension goal BP (blood pressure) < 130/80       carbidopa-levodopa  MG per tablet    SINEMET    540 tablet    Take 2 tablets 3 times a day 1 hour before each meal.  (Around 7 am, 11 am, & 4 pm.)    Parkinson's disease (H)       ciprofloxacin 500 MG tablet    CIPRO    14 tablet    Take 1 tablet (500 mg) by mouth 2 times daily    Urinary tract infection, acute       PARoxetine 20 MG tablet    PAXIL    90 tablet    TAKE ONE TABLET BY MOUTH EVERY NIGHT AT BEDTIME    Anxiety       polyethylene glycol powder    MIRALAX    510 g    Take 17 g (1 capful) by mouth daily    Constipation, unspecified constipation type       rasagiline 1 MG Tabs tablet    AZILECT    30 each    Take 1/2 tab daily for two weeks then increase to 1 tab daily after that.    Parkinson's disease (H)       senna 8.6 MG tablet    SENOKOT    180 tablet    Take 1 tablet by mouth 2 times daily    Slow transit constipation       simvastatin 40 MG tablet    ZOCOR    30 tablet    TAKE ONE TABLET BY MOUTH AT BEDTIME    Hyperlipidemia LDL goal <100       sulfamethoxazole-trimethoprim 400-80 MG per tablet    BACTRIM/SEPTRA    90 tablet    Take 1 tablet by mouth At Bedtime For UTI prevention    Urinary tract infection with hematuria, site unspecified       tamsulosin 0.4 MG capsule    FLOMAX    90 capsule    Take 1 capsule (0.4 mg) by mouth daily    Benign prostatic hyperplasia with nocturia

## 2018-11-21 NOTE — LETTER
Vini Loya   [address]  [facility] [address]        Dear Vini,     I am writing you concerning your recent test results:    Resulted Orders   UA with Microscopic   Result Value Ref Range    Color Urine Yellow     Appearance Urine Slightly Cloudy     Glucose Urine Negative NEG^Negative mg/dL    Bilirubin Urine Negative NEG^Negative    Ketones Urine 5 (A) NEG^Negative mg/dL    Specific Gravity Urine 1.020 1.003 - 1.035    Blood Urine Negative NEG^Negative    pH Urine 6.0 5.0 - 7.0 pH    Protein Albumin Urine 30 (A) NEG^Negative mg/dL    Urobilinogen mg/dL 0.0 0.0 - 2.0 mg/dL    Nitrite Urine Negative NEG^Negative    Leukocyte Esterase Urine Negative NEG^Negative    Source Midstream Urine     WBC Urine 1 0 - 5 /HPF    RBC Urine 2 0 - 2 /HPF    Squamous Epithelial /HPF Urine 2 (H) 0 - 1 /HPF    Mucous Urine Present (A) NEG^Negative /LPF   Urine Culture Aerobic Bacterial   Result Value Ref Range    Specimen Description Midstream Urine     Special Requests Specimen received in preservative     Culture Micro       10,000 to 50,000 colonies/mL  mixed urogenital jensen         This urinalysis is within normal limits.  There is no evidence of infection.     Keep up the good work!    Please let me know ifyou have any questions.      Sincerely,      CHANDA Eduardo [facility]      [facility]OLOG[facility] AND [facility]  [address]  [address] [facility] [address]  Phone: [phone]  Fax: [phone]

## 2018-11-21 NOTE — PATIENT INSTRUCTIONS
- You are emptying your bladder fine  - Start finasteride (which shrinks the prostate but this may take up to 2 years).  We should start this in case you need to stop Flomax at some point because of dizziness  - For now, continue Flomax (tamsulosin)  - Continue managing your bowels aggressively.  Constipation will lead to urinary retention which will lead to urinary tract infections  - We will send your urine for culture today (to assure there is no infection)  - Continue Bactrim SS (trimethoprim sulfamethoxazole) nightly before bed  - Fill your CIpro prescription and keep it at home with you, just in case.   - If you ever suspect a urinary tract infection call the clinic [phone] and we can order a urine culture for you.  If at all possible, try to start the culture before taking any antibiotics.   - Return 6 months or sooner with problems.     Lupe Hairston

## 2018-11-21 NOTE — PROGRESS NOTES
HPI: Mr. Vini Loya is a 77 year old year old male presenting today for evaluation of chief complaint(s): No chief complaint on file.    Mr. Loya has PMH significant for HTN, HLD, Parkinson, depression, BPH (on Flomax 0.4mg) and frequent UTIs. He was formerly seen by Dr. Morris in October 2015 after an episode of urosepsis in summer 2015 requiring hospitalization. He met me in 2016, again for UTIs requiring hospitalization.  His last hospitalization for pyelonephritis vs. Urosepsis was in October 2017.  Since then he has been continued on Flomax as well as suppressive Bactrim SS, and he had no UTIs until 9/27/18 when urine grew >100K E Coli, resistant to Bactrim.  Symptoms at that time included feeling ill, headache, low back pain, nightsweats, and a weak urinary stream.  He had no dysuria, hematuria or abdominal pain. He was treated with a 7 day course of Cipro for this.  A CT scan was ordered for him on 10/15/18 to rule out pyelonephritis.  From a urologic standpoint this showed: BL punctate stones (largest 3mm), no hydronephrosis, bladder wall thickening (concerning for cystitis), a small tic on the left lateral bladder wall, and enlarged prostate.     Today he presents in routine followup.   Today is feeling good. His UTI symptoms have fully resolved.   He is pleased with his voiding symptoms which are stable on flomax 0.4mg.  A PVR earlier this year was 58mL.   He has been having intermittent constipation for which he has been using senna BID. EXlax also works. Miralax doesn't work for him. He tried prunes they didn't work either.   He is wondering whether probiotics might be good for his digestive health.   Has had some SBPs in the 90's and he wonders if this makes him wobbly  He presents with his wife.     REVIEW OF DIAGNOSTICS:  9/27/18 - UCx: >100K E Coli resistant to Bactrim  12/5/17 - UA: Ketones 5, otherwise negative  11/3/17 - UA: WBC 5-10, RBC 2-5, otherwise negative --> No growth  10/16/17- Cr:  "1.17mg/dL  10/15/17 - UA: WBC 53, RBC >182, nitrities positive, protein 300 --> >100K E Coli  12/6/16 - UA: WBC 0-2, RBC 0-2, trace protein    Current Outpatient Prescriptions   Medication Sig Dispense Refill     aspirin 81 MG tablet Take 1 tablet (81 mg) by mouth daily 90 tablet 3     Blood Pressure Monitor JERONIMO 1 Device daily. 1 Device 0     carbidopa-levodopa (SINEMET)  MG per tablet Take 2 tablets 3 times a day 1 hour before each meal.  (Around 7 am, 11 am, & 4 pm.) 540 tablet 3     ciprofloxacin (CIPRO) 500 MG tablet Take 1 tablet (500 mg) by mouth 2 times daily 14 tablet 1     PARoxetine (PAXIL) 20 MG tablet TAKE ONE TABLET BY MOUTH EVERY NIGHT AT BEDTIME 90 tablet 3     polyethylene glycol (MIRALAX) powder Take 17 g (1 capful) by mouth daily 510 g 1     rasagiline (AZILECT) 1 MG TABS tablet Take 1/2 tab daily for two weeks then increase to 1 tab daily after that. 30 each 11     senna (SENOKOT) 8.6 MG tablet Take 1 tablet by mouth 2 times daily 180 tablet 3     simvastatin (ZOCOR) 40 MG tablet TAKE ONE TABLET BY MOUTH AT BEDTIME 30 tablet 3     sulfamethoxazole-trimethoprim (BACTRIM/SEPTRA) 400-80 MG per tablet Take 1 tablet by mouth At Bedtime For UTI prevention 90 tablet 4     tamsulosin (FLOMAX) 0.4 MG capsule Take 1 capsule (0.4 mg) by mouth daily 90 capsule 3       ALLERGIES: Vytorin       GENERAL PHYSICAL EXAM:   Vitals: /67  Pulse 65  Ht 1.727 m (5' 8\")  Wt 82.9 kg (182 lb 11.2 oz)  BMI 27.78 kg/m2  Body mass index is 27.78 kg/(m^2).    GENERAL: Well groomed, well developed, well nourished male in NAD.  GI: Soft, NT, ND,  No CVAT bilaterally.  NEURO: Alert and oriented x 3.  PSYCH: Normal mood and affect, pleasant and agreeable during interview and exam.    PVR: Residual urine by ultrasound was 84 ml.      RADIOLOGY: The following tests were reviewed:   Examination:  CT ABDOMEN PELVIS W CONTRAST 10/16/2017 12:04 AM      History: Question pyelonephritis.     Comparison: " 11/26/2014     Technique: CT of the abdomen and pelvis were obtained with IV  contrast. Sagittal and coronal reconstructions created and reviewed.     Findings:   Bilateral punctate nonobstructive renal calculi. The largest calculus  on the right measures approximately 3 mm (series 8, image 96). On the  left, the largest measures approximately 3 mm (series 8, image 109).  No hydronephrosis. There is diffuse bladder wall thickening and  mucosal enhancement there is a small left lateral diverticulum (series  6, image 52). The prostate is enlarged with some calcification.     8 mm hypodensity in the medial left hepatic lobe is unchanged since  2014. Subcentimeter hypodensity in the caudate lobe is too small to  characterize with CT but also appears similar to CT from 2014. The  gallbladder, pancreas, spleen, and adrenal glands are within normal  limits.     No bowel obstruction. Mild thickening of the distal esophagus. The  appendix is normal. Minimal scattered diverticulosis.     Lung bases: Small subpleural nodules in the right lower lobe are  stable since 2014 (series 8, image 6).  Small hiatal hernia.     Bones and soft tissues: No acute or suspicious osseous abnormality.  Degenerative changes of the thoracolumbar spine with a chronic wedge  compression deformity of T12, stable since the MRI dated 12/28/2016.  Multilevel disc degeneration and Schmorl's nodes.         Impression:  1. Diffuse thickening of the bladder wall with abnormal mucosal  enhancement concerning for cystitis. Enlarged prostate which may  contribute to chronic bladder outlet obstruction.  2. Mild thickening of the distal esophagus which can be seen in  esophagitis.  Consider direct visualization.  3. Nonobstructive bilateral renal calculi.  4. Small hiatal hernia.       LABS: The last test results for Mr. Vini Loya were reviewed:  PSA -   Lab Results   Component Value Date    PSA 1.92 06/26/2013    PSA 3.39 12/08/2011    PSA 2.07 02/01/2011     PSA 1.76 07/20/2009     BMP -   Recent Labs   Lab Test  10/01/18   1007  08/10/18   1328  10/16/17   0743   06/24/15   0834   NA  136  139  136   < >  135   POTASSIUM  4.1  4.1  4.1   < >  4.1   CHLORIDE  103  103  100   < >  102   CO2  27  26  28   < >  22   BUN  18  24  18   < >  26   CR  1.38*  1.62*  1.17   < >  1.33*   GLC  104*  162*  115*   < >  113*   JEMMA  8.6  8.8  8.9   < >  8.5   MAG   --    --    --    --   1.6   PHOS   --    --    --    --   2.8    < > = values in this interval not displayed.       CBC -   Recent Labs   Lab Test  08/10/18   1328  10/16/17   0743  10/15/17   2226   WBC  9.0  17.4*  20.6*   HGB  14.7  14.6  15.9   PLT  234  211  211       ASSESSMENT:   1) Recent UTI  2) BPH with stable voiding symptoms on Flomax    PLAN:   - You are emptying your bladder fine (PVR 84mL)  - Start finasteride (which shrinks the prostate but this may take up to 2 years).  We should start this in case you need to stop Flomax at some point because of dizziness  - For now, continue Flomax (tamsulosin)  - Continue managing your bowels aggressively.  Constipation will lead to urinary retention which will lead to urinary tract infections. OK to try probiotics.  Recommend dried apricots.   - We will send your urine for culture today (to assure there is no infection)  - Continue Bactrim SS (trimethoprim sulfamethoxazole) nightly before bed  - Fill your CIpro prescription and keep it at home with you, just in case.   - If you ever suspect a urinary tract infection call the clinic [phone] and we can order a urine culture for you.  If at all possible, try to start the culture before taking any antibiotics.   - Return 6 months or sooner with problems.     Lupe Hairston PA-C  Department of Urologic Surgery

## 2018-11-21 NOTE — NURSING NOTE
"Chief Complaint   Patient presents with     Follow Up For     urinary symptoms, no symptoms now       Blood pressure 110/67, pulse 65, height 1.727 m (5' 8\"), weight 82.9 kg (182 lb 11.2 oz). Body mass index is 27.78 kg/(m^2).    Patient Active Problem List   Diagnosis     Cerebral embolism with cerebral infarction (H)     Generalized anxiety disorder     CAD (coronary artery disease)     HYPERLIPIDEMIA LDL GOAL <100     Marital conflict     Tremors     BPH (benign prostatic hypertrophy) with urinary obstruction     Bradycardia     Parkinson's disease (H)     Advanced care planning/counseling discussion     Gait disturbance, post-stroke     Herniated nucleus pulposus, L5-S1, right     Right hip pain     Bunion     Other seborrheic keratosis     Allergic conjunctivitis     Diverticulosis     At high risk for falls     Glaucoma suspect, bilateral     Senile nuclear sclerosis, bilateral     Dry eye, bilateral     History of corneal transplant     Hypertension, goal below 150/90     Major depressive disorder, single episode, moderate (H)     Actinic keratosis     Watering of eye     Lactose intolerance in adult     Degeneration of L4-L5 intervertebral disc     Compression fracture of thoracic vertebra, with routine healing, subsequent encounter     Chronic midline low back pain without sciatica       Allergies   Allergen Reactions     Vytorin      Unknown       Current Outpatient Prescriptions   Medication Sig Dispense Refill     aspirin 81 MG tablet Take 1 tablet (81 mg) by mouth daily 90 tablet 3     Blood Pressure Monitor JERONIMO 1 Device daily. 1 Device 0     carbidopa-levodopa (SINEMET)  MG per tablet Take 2 tablets 3 times a day 1 hour before each meal.  (Around 7 am, 11 am, & 4 pm.) 540 tablet 3     ciprofloxacin (CIPRO) 500 MG tablet Take 1 tablet (500 mg) by mouth 2 times daily 14 tablet 1     PARoxetine (PAXIL) 20 MG tablet TAKE ONE TABLET BY MOUTH EVERY NIGHT AT BEDTIME 90 tablet 3     polyethylene glycol " (MIRALAX) powder Take 17 g (1 capful) by mouth daily 510 g 1     rasagiline (AZILECT) 1 MG TABS tablet Take 1/2 tab daily for two weeks then increase to 1 tab daily after that. 30 each 11     senna (SENOKOT) 8.6 MG tablet Take 1 tablet by mouth 2 times daily 180 tablet 3     simvastatin (ZOCOR) 40 MG tablet TAKE ONE TABLET BY MOUTH AT BEDTIME 30 tablet 3     sulfamethoxazole-trimethoprim (BACTRIM/SEPTRA) 400-80 MG per tablet Take 1 tablet by mouth At Bedtime For UTI prevention 90 tablet 4     tamsulosin (FLOMAX) 0.4 MG capsule Take 1 capsule (0.4 mg) by mouth daily 90 capsule 3       Social History   Substance Use Topics     Smoking status: Former Smoker     Packs/day: 0.50     Years: 3.00     Types: Cigarettes     Quit date: 3/14/1966     Smokeless tobacco: Former User     Alcohol use No      Comment: occasional wine on the holidays        Lena Chahal LPN  11/21/2018  1:43 PM

## 2018-11-22 LAB
BACTERIA SPEC CULT: NORMAL
Lab: NORMAL
SPECIMEN SOURCE: NORMAL

## 2018-12-04 ENCOUNTER — OFFICE VISIT (OUTPATIENT)
Dept: NEUROLOGY | Facility: CLINIC | Age: 78
End: 2018-12-04
Payer: COMMERCIAL

## 2018-12-04 VITALS
OXYGEN SATURATION: 96 % | SYSTOLIC BLOOD PRESSURE: 134 MMHG | DIASTOLIC BLOOD PRESSURE: 68 MMHG | HEIGHT: 68 IN | WEIGHT: 184 LBS | HEART RATE: 61 BPM | BODY MASS INDEX: 27.89 KG/M2

## 2018-12-04 DIAGNOSIS — G90.3 NEUROGENIC ORTHOSTATIC HYPOTENSION (H): ICD-10-CM

## 2018-12-04 DIAGNOSIS — G20.A1 PARKINSON'S DISEASE (H): Primary | ICD-10-CM

## 2018-12-04 RX ORDER — MIDODRINE HYDROCHLORIDE 2.5 MG/1
2.5 TABLET ORAL DAILY
Qty: 30 TABLET | Refills: 6 | Status: SHIPPED | OUTPATIENT
Start: 2018-12-04 | End: 2019-02-01

## 2018-12-04 ASSESSMENT — UNIFIED PARKINSONS DISEASE RATING SCALE (UPDRS)
RIGIDITY_NECK: MILD: RIGIDITY DETECTED WITHOUT THE ACTIVATION MANEUVER.  FULL RANGE OF MOTION IS EASILY ACHIEVED.
AMPLITUDE_LLE: MILD > 1 CM BUT < 3 CM IN MAXIMAL AMPLITUDE.
AMPLITUDE_LIP_JAW: MODERATE: > 2 CM BUT < 3 CM IN MAXIMAL AMPLITUDE.
FREEZING_GAIT: NORMAL
ARISING_CHAIR: NORMAL: ABLE TO ARISE QUICKLY WITHOUT HESITATION.
TOTAL_SCORE: 12
FINGER_TAPPING_LEFT: MODERATE: ANY OF THE FOLLOWING:  A) MORE THAN 5 INTERRUPTIONS  OR AT LEAST ONE LONGER ARREST (FREEZE) IN ONGOING MOVEMENT  B) MODERATE SLOWING C) THE AMPLITUDE DECREMENTS STARTING AFTER THE FIRST MOVEMENT.
AXIAL_SCORE: 14
RIGIDITY_RLE: MILD: RIGIDITY DETECTED WITHOUT THE ACTIVATION MANEUVER.  FULL RANGE OF MOTION IS EASILY ACHIEVED.
RIGIDITY_LLE: SLIGHT: RIGIDITY ONLY DETECTED WITH ACTIVATION MANEUVER.
RIGIDITY_RUE: MILD: RIGIDITY DETECTED WITHOUT THE ACTIVATION MANEUVER.  FULL RANGE OF MOTION IS EASILY ACHIEVED.
POSTURAL_STABILITY: MODERATE: STANDS SAFELY, BUT WITH ABSENCE OF POSTURAL RESPONSE,  FALLS IF NOT CAUGHT BY EXAMINER.
RIGIDITY_LUE: MILD: RIGIDITY DETECTED WITHOUT THE ACTIVATION MANEUVER.  FULL RANGE OF MOTION IS EASILY ACHIEVED.
CONSTANCY_TREMOR_ATREST: SEVERE: TREMOR AT REST IS PRESENT > 75% OF THE ENTIRE EXAMINATION PERIOD.
FINGER_TAPPING_RIGHT: SLIGHT: ANY OF THE FOLLOWING: A) THE REGULAR RHYTHM IS BROKEN WITH ONE WITH ONE OR TWO INTERRUPTIONS OR HESITATIONS OF THE MOVEMENT B) SLIGHT SLOWING C) THE AMPLITUDE DECREMENTS NEAR THE END OF THE 10 MOVEMENTS.
GAIT: SLIGHT: INDEPENDENT WALKING WITH MINOR GAIT IMPAIRMENT.
AMPLITUDE_RUE: NORMAL: NO TREMOR.
PRONATION_SUPINATION_RIGHT: SLIGHT: ANY OF THE FOLLOWING: A) THE REGULAR RHYTHM IS BROKEN WITH ONE WITH ONE OR TWO INTERRUPTIONS OR HESITATIONS OF THE MOVEMENT B) SLIGHT SLOWING C) THE AMPLITUDE DECREMENTS NEAR THE END OF THE 10 MOVEMENTS.
AMPLITUDE_LUE: MILD > 1 CM BUT < 3 CM IN MAXIMAL AMPLITUDE.
AMPLITUDE_RLE: MILD > 1 CM BUT < 3 CM IN MAXIMAL AMPLITUDE.
TOETAPPING_LEFT: SLIGHT: ANY OF THE FOLLOWING: A) THE REGULAR RHYTHM IS BROKEN WITH ONE WITH ONE OR TWO INTERRUPTIONS OR HESITATIONS OF THE MOVEMENT B) SLIGHT SLOWING C) THE AMPLITUDE DECREMENTS NEAR THE END OF THE 10 MOVEMENTS.
TOTAL_SCORE: 49
FACIAL_EXPRESSION: MODERATE: MASKED FACIES WITH LIPS PARTED SOME OF THE TIME WHEN THE MOUTH IS AT REST.
PRONATION_SUPINATION_LEFT: SLIGHT: ANY OF THE FOLLOWING: A) THE REGULAR RHYTHM IS BROKEN WITH ONE WITH ONE OR TWO INTERRUPTIONS OR HESITATIONS OF THE MOVEMENT B) SLIGHT SLOWING C) THE AMPLITUDE DECREMENTS NEAR THE END OF THE 10 MOVEMENTS.
LEG_AGILITY_LEFT: SLIGHT: ANY OF THE FOLLOWING: A) THE REGULAR RHYTHM IS BROKEN WITH ONE WITH ONE OR TWO INTERRUPTIONS OR HESITATIONS OF THE MOVEMENT B) SLIGHT SLOWING C) THE AMPLITUDE DECREMENTS NEAR THE END OF THE 10 MOVEMENTS.
SPONTANEITY_OF_MOVEMENT: 1: SLIGHT: SLIGHT GLOBAL SLOWNESS AND POVERTY OF SPONTANEOUS MOVEMENTS.
POSTURE: 3 MODERATE.  STOOPED POSTURE, SCOLIOSIS, OR LEANING TO ONE SIDE THAT CANNOT BE CORRECTED VOLITIONALLY TO A NORMAL POSTURE BY THE PATIENT.
TOTAL_SCORE_LEFT: 16
TOETAPPING_RIGHT: NORMAL
LEG_AGILITY_RIGHT: SLIGHT: ANY OF THE FOLLOWING: A) THE REGULAR RHYTHM IS BROKEN WITH ONE WITH ONE OR TWO INTERRUPTIONS OR HESITATIONS OF THE MOVEMENT B) SLIGHT SLOWING C) THE AMPLITUDE DECREMENTS NEAR THE END OF THE 10 MOVEMENTS.
HANDMOVEMENTS_LEFT: SLIGHT: ANY OF THE FOLLOWING: A) THE REGULAR RHYTHM IS BROKEN WITH ONE WITH ONE OR TWO INTERRUPTIONS OR HESITATIONS OF THE MOVEMENT B) SLIGHT SLOWING C) THE AMPLITUDE DECREMENTS NEAR THE END OF THE 10 MOVEMENTS.
HANDMOVEMENTS_RIGHT: MILD: ANY OF THE FOLLOWING: A) 3 TO 5 INTERRUPTIONS DURING TAPPING B) MILD SLOWING C) THE AMPLITUDE DECREMENTS MIDWAY IN THE 10-MOVEMENT SEQUENCE
PARKINSONS_MEDS: ON
SPEECH: SLIGHT: LOSS OF MODULATION, DICTION OR VOLUME, BUT STILL ALL WORDS EASY TO UNDERSTAND.

## 2018-12-04 ASSESSMENT — PAIN SCALES - GENERAL: PAINLEVEL: NO PAIN (0)

## 2018-12-04 NOTE — MR AVS SNAPSHOT
After Visit Summary   12/4/2018    Vini Loya    MRN: [id number]           Patient Information     Date Of Birth          1940        Visit Information        Provider Department      12/4/2018 1:10 PM Dalila Staples APRN CNP [facility] Neurology        Today's Diagnoses     Parkinson's disease (H)    -  1    Neurogenic orthostatic hypotension (H)          Care Instructions    December 4, 2018    Dear Mr. Vini Loya,    Thank you for coming today.  During your visit, we have discussed the following:     __  Please take your Sinemet dose as prescribed.     PD Medications 7 am 11 am 4 pm   Sinemet 25/100 mg  2 2 2     __  Stop taking Azilect for 2 weeks.    __  After 2 weeks, start taking Midodrine 2.5 mg at 7 am to help dizziness.     __  Monitor your blood pressure when you get up in the morning & mid-day.  If the top blood pressure number is consistently over 150 mmHg, call me.     __  Continue exercising.     __  Return in 3 months. You may return sooner as needed.      For questions, you may send us a [employer] message or call [phone]    Fax number: [phone]    KOTA Stone CNP  [facility]               Follow-ups after your visit        Your next 10 appointments already scheduled     Mar 04, 2019 12:50 PM CST   (Arrive by 12:35 PM)   Return Movement Disorder with KOTA Dutta CNP   [facility] ([facility])    [address] [place] [address] [phone]              Who to contact     Please call your clinic at [phone] to:    Ask questions about your health    Make or cancel appointments    Discuss your medicines    Learn about your test results    Speak to your doctor            Additional Information About Your Visit        Care EveryWhere ID     This is your Care EveryWhere ID. This could be used by other organizations to access your [place] medical records  [id number]       "  Your Vitals Were     Pulse Height Pulse Oximetry BMI (Body Mass Index)          61 1.727 m (5' 8\") 96% 27.98 kg/m2         Blood Pressure from Last 3 Encounters:   12/04/18 134/68   11/21/18 110/67   10/01/18 110/82    Weight from Last 3 Encounters:   12/04/18 83.5 kg (184 lb)   11/21/18 82.9 kg (182 lb 11.2 oz)   10/01/18 84.4 kg (186 lb)              Today, you had the following     No orders found for display         Today's Medication Changes          These changes are accurate as of 12/4/18  2:31 PM.  If you have any questions, ask your nurse or doctor.               Start taking these medicines.        Dose/Directions    midodrine 2.5 MG tablet   Commonly known as:  PROAMATINE   Used for:  Neurogenic orthostatic hypotension (H), Parkinson's disease (H)   Started by:  Dalial Staples APRN CNP        Dose:  2.5 mg   Take 1 tablet (2.5 mg) by mouth daily   Quantity:  30 tablet   Refills:  6         Stop taking these medicines if you haven't already. Please contact your care team if you have questions.     rasagiline 1 MG Tabs tablet   Commonly known as:  AZILECT   Stopped by:  Dalila Staples APRN CNP                Where to get your medicines      These medications were sent to [facility] [address]  [address] [facility] [address]     Phone:  [phone]     midodrine 2.5 MG tablet                Primary Care Provider Office Phone # Fax #    Joel Daniel Irwin Wegener, [phone]       [address]  [facility] [address]        Equal Access to Services     AMEENA JORDAN AH: Javy Kunz, eliz lukassidyadaha, qajonathan kaalmada kishore, claudia jeong. [facility] [phone].    ATENCIÓN: Si habla español, tiene a florez disposición servicios gratuitos de asistencia lingüística. Llame al [phone].    We comply with applicable federal civil rights laws and [place] laws. We do not discriminate on the " basis of race, color, national origin, age, disability, sex, sexual orientation, or gender identity.            Thank you!     Thank you for choosing [facility] NEUROLOGY  for your care. Our goal is always to provide you with excellent care. Hearing back from our patients is one way we can continue to improve our services. Please take a few minutes to complete the written survey that you may receive in the mail after your visit with us. Thank you!             Your Updated Medication List - Protect others around you: Learn how to safely use, store and throw away your medicines at www.disposemymeds.org.          This list is accurate as of 12/4/18  2:31 PM.  Always use your most recent med list.                   Brand Name Dispense Instructions for use Diagnosis    aspirin 81 MG tablet    ASA    90 tablet    Take 1 tablet (81 mg) by mouth daily    Coronary artery disease involving native heart without angina pectoris, unspecified vessel or lesion type       Blood Pressure Monitor Pricilla     1 Device    1 Device daily.    Hypertension goal BP (blood pressure) < 130/80       carbidopa-levodopa  MG tablet    SINEMET    540 tablet    Take 2 tablets 3 times a day 1 hour before each meal.  (Around 7 am, 11 am, & 4 pm.)    Parkinson's disease (H)       ciprofloxacin 500 MG tablet    CIPRO    14 tablet    Take 1 tablet (500 mg) by mouth 2 times daily    Urinary tract infection, acute       midodrine 2.5 MG tablet    PROAMATINE    30 tablet    Take 1 tablet (2.5 mg) by mouth daily    Neurogenic orthostatic hypotension (H), Parkinson's disease (H)       PARoxetine 20 MG tablet    PAXIL    90 tablet    TAKE ONE TABLET BY MOUTH EVERY NIGHT AT BEDTIME    Anxiety       polyethylene glycol powder    MIRALAX    510 g    Take 17 g (1 capful) by mouth daily    Constipation, unspecified constipation type       senna 8.6 MG tablet    SENOKOT    180 tablet    Take 1 tablet by mouth 2 times daily    Slow transit constipation        simvastatin 40 MG tablet    ZOCOR    30 tablet    TAKE ONE TABLET BY MOUTH AT BEDTIME    Hyperlipidemia LDL goal <100       sulfamethoxazole-trimethoprim 400-80 MG tablet    BACTRIM/SEPTRA    90 tablet    Take 1 tablet by mouth At Bedtime For UTI prevention    Urinary tract infection with hematuria, site unspecified       tamsulosin 0.4 MG capsule    FLOMAX    90 capsule    Take 1 capsule (0.4 mg) by mouth daily    Benign prostatic hyperplasia with nocturia

## 2018-12-04 NOTE — Clinical Note
2018       RE: Vini Loya  [address]  [facility] [address]     Dear Colleague,    Thank you for referring your patient, Vini Loya, to the [facility] NEUROLOGY at [facility]. Please see a copy of my visit note below.    PATIENT: Vini Loya    : 1940    ALBINA: 2018    REASON FOR VISIT: Parkinson's disease (PD) follow up.    HPI: Mr. Vini Loya is a 78 year old  male who came to the [facility] neurology clinic accompanied by his *** for a follow up visit.  He was last seen in the clinic on *** by *** for ***.  During that visit, the following were discussed: -      This 97/44, 123/55,     He continues having intermittent orthostatic hypotension.  No syncope.  He hydrates very well as he goes to the bathroom frequently.       PD Medications 7 am 11 am 4 pm   Sinemet 25/100 mg  2 2 2   Azilect 1 mg  1       He reports taking Sinemet 25/100 mg 3 tabs bid around 7-8 am & 8 pm.  He did this in the last 3 days.  He wanted to try 4 tablets but decided to not try it.     He hasn't met with Katie Yoon, HennyD.     He had another UTI in October.  He is now on Bactrim for prevention.     He is more bothered by the dizziness.      He checks his BP every day.  Most of the time, BP is in 103/70's -- he usually takes his BP in the moring around 8 - 9 am.     MEDICATIONS:   Outpatient Prescriptions Marked as Taking for the 18 encounter (Office Visit) with Dalila Staples APRN CNP   Medication Sig     aspirin 81 MG tablet Take 1 tablet (81 mg) by mouth daily     Blood Pressure Monitor JERONIMO 1 Device daily.     carbidopa-levodopa (SINEMET)  MG per tablet Take 2 tablets 3 times a day 1 hour before each meal.  (Around 7 am, 11 am, & 4 pm.)     ciprofloxacin (CIPRO) 500 MG tablet Take 1 tablet (500 mg) by mouth 2 times daily     PARoxetine (PAXIL) 20 MG tablet TAKE ONE TABLET BY MOUTH EVERY NIGHT AT BEDTIME     polyethylene glycol  "(MIRALAX) powder Take 17 g (1 capful) by mouth daily     rasagiline (AZILECT) 1 MG TABS tablet Take 1/2 tab daily for two weeks then increase to 1 tab daily after that.     senna (SENOKOT) 8.6 MG tablet Take 1 tablet by mouth 2 times daily     simvastatin (ZOCOR) 40 MG tablet TAKE ONE TABLET BY MOUTH AT BEDTIME     sulfamethoxazole-trimethoprim (BACTRIM/SEPTRA) 400-80 MG per tablet Take 1 tablet by mouth At Bedtime For UTI prevention     tamsulosin (FLOMAX) 0.4 MG capsule Take 1 capsule (0.4 mg) by mouth daily       ALLERGIES: Vytorin    PHYSICAL EXAM:    VITAL SIGNS:  Blood pressure 134/68, pulse 61, height 1.727 m (5' 8\"), weight 83.5 kg (184 lb), SpO2 96 %.  Body mass index is 27.98 kg/(m^2).    GENERAL:  Mr. Loya is a pleasant *** male who is {General:[id number]} sitting comfortably in the exam room without any distress.  Affect is appropriate.    MOVEMENT DISORDERS ASSESSMENT: (Last Sinemet was about 6 hrs ago.  He forgot to take his 12 pm dose. )  UPDRS Values 12/4/2018   Time: 1:47 PM   Medication On   R Brain DBS: None   L Brain DBS: None   Speech 1   Facial Expression 3   Rigidity Neck 2   Rigidity RUE 2   Rigidity LUE 2   Rigidity RLE 2   Rigidity LLE 1   Finger Taps R 1   Finger Taps L 3   Hand Mvt R 2   Hand Mvt L 1   Pron-/Supinate R 1   Pron-/Supinate L 1   Toe Tap R 0   Toe Tap L 1   Leg Agility R 1   Leg Agility L 1   Arise From Chair 0   Gait 1   Gait Freezing 0   Postural Stability 3   Posture 3   Global Spont Mvt 1   Postural Tremor RUE 1   Postural Tremor LUE 1   Kinetic Tremor RUE 0   Kinetic Tremor LUE 1   Rest Tremor RUE 0   Rest Tremor LUE 2   Rest Tremor RLE 2   Rest Tremor LLE 2   Rest Tremor Lip/Jaw 3   Rest Tremor Constancy 4   Total Right 12   Total Left 16   Axial Total 14   Total 49       Dyskinesias:  ***.     ASSESSMENT:    1)  Parkinson's Disease:  Patient has a *** year history of PD.    2)  ***:    3)  ***:    4)  ***:     PLAN:      __  Please take your Sinemet dose as prescribed. "     PD Medications 7 am 11 am 4 pm   Sinemet 25/100 mg  2 2 2     __  Stop taking Azilect for 2 weeks.    __  After 2 weeks, start taking Midodrine 2.5 mg at 7 am to help dizziness.     __  Monitor your blood pressure when you get up in the morning & mid-day.  If the top blood pressure number is consistently over 150 mmHg, call me.     __  Continue exercising.     __  Return in 3 months. You may return sooner as needed.        Will return to our clinic in *** months or sooner as needed.    The total time spent with the patient was *** minutes, and greater than 50% of this time was spent in counseling and coordination of care.    KOTA Stone,  CNP  [facility]    1:23 PM    Again, thank you for allowing me to participate in the care of your patient.      Sincerely,    KOTA Carpenter CNP

## 2018-12-04 NOTE — PATIENT INSTRUCTIONS
December 4, 2018    Dear Mr. Vini Loya,    Thank you for coming today.  During your visit, we have discussed the following:     __  Please take your Sinemet dose as prescribed.     PD Medications 7 am 11 am 4 pm   Sinemet 25/100 mg  2 2 2     __  Stop taking Azilect for 2 weeks.    __  After 2 weeks, start taking Midodrine 2.5 mg at 7 am to help dizziness.     __  Monitor your blood pressure when you get up in the morning & mid-day.  If the top blood pressure number is consistently over 150 mmHg, call me.     __  Continue exercising.     __  Return in 3 months. You may return sooner as needed.      For questions, you may send us a [place] message or call [phone]    Fax number: [phone]    KOTA Stone, CNP  [facility]

## 2018-12-04 NOTE — PROGRESS NOTES
PATIENT: Vini Loya    : 1940    ALBINA: 2018    REASON FOR VISIT: Parkinson's disease (PD) follow up.    HPI: Mr. Vini Loya is a 78 year old  male who came to the [facility] neurology clinic accompanied by his wife for a follow up visit.  I saw him last in clinic on 8/15/2018 for a routine follow-up visit.  During that visit, the following were discussed: -    ASSESSMENT:     1)  Parkinson's Disease:  Patient has a 9 year history of tremor-dominant PD.  He has tremor, but it's not bothersome to him.   Since Azilect was added, he is experiencing more dizziness & has stopped taking it.   2)  Orthostatic hypotension:  This is intermittent.  Today's BP didn't show significant BP drop.   After he stopped the Azilect, dizziness has improved.   3)  Fatigue:  Ongoing issues.  Taking naps as needed.           PLAN:     1)    Okay to stop the Azilect.   __  Will continue with Sinemet.    PD Medications 7 am  11 am  4 pm   Sinemet 25/100 mg  2 2 2      2)  Discussed about orthostatic hypotension.  Written material given.   __  He was given a 7 day orthostatic vitals logging sheet.  He was instructed how to take his BP & pulse.  After he completes the 7 days diary, he'll  mail it.    __  He was also encouraged to take his orthostatic vitals when he feels dizzy PRN, that is in addition to his diary above.     3)  Discussed fatigue associated with PD.   __  Encouraged to continue exercising as well as resting as needed.   __  No medication treatment at this time.   __  Might consider adding Amantadine to help with PD symptoms as well as boost his energy.       He reports checking his BP daily.  This morning his BP was 97/44 when he rechecked later, it was 123/55.  He continues having intermittent orthostatic hypotension.  No syncope.  He hydrates very well as he goes to the bathroom frequently.    He decided to increase his Sinemet 25/100 mg 3 tabs bid around 7-8 am & 8 pm for a few days.  He thought it  "would improve his dizziness.  He also thought increasing the dose to 4 tablets twice a day; but decided to go back to 2 tabs QID.  Today, he took 2 tablets.    PD Medications 7 am 11 am 4 pm   Sinemet 25/100 mg  2 2 2   Azilect 1 mg  1       He hasn't met with Katie Yoon, PharmD.  His wife reports that some part of his visit by a pharmacist is not covered as she had utilized the services in the past.    He had another UTI in October.  He is now on Bactrim for prevention.     Today, he reports the most bothersome symptom he wants treated is the dizziness.  Most of the time, his BP is around 103/70's.  He usually takes his BP in the morning around 8 - 9 am.  He has continued [place] and going to the gym.  When he feels dizzy, he sits and let it pass.     MEDICATIONS:   Medication Sig     aspirin 81 MG  Take 1 tablet (81 mg) by mouth daily     Blood Pressure Monitor JERONIMO 1 Device daily.     carbidopa-levodopa (SINEMET)  MG per tablet Take 2 tablets 3 times a day 1 hour before each meal.  (Around 7 am, 11 am, & 4 pm.)     ciprofloxacin (CIPRO) 500 MG  Take 1 tablet (500 mg) by mouth 2 times daily     PARoxetine (PAXIL) 20 MG  TAKE ONE TABLET BY MOUTH EVERY NIGHT AT BEDTIME     polyethylene glycol (MIRALAX) powder Take 17 g (1 capful) by mouth daily     rasagiline (AZILECT) 1 MG TABS  Take 1/2 tab daily for two weeks then increase to 1 tab daily after that.     senna (SENOKOT) 8.6 MG  Take 1 tablet by mouth 2 times daily     simvastatin (ZOCOR) 40 MG  TAKE ONE TABLET BY MOUTH AT BEDTIME     sulfamethoxazole-trimethoprim (BACTRIM/SEPTRA) 400-80 MG Take 1 tablet by mouth At Bedtime For UTI prevention     tamsulosin (FLOMAX) 0.4 MG capsule Take 1 capsule (0.4 mg) by mouth daily       ALLERGIES: Vytorin    PHYSICAL EXAM:    VITAL SIGNS:  Blood pressure 134/68, pulse 61, height 1.727 m (5' 8\"), weight 83.5 kg (184 lb), SpO2 96 %.  Body mass index is 27.98 kg/(m^2).    GENERAL:  Mr." Vincenzo is a pleasant  male who is well-groomed and well-developed sitting comfortably in the exam room without any distress.  Affect is appropriate.    MOVEMENT DISORDERS ASSESSMENT: (Last Sinemet was about 6 hrs ago.  He forgot to take his 12 pm dose. )  UPDRS Values 12/4/2018   Time: 1:47 PM   Medication On   R Brain DBS: None   L Brain DBS: None   Speech 1   Facial Expression 3   Rigidity Neck 2   Rigidity RUE 2   Rigidity LUE 2   Rigidity RLE 2   Rigidity LLE 1   Finger Taps R 1   Finger Taps L 3   Hand Mvt R 2   Hand Mvt L 1   Pron-/Supinate R 1   Pron-/Supinate L 1   Toe Tap R 0   Toe Tap L 1   Leg Agility R 1   Leg Agility L 1   Arise From Chair 0   Gait 1   Gait Freezing 0   Postural Stability 3   Posture 3   Global Spont Mvt 1   Postural Tremor RUE 1   Postural Tremor LUE 1   Kinetic Tremor RUE 0   Kinetic Tremor LUE 1   Rest Tremor RUE 0   Rest Tremor LUE 2   Rest Tremor RLE 2   Rest Tremor LLE 2   Rest Tremor Lip/Jaw 3   Rest Tremor Constancy 4   Total Right 12   Total Left 16   Axial Total 14   Total 49     Dyskinesias:  Absent.     ASSESSMENT:    1)  Parkinson's Disease:  Patient has about 8 year history of PD.   Although his tremors are pronounced, he is not bothered by it.  As mentioned above, he has been changing his Levodopa dose and increasing the dose not to improve his tremors, but his dizziness.    2)  Orthostatic Hypotension:  Dizziness is bothersome.  He is asking treatments.        PLAN:    __  Discussed about the importance of taking his medication as prescribed.  Also, the danger of increasing his Levodopa dose like he did.  He was told that increasing Levodopa will not improve his dizziness; in fact, it might worsen it.  __  He was instructed to take his Sinemet dose as prescribed.     PD Medications 7 am 11 am 4 pm   Sinemet 25/100 mg  2 2 2     __  He was told that if he wanted to change his dose, to call us and verify its safety.   __  Dicussed about Midodrine.  Since there is  drug-drug interaction between Azilect and Midodrine, he was instructed to stop taking Azilect.   __  After 2 weeks of stopping Azilect, he'll start taking Midodrine 2.5 mg at 7 am to help dizziness.   __  Instructed to monitor blood pressure when he gets up in the morning & mid-day.  If SBP is consistently over 150 mmHg, he was instructed to call.   __  Will continue exercising.     Will return to our clinic in 3 months or sooner as needed.    The total time spent with the patient was 45 minutes, and greater than 50% of this time was spent in counseling and coordination of care.    KOTA Stone,  CNP  [facility]    1:23 PM  2:05 PM

## 2018-12-04 NOTE — NURSING NOTE
Chief Complaint   Patient presents with     RECHECK     3 month follow up md Alessia Mcclain, MA

## 2018-12-05 ENCOUNTER — TELEPHONE (OUTPATIENT)
Dept: NEUROLOGY | Facility: CLINIC | Age: 78
End: 2018-12-05

## 2018-12-05 NOTE — TELEPHONE ENCOUNTER
This was the instruction I gave pt & his wife.  I did see the drug-drug interaction.   Thank you.      __  Please take your Sinemet dose as prescribed.      PD Medications 7 am 11 am 4 pm   Sinemet 25/100 mg  2 2 2      __  Stop taking Azilect for 2 weeks.     __  After 2 weeks, start taking Midodrine 2.5 mg at 7 am to help dizziness.      __  Monitor your blood pressure when you get up in the morning & mid-day.  If the top blood pressure number is consistently over 150 mmHg, call me.      __  Continue exercising.      __  Return in 3 months. You may return sooner as needed.

## 2018-12-05 NOTE — TELEPHONE ENCOUNTER
Health Call Center    Phone Message    May a detailed message be left on voicemail: no    Reason for Call: Medication Question or concern regarding medication   Prescription Clarification  Name of Medication: midodrine (PROAMATINE) 2.5 MG tablet  Prescribing Provider: Taina Staples NP   [facility]   What on the order needs clarification? Drug interaction with risagaline AVILECT 1mg tablets, contraindication. Combination can cause severe cardiac/vascular problems. Please call pharmacy.          Action Taken: Message routed to:  [facility] ([facility]

## 2019-01-28 ENCOUNTER — TELEPHONE (OUTPATIENT)
Dept: FAMILY MEDICINE | Facility: CLINIC | Age: 79
End: 2019-01-28

## 2019-01-28 DIAGNOSIS — N39.0 RECURRENT UTI: ICD-10-CM

## 2019-01-28 LAB
ALBUMIN UR-MCNC: NEGATIVE MG/DL
APPEARANCE UR: CLEAR
BILIRUB UR QL STRIP: NEGATIVE
COLOR UR AUTO: YELLOW
GLUCOSE UR STRIP-MCNC: NEGATIVE MG/DL
HGB UR QL STRIP: NEGATIVE
KETONES UR STRIP-MCNC: NEGATIVE MG/DL
LEUKOCYTE ESTERASE UR QL STRIP: NEGATIVE
NITRATE UR QL: NEGATIVE
PH UR STRIP: 6 PH (ref 5–7)
SOURCE: NORMAL
SP GR UR STRIP: 1.02 (ref 1–1.03)
UROBILINOGEN UR STRIP-ACNC: 0.2 EU/DL (ref 0.2–1)

## 2019-01-28 PROCEDURE — 87086 URINE CULTURE/COLONY COUNT: CPT | Performed by: FAMILY MEDICINE

## 2019-01-28 PROCEDURE — 81003 URINALYSIS AUTO W/O SCOPE: CPT | Performed by: FAMILY MEDICINE

## 2019-01-28 NOTE — TELEPHONE ENCOUNTER
Reason for Call:  Request for results:    Name of test or procedure: UA    Date of test of procedure: 1/28/19    Location of the test or procedure:     OK to leave the result message on voice mail or with a family member? Not Applicable    Phone number Patient can be reached at:  Home number on file [phone] (home)    Additional comments: Patients spouse is calling to check on the results. Writer advised her that one of them is still in process. Please advise, thank you!    Call taken on 1/28/2019 at 1:29 PM by Steph Sims

## 2019-01-28 NOTE — TELEPHONE ENCOUNTER
The urine test looks normal.    If Dr. Wegener has anything to add, we will call you back.    This weekend, pt was voiding every 30-60 minutes.  No fever. No burning.    Wife would like pt to have a physical to review his health and cancer screenings. This was made for 1/29/19.  JERI Carlson

## 2019-01-28 NOTE — TELEPHONE ENCOUNTER
Writer called and informed Kristi of message per Dr. Wegener.    Kristi verbalized understanding and prefers UC results be back before patient has his physical.  She requested appt on 2/1/19 for patient.    Appt rescheduled from 1/29/19 to 2/1/19 with Dr. Wegener.  Appt date, time and location confirmed with Kristi.    MATEO RuffN, RN

## 2019-01-29 LAB
BACTERIA SPEC CULT: NO GROWTH
SPECIMEN SOURCE: NORMAL

## 2019-02-01 ENCOUNTER — OFFICE VISIT (OUTPATIENT)
Dept: FAMILY MEDICINE | Facility: CLINIC | Age: 79
End: 2019-02-01
Payer: COMMERCIAL

## 2019-02-01 VITALS
TEMPERATURE: 97.6 F | WEIGHT: 184 LBS | HEART RATE: 63 BPM | BODY MASS INDEX: 27.89 KG/M2 | OXYGEN SATURATION: 94 % | HEIGHT: 68 IN | DIASTOLIC BLOOD PRESSURE: 61 MMHG | SYSTOLIC BLOOD PRESSURE: 115 MMHG | RESPIRATION RATE: 18 BRPM

## 2019-02-01 DIAGNOSIS — L57.0 ACTINIC KERATOSIS: ICD-10-CM

## 2019-02-01 DIAGNOSIS — G20.A1 PARKINSON'S DISEASE (H): ICD-10-CM

## 2019-02-01 DIAGNOSIS — N39.0 RECURRENT UTI: ICD-10-CM

## 2019-02-01 DIAGNOSIS — K59.01 SLOW TRANSIT CONSTIPATION: ICD-10-CM

## 2019-02-01 DIAGNOSIS — Z12.5 SCREENING FOR PROSTATE CANCER: ICD-10-CM

## 2019-02-01 DIAGNOSIS — E78.5 HYPERLIPIDEMIA LDL GOAL <100: ICD-10-CM

## 2019-02-01 DIAGNOSIS — I10 HYPERTENSION, GOAL BELOW 150/90: ICD-10-CM

## 2019-02-01 DIAGNOSIS — Z00.01 ENCOUNTER FOR GENERAL ADULT MEDICAL EXAMINATION WITH ABNORMAL FINDINGS: Primary | ICD-10-CM

## 2019-02-01 DIAGNOSIS — Z23 NEED FOR SHINGLES VACCINE: ICD-10-CM

## 2019-02-01 DIAGNOSIS — E87.5 HYPERKALEMIA: ICD-10-CM

## 2019-02-01 LAB
ALBUMIN UR-MCNC: 30 MG/DL
APPEARANCE UR: CLEAR
BACTERIA #/AREA URNS HPF: ABNORMAL /HPF
BILIRUB UR QL STRIP: NEGATIVE
COLOR UR AUTO: YELLOW
GLUCOSE UR STRIP-MCNC: NEGATIVE MG/DL
HGB UR QL STRIP: NEGATIVE
HYALINE CASTS #/AREA URNS LPF: ABNORMAL /LPF
KETONES UR STRIP-MCNC: ABNORMAL MG/DL
LEUKOCYTE ESTERASE UR QL STRIP: NEGATIVE
NITRATE UR QL: NEGATIVE
PH UR STRIP: 6 PH (ref 5–7)
RBC #/AREA URNS AUTO: ABNORMAL /HPF
SOURCE: ABNORMAL
SP GR UR STRIP: 1.02 (ref 1–1.03)
UROBILINOGEN UR STRIP-ACNC: 0.2 EU/DL (ref 0.2–1)
WBC #/AREA URNS AUTO: ABNORMAL /HPF

## 2019-02-01 PROCEDURE — 90471 IMMUNIZATION ADMIN: CPT | Performed by: FAMILY MEDICINE

## 2019-02-01 PROCEDURE — 82043 UR ALBUMIN QUANTITATIVE: CPT | Performed by: FAMILY MEDICINE

## 2019-02-01 PROCEDURE — 90750 HZV VACC RECOMBINANT IM: CPT | Performed by: FAMILY MEDICINE

## 2019-02-01 PROCEDURE — G0103 PSA SCREENING: HCPCS | Performed by: FAMILY MEDICINE

## 2019-02-01 PROCEDURE — G0439 PPPS, SUBSEQ VISIT: HCPCS | Performed by: FAMILY MEDICINE

## 2019-02-01 PROCEDURE — 36415 COLL VENOUS BLD VENIPUNCTURE: CPT | Performed by: FAMILY MEDICINE

## 2019-02-01 PROCEDURE — 99213 OFFICE O/P EST LOW 20 MIN: CPT | Mod: 25 | Performed by: FAMILY MEDICINE

## 2019-02-01 PROCEDURE — 80053 COMPREHEN METABOLIC PANEL: CPT | Performed by: FAMILY MEDICINE

## 2019-02-01 PROCEDURE — 81001 URINALYSIS AUTO W/SCOPE: CPT | Performed by: FAMILY MEDICINE

## 2019-02-01 PROCEDURE — 80061 LIPID PANEL: CPT | Performed by: FAMILY MEDICINE

## 2019-02-01 PROCEDURE — 87086 URINE CULTURE/COLONY COUNT: CPT | Performed by: FAMILY MEDICINE

## 2019-02-01 ASSESSMENT — MIFFLIN-ST. JEOR: SCORE: 1524.12

## 2019-02-01 NOTE — PROGRESS NOTES
"  SUBJECTIVE:   Vini Loya is a 78 year old male who presents for Preventive Visit.  Are you in the first 12 months of your Medicare Part B coverage?  No    Physical Health:    In general, how would you rate your overall physical health? fair    Outside of work, how many days during the week do you exercise? 2-3 days/week    Outside of work, approximately how many minutes a day do you exercise?45-60 minutes    If you drink alcohol do you typically have >3 drinks per day or >7 drinks per week? No    Do you usually eat at least 4 servings of fruit and vegetables a day, include whole grains & fiber and avoid regularly eating high fat or \"junk\" foods? Yes    Do you have any problems taking medications regularly?  No    Do you have any side effects from medications? none    Needs assistance for the following daily activities: no assistance needed    Which of the following safety concerns are present in your home?  none identified     Hearing impairment: No,    In the past 6 months, have you been bothered by leaking of urine? no    Mental Health:    In general, how would you rate your overall mental or emotional health? fair  PHQ-2 Score:      Do you feel safe in your environment? Yes    Do you have a Health Care Directive? No: Advance care planning reviewed with patient; information given to patient to review.    Additional concerns to address?      Yes:    Constipation - continues.  bm every 4-5 days.  Hard to pass.  Doesn't feel senna helped.  Doesn't believe it is from parkinson's and doesn't believe has parkinson's because \"if he did he would be worried about it. \"  .  Used  miralax once two days ago and an enema, didn't helpl  Concerned about blockage from cancer, would like to do colonoscopy again. Last 2011.  rec 5-10 year follow up .     Otherwise just wants to review meds.  No longer taking midodrine.     Continues to see his neurology team regularly.     Continues to live with spouse of 53 years with minor " disagreements.  Not worried about this.  Continues to shovel, mow, do housework, drive. Feels happy overall.     Fall risk:  Fallen 2 or more times in the past year?: No  Any fall with injury in the past year?: No    Cognitive Screenin) Repeat 3 items (Leader, Season, Table)    2) Clock draw: NORMAL  3) 3 item recall: Recalls 3 objects  Results: 3 items recalled: COGNITIVE IMPAIRMENT LESS LIKELY    Mini-CogTM Copyright VIKTORIYA Castro. Licensed by the author for use in [facility]; reprinted with permission (amanda@[facility]). All rights reserved.      Do you have sleep apnea, excessive snoring or daytime drowsiness?: no        PROBLEMS TO ADD ON...  -------------------------------------  As above.     Reviewed and updated as needed this visit by clinical staff  Tobacco  Allergies  Meds         Reviewed and updated as needed this visit by Provider        Social History     Tobacco Use     Smoking status: Former Smoker     Packs/day: 0.50     Years: 3.00     Pack years: 1.50     Types: Cigarettes     Last attempt to quit: 3/14/1966     Years since quittin.9     Smokeless tobacco: Former User   Substance Use Topics     Alcohol use: No     Comment: occasional wine on the holidays                            Current providers sharing in care for this patient include:   Patient Care Team:  Wegener, Joel Daniel Irwin, MD as PCP - General (Family Practice)  Wegener, Joel Daniel Irwin, MD as PCP - Assigned PCP  Yudith Mcdonnell MD as MD (Ophthalmology)  Evelyn Hairston PA as Physician Assistant (Physician Assistant)  Dalila Staples APRN CNP as Referring Physician (Nurse Practitioner)  Lena Velazquez, PhD LP as MD (Psychology)  Abel Stone MD as MD (Orthopedics)    The following health maintenance items are reviewed in Epic and correct as of today:  Health Maintenance   Topic Date Due     ZOSTER IMMUNIZATION (2 of 3) 2016     MICROALBUMIN Q1 YEAR  2017     ADVANCE  "DIRECTIVE PLANNING Q5 YRS  10/11/2017     ELISHA QUESTIONNAIRE 1 YEAR  03/15/2019     PHQ-9 Q6 MONTHS  03/27/2019     FALL RISK ASSESSMENT  09/27/2019     CMP Q1 YR  10/01/2019     LIPID MONITORING Q1 YEAR  10/01/2019     DTAP/TDAP/TD IMMUNIZATION (3 - Td) 10/01/2028     DEPRESSION ACTION PLAN  Completed     INFLUENZA VACCINE  Completed     IPV IMMUNIZATION  Aged Out     MENINGITIS IMMUNIZATION  Aged Out     Labs reviewed in EPIC      ROS:  Constitutional, HEENT, cardiovascular, pulmonary, GI, , musculoskeletal, neuro, skin, endocrine and psych systems are negative, except as otherwise noted.    OBJECTIVE:   /61 (BP Location: Left arm, Patient Position: Sitting, Cuff Size: Adult Regular)   Pulse 63   Temp 97.6  F (36.4  C) (Oral)   Resp 18   Ht 1.727 m (5' 8\")   Wt 83.5 kg (184 lb)   SpO2 94%   BMI 27.98 kg/m   Estimated body mass index is 27.98 kg/m  as calculated from the following:    Height as of this encounter: 1.727 m (5' 8\").    Weight as of this encounter: 83.5 kg (184 lb).  EXAM:   GENERAL: healthy, alert and no distress  EYES: Eyes grossly normal to inspection, PERRL and conjunctivae and sclerae normal  HENT: ear canals and TM's normal, nose and mouth without ulcers or lesions  NECK: no adenopathy, no asymmetry, masses, or scars and thyroid normal to palpation  RESP: lungs clear to auscultation - no rales, rhonchi or wheezes  CV: regular rate and rhythm, normal S1 S2, no S3 or S4, no murmur, click or rub, no peripheral edema and peripheral pulses strong  ABDOMEN: soft, nontender, no hepatosplenomegaly, no masses and bowel sounds normal  RECTAL: normal sphincter tone, no rectal masses, prostate normal size, smooth, nontender without nodules or masses  MS: no gross musculoskeletal defects noted, no edema  SKIN: no suspicious lesions or rashes  NEURO: Normal strength and tone, mentation intact and speech normal  PSYCH: mentation appears normal, affect normal/bright    Diagnostic Test " "Results:  none     ASSESSMENT / PLAN:   ASSESSMENT AND PLAN  1. Encounter for general adult medical examination with abnormal findings  Plans to do repeat colonoscopy for screening and to evaluate for obstruction given significant constipation.     Has had flu vaccine.     shingrix #1 today. Had eye shingles in past.     Labs today (cmp, lipid).  Has been taking statin again for one month, took a break.     Gave advanced directive to review/return.     Dermatology referral given for multiple actinic keratoses.     For constipation recommend being regular with taking the senna twice daily (not currently taking) , high fiber and can increase miralax to 2-3 times daily as needed.  Explained how parkinson's and his medicaitons can cause ths.       2. Recurrent UTI  Reviewed recent negative ua/culture.     3. Actinic keratosis  As above.     - DERMATOLOGY REFERRAL    Follow up one year at the latest .      End of Life Planning:  Patient currently has an advanced directive: No.  I have verified the patient's ablity to prepare an advanced directive/make health care decisions.  Literature was provided to assist patient in preparing an advanced directive.    COUNSELING:  Reviewed preventive health counseling, as reflected in patient instructions       Regular exercise       Healthy diet/nutrition       Colon cancer screening       Prostate cancer screening    BP Readings from Last 1 Encounters:   02/01/19 115/61     Estimated body mass index is 27.98 kg/m  as calculated from the following:    Height as of this encounter: 1.727 m (5' 8\").    Weight as of this encounter: 83.5 kg (184 lb).           reports that he quit smoking about 52 years ago. His smoking use included cigarettes. He has a 1.50 pack-year smoking history. He has quit using smokeless tobacco.      Appropriate preventive services were discussed with this patient, including applicable screening as appropriate for cardiovascular disease, diabetes, " osteopenia/osteoporosis, and glaucoma.  As appropriate for age/gender, discussed screening for colorectal cancer, prostate cancer, breast cancer, and cervical cancer. Checklist reviewing preventive services available has been given to the patient.    Reviewed patients plan of care and provided an AVS. The Basic Care Plan (routine screening as documented in Health Maintenance) for Vini meets the Care Plan requirement. This Care Plan has been established and reviewed with the Patient.    Counseling Resources:  ATP IV Guidelines  Pooled Cohorts Equation Calculator  Breast Cancer Risk Calculator  FRAX Risk Assessment  ICSI Preventive Guidelines  Dietary Guidelines for Americans, 2010  USDA's MyPlate  ASA Prophylaxis  Lung CA Screening    Joel Daniel Wegener, MD  [facility]

## 2019-02-01 NOTE — PATIENT INSTRUCTIONS
"ASSESSMENT AND PLAN  1. Encounter for general adult medical examination with abnormal findings  Plans to do repeat colonoscopy for screening and to evaluate for obstruction given significant constipation.     Has had flu vaccine.     shingrix #1 today. Had eye shingles in past.     Labs today (cmp, lipid).  Has been taking statin again for one month, took a break.     Gave advanced directive to review/return.     Dermatology referral given for multiple actinic keratoses.     For constipation recommend being regular with taking the senna twice daily (not currently taking) , high fiber and can increase miralax to 2-3 times daily as needed.  Explained how parkinson's and his medicaitons can cause ths.       2. Recurrent UTI  Reviewed recent negative ua/culture.     3. Actinic keratosis  As above.     - DERMATOLOGY REFERRAL    Follow up one year at the latest .      MYCHART FOR ON-LINE CARE(VISITS), LABS, REFILLS, MESSAGING, ETC [email or web address] , [phone]    E-VISIT: click \"on-line care, then request e-visit\".  E-visits work well for following up on issues we have discussed in clinic previously which may need new prescriptions, new prescriptions or substantial discussion. These are always done by me (Dr. Wegener).     ONCARE VISIT:  [email or web address]  - we treat nearly 50 common conditions through on-care.  These are done in an hour by on-call staff.     RADIOLOGY:  [facility]:  [phone]   [facility]: [phone]    Mammogram and Colonoscopy [phone]    Smoking Cessation: www.quitplan.org, 4-111-430-PLAN ([phone])      CONSUMER PRICE LINE for estimates of test costs:  [phone]       Preventive Health Recommendations:     See your health care provider every year to    Review health changes.     Discuss preventive care.      Review your medicines if your doctor has prescribed any.    Talk with your health care provider about whether you should have a test to " screen for prostate cancer (PSA).    Every 3 years, have a diabetes test (fasting glucose). If you are at risk for diabetes, you should have this test more often.    Every 5 years, have a cholesterol test. Have this test more often if you are at risk for high cholesterol or heart disease.     Every 10 years, have a colonoscopy. Or, have a yearly FIT test (stool test). These exams will check for colon cancer.    Talk to with your health care provider about screening for Abdominal Aortic Aneurysm if you have a family history of AAA or have a history of smoking.  Shots:     Get a flu shot each year.     Get a tetanus shot every 10 years.     Talk to your doctor about your pneumonia vaccines. There are now two you should receive - Pneumovax (PPSV 23) and Prevnar (PCV 13).    Talk to your pharmacist about a shingles vaccine.     Talk to your doctor about the hepatitis B vaccine.  Nutrition:     Eat at least 5 servings of fruits and vegetables each day.     Eat whole-grain bread, whole-wheat pasta and brown rice instead of white grains and rice.     Get adequate Calcium and Vitamin D.   Lifestyle    Exercise for at least 150 minutes a week (30 minutes a day, 5 days a week). This will help you control your weight and prevent disease.     Limit alcohol to one drink per day.     No smoking.     Wear sunscreen to prevent skin cancer.     See your dentist every six months for an exam and cleaning.     See your eye doctor every 1 to 2 years to screen for conditions such as glaucoma, macular degeneration and cataracts.    Personalized Prevention Plan  You are due for the preventive services outlined below.  Your care team is available to assist you in scheduling these services.  If you have already completed any of these items, please share that information with your care team to update in your medical record.    Health Maintenance Due   Topic Date Due     Zoster (Shingles) Vaccine (2 of 3) 01/28/2016     Microalbumin Lab -  yearly  02/04/2017     Discuss Advance Directive Planning  10/11/2017     Preventive Health Recommendations:     See your health care provider every year to    Review health changes.     Discuss preventive care.      Review your medicines if your doctor has prescribed any.    Talk with your health care provider about whether you should have a test to screen for prostate cancer (PSA).    Every 3 years, have a diabetes test (fasting glucose). If you are at risk for diabetes, you should have this test more often.    Every 5 years, have a cholesterol test. Have this test more often if you are at risk for high cholesterol or heart disease.     Every 10 years, have a colonoscopy. Or, have a yearly FIT test (stool test). These exams will check for colon cancer.    Talk to with your health care provider about screening for Abdominal Aortic Aneurysm if you have a family history of AAA or have a history of smoking.  Shots:     Get a flu shot each year.     Get a tetanus shot every 10 years.     Talk to your doctor about your pneumonia vaccines. There are now two you should receive - Pneumovax (PPSV 23) and Prevnar (PCV 13).    Talk to your pharmacist about a shingles vaccine.     Talk to your doctor about the hepatitis B vaccine.  Nutrition:     Eat at least 5 servings of fruits and vegetables each day.     Eat whole-grain bread, whole-wheat pasta and brown rice instead of white grains and rice.     Get adequate Calcium and Vitamin D.   Lifestyle    Exercise for at least 150 minutes a week (30 minutes a day, 5 days a week). This will help you control your weight and prevent disease.     Limit alcohol to one drink per day.     No smoking.     Wear sunscreen to prevent skin cancer.     See your dentist every six months for an exam and cleaning.     See your eye doctor every 1 to 2 years to screen for conditions such as glaucoma, macular degeneration and cataracts.    Personalized Prevention Plan  You are due for the preventive  services outlined below.  Your care team is available to assist you in scheduling these services.  If you have already completed any of these items, please share that information with your care team to update in your medical record.    Health Maintenance Due   Topic Date Due     Zoster (Shingles) Vaccine (2 of 3) 01/28/2016     Microalbumin Lab - yearly  02/04/2017     Discuss Advance Directive Planning  10/11/2017

## 2019-02-01 NOTE — LETTER
February 5, 2019      Vini Loya  [address]  [facility] [address]        Dear Vini,    Here are your recent test results:    The urine test did not show an infection.  The labs were overall stable and in good ranges except the potassium was slightly high.  I would recommend re-checking the potassium in 1-2 weeks when you are well hydrated to make sure it is actually normal.  You are not on any medications that would cause high potassium so that is a little bit unusual.     I will put a lab order in for the potassium check.     Results for orders placed or performed in visit on 02/01/19   Comprehensive metabolic panel   Result Value Ref Range    Sodium 139 133 - 144 mmol/L    Potassium 5.4 (H) 3.4 - 5.3 mmol/L    Chloride 105 94 - 109 mmol/L    Carbon Dioxide 30 20 - 32 mmol/L    Anion Gap 4 3 - 14 mmol/L    Glucose 95 70 - 99 mg/dL    Urea Nitrogen 23 7 - 30 mg/dL    Creatinine 1.33 (H) 0.66 - 1.25 mg/dL    GFR Estimate 50 (L) >60 mL/min/[1.73_m2]    GFR Estimate If Black 58 (L) >60 mL/min/[1.73_m2]    Calcium 9.3 8.5 - 10.1 mg/dL    Bilirubin Total 1.0 0.2 - 1.3 mg/dL    Albumin 4.0 3.4 - 5.0 g/dL    Protein Total 7.7 6.8 - 8.8 g/dL    Alkaline Phosphatase 67 40 - 150 U/L    ALT 7 0 - 70 U/L    AST 23 0 - 45 U/L   Lipid panel reflex to direct LDL Fasting   Result Value Ref Range    Cholesterol 176 <200 mg/dL    Triglycerides 138 <150 mg/dL    HDL Cholesterol 40 >39 mg/dL    LDL Cholesterol Calculated 108 (H) <100 mg/dL    Non HDL Cholesterol 136 (H) <130 mg/dL   Prostate spec antigen screen   Result Value Ref Range    PSA 3.04 0 - 4 ug/L   *UA reflex to Microscopic   Result Value Ref Range    Color Urine Yellow     Appearance Urine Clear     Glucose Urine Negative NEG^Negative mg/dL    Bilirubin Urine Negative NEG^Negative    Ketones Urine Trace (A) NEG^Negative mg/dL    Specific Gravity Urine 1.020 1.003 - 1.035    Blood Urine Negative NEG^Negative    pH Urine 6.0 5.0 - 7.0 pH    Protein Albumin  Urine 30 (A) NEG^Negative mg/dL    Urobilinogen Urine 0.2 0.2 - 1.0 EU/dL    Nitrite Urine Negative NEG^Negative    Leukocyte Esterase Urine Negative NEG^Negative    Source Midstream Urine    Albumin Random Urine Quantitative with Creat Ratio   Result Value Ref Range    Creatinine Urine 135 mg/dL    Albumin Urine mg/L 84 mg/L    Albumin Urine mg/g Cr 62.59 (H) 0 - 17 mg/g Cr   Urine Microscopic   Result Value Ref Range    WBC Urine 0 - 5 OTO5^0 - 5 /HPF    RBC Urine O - 2 OTO2^O - 2 /HPF    Hyaline Casts 2-5 (A) OTO2^O - 2 /LPF    Bacteria Urine Few (A) NEG^Negative /HPF   Urine Culture Aerobic Bacterial   Result Value Ref Range    Specimen Description Midstream Urine     Culture Micro No growth                Sincerely,        Joel Daniel Wegener, MD/yumikon

## 2019-02-01 NOTE — NURSING NOTE
Screening Questionnaire for Adult Immunization    Are you sick today?   No   Do you have allergies to medications, food, a vaccine component or latex?   No   Have you ever had a serious reaction after receiving a vaccination?   No   Do you have a long-term health problem with heart disease, lung disease, asthma, kidney disease, metabolic disease (e.g. diabetes), anemia, or other blood disorder?   No   Do you have cancer, leukemia, HIV/AIDS, or any other immune system problem?   No   In the past 3 months, have you taken medications that affect  your immune system, such as prednisone, other steroids, or anticancer drugs; drugs for the treatment of rheumatoid arthritis, Crohn s disease, or psoriasis; or have you had radiation treatments?   No   Have you had a seizure, or a brain or other nervous system problem?   No   During the past year, have you received a transfusion of blood or blood     products, or been given immune (gamma) globulin or antiviral drug?   No   For women: Are you pregnant or is there a chance you could become        pregnant during the next month?   No   Have you received any vaccinations in the past 4 weeks?   No     Immunization questionnaire answers were all negative.        Per orders of Dr. Wegener injection of Shingrix given by Ana Laura Day. Patient instructed to remain in clinic for 15 minutes afterwards, and to report any adverse reaction to me immediately.       Screening performed by Ana Laura Day on 2/1/2019 at 12:16 PM.

## 2019-02-02 LAB
ALBUMIN SERPL-MCNC: 4 G/DL (ref 3.4–5)
ALP SERPL-CCNC: 67 U/L (ref 40–150)
ALT SERPL W P-5'-P-CCNC: 7 U/L (ref 0–70)
ANION GAP SERPL CALCULATED.3IONS-SCNC: 4 MMOL/L (ref 3–14)
AST SERPL W P-5'-P-CCNC: 23 U/L (ref 0–45)
BACTERIA SPEC CULT: NO GROWTH
BILIRUB SERPL-MCNC: 1 MG/DL (ref 0.2–1.3)
BUN SERPL-MCNC: 23 MG/DL (ref 7–30)
CALCIUM SERPL-MCNC: 9.3 MG/DL (ref 8.5–10.1)
CHLORIDE SERPL-SCNC: 105 MMOL/L (ref 94–109)
CHOLEST SERPL-MCNC: 176 MG/DL
CO2 SERPL-SCNC: 30 MMOL/L (ref 20–32)
CREAT SERPL-MCNC: 1.33 MG/DL (ref 0.66–1.25)
CREAT UR-MCNC: 135 MG/DL
GFR SERPL CREATININE-BSD FRML MDRD: 50 ML/MIN/{1.73_M2}
GLUCOSE SERPL-MCNC: 95 MG/DL (ref 70–99)
HDLC SERPL-MCNC: 40 MG/DL
LDLC SERPL CALC-MCNC: 108 MG/DL
MICROALBUMIN UR-MCNC: 84 MG/L
MICROALBUMIN/CREAT UR: 62.59 MG/G CR (ref 0–17)
NONHDLC SERPL-MCNC: 136 MG/DL
POTASSIUM SERPL-SCNC: 5.4 MMOL/L (ref 3.4–5.3)
PROT SERPL-MCNC: 7.7 G/DL (ref 6.8–8.8)
PSA SERPL-ACNC: 3.04 UG/L (ref 0–4)
SODIUM SERPL-SCNC: 139 MMOL/L (ref 133–144)
SPECIMEN SOURCE: NORMAL
TRIGL SERPL-MCNC: 138 MG/DL

## 2019-02-04 ENCOUNTER — TELEPHONE (OUTPATIENT)
Dept: GASTROENTEROLOGY | Facility: CLINIC | Age: 79
End: 2019-02-04

## 2019-02-04 ENCOUNTER — TELEPHONE (OUTPATIENT)
Dept: FAMILY MEDICINE | Facility: CLINIC | Age: 79
End: 2019-02-04

## 2019-02-04 DIAGNOSIS — K59.00 CONSTIPATION, UNSPECIFIED CONSTIPATION TYPE: ICD-10-CM

## 2019-02-04 DIAGNOSIS — Z12.11 SPECIAL SCREENING FOR MALIGNANT NEOPLASMS, COLON: ICD-10-CM

## 2019-02-04 DIAGNOSIS — K59.00 CONSTIPATION, UNSPECIFIED CONSTIPATION TYPE: Primary | ICD-10-CM

## 2019-02-04 DIAGNOSIS — K57.30 DIVERTICULOSIS OF LARGE INTESTINE WITHOUT HEMORRHAGE: ICD-10-CM

## 2019-02-04 DIAGNOSIS — R19.5 CHANGE IN STOOL: Primary | ICD-10-CM

## 2019-02-04 NOTE — TELEPHONE ENCOUNTER
: [x] N/A   [] Yes:  Language /  ID:       Patient scheduled for:  [] EGD  [x] Colonoscopy  [] EUS  [] Flex Sig   [] Other:     Indication for procedure. [] Screening   [x] Change in stool,Constipation, unspecified constipation type,Diverticulosis of large intestine without hemorrhage    Procedure Provider:  Barb      Referring Provider. Wegener    Arrival time verified: Thurs; 2/7/19; 0915    Facility location verified:    [x][facility] - [address], 1st Floor, [phone]  [][address] [facility], 5th floor         Prep Type:   [x]Golytely eRx: 2-day Prep (emailed) [facility] [place], [place] - [address] ;  [] MoviPrep: , [] MiraLax: , [] Other:   []NPO /p [place], No solid food /p 2200 the night before    Anticoagulants or blood thinners: []None [x] ASA 81mg [] Warfarin  [] Warfarin + Lovenox bridge [] Plavix [] Effient [] Eliquis  [] Xarelto  [] Brilinta [] NSAIDS  [] Other    LAST anticoagulant dose: Date/Time: May continue ASA 81mg  INR: N/A    Electronic implanted devices: [x] No  [] IPG  []  ICD  []  LVAD  []     H&P / Pre op physical completed: [x] N/A, [] Complete, Date , [] Scheduled, Date , [] No,     Additional Information: Pt's wife interrupted conversation with patient numerous times by yelling on telephone, interrupting patient's question or expressed statement. It was necessary to remind her repeatedly that I needed to speak with patient regarding his Colonoscopy first.     _______________________________________________      Instructions given: [x] Rec d & Read   [] Reviewed   [] Resent via [employer]     [] Resent via eMail (see below)     Pre procedure teaching completed: [x] Yes - Reviewed, [] No,     Transportation from procedure: [x] Yes Wife, [] Pending , [] No     Andree Wagoner RN  [facility]/ealth Endoscopy

## 2019-02-04 NOTE — TELEPHONE ENCOUNTER
Reason for Call:  Other call back    Detailed comments: Patient is requesting a call back as he has questions about stomach problems. Please assist. Thanks!    Phone Number Patient can be reached at: Home number on file [phone] (home)    Best Time: Any    Can we leave a detailed message on this number? YES    Call taken on 2/4/2019 at 10:51 AM by Betsey Figueroa

## 2019-02-04 NOTE — TELEPHONE ENCOUNTER
The white color in and of itself is not particularly concerning especially after many days of constipation.     I signed colonoscopy order.   Joel Wegener,MD

## 2019-02-04 NOTE — TELEPHONE ENCOUNTER
Writer called patient and reviewed message per Dr. Wegener and colonoscopy referral information.    Patient verbalized understanding and in agreement with plan.  MATEO RuffN, RN

## 2019-02-04 NOTE — TELEPHONE ENCOUNTER
Dr. Wegener-Please review and advise.  Writer pended diagnostic colonoscopy.    Writer called patient who stated:  1. Finally had a bowl movement today   A. Previously constipated   B. Stool was formed but white in color  2. He feels queasy  3. Wife was going to schedule colonoscopy screening for him, but will wait to hear back from PCP whether or not he needs different follow up/diagnostic colonoscopy  4. Denied abdominal pain    Thank you!  MAXIME Gipson, MATEON, RN

## 2019-02-06 ENCOUNTER — TELEPHONE (OUTPATIENT)
Dept: GASTROENTEROLOGY | Facility: CLINIC | Age: 79
End: 2019-02-06

## 2019-02-06 NOTE — TELEPHONE ENCOUNTER
Called patient to confirm colonoscopy appointment ton 2/7/19.  Patient stated he had all the information for prepping and had no questions. Patient stated he had transportation arranged.   RN asked patient if he could come in earlier for procedure.  Patient stated he could not move his appointment time at all.

## 2019-02-07 ENCOUNTER — HOSPITAL ENCOUNTER (OUTPATIENT)
Facility: CLINIC | Age: 79
Discharge: HOME | End: 2019-02-07
Attending: INTERNAL MEDICINE | Admitting: INTERNAL MEDICINE
Payer: COMMERCIAL

## 2019-02-07 VITALS
RESPIRATION RATE: 12 BRPM | DIASTOLIC BLOOD PRESSURE: 94 MMHG | OXYGEN SATURATION: 96 % | SYSTOLIC BLOOD PRESSURE: 141 MMHG | HEART RATE: 72 BPM

## 2019-02-07 LAB — COLONOSCOPY: NORMAL

## 2019-02-07 PROCEDURE — 25000128 H RX IP 250 OP 636: Performed by: INTERNAL MEDICINE

## 2019-02-07 PROCEDURE — G0500 MOD SEDAT ENDO SERVICE >5YRS: HCPCS | Performed by: INTERNAL MEDICINE

## 2019-02-07 PROCEDURE — 25000132 ZZH RX MED GY IP 250 OP 250 PS 637: Performed by: INTERNAL MEDICINE

## 2019-02-07 PROCEDURE — 99152 MOD SED SAME PHYS/QHP 5/>YRS: CPT | Performed by: INTERNAL MEDICINE

## 2019-02-07 PROCEDURE — 99153 MOD SED SAME PHYS/QHP EA: CPT | Performed by: INTERNAL MEDICINE

## 2019-02-07 PROCEDURE — G0121 COLON CA SCRN NOT HI RSK IND: HCPCS | Performed by: INTERNAL MEDICINE

## 2019-02-07 PROCEDURE — 45378 DIAGNOSTIC COLONOSCOPY: CPT | Performed by: INTERNAL MEDICINE

## 2019-02-07 RX ORDER — FENTANYL CITRATE 50 UG/ML
INJECTION, SOLUTION INTRAMUSCULAR; INTRAVENOUS PRN
Status: DISCONTINUED | OUTPATIENT
Start: 2019-02-07 | End: 2019-02-07 | Stop reason: HOSPADM

## 2019-02-07 RX ORDER — SIMETHICONE
LIQUID (ML) MISCELLANEOUS PRN
Status: DISCONTINUED | OUTPATIENT
Start: 2019-02-07 | End: 2019-02-07 | Stop reason: HOSPADM

## 2019-02-07 NOTE — DISCHARGE INSTRUCTIONS
Discharge Instructions after Colonoscopy  or Sigmoidoscopy    Today you had a _x___ Colonoscopy    Activity and Diet  You were given medicine for pain. You may be dizzy or sleepy.  For 24 hours:    Do not drive or use heavy equipment.    Do not make important decisions.    Do not drink any alcohol.  You may return to your normal diet and medicines.    Discomfort    Air was placed in your colon during the exam in order to see it. Walking helps to pass the air.    You may take Tylenol (acetaminophen) for pain unless your doctor has told you not to.      Follow-up      When to call:    Call right away if you have:    Unusual pain in belly or chest pain not relieved with passing air.    More than 1 to 2 Tablespoons of bleeding from your rectum.    Fever above 100.6  F (37.5  C).    If you have severe pain, bleeding, or shortness of breath, go to an emergency room.    If you have questions, call:  Monday to Friday, 7 a.m. to 4:30 p.m.  Endoscopy: [phone] (We may have to call you back)    After hours  Hospital: [phone] (Ask for the GI fellow on call)

## 2019-02-26 ENCOUNTER — TELEPHONE (OUTPATIENT)
Dept: DERMATOLOGY | Facility: CLINIC | Age: 79
End: 2019-02-26

## 2019-02-26 NOTE — TELEPHONE ENCOUNTER
Dermatology Pre-visit Call:    Reason for visit : Spot check       Patient Reminders Given:  --Please, make sure you bring an updated list of your medications.   --Plan on being in our facility for approximately one hour, this includes the registration process, office visit, education and check-out process.  If you are having a procedure, more time may be required.     --If you are having a procedure, please, present 15 minutes early.  --Location reviewed.   --If you need to cancel or reschedule, call XXXX  --We look forward to seeing you in Dermatology Clinic.

## 2019-03-04 ENCOUNTER — OFFICE VISIT (OUTPATIENT)
Dept: NEUROLOGY | Facility: CLINIC | Age: 79
End: 2019-03-04
Payer: COMMERCIAL

## 2019-03-04 VITALS
HEART RATE: 64 BPM | OXYGEN SATURATION: 97 % | BODY MASS INDEX: 27.69 KG/M2 | SYSTOLIC BLOOD PRESSURE: 146 MMHG | DIASTOLIC BLOOD PRESSURE: 74 MMHG | WEIGHT: 182.1 LBS

## 2019-03-04 DIAGNOSIS — I95.1 ORTHOSTATIC HYPOTENSION: Primary | ICD-10-CM

## 2019-03-04 DIAGNOSIS — R53.82 CHRONIC FATIGUE: ICD-10-CM

## 2019-03-04 DIAGNOSIS — G20.A1 PARKINSON'S DISEASE (H): ICD-10-CM

## 2019-03-04 RX ORDER — CARBIDOPA AND LEVODOPA 25; 100 MG/1; MG/1
TABLET ORAL
Qty: 540 TABLET | Refills: 3 | Status: SHIPPED | OUTPATIENT
Start: 2019-03-04 | End: 2019-12-03

## 2019-03-04 ASSESSMENT — PAIN SCALES - GENERAL: PAINLEVEL: NO PAIN (0)

## 2019-03-04 NOTE — Clinical Note
3/4/2019       RE: Vini Loya  [address]  [facility] [id number]     Dear Colleague,    Thank you for referring your patient, Vini Loya, to the [facility] NEUROLOG[facility] at [facility]. Please see a copy of my visit note below.    PATIENT: Vini Loya    : 1940    ALBINA: 2019    REASON FOR VISIT: 3 months follow up.    HPI: . Vini Loya is a 78 year old left  handed  male who came to the [facility] neurology clinic accompanied by his wife for a follow up visit.  He was last seen in the clinic on 2018 by Taina Staples CNP, for routine follow up visit.  During that visit, the following were discussed: -    ASSESSMENT:     1)  Parkinson's Disease:  Patient has about 8 year history of PD.   Although his tremors are pronounced, he is not bothered by it.  As mentioned above, he has been changing his Levodopa dose and increasing the dose not to improve his tremors, but his dizziness.     2)  Orthostatic Hypotension:  Dizziness is bothersome.  He is asking treatments.         PLAN:     __  Discussed about the importance of taking his medication as prescribed.  Also, the danger of increasing his Levodopa dose like he did.  He was told that increasing Levodopa will not improve his dizziness; in fact, it might worsen it.  __  He was instructed to take his Sinemet dose as prescribed.      PD Medications 7 am 11 am 4 pm   Sinemet 25/100 mg  2 2 2      __  He was told that if he wanted to change his dose, to call us and verify its safety.   __  Dicussed about Midodrine.  Since there is drug-drug interaction between Azilect and Midodrine, he was instructed to stop taking Azilect.   __  After 2 weeks of stopping Azilect, he'll start taking Midodrine 2.5 mg at 7 am to help dizziness.   __  Instructed to monitor blood pressure when he gets up in the morning & mid-day.  If SBP is consistently over 150 mmHg, he was instructed to call.   __  Will continue  "exercising.        We are collaborating with PD registry for research.  Are you interested in sharing your contact information so you can learn about these research studies? Do you live in [place]? \"      Your name has been added to the PD registry for research -- Parkinson's: Environment, Diet, & Lifestyle Study.  Jaki Samy is the  & will mail you the information.  There is no obligation to participate.    He reports his dizziness and fatigue symptoms subsided. His PCP stopped his HTN medications and it made a huge difference. His blood pressure is now stable. He stopped checking his B/P daily now. He did mentioned about intermittent fatigue and wife mentioned that \"when he does too many things or exercise too much then he feels fatigues for the rest of the day\". He denies having difficulty sleeping at night.     Increased left hand and leg tremor as per wife. Patient denies noticing them as well as said \"it does not bother me\".     No medication changes. Taking sinement 25/100 mg TID as per plan. Denies any wearing off symptoms.      Current PD Meds (Time, dose):  PD Medications 7am 11am 4pm *** *** *** ***   Sinemet 25/100 mg  2 2 2                   Today, he mentioned about his constipation, having BM every 3-4 days. Miralax and Senna are too strong for him and he does not want to take it. He mentioned taking OTC stool softener 2tabs at HS and that seems to be working. Wife said 'he manages his constipation with stool softener, fluid, diet and exercise\".    Participate in daily exercise program. Walking at the treadmill-30 min and weights -30min daily. Due to cold weather could not be able to go for exercise from past two weeks otherwise regular exercise routine. Enjoys a lot.     Wife mentioned about short term memory loss. Forgets about things and requires multiple reminders. Patient denied it.     No UTI since October-2018, after starting Bactrim.     One non injurious fall on his driveway " last week as per wife. Patient said he felt dizzy while shoveling snow.      Except for fatigue, denies side effects from current medications.   Sinemet: Denies N/V, loss of appetite, orthostatic hypotension, confusion, somnolence, or dyskinesias.  Dopamine agonists: Denies sleep attacks during the day, dyskinesia, nausea, peripheral edema, hallucinations, orthostatic hypotension, impulsive/compulsive behaviors (intense urges to almaraz, spend money, sexual activity, binge eating.      ROS: -   MOTOR FUNCTION   Tremor: Denies. (affected body, when occurs).   Bradykinesia: Denies.  Speech: Denies..   Rigidity: Denies.   Gait: Denies. *** freezing, *** shuffling. *** assistive device.  Falls: Denies. How often, when does it occur?  Any PT/OT?  Dyskinesia: Denies. (affected body, when occurs):  Dystonia: Denies.  Pain: Denies.  Numbness: Denies.   Tingling: Denies.     AUTONOMIC FUNCTION   Orthostatic Hypotension: Denies.   Constipation: Denies.   Urinary symptoms: Denies.   Erectile Dysfunction: Denies.     MENTATION, BEHAVIOR, MOOD   Depression, anxiety: Denies. *** Stable on meds.  Fatigue: Denies.   Memory problems: *** word finding?  Confusion, hallucinations: Denies.   Sleep Problems: *** snoring. *** REM behavior disorder.    OTHERS   Trouble swallowing, drooling: Denies.  Changes in sense of smelling: Denies.  Trouble with handwriting: Denies.  Trouble with handling utensils, dressing: Denies.   Skin problems: Denies.  Exercise: Denies.    MEDICATIONS:   Outpatient Medications Marked as Taking for the 3/4/19 encounter (Office Visit) with Dalila Staples APRN CNP   Medication Sig     aspirin 81 MG tablet Take 1 tablet (81 mg) by mouth daily     Blood Pressure Monitor JERONIMO 1 Device daily.     carbidopa-levodopa (SINEMET)  MG per tablet Take 2 tablets 3 times a day 1 hour before each meal.  (Around 7 am, 11 am, & 4 pm.)     PARoxetine (PAXIL) 20 MG tablet TAKE ONE TABLET BY MOUTH EVERY NIGHT AT BEDTIME      polyethylene glycol (MIRALAX) powder Take 17 g (1 capful) by mouth daily     senna (SENOKOT) 8.6 MG tablet Take 1 tablet by mouth 2 times daily     simvastatin (ZOCOR) 40 MG tablet TAKE ONE TABLET BY MOUTH AT BEDTIME     sulfamethoxazole-trimethoprim (BACTRIM/SEPTRA) 400-80 MG per tablet Take 1 tablet by mouth At Bedtime For UTI prevention     tamsulosin (FLOMAX) 0.4 MG capsule Take 1 capsule (0.4 mg) by mouth daily       ALLERGIES: Vytorin    PROBLEM LIST: does not have any pertinent problems on file.    PMH:  has a past medical history of CAD (coronary artery disease), CEREBRAL EMBOLUS W CEREBR INFARCT (2/27/2007), Chronic infection, Closed nondisplaced fracture of styloid process of left radius (10/16/2017), Corneal ulcer, unspecified, GENERALIZED ANXIETY DIS (5/22/2007), Hyperlipidemia LDL goal < 100, Hypertension goal BP (blood pressure) < 130/80, Hypertension, goal below 150/90 (6/23/2016), Lactose intolerance in adult (12/8/2016), Moderate major depression (H) (2/20/2014), Parkinson's disease, Parkinsons disease (H), Pyelonephritis (10/16/2017), Stented coronary artery, Unspecified transient cerebral ischemia (2006), and UTI (urinary tract infection) (12/2/2011). He also has no past medical history of Complication of anesthesia, Difficult intubation, or Malignant hyperthermia.    PSH:  has a past surgical history that includes ANESTH,CORNEAL TRANSPLANT (1990+/-); TRANSCATH STENT INIT VESSEL,PERCUT (4/2009); PTA RENAL/VISCERAL ARTERY S&I, EACH ADDITIONAL; NONSPECIFIC PROCEDURE (1975); REVISN JAW MUSCLE/BONE,INTRAORAL; Stress Test - Heart (10/2010); Cardiac surgery; Phacoemulsification with standard intraocular lens implant (Right, 5/30/2018); and Colonoscopy (N/A, 2/7/2019).    FH: family history includes C.A.D. in his father and mother; Cancer in his brother; Depression in his brother, sister, sister, and sister; Hypertension in his brother, brother, brother, sister, sister, sister, and sister; Lipids in  his brother, brother, brother, and sister; Lung Cancer in his sister; Neurologic Disorder (age of onset: 33) in his daughter; Neurologic Disorder (age of onset: 50) in his sister; Respiratory in his sister.    SH:   Social History     Social History Narrative    Balanced Diet - Yes    Osteoporosis Preventative measures-  Dairy servings per day: 1-2    Regular Exercise -  Yes Describe wlak the dog every day Bike for [employer]  Physical job    Dental Exam up - YES - Date: 10/05        Eye Exam - due    Self Testicular Exam -  Yes    Do you have any concerns about STD's -  No    Abuse: Current or Past (Physical, Sexual or Emotional)- No    Do you feel safe in your environment - Yes    Guns stored in the home - Yes    Sunscreen used - Yes    Seatbelts used - Yes    Lipids - YES - Date: 6/12/03    Glucose -  YES - Date: 6/12/03        Colon Cancer Screening - Colonoscopy 8/05    Hemoccults - YES - Date: Partcipated in the study    PSA - YES - Date: 8/05        Digital Rectal Exam - YES - Date: 8/05    Immunizations reviewed and up to date - No    Jaziel WILCOX     reports that he quit smoking about 53 years ago. His smoking use included cigarettes. He has a 1.50 pack-year smoking history. He has quit using smokeless tobacco. He reports that he does not drink alcohol or use drugs.    PHYSICAL EXAM:    VITAL SIGNS:  Blood pressure 146/74, pulse 64, weight 82.6 kg (182 lb 1.6 oz), SpO2 97 %., Body mass index is 27.69 kg/m .    GENERAL:  Mr. Loya is a pleasant *** male who is {General:342381843} sitting comfortably in the exam room without any distress.  Affect is appropriate.    MOVEMENT DISORDERS ASSESSMENT: (Last Sinemet was about *** hrs ago)  Speech: {Speech:899799}  Facial Expression: {Facial Expression:660702}  Rest Tremor: {Rest Tremor:6655154} {Rest Tremor:5956593}  Action/Postural Tremor: {Action/Postural Tremor:93152097} {Action/Postural Tremor:41792508}  Rigidity: {Rigidity:9011749} {Rigidity:5313302}  Finger Taps:  {Finger Taps:2833843} {Finger Taps:4282101}  Hand opening & closing: {Finger Taps:7798224} {Finger Taps:9928503}  Hand pronation & supination: {Finger Taps:4028665} {Finger Taps:6701866}  Toe Tapping:  {Finger Taps:5601055} {Finger Taps:0598831}   Leg Agility: {Finger Taps:1327247} {Finger Taps:7717773}  Arising from chair - arms folded across chest: {Arising from chair:0650199}  Posture: {Posture:1347238}  Gait: {Gait:6782994}  Freezing of gait:  ***.   Postural Stability: {Postural Stability:8966672}  Body Bradykinesia: {Body Bradykinesia:1011580}  Dyskinesias:  ***.     ASSESSMENT:    1)  Parkinson's Disease:  Patient has a *** year history of PD.    2)  ***:    3)  ***:    4)  ***:     PLAN:    1)      2)  ***  3)  ***  4)  ***    Will return to our clinic in *** months or sooner as needed.    The total time spent with the patient was *** minutes, and greater than 50% of this time was spent in counseling and coordination of care.    KOTA Stone CNP  [facility]    12:45 PM    PATIENT: Vini Loya    : 1940    ALBINA: 2019    REASON FOR VISIT: 3 months follow up.    HPI: Mr. Vini Loya is a 78 year old left  handed  male who came to the [facility] neurology clinic accompanied by his wife for a follow up visit.  He was last seen in the clinic on 2018 by Taina Staples CNP, for routine follow up visit.  During that visit, the following were discussed: -    ASSESSMENT:     1)  Parkinson's Disease:  Patient has about 8 year history of PD.   Although his tremors are pronounced, he is not bothered by it.  As mentioned above, he has been changing his Levodopa dose and increasing the dose not to improve his tremors, but his dizziness.     2)  Orthostatic Hypotension:  Dizziness is bothersome.  He is asking treatments.         PLAN:     __  Discussed about the importance of taking his medication as prescribed.  Also, the danger of increasing his Levodopa dose like he did.  He  "was told that increasing Levodopa will not improve his dizziness; in fact, it might worsen it.  __  He was instructed to take his Sinemet dose as prescribed.      PD Medications 7 am 11 am 4 pm   Sinemet 25/100 mg  2 2 2      __  He was told that if he wanted to change his dose, to call us and verify its safety.   __  Dicussed about Midodrine.  Since there is drug-drug interaction between Azilect and Midodrine, he was instructed to stop taking Azilect.   __  After 2 weeks of stopping Azilect, he'll start taking Midodrine 2.5 mg at 7 am to help dizziness.   __  Instructed to monitor blood pressure when he gets up in the morning & mid-day.  If SBP is consistently over 150 mmHg, he was instructed to call.   __  Will continue exercising.        We are collaborating with PD registry for research.  Are you interested in sharing your contact information so you can learn about these research studies? Do you live in [place]? \"      Your name has been added to the PD registry for research -- Parkinson's: Environment, Diet, & Lifestyle Study.  Jaki Pablo is the  & will mail you the information.  There is no obligation to participate.    He reports his dizziness and fatigue symptoms subsided. His PCP stopped his HTN medications and it made a huge difference. His blood pressure is now stable. He stopped checking his B/P daily now. He did mentioned about intermittent fatigue and wife mentioned that \"when he does too many things or exercise too much then he feels fatigues for the rest of the day\". He denies having difficulty sleeping at night.     Increased left hand and leg tremor as per wife. Patient denies noticing them as well as said \"it does not bother me\".     No medication changes. Taking sinement 25/100 mg TID as per plan. Denies any wearing off symptoms.      Current PD Meds (Time, dose):  PD Medications 7am 11am 4pm *** *** *** ***   Sinemet 25/100 mg  2 2 2                   Today, he mentioned about his " "constipation, having BM every 3-4 days. Miralax and Senna are too strong for him and he does not want to take it. He mentioned taking OTC stool softener 2tabs at HS and that seems to be working. Wife said 'he manages his constipation with stool softener, fluid, diet and exercise\".    Participate in daily exercise program. Walking at the treadmill-30 min and weights -30min daily. Due to cold weather could not be able to go for exercise from past two weeks otherwise regular exercise routine. Enjoys a lot.     Wife mentioned about short term memory loss. Forgets about things and requires multiple reminders. Patient denied it.     No UTI since 2018, after starting Bactrim.     One non injurious fall on his driveway last week as per wife. Patient said he felt dizzy while shoveling snow.     Arpan as Reviewed Reviewed by EMT at 12:33 PM CST     Medications     aspirin 81 MG tablet Take 1 tablet (81 mg) by mouth daily    Blood Pressure Monitor JERONIMO 1 Device daily.    carbidopa-levodopa (SINEMET)  MG tablet Take 2 tablets 3 times a day 1 hour before each meal. (Around 7 am, 11 am, & 4 pm.)    PARoxetine (PAXIL) 20 MG tablet TAKE ONE TABLET BY MOUTH EVERY NIGHT AT BEDTIME    polyethylene glycol (MIRALAX) powder Take 17 g (1 capful) by mouth daily    senna (SENOKOT) 8.6 MG tablet Take 1 tablet by mouth 2 times daily    simvastatin (ZOCOR) 40 MG tablet TAKE ONE TABLET BY MOUTH AT BEDTIME    sulfamethoxazole-trimethoprim (BACTRIM/SEPTRA) 400-80 MG per tablet Take 1 tablet by mouth At Bedtime For UTI prevention    tamsulosin (FLOMAX) 0.4 MG capsule Take 1 capsule (0.4 mg) by mouth daily    polyethylene glycol (GOLYTELY/NULYTELY) 236 g suspension () Take 4,000 mLs (4 L) by mouth daily for 2 doses          Except for fatigue, denies side effects from current medications.   Sinemet: Denies N/V, loss of appetite, orthostatic hypotension, confusion, somnolence, or dyskinesias.  Dopamine agonists: Denies sleep " "attacks during the day, dyskinesia, nausea, peripheral edema, hallucinations, orthostatic hypotension, impulsive/compulsive behaviors (intense urges to almaraz, spend money, sexual activity, binge eating.    ALLERGIES: Vytorin     PHYSICAL EXAM:     VITAL SIGNS:  Blood pressure 146/74, pulse 64, height 1.727 m (5' 8\"), weight 82.6 kg (182lbs ), SpO2 97 %.  Body mass index is 27.69 kg/(m^2).     GENERAL:  Mr. Loya is a pleasant  male who is well-groomed and well-developed sitting comfortably in the exam room without any distress.  Affect is appropriate. Wife is present in the room.      MOVEMENT DISORDERS ASSESSMENT: (Last Sinemet was about  2 hrs ago.        ROS: -   MOTOR FUNCTION   Tremor: Denies. (affected body, when occurs).   Bradykinesia: Denies.  Speech: Denies..   Rigidity: Denies.   Gait: Denies. *** freezing, *** shuffling. *** assistive device.  Falls: Denies. How often, when does it occur?  Any PT/OT?  Dyskinesia: Denies. (affected body, when occurs):  Dystonia: Denies.  Pain: Denies.  Numbness: Denies.   Tingling: Denies.     AUTONOMIC FUNCTION   Orthostatic Hypotension: Denies.   Constipation: Denies.   Urinary symptoms: Denies.   Erectile Dysfunction: Denies.     MENTATION, BEHAVIOR, MOOD   Depression, anxiety: Denies. *** Stable on meds.  Fatigue: Denies.   Memory problems: *** word finding?  Confusion, hallucinations: Denies.   Sleep Problems: *** snoring. *** REM behavior disorder.    OTHERS   Trouble swallowing, drooling: Denies.  Changes in sense of smelling: Denies.  Trouble with handwriting: Denies.  Trouble with handling utensils, dressing: Denies.   Skin problems: Denies.  Exercise: Denies.    MEDICATIONS:   Outpatient Medications Marked as Taking for the 3/4/19 encounter (Office Visit) with Dalila Staples APRN CNP   Medication Sig     aspirin 81 MG tablet Take 1 tablet (81 mg) by mouth daily     Blood Pressure Monitor JERONIMO 1 Device daily.     carbidopa-levodopa (SINEMET)  " MG per tablet Take 2 tablets 3 times a day 1 hour before each meal.  (Around 7 am, 11 am, & 4 pm.)     PARoxetine (PAXIL) 20 MG tablet TAKE ONE TABLET BY MOUTH EVERY NIGHT AT BEDTIME     polyethylene glycol (MIRALAX) powder Take 17 g (1 capful) by mouth daily     senna (SENOKOT) 8.6 MG tablet Take 1 tablet by mouth 2 times daily     simvastatin (ZOCOR) 40 MG tablet TAKE ONE TABLET BY MOUTH AT BEDTIME     sulfamethoxazole-trimethoprim (BACTRIM/SEPTRA) 400-80 MG per tablet Take 1 tablet by mouth At Bedtime For UTI prevention     tamsulosin (FLOMAX) 0.4 MG capsule Take 1 capsule (0.4 mg) by mouth daily       ALLERGIES: Vytorin    PROBLEM LIST: does not have any pertinent problems on file.    PMH:  has a past medical history of CAD (coronary artery disease), CEREBRAL EMBOLUS W CEREBR INFARCT (2/27/2007), Chronic infection, Closed nondisplaced fracture of styloid process of left radius (10/16/2017), Corneal ulcer, unspecified, GENERALIZED ANXIETY DIS (5/22/2007), Hyperlipidemia LDL goal < 100, Hypertension goal BP (blood pressure) < 130/80, Hypertension, goal below 150/90 (6/23/2016), Lactose intolerance in adult (12/8/2016), Moderate major depression (H) (2/20/2014), Parkinson's disease, Parkinsons disease (H), Pyelonephritis (10/16/2017), Stented coronary artery, Unspecified transient cerebral ischemia (2006), and UTI (urinary tract infection) (12/2/2011). He also has no past medical history of Complication of anesthesia, Difficult intubation, or Malignant hyperthermia.    PSH:  has a past surgical history that includes ANESTH,CORNEAL TRANSPLANT (1990+/-); TRANSCATH STENT INIT VESSEL,PERCUT (4/2009); PTA RENAL/VISCERAL ARTERY S&I, EACH ADDITIONAL; NONSPECIFIC PROCEDURE (1975); REVISN JAW MUSCLE/BONE,INTRAORAL; Stress Test - Heart (10/2010); Cardiac surgery; Phacoemulsification with standard intraocular lens implant (Right, 5/30/2018); and Colonoscopy (N/A, 2/7/2019).    FH: family history includes C.A.D. in his father  and mother; Cancer in his brother; Depression in his brother, sister, sister, and sister; Hypertension in his brother, brother, brother, sister, sister, sister, and sister; Lipids in his brother, brother, brother, and sister; Lung Cancer in his sister; Neurologic Disorder (age of onset: 33) in his daughter; Neurologic Disorder (age of onset: 50) in his sister; Respiratory in his sister.    SH:   Social History     Social History Narrative    Balanced Diet - Yes    Osteoporosis Preventative measures-  Dairy servings per day: 1-2    Regular Exercise -  Yes Describe wlak the dog every day Bike for [employer]  Physical job    Dental Exam up - YES - Date: 10/05        Eye Exam - due    Self Testicular Exam -  Yes    Do you have any concerns about STD's -  No    Abuse: Current or Past (Physical, Sexual or Emotional)- No    Do you feel safe in your environment - Yes    Guns stored in the home - Yes    Sunscreen used - Yes    Seatbelts used - Yes    Lipids - YES - Date: 6/12/03    Glucose -  YES - Date: 6/12/03        Colon Cancer Screening - Colonoscopy 8/05    Hemoccults - YES - Date: Partcipated in the study    PSA - YES - Date: 8/05        Digital Rectal Exam - YES - Date: 8/05    Immunizations reviewed and up to date - No    Jaziel WILCOX     reports that he quit smoking about 53 years ago. His smoking use included cigarettes. He has a 1.50 pack-year smoking history. He has quit using smokeless tobacco. He reports that he does not drink alcohol or use drugs.    PHYSICAL EXAM:    VITAL SIGNS:  Blood pressure 146/74, pulse 64, weight 82.6 kg (182 lb 1.6 oz), SpO2 97 %., Body mass index is 27.69 kg/m .    GENERAL:  Mr. Loya is a pleasant *** male who is {General:206887218} sitting comfortably in the exam room without any distress.  Affect is appropriate.    MOVEMENT DISORDERS ASSESSMENT: (Last Sinemet was about *** hrs ago)  Speech: {Speech:352280}  Facial Expression: {Facial Expression:129137}  Rest Tremor: {Rest Tremor:0510018}  {Rest Tremor:0195745}  Action/Postural Tremor: {Action/Postural Tremor:25400625} {Action/Postural Tremor:43522239}  Rigidity: {Rigidity:3908480} {Rigidity:0582608}  Finger Taps: {Finger Taps:8267247} {Finger Taps:9237008}  Hand opening & closing: {Finger Taps:0976033} {Finger Taps:6920206}  Hand pronation & supination: {Finger Taps:8210105} {Finger Taps:2594368}  Toe Tapping:  {Finger Taps:4887828} {Finger Taps:8816520}   Leg Agility: {Finger Taps:6583542} {Finger Taps:8251959}  Arising from chair - arms folded across chest: {Arising from chair:5216151}  Posture: {Posture:6187875}  Gait: {Gait:8581997}  Freezing of gait:  ***.   Postural Stability: {Postural Stability:4907844}  Body Bradykinesia: {Body Bradykinesia:4840572}  Dyskinesias:  ***.     ASSESSMENT:    1)  Parkinson's Disease:  Patient has a *** year history of PD.    2)  ***:    3)  ***:    4)  ***:     PLAN:    1)      2)  ***  3)  ***  4)  ***    Will return to our clinic in *** months or sooner as needed.    The total time spent with the patient was *** minutes, and greater than 50% of this time was spent in counseling and coordination of care.    KOTA Stone,  CNP  [facility]    12:45 PM    Again, thank you for allowing me to participate in the care of your patient.      Sincerely,    KOTA Carpenter CNP

## 2019-03-04 NOTE — PROGRESS NOTES
"PATIENT: Vini Loya    : 1940    ALBINA: 2019    REASON FOR VISIT: 3 months follow up.    HPI: Mr. Vini Loya is a 78 year old left  handed  male who came to the [facility] neurology clinic accompanied by his wife for a follow up visit.  I saw him last on 2018 for a routine follow up visit.  During that visit, the following were discussed: -    ASSESSMENT:     1)  Parkinson's Disease:  Patient has about 8 year history of PD.   Although his tremors are pronounced, he is not bothered by it.  As mentioned above, he has been changing his Levodopa dose and increasing the dose not to improve his tremors, but his dizziness.  2)  Orthostatic Hypotension:  Dizziness is bothersome.  He is asking treatments.         PLAN:     __  Discussed about the importance of taking his medication as prescribed.  Also, the danger of increasing his Levodopa dose like he did.  He was told that increasing Levodopa will not improve his dizziness; in fact, it might worsen it.  __  He was instructed to take his Sinemet dose as prescribed.      PD Medications 7 am 11 am 4 pm   Sinemet 25/100 mg  2 2 2      __  He was told that if he wanted to change his dose, to call us and verify its safety.   __  Dicussed about Midodrine.  Since there is drug-drug interaction between Azilect and Midodrine, he was instructed to stop taking Azilect.   __  After 2 weeks of stopping Azilect, he'll start taking Midodrine 2.5 mg at 7 am to help dizziness.   __  Instructed to monitor blood pressure when he gets up in the morning & mid-day.  If SBP is consistently over 150 mmHg, he was instructed to call.   __  Will continue exercising.        Today he reports his dizziness and fatigue symptoms have subsided.  His PCP stopped his HTN medications and \"it made a huge difference.\" His blood pressure is now stable. He has stopped checking his B/P daily.  He did mentioned about intermittent fatigue and wife mentioned that \"when he does too many " "things or exercise too much then he feels fatigued for the rest of the day.\" He denies having difficulty sleeping at night.     His wife reports noticing increased left hand and leg tremor. Patient denies noticing them & said \"it does not bother me\".     No medication changes. Taking Sinemet 25/100 mg TID as per plan. Denies any wearing off symptoms.  Except for fatigue, denies side effects from current medications.      PD Medications 7am 11am 4pm   Sinemet 25/100 mg  2 2 2     Today, he mentioned about his constipation, having BM every 3-4 days. Miralax and Senna are too strong for him and he does not want to take it. He is taking OTC stool softener 2 tabs at  and that seems to be working. Wife said 'he manages his constipation with \"stool softener, fluid, diet and exercise\".    He participates in daily exercise program. He walks on a treadmill for 30 min and weights 30 min daily. Due to cold weather, he was not be able to go to exercise in the past two weeks; otherwise, he exercises regularly and enjoys it a lot.     His wife reports noticing short term memory loss.  \"He forgets about things and requires multiple reminders.\" Patient denies memory issue.      No UTI since October-2018, after starting Bactrim.     Since his last visit, he had one non injurious fall on his driveway last week as per wife. Patient said he felt dizzy while shoveling snow.    ROS: -     AUTONOMIC FUNCTION   Orthostatic Hypotension: Dizziness is better as mentioned above.   Constipation: As above.   Urinary symptoms: Denies.   Erectile Dysfunction: Denies.     MENTATION, BEHAVIOR, MOOD   Depression, anxiety: Denies. Mood Stable on meds.  Fatigue: Intermittent , increases with activity.  Memory problems: As above.   Confusion, hallucinations: Denies.   Sleep Problems:  Yes snoring. Denies REM behavior disorder.    OTHERS   Trouble swallowing, drooling: Denies, drooling sometimes, minimal (observed today).  Changes in sense of smelling:  " "\"Not good.\"  Trouble with handwriting: \"Yes.\"  Trouble with handling utensils, dressing: Denies.   Skin problems: Denies.      MEDICATIONS:   Medication Sig     aspirin 81 MG tablet Take 1 tablet (81 mg) by mouth daily     Blood Pressure Monitor JERONIMO 1 Device daily.     carbidopa-levodopa (SINEMET)  MG per tablet Take 2 tablets 3 times a day 1 hour before each meal.  (Around 7 am, 11 am, & 4 pm.)     PARoxetine (PAXIL) 20 MG tablet TAKE ONE TABLET BY MOUTH EVERY NIGHT AT BEDTIME     polyethylene glycol (MIRALAX) powder Take 17 g (1 capful) by mouth daily     senna (SENOKOT) 8.6 MG tablet Take 1 tablet by mouth 2 times daily     simvastatin (ZOCOR) 40 MG tablet TAKE ONE TABLET BY MOUTH AT BEDTIME     sulfamethoxazole-trimethoprim (BACTRIM/SEPTRA) 400-80 MG per tablet Take 1 tablet by mouth At Bedtime For UTI prevention     tamsulosin (FLOMAX) 0.4 MG capsule Take 1 capsule (0.4 mg) by mouth daily       ALLERGIES: Vytorin    PHYSICAL EXAM:     VITAL SIGNS:  Blood pressure 146/74, pulse 64, height 1.727 m (5' 8\"), weight 82.6 kg (182lbs ), SpO2 97 %.  Body mass index is 27.69 kg/(m^2).    GENERAL:  Mr. Loya is a pleasant  male who is well-groomed and well-developed sitting comfortably in the exam room without any distress.  Affect is appropriate. Wife is present in the room.      MOVEMENT DISORDERS ASSESSMENT: (Last Sinemet was about 2 hrs ago).     Speech: Slight loss of expression and volume.  Facial Expression: Moderate hypomimia; lips parted some of the time.  Rest Tremor: Mild in amplitude and persistent in all extremities; Lt hand>Rt hand, Lt leg>Rt leg  Action/Postural Tremor: Moderate in amplitude, present with action bilaterally.   Rigidity: Absent in all extremities.  Finger Taps: Moderately impaired; early fatiguing; occasional arrests in movement bilaterally.  Hand opening & closing: Mild slowing and reduction in amplitude in Rt side. Moderately impaired; early fatiguing; occasional arrests in " movement in Lt side.  Hand pronation & supination: Mild slowing and reduction in amplitude bilaterally.  Toe Tapping:  Moderately impaired; early fatiguing; occasional arrests in movement bilaterally.   Arising from chair - arms folded across chest: Slow; or may need more than one attempt.  Posture: Slightly stooped posture.  Gait: Walks slowly, shuffles with short steps. No festination or propulsion.  Freezing of gait: Absent.   Postural Stability: Retropulsion, but recovers unaided.  Body Bradykinesia: Mild degree of slowness and poverty of movement. Mild reduced amplitude.  Dyskinesias: Absent     ASSESSMENT/PLAN:    1)  Parkinson's Disease:  Patient has a 8 year history of PD.  Although his tremors are pronounced, he is not bothered by it.  As mentioned above, he has been taking his Sinemet as prescribed and the dizziness has been subsided after his antihypertensive medication was stopped by his PCP.  Epic shows that Midodrine was stopped by his PCP on 2/4 as patient reported not taking the medication.  Unclear why he didn't take it.     __  Will stay on the same antiparkinsonian medication.     2) Intermittent Fatigue: Ongoing issue.  Somewhat worse with activities.  __  Encouraged to rest and nap during the day.   __  Will continue to exercise regularly.      3) Constipation: OTC stool softener is helping him a lot.     __  Educated on increasing fiber rich diet, daily OTC stool softener, hydration and exercise.    __  May also try a suppository if needed.    __  Will return in 5 months. May return sooner as needed.        The total time spent with the patient was 45 minutes, and greater than 50% of this time was spent in counseling and coordination of care.    Taina Staples, APRN,  CNP  [facility]

## 2019-03-04 NOTE — NURSING NOTE
Chief Complaint   Patient presents with     RECHECK     [facility] RETURN - 3 MONTH FOLLOW UP       David Hall, EMT

## 2019-03-04 NOTE — PATIENT INSTRUCTIONS
March 4, 2019    Dear Violeta Vini Loya,    Thank you for coming today.  During your visit, we have discussed the following:     __  Stay on the same antiparkinsonian medication.     __  Continue exercising.     __  For constipation, you can also try a suppository.     __  Return in 5 months. You may return sooner as needed.      For questions, you may send us a Zientia message or call [phone]    Fax number: [phone]    KOTA Stone, CNP  [facility]

## 2019-03-06 ENCOUNTER — OFFICE VISIT (OUTPATIENT)
Dept: DERMATOLOGY | Facility: CLINIC | Age: 79
End: 2019-03-06
Attending: FAMILY MEDICINE
Payer: COMMERCIAL

## 2019-03-06 VITALS — SYSTOLIC BLOOD PRESSURE: 174 MMHG | DIASTOLIC BLOOD PRESSURE: 90 MMHG | HEART RATE: 63 BPM

## 2019-03-06 DIAGNOSIS — L57.0 ACTINIC KERATOSIS: Primary | ICD-10-CM

## 2019-03-06 DIAGNOSIS — L21.9 DERMATITIS, SEBORRHEIC: ICD-10-CM

## 2019-03-06 DIAGNOSIS — L82.1 SEBORRHEIC KERATOSIS: ICD-10-CM

## 2019-03-06 RX ORDER — HYDROCORTISONE 25 MG/G
OINTMENT TOPICAL 2 TIMES DAILY
Qty: 30 G | Refills: 3 | Status: SHIPPED | OUTPATIENT
Start: 2019-03-06 | End: 2020-02-11

## 2019-03-06 RX ORDER — KETOCONAZOLE 20 MG/ML
SHAMPOO TOPICAL
Qty: 120 ML | Refills: 11 | Status: SHIPPED | OUTPATIENT
Start: 2019-03-06 | End: 2020-02-11

## 2019-03-06 ASSESSMENT — PAIN SCALES - GENERAL: PAINLEVEL: NO PAIN (0)

## 2019-03-06 NOTE — NURSING NOTE
Dermatology Rooming Note    Vini Loya's goals for this visit include:   Chief Complaint   Patient presents with     Skin Check     Vini is here today to have his arms and face looked at.      ULIS Wong
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
## 2019-04-08 DIAGNOSIS — E78.5 HYPERLIPIDEMIA LDL GOAL <100: ICD-10-CM

## 2019-04-09 NOTE — TELEPHONE ENCOUNTER
Routing refill request to provider for review/approval because:  Patient not at LDL goal but passes protocol? What is plan for recheck?

## 2019-04-09 NOTE — TELEPHONE ENCOUNTER
"Requested Prescriptions   Pending Prescriptions Disp Refills     simvastatin (ZOCOR) 40 MG tablet [Pharmacy Med Name: SIMVASTATIN 40MG TABS] 30 tablet 3     Sig: TAKE ONE TABLET BY MOUTH EVERY NIGHT AT BEDTIME  Last Written Prescription Date:  10/1/2018  Last Fill Quantity: 30 tablet,  # refills: 3   Last Office Visit: 2/1/2019   Future Office Visit:            Statins Protocol Passed - 4/8/2019  5:51 PM        Passed - LDL on file in past 12 months     Recent Labs   Lab Test 02/01/19  1211   *           Passed - No abnormal creatine kinase in past 12 months     No lab results found.           Passed - Recent (12 mo) or future (30 days) visit within the authorizing provider's specialty     Patient had office visit in the last 12 months or has a visit in the next 30 days with authorizing provider or within the authorizing provider's specialty.  See \"Patient Info\" tab in inbasket, or \"Choose Columns\" in Meds & Orders section of the refill encounter.            Passed - Medication is active on med list        Passed - Patient is age 18 or older          "

## 2019-04-10 RX ORDER — SIMVASTATIN 40 MG
TABLET ORAL
Qty: 90 TABLET | Refills: 3 | Status: SHIPPED | OUTPATIENT
Start: 2019-04-10 | End: 2020-02-11

## 2019-04-15 DIAGNOSIS — R35.1 BENIGN PROSTATIC HYPERPLASIA WITH NOCTURIA: ICD-10-CM

## 2019-04-15 DIAGNOSIS — N40.1 BENIGN PROSTATIC HYPERPLASIA WITH NOCTURIA: ICD-10-CM

## 2019-04-15 NOTE — TELEPHONE ENCOUNTER
"Requested Prescriptions   Pending Prescriptions Disp Refills     tamsulosin (FLOMAX) 0.4 MG capsule  Last Written Prescription Date:  10/1/2018  Last Fill Quantity: 90 capsule,  # refills: 3   Last Office Visit: 2/1/2019   Future Office Visit:      90 capsule 3     Sig: Take 1 capsule (0.4 mg) by mouth daily       Alpha Blockers Failed - 4/15/2019  9:53 AM        Failed - Blood pressure under 140/90 in past 12 months     BP Readings from Last 3 Encounters:   03/06/19 174/90   03/04/19 146/74   02/07/19 (!) 141/94           Passed - Recent (12 mo) or future (30 days) visit within the authorizing provider's specialty     Patient had office visit in the last 12 months or has a visit in the next 30 days with authorizing provider or within the authorizing provider's specialty.  See \"Patient Info\" tab in inbasket, or \"Choose Columns\" in Meds & Orders section of the refill encounter.            Passed - Patient does not have Tadalafil, Vardenafil, or Sildenafil on their medication list        Passed - Medication is active on med list        Passed - Patient is 18 years of age or older          "

## 2019-04-17 RX ORDER — TAMSULOSIN HYDROCHLORIDE 0.4 MG/1
0.4 CAPSULE ORAL DAILY
Qty: 90 CAPSULE | Refills: 3 | OUTPATIENT
Start: 2019-04-17

## 2019-06-04 ENCOUNTER — OFFICE VISIT (OUTPATIENT)
Dept: FAMILY MEDICINE | Facility: CLINIC | Age: 79
End: 2019-06-04
Payer: COMMERCIAL

## 2019-06-04 VITALS
OXYGEN SATURATION: 95 % | WEIGHT: 181 LBS | HEIGHT: 68 IN | TEMPERATURE: 96.9 F | BODY MASS INDEX: 27.43 KG/M2 | SYSTOLIC BLOOD PRESSURE: 145 MMHG | HEART RATE: 66 BPM | DIASTOLIC BLOOD PRESSURE: 75 MMHG | RESPIRATION RATE: 17 BRPM

## 2019-06-04 DIAGNOSIS — R31.9 URINARY TRACT INFECTION WITH HEMATURIA, SITE UNSPECIFIED: ICD-10-CM

## 2019-06-04 DIAGNOSIS — F32.1 MAJOR DEPRESSIVE DISORDER, SINGLE EPISODE, MODERATE (H): ICD-10-CM

## 2019-06-04 DIAGNOSIS — N18.30 CKD (CHRONIC KIDNEY DISEASE) STAGE 3, GFR 30-59 ML/MIN (H): ICD-10-CM

## 2019-06-04 DIAGNOSIS — F41.9 ANXIETY: ICD-10-CM

## 2019-06-04 DIAGNOSIS — N40.1 BENIGN PROSTATIC HYPERPLASIA WITH NOCTURIA: ICD-10-CM

## 2019-06-04 DIAGNOSIS — K59.00 CONSTIPATION, UNSPECIFIED CONSTIPATION TYPE: ICD-10-CM

## 2019-06-04 DIAGNOSIS — R35.1 BENIGN PROSTATIC HYPERPLASIA WITH NOCTURIA: ICD-10-CM

## 2019-06-04 DIAGNOSIS — N39.0 URINARY TRACT INFECTION WITH HEMATURIA, SITE UNSPECIFIED: ICD-10-CM

## 2019-06-04 DIAGNOSIS — E55.9 HYPOVITAMINOSIS D: ICD-10-CM

## 2019-06-04 DIAGNOSIS — E78.5 HYPERLIPIDEMIA LDL GOAL <100: ICD-10-CM

## 2019-06-04 DIAGNOSIS — G20.A1 PARKINSON'S DISEASE (H): Primary | ICD-10-CM

## 2019-06-04 DIAGNOSIS — R39.11 URINARY HESITANCY: ICD-10-CM

## 2019-06-04 DIAGNOSIS — R06.09 DYSPNEA ON EXERTION: ICD-10-CM

## 2019-06-04 DIAGNOSIS — R53.83 FATIGUE, UNSPECIFIED TYPE: ICD-10-CM

## 2019-06-04 DIAGNOSIS — N39.0 RECURRENT UTI: ICD-10-CM

## 2019-06-04 LAB
ALBUMIN UR-MCNC: 30 MG/DL
APPEARANCE UR: CLEAR
BACTERIA #/AREA URNS HPF: ABNORMAL /HPF
BILIRUB UR QL STRIP: ABNORMAL
COLOR UR AUTO: YELLOW
ERYTHROCYTE [DISTWIDTH] IN BLOOD BY AUTOMATED COUNT: 13.5 % (ref 10–15)
GLUCOSE UR STRIP-MCNC: NEGATIVE MG/DL
HCT VFR BLD AUTO: 45.8 % (ref 40–53)
HGB BLD-MCNC: 15.4 G/DL (ref 13.3–17.7)
HGB UR QL STRIP: NEGATIVE
KETONES UR STRIP-MCNC: ABNORMAL MG/DL
LEUKOCYTE ESTERASE UR QL STRIP: NEGATIVE
MCH RBC QN AUTO: 31.5 PG (ref 26.5–33)
MCHC RBC AUTO-ENTMCNC: 33.6 G/DL (ref 31.5–36.5)
MCV RBC AUTO: 94 FL (ref 78–100)
MUCOUS THREADS #/AREA URNS LPF: PRESENT /LPF
NITRATE UR QL: NEGATIVE
NON-SQ EPI CELLS #/AREA URNS LPF: ABNORMAL /LPF
PH UR STRIP: 6 PH (ref 5–7)
PLATELET # BLD AUTO: 279 10E9/L (ref 150–450)
RBC # BLD AUTO: 4.89 10E12/L (ref 4.4–5.9)
RBC #/AREA URNS AUTO: ABNORMAL /HPF
SOURCE: ABNORMAL
SP GR UR STRIP: 1.02 (ref 1–1.03)
UROBILINOGEN UR STRIP-ACNC: 0.2 EU/DL (ref 0.2–1)
WBC # BLD AUTO: 10.8 10E9/L (ref 4–11)
WBC #/AREA URNS AUTO: ABNORMAL /HPF

## 2019-06-04 PROCEDURE — 87086 URINE CULTURE/COLONY COUNT: CPT | Performed by: FAMILY MEDICINE

## 2019-06-04 PROCEDURE — 84443 ASSAY THYROID STIM HORMONE: CPT | Performed by: FAMILY MEDICINE

## 2019-06-04 PROCEDURE — 36415 COLL VENOUS BLD VENIPUNCTURE: CPT | Performed by: FAMILY MEDICINE

## 2019-06-04 PROCEDURE — 82306 VITAMIN D 25 HYDROXY: CPT | Performed by: FAMILY MEDICINE

## 2019-06-04 PROCEDURE — 81001 URINALYSIS AUTO W/SCOPE: CPT | Performed by: FAMILY MEDICINE

## 2019-06-04 PROCEDURE — 85027 COMPLETE CBC AUTOMATED: CPT | Performed by: FAMILY MEDICINE

## 2019-06-04 PROCEDURE — 80053 COMPREHEN METABOLIC PANEL: CPT | Performed by: FAMILY MEDICINE

## 2019-06-04 PROCEDURE — 99214 OFFICE O/P EST MOD 30 MIN: CPT | Performed by: FAMILY MEDICINE

## 2019-06-04 ASSESSMENT — MIFFLIN-ST. JEOR: SCORE: 1506.54

## 2019-06-04 ASSESSMENT — PATIENT HEALTH QUESTIONNAIRE - PHQ9: SUM OF ALL RESPONSES TO PHQ QUESTIONS 1-9: 4

## 2019-06-04 NOTE — PATIENT INSTRUCTIONS
Easy fatigue like when had heart attack.  Daily headaches/ibuprofen use, decreased urinary stream.  Hard to get up from chair. Not using miralax/senna, constipated.     Plan:    Check labs for fatigue today.     Call [phone] to schedule stress test.     Referral given for parkinson's physical therapy.     ua today to evaluate for infection.  He will follow up with his urologist.     STOP ALL IBUPROFEN AND TYLENOL FOR ONE WEEK - headaches likely will worsening for a few days but then get better.     Recommend resuming senna one tablet twice daily and miralax one dose daily for constipation.     Discussed mood, continuing paxil.     Follow up six months.

## 2019-06-04 NOTE — LETTER
[place] [id number] [place] [id number] [phone]          June 14, 2019    Vini Loya                                                                                                                     [facility] [address]        Dear Vini,    Normal labs since you were not fasting    Results for orders placed or performed in visit on 06/04/19   Comprehensive metabolic panel   Result Value Ref Range    Sodium 137 133 - 144 mmol/L    Potassium 4.4 3.4 - 5.3 mmol/L    Chloride 103 94 - 109 mmol/L    Carbon Dioxide 26 20 - 32 mmol/L    Anion Gap 8 3 - 14 mmol/L    Glucose 104 (H) 70 - 99 mg/dL    Urea Nitrogen 20 7 - 30 mg/dL    Creatinine 1.14 0.66 - 1.25 mg/dL    GFR Estimate 61 >60 mL/min/[1.73_m2]    GFR Estimate If Black 70 >60 mL/min/[1.73_m2]    Calcium 8.7 8.5 - 10.1 mg/dL    Bilirubin Total 1.0 0.2 - 1.3 mg/dL    Albumin 4.2 3.4 - 5.0 g/dL    Protein Total 7.4 6.8 - 8.8 g/dL    Alkaline Phosphatase 74 40 - 150 U/L    ALT 15 0 - 70 U/L    AST 26 0 - 45 U/L   CBC with platelets   Result Value Ref Range    WBC 10.8 4.0 - 11.0 10e9/L    RBC Count 4.89 4.4 - 5.9 10e12/L    Hemoglobin 15.4 13.3 - 17.7 g/dL    Hematocrit 45.8 40.0 - 53.0 %    MCV 94 78 - 100 fl    MCH 31.5 26.5 - 33.0 pg    MCHC 33.6 31.5 - 36.5 g/dL    RDW 13.5 10.0 - 15.0 %    Platelet Count 279 150 - 450 10e9/L   TSH with free T4 reflex   Result Value Ref Range    TSH 1.56 0.40 - 4.00 mU/L   Vitamin D Deficiency   Result Value Ref Range    Vitamin D Deficiency screening 38 20 - 75 ug/L   *UA reflex to Microscopic   Result Value Ref Range    Color Urine Yellow     Appearance Urine Clear     Glucose Urine Negative NEG^Negative mg/dL    Bilirubin Urine Small (A) NEG^Negative    Ketones Urine Trace (A) NEG^Negative mg/dL    Specific Gravity Urine 1.025 1.003 - 1.035    Blood Urine Negative NEG^Negative    pH Urine 6.0 5.0 - 7.0 pH    Protein Albumin Urine 30 (A) NEG^Negative mg/dL     Urobilinogen Urine 0.2 0.2 - 1.0 EU/dL    Nitrite Urine Negative NEG^Negative    Leukocyte Esterase Urine Negative NEG^Negative    Source Midstream Urine    Urine Microscopic   Result Value Ref Range    WBC Urine 0 - 5 OTO5^0 - 5 /HPF    RBC Urine O - 2 OTO2^O - 2 /HPF    Squamous Epithelial /LPF Urine Few FEW^Few /LPF    Bacteria Urine Few (A) NEG^Negative /HPF    Mucous Urine Present (A) NEG^Negative /LPF   Urine Culture Aerobic Bacterial   Result Value Ref Range    Specimen Description Midstream Urine     Culture Micro       10,000 to 50,000 colonies/mL  mixed urogenital jensen  Susceptibility testing not routinely done         Sincerely,       Joel Daniel Irwin Wegener MD.

## 2019-06-05 LAB
ALBUMIN SERPL-MCNC: 4.2 G/DL (ref 3.4–5)
ALP SERPL-CCNC: 74 U/L (ref 40–150)
ALT SERPL W P-5'-P-CCNC: 15 U/L (ref 0–70)
ANION GAP SERPL CALCULATED.3IONS-SCNC: 8 MMOL/L (ref 3–14)
AST SERPL W P-5'-P-CCNC: 26 U/L (ref 0–45)
BILIRUB SERPL-MCNC: 1 MG/DL (ref 0.2–1.3)
BUN SERPL-MCNC: 20 MG/DL (ref 7–30)
CALCIUM SERPL-MCNC: 8.7 MG/DL (ref 8.5–10.1)
CHLORIDE SERPL-SCNC: 103 MMOL/L (ref 94–109)
CO2 SERPL-SCNC: 26 MMOL/L (ref 20–32)
CREAT SERPL-MCNC: 1.14 MG/DL (ref 0.66–1.25)
DEPRECATED CALCIDIOL+CALCIFEROL SERPL-MC: 38 UG/L (ref 20–75)
GFR SERPL CREATININE-BSD FRML MDRD: 61 ML/MIN/{1.73_M2}
GLUCOSE SERPL-MCNC: 104 MG/DL (ref 70–99)
POTASSIUM SERPL-SCNC: 4.4 MMOL/L (ref 3.4–5.3)
PROT SERPL-MCNC: 7.4 G/DL (ref 6.8–8.8)
SODIUM SERPL-SCNC: 137 MMOL/L (ref 133–144)
TSH SERPL DL<=0.005 MIU/L-ACNC: 1.56 MU/L (ref 0.4–4)

## 2019-06-06 LAB
BACTERIA SPEC CULT: NORMAL
SPECIMEN SOURCE: NORMAL

## 2019-06-07 RX ORDER — SULFAMETHOXAZOLE AND TRIMETHOPRIM 400; 80 MG/1; MG/1
1 TABLET ORAL AT BEDTIME
Qty: 90 TABLET | Refills: 4 | Status: CANCELLED | OUTPATIENT
Start: 2019-06-07

## 2019-06-07 RX ORDER — TAMSULOSIN HYDROCHLORIDE 0.4 MG/1
0.4 CAPSULE ORAL DAILY
Qty: 90 CAPSULE | Refills: 3 | Status: CANCELLED | OUTPATIENT
Start: 2019-06-07

## 2019-06-07 RX ORDER — PAROXETINE 20 MG/1
TABLET, FILM COATED ORAL
Qty: 90 TABLET | Refills: 3 | Status: CANCELLED | OUTPATIENT
Start: 2019-06-07

## 2019-06-07 RX ORDER — SIMVASTATIN 40 MG
TABLET ORAL
Qty: 90 TABLET | Refills: 3 | Status: CANCELLED | OUTPATIENT
Start: 2019-06-07

## 2019-06-10 DIAGNOSIS — N39.0 URINARY TRACT INFECTION WITH HEMATURIA, SITE UNSPECIFIED: ICD-10-CM

## 2019-06-10 DIAGNOSIS — R31.9 URINARY TRACT INFECTION WITH HEMATURIA, SITE UNSPECIFIED: ICD-10-CM

## 2019-06-14 RX ORDER — SULFAMETHOXAZOLE AND TRIMETHOPRIM 400; 80 MG/1; MG/1
1 TABLET ORAL AT BEDTIME
Qty: 90 TABLET | Refills: 3 | Status: SHIPPED | OUTPATIENT
Start: 2019-06-14 | End: 2020-02-11

## 2019-06-14 NOTE — TELEPHONE ENCOUNTER
SULFAMETHOXAZOLE-TRIMET 400-80 TABS       Take 1 tablet by mouth At Bedtime For UTI prevention  Last Written Prescription Date:  6/6/18  Last Fill Quantity: 90,   # refills: 4  Last Office Visit : 11/21/2018  Future Office visit:  None  Scheduling has been notified to contact the pt for appointment.      Routing refill request to provider for review/approval because:  Drug not on Urology refill protocol
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
## 2019-06-27 ENCOUNTER — ANCILLARY PROCEDURE (OUTPATIENT)
Dept: CARDIOLOGY | Facility: CLINIC | Age: 79
End: 2019-06-27
Attending: FAMILY MEDICINE
Payer: COMMERCIAL

## 2019-06-27 DIAGNOSIS — R06.09 DYSPNEA ON EXERTION: ICD-10-CM

## 2019-06-27 NOTE — RESULT ENCOUNTER NOTE
Please send results letter:  Your stress echocardiogram results were normal.  Marianne Reis MD for Dr. Wegener

## 2019-07-30 NOTE — PROGRESS NOTES
Subjective     Vini Loya is a 78 year old male who presents to clinic today for the following health issues:    HPI   ABDOMINAL   PAIN     Onset: weeks ago     Description:   Character: Stabbing  Location: lower middle   Radiation: None    Intensity: mild    Progression of Symptoms:  same and intermittent    Accompanying Signs & Symptoms:  Fever/Chills?: no   Gas/Bloating: YES-  Nausea: no   Vomitting: no   Diarrhea?: no   Constipation:yes  Dysuria or Hematuria: no    History:   Trauma: no   Previous similar pain: no    Previous tests done: none    Precipitating factors:   Does the pain change with:     Food: YES     BM: no     Urination: no     Alleviating factors:    Therapies Tried and outcome:     LMP:  not applicable     For a few weeks after he eats he experiences mid-abdominal pain. Triggers are usually after eating or drinking lemonade. Pain is mild, throbbing, non radiating and improves with rest. Due to the pain, his head starts throbbing. Appetite decreased due to pain. He has BM once or twice/week, which is normal. No nausea, vomiting, fever, chills, unintentional weight loss, night sweats or diarrhea. No recent travel. No smoking. He takes OTC aleve three times/week. No alcohol use. No recent swimming. He quit taking senokot and miralax. No daily intolerance.    Reviewed and updated as needed this visit by Provider         Review of Systems   ROS COMP: Constitutional, HEENT, cardiovascular, pulmonary, GI, , musculoskeletal, neuro, skin, endocrine and psych systems are negative, except as otherwise noted.      Objective    There were no vitals taken for this visit.  There is no height or weight on file to calculate BMI.   /66   Pulse 61   Temp 97.9  F (36.6  C) (Oral)   Resp 10   Wt 81.2 kg (179 lb)   SpO2 94%   BMI 27.42 kg/m    Physical Exam   GENERAL: healthy, alert and no distress  HENT: nose and mouth without ulcers or lesions  RESP: lungs clear to auscultation - no rales, rhonchi  or wheezes  CV: regular rate and rhythm, normal S1 S2  ABDOMEN: soft, + LLQ tender, no hepatosplenomegaly, no masses and bowel sounds normal    Diagnostic Test Results:  Labs reviewed in Epic        Assessment & Plan     1. LLQ abdominal pain  - H/o Parkinson's disease, chronic constipation, ascending and sigmoid colon diverticulosis. Due to symptoms concern for diverticulitis and ordered CT scan for today for further evaluation. Wife is unsure if they'll be able to make the appointment. If he is unable to get it scheduled today, then he can start the abx and reschedule CT scan. If CT scan and lab work is normal then I recommended returning stool sample to rule out h.pylori. If both are negative then I'll tx patient for gastritis with PPI.   - metroNIDAZOLE (FLAGYL) 500 MG tablet; Take 1 tablet (500 mg) by mouth 3 times daily for 14 days  Dispense: 42 tablet; Refill: 0  - ciprofloxacin (CIPRO) 500 MG tablet; Take 1 tablet (500 mg) by mouth 2 times daily for 14 days  Dispense: 28 tablet; Refill: 0  - CT Abdomen Pelvis w/o Contrast; Future  - CBC with platelets and differential  - Comprehensive metabolic panel (BMP + Alb, Alk Phos, ALT, AST, Total. Bili, TP)  - CRP, inflammation  - Lipase  - H Pylori antigen, stool; Future    2. Diverticular disease of colon  - see above     Peg Walton MD  [facility]

## 2019-07-31 ENCOUNTER — OFFICE VISIT (OUTPATIENT)
Dept: FAMILY MEDICINE | Facility: CLINIC | Age: 79
End: 2019-07-31
Payer: COMMERCIAL

## 2019-07-31 VITALS
BODY MASS INDEX: 27.42 KG/M2 | SYSTOLIC BLOOD PRESSURE: 126 MMHG | TEMPERATURE: 97.9 F | OXYGEN SATURATION: 94 % | HEART RATE: 61 BPM | DIASTOLIC BLOOD PRESSURE: 66 MMHG | WEIGHT: 179 LBS | RESPIRATION RATE: 10 BRPM

## 2019-07-31 DIAGNOSIS — R10.32 LLQ ABDOMINAL PAIN: Primary | ICD-10-CM

## 2019-07-31 DIAGNOSIS — K57.30 DIVERTICULAR DISEASE OF COLON: ICD-10-CM

## 2019-07-31 DIAGNOSIS — G20.A1 PARKINSON'S DISEASE (H): ICD-10-CM

## 2019-07-31 DIAGNOSIS — K59.09 CHRONIC CONSTIPATION: ICD-10-CM

## 2019-07-31 LAB
ALBUMIN SERPL-MCNC: 3.9 G/DL (ref 3.4–5)
ALP SERPL-CCNC: 67 U/L (ref 40–150)
ALT SERPL W P-5'-P-CCNC: 12 U/L (ref 0–70)
ANION GAP SERPL CALCULATED.3IONS-SCNC: 5 MMOL/L (ref 3–14)
AST SERPL W P-5'-P-CCNC: 20 U/L (ref 0–45)
BASOPHILS # BLD AUTO: 0.1 10E9/L (ref 0–0.2)
BASOPHILS NFR BLD AUTO: 0.6 %
BILIRUB SERPL-MCNC: 1.5 MG/DL (ref 0.2–1.3)
BUN SERPL-MCNC: 26 MG/DL (ref 7–30)
CALCIUM SERPL-MCNC: 9.2 MG/DL (ref 8.5–10.1)
CHLORIDE SERPL-SCNC: 110 MMOL/L (ref 94–109)
CO2 SERPL-SCNC: 27 MMOL/L (ref 20–32)
CREAT SERPL-MCNC: 1.22 MG/DL (ref 0.66–1.25)
CRP SERPL-MCNC: <2.9 MG/L (ref 0–8)
DIFFERENTIAL METHOD BLD: NORMAL
EOSINOPHIL # BLD AUTO: 0.1 10E9/L (ref 0–0.7)
EOSINOPHIL NFR BLD AUTO: 1.3 %
ERYTHROCYTE [DISTWIDTH] IN BLOOD BY AUTOMATED COUNT: 13.6 % (ref 10–15)
GFR SERPL CREATININE-BSD FRML MDRD: 56 ML/MIN/{1.73_M2}
GLUCOSE SERPL-MCNC: 123 MG/DL (ref 70–99)
HCT VFR BLD AUTO: 45.3 % (ref 40–53)
HGB BLD-MCNC: 15.2 G/DL (ref 13.3–17.7)
LIPASE SERPL-CCNC: 133 U/L (ref 73–393)
LYMPHOCYTES # BLD AUTO: 1.6 10E9/L (ref 0.8–5.3)
LYMPHOCYTES NFR BLD AUTO: 19 %
MCH RBC QN AUTO: 31.7 PG (ref 26.5–33)
MCHC RBC AUTO-ENTMCNC: 33.6 G/DL (ref 31.5–36.5)
MCV RBC AUTO: 94 FL (ref 78–100)
MONOCYTES # BLD AUTO: 0.8 10E9/L (ref 0–1.3)
MONOCYTES NFR BLD AUTO: 9.9 %
NEUTROPHILS # BLD AUTO: 5.9 10E9/L (ref 1.6–8.3)
NEUTROPHILS NFR BLD AUTO: 69.2 %
PLATELET # BLD AUTO: 239 10E9/L (ref 150–450)
POTASSIUM SERPL-SCNC: 4.2 MMOL/L (ref 3.4–5.3)
PROT SERPL-MCNC: 7.1 G/DL (ref 6.8–8.8)
RBC # BLD AUTO: 4.8 10E12/L (ref 4.4–5.9)
SODIUM SERPL-SCNC: 142 MMOL/L (ref 133–144)
WBC # BLD AUTO: 8.5 10E9/L (ref 4–11)

## 2019-07-31 PROCEDURE — 36415 COLL VENOUS BLD VENIPUNCTURE: CPT | Performed by: FAMILY MEDICINE

## 2019-07-31 PROCEDURE — 85025 COMPLETE CBC W/AUTO DIFF WBC: CPT | Performed by: FAMILY MEDICINE

## 2019-07-31 PROCEDURE — 83690 ASSAY OF LIPASE: CPT | Performed by: FAMILY MEDICINE

## 2019-07-31 PROCEDURE — 99215 OFFICE O/P EST HI 40 MIN: CPT | Performed by: FAMILY MEDICINE

## 2019-07-31 PROCEDURE — 86140 C-REACTIVE PROTEIN: CPT | Performed by: FAMILY MEDICINE

## 2019-07-31 PROCEDURE — 80053 COMPREHEN METABOLIC PANEL: CPT | Performed by: FAMILY MEDICINE

## 2019-07-31 RX ORDER — METRONIDAZOLE 500 MG/1
500 TABLET ORAL 3 TIMES DAILY
Qty: 42 TABLET | Refills: 0 | Status: SHIPPED | OUTPATIENT
Start: 2019-07-31 | End: 2019-08-05

## 2019-07-31 RX ORDER — CIPROFLOXACIN 500 MG/1
500 TABLET, FILM COATED ORAL 2 TIMES DAILY
Qty: 28 TABLET | Refills: 0 | Status: SHIPPED | OUTPATIENT
Start: 2019-07-31 | End: 2019-08-05

## 2019-07-31 NOTE — PATIENT INSTRUCTIONS
I suspect symptoms could be due to diverticulitis  I'll call you after the CT scan results. If symptoms are due to diverticulitis then please  antibiotic prescription.   If all testing is normal then please return stool sample to rule out h.pylor infection.

## 2019-07-31 NOTE — LETTER
August 12, 2019      Vini Loya  [address] [facility] [address]        Dear Vini,    Here are your recent lab results:    Here are your results for your recent labs. Your bilirubin (pigment formed in the liver and excreted) was slightly elevated. Your previous levels were all normal. I would recommend rechecking your levels in six to eight weeks. Please schedule a lab only visit at the clinic. The rest of your labs were stable. Please call or message me if you have questions or concerns.     Results for orders placed or performed in visit on 07/31/19   CBC with platelets and differential   Result Value Ref Range    WBC 8.5 4.0 - 11.0 10e9/L    RBC Count 4.80 4.4 - 5.9 10e12/L    Hemoglobin 15.2 13.3 - 17.7 g/dL    Hematocrit 45.3 40.0 - 53.0 %    MCV 94 78 - 100 fl    MCH 31.7 26.5 - 33.0 pg    MCHC 33.6 31.5 - 36.5 g/dL    RDW 13.6 10.0 - 15.0 %    Platelet Count 239 150 - 450 10e9/L    % Neutrophils 69.2 %    % Lymphocytes 19.0 %    % Monocytes 9.9 %    % Eosinophils 1.3 %    % Basophils 0.6 %    Absolute Neutrophil 5.9 1.6 - 8.3 10e9/L    Absolute Lymphocytes 1.6 0.8 - 5.3 10e9/L    Absolute Monocytes 0.8 0.0 - 1.3 10e9/L    Absolute Eosinophils 0.1 0.0 - 0.7 10e9/L    Absolute Basophils 0.1 0.0 - 0.2 10e9/L    Diff Method Automated Method    Comprehensive metabolic panel (BMP + Alb, Alk Phos, ALT, AST, Total. Bili, TP)   Result Value Ref Range    Sodium 142 133 - 144 mmol/L    Potassium 4.2 3.4 - 5.3 mmol/L    Chloride 110 (H) 94 - 109 mmol/L    Carbon Dioxide 27 20 - 32 mmol/L    Anion Gap 5 3 - 14 mmol/L    Glucose 123 (H) 70 - 99 mg/dL    Urea Nitrogen 26 7 - 30 mg/dL    Creatinine 1.22 0.66 - 1.25 mg/dL    GFR Estimate 56 (L) >60 mL/min/[1.73_m2]    GFR Estimate If Black 65 >60 mL/min/[1.73_m2]    Calcium 9.2 8.5 - 10.1 mg/dL    Bilirubin Total 1.5 (H) 0.2 - 1.3 mg/dL    Albumin 3.9 3.4 - 5.0 g/dL    Protein Total 7.1 6.8 - 8.8 g/dL    Alkaline Phosphatase 67 40 - 150 U/L    ALT 12 0 - 70  U/L    AST 20 0 - 45 U/L   CRP, inflammation   Result Value Ref Range    CRP Inflammation <2.9 0.0 - 8.0 mg/L   Lipase   Result Value Ref Range    Lipase 133 73 - 393 U/L           Sincerely,        Peg Walton MD/miranda

## 2019-08-02 ENCOUNTER — ANCILLARY PROCEDURE (OUTPATIENT)
Dept: CT IMAGING | Facility: CLINIC | Age: 79
End: 2019-08-02
Attending: FAMILY MEDICINE
Payer: COMMERCIAL

## 2019-08-02 ENCOUNTER — TELEPHONE (OUTPATIENT)
Dept: FAMILY MEDICINE | Facility: CLINIC | Age: 79
End: 2019-08-02

## 2019-08-02 DIAGNOSIS — K22.89 ESOPHAGEAL THICKENING: Primary | ICD-10-CM

## 2019-08-02 DIAGNOSIS — R10.32 LLQ ABDOMINAL PAIN: ICD-10-CM

## 2019-08-02 RX ORDER — IOPAMIDOL 755 MG/ML
109 INJECTION, SOLUTION INTRAVASCULAR ONCE
Status: COMPLETED | OUTPATIENT
Start: 2019-08-02 | End: 2019-08-02

## 2019-08-02 RX ADMIN — IOPAMIDOL 109 ML: 755 INJECTION, SOLUTION INTRAVASCULAR at 10:29

## 2019-08-02 NOTE — DISCHARGE INSTRUCTIONS

## 2019-08-04 ENCOUNTER — APPOINTMENT (OUTPATIENT)
Dept: GENERAL RADIOLOGY | Facility: CLINIC | Age: 79
End: 2019-08-04
Attending: EMERGENCY MEDICINE
Payer: COMMERCIAL

## 2019-08-04 ENCOUNTER — HOSPITAL ENCOUNTER (EMERGENCY)
Facility: CLINIC | Age: 79
Discharge: HOME | End: 2019-08-04
Attending: EMERGENCY MEDICINE | Admitting: EMERGENCY MEDICINE
Payer: COMMERCIAL

## 2019-08-04 VITALS
HEART RATE: 61 BPM | SYSTOLIC BLOOD PRESSURE: 146 MMHG | OXYGEN SATURATION: 97 % | WEIGHT: 180.1 LBS | DIASTOLIC BLOOD PRESSURE: 92 MMHG | RESPIRATION RATE: 16 BRPM | HEIGHT: 69 IN | TEMPERATURE: 98.1 F | BODY MASS INDEX: 26.67 KG/M2

## 2019-08-04 DIAGNOSIS — K59.00 CONSTIPATION, UNSPECIFIED CONSTIPATION TYPE: ICD-10-CM

## 2019-08-04 DIAGNOSIS — L21.9 DERMATITIS, SEBORRHEIC: ICD-10-CM

## 2019-08-04 LAB
ALBUMIN UR-MCNC: 10 MG/DL
APPEARANCE UR: CLEAR
BILIRUB UR QL STRIP: NEGATIVE
COLOR UR AUTO: YELLOW
GLUCOSE UR STRIP-MCNC: NEGATIVE MG/DL
HGB UR QL STRIP: NEGATIVE
KETONES UR STRIP-MCNC: NEGATIVE MG/DL
LEUKOCYTE ESTERASE UR QL STRIP: ABNORMAL
MUCOUS THREADS #/AREA URNS LPF: PRESENT /LPF
NITRATE UR QL: NEGATIVE
PH UR STRIP: 6.5 PH (ref 5–7)
RBC #/AREA URNS AUTO: 2 /HPF (ref 0–2)
SOURCE: ABNORMAL
SP GR UR STRIP: 1.03 (ref 1–1.03)
SQUAMOUS #/AREA URNS AUTO: 3 /HPF (ref 0–1)
UROBILINOGEN UR STRIP-MCNC: NORMAL MG/DL (ref 0–2)
WBC #/AREA URNS AUTO: 2 /HPF (ref 0–5)

## 2019-08-04 PROCEDURE — 74019 RADEX ABDOMEN 2 VIEWS: CPT

## 2019-08-04 PROCEDURE — 99284 EMERGENCY DEPT VISIT MOD MDM: CPT | Performed by: EMERGENCY MEDICINE

## 2019-08-04 PROCEDURE — 81001 URINALYSIS AUTO W/SCOPE: CPT | Performed by: EMERGENCY MEDICINE

## 2019-08-04 PROCEDURE — 99284 EMERGENCY DEPT VISIT MOD MDM: CPT | Mod: Z6 | Performed by: EMERGENCY MEDICINE

## 2019-08-04 PROCEDURE — 25000132 ZZH RX MED GY IP 250 OP 250 PS 637: Performed by: EMERGENCY MEDICINE

## 2019-08-04 RX ORDER — ROPINIROLE 0.25 MG/1
0.25 TABLET, FILM COATED ORAL 3 TIMES DAILY
COMMUNITY
End: 2021-06-03

## 2019-08-04 RX ORDER — MAGNESIUM CARB/ALUMINUM HYDROX 105-160MG
296 TABLET,CHEWABLE ORAL ONCE
Status: COMPLETED | OUTPATIENT
Start: 2019-08-04 | End: 2019-08-04

## 2019-08-04 RX ORDER — POLYETHYLENE GLYCOL 3350 17 G/17G
1 POWDER, FOR SOLUTION ORAL DAILY
Qty: 527 G | Refills: 0 | Status: SHIPPED | OUTPATIENT
Start: 2019-08-04 | End: 2019-10-08

## 2019-08-04 RX ADMIN — MAGNESIUM CITRATE 296 ML: 1.75 LIQUID ORAL at 09:41

## 2019-08-04 ASSESSMENT — MIFFLIN-ST. JEOR: SCORE: 1514.37

## 2019-08-04 ASSESSMENT — ENCOUNTER SYMPTOMS
FEVER: 0
ABDOMINAL DISTENTION: 1
SHORTNESS OF BREATH: 0
ABDOMINAL PAIN: 1

## 2019-08-04 NOTE — DISCHARGE INSTRUCTIONS
No evidence for diverticulitis on the CT scan.  You can stop the antibiotics.  You appear to be constipated, start MiraLAX as directed.  Follow-up with your primary care provider

## 2019-08-04 NOTE — ED TRIAGE NOTES
Pt presents ambulatory to triage with c/o abdominal pain, constipation (last BM Wednesday) and feeling bloated. Reports it has  Been going on for weeks and saw his PCP on Wednesday, started on antibiotics, and scheduled for a CT on Friday. Pt states he has not received his results from the scan yet.
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
## 2019-08-04 NOTE — ED AVS SNAPSHOT
[facility], [place], Emergency Department  [address] [place] [id number]  Phone:  [phone]                                    Vini Loya   MRN: [id number]    Department:  [facility], Emergency Department   Date of Visit:  8/4/2019           After Visit Summary Signature Page    I have received my discharge instructions, and my questions have been answered. I have discussed any challenges I see with this plan with the nurse or doctor.    ..........................................................................................................................................  Patient/Patient Representative Signature      ..........................................................................................................................................  Patient Representative Print Name and Relationship to Patient    ..................................................               ................................................  Date                                   Time    ..........................................................................................................................................  Reviewed by Signature/Title    ...................................................              ..............................................  Date                                               Time          22EPIC Rev 08/18

## 2019-08-09 NOTE — RESULT ENCOUNTER NOTE
Please send the letter to the patient with the following.         Here are your results for your recent labs. Your bilirubin (pigment formed in the liver and excreted) was slightly elevated. Your previous levels were all normal. I would recommend rechecking your levels in six to eight weeks. Please schedule a lab only visit at the clinic. The rest of your labs were stable. Please call or message me if you have questions or concerns.

## 2019-08-19 ENCOUNTER — TELEPHONE (OUTPATIENT)
Dept: GASTROENTEROLOGY | Facility: CLINIC | Age: 79
End: 2019-08-19

## 2019-08-19 NOTE — TELEPHONE ENCOUNTER
Patient Name: Vini Loya   : 1940  MRN: [id number]       : [x] N/A   [] Yes:  Language  /  ID:        Additional Information regarding appointment:      Patient scheduled for:  [x] EGD  [] Colonoscopy  [] EUS  [] Flex Sig   [] Other:      Indication for procedure. [] Screening   [x]  Esophageal thickening [K22.8]  - Primary     Sedation Type: [x] Conscious Sedation   [] MAC   [] None    Procedure Provider:  Dr. Jan Real      Referring Provider. Peg Walton    Arrival time verified: 10:15 am / Monday  / 19    Facility location verified:   [x][facility] Endoscopy Unit - [address], [address]st Floor, [address]    Pt meets medical necessity for outpatient procedure in hospital Endoscopy Unit:       [x] N/A for this Payor (non-BCBS)    Prep Type:      []NPO /p 5 am  , No solid food /p 2200 the night before    Anticoagulants or blood thinners: []None [x] ASA 81mg  - may continue           [] Warfarin   [] Warfarin + Lovenox bridge [] Plavix [] Effient [] Eliquis         [] Xarelto  [] Brilinta [] NSAIDS  [] Other     LAST anticoagulant dose: Date/Time:    INR:      Electronic implanted devices: [x] No  [] IPG  []  ICD  []  LVAD  []      H&P / Pre op physical completed: [] N/A, [] Complete, Date  , [] Scheduled, Date  , [] No,      Additional Information:      _______________________________________________      Instructions given: [] Rec'd & Read   [x] Reviewed             Pre procedure teaching completed: [x] Yes - Reviewed, [] No,      [x] No questions regarding Sedation as ordered, [x]      Transportation from procedure & responsible adult to be with patient following procedure for a minimum of 6 hrs (Conscious Sedation) 24 hrs (MAC): [] Yes   - confirmed will have post-procedure companionship as required, [] Pending  , [] No      Gabby Bravo RN  [facility] End[facility]

## 2019-08-20 ENCOUNTER — OFFICE VISIT (OUTPATIENT)
Dept: NEUROLOGY | Facility: CLINIC | Age: 79
End: 2019-08-20
Payer: COMMERCIAL

## 2019-08-20 VITALS
DIASTOLIC BLOOD PRESSURE: 75 MMHG | BODY MASS INDEX: 26.73 KG/M2 | OXYGEN SATURATION: 98 % | WEIGHT: 178.4 LBS | HEART RATE: 74 BPM | SYSTOLIC BLOOD PRESSURE: 129 MMHG

## 2019-08-20 DIAGNOSIS — I95.1 ORTHOSTATIC HYPOTENSION: ICD-10-CM

## 2019-08-20 DIAGNOSIS — G20.A1 PARKINSON'S DISEASE (H): Primary | ICD-10-CM

## 2019-08-20 ASSESSMENT — PAIN SCALES - GENERAL: PAINLEVEL: EXTREME PAIN (8)

## 2019-08-20 NOTE — PATIENT INSTRUCTIONS
August 20, 2019      Dear  Vini Loya,    Thank you for coming today.  During your visit, we have discussed the following:     __  Stay on the same antiparkinsonian medication.     PD Medications 7am 11am 4pm   Sinemet 25/100 mg  2 2 2   Ropinirole 0.25 mg  1 1 1     __  Continue exercising regularly.     __  Follow up with gastroenterologist.     __  See Dr. Ibanez in October.     __  Return in 6 months. You may return sooner as needed.      For questions, you may send us a [place] message or call [phone]    Fax number: [phone]    KOTA Stone, CNP  [facility]

## 2019-08-20 NOTE — Clinical Note
2019       RE: Vini Loya  [address]  [facility] [address]     Dear Colleague,    Thank you for referring your patient, Vini Loya, to the [facility] NEUROLOGY at [facility]. Please see a copy of my visit note below.    PATIENT: Vini Loya    : 1940    ALBINA:  2019    REASON FOR VISIT:  Follow up for Parkinson's disease (PD.)    HPI: Mr. Vini Loya is a 78 year old left  handed  male who came to the [facility] neurology clinic accompanied by his wife for a follow up visit.  I saw him last on 2019 for a routine follow up visit.  During that visit, the following were discussed: -    ASSESSMENT/PLAN:    1)  Parkinson's Disease:  Patient has a 8 year history of PD.  Although his tremors are pronounced, he is not bothered by it.  As mentioned above, he has been taking his Sinemet as prescribed and the dizziness has been subsided after his antihypertensive medication was stopped by his PCP.  Epic shows that Midodrine was stopped by his PCP on  as patient reported not taking the medication.  Unclear why he didn't take it.   __  Will stay on the same antiparkinsonian medication.     2) Intermittent Fatigue: Ongoing issue.  Somewhat worse with activities.  __  Encouraged to rest and nap during the day.   __  Will continue to exercise regularly.      3) Constipation: OTC stool softener is helping him a lot.   __  Educated on increasing fiber rich diet, daily OTC stool softener, hydration and exercise.    __  May also try a suppository if needed.  __  Will return in 5 months. May return sooner as needed.     Since his last visit [facility]  He was started on Ropinirole 0.25 mg 1 tab TID.        He is having abdominal bloatedness.  He was in ER due to GI discomfort.  He's scheduled endoscopy next Monday.    When his low BP, he feels dizzy.  His BP usually would be low.  Then after he  "drinks more fluids, BP comes up and the dizziness improves.  No falls since his last visit.      PD Medications 7am 11am 4pm   Sinemet 25/100 mg  2 2 2   Ropinirole 0.25 mg  1 1 1     He goes to the gym about 3 times a week.  He walks on a treadmill, walk on a path, and weights.  \"It energizes me.\"      MEDICATIONS:   Outpatient Medications Marked as Taking for the 8/20/19 encounter (Office Visit) with Dalila Staples APRN CNP   Medication Sig     aspirin 81 MG tablet Take 1 tablet (81 mg) by mouth daily     carbidopa-levodopa (SINEMET)  MG tablet Take 2 tablets 3 times a day 1 hour before each meal.  (Around 7 am, 11 am, & 4 pm.)     hydrocortisone 2.5 % ointment Apply topically 2 times daily     ketoconazole (NIZORAL) 2 % external shampoo Apply topically three times a week     PARoxetine (PAXIL) 20 MG tablet TAKE ONE TABLET BY MOUTH EVERY NIGHT AT BEDTIME     polyethylene glycol (MIRALAX) powder Take 17 g (1 capful) by mouth daily     rOPINIRole (REQUIP) 0.25 MG tablet Take 0.25 mg by mouth 3 times daily     simvastatin (ZOCOR) 40 MG tablet TAKE ONE TABLET BY MOUTH EVERY NIGHT AT BEDTIME     sulfamethoxazole-trimethoprim (BACTRIM/SEPTRA) 400-80 MG tablet Take 1 tablet by mouth At Bedtime For UTI prevention     tamsulosin (FLOMAX) 0.4 MG capsule Take 1 capsule (0.4 mg) by mouth daily         ALLERGIES: Vytorin    PHYSICAL EXAM:     VITAL SIGNS:  Blood pressure 129/75, pulse 74, weight 80.9 kg (178 lb 6.4 oz), SpO2 98 %.   Body mass index is 26.73 kg/m .      GENERAL:  Mr. Loya is a pleasant  male who is well-groomed and well-developed sitting comfortably in the exam room without any distress.  Affect is appropriate. Wife is present in the room.      MOVEMENT DISORDERS ASSESSMENT: (Last Sinemet was about 1.5 hrs ago).     Speech:  Monotone.  Mild expression and volume.  Facial Expression: Moderate hypomimia; lips parted some of the time.  Rest Tremor: Moderate in amplitude and persistent in LUE " extremities;  Mild in LLE; Mild and intermittent in LUE & LLE.  Mild at rest and moderate with mental activation.   Action/Postural Tremor: Slight postural and action tremor bilaterally.   Rigidity: Absent in all extremities.  Finger Taps: Moderately impaired; early fatiguing; occasional arrests in movement bilaterally.  Hand opening & closing: Mild slowing and reduction in amplitude in Rt side. Moderately impaired; early fatiguing; occasional arrests in movement in Lt side.  Hand pronation & supination: Mild slowing and reduction in amplitude bilaterally.  Toe Tapping:   Normal bilaterally.   Heel Tapping:  Mild slowing bilaterally.   Arising from chair - arms folded across chest:  Normal.   Posture: Mildly stooped posture;' leaning to the Right.  Gait: Walks with normal steps. No festination or propulsion.  Freezing of gait: Absent.   Postural Stability: Retropulsion, but recovers unaided.  Body Bradykinesia: Mild degree of slowness and poverty of movement.  Dyskinesias: Absent     ASSESSMENT/PLAN:    1)  Parkinson's Disease:  Patient has a 9 year history of PD (Tremor since 2010.)  Stable.  He reports not much change with Ropinirole being added to his Sinemet;     __  Will stay on the same antiparkinsonian medication.     2) GI Discomfort:  Scheduled for endoscopy & will f/u with gastroenterologist.     __  Will return in 6 months. May return sooner as needed.        The total time spent with the patient was 45 minutes, and greater than 50% of this time was spent in counseling and coordination of care.    KOTA Stone,  CNP  [facility]    12:50 PM  1:22 PM      Again, thank you for allowing me to participate in the care of your patient.      Sincerely,    KOTA Carpenter CNP

## 2019-08-20 NOTE — PROGRESS NOTES
PATIENT: Vini Loya    : 1940    ALBINA:  2019    REASON FOR VISIT:  Follow up for Parkinson's disease (PD.)    HPI: Mr. Vini Loya is a 78 year old left  handed  male who came to the [facility] neurology clinic accompanied by his wife for a follow up visit.  I saw him last on 2019 for a routine follow up visit.  During that visit, the following were discussed: -    ASSESSMENT/PLAN:    1)  Parkinson's Disease:  Patient has an 8 year history of PD.  Although his tremors are pronounced, he is not bothered by it.  As mentioned above, he has been taking his Sinemet as prescribed and the dizziness has been subsided after his antihypertensive medication was stopped by his PCP.  Epic shows that Midodrine was stopped by his PCP on  as patient reported not taking the medication.  Unclear why he didn't take it.   __  Will stay on the same antiparkinsonian medication.     2) Intermittent Fatigue: Ongoing issue.  Somewhat worse with activities.  __  Encouraged to rest and nap during the day.   __  Will continue to exercise regularly.      3) Constipation: OTC stool softener is helping him a lot.   __  Educated on increasing fiber rich diet, daily OTC stool softener, hydration and exercise.    __  May also try a suppository if needed.  __  Will return in 5 months. May return sooner as needed.     Since his last visit he was [facility] Ibane[facility].  He was started on Ropinirole 0.25 mg 1 tab TID.   He hasn't noticed any change in his motor symptoms.  Denies side effect from Ropinirole including impulse control & excessive daytime sleepiness.      He has been having abdominal bloatedness and discomfort.  He was in ER due to GI discomfort.  He's scheduled for endoscopy next Monday.    Orthostatic hypotension is managed with increasing fluids.  When he feels dizzy, he checks his BP and usually finds it to be low.  Then after he drinks more fluids, BP comes  "up and the dizziness improves.  No falls since his last visit.      PD Medications 7am 11am 4pm   Sinemet 25/100 mg  2 2 2   Ropinirole 0.25 mg  1 1 1     He goes to the gym about 3 times a week.  He walks on a treadmill, walks on a track at the gym, and does weight lifting.  \"It energizes me.\"      MEDICATIONS:   Medication Sig     aspirin 81 MG tablet Take 1 tablet (81 mg) by mouth daily     carbidopa-levodopa (SINEMET)  MG tablet Take 2 tablets 3 times a day 1 hour before each meal.  (Around 7 am, 11 am, & 4 pm.)     hydrocortisone 2.5 % ointment Apply topically 2 times daily     ketoconazole (NIZORAL) 2 % external shampoo Apply topically three times a week     PARoxetine (PAXIL) 20 MG tablet TAKE ONE TABLET BY MOUTH EVERY NIGHT AT BEDTIME     polyethylene glycol (MIRALAX) powder Take 17 g (1 capful) by mouth daily     rOPINIRole (REQUIP) 0.25 MG tablet Take 0.25 mg by mouth 3 times daily     simvastatin (ZOCOR) 40 MG tablet TAKE ONE TABLET BY MOUTH EVERY NIGHT AT BEDTIME     sulfamethoxazole-trimethoprim (BACTRIM/SEPTRA) 400-80 MG tablet Take 1 tablet by mouth At Bedtime For UTI prevention     tamsulosin (FLOMAX) 0.4 MG capsule Take 1 capsule (0.4 mg) by mouth daily       ALLERGIES: Vytorin    PHYSICAL EXAM:     VITAL SIGNS:  Blood pressure 129/75, pulse 74, weight 80.9 kg (178 lb 6.4 oz), SpO2 98 %.  Body mass index is 26.73 kg/m .    GENERAL:  Mr. Loya is a pleasant  male who is well-groomed and well-developed sitting comfortably in the exam room without any distress.  Affect is appropriate.      MOVEMENT DISORDERS ASSESSMENT: (Last Sinemet was about 1.5 hrs ago).     Speech:  Monotone.  Mild expression and volume.  Facial Expression: Moderate hypomimia; lips parted some of the time.  Rest Tremor: Moderate in amplitude and persistent in LUE extremities;  Mild in LLE; Mild and intermittent in LUE & LLE.  Mild at rest and moderate with mental activation.   Action/Postural Tremor: Slight postural " and action tremor bilaterally.   Rigidity: Absent in all extremities.  Finger Taps: Moderately impaired; early fatiguing; occasional arrests in movement bilaterally.  Hand opening & closing: Mild slowing and reduction in amplitude in Rt side. Moderately impaired; early fatiguing; occasional arrests in movement in Lt side.  Hand pronation & supination: Mild slowing and reduction in amplitude bilaterally.  Toe Tapping:  Normal bilaterally.   Heel Tapping:  Mild slowing bilaterally.   Arising from chair - arms folded across chest:  Normal.   Posture: Mildly stooped posture; leaning to the Right.  Gait: Walks with normal steps. No festination or propulsion.  Freezing of gait: Absent.   Postural Stability: Retropulsion, but recovers unaided.  Body Bradykinesia: Mild degree of slowness and poverty of movement.  Dyskinesias: Absent     ASSESSMENT/PLAN:    1)  Parkinson's Disease:  Patient has a 9 year history of PD (Tremor since 2010.)  He reports not much change with Ropinirole being added to his Sinemet;     __  Will stay on the same antiparkinsonian medication.     2) GI Discomfort:  He is scheduled for endoscopy & will f/u with gastroenterologist.       3)  Orthostatic Hypotension:  Managed by increasing fluids.    __  Will return in 6 months. [facility] given [facility]   May return sooner as needed.        The total time spent with the patient was 30 minutes, and greater than 50% of this time was spent in counseling and coordination of care.    KOTA Sotne,  CNP  [facility]    12:50 PM  1:22 PM

## 2019-08-20 NOTE — NURSING NOTE
Chief Complaint   Patient presents with     RECHECK     [facility] RETURN MOVEMENT DISORDER 5 MONTH FOLLOW UP       Iwona Mays, EMT

## 2019-08-26 ENCOUNTER — HOSPITAL ENCOUNTER (OUTPATIENT)
Facility: CLINIC | Age: 79
Discharge: HOME | End: 2019-08-26
Attending: INTERNAL MEDICINE | Admitting: INTERNAL MEDICINE
Payer: COMMERCIAL

## 2019-08-26 VITALS
RESPIRATION RATE: 12 BRPM | DIASTOLIC BLOOD PRESSURE: 82 MMHG | OXYGEN SATURATION: 96 % | SYSTOLIC BLOOD PRESSURE: 151 MMHG | HEART RATE: 56 BPM

## 2019-08-26 DIAGNOSIS — K25.9 GASTRIC EROSION, UNSPECIFIED CHRONICITY: Primary | ICD-10-CM

## 2019-08-26 LAB — UPPER GI ENDOSCOPY: NORMAL

## 2019-08-26 PROCEDURE — 43239 EGD BIOPSY SINGLE/MULTIPLE: CPT | Performed by: INTERNAL MEDICINE

## 2019-08-26 PROCEDURE — G0500 MOD SEDAT ENDO SERVICE >5YRS: HCPCS | Performed by: INTERNAL MEDICINE

## 2019-08-26 PROCEDURE — 25000128 H RX IP 250 OP 636: Performed by: INTERNAL MEDICINE

## 2019-08-26 PROCEDURE — 88305 TISSUE EXAM BY PATHOLOGIST: CPT | Performed by: INTERNAL MEDICINE

## 2019-08-26 PROCEDURE — 25000125 ZZHC RX 250: Performed by: INTERNAL MEDICINE

## 2019-08-26 PROCEDURE — 40000104 ZZH STATISTIC MODERATE SEDATION < 10 MIN: Performed by: INTERNAL MEDICINE

## 2019-08-26 RX ORDER — LIDOCAINE 40 MG/G
CREAM TOPICAL
Status: DISCONTINUED | OUTPATIENT
Start: 2019-08-26 | End: 2019-08-26 | Stop reason: HOSPADM

## 2019-08-26 RX ORDER — OMEPRAZOLE 20 MG/1
20 TABLET, DELAYED RELEASE ORAL DAILY
Qty: 30 TABLET | Refills: 3 | Status: SHIPPED | OUTPATIENT
Start: 2019-08-26 | End: 2020-02-11

## 2019-08-26 RX ORDER — ONDANSETRON 2 MG/ML
4 INJECTION INTRAMUSCULAR; INTRAVENOUS
Status: DISCONTINUED | OUTPATIENT
Start: 2019-08-26 | End: 2019-08-26 | Stop reason: HOSPADM

## 2019-08-26 RX ORDER — FENTANYL CITRATE 50 UG/ML
INJECTION, SOLUTION INTRAMUSCULAR; INTRAVENOUS PRN
Status: DISCONTINUED | OUTPATIENT
Start: 2019-08-26 | End: 2019-08-26 | Stop reason: HOSPADM

## 2019-08-26 NOTE — DISCHARGE INSTRUCTIONS
Discharge Instructions after  Upper Endoscopy (EGD)    Activity and Diet  You were given medicine for pain. You may be dizzy or sleepy.  For 24 hours:    Do not drive or use heavy equipment.    Do not make important decisions.    Do not drink any alcohol.    You may return to your regular diet.    Discomfort  You may have a sore throat for 2 to 3 days. It may help to:    Avoid hot liquids for 24 hours.    Use sore throat lozenges.    Gargle as needed with salt water up to 4 times a day. Mix 1 cup of warm water  with 1 teaspoon of salt. Do not swallow.    Your esophagus was dilated (opened) or banded during the exam:    Drink only cool liquids for the rest of the day. Eat a soft diet for the next few days.    You may have a sore chest for 2 to 3 days.    You may take Tylenol (acetaminophen) for pain unless your doctor has told you not to.    Do not take aspirin or ibuprofen (Advil, Motrin) or other NSAIDS  (anti-inflammatory drugs) for 4 days.    Follow-up    We took small tissue samples for study. If you do not have a follow-up visit scheduled,  call your provider s office in 2 weeks for the results.      When to call us:  Problems are rare. Call right away if you have:    Unusual throat pain or trouble swallowing    Unusual pain in belly or chest that is not relieved by belching or passing air    Black stools (tar-like looking bowel movement)    Temperature above 100.6  F. (37.5  C).    If you vomit blood or have severe pain, go to an emergency room.    If you have questions, call:  Monday to Friday, 7 a.m. to 4:30 p.m.: Endoscopy: [phone] (We may have to call you back)    After hours: Hospital: [phone] (Ask for the GI fellow on call)

## 2019-08-28 LAB — COPATH REPORT: NORMAL

## 2019-09-20 NOTE — RESULT ENCOUNTER NOTE
There was inflammation on biopsy with atypical, which they believe is related to prior injury. Given his overall clinical picture, we can consider continuing PPIs, avoid NSAIDs, and considering repeating the EGD in a year depending on his clinical course.

## 2019-09-25 ENCOUNTER — OFFICE VISIT (OUTPATIENT)
Dept: FAMILY MEDICINE | Facility: CLINIC | Age: 79
End: 2019-09-25
Payer: COMMERCIAL

## 2019-09-25 VITALS
HEART RATE: 61 BPM | WEIGHT: 175 LBS | OXYGEN SATURATION: 96 % | DIASTOLIC BLOOD PRESSURE: 70 MMHG | BODY MASS INDEX: 26.22 KG/M2 | SYSTOLIC BLOOD PRESSURE: 120 MMHG | TEMPERATURE: 97.8 F

## 2019-09-25 DIAGNOSIS — K59.00 CONSTIPATION, UNSPECIFIED CONSTIPATION TYPE: ICD-10-CM

## 2019-09-25 DIAGNOSIS — K25.9 GASTRIC EROSION, UNSPECIFIED CHRONICITY: Primary | ICD-10-CM

## 2019-09-25 PROCEDURE — 99214 OFFICE O/P EST MOD 30 MIN: CPT | Performed by: FAMILY MEDICINE

## 2019-09-25 NOTE — PATIENT INSTRUCTIONS
Stop -   Aleve or Naproxen  Advil or Ibuprofen   Ok to take baby aspirin   Ok to take tylenol (acetaminophen) for the pain     You can try metamucil daily as needed for constipation       If you continue to have symptoms in one year then we can repeat the endoscopy (EGD)

## 2019-09-25 NOTE — PROGRESS NOTES
Subjective     Vini Loya is a 78 year old male who presents to clinic today for the following health issues:    HPI   Discuss endoscopy results     Result Notes for Surgical pathology exam     Notes recorded by Jan Real MD on 9/20/2019 at 7:57 AM CDT  There was inflammation on biopsy with atypical, which they believe is related to prior injury. Given his overall clinical picture, we can consider continuing PPIs, avoid NSAIDs, and considering repeating the EGD in a year depending on his clinical course.     Impression:          - LA Grade A esophagitis.                        - Sliding small hiatal hernia.                        - Non-bleeding erosive gastropathy. Biopsied.                        - Mucosal changes in the duodenum. Biopsied.                        - Esophageal thickening on CT may represent esophagitis                        or the hiatal hernia. His abdominal symptoms may also be                        related to his gastritis.   Recommendation:      - Resume previous diet.                        - Use Prilosec (omeprazole) 20 mg PO daily.                        - Continue present medications.                        - Await pathology results.                        - Repeat upper endoscopy for surveillance based on                        pathology results.                        - Return to referring physician as previously scheduled.                                                                                       Signed electronically by Jan Real MD     Update -  He notes occasional stomach pain.   He notes that he is not constipated. He     Reviewed and updated as needed this visit by Provider         Review of Systems   ROS COMP: Constitutional, HEENT, cardiovascular, pulmonary, gi and gu systems are negative, except as otherwise noted.      Objective    There were no vitals taken for this visit.  There is no height or weight on file to calculate BMI.  Physical Exam   BP  120/70   Pulse 61   Temp 97.8  F (36.6  C) (Oral)   Wt 79.4 kg (175 lb)   SpO2 96%   BMI 26.22 kg/m    GENERAL: healthy, alert and no distress  EYES: Eyes grossly normal to inspection  HENT: nose and mouth without ulcers or lesions  PSYCH: affect normal    Diagnostic Test Results:  Labs reviewed in Epic        Assessment & Plan     1. Gastric erosion, unspecified chronicity  - discussed EGD results with patient   - advised below  - Stop    Aleve or Naproxen  Advil or Ibuprofen   Ok to take baby aspirin   Ok to take tylenol (acetaminophen) for the pain   If you continue to have symptoms in one year then we can repeat the endoscopy (EGD)     2. Constipation, unspecified constipation type  - improving   You can try metamucil daily as needed for constipation       Peg Walton MD  [facility]

## 2019-10-08 ENCOUNTER — OFFICE VISIT (OUTPATIENT)
Dept: FAMILY MEDICINE | Facility: CLINIC | Age: 79
End: 2019-10-08
Payer: COMMERCIAL

## 2019-10-08 VITALS
SYSTOLIC BLOOD PRESSURE: 110 MMHG | OXYGEN SATURATION: 97 % | HEART RATE: 64 BPM | DIASTOLIC BLOOD PRESSURE: 70 MMHG | TEMPERATURE: 97.4 F | RESPIRATION RATE: 16 BRPM | HEIGHT: 69 IN | WEIGHT: 174.5 LBS | BODY MASS INDEX: 25.84 KG/M2

## 2019-10-08 DIAGNOSIS — N39.0 RECURRENT UTI: ICD-10-CM

## 2019-10-08 DIAGNOSIS — R53.83 FATIGUE, UNSPECIFIED TYPE: Primary | ICD-10-CM

## 2019-10-08 DIAGNOSIS — K59.00 CONSTIPATION, UNSPECIFIED CONSTIPATION TYPE: ICD-10-CM

## 2019-10-08 LAB
ALBUMIN UR-MCNC: 30 MG/DL
APPEARANCE UR: CLEAR
BACTERIA #/AREA URNS HPF: ABNORMAL /HPF
BILIRUB UR QL STRIP: ABNORMAL
COLOR UR AUTO: YELLOW
ERYTHROCYTE [DISTWIDTH] IN BLOOD BY AUTOMATED COUNT: 13.8 % (ref 10–15)
GLUCOSE UR STRIP-MCNC: NEGATIVE MG/DL
HCT VFR BLD AUTO: 45.3 % (ref 40–53)
HGB BLD-MCNC: 15 G/DL (ref 13.3–17.7)
HGB UR QL STRIP: NEGATIVE
KETONES UR STRIP-MCNC: NEGATIVE MG/DL
LEUKOCYTE ESTERASE UR QL STRIP: NEGATIVE
MCH RBC QN AUTO: 31.1 PG (ref 26.5–33)
MCHC RBC AUTO-ENTMCNC: 33.1 G/DL (ref 31.5–36.5)
MCV RBC AUTO: 94 FL (ref 78–100)
MUCOUS THREADS #/AREA URNS LPF: PRESENT /LPF
NITRATE UR QL: NEGATIVE
NON-SQ EPI CELLS #/AREA URNS LPF: ABNORMAL /LPF
PH UR STRIP: 6 PH (ref 5–7)
PLATELET # BLD AUTO: 249 10E9/L (ref 150–450)
RBC # BLD AUTO: 4.83 10E12/L (ref 4.4–5.9)
RBC #/AREA URNS AUTO: ABNORMAL /HPF
SOURCE: ABNORMAL
SP GR UR STRIP: 1.02 (ref 1–1.03)
UROBILINOGEN UR STRIP-ACNC: 1 EU/DL (ref 0.2–1)
WBC # BLD AUTO: 8.6 10E9/L (ref 4–11)
WBC #/AREA URNS AUTO: ABNORMAL /HPF

## 2019-10-08 PROCEDURE — 99214 OFFICE O/P EST MOD 30 MIN: CPT | Performed by: FAMILY MEDICINE

## 2019-10-08 PROCEDURE — 85027 COMPLETE CBC AUTOMATED: CPT | Performed by: FAMILY MEDICINE

## 2019-10-08 PROCEDURE — 36415 COLL VENOUS BLD VENIPUNCTURE: CPT | Performed by: FAMILY MEDICINE

## 2019-10-08 PROCEDURE — 80048 BASIC METABOLIC PNL TOTAL CA: CPT | Performed by: FAMILY MEDICINE

## 2019-10-08 PROCEDURE — 87086 URINE CULTURE/COLONY COUNT: CPT | Performed by: FAMILY MEDICINE

## 2019-10-08 PROCEDURE — 81001 URINALYSIS AUTO W/SCOPE: CPT | Performed by: FAMILY MEDICINE

## 2019-10-08 RX ORDER — POLYETHYLENE GLYCOL 3350 17 G/17G
1 POWDER, FOR SOLUTION ORAL 2 TIMES DAILY
Qty: 527 G | Refills: 0 | Status: SHIPPED | OUTPATIENT
Start: 2019-10-08 | End: 2020-02-11

## 2019-10-08 RX ORDER — SENNA AND DOCUSATE SODIUM 50; 8.6 MG/1; MG/1
1 TABLET, FILM COATED ORAL 2 TIMES DAILY
Qty: 180 TABLET | Refills: 1 | Status: SHIPPED | OUTPATIENT
Start: 2019-10-08 | End: 2020-05-22

## 2019-10-08 ASSESSMENT — MIFFLIN-ST. JEOR: SCORE: 1488.97

## 2019-10-08 NOTE — LETTER
October 11, 2019      Vini Loya  [address] [address]        Dear ,    We are writing to inform you of your test results.    Your results were normal.    Resulted Orders   *UA reflex to Microscopic   Result Value Ref Range    Color Urine Yellow     Appearance Urine Clear     Glucose Urine Negative NEG^Negative mg/dL    Bilirubin Urine Small (A) NEG^Negative      Comment:      This is an unconfirmed screening test result. A positive result may be false.    Ketones Urine Negative NEG^Negative mg/dL    Specific Gravity Urine 1.025 1.003 - 1.035    Blood Urine Negative NEG^Negative    pH Urine 6.0 5.0 - 7.0 pH    Protein Albumin Urine 30 (A) NEG^Negative mg/dL    Urobilinogen Urine 1.0 0.2 - 1.0 EU/dL    Nitrite Urine Negative NEG^Negative    Leukocyte Esterase Urine Negative NEG^Negative    Source Midstream Urine    Urine Culture Aerobic Bacterial   Result Value Ref Range    Specimen Description Midstream Urine     Culture Micro No growth    Basic metabolic panel   Result Value Ref Range    Sodium 140 133 - 144 mmol/L    Potassium 4.3 3.4 - 5.3 mmol/L    Chloride 108 94 - 109 mmol/L    Carbon Dioxide 25 20 - 32 mmol/L    Anion Gap 7 3 - 14 mmol/L    Glucose 121 (H) 70 - 99 mg/dL      Comment:      Non Fasting    Urea Nitrogen 26 7 - 30 mg/dL    Creatinine 1.01 0.66 - 1.25 mg/dL    GFR Estimate 70 >60 mL/min/[1.73_m2]      Comment:      Non  GFR Calc  Starting 12/18/2018, serum creatinine based estimated GFR (eGFR) will be   calculated using the Chronic Kidney Disease Epidemiology Collaboration   (CKD-EPI) equation.      GFR Estimate If Black 81 >60 mL/min/[1.73_m2]      Comment:       GFR Calc  Starting 12/18/2018, serum creatinine based estimated GFR (eGFR) will be   calculated using the Chronic Kidney Disease Epidemiology Collaboration   (CKD-EPI) equation.      Calcium 9.1 8.5 - 10.1 mg/dL   CBC with platelets   Result Value Ref Range    WBC 8.6 4.0  - 11.0 10e9/L    RBC Count 4.83 4.4 - 5.9 10e12/L    Hemoglobin 15.0 13.3 - 17.7 g/dL    Hematocrit 45.3 40.0 - 53.0 %    MCV 94 78 - 100 fl    MCH 31.1 26.5 - 33.0 pg    MCHC 33.1 31.5 - 36.5 g/dL    RDW 13.8 10.0 - 15.0 %    Platelet Count 249 150 - 450 10e9/L   Urine Microscopic   Result Value Ref Range    WBC Urine 0 - 5 OTO5^0 - 5 /HPF    RBC Urine O - 2 OTO2^O - 2 /HPF    Squamous Epithelial /LPF Urine Few FEW^Few /LPF    Bacteria Urine Few (A) NEG^Negative /HPF    Mucous Urine Present (A) NEG^Negative /LPF       If you have any questions or concerns, please call the clinic at the number listed above.       Sincerely,      Joel Daniel Wegener, MD/nr
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
## 2019-10-08 NOTE — PROGRESS NOTES
"Subjective     Vini Loya is a 78 year old male who presents to clinic today for the following health issues:    HPI   Acute Illness   Acute illness concerns:    Onset: ***    Fever: { :940420::\"no\"}    Chills/Sweats: { :870887::\"no\"}    Headache (location?): { :033695::\"no\"}    Sinus Pressure:{.:183782::\"no\"}    Conjunctivitis:  {.:208140::\"no\"}    Ear Pain: {.:269990::\"no\"}    Rhinorrhea: { :883900::\"no\"}    Congestion: { :137654::\"no\"}    Sore Throat: { :574239::\"no\"}     Cough: {.:990460::\"no\"}    Wheeze: { :020515::\"no\"}    Decreased Appetite: { :537551::\"no\"}    Nausea: { :714991::\"no\"}    Vomiting: { :593966::\"no\"}    Diarrhea:  { :186476::\"no\"}    Dysuria/Freq.: { :157942::\"no\"}    Fatigue/Achiness: { :101590::\"no\"}    Sick/Strep Exposure: { :602619::\"no\"}     Therapies Tried and outcome: ***    {additonal problems for provider to add (Optional):716339}    {HIST REVIEW/ LINKS 2 (Optional):375241}    {Additional problems for the provider to add (optional):797890}  Reviewed and updated as needed this visit by Provider         Review of Systems   {ROS COMP (Optional):793187}      Objective    There were no vitals taken for this visit.  There is no height or weight on file to calculate BMI.  Physical Exam   {Exam List (Optional):067410}    {Diagnostic Test Results (Optional):176284::\"Diagnostic Test Results:\",\"Labs reviewed in Epic\"}        {PROVIDER CHARTING PREFERENCE:875293}      "

## 2019-10-08 NOTE — PATIENT INSTRUCTIONS
"ASSESSMENT AND PLAN  1. Fatigue, unspecified type  Unclear cause . No infectious symptoms.     Recurrent uti though, check for infection today.     eval for electrolyte imbalance/dehydration and anemia.     Recent hypothyroid testing.      Likely due to worsening parkinson's with muscle fatigue and stiffness.     No myalgias to suggest statin myopathy.       - *UA reflex to Microscopic  - Urine Culture Aerobic Bacterial  - Basic metabolic panel  - CBC with platelets    2. Recurrent UTI    - *UA reflex to Microscopic  - Urine Culture Aerobic Bacterial  - Basic metabolic panel  - CBC with platelets    Plans flu shot from pharmacy.     Follow up as needed .     3.  Constipation:  Recommend miralax twice daily and senna one tab twice daily.     MYCHART FOR ON-LINE CARE(VISITS), LABS, REFILLS, MESSAGING, ETC [email or web address] , [phone]    E-VISIT: click \"on-line care, then request e-visit\".  E-visits work well for following up on issues we have discussed in clinic previously which may need new prescriptions, new prescriptions or substantial discussion. These are always done by me (Dr. Wegener).     ONCARE VISIT:  [email or web address]  - we treat nearly 50 common conditions through on-care.  These are done in an hour by on-call staff.     RADIOLOGY:  [facility]:  [phone]   [facility]: [phone]    Mammogram and Colonoscopy [phone]    Smoking Cessation: [email or web address], [phone] ([phone])      CONSUMER PRICE LINE for estimates of test costs:  [phone]       "

## 2019-10-09 LAB
ANION GAP SERPL CALCULATED.3IONS-SCNC: 7 MMOL/L (ref 3–14)
BACTERIA SPEC CULT: NO GROWTH
BUN SERPL-MCNC: 26 MG/DL (ref 7–30)
CALCIUM SERPL-MCNC: 9.1 MG/DL (ref 8.5–10.1)
CHLORIDE SERPL-SCNC: 108 MMOL/L (ref 94–109)
CO2 SERPL-SCNC: 25 MMOL/L (ref 20–32)
CREAT SERPL-MCNC: 1.01 MG/DL (ref 0.66–1.25)
GFR SERPL CREATININE-BSD FRML MDRD: 70 ML/MIN/{1.73_M2}
GLUCOSE SERPL-MCNC: 121 MG/DL (ref 70–99)
POTASSIUM SERPL-SCNC: 4.3 MMOL/L (ref 3.4–5.3)
SODIUM SERPL-SCNC: 140 MMOL/L (ref 133–144)
SPECIMEN SOURCE: NORMAL

## 2019-10-14 ENCOUNTER — TELEPHONE (OUTPATIENT)
Dept: FAMILY MEDICINE | Facility: CLINIC | Age: 79
End: 2019-10-14

## 2019-10-14 DIAGNOSIS — K59.00 CONSTIPATION: Primary | ICD-10-CM

## 2019-10-14 NOTE — TELEPHONE ENCOUNTER
Patient calling to find out status of this message.  Please call today if possible.  Ok to leave message on voicemail.

## 2019-10-14 NOTE — TELEPHONE ENCOUNTER
I talked to pt and his wife.  Pt has had constipation issues for years.  Bloated abd, hard like a rock.  No nausea.  Decreased appetite.  No n/v.  Last BM a few days ago.  No gas.  Senna and miralax not working.  Pt is trying increased activity.  Then he is finally able to go and goes 5 times in one day.  Hx of large stool. Clogs toilet.  Lifelong constipation.    Placed GI referral. Pt in agreement. Normal meds aren't working.  Gave GI referral.   Faxed to [facility].  JERI Carlson

## 2019-10-14 NOTE — TELEPHONE ENCOUNTER
Reason for Call:  Other call back    Detailed comments: Patient is requesting a call back. States his stomach is bloating and he would like to discuss. Please advise. Thanks!    Phone Number Patient can be reached at: Home number on file [phone] (home)    Best Time: Any    Can we leave a detailed message on this number? YES    Call taken on 10/14/2019 at 11:34 AM by Betsey Figueroa

## 2019-10-21 ENCOUNTER — TRANSFERRED RECORDS (OUTPATIENT)
Dept: HEALTH INFORMATION MANAGEMENT | Facility: CLINIC | Age: 79
End: 2019-10-21

## 2019-11-20 ENCOUNTER — DOCUMENTATION ONLY (OUTPATIENT)
Dept: FAMILY MEDICINE | Facility: CLINIC | Age: 79
End: 2019-11-20

## 2019-11-20 DIAGNOSIS — N39.0 RECURRENT UTI: Primary | ICD-10-CM

## 2019-11-20 DIAGNOSIS — N39.0 RECURRENT UTI: ICD-10-CM

## 2019-11-20 PROCEDURE — 87086 URINE CULTURE/COLONY COUNT: CPT | Performed by: FAMILY MEDICINE

## 2019-11-20 PROCEDURE — 81003 URINALYSIS AUTO W/O SCOPE: CPT | Performed by: FAMILY MEDICINE

## 2019-11-20 NOTE — PROGRESS NOTES
So far the UA is completely normal so not looking like an infection.     We will have the urine culture back by Friday am.     No treatment recommended at this time.     If he has had a substantial change in symptoms may need urgent care visit or provider visit.     Joel Wegener,MD

## 2019-11-20 NOTE — PROGRESS NOTES
Patient came in today and left a urine sample, said he is supposed to have standing orders to check for infection. Please place FUTURE/STANDING orders if appropriate.    Thanks,    Lab

## 2019-11-20 NOTE — PROGRESS NOTES
Called patient discussed results and provider recommendations -     Patient says he was getting a little dizzy a couple of days ago and his BP yesterday was 106/60 drank some juice and was 134/60 on recheck - says he kind of felt like he felt before when he had a UTI    Says he gets a headache from juice - seems a little tired he says    Had some frequency/urgency - yellow - denies burning, pain, itching, odor, fever, cloudiness    Encourage to sit down if he feels dizzy and drink fluids if dizzy, BP low, headache - patient agrees with plan - to be seen for worsening or new symptoms

## 2019-11-21 LAB
BACTERIA SPEC CULT: NORMAL
SPECIMEN SOURCE: NORMAL

## 2019-12-03 ENCOUNTER — OFFICE VISIT (OUTPATIENT)
Dept: NEUROLOGY | Facility: CLINIC | Age: 79
End: 2019-12-03
Payer: COMMERCIAL

## 2019-12-03 VITALS
OXYGEN SATURATION: 96 % | SYSTOLIC BLOOD PRESSURE: 130 MMHG | BODY MASS INDEX: 27.09 KG/M2 | HEART RATE: 60 BPM | DIASTOLIC BLOOD PRESSURE: 77 MMHG | WEIGHT: 180.8 LBS

## 2019-12-03 DIAGNOSIS — G20.A1 PARKINSON'S DISEASE (H): ICD-10-CM

## 2019-12-03 DIAGNOSIS — R42 LIGHTHEADEDNESS: ICD-10-CM

## 2019-12-03 DIAGNOSIS — R42 DIZZINESS: Primary | ICD-10-CM

## 2019-12-03 RX ORDER — CHOLECALCIFEROL (VITAMIN D3) 50 MCG
1 TABLET ORAL DAILY PRN
COMMUNITY

## 2019-12-03 RX ORDER — CARBIDOPA AND LEVODOPA 25; 100 MG/1; MG/1
TABLET ORAL
Qty: 540 TABLET | Refills: 3 | Status: SHIPPED | OUTPATIENT
Start: 2019-12-03 | End: 2020-03-09

## 2019-12-03 ASSESSMENT — PAIN SCALES - GENERAL: PAINLEVEL: NO PAIN (0)

## 2019-12-03 NOTE — PROGRESS NOTES
PATIENT: Vini Loya    : 1940    ALBINA:  December 3, 2019 (Today is his 79th birthday.  Patient didn't know)    REASON FOR VISIT:  Follow up for Parkinson's disease (PD.)    HPI: Mr. Vini Loya is an 79 year old left  handed  male who came to the [facility] neurology clinic accompanied by his wife, Kristi, for a follow up visit.  I saw him last on 2019 for a routine follow up visit.  During that visit, the following were discussed: -    ASSESSMENT/PLAN:    1)  Parkinson's Disease:  Patient has a 9 year history of PD (Tremor since .)  He reports not much change with Ropinirole being added to his Sinemet;   __  Will stay on the same antiparkinsonian medication.   2) GI Discomfort:  He is scheduled for endoscopy & will f/u with gastroenterologist.   3)  Orthostatic Hypotension:  Managed by increasing fluids.    When he gets up in the morning, his legs are red all the way up to his thigh.  It returned back to normal later.  His wife is not aware of this and hasn't witnessed it.    He sometimes feels dizzy.  About 2 weeks ago, he felt dizzy after his wife dropped him off at [employer] for breakfast meeting.  He couldn't find a chair right away; therefore, so he sat on the floor until he felt better.  After his wife parked and came inside, he was surrounded by people sitting on the floor.  They offered to take him to ER, but he declined.  He had a glass of orange juice and felt better.  When he feels dizzy, he increases his fluid intake.  His wife has noticed that the dizzy spells frequency have increased.  Today, he denies dizziness.     Since I saw him last, [facility]   His Ropinirole was increased from 0.25 mg 1 tab TID to 0.5 mg 1 tab TID.  It is unclear if the dizziness increased after the Ropinirole was increased.  Most of the time, he takes his medication as prescribed.     PD Medications 7 am 11 am 4 pm   Sinemet 25/100 mg  2 2  "2   Ropinirole 0.5 mg  1 1 1      His wife reports that he refused to use a cane.  His wife tried to get [facility], \"he hasn't found the time to call and make the appointment.\"    He is doing the stationary bike and rowing 2-3 times a week.  He also does the weight training.     He has sore back from shoveling.     MEDICATIONS:   Medication Sig     aspirin 81 MG tablet Take 1 tablet (81 mg) by mouth daily     carbidopa-levodopa (SINEMET)  MG tablet Take 2 tablets 3 times a day 1 hour before each meal.  (Around 7 am, 11 am, & 4 pm.)     hydrocortisone 2.5 % ointment Apply topically 2 times daily (Patient taking differently: Apply topically 2 times daily as needed )     ketoconazole (NIZORAL) 2 % external shampoo Apply topically three times a week     omeprazole (PRILOSEC OTC) 20 MG EC tablet Take 1 tablet (20 mg) by mouth daily     PARoxetine (PAXIL) 20 MG tablet TAKE ONE TABLET BY MOUTH EVERY NIGHT AT BEDTIME     polyethylene glycol (MIRALAX) powder Take 17 g (1 capful) by mouth 2 times daily (Patient taking differently: Take 1 capful by mouth 2 times daily as needed )     rOPINIRole (REQUIP) 0.25 MG tablet Take 0.25 mg by mouth 3 times daily     SENNA-docusate sodium (SENNA S) 8.6-50 MG tablet Take 1 tablet by mouth 2 times daily (Patient taking differently: Take 1 tablet by mouth 2 times daily as needed )     simvastatin (ZOCOR) 40 MG tablet TAKE ONE TABLET BY MOUTH EVERY NIGHT AT BEDTIME     sulfamethoxazole-trimethoprim (BACTRIM/SEPTRA) 400-80 MG tablet Take 1 tablet by mouth At Bedtime For UTI prevention     tamsulosin (FLOMAX) 0.4 MG capsule Take 1 capsule (0.4 mg) by mouth daily     vitamin D3 (CHOLECALCIFEROL) 2000 units (50 mcg) tablet Take 1 tablet by mouth daily       ALLERGIES: Vytorin    PHYSICAL EXAM:     VITAL SIGNS:  Blood pressure 130/77, pulse 60, weight 82 kg (180 lb 12.8 oz), SpO2 96 %.  Body mass index is 27.09 kg/m .     GENERAL:  Mr. Loya is a pleasant  " male who is well-groomed and well-developed sitting comfortably in the exam room without any distress.  Affect is appropriate.      MOVEMENT DISORDERS ASSESSMENT: (Last Sinemet was about 1.5 hrs ago).     Speech:  Monotone.  Mild expression and volume.  Facial Expression: Moderate hypomimia; lips parted some of the time.  Rest Tremor:  Mild and persistent in LUE extremity;  Mild in chin.  Mild and intermittent in LLE.  Lt body and chin tremors become much worse with mental activation.   Action/Postural Tremor: Slight postural and action tremor bilaterally.  Rigidity: Absent in all extremities.  Finger Taps: Mildly impaired bilaterally.  Hand opening & closing: Mild slowing and reduction in amplitude in Rt side. Moderately impaired; early fatiguing; occasional arrests in movement in Lt side.  Hand pronation & supination: Mild slowing and reduction in amplitude bilaterally.  Toe Tapping:  Normal bilaterally.   Heel Tapping:  Normal bilaterally.   Arising from chair - arms folded across chest:  Normal.   Posture: Mildly stooped posture; leaning to the Right.  Gait: Walks with normal steps. No festination or propulsion.  Freezing of gait: Absent.   Body Bradykinesia: Slight to mild degree of slowness and poverty of movement.  Dyskinesias: Absent.     ASSESSMENT    1)  Parkinson's Disease:  Patient has a 9 year history of PD (Tremor since 2010.)      2)  Dizziness/Lightheadedness:  Dizziness and lightheadedness most likely from orthostatic hypotension.    PLAN:  __  Will stay on the same antiparkinsonian medications.     PD Medications 7 am 11 am 4 pm   Sinemet 25/100 mg  2 2 2   Ropinirole 0.5 mg  1 1 1      __  He was given a diary to complete a 7-Day Blood Pressure and pulse diary and mail it back.  Instructed to wait 2 minutes between sitting and standing Blood pressure and pulse.     __  He was encouraged to stand up, slowly arise to prevent falls.     __  His wife was asked to look at his legs in the morning to  witness the redness.     __  [facility].     __  Will return in 3 - 4 months. May return sooner as needed.    __  He was reminded to [facility].  His wife wanted to keep both at this time.         The total time spent with the patient was 40 minutes, and greater than 50% of this time was spent in counseling and coordination of care.    KOTA Stone,  CNP  [facility]      1:02 PM  1:40 PM

## 2019-12-03 NOTE — PATIENT INSTRUCTIONS
December 3, 2019    Dear Mr. Vini Loya,    Thank you for coming today.  During your visit, we have discussed the following:     __  Stay on the same antiparkinsonian medications.     PD Medications 7am 11am 4pm   Sinemet 25/100 mg  2 2 2   Ropinirole 0.5 mg  1 1 1      __  Complete the Blood Pressure and pulse diary for 7 days and mail it back.  Make sure you wait for 2 minutes between sitting and standing Blood pressure and pulse.     __  When you stand up, slowly arise to prevent falls.     __  It would be [facility].     __  Return in 3 - 4 months. You may return sooner as needed.      For questions, you may send us a [facility] message or call [phone]    Fax number: [phone]    KOTA Stone, CNP  [facility]

## 2019-12-03 NOTE — NURSING NOTE
Chief Complaint   Patient presents with     RECHECK     [facility] RETURN - FOLLOW UP       David Hall, EMT

## 2019-12-11 DIAGNOSIS — F41.9 ANXIETY: ICD-10-CM

## 2019-12-11 NOTE — TELEPHONE ENCOUNTER
"Requested Prescriptions   Pending Prescriptions Disp Refills     PARoxetine (PAXIL) 20 MG tablet [Pharmacy Med Name: PAROXETINE HCL 20MG TABS] 90 tablet 3     Sig: TAKE ONE TABLET BY MOUTH EVERY NIGHT AT BEDTIME  Last Written Prescription Date:  10/1/2018  Last Fill Quantity: 90 tablet,  # refills: 3   Last Office Visit: 10/8/2019   Future Office Visit:            SSRIs Protocol Passed - 12/11/2019 12:02 PM        Passed - Recent (12 mo) or future (30 days) visit within the authorizing provider's specialty     Patient has had an office visit with the authorizing provider or a provider within the authorizing providers department within the previous 12 mos or has a future within next 30 days. See \"Patient Info\" tab in inbasket, or \"Choose Columns\" in Meds & Orders section of the refill encounter.              Passed - Medication is active on med list        Passed - Patient is age 18 or older          "

## 2019-12-15 RX ORDER — PAROXETINE 20 MG/1
TABLET, FILM COATED ORAL
Qty: 30 TABLET | Refills: 0 | Status: SHIPPED | OUTPATIENT
Start: 2019-12-15 | End: 2020-01-03

## 2019-12-15 NOTE — TELEPHONE ENCOUNTER
Depression on problem list    PHQ-9 SCORE 9/27/2018 9/27/2018 6/4/2019   PHQ-9 Total Score - - -   PHQ-9 Total Score 1 1 4     Over one year since depression/anxiety addressed in office visit     HW Reception Medication is being filled for 1 time refill only due to:  Patient needs to be seen because due for depression/mental health f/u.     Signed Prescriptions:                        Disp   Refills    PARoxetine (PAXIL) 20 MG tablet            30 tab*0        Sig: TAKE ONE TABLET BY MOUTH EVERY NIGHT AT BEDTIME  Authorizing Provider: WEGENER, JOEL DANIEL IRWIN  Ordering User: JAIDA HARKINS

## 2019-12-18 ENCOUNTER — NURSE TRIAGE (OUTPATIENT)
Dept: FAMILY MEDICINE | Facility: CLINIC | Age: 79
End: 2019-12-18

## 2019-12-18 NOTE — TELEPHONE ENCOUNTER
"  Additional Information    Negative: Passed out (i.e., fainted, collapsed and was not responding)    Negative: Shock suspected (e.g., cold/pale/clammy skin, too weak to stand, low BP, rapid pulse)    Negative: Sounds like a life-threatening emergency to the triager    Negative: Chest pain    Negative: Pain is mainly in upper abdomen (if needed ask: 'is it mainly above the belly button?')    Negative: SEVERE abdominal pain (e.g., excruciating)    Negative: Vomiting red blood or black (coffee ground) material    Negative: Bloody, black, or tarry bowel movements    Negative: Unable to urinate (or only a few drops) and bladder feels very full    Negative: Pain in scrotum persists > 1 hour    Negative: Constant abdominal pain lasting > 2 hours    Negative: Vomiting bile (green color)    Negative: Patient sounds very sick or weak to the triager    Negative: Vomiting and abdomen looks much more swollen than usual    Negative: White of the eyes have turned yellow (i.e., jaundice)    Negative: Blood in urine (red, pink, or tea-colored)    Negative: Fever > 103 F (39.4 C)    Negative: Fever > 101 F (38.3 C) and over 60 years of age    Negative: Fever > 100.0 F (37.8 C) and has diabetes mellitus or a weak immune system (e.g., HIV positive, cancer chemotherapy, organ transplant, splenectomy, chronic steroids)    Negative: Fever > 100.0 F (37.8 C) and bedridden (e.g., nursing home patient, stroke, chronic illness, recovering from surgery)    MILD pain that comes and goes (cramps) lasts > 24 hours    Answer Assessment - Initial Assessment Questions  1. LOCATION: \"Where does it hurt?\"       Generalized abdominal pain  2. RADIATION: \"Does the pain shoot anywhere else?\" (e.g., chest, back)      Sometimes shoots to right side of back, but not currently  3. ONSET: \"When did the pain begin?\" (Minutes, hours or days ago)       12/14/19  4. SUDDEN: \"Gradual or sudden onset?\"      Gradual  5. PATTERN \"Does the pain come and go, or is it " "constant?\"     - If constant: \"Is it getting better, staying the same, or worsening?\"       (Note: Constant means the pain never goes away completely; most serious pain is constant and it progresses)      - If intermittent: \"How long does it last?\" \"Do you have pain now?\"      (Note: Intermittent means the pain goes away completely between bouts)      Intermittent and lasts a few hours at a time.  Has pain now and it is moderate.  6. SEVERITY: \"How bad is the pain?\"  (e.g., Scale 1-10; mild, moderate, or severe)     - MILD (1-3): doesn't interfere with normal activities, abdomen soft and not tender to touch      - MODERATE (4-7): interferes with normal activities or awakens from sleep, tender to touch      - SEVERE (8-10): excruciating pain, doubled over, unable to do any normal activities        Moderate  7. RECURRENT SYMPTOM: \"Have you ever had this type of abdominal pain before?\" If so, ask: \"When was the last time?\" and \"What happened that time?\"       Never had this type of pain before  8. CAUSE: \"What do you think is causing the abdominal pain?\"      Unsure  9. RELIEVING/AGGRAVATING FACTORS: \"What makes it better or worse?\" (e.g., movement, antacids, bowel movement)      Nothing  10. OTHER SYMPTOMS: \"Has there been any vomiting, diarrhea, constipation, or urine problems?\"        Diarrheal episodes, which started 12/14/19 and occur 4-5 per day.  loose stools, not watery.  A headache today and \"a little\" dizzy.  Denied: chest pain, SOB, emesis, weakness, fever.    Protocols used: ABDOMINAL PAIN - MALE-A-OH      MAXIME Gipson, MATEON, RN    "

## 2019-12-18 NOTE — TELEPHONE ENCOUNTER
Reason for call:  Patient reporting a symptom    Symptom or request: Stomach pain    Duration (how long have symptoms been present): 2 weeks    Additional comments: Patient states he has been dealing with stomach / gut issues and pain and would like a call back to be advised. Thanks!    Phone Number patient can be reached at:  Cell number on file:    Telephone Information:   Mobile [phone]     Best Time:  Any    Can we leave a detailed message on this number:  YES    Call taken on 12/18/2019 at 11:53 AM by Betsey Figueroa

## 2020-01-03 DIAGNOSIS — R35.1 BENIGN PROSTATIC HYPERPLASIA WITH NOCTURIA: ICD-10-CM

## 2020-01-03 DIAGNOSIS — N40.1 BENIGN PROSTATIC HYPERPLASIA WITH NOCTURIA: ICD-10-CM

## 2020-01-03 DIAGNOSIS — F41.9 ANXIETY: ICD-10-CM

## 2020-01-03 RX ORDER — TAMSULOSIN HYDROCHLORIDE 0.4 MG/1
0.4 CAPSULE ORAL DAILY
Qty: 90 CAPSULE | Refills: 1 | Status: SHIPPED | OUTPATIENT
Start: 2020-01-03 | End: 2020-02-11

## 2020-01-03 RX ORDER — PAROXETINE 20 MG/1
20 TABLET, FILM COATED ORAL EVERY MORNING
Qty: 90 TABLET | Refills: 1 | Status: SHIPPED | OUTPATIENT
Start: 2020-01-03 | End: 2020-02-11

## 2020-02-06 ENCOUNTER — TELEPHONE (OUTPATIENT)
Dept: FAMILY MEDICINE | Facility: CLINIC | Age: 80
End: 2020-02-06

## 2020-02-06 NOTE — TELEPHONE ENCOUNTER
Reason for call:  Patient reporting a symptom    Symptom or request: symptoms    Duration (how long have symptoms been present): 1 weeek    Have you been treated for this before? No    Additional comments: has been constipated for a week said he has tried everything    Phone Number patient can be reached at:  Home number on file [phone] (home)    Best Time:  any    Can we leave a detailed message on this number:  YES    Call taken on 2/6/2020 at 12:18 PM by Alessia Valdes

## 2020-02-06 NOTE — TELEPHONE ENCOUNTER
S-(situation): Last BM was last Thursday 1/30. Patient denies    Fever of 100.4 F (38 C)     Pain in your abdomen or rigdity or back gets worse    Nausea or vomiting    Swelling in your abdomen    Blood in the stool    Black, tarry stool    Increased HR    Sob    Patient states Miralax does not work. Patient states he is drinking about 6 glasses of water a day.     B-(background): Patient has chronic constipation pt taking Senna. Hx of Parkinson's and stroke.    A-(assessment): Patient with last BM on 1/30.    R-(recommendations): Patient to increase fluids, increase fiber intake, purchase an enema with the guidance of a pharmacist. If does not work- go to ER   Patient in agreement with plan and verbalizes understanding.   Thanks!   Soco Pimentel RN

## 2020-02-11 ENCOUNTER — OFFICE VISIT (OUTPATIENT)
Dept: FAMILY MEDICINE | Facility: CLINIC | Age: 80
End: 2020-02-11
Payer: COMMERCIAL

## 2020-02-11 VITALS
RESPIRATION RATE: 18 BRPM | HEART RATE: 65 BPM | OXYGEN SATURATION: 95 % | HEIGHT: 69 IN | BODY MASS INDEX: 25.7 KG/M2 | WEIGHT: 173.5 LBS | DIASTOLIC BLOOD PRESSURE: 70 MMHG | SYSTOLIC BLOOD PRESSURE: 128 MMHG | TEMPERATURE: 98.2 F

## 2020-02-11 DIAGNOSIS — F41.9 ANXIETY: ICD-10-CM

## 2020-02-11 DIAGNOSIS — N18.30 CKD (CHRONIC KIDNEY DISEASE) STAGE 3, GFR 30-59 ML/MIN (H): ICD-10-CM

## 2020-02-11 DIAGNOSIS — I25.10 CORONARY ARTERY DISEASE INVOLVING NATIVE HEART WITHOUT ANGINA PECTORIS, UNSPECIFIED VESSEL OR LESION TYPE: ICD-10-CM

## 2020-02-11 DIAGNOSIS — E78.5 HYPERLIPIDEMIA LDL GOAL <100: ICD-10-CM

## 2020-02-11 DIAGNOSIS — R35.1 BENIGN PROSTATIC HYPERPLASIA WITH NOCTURIA: ICD-10-CM

## 2020-02-11 DIAGNOSIS — F32.1 MAJOR DEPRESSIVE DISORDER, SINGLE EPISODE, MODERATE (H): ICD-10-CM

## 2020-02-11 DIAGNOSIS — G20.A1 PARKINSON'S DISEASE (H): ICD-10-CM

## 2020-02-11 DIAGNOSIS — Z79.899 MEDICATION MANAGEMENT: ICD-10-CM

## 2020-02-11 DIAGNOSIS — Z00.00 ENCOUNTER FOR MEDICARE ANNUAL WELLNESS EXAM: Primary | ICD-10-CM

## 2020-02-11 DIAGNOSIS — R31.9 URINARY TRACT INFECTION WITH HEMATURIA, SITE UNSPECIFIED: ICD-10-CM

## 2020-02-11 DIAGNOSIS — N39.0 URINARY TRACT INFECTION WITH HEMATURIA, SITE UNSPECIFIED: ICD-10-CM

## 2020-02-11 DIAGNOSIS — N40.1 BENIGN PROSTATIC HYPERPLASIA WITH NOCTURIA: ICD-10-CM

## 2020-02-11 LAB
ALBUMIN SERPL-MCNC: 3.6 G/DL (ref 3.4–5)
ALP SERPL-CCNC: 77 U/L (ref 40–150)
ALT SERPL W P-5'-P-CCNC: 8 U/L (ref 0–70)
ANION GAP SERPL CALCULATED.3IONS-SCNC: 6 MMOL/L (ref 3–14)
AST SERPL W P-5'-P-CCNC: 30 U/L (ref 0–45)
BILIRUB SERPL-MCNC: 1 MG/DL (ref 0.2–1.3)
BUN SERPL-MCNC: 20 MG/DL (ref 7–30)
CALCIUM SERPL-MCNC: 9.3 MG/DL (ref 8.5–10.1)
CHLORIDE SERPL-SCNC: 106 MMOL/L (ref 94–109)
CHOLEST SERPL-MCNC: 135 MG/DL
CO2 SERPL-SCNC: 28 MMOL/L (ref 20–32)
CREAT SERPL-MCNC: 1.24 MG/DL (ref 0.66–1.25)
CREAT UR-MCNC: 126 MG/DL
ERYTHROCYTE [DISTWIDTH] IN BLOOD BY AUTOMATED COUNT: 13.5 % (ref 10–15)
GFR SERPL CREATININE-BSD FRML MDRD: 54 ML/MIN/{1.73_M2}
GLUCOSE SERPL-MCNC: 117 MG/DL (ref 70–99)
HCT VFR BLD AUTO: 46.4 % (ref 40–53)
HDLC SERPL-MCNC: 43 MG/DL
HGB BLD-MCNC: 15.2 G/DL (ref 13.3–17.7)
LDLC SERPL CALC-MCNC: 71 MG/DL
MCH RBC QN AUTO: 31.1 PG (ref 26.5–33)
MCHC RBC AUTO-ENTMCNC: 32.8 G/DL (ref 31.5–36.5)
MCV RBC AUTO: 95 FL (ref 78–100)
MICROALBUMIN UR-MCNC: 97 MG/L
MICROALBUMIN/CREAT UR: 76.83 MG/G CR (ref 0–17)
NONHDLC SERPL-MCNC: 92 MG/DL
PLATELET # BLD AUTO: 281 10E9/L (ref 150–450)
POTASSIUM SERPL-SCNC: 4.1 MMOL/L (ref 3.4–5.3)
PROT SERPL-MCNC: 7.4 G/DL (ref 6.8–8.8)
RBC # BLD AUTO: 4.88 10E12/L (ref 4.4–5.9)
SODIUM SERPL-SCNC: 140 MMOL/L (ref 133–144)
TRIGL SERPL-MCNC: 104 MG/DL
WBC # BLD AUTO: 8.6 10E9/L (ref 4–11)

## 2020-02-11 PROCEDURE — 99397 PER PM REEVAL EST PAT 65+ YR: CPT | Mod: 25 | Performed by: FAMILY MEDICINE

## 2020-02-11 PROCEDURE — G0008 ADMIN INFLUENZA VIRUS VAC: HCPCS | Performed by: FAMILY MEDICINE

## 2020-02-11 PROCEDURE — 36415 COLL VENOUS BLD VENIPUNCTURE: CPT | Performed by: FAMILY MEDICINE

## 2020-02-11 PROCEDURE — 90662 IIV NO PRSV INCREASED AG IM: CPT | Performed by: FAMILY MEDICINE

## 2020-02-11 PROCEDURE — 82043 UR ALBUMIN QUANTITATIVE: CPT | Performed by: FAMILY MEDICINE

## 2020-02-11 PROCEDURE — 80061 LIPID PANEL: CPT | Performed by: FAMILY MEDICINE

## 2020-02-11 PROCEDURE — 80053 COMPREHEN METABOLIC PANEL: CPT | Performed by: FAMILY MEDICINE

## 2020-02-11 PROCEDURE — 85027 COMPLETE CBC AUTOMATED: CPT | Performed by: FAMILY MEDICINE

## 2020-02-11 RX ORDER — SULFAMETHOXAZOLE AND TRIMETHOPRIM 400; 80 MG/1; MG/1
1 TABLET ORAL AT BEDTIME
Qty: 90 TABLET | Refills: 3 | Status: SHIPPED | OUTPATIENT
Start: 2020-02-11 | End: 2021-04-01

## 2020-02-11 RX ORDER — TAMSULOSIN HYDROCHLORIDE 0.4 MG/1
0.4 CAPSULE ORAL DAILY
Qty: 90 CAPSULE | Refills: 3 | Status: ON HOLD | OUTPATIENT
Start: 2020-02-11 | End: 2020-09-11

## 2020-02-11 RX ORDER — PAROXETINE 20 MG/1
20 TABLET, FILM COATED ORAL EVERY MORNING
Qty: 90 TABLET | Refills: 3 | Status: SHIPPED | OUTPATIENT
Start: 2020-02-11 | End: 2020-06-23

## 2020-02-11 RX ORDER — SIMVASTATIN 40 MG
TABLET ORAL
Qty: 90 TABLET | Refills: 3 | Status: SHIPPED | OUTPATIENT
Start: 2020-02-11 | End: 2021-04-01

## 2020-02-11 ASSESSMENT — PATIENT HEALTH QUESTIONNAIRE - PHQ9: SUM OF ALL RESPONSES TO PHQ QUESTIONS 1-9: 3

## 2020-02-11 ASSESSMENT — MIFFLIN-ST. JEOR: SCORE: 1479.43

## 2020-02-11 NOTE — LETTER
February 14, 2020      Vini Loya  [address] [address]        Dear ,    We are writing to inform you of your test results.    The kidney function was slightly lowe than last checked.  The other labs were in good ranges no medication changes needed based on this.     Resulted Orders   Albumin Random Urine Quantitative with Creat Ratio   Result Value Ref Range    Creatinine Urine 126 mg/dL    Albumin Urine mg/L 97 mg/L    Albumin Urine mg/g Cr 76.83 (H) 0 - 17 mg/g Cr   Lipid panel reflex to direct LDL Fasting   Result Value Ref Range    Cholesterol 135 <200 mg/dL    Triglycerides 104 <150 mg/dL    HDL Cholesterol 43 >39 mg/dL    LDL Cholesterol Calculated 71 <100 mg/dL      Comment:      Desirable:       <100 mg/dl    Non HDL Cholesterol 92 <130 mg/dL   CBC with platelets   Result Value Ref Range    WBC 8.6 4.0 - 11.0 10e9/L    RBC Count 4.88 4.4 - 5.9 10e12/L    Hemoglobin 15.2 13.3 - 17.7 g/dL    Hematocrit 46.4 40.0 - 53.0 %    MCV 95 78 - 100 fl    MCH 31.1 26.5 - 33.0 pg    MCHC 32.8 31.5 - 36.5 g/dL    RDW 13.5 10.0 - 15.0 %    Platelet Count 281 150 - 450 10e9/L   Comprehensive metabolic panel   Result Value Ref Range    Sodium 140 133 - 144 mmol/L    Potassium 4.1 3.4 - 5.3 mmol/L    Chloride 106 94 - 109 mmol/L    Carbon Dioxide 28 20 - 32 mmol/L    Anion Gap 6 3 - 14 mmol/L    Glucose 117 (H) 70 - 99 mg/dL    Urea Nitrogen 20 7 - 30 mg/dL    Creatinine 1.24 0.66 - 1.25 mg/dL    GFR Estimate 54 (L) >60 mL/min/[1.73_m2]      Comment:      Non  GFR Calc  Starting 12/18/2018, serum creatinine based estimated GFR (eGFR) will be   calculated using the Chronic Kidney Disease Epidemiology Collaboration   (CKD-EPI) equation.      GFR Estimate If Black 63 >60 mL/min/[1.73_m2]      Comment:       GFR Calc  Starting 12/18/2018, serum creatinine based estimated GFR (eGFR) will be   calculated using the Chronic Kidney Disease Epidemiology Collaboration    (CKD-EPI) equation.      Calcium 9.3 8.5 - 10.1 mg/dL    Bilirubin Total 1.0 0.2 - 1.3 mg/dL    Albumin 3.6 3.4 - 5.0 g/dL    Protein Total 7.4 6.8 - 8.8 g/dL    Alkaline Phosphatase 77 40 - 150 U/L    ALT 8 0 - 70 U/L    AST 30 0 - 45 U/L       If you have any questions or concerns, please call the clinic at the number listed above.       Sincerely,        Joel Daniel Wegener, MD/nr

## 2020-02-11 NOTE — PROGRESS NOTES
"  SUBJECTIVE:   Vini Loya is a 79 year old male who presents for Preventive Visit.  Are you in the first 12 months of your Medicare Part B coverage?  No    Physical Health:    In general, how would you rate your overall physical health? poor    Outside of work, how many days during the week do you exercise? 2-3 days/week    Outside of work, approximately how many minutes a day do you exercise?15-30 minutes    If you drink alcohol do you typically have >3 drinks per day or >7 drinks per week? No    Do you usually eat at least 4 servings of fruit and vegetables a day, include whole grains & fiber and avoid regularly eating high fat or \"junk\" foods? Yes    Do you have any problems taking medications regularly?  No    Do you have any side effects from medications? none    Needs assistance for the following daily activities: housework    Which of the following safety concerns are present in your home?  none identified     Hearing impairment: No    In the past 6 months, have you been bothered by leaking of urine? yes    Mental Health:    In general, how would you rate your overall mental or emotional health? Good      Do you feel safe in your environment? Yes    Additional concerns to address?  YES    Fall risk:  Fallen 2 or more times in the past year?: No  Any fall with injury in the past year?: No  click delete button to remove this line now  Cognitive Screening: Not appropriate due to known memory loss with parkinson's    Do you have sleep apnea, excessive snoring or daytime drowsiness?: no        PROBLEMS TO ADD ON...  -------------------------------------  Doesn't think miralax works.  Senna and colace work for constipation.  Much improved.     Otherwise doing well.     Driving without problems still despite parkinson's and also shovelling snow still.  Living with spouse. No safety concerns or falls. Overall he feels his mood is good.     Reviewed and updated as needed this visit by clinical staff     " "    Reviewed and updated as needed this visit by Provider        Social History     Tobacco Use     Smoking status: Former Smoker     Packs/day: 0.50     Years: 3.00     Pack years: 1.50     Types: Cigarettes     Last attempt to quit: 3/14/1966     Years since quittin.9     Smokeless tobacco: Former User   Substance Use Topics     Alcohol use: No     Comment: occasional wine on the holidays                            Current providers sharing in care for this patient include:   Patient Care Team:  Wegener, Joel Daniel Irwin, MD as PCP - General (Family Practice)  Yudith Mcdonnell MD as MD (Ophthalmology)  Evelyn Hairston PA as Physician Assistant (Physician Assistant)  Dalila Staples APRN CNP as Referring Physician (Nurse Practitioner)  Lena Velazquez, PhD LP as MD (Psychology)  Abel Stone MD as MD (Orthopedics)  Wegener, Joel Daniel Irwin, MD as Assigned PCP    The following health maintenance items are reviewed in Epic and correct as of today:  Health Maintenance   Topic Date Due     ADVANCE CARE PLANNING  10/11/2017     INFLUENZA VACCINE (1) 2019     PHQ-9  2019     LIPID  2020     MICROALBUMIN  2020     ZOSTER IMMUNIZATION (3 of 3) 2019     MEDICARE ANNUAL WELLNESS VISIT  2020     FALL RISK ASSESSMENT  2020     CMP  2020     DTAP/TDAP/TD IMMUNIZATION (3 - Td) 10/01/2028     DEPRESSION ACTION PLAN  Completed     PNEUMOCOCCAL IMMUNIZATION 65+ LOW/MEDIUM RISK  Completed     IPV IMMUNIZATION  Aged Out     MENINGITIS IMMUNIZATION  Aged Out     Lab work is in process      ROS:  Constitutional, HEENT, cardiovascular, pulmonary, GI, , musculoskeletal, neuro, skin, endocrine and psych systems are negative, except as otherwise noted.    OBJECTIVE:   There were no vitals taken for this visit. Estimated body mass index is 27.09 kg/m  as calculated from the following:    Height as of 10/8/19: 1.74 m (5' 8.5\").    Weight as of 12/3/19: 82 kg " (180 lb 12.8 oz).  EXAM:   GENERAL: healthy, alert and no distress  EYES: Eyes grossly normal to inspection, PERRL and conjunctivae and sclerae normal  HENT: ear canals and TM's normal, nose and mouth without ulcers or lesions  NECK: no adenopathy, no asymmetry, masses, or scars and thyroid normal to palpation  RESP: lungs clear to auscultation - no rales, rhonchi or wheezes  CV: regular rate and rhythm, normal S1 S2, no S3 or S4, no murmur, click or rub, no peripheral edema and peripheral pulses strong  ABDOMEN: soft, nontender, no hepatosplenomegaly, no masses and bowel sounds normal  MS: no gross musculoskeletal defects noted, no edema  SKIN: no suspicious lesions or rashes  NEURO: Normal strength and tone, mentation intact and speech normal  PSYCH: mentation appears normal, affect normal/bright    Diagnostic Test Results:  Labs reviewed in Epic    ASSESSMENT / PLAN:   1. Encounter for Medicare annual wellness exam  Reviewed medications - appropriate.  Refilled all necessary.     Recommend flu shot. Other immunizations utd.     Continue care with neurology.     Labs today.     Gave advanced directive to review/return.     Reviewed constipation management and connection with parkinson's     2. Anxiety  ELISHA-7 SCORE 12/6/2016 6/20/2017 3/15/2018   Total Score - - -   Total Score 0 0 0       Controlled, continuemedication.   - PARoxetine (PAXIL) 20 MG tablet; Take 1 tablet (20 mg) by mouth every morning  Dispense: 90 tablet; Refill: 3    3. Hyperlipidemia LDL goal <100    - simvastatin (ZOCOR) 40 MG tablet; TAKE ONE TABLET BY MOUTH EVERY NIGHT AT BEDTIME  Dispense: 90 tablet; Refill: 3  - Lipid panel reflex to direct LDL Fasting    4. Urinary tract infection with hematuria, site unspecified  Continue prophylaxis  - sulfamethoxazole-trimethoprim (BACTRIM/SEPTRA) 400-80 MG tablet; Take 1 tablet by mouth At Bedtime For UTI prevention  Dispense: 90 tablet; Refill: 3    5. Benign prostatic hyperplasia with nocturia    -  "tamsulosin (FLOMAX) 0.4 MG capsule; Take 1 capsule (0.4 mg) by mouth daily  Dispense: 90 capsule; Refill: 3    6. Parkinson's disease (H)      7. Major depressive disorder, single episode, moderate (H)    PHQ-9 SCORE 9/27/2018 6/4/2019 2/11/2020   PHQ-9 Total Score - - -   PHQ-9 Total Score 1 4 3     Controlled.   8. CKD (chronic kidney disease) stage 3, GFR 30-59 ml/min (H)    - Albumin Random Urine Quantitative with Creat Ratio  - Comprehensive metabolic panel    9. Coronary artery disease involving native heart without angina pectoris, unspecified vessel or lesion type    - aspirin (ASA) 81 MG tablet; Take 1 tablet (81 mg) by mouth daily  Dispense: 90 tablet; Refill: 3  - Comprehensive metabolic panel    10. Medication management    - CBC with platelets  - Comprehensive metabolic panel    COUNSELING:  Reviewed preventive health counseling, as reflected in patient instructions       Regular exercise       Healthy diet/nutrition    Estimated body mass index is 27.09 kg/m  as calculated from the following:    Height as of 10/8/19: 1.74 m (5' 8.5\").    Weight as of 12/3/19: 82 kg (180 lb 12.8 oz).         reports that he quit smoking about 53 years ago. His smoking use included cigarettes. He has a 1.50 pack-year smoking history. He has quit using smokeless tobacco.      Appropriate preventive services were discussed with this patient, including applicable screening as appropriate for cardiovascular disease, diabetes, osteopenia/osteoporosis, and glaucoma.  As appropriate for age/gender, discussed screening for colorectal cancer, prostate cancer, breast cancer, and cervical cancer. Checklist reviewing preventive services available has been given to the patient.    Reviewed patients plan of care and provided an AVS. The Basic Care Plan (routine screening as documented in Health Maintenance) for Vini meets the Care Plan requirement. This Care Plan has been established and reviewed with the Patient and " spouse.    Counseling Resources:  ATP IV Guidelines  Pooled Cohorts Equation Calculator  Breast Cancer Risk Calculator  FRAX Risk Assessment  ICSI Preventive Guidelines  Dietary Guidelines for Americans, 2010  USDA's MyPlate  ASA Prophylaxis  Lung CA Screening    Joel Daniel Wegener, MD  [facility]

## 2020-02-11 NOTE — PATIENT INSTRUCTIONS
Reviewed medications - appropriate.  Refilled all necessary.     Recommend flu shot. Other immunizations utd.     Continue care with neurology.     Labs today.     Gave advanced directive to review/return.     No imminent safety concerns - living with spouse.   Patient Education   Personalized Prevention Plan  You are due for the preventive services outlined below.  Your care team is available to assist you in scheduling these services.  If you have already completed any of these items, please share that information with your care team to update in your medical record.  Health Maintenance Due   Topic Date Due     Discuss Advance Care Planning  10/11/2017     Flu Vaccine (1) 09/01/2019     Depression Assessment  12/04/2019     Cholesterol Lab  02/01/2020     Kidney Microalbumin Urine Test  02/01/2020     Zoster (Shingles) Vaccine (3 of 3) 03/29/2019     Annual Wellness Visit  02/01/2020

## 2020-03-09 ENCOUNTER — OFFICE VISIT (OUTPATIENT)
Dept: NEUROLOGY | Facility: CLINIC | Age: 80
End: 2020-03-09
Payer: COMMERCIAL

## 2020-03-09 VITALS
OXYGEN SATURATION: 98 % | WEIGHT: 179.2 LBS | SYSTOLIC BLOOD PRESSURE: 164 MMHG | DIASTOLIC BLOOD PRESSURE: 84 MMHG | HEART RATE: 57 BPM | BODY MASS INDEX: 26.85 KG/M2

## 2020-03-09 DIAGNOSIS — G20.A1 PARKINSON'S DISEASE (H): Primary | ICD-10-CM

## 2020-03-09 DIAGNOSIS — K59.00 CONSTIPATION, UNSPECIFIED CONSTIPATION TYPE: ICD-10-CM

## 2020-03-09 DIAGNOSIS — R42 DIZZINESS: ICD-10-CM

## 2020-03-09 RX ORDER — CARBIDOPA AND LEVODOPA 25; 100 MG/1; MG/1
TABLET ORAL
Qty: 675 TABLET | Refills: 3 | Status: ON HOLD | OUTPATIENT
Start: 2020-03-09 | End: 2020-08-13

## 2020-03-09 ASSESSMENT — PAIN SCALES - GENERAL: PAINLEVEL: NO PAIN (0)

## 2020-03-09 NOTE — Clinical Note
3/9/2020       RE: Vini Loya  [address]  [place] [place] [address]     Dear Colleague,    Thank you for referring your patient, Vini Loya, to the [facility] NEUROLOGY at [facility]. Please see a copy of my visit note below.    PATIENT: Vini Loya    : 1940    ALBINA:  2020    REASON FOR VISIT:  Follow up for Parkinson's disease (PD.)    HPI: . Vini Loya is an 79 year old left  handed  male who came to the [facility] neurology clinic accompanied by his wife, Kristi, for a follow up visit.  I saw him last on 12/3/2019 for a routine follow up visit.  During that visit, the following were discussed: -    He has constipation.  MiraLax is not working well.  He takes it for 2 - 3 days, and he gets diarrhea.  Senna 1 tab BID is not working.  Last BM is 3 days ago.  He eats prunes.  He has used Dulcolax suppository long time ago.  He eats green vegetables.  He has tried to     He reports that he didn't complete the orthostatic hypotension vital signs.  He reports that he hasn't had dizziness in a long time.      His wife has noticed that his tremors are worse.  Patient reports the only time that he notices the tremors are at night.  [facility]       PD Medications 7 am 11 am 4 pm   Sinemet 25/100 mg  2 2 2   Ropinirole 0.5 mg  1 1 1      His wife is asking if patient would benefit from counseling.    He goes to the gym about twice a week.  He also goes out for a walk.  On days that he doesn't go to the gym, he goes up and down the stairs.  He also does the weight training.       MEDICATIONS:   Outpatient Medications Marked as Taking for the 3/9/20 encounter (Office Visit) with Dalila Staples APRN CNP   Medication Sig     aspirin (ASA) 81 MG tablet Take 1 tablet (81 mg) by mouth daily     carbidopa-levodopa (SINEMET)  MG tablet Take 2 tablets 3 times a day 1 hour before each meal.  (Around  7 am, 11 am, & 4 pm.)     PARoxetine (PAXIL) 20 MG tablet Take 1 tablet (20 mg) by mouth every morning     rOPINIRole (REQUIP) 0.25 MG tablet Take 0.5 mg by mouth 3 times daily      simvastatin (ZOCOR) 40 MG tablet TAKE ONE TABLET BY MOUTH EVERY NIGHT AT BEDTIME     sulfamethoxazole-trimethoprim (BACTRIM/SEPTRA) 400-80 MG tablet Take 1 tablet by mouth At Bedtime For UTI prevention     tamsulosin (FLOMAX) 0.4 MG capsule Take 1 capsule (0.4 mg) by mouth daily     vitamin D3 (CHOLECALCIFEROL) 2000 units (50 mcg) tablet Take 1 tablet by mouth daily       ALLERGIES: Vytorin    PHYSICAL EXAM:     VITAL SIGNS:  Blood pressure (!) 164/84, pulse 57, weight 81.3 kg (179 lb 3.2 oz), SpO2 98 %.   Body mass index is 26.85 kg/m .    GENERAL:  Mr. Loya is a pleasant  male who is well-groomed and well-developed sitting comfortably in the exam room without any distress.  Affect is appropriate.      MOVEMENT DISORDERS ASSESSMENT: (Last Sinemet was about 1.5 hrs ago).     Speech:  Monotone.  Mild expression and volume.  Facial Expression: Moderate hypomimia; lips parted some of the time.  Rest Tremor:  Mild and persistent in LUE, LLE and chin.  Mild and intermittent in LLE.  Lt body and chin tremors become much worse with mental activation.   Action/Postural Tremor: Slight postural and action tremor bilaterally.  Rigidity: Absent in all extremities.  Finger Taps: Mildly impaired bilaterally.  Hand opening & closing: Mild slowing and reduction in amplitude in Rt side. Moderately impaired; early fatiguing; occasional arrests in movement in Lt side.  Hand pronation & supination: Mild slowing and reduction in amplitude bilaterally.  Toe Tapping:  Normal bilaterally.   Heel Tapping:  Normal bilaterally.   Arising from chair - arms folded across chest:  Normal.   Posture: Mildly stooped posture; leaning to the Right.  Gait: Walks with normal steps. No festination or propulsion.  Freezing of gait: Absent.   Body Bradykinesia: Slight  to mild degree of slowness and poverty of movement.  Dyskinesias: Absent.     ASSESSMENT    1)  Parkinson's Disease:  Patient has a 10 year history of PD (Tremor since 2010.)      2)  Dizziness/Lightheadedness:  Per patient, it's better.     3)  Constipation:  Ongoing issue.     Clinic High Blood Pressure Reading:  Today's Blood pressure (!) 164/84, pulse 57, weight 81.3 kg (179 lb 3.2 oz), SpO2 98 %..      __  Due to high BP in the clinic, pt was asked to check it at home.  If BP is consistently over 140/80, pt was instructed to contact his PCP.         PLAN:  __  This evening, use Glycerine suppository to help with constipation.  If no bowel movements, use it tomorrow morning and twice a day until you have a good BM.    __  After you have bowel movements, use Glycerine suppository as needed if you have no BM for 3 days.      __  Take Colace 100 mg 2 capsules twice a day.      __  For now, stop senna.      __  If this regimen doesn't work in the next 2 weeks, call us.     __  Increase the antiparkinsonian medications.     PD Medications 7 am 11 am 4 pm   Sinemet 25/100 mg  2.5 2.5 2.5   Ropinirole 0.5 mg  1 1 1      __  Monitor the dizziness as it might get worse with increased Sinemet.     __  Continue with exercising as able.     __  See Dr. Ibanez in May.     __  Return in 3 months. You may return sooner as needed.  The total time spent with the patient was 40 minutes, and greater than 50% of this time was spent in counseling and coordination of care.    KOTA Stone,  CNP  [facility]    1:39 PM  2:31 PM      Again, thank you for allowing me to participate in the care of your patient.      Sincerely,    KOTA Carpenter CNP

## 2020-03-09 NOTE — PATIENT INSTRUCTIONS
March 9, 2020    Dear Mr. Vini Loya,    Thank you for coming today.  During your visit, we have discussed the following:     __  This evening, use Glycerine suppository to help with constipation.  If no bowel movements, use it tomorrow morning and twice a day until you have a good BM.    __  After you have bowel movements, use Glycerine suppository as needed if you have no BM for 3 days.      __  Take Colace 100 mg 2 capsules twice a day.      __  For now, stop senna.      __  If this regimen doesn't work in the next 2 weeks, call us.     __  Increase the antiparkinsonian medications.     PD Medications 7 am 11 am 4 pm   Sinemet 25/100 mg  2.5 2.5 2.5   Ropinirole 0.5 mg  1 1 1      __  Monitor the dizziness as it might get worse with increased Sinemet.     __  Continue with exercising as able.     __  See Dr. Ibanez in May.     __  Return in 3 months. You may return sooner as needed.      For questions, you may send us a [employer] message or call [phone]    Fax number: [phone]    KOTA Stone, CNP  [facility]

## 2020-03-09 NOTE — PROGRESS NOTES
"PATIENT: Vini Loya    : 1940    ALBINA:  2020    REASON FOR VISIT:  Follow up for Parkinson's disease (PD.)    HPI: Mr. Vini Loya is an 79 year old left - handed  male who came to the [facility] neurology clinic accompanied by his wife, Kristi, for a follow up visit.  I saw him last on 12/3/2019 for a routine follow up visit.    Today, his biggest concern is constipation.  He has called his PCP clinic.  MiraLax is not working well for him.  When he used MiraLax for 2 - 3 days, he gets diarrhea.  Currently, he takes Senna 1 tab BID.  \"It's not working.\"  Last BM is 3 days ago.  He eats prunes.  He has used Dulcolax suppository long time ago.  He eats green vegetables.  He is poor historian and was hard to get a clear picture of how long he has tried medications.     He reports that he didn't complete the orthostatic hypotension vital signs.  His wife said, \"it's still sitting on the table.\"  He reports that he hasn't had dizziness in a long time.      His wife has noticed that his tremors are worse.  Patient reports that the only time that he notices the tremors are at night.  [facility]       PD Medications 7 am 11 am 4 pm   Sinemet 25/100 mg  2 2 2   Ropinirole 0.5 mg  1 1 1      His wife is asking if patient would benefit from counseling.    He goes to the gym about twice a week.  He also goes out for a walk.  On days that he doesn't go to the gym, he goes up and down the stairs.     MEDICATIONS:   Outpatient Medications Marked as Taking for the 3/9/20 encounter (Office Visit) with Dalila Staples APRN CNP   Medication Sig     aspirin (ASA) 81 MG tablet Take 1 tablet (81 mg) by mouth daily     carbidopa-levodopa (SINEMET)  MG tablet Take 2 tablets 3 times a day 1 hour before each meal.  (Around 7 am, 11 am, & 4 pm.)     PARoxetine (PAXIL) 20 MG tablet Take 1 tablet (20 mg) by mouth every morning     rOPINIRole (REQUIP) 0.25 MG " "tablet Take 0.5 mg by mouth 3 times daily      simvastatin (ZOCOR) 40 MG tablet TAKE ONE TABLET BY MOUTH EVERY NIGHT AT BEDTIME     sulfamethoxazole-trimethoprim (BACTRIM/SEPTRA) 400-80 MG tablet Take 1 tablet by mouth At Bedtime For UTI prevention     tamsulosin (FLOMAX) 0.4 MG capsule Take 1 capsule (0.4 mg) by mouth daily     vitamin D3 (CHOLECALCIFEROL) 2000 units (50 mcg) tablet Take 1 tablet by mouth daily       ALLERGIES: Vytorin    PHYSICAL EXAM:     VITAL SIGNS:  Blood pressure (!) 164/84, pulse 57, weight 81.3 kg (179 lb 3.2 oz), SpO2 98 %.  Body mass index is 26.85 kg/m .    GENERAL:  Mr. Loya is a pleasant  male who is well-groomed and well-developed sitting comfortably in the exam room without any distress.  Affect is appropriate.     Objective assessment:  Patient and his wife argue about most things discussed in clinic visit.  This is not unusual and happens at every visit.  When pt responds to questions, she says, \"No, that is not true . . .\"       MOVEMENT DISORDERS ASSESSMENT: (Last Sinemet was about 1.5 hrs ago).     Speech:  Monotone.  Mild expression and volume.  Facial Expression: Moderate hypomimia; lips parted some of the time.  Rest Tremor:  Mild and persistent in LUE, LLE and chin.  Lt body and chin tremors become much worse with mental activation.   Action/Postural Tremor: Slight postural and action tremor bilaterally.  Finger Taps: Mildly impaired bilaterally.  Hand opening & closing: Mild slowing and reduction in amplitude in Rt side. Moderately impaired; early fatiguing; occasional arrests in movement in Lt side.  Hand pronation & supination: Mild slowing and reduction in amplitude bilaterally.  Toe Tapping:  Normal bilaterally.   Heel Tapping:  Normal bilaterally.   Arising from chair - arms folded across chest:  Normal.   Posture: Mildly stooped posture; leaning to the Right.  Gait: Walks with normal steps. No festination or propulsion.  Freezing of gait: Absent.   Body " Bradykinesia: Mild degree of slowness and poverty of movement.  Dyskinesias: Absent.     ASSESSMENT    1)  Parkinson's Disease:  Patient has a 10 year history of PD (Tremor since 2010.)  Tremor is worse.  Pt argues that he can stop his tremors, but obviously, he couldn't as tremors were present 90% of the time.     2)  Dizziness/Lightheadedness:  Per patient, it's better.     3)  Constipation:  Ongoing issue.     4)  Clinic High Blood Pressure Reading:  Today's Blood pressure (!) 164/84, pulse 57, weight 81.3 kg (179 lb 3.2 oz), SpO2 98 %..      PLAN:    We had a long discussions about assessment and plan.  The following instruction provided.     __  This evening, use Glycerine suppository to help with constipation.  If no bowel movements, use it tomorrow morning and twice a day until you have a good BM.    __  After you have bowel movements, use Glycerine suppository as needed if you have no BM for 3 days.      __  Take Colace 100 mg 2 capsules twice a day.      __  For now, stop senna.      __  If this regimen doesn't work in the next 2 weeks, call us.     __  Increase the antiparkinsonian medications.     PD Medications 7 am 11 am 4 pm   Sinemet 25/100 mg  2.5 2.5 2.5   Ropinirole 0.5 mg  1 1 1      __  Monitor the dizziness as it might get worse with increased Sinemet.     __  Continue with exercising as able.     __  See Dr. Ibanez in May.     __  Return in 3 months. You may return sooner as needed.    The total time spent with the patient was 45 minutes, and greater than 50% of this time was spent in counseling and coordination of care.    KOTA Stone,  CNP  [facility]    1:39 PM  2:26 PM

## 2020-05-22 ENCOUNTER — ANCILLARY PROCEDURE (OUTPATIENT)
Dept: GENERAL RADIOLOGY | Facility: CLINIC | Age: 80
End: 2020-05-22
Attending: FAMILY MEDICINE
Payer: COMMERCIAL

## 2020-05-22 ENCOUNTER — TELEPHONE (OUTPATIENT)
Dept: FAMILY MEDICINE | Facility: CLINIC | Age: 80
End: 2020-05-22

## 2020-05-22 ENCOUNTER — OFFICE VISIT (OUTPATIENT)
Dept: FAMILY MEDICINE | Facility: CLINIC | Age: 80
End: 2020-05-22
Payer: COMMERCIAL

## 2020-05-22 VITALS
HEART RATE: 63 BPM | DIASTOLIC BLOOD PRESSURE: 78 MMHG | BODY MASS INDEX: 26.49 KG/M2 | TEMPERATURE: 97.2 F | OXYGEN SATURATION: 95 % | WEIGHT: 176.8 LBS | RESPIRATION RATE: 12 BRPM | SYSTOLIC BLOOD PRESSURE: 127 MMHG

## 2020-05-22 DIAGNOSIS — M54.42 CHRONIC BILATERAL LOW BACK PAIN WITH LEFT-SIDED SCIATICA: ICD-10-CM

## 2020-05-22 DIAGNOSIS — K59.00 CONSTIPATION, UNSPECIFIED CONSTIPATION TYPE: ICD-10-CM

## 2020-05-22 DIAGNOSIS — R10.84 ABDOMINAL PAIN, GENERALIZED: ICD-10-CM

## 2020-05-22 DIAGNOSIS — G89.29 CHRONIC BILATERAL LOW BACK PAIN WITH LEFT-SIDED SCIATICA: ICD-10-CM

## 2020-05-22 DIAGNOSIS — R10.84 ABDOMINAL PAIN, GENERALIZED: Primary | ICD-10-CM

## 2020-05-22 PROCEDURE — 74019 RADEX ABDOMEN 2 VIEWS: CPT

## 2020-05-22 PROCEDURE — 99214 OFFICE O/P EST MOD 30 MIN: CPT | Performed by: FAMILY MEDICINE

## 2020-05-22 PROCEDURE — 72100 X-RAY EXAM L-S SPINE 2/3 VWS: CPT

## 2020-05-22 RX ORDER — POLYETHYLENE GLYCOL 3350 17 G/17G
1 POWDER, FOR SOLUTION ORAL 3 TIMES DAILY
Qty: 1 BOTTLE | Refills: 11 | Status: SHIPPED | OUTPATIENT
Start: 2020-05-22

## 2020-05-22 RX ORDER — SENNA AND DOCUSATE SODIUM 50; 8.6 MG/1; MG/1
2 TABLET, FILM COATED ORAL 2 TIMES DAILY
Qty: 120 TABLET | Refills: 11 | Status: ON HOLD | OUTPATIENT
Start: 2020-05-22 | End: 2020-09-11

## 2020-05-22 NOTE — TELEPHONE ENCOUNTER
"A few weeks  Main complaint- can go 5 days without having a stool- tried stool softeners and miralax in the past - but then he goes from not going to 5-6 stools in one day  Hasn't had a stool since Monday  Is eating yogurt with probiotics - once in the am  Not taking the stool softeners- because then it \"gets all liquid in there\"  He says he drinks water all day- like 8-10 glasses  Tried a suppository every day this week but it doesn't go in far enough- the same goes for the enema  Eats raisin bran for breakfast-   Tried stool softeners every day- and they didn't help  Tried milk of magnesia the other day- and it didn't help- had a small result from the milk of magnesia(he thinks Sunday)  Trying to eat apples, shredded wheat  But the constipation just isn't improving- he is losing energy   Is it possible for you to see him today- as he is feeling really weak  He took 3 naps yesterday  Appointment today with BETY   Pt notified and in agreement.  Thanks!     Bella Moraes RN    "

## 2020-05-22 NOTE — TELEPHONE ENCOUNTER
Reason for call:  Patient reporting a symptom    Symptom or request: weakness, belching, constipated, runs,     Duration (how long have symptoms been present): 5 weeks    Have you been treated for this before? Yes    Additional comments: pills maybe hurting him, mentioned blockage, afraid hes dying  Phone Number patient can be reached at:  Home number on file [phone] (home)    Best Time:  asap    Can we leave a detailed message on this number:  YES    Call taken on 5/22/2020 at 10:12 AM by Linda Martinez
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
## 2020-05-22 NOTE — PATIENT INSTRUCTIONS
"ASSESSMENT AND PLAN  1. Abdominal pain, generalized    - XR Abdomen 2 Views; Future    2. Constipation, unspecified constipation type  Difficult problem with underlying parkinson's.  Some of what he may be interpretting as constipation may just be weight gain.     Has tried many options.     Recommend continuing senna 2 tabs twice daily and be fastidious about this.      Not using miralax - thinks caused loose stools sometimes.  Explained this is safest route since it makes a non-absorbable liquid.  This means if constipated then use more and if getting diarrhea use less.      Sometimes unfortunately can go from blocked up to \"explosion\" quickly and I'm not sure we can fix that.     Will obtain abdominal xray and low back xray for reassurance.  He is concerned previous back injury may be contributing.   - SENNA-docusate sodium (SENNA S) 8.6-50 MG tablet; Take 2 tablets by mouth 2 times daily  Dispense: 120 tablet; Refill: 11  - polyethylene glycol (MIRALAX) 17 GM/SCOOP powder; Take 17 g (1 capful) by mouth 3 times daily  Dispense: 1 Bottle; Refill: 11    3. Chronic bilateral low back pain with left-sided sciatica          MYCHART SIGN UP: [email or web address] , [phone]    E-VISITS CAN BE DONE FOR CARE/PRESCRIPTIONS WHICH MAY NOT NEED AN IN-PERSON ASSESSMENT - click \"on-line care, then request e-visit\".      ONCARE VISIT/PRESCRIPTIONS: [email or web address]  - we treat nearly 50 common conditions with one hour response time     RADIOLOGY SCHEDULING  [facility]:  [phone]   [facility]: [phone]  [facility]: [phone]    Mammogram and Colonoscopy [phone]    Smoking Cessation: www.quitplan.org, 1-712-591-PLAN (7886)    Pharmacy savings card application: www.[facility]    CONSUMER PRICE LINE for estimates of test costs:  [phone]       "

## 2020-05-26 ENCOUNTER — VIRTUAL VISIT (OUTPATIENT)
Dept: FAMILY MEDICINE | Facility: CLINIC | Age: 80
End: 2020-05-26
Payer: COMMERCIAL

## 2020-05-26 DIAGNOSIS — R53.83 DECREASED ENERGY: Primary | ICD-10-CM

## 2020-05-26 DIAGNOSIS — K59.01 SLOW TRANSIT CONSTIPATION: ICD-10-CM

## 2020-05-26 DIAGNOSIS — F32.1 MAJOR DEPRESSIVE DISORDER, SINGLE EPISODE, MODERATE (H): ICD-10-CM

## 2020-05-26 PROCEDURE — 99214 OFFICE O/P EST MOD 30 MIN: CPT | Mod: 95 | Performed by: FAMILY MEDICINE

## 2020-05-26 RX ORDER — BUPROPION HYDROCHLORIDE 150 MG/1
150 TABLET ORAL EVERY MORNING
Qty: 30 TABLET | Refills: 1 | Status: SHIPPED | OUTPATIENT
Start: 2020-05-26 | End: 2020-08-11
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
## 2020-05-26 NOTE — LETTER
[place] [id number] [place] [id number]  [phone]          May 26, 2020    Vini Loya                                                                                                                     [address] [place] [id number]            Dear Vini,  This is your after visit summary.        Sincerely,         Joel Daniel Irwin Wegener MD.
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
## 2020-05-27 DIAGNOSIS — R53.83 DECREASED ENERGY: ICD-10-CM

## 2020-05-27 LAB
ALBUMIN SERPL-MCNC: 3.2 G/DL (ref 3.4–5)
ALP SERPL-CCNC: 59 U/L (ref 40–150)
ALT SERPL W P-5'-P-CCNC: 11 U/L (ref 0–70)
ANION GAP SERPL CALCULATED.3IONS-SCNC: 7 MMOL/L (ref 3–14)
AST SERPL W P-5'-P-CCNC: 19 U/L (ref 0–45)
BILIRUB SERPL-MCNC: 1.2 MG/DL (ref 0.2–1.3)
BUN SERPL-MCNC: 18 MG/DL (ref 7–30)
CALCIUM SERPL-MCNC: 8.5 MG/DL (ref 8.5–10.1)
CHLORIDE SERPL-SCNC: 106 MMOL/L (ref 94–109)
CO2 SERPL-SCNC: 27 MMOL/L (ref 20–32)
CREAT SERPL-MCNC: 0.94 MG/DL (ref 0.66–1.25)
ERYTHROCYTE [DISTWIDTH] IN BLOOD BY AUTOMATED COUNT: 12.9 % (ref 10–15)
GFR SERPL CREATININE-BSD FRML MDRD: 76 ML/MIN/{1.73_M2}
GLUCOSE SERPL-MCNC: 156 MG/DL (ref 70–99)
HCT VFR BLD AUTO: 43 % (ref 40–53)
HGB BLD-MCNC: 14.3 G/DL (ref 13.3–17.7)
MCH RBC QN AUTO: 31.2 PG (ref 26.5–33)
MCHC RBC AUTO-ENTMCNC: 33.3 G/DL (ref 31.5–36.5)
MCV RBC AUTO: 94 FL (ref 78–100)
PLATELET # BLD AUTO: 247 10E9/L (ref 150–450)
POTASSIUM SERPL-SCNC: 3.9 MMOL/L (ref 3.4–5.3)
PROT SERPL-MCNC: 6.8 G/DL (ref 6.8–8.8)
RBC # BLD AUTO: 4.58 10E12/L (ref 4.4–5.9)
SODIUM SERPL-SCNC: 140 MMOL/L (ref 133–144)
TSH SERPL DL<=0.005 MIU/L-ACNC: 1.81 MU/L (ref 0.4–4)
WBC # BLD AUTO: 7.4 10E9/L (ref 4–11)

## 2020-05-27 PROCEDURE — 84443 ASSAY THYROID STIM HORMONE: CPT | Performed by: FAMILY MEDICINE

## 2020-05-27 PROCEDURE — 80053 COMPREHEN METABOLIC PANEL: CPT | Performed by: FAMILY MEDICINE

## 2020-05-27 PROCEDURE — 85027 COMPLETE CBC AUTOMATED: CPT | Performed by: FAMILY MEDICINE

## 2020-05-27 PROCEDURE — 36415 COLL VENOUS BLD VENIPUNCTURE: CPT | Performed by: FAMILY MEDICINE

## 2020-06-19 ENCOUNTER — TELEPHONE (OUTPATIENT)
Dept: NEUROLOGY | Facility: CLINIC | Age: 80
End: 2020-06-19

## 2020-06-19 NOTE — TELEPHONE ENCOUNTER
[facility] Call Center    Phone Message    May a detailed message be left on voicemail: yes     Reason for Call: Other: Ladi calling to request a phone call regarding her 's worsening/new symptoms. Please give her a call back at your earliest convenience.     Action Taken: Message routed to:  [facility] ([facility]):  NEUROLOGY    Travel Screening: Not Applicable

## 2020-06-19 NOTE — TELEPHONE ENCOUNTER
"Situation:  Vini Loya is a 79 year old male who receives support for Parkinson's disease. Kristi calls today with concerns for new and worsening symptoms.    Background:    3/9/2020 - Office visit with Taina Staples NP:  Patient is taking:  Colace stool softner 100 mg 2 capsules twice a day - Not taking  On Miralax and Senna per Dr. Wegeners instructions - this seems to be working    HX UTI, stroke  Assessment:  Kristi stated he has been more disoriented and fatigued sine Tuesday. He will not know what to do (what bed to lay in to nap for example). His short term memory seems to be much worse. He will ask what something is when he knows what it is (unable to give me an example) and he will not remember if he had a BM or not which is not typical for him.  In the evenings he is more unsteady and does not feel well. He will say \"if I feel like this in the morning I will need to go into the hospital.\" He gets better after he sleeps. Every evening he is very fatigued and has to lie down and sleep. He has not complained about dizziness.  He had a hard time getting his fork to his mouth last night and knowing how to use the fork properly.    Denies weakness on one side of the body or speech issues. Kristi reports he has a history of UTIs and has not had one in about 2 years. He does have a urologist who prescribed a low dose antibiotic daily for him but he stopped taking this.  Kristi reported Vini will take his medication inconsistently. He will often stop bowel medications because he feels they do not work.  Are you having any new symptoms such as,   Fever/chills  No   Recent illness  No   Dehydration  No   Eating OK  Sometimes   Mental stress  No   Emotional stress No   Confusion  Yes   Hallucinations  No   Urinating OK?  Yes   Any discomfort while urinating No   Last BM: Yesterday   Does not complain of any urinary  Symptoms    Education:  1. UTI's can present in elderly Parkinson's disease patients as " confusion, weakness, worsening Parkinson's disease symptoms rather than urinary symptoms.    Plan/recommendation:  1. Appointment with Taina on 6/29. We are making plans for in-person appointments around this time. You will get a call to either move your appointment or schedule as a virtual appointment.  2. Kristi will call Dr. Wegener's office and request a UA be done today per my instruction.  3. Continue to encourage Vini to take his medications as prescribed    20 minute phone call

## 2020-06-23 ENCOUNTER — VIRTUAL VISIT (OUTPATIENT)
Dept: FAMILY MEDICINE | Facility: CLINIC | Age: 80
End: 2020-06-23
Payer: COMMERCIAL

## 2020-06-23 DIAGNOSIS — F32.1 MAJOR DEPRESSIVE DISORDER, SINGLE EPISODE, MODERATE (H): ICD-10-CM

## 2020-06-23 DIAGNOSIS — R53.83 DECREASED ENERGY: Primary | ICD-10-CM

## 2020-06-23 DIAGNOSIS — F41.9 ANXIETY: ICD-10-CM

## 2020-06-23 PROCEDURE — 99214 OFFICE O/P EST MOD 30 MIN: CPT | Mod: 95 | Performed by: FAMILY MEDICINE

## 2020-06-23 RX ORDER — PAROXETINE 20 MG/1
TABLET, FILM COATED ORAL
Qty: 90 TABLET | Refills: 3 | COMMUNITY
Start: 2020-06-23 | End: 2020-08-11

## 2020-06-23 RX ORDER — BUPROPION HYDROCHLORIDE 300 MG/1
300 TABLET ORAL EVERY MORNING
Qty: 90 TABLET | Refills: 1 | Status: SHIPPED | OUTPATIENT
Start: 2020-06-23 | End: 2020-09-18
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
## 2020-06-25 DIAGNOSIS — R53.83 DECREASED ENERGY: ICD-10-CM

## 2020-06-25 LAB
ALBUMIN UR-MCNC: ABNORMAL MG/DL
APPEARANCE UR: CLEAR
BACTERIA #/AREA URNS HPF: ABNORMAL /HPF
BILIRUB UR QL STRIP: NEGATIVE
COLOR UR AUTO: YELLOW
GLUCOSE UR STRIP-MCNC: NEGATIVE MG/DL
HGB UR QL STRIP: NEGATIVE
KETONES UR STRIP-MCNC: NEGATIVE MG/DL
LEUKOCYTE ESTERASE UR QL STRIP: NEGATIVE
NITRATE UR QL: NEGATIVE
NON-SQ EPI CELLS #/AREA URNS LPF: ABNORMAL /LPF
PH UR STRIP: 6 PH (ref 5–7)
RBC #/AREA URNS AUTO: ABNORMAL /HPF
SOURCE: ABNORMAL
SP GR UR STRIP: 1.02 (ref 1–1.03)
UROBILINOGEN UR STRIP-ACNC: 0.2 EU/DL (ref 0.2–1)
WBC #/AREA URNS AUTO: ABNORMAL /HPF

## 2020-06-25 PROCEDURE — 81001 URINALYSIS AUTO W/SCOPE: CPT | Performed by: FAMILY MEDICINE

## 2020-06-25 PROCEDURE — 87086 URINE CULTURE/COLONY COUNT: CPT | Performed by: FAMILY MEDICINE

## 2020-06-26 LAB
BACTERIA SPEC CULT: NORMAL
SPECIMEN SOURCE: NORMAL

## 2020-06-29 ENCOUNTER — VIRTUAL VISIT (OUTPATIENT)
Dept: NEUROLOGY | Facility: CLINIC | Age: 80
End: 2020-06-29
Payer: COMMERCIAL

## 2020-06-29 DIAGNOSIS — G20.A1 PARKINSON'S DISEASE (H): Primary | ICD-10-CM

## 2020-06-29 DIAGNOSIS — F41.9 ANXIETY: ICD-10-CM

## 2020-06-29 NOTE — PATIENT INSTRUCTIONS
June 29, 2020    Dear  Vini MUNOZ Vincenzo,    Telephone Visit Summary: -     __  Stay on the same antiparkinsonian medication.    PD Medications 7 am 11 am 4 pm   Sinemet 25/100 mg  2 2 2   Ropinirole 0.5 mg  1 1 1     __  Make a follow up visit with Dr. Wegener for Anxiety.    __  I will see you on Oct 19th at 12:50 pm.  This will be a Video Visit.    __  You may contact us anytime as needed.     For questions, you may send us a [employer] message or call [phone]    Fax number: [phone]    KOTA Stone, CNP  [facility]

## 2020-06-29 NOTE — PROGRESS NOTES
"Vini Loya is an 79 year old male who is being evaluated via a billable telephone visit.      The patient has been notified of following:     \"This telephone visit will be conducted via a call between you and your physician/provider. We have found that certain health care needs can be provided without the need for a physical exam.  This service lets us provide the care you need with a short phone conversation.  If a prescription is necessary we can send it directly to your pharmacy.  If lab work is needed we can place an order for that and you can then stop by our lab to have the test done at a later time.    Telephone visits are billed at different rates depending on your insurance coverage. During this emergency period, for some insurers they may be billed the same as an in-person visit.  Please reach out to your insurance provider with any questions.    If during the course of the call the physician/provider feels a telephone visit is not appropriate, you will not be charged for this service.\"    Patient has given verbal consent for Telephone visit?  Yes    What phone number would you like to be contacted at? [phone]    How would you like to obtain your AVS? Mail    JENNA Joyner      -----------------------------------------------------------------------------------------------------------------------------------------------------------------------------------------------------------------------      MOVEMENT DISORDERS TELEMEDICINE VISIT:     PATIENT: Vini Loay    : 1940    DATE: 2020    REASON FOR VISIT: Parkinson's disease (PD) follow up.    After a review of the patient s situation, this visit was changed from an in-person visit to a telephone visit to reduce the risk of COVID-19 exposure.   The patient is being evaluated via a billable telephone visit.    Patient and his wife were unable to set up a video visit.    Patient reports that his anxiety medication has been changed.  " He had been on Paxil for a long time and he is in the process of being switched to Bupropion.  His wife reports that at this point he doesn't have a f/u visit with his PCP.  She also reports that since he started taking Bupropion, his sleepiness has increased.  However, pt reports that sleepiness hasn't changed.  He takes a nap 1 - 2 hours per day, which hasn't changed.     PD Medications 7 am 11 am 4 pm   Sinemet 25/100 mg  2 2 2   Ropinirole 0.5 mg  1 1 1      He hasn't been back to the [facility] as it has been closed.  He goes out for walks.    He continues having fluctuating blood pressure.      MEDICATIONS:   Outpatient Medications Marked as Taking for the 6/29/20 encounter (Virtual Visit) with Dalila Staples APRN CNP   Medication Sig     aspirin (ASA) 81 MG tablet Take 1 tablet (81 mg) by mouth daily     buPROPion (WELLBUTRIN XL) 300 MG 24 hr tablet Take 1 tablet (300 mg) by mouth every morning     carbidopa-levodopa (SINEMET)  MG tablet Take 2.5 tablets 3 times a day 1 hour before each meal.  (Around 7 am, 11 am, & 4 pm.)     PARoxetine (PAXIL) 20 MG tablet Decrease to 1/2 pill daily (10mg) for two weeks then stop     rOPINIRole (REQUIP) 0.25 MG tablet Take 0.5 mg by mouth 3 times daily      SENNA-docusate sodium (SENNA S) 8.6-50 MG tablet Take 2 tablets by mouth 2 times daily     simvastatin (ZOCOR) 40 MG tablet TAKE ONE TABLET BY MOUTH EVERY NIGHT AT BEDTIME     sulfamethoxazole-trimethoprim (BACTRIM/SEPTRA) 400-80 MG tablet Take 1 tablet by mouth At Bedtime For UTI prevention     tamsulosin (FLOMAX) 0.4 MG capsule Take 1 capsule (0.4 mg) by mouth daily     vitamin D3 (CHOLECALCIFEROL) 2000 units (50 mcg) tablet Take 1 tablet by mouth daily       ALLERGIES: Patient has no known allergies.    Patient reports that no new changes in medical history, surgical Hx, family Hx, and social Hx.    ASSESSMENT:    1)  Parkinson's Disease:    2)  Anxiety:      PLAN:    __  Will stay on the same antiparkinsonian  medication.    PD Medications 7 am 11 am 4 pm   Sinemet 25/100 mg  2 2 2   Ropinirole 0.5 mg  1 1 1     __  As recommended by Dr. Wegener, he will continue titrating his antianxiety medications.     __  Encouraged pt/wife to make a follow up visit with Dr. Wegener for anxiety.    __  Schedulers to put patient for a video follow up visit on Oct 19th at 12:50 pm.  Patient may contact us anytime as needed.     I have reviewed the note as documented above.  This accurately captures the substance of my conversation with the patient.    Total telephone time was 20 minutes.  Greater than 50% of this time was spent in therapeutic plan, counseling, and coordination of care.     KOTA Stone,  CNP  [facility]      Phone call contact time  Call Started at 2:17 pm  Call Ended at 2:37 pm

## 2020-06-29 NOTE — Clinical Note
"2020       RE: Vini Loya  [address]  [facility] [address]     Dear Colleague,    Thank you for referring your patient, Vini Loya, to the [facility] at [facility]. Please see a copy of my visit note below.    Vini Loya is a 79 year old male who is being evaluated via a billable telephone visit.      The patient has been notified of following:     \"This telephone visit will be conducted via a call between you and your physician/provider. We have found that certain health care needs can be provided without the need for a physical exam.  This service lets us provide the care you need with a short phone conversation.  If a prescription is necessary we can send it directly to your pharmacy.  If lab work is needed we can place an order for that and you can then stop by our lab to have the test done at a later time.    Telephone visits are billed at different rates depending on your insurance coverage. During this emergency period, for some insurers they may be billed the same as an in-person visit.  Please reach out to your insurance provider with any questions.    If during the course of the call the physician/provider feels a telephone visit is not appropriate, you will not be charged for this service.\"    Patient has given verbal consent for Telephone visit?  Yes    What phone number would you like to be contacted at? [phone]    How would you like to obtain your AVS? Mail    JENNA Joyner      -----------------------------------------------------------------------------------------------------------------------------------------------------------------------------------------------------------------------      MOVEMENT DISORDERS TELEMEDICINE VISIT:     PATIENT: Vini Loya    : 1940    DATE: 2020    REASON FOR VISIT: Parkinson's disease (PD) follow up.    After a review of the patient s situation, this visit was changed " from an in-person visit to a telephone visit to reduce the risk of COVID-19 exposure.   The patient is being evaluated via a billable telephone visit.    Patient reports that his anxiety medication has been changed.    Cindy reports that since he started taking Bupropion, his sleepiness has increased.  He reports it     He takes a nap 1 - 2 hours.    PD Medications 7 am 11 am 4 pm   Sinemet 25/100 mg  2 2 2   Ropinirole 0.5 mg  1 1 1      He hasn't been back to the [facility].  He goes out for walks.    He has fluctuating blood pressure.    He hasn't seen     MEDICATIONS:   Outpatient Medications Marked as Taking for the 6/29/20 encounter (Virtual Visit) with Dalila Staples APRN CNP   Medication Sig     aspirin (ASA) 81 MG tablet Take 1 tablet (81 mg) by mouth daily     buPROPion (WELLBUTRIN XL) 300 MG 24 hr tablet Take 1 tablet (300 mg) by mouth every morning     carbidopa-levodopa (SINEMET)  MG tablet Take 2.5 tablets 3 times a day 1 hour before each meal.  (Around 7 am, 11 am, & 4 pm.)     PARoxetine (PAXIL) 20 MG tablet Decrease to 1/2 pill daily (10mg) for two weeks then stop     rOPINIRole (REQUIP) 0.25 MG tablet Take 0.5 mg by mouth 3 times daily      SENNA-docusate sodium (SENNA S) 8.6-50 MG tablet Take 2 tablets by mouth 2 times daily     simvastatin (ZOCOR) 40 MG tablet TAKE ONE TABLET BY MOUTH EVERY NIGHT AT BEDTIME     sulfamethoxazole-trimethoprim (BACTRIM/SEPTRA) 400-80 MG tablet Take 1 tablet by mouth At Bedtime For UTI prevention     tamsulosin (FLOMAX) 0.4 MG capsule Take 1 capsule (0.4 mg) by mouth daily     vitamin D3 (CHOLECALCIFEROL) 2000 units (50 mcg) tablet Take 1 tablet by mouth daily       ALLERGIES: Patient has no known allergies.    Patient reports that no new changes in medical history, surgical Hx, family Hx, and social Hx.    ASSESSMENT:    1)  Parkinson's Disease:        2)  Anxiety:        PLAN:    __  Will stay on the same antiparkinsonian medication.    PD Medications 7 am  11 am 4 pm   Sinemet 25/100 mg  2 2 2   Ropinirole 0.5 mg  1 1 1     __  Will continue with titrating his antianxiety medication as recommended by Dr. Oquendo.      __  Encouraged pt/wife to make a follow up visit with Dr. Wegener for Anxiety.    __  Schedulers to put patient on my schedule for Oct 19th at 12:50 pm.  Patient may contact us anytime as needed.     I have reviewed the note as documented above.  This accurately captures the substance of my conversation with the patient.    Total telephone time was 20 minutes.  Greater than 50% of this time was spent in therapeutic plan, counseling, and coordination of care.     KOTA Stone,  CNP  [facility]      Phone call contact time  Call Started at 2:17 pm  Call Ended at 2:37 pm            Again, thank you for allowing me to participate in the care of your patient.      Sincerely,    KOTA Carpenter CNP

## 2020-07-14 ENCOUNTER — OFFICE VISIT (OUTPATIENT)
Dept: URGENT CARE | Facility: URGENT CARE | Age: 80
End: 2020-07-14
Payer: COMMERCIAL

## 2020-07-14 ENCOUNTER — NURSE TRIAGE (OUTPATIENT)
Dept: FAMILY MEDICINE | Facility: CLINIC | Age: 80
End: 2020-07-14

## 2020-07-14 VITALS
SYSTOLIC BLOOD PRESSURE: 123 MMHG | HEART RATE: 62 BPM | WEIGHT: 170 LBS | TEMPERATURE: 98 F | DIASTOLIC BLOOD PRESSURE: 76 MMHG | BODY MASS INDEX: 25.18 KG/M2 | OXYGEN SATURATION: 96 % | HEIGHT: 69 IN

## 2020-07-14 DIAGNOSIS — I25.10 CORONARY ARTERY DISEASE INVOLVING NATIVE HEART WITHOUT ANGINA PECTORIS, UNSPECIFIED VESSEL OR LESION TYPE: ICD-10-CM

## 2020-07-14 DIAGNOSIS — F41.1 GENERALIZED ANXIETY DISORDER: ICD-10-CM

## 2020-07-14 DIAGNOSIS — R19.5 LOOSE STOOLS: ICD-10-CM

## 2020-07-14 DIAGNOSIS — G20.A1 PARKINSON'S DISEASE (H): Primary | ICD-10-CM

## 2020-07-14 PROCEDURE — 99214 OFFICE O/P EST MOD 30 MIN: CPT | Performed by: FAMILY MEDICINE

## 2020-07-14 ASSESSMENT — MIFFLIN-ST. JEOR: SCORE: 1463.55

## 2020-07-14 NOTE — TELEPHONE ENCOUNTER
Reason for call:  Patient reporting a symptom    Symptom or request: pt states has the runs, wont stop, feels dizzy, bp questions.    Duration (how long have symptoms been present): off and on , could not give duration    Have you been treated for this before? No    Additional comments: none    Phone Number patient can be reached at:  Home number on file [phone] (home)    Best Time:  asap    Can we leave a detailed message on this number:  YES    Call taken on 7/14/2020 at 10:28 AM by Linda Martinez

## 2020-07-14 NOTE — TELEPHONE ENCOUNTER
"Plan- Pt will go to [facility] this afternoon.  Rec pushing fluids.  Pt reports rs=675/84 and pulse =62  Pt will wear a depends. He is fearful being far from a bathroom. freq water stool.  Wife or neighbor will drive to to .  Pt noted that he had problems with constipation and was taking miralax. Pt will stop now.  Pt is on bactrim prophylactically and also Tres Carlson RN    Additional Information    Negative: Shock suspected (e.g., cold/pale/clammy skin, too weak to stand, low BP, rapid pulse)    Negative: Difficult to awaken or acting confused (e.g., disoriented, slurred speech)    Negative: Sounds like a life-threatening emergency to the triager    Negative: Vomiting also present and worse than the diarrhea    Negative: Blood in stool and without diarrhea    Negative: SEVERE abdominal pain (e.g., excruciating) and present > 1 hour    Negative: SEVERE abdominal pain and age > 60    Negative: Bloody, black, or tarry bowel movements    Negative: SEVERE diarrhea (e.g., 7 or more times / day more than normal) and age > 60 years    Negative: Constant abdominal pain lasting > 2 hours    Negative: Drinking very little and has signs of dehydration (e.g., no urine > 12 hours, very dry mouth, very lightheaded)    Negative: Patient sounds very sick or weak to the triager    Abdominal pain  (Exception: pain clears completely with each passage of diarrhea stool)    Negative: Blood in the stool    Negative: Fever > 101 F (38.3 C)    Negative: Mucus or pus in stool has been present > 2 days and diarrhea is more than mild    Negative: Weak immune system (e.g., HIV positive, cancer chemo, splenectomy, organ transplant, chronic steroids)    Answer Assessment - Initial Assessment Questions  1. DIARRHEA SEVERITY: \"How bad is the diarrhea?\" \"How many extra stools have you had in the past 24 hours than normal?\"     - NO DIARRHEA (SCALE 0)    - MILD (SCALE 1-3): Few loose or mushy BMs; increase of 1-3 stools over normal daily number of " "stools; mild increase in ostomy output.    -  MODERATE (SCALE 4-7): Increase of 4-6 stools daily over normal; moderate increase in ostomy output.  * SEVERE (SCALE 8-10; OR 'WORST POSSIBLE'): Increase of 7 or more stools daily over normal; moderate increase in ostomy output; incontinence.      20 times since yest.  2. ONSET: \"When did the diarrhea begin?\"       Yesterday.  3. BM CONSISTENCY: \"How loose or watery is the diarrhea?\"       Watery. Brown.  4. VOMITING: \"Are you also vomiting?\" If so, ask: \"How many times in the past 24 hours?\"       No. Burping. Some nausea.  5. ABDOMINAL PAIN: \"Are you having any abdominal pain?\" If yes: \"What does it feel like?\" (e.g., crampy, dull, intermittent, constant)      Nausea and h/a. No pain., no sharp pain.  6. ABDOMINAL PAIN SEVERITY: If present, ask: \"How bad is the pain?\"  (e.g., Scale 1-10; mild, moderate, or severe)    - MILD (1-3): doesn't interfere with normal activities, abdomen soft and not tender to touch     - MODERATE (4-7): interferes with normal activities or awakens from sleep, tender to touch     - SEVERE (8-10): excruciating pain, doubled over, unable to do any normal activities        No pain.  7. ORAL INTAKE: If vomiting, \"Have you been able to drink liquids?\" \"How much fluids have you had in the past 24 hours?\"      Voiding fine..  8. HYDRATION: \"Any signs of dehydration?\" (e.g., dry mouth [not just dry lips], too weak to stand, dizziness, new weight loss) \"When did you last urinate?\"      Some dizziness with moving.  9. EXPOSURE: \"Have you traveled to a foreign country recently?\" \"Have you been exposed to anyone with diarrhea?\" \"Could you have eaten any food that was spoiled?\"     No.  10. ANTIBIOTIC USE: \"Are you taking antibiotics now or have you taken antibiotics in the past 2 months?\"        UTI med. Started 3-4 years ago. Takes prophylaccally.  11. OTHER SYMPTOMS: \"Do you have any other symptoms?\" (e.g., fever, blood in stool)        HA, dizzy.  12. " "PREGNANCY: \"Is there any chance you are pregnant?\" \"When was your last menstrual period?\"        no    Protocols used: DIARRHEA-A-OH    "

## 2020-07-14 NOTE — PATIENT INSTRUCTIONS
A medication review by Zoraida West or Nat Hurtado    A fiber supplement may help you be more regular.     Seeing DR Wegener would be a good idea.     Seeing the cardiologist in follow up on your heart.

## 2020-07-15 NOTE — PROGRESS NOTES
"SUBJECTIVE: 78 yo with complex medical hx including parkinsonswith 2 main concerns.     1: gi issues-alternativing diarrhea and constipion. Gets constipated then usese miralax and senna and dulcolax then gets dairrhea. This has been an ongoing issues for months. No blood in the stool no associated fever.    2: imbalance-he sounds to report times when he is just off balance when he is stading and also orthostatic lghtheadedness. He thinks this has been worse as of late but unsure why. He also reports a decrease in exercise toelrance.     Does not reports shortness of breath nor cp nor palpitation.     At home his wife has noted variable blood pressures from 80s to 160s but hard to correlate with symptoms.     Recently stopped paxil and changed to buproprion but no seeming effect nor side effects from the change.     On flomax for years.     Had senekot increased some time ago but not clearly associated with worsening symptoms.     The patient has a history of MI years ago and reports decreased exercise tolerance then.     OBJECTIVE: /76   Pulse 62   Temp 98  F (36.7  C) (Oral)   Ht 1.74 m (5' 8.5\")   Wt 77.1 kg (170 lb)   SpO2 96%   BMI 25.47 kg/m   no apparent distress   Exam:  GENERAL APPEARANCE: healthy, alert and no distress  EYES: Eyes grossly normal to inspection  HENT: ear canals and TM's normal and nose and mouth without ulcers or lesions  NECK: no adenopathy, no asymmetry, masses, or scars and thyroid normal to palpation  RESP: lungs clear to auscultation - no rales, rhonchi or wheezes  CV: regular rates and rhythm, normal S1 S2, no S3 or S4 and no murmur, click or rub -  ABDOMEN:  soft, nontender, no HSM or masses and bowel sounds normal  SKIN: no suspicious lesions or rashes  NEURO: rest tremor noted. Imbalance noted. Noted more when he changes direction or starts to move.   Rectal shows no stool in vault. Decreased lower rectal tone.     1. Parkinson's disease (H)  I think this is likely the " cause of his balance difficulty but this may be compounded by medication. We discussed balance therapy. We did orthostatics her and he did have a 30mmhg drop in sys bp.     2. Coronary artery disease involving native heart without angina pectoris, unspecified vessel or lesion type  He has not had recent cardiac eval and given his decreased exercise toelrance recommended he see his cardiologist.     3. Generalized anxiety disorder  His anxiety may be contributing as could the change in his medication to the imbalance sensation. Recommended follow up lwt his primary for anxiety med recheck as it has bee about 4 weeks     4. Loose stools  Probably due to too many laxatives for his underlying constipation. Discussed adding in a fiber supplemnt for regularity. Decreasing the senna and miralax.  He also could consider GI evaluation.

## 2020-07-15 NOTE — TELEPHONE ENCOUNTER
RECORDS RECEIVED FROM: Internal   DATE RECEIVED: 7-17   NOTES STATUS DETAILS   OFFICE NOTE from referring provider    N/A    OFFICE NOTE from other cardiologist    N/A    DISCHARGE SUMMARY from hospital    N/A    DISCHARGE REPORT from the ER   N/A    OPERATIVE REPORT    N/A    MEDICATION LIST   Internal    LABS     BMP   Internal 10-8-19   CBC   Internal 5-27-20   CMP   Internal 5-27-20   Lipids   Internal 2-1-19   TSH   Internal 5-27-20   DIAGNOSTIC PROCEDURES     EKG   N/A    Monitor Reports   N/A    IMAGING (DISC & REPORT)      Echo   Internal 6-27-19   Stress Tests   N/A    Cath   N/A    MRI/MRA   N/A    CT/CTA   N/A

## 2020-07-16 ENCOUNTER — PATIENT OUTREACH (OUTPATIENT)
Dept: CARDIOLOGY | Facility: CLINIC | Age: 80
End: 2020-07-16

## 2020-07-16 NOTE — TELEPHONE ENCOUNTER
Was asked to call wife due to symptoms patient was having. Has appointment tomorrow to establish care in cardiology after last having been seen 5 years ago but want to be seen in person. This is a virtual clinic. He was rescheduled to an in person clinic next week. Wife wondering if he needs to be seen sooner. She reports his dizziness and weakness have progressively gotten worse. BP has been running high in 170s. Not on any BP medications currently.     I told her that if she is concerned about his symptoms before he sees us, should go to the ER. Can also call primary care about blood pressure until he establishes care with us.   She verbalized understanding.   Estrella Olmos RN

## 2020-07-17 ENCOUNTER — PRE VISIT (OUTPATIENT)
Dept: CARDIOLOGY | Facility: CLINIC | Age: 80
End: 2020-07-17

## 2020-07-19 ENCOUNTER — APPOINTMENT (OUTPATIENT)
Dept: CT IMAGING | Facility: CLINIC | Age: 80
End: 2020-07-19
Attending: INTERNAL MEDICINE
Payer: COMMERCIAL

## 2020-07-19 ENCOUNTER — HOSPITAL ENCOUNTER (EMERGENCY)
Facility: CLINIC | Age: 80
Discharge: HOME | End: 2020-07-19
Attending: INTERNAL MEDICINE | Admitting: INTERNAL MEDICINE
Payer: COMMERCIAL

## 2020-07-19 VITALS
SYSTOLIC BLOOD PRESSURE: 155 MMHG | HEIGHT: 68 IN | OXYGEN SATURATION: 96 % | RESPIRATION RATE: 28 BRPM | BODY MASS INDEX: 25.76 KG/M2 | TEMPERATURE: 97 F | WEIGHT: 170 LBS | DIASTOLIC BLOOD PRESSURE: 81 MMHG | HEART RATE: 64 BPM

## 2020-07-19 DIAGNOSIS — G20.A1 PARKINSON DISEASE (H): ICD-10-CM

## 2020-07-19 DIAGNOSIS — S16.1XXA STRAIN OF NECK MUSCLE, INITIAL ENCOUNTER: ICD-10-CM
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
## 2020-07-19 DIAGNOSIS — S09.90XA CLOSED HEAD INJURY, INITIAL ENCOUNTER: ICD-10-CM

## 2020-07-19 LAB
ALBUMIN SERPL-MCNC: 3.5 G/DL (ref 3.4–5)
ALBUMIN UR-MCNC: 10 MG/DL
ALP SERPL-CCNC: 78 U/L (ref 40–150)
ALT SERPL W P-5'-P-CCNC: 11 U/L (ref 0–70)
ANION GAP SERPL CALCULATED.3IONS-SCNC: 2 MMOL/L (ref 3–14)
APPEARANCE UR: CLEAR
AST SERPL W P-5'-P-CCNC: 22 U/L (ref 0–45)
BASOPHILS # BLD AUTO: 0.1 10E9/L (ref 0–0.2)
BASOPHILS NFR BLD AUTO: 0.9 %
BILIRUB SERPL-MCNC: 0.9 MG/DL (ref 0.2–1.3)
BILIRUB UR QL STRIP: NEGATIVE
BUN SERPL-MCNC: 21 MG/DL (ref 7–30)
CALCIUM SERPL-MCNC: 9 MG/DL (ref 8.5–10.1)
CHLORIDE SERPL-SCNC: 106 MMOL/L (ref 94–109)
CO2 SERPL-SCNC: 30 MMOL/L (ref 20–32)
COLOR UR AUTO: YELLOW
CREAT SERPL-MCNC: 1.26 MG/DL (ref 0.66–1.25)
DIFFERENTIAL METHOD BLD: NORMAL
EOSINOPHIL # BLD AUTO: 0.2 10E9/L (ref 0–0.7)
EOSINOPHIL NFR BLD AUTO: 2.1 %
ERYTHROCYTE [DISTWIDTH] IN BLOOD BY AUTOMATED COUNT: 13.2 % (ref 10–15)
GFR SERPL CREATININE-BSD FRML MDRD: 53 ML/MIN/{1.73_M2}
GLUCOSE SERPL-MCNC: 123 MG/DL (ref 70–99)
GLUCOSE UR STRIP-MCNC: NEGATIVE MG/DL
HCT VFR BLD AUTO: 47.1 % (ref 40–53)
HGB BLD-MCNC: 15.5 G/DL (ref 13.3–17.7)
HGB UR QL STRIP: NEGATIVE
HYALINE CASTS #/AREA URNS LPF: 7 /LPF (ref 0–2)
IMM GRANULOCYTES # BLD: 0.1 10E9/L (ref 0–0.4)
IMM GRANULOCYTES NFR BLD: 0.5 %
KETONES UR STRIP-MCNC: NEGATIVE MG/DL
LEUKOCYTE ESTERASE UR QL STRIP: NEGATIVE
LYMPHOCYTES # BLD AUTO: 1.4 10E9/L (ref 0.8–5.3)
LYMPHOCYTES NFR BLD AUTO: 13.1 %
MCH RBC QN AUTO: 31.4 PG (ref 26.5–33)
MCHC RBC AUTO-ENTMCNC: 32.9 G/DL (ref 31.5–36.5)
MCV RBC AUTO: 96 FL (ref 78–100)
MONOCYTES # BLD AUTO: 1.1 10E9/L (ref 0–1.3)
MONOCYTES NFR BLD AUTO: 10.6 %
MUCOUS THREADS #/AREA URNS LPF: PRESENT /LPF
NEUTROPHILS # BLD AUTO: 7.5 10E9/L (ref 1.6–8.3)
NEUTROPHILS NFR BLD AUTO: 72.8 %
NITRATE UR QL: NEGATIVE
NRBC # BLD AUTO: 0 10*3/UL
NRBC BLD AUTO-RTO: 0 /100
PH UR STRIP: 6.5 PH (ref 5–7)
PLATELET # BLD AUTO: 285 10E9/L (ref 150–450)
POTASSIUM SERPL-SCNC: 4.5 MMOL/L (ref 3.4–5.3)
PROT SERPL-MCNC: 7.2 G/DL (ref 6.8–8.8)
RBC # BLD AUTO: 4.93 10E12/L (ref 4.4–5.9)
RBC #/AREA URNS AUTO: <1 /HPF (ref 0–2)
SODIUM SERPL-SCNC: 138 MMOL/L (ref 133–144)
SOURCE: ABNORMAL
SP GR UR STRIP: 1.02 (ref 1–1.03)
SQUAMOUS #/AREA URNS AUTO: 1 /HPF (ref 0–1)
TRANS CELLS #/AREA URNS HPF: <1 /HPF (ref 0–1)
TROPONIN I SERPL-MCNC: <0.015 UG/L (ref 0–0.04)
UROBILINOGEN UR STRIP-MCNC: NORMAL MG/DL (ref 0–2)
WBC # BLD AUTO: 10.3 10E9/L (ref 4–11)
WBC #/AREA URNS AUTO: 1 /HPF (ref 0–5)

## 2020-07-19 PROCEDURE — 99285 EMERGENCY DEPT VISIT HI MDM: CPT | Mod: Z6 | Performed by: INTERNAL MEDICINE

## 2020-07-19 PROCEDURE — 80053 COMPREHEN METABOLIC PANEL: CPT | Performed by: INTERNAL MEDICINE

## 2020-07-19 PROCEDURE — 93005 ELECTROCARDIOGRAM TRACING: CPT | Performed by: INTERNAL MEDICINE

## 2020-07-19 PROCEDURE — 81001 URINALYSIS AUTO W/SCOPE: CPT | Performed by: INTERNAL MEDICINE

## 2020-07-19 PROCEDURE — 85025 COMPLETE CBC W/AUTO DIFF WBC: CPT | Performed by: INTERNAL MEDICINE

## 2020-07-19 PROCEDURE — 99285 EMERGENCY DEPT VISIT HI MDM: CPT | Mod: 25 | Performed by: INTERNAL MEDICINE

## 2020-07-19 PROCEDURE — 72125 CT NECK SPINE W/O DYE: CPT

## 2020-07-19 PROCEDURE — 70450 CT HEAD/BRAIN W/O DYE: CPT

## 2020-07-19 PROCEDURE — 84484 ASSAY OF TROPONIN QUANT: CPT | Performed by: INTERNAL MEDICINE

## 2020-07-19 ASSESSMENT — ENCOUNTER SYMPTOMS
EYE REDNESS: 0
COLOR CHANGE: 0
HEADACHES: 0
ARTHRALGIAS: 0
CONFUSION: 0
DIFFICULTY URINATING: 0
FEVER: 0
NECK STIFFNESS: 0
ABDOMINAL PAIN: 0
NECK PAIN: 1
SHORTNESS OF BREATH: 0

## 2020-07-19 ASSESSMENT — MIFFLIN-ST. JEOR: SCORE: 1455.61

## 2020-07-19 NOTE — ED AVS SNAPSHOT
[facility], [place], Emergency Department  [address] [place] [id number]  Phone:  [phone]                                    Vini Loya   MRN: [id number]    Department:  [facility], Emergency Department   Date of Visit:  7/19/2020           After Visit Summary Signature Page    I have received my discharge instructions, and my questions have been answered. I have discussed any challenges I see with this plan with the nurse or doctor.    ..........................................................................................................................................  Patient/Patient Representative Signature      ..........................................................................................................................................  Patient Representative Print Name and Relationship to Patient    ..................................................               ................................................  Date                                   Time    ..........................................................................................................................................  Reviewed by Signature/Title    ...................................................              ..............................................  Date                                               Time          22EPIC Rev 08/18
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
## 2020-07-19 NOTE — ED TRIAGE NOTES
Fall from standing Friday, reports hitting right side of head and right shoulder. Denies LOC. Reports some right side neck pain as well.

## 2020-07-20 LAB — INTERPRETATION ECG - MUSE: NORMAL

## 2020-07-24 ENCOUNTER — OFFICE VISIT (OUTPATIENT)
Dept: CARDIOLOGY | Facility: CLINIC | Age: 80
End: 2020-07-24
Attending: INTERNAL MEDICINE
Payer: COMMERCIAL

## 2020-07-24 VITALS
SYSTOLIC BLOOD PRESSURE: 115 MMHG | BODY MASS INDEX: 25.18 KG/M2 | TEMPERATURE: 97.6 F | OXYGEN SATURATION: 94 % | DIASTOLIC BLOOD PRESSURE: 82 MMHG | HEART RATE: 70 BPM | WEIGHT: 170 LBS | HEIGHT: 69 IN

## 2020-07-24 DIAGNOSIS — G20.A1 PARKINSON DISEASE (H): ICD-10-CM

## 2020-07-24 DIAGNOSIS — I25.10 CORONARY ARTERY DISEASE INVOLVING NATIVE HEART WITHOUT ANGINA PECTORIS, UNSPECIFIED VESSEL OR LESION TYPE: Primary | ICD-10-CM

## 2020-07-24 DIAGNOSIS — R09.89 LABILE BLOOD PRESSURE: ICD-10-CM

## 2020-07-24 PROCEDURE — G0463 HOSPITAL OUTPT CLINIC VISIT: HCPCS | Mod: ZF

## 2020-07-24 PROCEDURE — 99204 OFFICE O/P NEW MOD 45 MIN: CPT | Mod: ZP | Performed by: INTERNAL MEDICINE

## 2020-07-24 ASSESSMENT — PAIN SCALES - GENERAL: PAINLEVEL: NO PAIN (0)

## 2020-07-24 ASSESSMENT — MIFFLIN-ST. JEOR: SCORE: 1463.55

## 2020-07-24 NOTE — PATIENT INSTRUCTIONS
You were seen today in the Cardiovascular Clinic at the [facility].     Cardiology Providers you saw during your visit: Dr. Eloise Shannon     Diagnosis:   Encounter Diagnoses   Name Primary?     Labile blood pressure Yes     Parkinson disease (H)      Coronary artery disease involving native heart without angina pectoris, unspecified vessel or lesion type         Results: Discussed with you today labs      Orders:   No orders of the defined types were placed in this encounter.      Current Medication List  Current Outpatient Medications   Medication Sig Dispense Refill     aspirin (ASA) 81 MG tablet Take 1 tablet (81 mg) by mouth daily 90 tablet 3     buPROPion (WELLBUTRIN XL) 300 MG 24 hr tablet Take 1 tablet (300 mg) by mouth every morning 90 tablet 1     carbidopa-levodopa (SINEMET)  MG tablet Take 2.5 tablets 3 times a day 1 hour before each meal.  (Around 7 am, 11 am, & 4 pm.) 675 tablet 3     SENNA-docusate sodium (SENNA S) 8.6-50 MG tablet Take 2 tablets by mouth 2 times daily 120 tablet 11     simvastatin (ZOCOR) 40 MG tablet TAKE ONE TABLET BY MOUTH EVERY NIGHT AT BEDTIME 90 tablet 3     sulfamethoxazole-trimethoprim (BACTRIM/SEPTRA) 400-80 MG tablet Take 1 tablet by mouth At Bedtime For UTI prevention 90 tablet 3     tamsulosin (FLOMAX) 0.4 MG capsule Take 1 capsule (0.4 mg) by mouth daily 90 capsule 3     buPROPion (WELLBUTRIN XL) 150 MG 24 hr tablet Take 1 tablet (150 mg) by mouth every morning 30 tablet 1     PARoxetine (PAXIL) 20 MG tablet Decrease to 1/2 pill daily (10mg) for two weeks then stop 90 tablet 3     polyethylene glycol (MIRALAX) 17 GM/SCOOP powder Take 17 g (1 capful) by mouth 3 times daily (Patient not taking: Reported on 7/24/2020) 1 Bottle 11     rOPINIRole (REQUIP) 0.25 MG tablet Take 0.5 mg by mouth 3 times daily        vitamin D3 (CHOLECALCIFEROL) 2000 units (50 mcg) tablet Take 1 tablet by mouth daily           Medications Discontinued:  There are no discontinued  "medications.      Recommendations:   1. No change in heart medications today.  2. Continue to exercise but go slow and take frequent breaks. Sit down if you start to feel lightheaded.  3. Follow up with Dr. Eloise Shannon in 6 months.       Please feel free to call me with any questions or concerns.       Kavon Faulkner LPN     Questions: [phone].   First press #1 for the [employer] and then press #4 for \"Medical Questions\" to reach us Cardiology Nurses.     [phone].   First press #1 for the [employer] and then press #1     On Call Cardiologist for after hours or on weekends: [phone]   option #4 and ask to speak to the on-call Cardiologist.          If you need a medication refill please contact your pharmacy.  Please allow 3 business days for your refill to be completed.  ________________________________________________________________________________________________________________________________          "
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
## 2020-07-24 NOTE — NURSING NOTE
Chief Complaint   Patient presents with     New Patient     present for high blood pressure and dizziness- pt. Fell on 07/19/2020 ED visit-      Vitals were taken and medication reconciled.    SEBLE Spring  12:31 PM
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
## 2020-07-29 ENCOUNTER — PRE VISIT (OUTPATIENT)
Dept: GASTROENTEROLOGY | Facility: CLINIC | Age: 80
End: 2020-07-29

## 2020-08-03 ENCOUNTER — TELEPHONE (OUTPATIENT)
Dept: FAMILY MEDICINE | Facility: CLINIC | Age: 80
End: 2020-08-03

## 2020-08-03 NOTE — TELEPHONE ENCOUNTER
Patient requesting a call from Dr Wegener's triage nurse.  Patient seen at Urgent Care on 7-24-20.  Had fallen and had hit his head. Still having pain in back of head.    Please call.  Ok to LM on VM

## 2020-08-03 NOTE — TELEPHONE ENCOUNTER
Pt was seen in UC for other sx and then in ER after fall on 7/19/20.  Pt hit his back of the head with fall.    Head is still sore.  Pounds at bedtime. Neck pain.  Worse when laying down.    No vision changes.    Pt was to f/u with PCP for anxiety and orthostatics.  I see pt did see cards for orthostatic.    Dr. Wegener- are you able to see pt this week?  No openings with you.  Pt may be hard to see virtually. Pt is NOT on mychart.  PT seems to have complex health history.  JERI Carlson

## 2020-08-03 NOTE — TELEPHONE ENCOUNTER
I could see at 4:20 on Wednesday if desired.  If worsening then UC /EMERGENCY ROOM. Joel Wegener,MD

## 2020-08-05 ENCOUNTER — OFFICE VISIT (OUTPATIENT)
Dept: FAMILY MEDICINE | Facility: CLINIC | Age: 80
End: 2020-08-05
Payer: COMMERCIAL

## 2020-08-05 VITALS
DIASTOLIC BLOOD PRESSURE: 81 MMHG | SYSTOLIC BLOOD PRESSURE: 148 MMHG | RESPIRATION RATE: 18 BRPM | HEART RATE: 77 BPM | OXYGEN SATURATION: 96 % | TEMPERATURE: 98.1 F | BODY MASS INDEX: 25.47 KG/M2 | WEIGHT: 170 LBS

## 2020-08-05 DIAGNOSIS — K59.01 SLOW TRANSIT CONSTIPATION: ICD-10-CM

## 2020-08-05 DIAGNOSIS — R29.6 FALLS FREQUENTLY: ICD-10-CM

## 2020-08-05 DIAGNOSIS — G20.A1 PARKINSON'S DISEASE (H): Primary | ICD-10-CM

## 2020-08-05 PROCEDURE — 99214 OFFICE O/P EST MOD 30 MIN: CPT | Performed by: FAMILY MEDICINE
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
## 2020-08-05 NOTE — PATIENT INSTRUCTIONS
Recurrent falls with parkinson's stiffness    Rec:  Follow up with your neurologist    Parkinson's physical therapy ordered and discuss with the mobility aids and house safety.     Constipation:    Increase senna to two tabs three times daily     miralax three times daily as well     Plan to attempt to stool 30 minutes after breakfast for at least 15 minutes EVERY DAY to stay ahead of it.     Follow up as needed or February for  Yearly physical.

## 2020-08-07 ENCOUNTER — TELEPHONE (OUTPATIENT)
Dept: NEUROLOGY | Facility: CLINIC | Age: 80
End: 2020-08-07

## 2020-08-07 NOTE — TELEPHONE ENCOUNTER
"Situation:  Vini Loya is a 79 year old male who receives support for Parkinson's disease. Vini's spouse Ladi calls today with concerns for increased balance issues and falls.    Background:    Patient is taking:  PD Medications 7 am 11 am 4 pm   Sinemet 25/100 mg  2 2 2   Ropinirole 0.5 mg  1 1 1     Assessment:  Sharon has been falling more often this past week. This has been worsening over the past 3 weeks. She was unable to say how many times a day he falls because she usually does not witness it. She believes he is getting dizzy. He went to sit on the chair this morning and fell over. When he tried to get up he fell again trying to use the chair to get up. Ladi encouraged him to use something sturdy to help him get up. This started 3 weeks ago and has been getting worse.  He hit the back of his head when he fell 3 weeks ago and hurt his shoulder. Went to the ER a day or 2 after the fall. He has not hit his head since.  He will fall on walks outside, in the kitchen, bathroom etc. He will fall backwards and/ or to one side. Ladi has been trying to get him to use the U-step walker but he feels it is a hassle as when he walks outside he will have to lift it over a bump.  Ladi put Vini on the phone and he reported sometimes he gets dizzy but lately \"my feet wont go forward forward and I fall.\" His feet will stick to the floor and he will fall trying to move. Vini reports when he is \"on\" he does not notice sinemet helping his freezing. He reports leaning on something helps break a freeze.    There is4 steps to get in and out of the home  1 level home with a basement but he does not need to go down there.    Ladi interrupted me multiple times when trying to educate on breaking a freezing episode. She was very focused on dizziness (although he reported this is not really the cause of his falls) and him seeing physical therapy. She would like someone to show Vini how to break a freeze as he probably " would not remember with hearing it or seeing it written on paper.    Education:  1. Educated on freezing of gait being a symptoms of Parkinson's disease and how to break an episode with rocking back and forth, singing/humming, imagining stepping over a line or on a spot and using a laser pointer.    Plan/recommendation:  1. I advised I will mail a letter with tips on breaking a freezing episode. Once he see's physical therapy you can bring this list and they can physically show and help him do these things.  2. I informed Vini for his safety he should use the U-step walker all the time. This way you can sit down when you get dizzy or lean on it to help with freezing.  3. Ladi will [facility] and [facility]. She does not know if she wants [facility] to [facility] it [facility] or through [facility] (if covered).

## 2020-08-07 NOTE — TELEPHONE ENCOUNTER
Health Call Center    Phone Message    May a detailed message be left on voicemail: yes     Reason for Call: Symptoms or Concerns     If patient has red-flag symptoms, warm transfer to triage line    Current symptom or concern: Loss of balance and falling a lot     Symptoms have been present for:  1 week(s)    Has patient previously been seen for this? Yes    By : Dalila Staples    Date: 06/29/20    Are there any new or worsening symptoms? Yes: increased loss of balance and falling    Ladi is calling with with request to speak with Dalila Staples as she is very concerned with the amount of falls Vini has taken this past week due to losing his balance.      Action Taken: Message routed to:  [facility] ([facility]): Neurology    Travel Screening: Not Applicable

## 2020-08-11 ENCOUNTER — APPOINTMENT (OUTPATIENT)
Dept: GENERAL RADIOLOGY | Facility: CLINIC | Age: 80
End: 2020-08-11
Attending: EMERGENCY MEDICINE
Payer: COMMERCIAL

## 2020-08-11 ENCOUNTER — HOSPITAL ENCOUNTER (OUTPATIENT)
Facility: CLINIC | Age: 80
Setting detail: OBSERVATION
Discharge: HOME | End: 2020-08-13
Attending: EMERGENCY MEDICINE | Admitting: EMERGENCY MEDICINE
Payer: COMMERCIAL

## 2020-08-11 ENCOUNTER — APPOINTMENT (OUTPATIENT)
Dept: CT IMAGING | Facility: CLINIC | Age: 80
End: 2020-08-11
Attending: EMERGENCY MEDICINE
Payer: COMMERCIAL

## 2020-08-11 DIAGNOSIS — R29.6 FALLING: ICD-10-CM

## 2020-08-11 DIAGNOSIS — Z20.828 EXPOSURE TO SARS-ASSOCIATED CORONAVIRUS: ICD-10-CM

## 2020-08-11 DIAGNOSIS — M62.81 GENERALIZED MUSCLE WEAKNESS: ICD-10-CM
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
## 2020-08-11 DIAGNOSIS — G20.A1 PARKINSON'S DISEASE (H): ICD-10-CM

## 2020-08-11 DIAGNOSIS — I10 HYPERTENSION, UNSPECIFIED TYPE: ICD-10-CM

## 2020-08-11 LAB
ALBUMIN SERPL-MCNC: 3.4 G/DL (ref 3.4–5)
ALBUMIN UR-MCNC: 10 MG/DL
ALP SERPL-CCNC: 123 U/L (ref 40–150)
ALT SERPL W P-5'-P-CCNC: 11 U/L (ref 0–70)
AMORPH CRY #/AREA URNS HPF: ABNORMAL /HPF
ANION GAP SERPL CALCULATED.3IONS-SCNC: 4 MMOL/L (ref 3–14)
APPEARANCE UR: ABNORMAL
AST SERPL W P-5'-P-CCNC: 22 U/L (ref 0–45)
BASOPHILS # BLD AUTO: 0.1 10E9/L (ref 0–0.2)
BASOPHILS NFR BLD AUTO: 0.9 %
BILIRUB SERPL-MCNC: 1.3 MG/DL (ref 0.2–1.3)
BILIRUB UR QL STRIP: NEGATIVE
BUN SERPL-MCNC: 19 MG/DL (ref 7–30)
CALCIUM SERPL-MCNC: 9 MG/DL (ref 8.5–10.1)
CHLORIDE SERPL-SCNC: 106 MMOL/L (ref 94–109)
CO2 SERPL-SCNC: 28 MMOL/L (ref 20–32)
COLOR UR AUTO: YELLOW
CREAT SERPL-MCNC: 1.14 MG/DL (ref 0.66–1.25)
DIFFERENTIAL METHOD BLD: ABNORMAL
EOSINOPHIL # BLD AUTO: 0.3 10E9/L (ref 0–0.7)
EOSINOPHIL NFR BLD AUTO: 2.2 %
ERYTHROCYTE [DISTWIDTH] IN BLOOD BY AUTOMATED COUNT: 13.6 % (ref 10–15)
GFR SERPL CREATININE-BSD FRML MDRD: 60 ML/MIN/{1.73_M2}
GLUCOSE SERPL-MCNC: 129 MG/DL (ref 70–99)
GLUCOSE UR STRIP-MCNC: NEGATIVE MG/DL
HCT VFR BLD AUTO: 47.8 % (ref 40–53)
HGB BLD-MCNC: 15.9 G/DL (ref 13.3–17.7)
HGB UR QL STRIP: NEGATIVE
IMM GRANULOCYTES # BLD: 0.1 10E9/L (ref 0–0.4)
IMM GRANULOCYTES NFR BLD: 0.4 %
KETONES UR STRIP-MCNC: NEGATIVE MG/DL
LABORATORY COMMENT REPORT: NORMAL
LACTATE BLD-SCNC: 1 MMOL/L (ref 0.7–2)
LEUKOCYTE ESTERASE UR QL STRIP: NEGATIVE
LYMPHOCYTES # BLD AUTO: 1.6 10E9/L (ref 0.8–5.3)
LYMPHOCYTES NFR BLD AUTO: 13 %
MCH RBC QN AUTO: 31.8 PG (ref 26.5–33)
MCHC RBC AUTO-ENTMCNC: 33.3 G/DL (ref 31.5–36.5)
MCV RBC AUTO: 96 FL (ref 78–100)
MONOCYTES # BLD AUTO: 1.4 10E9/L (ref 0–1.3)
MONOCYTES NFR BLD AUTO: 11.8 %
MUCOUS THREADS #/AREA URNS LPF: PRESENT /LPF
NEUTROPHILS # BLD AUTO: 8.7 10E9/L (ref 1.6–8.3)
NEUTROPHILS NFR BLD AUTO: 71.7 %
NITRATE UR QL: NEGATIVE
NRBC # BLD AUTO: 0 10*3/UL
NRBC BLD AUTO-RTO: 0 /100
PH UR STRIP: 7.5 PH (ref 5–7)
PLATELET # BLD AUTO: 272 10E9/L (ref 150–450)
POTASSIUM SERPL-SCNC: 4.1 MMOL/L (ref 3.4–5.3)
PROT SERPL-MCNC: 7.4 G/DL (ref 6.8–8.8)
RBC # BLD AUTO: 5 10E12/L (ref 4.4–5.9)
RBC #/AREA URNS AUTO: <1 /HPF (ref 0–2)
SARS-COV-2 RNA SPEC QL NAA+PROBE: NEGATIVE
SARS-COV-2 RNA SPEC QL NAA+PROBE: NORMAL
SODIUM SERPL-SCNC: 138 MMOL/L (ref 133–144)
SOURCE: ABNORMAL
SP GR UR STRIP: 1.02 (ref 1–1.03)
SPECIMEN SOURCE: NORMAL
SPECIMEN SOURCE: NORMAL
SQUAMOUS #/AREA URNS AUTO: 2 /HPF (ref 0–1)
TRANS CELLS #/AREA URNS HPF: 1 /HPF (ref 0–1)
UROBILINOGEN UR STRIP-MCNC: NORMAL MG/DL (ref 0–2)
WBC # BLD AUTO: 12.1 10E9/L (ref 4–11)
WBC #/AREA URNS AUTO: <1 /HPF (ref 0–5)

## 2020-08-11 PROCEDURE — 25000132 ZZH RX MED GY IP 250 OP 250 PS 637: Performed by: NURSE PRACTITIONER

## 2020-08-11 PROCEDURE — 99220 ZZC INITIAL OBSERVATION CARE,LEVL III: CPT | Mod: Z6 | Performed by: EMERGENCY MEDICINE

## 2020-08-11 PROCEDURE — 85025 COMPLETE CBC W/AUTO DIFF WBC: CPT | Performed by: EMERGENCY MEDICINE

## 2020-08-11 PROCEDURE — 96374 THER/PROPH/DIAG INJ IV PUSH: CPT

## 2020-08-11 PROCEDURE — 87086 URINE CULTURE/COLONY COUNT: CPT | Performed by: EMERGENCY MEDICINE

## 2020-08-11 PROCEDURE — 70450 CT HEAD/BRAIN W/O DYE: CPT

## 2020-08-11 PROCEDURE — 25000128 H RX IP 250 OP 636: Performed by: NURSE PRACTITIONER

## 2020-08-11 PROCEDURE — 83605 ASSAY OF LACTIC ACID: CPT | Performed by: EMERGENCY MEDICINE

## 2020-08-11 PROCEDURE — G0378 HOSPITAL OBSERVATION PER HR: HCPCS

## 2020-08-11 PROCEDURE — 25000132 ZZH RX MED GY IP 250 OP 250 PS 637: Performed by: EMERGENCY MEDICINE

## 2020-08-11 PROCEDURE — 80053 COMPREHEN METABOLIC PANEL: CPT | Performed by: EMERGENCY MEDICINE

## 2020-08-11 PROCEDURE — 99285 EMERGENCY DEPT VISIT HI MDM: CPT | Mod: 25 | Performed by: EMERGENCY MEDICINE

## 2020-08-11 PROCEDURE — 87040 BLOOD CULTURE FOR BACTERIA: CPT | Performed by: NURSE PRACTITIONER

## 2020-08-11 PROCEDURE — U0003 INFECTIOUS AGENT DETECTION BY NUCLEIC ACID (DNA OR RNA); SEVERE ACUTE RESPIRATORY SYNDROME CORONAVIRUS 2 (SARS-COV-2) (CORONAVIRUS DISEASE [COVID-19]), AMPLIFIED PROBE TECHNIQUE, MAKING USE OF HIGH THROUGHPUT TECHNOLOGIES AS DESCRIBED BY CMS-2020-01-R: HCPCS | Performed by: NURSE PRACTITIONER

## 2020-08-11 PROCEDURE — 96376 TX/PRO/DX INJ SAME DRUG ADON: CPT

## 2020-08-11 PROCEDURE — 87040 BLOOD CULTURE FOR BACTERIA: CPT | Performed by: EMERGENCY MEDICINE

## 2020-08-11 PROCEDURE — 81001 URINALYSIS AUTO W/SCOPE: CPT | Performed by: EMERGENCY MEDICINE

## 2020-08-11 PROCEDURE — 93005 ELECTROCARDIOGRAM TRACING: CPT | Performed by: EMERGENCY MEDICINE

## 2020-08-11 PROCEDURE — C9803 HOPD COVID-19 SPEC COLLECT: HCPCS | Performed by: EMERGENCY MEDICINE

## 2020-08-11 PROCEDURE — 71045 X-RAY EXAM CHEST 1 VIEW: CPT

## 2020-08-11 PROCEDURE — 96375 TX/PRO/DX INJ NEW DRUG ADDON: CPT

## 2020-08-11 PROCEDURE — 25000132 ZZH RX MED GY IP 250 OP 250 PS 637: Performed by: STUDENT IN AN ORGANIZED HEALTH CARE EDUCATION/TRAINING PROGRAM

## 2020-08-11 RX ORDER — LORAZEPAM 2 MG/ML
0.5 INJECTION INTRAMUSCULAR ONCE
Status: COMPLETED | OUTPATIENT
Start: 2020-08-11 | End: 2020-08-11

## 2020-08-11 RX ORDER — ROPINIROLE 0.5 MG/1
0.5 TABLET, FILM COATED ORAL 3 TIMES DAILY
Status: DISCONTINUED | OUTPATIENT
Start: 2020-08-11 | End: 2020-08-11

## 2020-08-11 RX ORDER — ASPIRIN 81 MG/1
81 TABLET ORAL DAILY
Status: DISCONTINUED | OUTPATIENT
Start: 2020-08-11 | End: 2020-08-13 | Stop reason: HOSPADM

## 2020-08-11 RX ORDER — NALOXONE HYDROCHLORIDE 0.4 MG/ML
.1-.4 INJECTION, SOLUTION INTRAMUSCULAR; INTRAVENOUS; SUBCUTANEOUS
Status: DISCONTINUED | OUTPATIENT
Start: 2020-08-11 | End: 2020-08-13 | Stop reason: HOSPADM

## 2020-08-11 RX ORDER — ONDANSETRON 2 MG/ML
4 INJECTION INTRAMUSCULAR; INTRAVENOUS EVERY 6 HOURS PRN
Status: DISCONTINUED | OUTPATIENT
Start: 2020-08-11 | End: 2020-08-13 | Stop reason: HOSPADM

## 2020-08-11 RX ORDER — POLYETHYLENE GLYCOL 3350 17 G/17G
17 POWDER, FOR SOLUTION ORAL DAILY PRN
Status: DISCONTINUED | OUTPATIENT
Start: 2020-08-11 | End: 2020-08-13 | Stop reason: HOSPADM

## 2020-08-11 RX ORDER — HYDRALAZINE HYDROCHLORIDE 20 MG/ML
5 INJECTION INTRAMUSCULAR; INTRAVENOUS EVERY 6 HOURS PRN
Status: DISCONTINUED | OUTPATIENT
Start: 2020-08-11 | End: 2020-08-13 | Stop reason: HOSPADM

## 2020-08-11 RX ORDER — ONDANSETRON 4 MG/1
4 TABLET, ORALLY DISINTEGRATING ORAL EVERY 6 HOURS PRN
Status: DISCONTINUED | OUTPATIENT
Start: 2020-08-11 | End: 2020-08-13 | Stop reason: HOSPADM

## 2020-08-11 RX ORDER — CARBIDOPA AND LEVODOPA 25; 100 MG/1; MG/1
2 TABLET ORAL 3 TIMES DAILY
Status: DISCONTINUED | OUTPATIENT
Start: 2020-08-11 | End: 2020-08-13 | Stop reason: HOSPADM

## 2020-08-11 RX ORDER — SIMVASTATIN 40 MG
40 TABLET ORAL AT BEDTIME
Status: DISCONTINUED | OUTPATIENT
Start: 2020-08-11 | End: 2020-08-13 | Stop reason: HOSPADM

## 2020-08-11 RX ORDER — SULFAMETHOXAZOLE AND TRIMETHOPRIM 400; 80 MG/1; MG/1
1 TABLET ORAL AT BEDTIME
Status: DISCONTINUED | OUTPATIENT
Start: 2020-08-11 | End: 2020-08-13 | Stop reason: HOSPADM

## 2020-08-11 RX ORDER — ACETAMINOPHEN 325 MG/1
650 TABLET ORAL EVERY 4 HOURS PRN
Status: DISCONTINUED | OUTPATIENT
Start: 2020-08-11 | End: 2020-08-13 | Stop reason: HOSPADM

## 2020-08-11 RX ORDER — BUPROPION HYDROCHLORIDE 150 MG/1
300 TABLET ORAL EVERY MORNING
Status: DISCONTINUED | OUTPATIENT
Start: 2020-08-12 | End: 2020-08-13 | Stop reason: HOSPADM

## 2020-08-11 RX ORDER — OLANZAPINE 2.5 MG/1
2.5 TABLET, FILM COATED ORAL ONCE
Status: DISCONTINUED | OUTPATIENT
Start: 2020-08-11 | End: 2020-08-11

## 2020-08-11 RX ORDER — LIDOCAINE 40 MG/G
CREAM TOPICAL
Status: DISCONTINUED | OUTPATIENT
Start: 2020-08-11 | End: 2020-08-13 | Stop reason: HOSPADM

## 2020-08-11 RX ORDER — TAMSULOSIN HYDROCHLORIDE 0.4 MG/1
0.4 CAPSULE ORAL AT BEDTIME
Status: DISCONTINUED | OUTPATIENT
Start: 2020-08-11 | End: 2020-08-13 | Stop reason: HOSPADM

## 2020-08-11 RX ORDER — METOPROLOL TARTRATE 1 MG/ML
5 INJECTION, SOLUTION INTRAVENOUS ONCE
Status: DISCONTINUED | OUTPATIENT
Start: 2020-08-11 | End: 2020-08-11

## 2020-08-11 RX ORDER — METOPROLOL TARTRATE 25 MG/1
25 TABLET, FILM COATED ORAL ONCE
Status: COMPLETED | OUTPATIENT
Start: 2020-08-11 | End: 2020-08-11

## 2020-08-11 RX ORDER — METOPROLOL TARTRATE 25 MG/1
25 TABLET, FILM COATED ORAL ONCE
Status: DISCONTINUED | OUTPATIENT
Start: 2020-08-12 | End: 2020-08-12

## 2020-08-11 RX ORDER — HYDRALAZINE HYDROCHLORIDE 20 MG/ML
5 INJECTION INTRAMUSCULAR; INTRAVENOUS ONCE
Status: COMPLETED | OUTPATIENT
Start: 2020-08-11 | End: 2020-08-11

## 2020-08-11 RX ORDER — ACETAMINOPHEN 650 MG/1
650 SUPPOSITORY RECTAL EVERY 4 HOURS PRN
Status: DISCONTINUED | OUTPATIENT
Start: 2020-08-11 | End: 2020-08-13 | Stop reason: HOSPADM

## 2020-08-11 RX ORDER — ROPINIROLE 0.25 MG/1
0.25 TABLET, FILM COATED ORAL 3 TIMES DAILY
Status: DISCONTINUED | OUTPATIENT
Start: 2020-08-11 | End: 2020-08-12

## 2020-08-11 RX ORDER — AMOXICILLIN 250 MG
2 CAPSULE ORAL 2 TIMES DAILY PRN
Status: DISCONTINUED | OUTPATIENT
Start: 2020-08-11 | End: 2020-08-13 | Stop reason: HOSPADM

## 2020-08-11 RX ADMIN — ROPINIROLE HYDROCHLORIDE 0.25 MG: 0.25 TABLET, FILM COATED ORAL at 18:29

## 2020-08-11 RX ADMIN — CARBIDOPA AND LEVODOPA 2 TABLET: 25; 100 TABLET ORAL at 13:15

## 2020-08-11 RX ADMIN — METOPROLOL TARTRATE 25 MG: 25 TABLET, FILM COATED ORAL at 14:01

## 2020-08-11 RX ADMIN — ROPINIROLE HYDROCHLORIDE 0.5 MG: 0.5 TABLET, FILM COATED ORAL at 13:15

## 2020-08-11 RX ADMIN — SIMVASTATIN 40 MG: 40 TABLET, FILM COATED ORAL at 22:03

## 2020-08-11 RX ADMIN — HYDRALAZINE HYDROCHLORIDE 5 MG: 20 INJECTION INTRAMUSCULAR; INTRAVENOUS at 19:54

## 2020-08-11 RX ADMIN — TAMSULOSIN HYDROCHLORIDE 0.4 MG: 0.4 CAPSULE ORAL at 22:03

## 2020-08-11 RX ADMIN — HYDRALAZINE HYDROCHLORIDE 5 MG: 20 INJECTION INTRAMUSCULAR; INTRAVENOUS at 22:32

## 2020-08-11 RX ADMIN — LORAZEPAM 0.5 MG: 2 INJECTION INTRAMUSCULAR; INTRAVENOUS at 23:45

## 2020-08-11 RX ADMIN — SULFAMETHOXAZOLE AND TRIMETHOPRIM 1 TABLET: 400; 80 TABLET ORAL at 22:03

## 2020-08-11 RX ADMIN — ASPIRIN 81 MG: 81 TABLET, COATED ORAL at 16:00

## 2020-08-11 RX ADMIN — CARBIDOPA AND LEVODOPA 2 TABLET: 25; 100 TABLET ORAL at 18:29

## 2020-08-11 ASSESSMENT — ENCOUNTER SYMPTOMS
FATIGUE: 0
CHILLS: 0
DIARRHEA: 0
SHORTNESS OF BREATH: 0
DIZZINESS: 0
HEADACHES: 0
LIGHT-HEADEDNESS: 1
VOMITING: 0
ABDOMINAL PAIN: 0
FEVER: 0
WEAKNESS: 1
NAUSEA: 0
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
## 2020-08-11 NOTE — ED TRIAGE NOTES
Pt has had multiple falls within the last few weeks - increased since Friday - pt has a history of parkinsons - today was unable to get into car by self - took assist of 3 to get out of car and into bed today
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
## 2020-08-11 NOTE — ED NOTES
[facility], [place]   ED Nurse to Floor Handoff     Vini Loya is a 79 year old male who speaks English and lives with a spouse,  in a home  They arrived in the ED by car from emergency room.  Parkinson's disease, hypertension, cerebral embolus with cerebral infarction, TIA, CAD s/p stent placement, CKD stage III, hyperlipidemia, BPH, ELISHA and MDD.  Per review of patient's chart, patient was recently seen in the Emergency Department here on 7/19/2020 after a fall in which he struck the back of his head.  Patient had a CT head and CT cervical spine, both of which were negative.     Patient presents to the Emergency Department today for evaluation of generalized weakness.  Per further review of patient's chart, the patient's wife contacted his neurology team on 8/7/2020 with concerns for increased balance issues and falls over the last few weeks.  It was recommended that the patient use a walker at all times and to sit down when feeling dizzy.  Per wife patient is been having more falls over the past week.  She says in the past couple days she is having difficulty getting out of bed by himself.  Patient was in the past able to use his walker and a few steps by himself.  Last night patient was helped to bed around midnight and again ended up falling.  This morning patient was again having difficulty getting out of bed so the wife used the help of her neighbors and brought him to the ER for evaluation.  Patient notes history of constipation and says he uses MiraLAX, senna and stool softeners.  Patient denies any chest pain or shortness of breath.  Patient denies any headache.  Says he has diffuse pain, all over her back from repeated falls.    ED Chief Complaint: Generalized Weakness    ED Dx;   Final diagnoses:   Fall, initial encounter   Parkinson's disease (H)   Generalized muscle weakness         Needed?: No    Allergies: No Known Allergies.  Past Medical Hx:   Past Medical  History:   Diagnosis Date     CAD (coronary artery disease)     With Stent Placement     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     Chronic infection     Bladder     Closed nondisplaced fracture of styloid process of left radius 10/16/2017     Corneal ulcer, unspecified     s/p right corneal tranplant--Herpetic       GENERALIZED ANXIETY DIS 5/22/2007     Hyperlipidemia LDL goal < 100      Hypertension goal BP (blood pressure) < 130/80      Hypertension, goal below 150/90 6/23/2016     Lactose intolerance in adult 12/8/2016     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     Parkinson's disease      Parkinsons disease (H)      Pyelonephritis 10/16/2017     Stented coronary artery      Unspecified transient cerebral ischemia 2006    possible     UTI (urinary tract infection) 12/2/2011 June 23 2015 -- hospitalized due to urine tract infection that became septic, elevated white count and acute kidney injury and mild confusion.       Baseline Mental status: WDL  Current Mental Status changes: at basesline    Infection present or suspected this encounter: no  Sepsis suspected: No  Isolation type: No active isolations     Activity level - Baseline/Home:  Walker  Activity Level - Current:   Stand with assist x2    Bariatric equipment needed?: No    In the ED these meds were given:   Medications   rOPINIRole (REQUIP) tablet 0.5 mg (0.5 mg Oral Given 8/11/20 1315)   carbidopa-levodopa (SINEMET)  MG per tablet 2 tablet (2 tablets Oral Given 8/11/20 1315)   metoprolol tartrate (LOPRESSOR) tablet 25 mg (25 mg Oral Given 8/11/20 1401)       Drips running?  No    Home pump  No    Current LDAs  Peripheral IV 08/11/20 Right (Active)   Number of days: 0       Incision/Surgical Site 05/30/18 Right Eye (Active)   Number of days: 804       Labs results:   Labs Ordered and Resulted from Time of ED Arrival Up to the Time of Departure from the ED   CBC WITH PLATELETS DIFFERENTIAL - Abnormal; Notable for the following components:        Result Value    WBC 12.1 (*)     Absolute Neutrophil 8.7 (*)     Absolute Monocytes 1.4 (*)     All other components within normal limits   COMPREHENSIVE METABOLIC PANEL - Abnormal; Notable for the following components:    Glucose 129 (*)     GFR Estimate 60 (*)     All other components within normal limits   ROUTINE UA WITH MICROSCOPIC - Abnormal; Notable for the following components:    pH Urine 7.5 (*)     Protein Albumin Urine 10 (*)     Squamous Epithelial /HPF Urine 2 (*)     Mucous Urine Present (*)     Amorphous Crystals Moderate (*)     All other components within normal limits   LACTIC ACID WHOLE BLOOD   URINE CULTURE AEROBIC BACTERIAL       Imaging Studies:   Recent Results (from the past 24 hour(s))   CT Head w/o Contrast    Narrative    CT HEAD W/O CONTRAST 8/11/2020 8:35 AM    Provided History: Head trauma, minor, GCS>=13, low clinical risk,  initial exam    Comparison: Head CT 7/19/2020.    Technique: Using multidetector thin collimation helical acquisition  technique, axial, coronal and sagittal CT images from the skull base  to the vertex were obtained without intravenous contrast.     Findings:    No intracranial hemorrhage, mass effect, or midline shift. The  ventricles are proportionate to the cerebral sulci. No new loss of  gray-white differentiation. The basal cisterns are patent. Chronic  encephalomalacia of the medial right occipital lobe, as well as in the  bilateral cerebellar hemispheres. Old right caudate head lacunar  infarct. Patchy foci of hypoattenuation in the subcortical and deep  cerebral white matter which is  unchanged. There is age appropriate  general parenchymal atrophy.    The visualized paranasal sinuses are clear. The mastoid air cells are  clear.       Impression    Impression:   1.  No acute intracranial pathology.  2.  No change in multiple old infarcts and chronic small vessel  ischemic disease.    I have personally reviewed the examination and initial  interpretation  and I agree with the findings.    STEVE STOCK MD   XR Chest Port 1 View    Narrative    Exam: XR CHEST PORT 1 VW, 8/11/2020 1:07 PM    Indication: weakness    Comparison: 10/15/2017    Findings:   The cardiomediastinal silhouette and pulmonary vasculature are within  normal limits. No appreciable pleural effusion or pneumothorax. No  focal airspace opacity.      Impression    Impression: No acute radiographic abnormality.    MATTEO FRY, DO       Recent vital signs:   BP (!) 182/107   Pulse 72   Temp 97.9  F (36.6  C)   Resp 23   Wt 76.2 kg (168 lb)   SpO2 96%   BMI 25.17 kg/m      Christian Coma Scale Score: 15 (08/11/20 1145)       Cardiac Rhythm: Normal Sinus  Pt needs tele? No  Skin/wound Issues: None    Code Status: Awaiting Orders     Pain control: pt had none    Nausea control: pt had none    Abnormal labs/tests/findings requiring intervention: See EPIC    Family present during ED course? Yes   Family Comments/Social Situation comments: WifeLiz Bledsoe is attentive and supportive to patient     Tasks needing completion: None    Julio Cesar Mejia RN  asc -- [phone] [place] [facility]  [phone] [facility]

## 2020-08-11 NOTE — PHARMACY-ADMISSION MEDICATION HISTORY
Admission Medication History Completed by Pharmacy    See [facility] Admission Navigator for allergy information, preferred outpatient pharmacy, prior to admission medications and immunization status.     Medication History Sources:     Patient and patient's wife    Changes made to PTA medication list (reason):    Added: None    Deleted: None    Changed:   o Increased carbidopa/levodopa to  3 tablets 3 times daily (most recent fill was for 2.5 tablets 3 times daily, per patient's wife he had been increased to 3 tablets)  o Changed Miralax and Senna-s to PRN  o Decreased ropinirole to 0.25mg 3 times daily (contradictory order had both 0.25mg and 0.5mg listed as doses in the same order)    Additional Information:    None    Prior to Admission medications    Medication Sig Last Dose Taking? Auth Provider   aspirin (ASA) 81 MG tablet Take 1 tablet (81 mg) by mouth daily 8/11/2020 at AM Yes Wegener, Joel Daniel Irwin, MD   buPROPion (WELLBUTRIN XL) 300 MG 24 hr tablet Take 1 tablet (300 mg) by mouth every morning 8/11/2020 at AM Yes Wegener, Joel Daniel Irwin, MD   carbidopa-levodopa (SINEMET)  MG tablet Take 2.5 tablets 3 times a day 1 hour before each meal.  (Around 7 am, 11 am, & 4 pm.)  Patient taking differently: Take 3 tablets by mouth 3 times daily 1 hour before each meal.  (Around 7 am, 11 am, & 4 pm.) 8/11/2020 at AM Yes Dalila Staples APRN CNP   polyethylene glycol (MIRALAX) 17 GM/SCOOP powder Take 17 g (1 capful) by mouth 3 times daily  Patient taking differently: Take 1 capful by mouth 3 times daily as needed for constipation  Past Week at Unknown time Yes Wegener, Joel Daniel Irwin, MD   rOPINIRole (REQUIP) 0.25 MG tablet Take 0.25 mg by mouth 3 times daily  8/11/2020 at AM Yes Reported, Patient   SENNA-docusate sodium (SENNA S) 8.6-50 MG tablet Take 2 tablets by mouth 2 times daily  Patient taking differently: Take 2 tablets by mouth 2 times daily as needed  Past Week at Unknown time Yes Wegener,  Home Armendariz MD   simvastatin (ZOCOR) 40 MG tablet TAKE ONE TABLET BY MOUTH EVERY NIGHT AT BEDTIME 8/10/2020 at PM Yes Wegener, Joel Daniel Irwin, MD   sulfamethoxazole-trimethoprim (BACTRIM/SEPTRA) 400-80 MG tablet Take 1 tablet by mouth At Bedtime For UTI prevention 8/10/2020 at PM Yes Wegener, Joel Daniel Irwin, MD   tamsulosin (FLOMAX) 0.4 MG capsule Take 1 capsule (0.4 mg) by mouth daily 8/10/2020 at PM Yes Wegener, Joel Daniel Irwin, MD   vitamin D3 (CHOLECALCIFEROL) 2000 units (50 mcg) tablet Take 1 tablet by mouth daily as needed  Past Month at Unknown time Yes Reported, Patient       Date completed: 08/11/20    Medication history completed by: Raymond Delarosa [facility]
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
## 2020-08-12 ENCOUNTER — APPOINTMENT (OUTPATIENT)
Dept: PHYSICAL THERAPY | Facility: CLINIC | Age: 80
End: 2020-08-12
Attending: NURSE PRACTITIONER
Payer: COMMERCIAL

## 2020-08-12 ENCOUNTER — TELEPHONE (OUTPATIENT)
Dept: FAMILY MEDICINE | Facility: CLINIC | Age: 80
End: 2020-08-12

## 2020-08-12 LAB
ALBUMIN UR-MCNC: 10 MG/DL
AMORPH CRY #/AREA URNS HPF: ABNORMAL /HPF
ANION GAP SERPL CALCULATED.3IONS-SCNC: 6 MMOL/L (ref 3–14)
APPEARANCE UR: ABNORMAL
BACTERIA SPEC CULT: NORMAL
BILIRUB UR QL STRIP: NEGATIVE
BUN SERPL-MCNC: 18 MG/DL (ref 7–30)
CALCIUM SERPL-MCNC: 8.8 MG/DL (ref 8.5–10.1)
CHLORIDE SERPL-SCNC: 104 MMOL/L (ref 94–109)
CO2 SERPL-SCNC: 28 MMOL/L (ref 20–32)
COLOR UR AUTO: YELLOW
CREAT SERPL-MCNC: 1.2 MG/DL (ref 0.66–1.25)
ERYTHROCYTE [DISTWIDTH] IN BLOOD BY AUTOMATED COUNT: 13.4 % (ref 10–15)
GFR SERPL CREATININE-BSD FRML MDRD: 56 ML/MIN/{1.73_M2}
GLUCOSE SERPL-MCNC: 93 MG/DL (ref 70–99)
GLUCOSE UR STRIP-MCNC: NEGATIVE MG/DL
HCT VFR BLD AUTO: 49.1 % (ref 40–53)
HGB BLD-MCNC: 16.3 G/DL (ref 13.3–17.7)
HGB UR QL STRIP: NEGATIVE
INTERPRETATION ECG - MUSE: NORMAL
KETONES UR STRIP-MCNC: 10 MG/DL
LEUKOCYTE ESTERASE UR QL STRIP: NEGATIVE
MCH RBC QN AUTO: 31.7 PG (ref 26.5–33)
MCHC RBC AUTO-ENTMCNC: 33.2 G/DL (ref 31.5–36.5)
MCV RBC AUTO: 96 FL (ref 78–100)
MUCOUS THREADS #/AREA URNS LPF: PRESENT /LPF
NITRATE UR QL: NEGATIVE
PH UR STRIP: 7.5 PH (ref 5–7)
PLATELET # BLD AUTO: 274 10E9/L (ref 150–450)
POTASSIUM SERPL-SCNC: 4 MMOL/L (ref 3.4–5.3)
RBC # BLD AUTO: 5.14 10E12/L (ref 4.4–5.9)
RBC #/AREA URNS AUTO: <1 /HPF (ref 0–2)
SODIUM SERPL-SCNC: 138 MMOL/L (ref 133–144)
SOURCE: ABNORMAL
SP GR UR STRIP: 1.01 (ref 1–1.03)
SPECIMEN SOURCE: NORMAL
TRANS CELLS #/AREA URNS HPF: <1 /HPF (ref 0–1)
UROBILINOGEN UR STRIP-MCNC: NORMAL MG/DL (ref 0–2)
WBC # BLD AUTO: 10.1 10E9/L (ref 4–11)
WBC #/AREA URNS AUTO: <1 /HPF (ref 0–5)

## 2020-08-12 PROCEDURE — 36415 COLL VENOUS BLD VENIPUNCTURE: CPT | Performed by: NURSE PRACTITIONER

## 2020-08-12 PROCEDURE — 97116 GAIT TRAINING THERAPY: CPT | Mod: GP

## 2020-08-12 PROCEDURE — 97161 PT EVAL LOW COMPLEX 20 MIN: CPT | Mod: GP

## 2020-08-12 PROCEDURE — 25000132 ZZH RX MED GY IP 250 OP 250 PS 637: Performed by: STUDENT IN AN ORGANIZED HEALTH CARE EDUCATION/TRAINING PROGRAM

## 2020-08-12 PROCEDURE — 81001 URINALYSIS AUTO W/SCOPE: CPT | Performed by: NURSE PRACTITIONER

## 2020-08-12 PROCEDURE — 25800030 ZZH RX IP 258 OP 636: Performed by: NURSE PRACTITIONER

## 2020-08-12 PROCEDURE — 99225 ZZC SUBSEQUENT OBSERVATION CARE,LEVEL II: CPT | Mod: Z6 | Performed by: INTERNAL MEDICINE

## 2020-08-12 PROCEDURE — 85027 COMPLETE CBC AUTOMATED: CPT | Performed by: NURSE PRACTITIONER

## 2020-08-12 PROCEDURE — 40000141 ZZH STATISTIC PERIPHERAL IV START W/O US GUIDANCE

## 2020-08-12 PROCEDURE — G0378 HOSPITAL OBSERVATION PER HR: HCPCS

## 2020-08-12 PROCEDURE — 25000132 ZZH RX MED GY IP 250 OP 250 PS 637: Performed by: NURSE PRACTITIONER

## 2020-08-12 PROCEDURE — 80048 BASIC METABOLIC PNL TOTAL CA: CPT | Performed by: NURSE PRACTITIONER

## 2020-08-12 PROCEDURE — 97530 THERAPEUTIC ACTIVITIES: CPT | Mod: GP

## 2020-08-12 RX ORDER — METOPROLOL TARTRATE 25 MG/1
25 TABLET, FILM COATED ORAL ONCE
Status: COMPLETED | OUTPATIENT
Start: 2020-08-12 | End: 2020-08-12

## 2020-08-12 RX ORDER — ROPINIROLE 0.5 MG/1
0.5 TABLET, FILM COATED ORAL 3 TIMES DAILY
Status: DISCONTINUED | OUTPATIENT
Start: 2020-08-12 | End: 2020-08-13 | Stop reason: HOSPADM

## 2020-08-12 RX ORDER — OLANZAPINE 2.5 MG/1
2.5 TABLET, FILM COATED ORAL
Status: DISCONTINUED | OUTPATIENT
Start: 2020-08-12 | End: 2020-08-12

## 2020-08-12 RX ADMIN — ACETAMINOPHEN 650 MG: 325 TABLET, FILM COATED ORAL at 07:38

## 2020-08-12 RX ADMIN — SIMVASTATIN 40 MG: 40 TABLET, FILM COATED ORAL at 22:31

## 2020-08-12 RX ADMIN — SODIUM CHLORIDE 500 ML: 9 INJECTION, SOLUTION INTRAVENOUS at 16:28

## 2020-08-12 RX ADMIN — SULFAMETHOXAZOLE AND TRIMETHOPRIM 1 TABLET: 400; 80 TABLET ORAL at 22:31

## 2020-08-12 RX ADMIN — ASPIRIN 81 MG: 81 TABLET, COATED ORAL at 07:39

## 2020-08-12 RX ADMIN — ROPINIROLE HYDROCHLORIDE 0.5 MG: 0.5 TABLET, FILM COATED ORAL at 15:54

## 2020-08-12 RX ADMIN — ROPINIROLE HYDROCHLORIDE 0.5 MG: 0.5 TABLET, FILM COATED ORAL at 11:24

## 2020-08-12 RX ADMIN — CARBIDOPA AND LEVODOPA 2 TABLET: 25; 100 TABLET ORAL at 11:24

## 2020-08-12 RX ADMIN — TAMSULOSIN HYDROCHLORIDE 0.4 MG: 0.4 CAPSULE ORAL at 22:31

## 2020-08-12 RX ADMIN — METOPROLOL TARTRATE 25 MG: 25 TABLET, FILM COATED ORAL at 08:50

## 2020-08-12 RX ADMIN — CARBIDOPA AND LEVODOPA 2 TABLET: 25; 100 TABLET ORAL at 07:38

## 2020-08-12 RX ADMIN — CARBIDOPA AND LEVODOPA 2 TABLET: 25; 100 TABLET ORAL at 15:54

## 2020-08-12 RX ADMIN — BUPROPION HYDROCHLORIDE 300 MG: 150 TABLET, FILM COATED, EXTENDED RELEASE ORAL at 07:38

## 2020-08-12 RX ADMIN — ROPINIROLE HYDROCHLORIDE 0.25 MG: 0.25 TABLET, FILM COATED ORAL at 07:38

## 2020-08-12 ASSESSMENT — ENCOUNTER SYMPTOMS
ACTIVITY IMPAIRMENT: IMPAIRED DUE TO WEAKNESS
DIETARY ISSUES: ADEQUATE INTAKE
NO PATIENT REPORTED PAIN: 1
WEAKNESS: 1

## 2020-08-12 NOTE — PROVIDER NOTIFICATION
Ed Obs paged: Pt pulled IV, can we wait till morning or do you want stat for a new IV?    Per provider: Wait until AM for new PIV placement.

## 2020-08-12 NOTE — PROGRESS NOTES
[facility], [place]    ED Observation Progress Note        Date of Admission:  8/11/2020  Assessment & Plan   Vini Loya is a 79 year old male admitted on 8/11/2020. He has a PMH significant for HTN,Parkinson's disease with subsequent gait issues, CVA, TIA, CAD s/p stent placement, CKD stage III, hyperlipidemia, ELISHA, depression, chronic back pain, BPH with h/o obstruction who presents to the ED after a fall last night.     1. Fall:  2. Parkinson's disease:   Patient fell last night. Patient's wife reports it has been progressively difficult to take care of him. She has been unable to assess him out of bed. His wife reports multiple falls a day since Friday. His wife says he always falls onto his back. The patient denies any painful areas aside from diffuse discomfort in his spine. He needed 3 people to help him into the car today. The patient feels his falls have been a result of not being able to go to the gym due to COVID. He usually does exercises on the treadmill and weights.  In the ED:   Vitals:BP: 150/94 Pulse: 68 Temp: 97.9 Resp: 17 SP02:94 % Labs:Na 138, K 4.1 , Cr 1.14 BUN 19, LFTS normal., , WBC 12.1, Hgb 15.9, Plt 272, BC pending,  Medications: Metoprolol 25 mg daily. Imaging: CT head: No acute intracranial pathology. No change in multiple old infarcts and chronic small vessel ischemic disease. Chest xray: No acute radiographic abnormality. EKG: Consults: Neurology consulted in the ED: recommended to continue home requip and Sinemet,  Physical therapy and occupational therapy and follow up with primary neurologist.  Plan: Admit to ED observation unit for PT consult and social work/CC consult. COVID test pending  - Continue home Requip and Sinemet     - Care coordinator Consult  - PT/OT consult  - Up with assist  - Fall precautions  - Follow up with primary neurologist (sent her a staff message to make her aware of his admission)  - COVID test pending  - CBC,BMP in  am     3. Hypertension: Patient had previously been on lisinopril and metoprolol.   - restarted Metoprolol 25 mg daily  - Hydralazine IV prn SBP >150 and/or DBP >100.      4. Chronic issues:  # Recurrent UTIs: Continue Bactrim  # CAD, h/o CVA. Continue home baby aspirin.   # Depression. Continue home Paxil and Wellbutrin  # HLD. Continue simvastatin   # BPH. Continue home flomax.   # Constipation: Miralax and Senna S prn       Diet: Regular Diet Adult    DVT Prophylaxis: Low Risk/Ambulatory with no VTE prophylaxis indicated  Palomares Catheter: not present  Code Status: Full Code           Disposition Plan   Expected discharge: 2 - 3 days, recommended to prior living arrangement once safe disposition plan/ TCU bed available.  Entered: Cheryl Manzanares CNP 08/11/2020, 10:18 PM       The patient's care was discussed with the Attending Physician, Dr. Beltran Nuñez.    Cheryl Manzanares CNP  ED Observation, 6D  Ascom:[id number]  [facility], [place]  ______________________________________________________________________    Interval History   Patient confused overnight. Called his wife himself and told her he was in a warehouse in [place] and she needed to come get him. Wife was understandably upset. It is not clear whether this is a progressive confusion that has been occurring at home. Pts wife reports that he forgets to wait for assistance with ambulating. On initial exam and on repeat exam an hour later, the patient is afebrile, neurologically intact, alert and oriented x3. Blood cultures pending. He remains hypertensive.    Data reviewed today: I reviewed all medications, new labs and imaging results over the last 24 hours. I personally reviewed the head ct, chest xray image(s) showing no acute changes.    Physical Exam   Vital Signs: Temp: 97.8  F (36.6  C) Temp src: Oral BP: (!) 227/127(pts nurse aware) Pulse: 53 Heart Rate: 66 Resp: 18 SpO2: 96 % O2 Device: None (Room air)    Weight: 168 lbs 0  oz  Constitutional: awake, alert, cooperative, no apparent distress, and appears stated age  Eyes: Lids and lashes normal, pupils equal, round and reactive to light, extra ocular muscles intact, sclera clear, conjunctiva normal  ENT: Normocephalic, without obvious abnormality, atraumatic, external ears without lesions, oral pharynx with moist mucous membranes  Respiratory: No increased work of breathing, good air exchange, clear to auscultation bilaterally, no crackles or wheezing  Cardiovascular: Regular rate and rhythm, normal S1 and S2, no S3 or S4, and no murmur noted  Abdomen: Normal bowel sounds, soft, non-distended, non-tender, no masses palpated, no hepatosplenomegally  Skin: normal skin color, texture, turgor  Musculoskeletal: There is no redness, warmth, or swelling of the joints.  Full range of motion noted.  Motor strength is 4 out of 5 all extremities bilaterally.  Tone is normal.  Neurologic: Awake, alert, oriented to name, place and time.  Cranial nerves II-XII are grossly intact.  Motor is 5 out of 5 bilaterally.  Cerebellar finger to nose, heel to shin intact.  Sensory is intact.      Data   Recent Labs   Lab 08/11/20  0720   WBC 12.1*   HGB 15.9   MCV 96         POTASSIUM 4.1   CHLORIDE 106   CO2 28   BUN 19   CR 1.14   ANIONGAP 4   JEMMA 9.0   *   ALBUMIN 3.4   PROTTOTAL 7.4   BILITOTAL 1.3   ALKPHOS 123   ALT 11   AST 22     Recent Results (from the past 24 hour(s))   CT Head w/o Contrast    Narrative    CT HEAD W/O CONTRAST 8/11/2020 8:35 AM    Provided History: Head trauma, minor, GCS>=13, low clinical risk,  initial exam    Comparison: Head CT 7/19/2020.    Technique: Using multidetector thin collimation helical acquisition  technique, axial, coronal and sagittal CT images from the skull base  to the vertex were obtained without intravenous contrast.     Findings:    No intracranial hemorrhage, mass effect, or midline shift. The  ventricles are proportionate to the cerebral  sulci. No new loss of  gray-white differentiation. The basal cisterns are patent. Chronic  encephalomalacia of the medial right occipital lobe, as well as in the  bilateral cerebellar hemispheres. Old right caudate head lacunar  infarct. Patchy foci of hypoattenuation in the subcortical and deep  cerebral white matter which is  unchanged. There is age appropriate  general parenchymal atrophy.    The visualized paranasal sinuses are clear. The mastoid air cells are  clear.       Impression    Impression:   1.  No acute intracranial pathology.  2.  No change in multiple old infarcts and chronic small vessel  ischemic disease.    I have personally reviewed the examination and initial interpretation  and I agree with the findings.    STEVE STOCK MD   XR Chest Port 1 View    Narrative    Exam: XR CHEST PORT 1 VW, 8/11/2020 1:07 PM    Indication: weakness    Comparison: 10/15/2017    Findings:   The cardiomediastinal silhouette and pulmonary vasculature are within  normal limits. No appreciable pleural effusion or pneumothorax. No  focal airspace opacity.      Impression    Impression: No acute radiographic abnormality.    MATTEO FRY,            This patient was discussed with the Care Team in the OBS Unit.  The patient's chart was reviewed and the patient was also seen and evaluated by me.  The plan of care was discussed and reviewed with the Care Team.  The above documentation reflects the evaluation, medical decision making and plan under my supervision.    Beltran Nuñez MD, FACEP  [facility] Staff Emergency Physician

## 2020-08-12 NOTE — PROGRESS NOTES
Observation Goals Prior to Discharge:  - Vital Signs normal or at patient baseline.: Met  - Tolerating oral intake to maintain hydration.:Met  - Diagnostic tests and consults completed and resulted: met  - Cleared for discharge from consultants' standpoint if involved in care: Not met  - Safe disposition plan has been identified: Not met

## 2020-08-12 NOTE — PLAN OF CARE
OT 6D. Defer. OT orders acknowledged and appreciated.   Discharge Planner OT   Patient plan for discharge: TCU  Current status: Per conversation with PT, pt is requiring CGA-mod A with functional transfers and mobility at this time.   Barriers to return to prior living situation: medical needs, balance deficits, level of assist  Recommendations for discharge: Defer to PT  Rationale for recommendations: Pt does have OT needs, however due to already having a safe discharge plan of TCU in place and pt on observation status, will complete OT orders.        Entered by: uOmou Ennis 08/12/2020 10:26 AM

## 2020-08-12 NOTE — PROGRESS NOTES
Reason for admission: Frequent falls at home.  Primary team notified of pt arrival : Yes.  Admitted from:ED.  Via: stretcher  Accompanied by: Staff.  Belongings: Placed in closet (sock).  Admission Profile: Unable to complete due to pt's confusion and wife not here. Writer spoke to wife to do admission, not available for the whole process.  Teaching: orientation to unit and call light- call light within reach, call don't fall, use of console, meal times, when to call for the RN, and enforced importance of safety. Bed alarm utilized at admission, then later on pt became more confused and attempted to get out of the bed. Pt decline redirections from staff. Provider was informed and attendant was ordered for his safety.  Access: PIV  Ht./Wt.: complete.  Pt's bp has been elevated, provider was informed and Hydralazine was given upon arrival and no improvement. Provider wanted recheck about an hour later and it was done and no change in pb, so another dose was given. Will continue to monitor.

## 2020-08-12 NOTE — PROGRESS NOTES
Spoke to patient regarding provider note below. Patient expressed understanding without any other questions or concerns.    Vini Loya is a 79 year old male patient.  1. Fall, initial encounter    2. Parkinson's disease (H)    3. Generalized muscle weakness      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     Chronic infection     Bladder     Closed nondisplaced fracture of styloid process of left radius 10/16/2017     Corneal ulcer, unspecified     s/p right corneal tranplant--Herp[facility]       GENERALIZED ANXIETY DIS 5/22/2007     Hyperlipidemia LDL goal < 100      Hypertension goal BP (blood pressure) < 130/80      Hypertension, goal below 150/90 6/23/2016     Lactose intolerance in adult 12/8/2016     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     Parkinson's disease      Parkinsons disease (H)      Pyelonephritis 10/16/2017     Stented coronary artery      Unspecified transient cerebral ischemia 2006    possible     UTI (urinary tract infection) 12/2/2011 June 23 2015 -- hospitalized due to urine tract infection that became septic, elevated white count and acute kidney injury and mild confusion.      No current outpatient medications on file.     No Known Allergies  Active Problems:    Fall    Blood pressure 116/71, pulse 50, temperature 98.4  F (36.9  C), temperature source Oral, resp. rate 16, weight 76.2 kg (168 lb), SpO2 96 %.    Subjective:  Symptoms:  Stable.  He reports weakness.    Diet:  Adequate intake.    Activity level: Impaired due to weakness.    Pain:  He reports no pain.      Objective:  General Appearance:  Comfortable.    Vital signs: (most recent): Blood pressure 116/71, pulse 50, temperature 98.4  F (36.9  C), temperature source Oral, resp. rate 16, weight 76.2 kg (168 lb), SpO2 96 %.  Vital signs are normal.    Output: Producing urine.    HEENT: Normal HEENT exam.    Lungs:  Normal effort and normal respiratory rate.  Breath sounds clear to auscultation.    Heart: Normal rate.  Regular rhythm.  S1 normal and S2  normal.    Abdomen: Abdomen is soft.  Bowel sounds are normal.   There is no abdominal tenderness.     Extremities: Normal range of motion.    Pulses: Distal pulses are intact.    Neurological: Patient is alert.  (Parkinsonian tremors).    Pupils:  Pupils are equal, round, and reactive to light.    Skin:  Warm.      Assessment:    Condition: In stable condition.  Improving.   (79 yom with Parkinson presents with falls. Last night an episode of confusion but better now. Neuro input appreciated.    Appreciate PT input-TCU placement.).       Mary Grace Chaparro MD, MD  8/12/2020    The pt was seen and examined by myself. The case was reviewed and the plan was discussed with the NINA.

## 2020-08-12 NOTE — PLAN OF CARE
Observation Goals Prior to Discharge:  - Vital Signs normal or at patient baseline.: Not met, hypertensive but slowly coming back to baseline.   - Tolerating oral intake to maintain hydration.: In progress  - Diagnostic tests and consults completed and resulted: In progress  - Cleared for discharge from consultants' standpoint if involved in care: Not met  - Safe disposition plan has been identified: Not met

## 2020-08-12 NOTE — PLAN OF CARE
PT 6D: Up with Ax1 for pivot transfers using gait belt and walker. Ax2 to ambulate with FWW + gait belt, standing very close to patient with hand on belt at all times.    Discharge Planner PT   Patient plan for discharge: Open to rehab  Current status: Evaluation complete and treatment indicated. Pt oriented to person, place, time, situation. Engaged pt in bed mobility with min A due to posterior lean, sitting at EOB with CGA due to frequent posterior lean, gait with 4WW ~300ft at CGA with min-mod A during turning to recover significant loss of balance. Pt is at an increased risk for falls.   Barriers to return to prior living situation: medical status, home set up (stairs, wife likely unable to provide current level of assist for safe mobility)  Recommendations for discharge: TCU  Rationale for recommendations: Pt is below baseline for mobility and would be unsafe to return home. Pt is limited by impaired endurance, strength, and balance.        Entered by: Erinn Lowery 08/12/2020 10:20 AM

## 2020-08-12 NOTE — PROGRESS NOTES
[facility]  Neurology Consultation - Progress Note    Patient Name:  Vini Loya  MRN:  [id number]    :  1940  Date of Service:  2020  Primary care provider:  Wegener, Joel Daniel Irwin      Patient Summary:   79 year old male h/o Parkinsons Disease who presents with increasing falls.     Interval Events: Nursing notes reviewed. Pt was agitated overnight. Given ativan 0.5 mg x 1 last night. Otherwise, this morning he has no new symptoms. Given his sinemet dose 2 hours prior to seeing him. Of note, when seeing patient again, he says he took 3 tabs of sinemet three times daily + 1 tab of ropinirole three times daily; unclear if he is confused but it seems this sinemet 3 tabs had been tried earlier this summer and it was decreased back to 2 tabs as pt had presyncopal episodes. He does say that when he took his sinemet at home yesterday vs today morning, he feels a little better today. Again, it is unclear if he was taking the 3 tabs sinemet TID -- if so, this may have been the reason for increasing falls. Either way, would recommend he sticks     Physical Examination   Vitals: BP (!) 160/83 (BP Location: Left arm)   Pulse 60   Temp 97.8  F (36.6  C) (Oral)   Resp 16   Wt 76.2 kg (168 lb)   SpO2 96%   BMI 25.17 kg/m    General: Adult male patient, lying in bed, NAD  HEENT: NC/AT, no icterus, op pink and moist  Cardiac: RR  Chest: non-labored on RA  Extremities: Warm, no edema  Skin: No rash or lesion   Psych: affect congruent   Neuro: Examined 2 hours after receiving sinemet  Mental status: Awake, alert, attentive, oriented to situation. Language is fluent. Hypophonia. Decreased facial expression. Follows simple commands.   Cranial nerves: VFF, conjugate gaze, EOMI, facial sensation intact, face symmetric, shoulder shrug strong, tongue midline, no dysarthria.   Motor: Increased tone in BUE but this is confounded by some degree of paratonia adelaida in LUE.  Unable to truly assess tone in BLE as pt could not relax legs. Mild resting tremor in LUE and moderate tremor in LLE. Full strength in all 4 extremities. Bradykinesia with finger tapping, slower on L side. Hand opening slower on L side.  Reflexes: 2+ and symmetric biceps and brachioradialis. 3+ at R patellar, 2+ at L patellar. 2+ at bilateral achilles. Few beats of clonus bilaterally. Toes equivocal.  Sensory: Intact to light touch throughout all 4 extremities  Coordination: FNF without ataxia or dysmetria bilaterally  Gait: deferred, but able to sit up in his bed with little assistance    Investigations     CBC RESULTS:   Recent Labs   Lab Test 08/12/20  0622   WBC 10.1   RBC 5.14   HGB 16.3   HCT 49.1   MCV 96   MCH 31.7   MCHC 33.2   RDW 13.4        Recent Labs   Lab Test 08/12/20 0622 08/11/20  0720    138   POTASSIUM 4.0 4.1   CHLORIDE 104 106   CO2 28 28   ANIONGAP 6 4   GLC 93 129*   BUN 18 19   CR 1.20 1.14   JEMMA 8.8 9.0       Impression  Mr. Loya is an 79 year old with a [facility] parkinson's disease presenting with falls and weakness.  This appears to be a chronic process with some worsening over the past couple days. Did have some abdominal pain that seems to have been related to constipation over the past couple days, but otherwise the CT head is without evidence of acute process and labs and clinical history without evidence of infection (which could exacerbate his falls). For this reason, believe this is likely progression of his underlying parkinson's disease. On exam today, ~2 hours after receiving his current PTA Sinemet dose, he appeared much improved from yesterday's exam (yesterday, examined 2 hours after missing his dose). He was able to sit up on his own which is a significant difference from yesterday. For this reason, we would not want to alter his meds inpatient as he is stable overall.     Of note, pt may have been incorrectly taking 3 tabs of sinemet three times daily at home;  unclear if he is confused but it seems this sinemet 3 tabs had been tried in the past and it was decreased back to 2 tabs as pt had presyncopal episodes. He does say that when he took his sinemet at home yesterday vs today morning, he feels a little better today. If he was taking the 3 tabs sinemet TID at home, this may have been the reason for increasing falls. Either way, would recommend he sticks to 2 tabs sinemet TID as written in note on 6/29/20 by KOTA Malcolm, CNP.    Additionally, his Ropinirole dose was ordered as 0.25 mg TID as there was some confusion during pharm med rec (it seems both doses had been seen in his orders). Since his provider's note reports pt should be on 0.5 mg TID, we would recommend placing him on this dose (we have updated this in his orders).    For agitation, recommend avoiding antipsychotics (I.e. Zyprexa) as this is antidopinergic and can worsen his parkinson's symptoms; recommend using low dose seroquel if needed.    Recommendations  - Continue Sinemet 2 tabs of 25/100 TID (7 am, 11am, 4 pm), if pt was taking more please make sure he is discharged with this dose  - Resume the Ropinirole dose noted in last clinic visit note --> Ropinerole 0.5 mg TID (7 am, 11am, 4 pm), already ordered for you    PD Medications 7 am 11 am 4 pm   Sinemet 25/100 mg  2 2 2   Ropinirole 0.5 mg  1 1 1      - Continue Physical therapy and occupational therapy  - Follow up with primary neurologist (staff message sent to make her aware of his admission)  - We will sign off at this time    Thank you for involving Neurology in the care of Vini Loya.  Please do not hesitate to call with questions/concerns (consult pager [phone]).      Patient was seen and discussed with Dr. Duval.    Marge Encinas MD  Neurology PGY-3  [phone]

## 2020-08-12 NOTE — PLAN OF CARE
Observation Goals Prior to Discharge:  - Vital Signs normal or at patient baseline: Met /65 (BP Location: Left arm)   Pulse 52   Temp 97.8  F (36.6  C) (Oral)   Resp 16   Wt 76.2 kg (168 lb)   SpO2 95%   BMI 25.17 kg/m    - Tolerating oral intake to maintain hydration: In progress, encouraging fluids   - Diagnostic tests and consults completed and resulted: In progress, PT consult before discharge  - Cleared for discharge from consultants' standpoint if involved in care: In progress  - Safe disposition plan has been identified: In progress    Sitter at bedside. Disoriented x4 overnight, attempted to reorient. Pt polite and laughing with author but attempted to hit sitter and get up at beginning of shift. Provider notified, PRN xyprexa ordered. Unable to accurately complete neuro check as pt was unable to follow commands and slept off and on overnight. BP was elevated during evening shift at 227/117 but now at 100/65 after dose of hydralazine. Provider recommended turning the lights down and the TV on as that is how pt sleeps at home, once done the pt slept throughout the night. External cath in place for urine collection. Continuing to monitor and follow POC.

## 2020-08-12 NOTE — PROGRESS NOTES
08/12/20 0924   Quick Adds   Type of Visit Initial PT Evaluation      Language English   Living Environment   Lives With spouse   Living Arrangements house   Home Accessibility stairs to enter home;stairs within home   Number of Stairs, Main Entrance 6   Stair Railings, Main Entrance   (B rails in back, 0 rails in front)   Number of Stairs, Within Home, Primary other (see comments)  (1 flight)   Stair Railings, Within Home, Primary railing on left side (ascending)   Transportation Anticipated family or friend will provide   Living Environment Comment Tub on main level, shower in basement. Pt reports driving still, but wife drives more. Retired . 2 kids live near by and can help if needed. Wife can assist pt if needed - does volunteer work for the [employer]. Pt will need to acces th ebasement.    Self-Care   Usual Activity Tolerance moderate   Current Activity Tolerance fair   Regular Exercise Yes   Activity/Exercise Type walking   Exercise Amount/Frequency 2 times/wk   Equipment Currently Used at Home walker, rolling   Activity/Exercise/Self-Care Comment Was doing PT for PD every Friday. Pre COVID liked to go to the gym. Pt has 4WW at home, doesnt use at baseline, but using recently.    Functional Level Prior   Ambulation 0-->independent   Transferring 0-->independent   Toileting 0-->independent   Bathing 0-->independent   Communication 0-->understands/communicates without difficulty   Swallowing 0-->swallows foods/liquids without difficulty   Fall history within last six months yes   Number of times patient has fallen within last six months 10   Which of the above functional risks had a recent onset or change? ambulation;transferring;toileting;bathing;dressing;cognition;fall history   Prior Functional Level Comment Pt is usually IND in mobility. Pt with recent and frequent falls so he has been using his 4WW.    General Information   Onset of Illness/Injury or Date of Surgery - Date  08/11/20  (date of PT orders)   Referring Physician Radha Ratliff APRN CNP    Patient/Family Goals Statement To get stronger   Pertinent History of Current Problem (include personal factors and/or comorbidities that impact the POC) Pt is a 79 year old male admitted on 8/11/2020. He has a PMH significant for HTN,Parkinson's disease with subsequent gait issues, CVA, TIA, CAD s/p stent placement, CKD stage III, hyperlipidemia, ELISHA, depression, chronic back pain, BPH with h/o obstruction who presents to the ED after a fall last night. - per chart review   Precautions/Limitations fall precautions   General Observations Pt sitting up in bed, alarm on, agreeable to therapy   General Info Comments Activity: ambulate with assists   Cognitive Status Examination   Orientation orientation to person, place and time   Level of Consciousness alert   Follows Commands and Answers Questions 100% of the time;able to follow multistep instructions   Personal Safety and Judgment intact   Memory intact   Cognitive Comment Pt reporting some changes in memory recently. Pt talking [facility] leaving the hospital last [facility]    Pain Assessment   Patient Currently in Pain No   Integumentary/Edema   Integumentary/Edema no deficits were identifed   Posture    Posture Forward head position;Protracted shoulders   Range of Motion (ROM)   ROM Comment WNL   Strength   Strength Comments B UE/LE 4/5 grossly   Bed Mobility   Bed Mobility Comments Min A to scoot towards EOB due to posterior lean   Transfer Skills   Transfer Comments Sit <> stand at CGA to min A due to posterior lean   Gait   Gait Comments Pt ambulating with 4WW, posteior-right lateral lean, strong heel contact and toes extended during gait   Balance   Balance Comments Sitting static fair. Sitting dynamic poor. Standing static and dynamic poor   Sensory Examination   Sensory Perception Comments Pt denies N/T   General Therapy Interventions  "  Planned Therapy Interventions ADL retraining;balance training;bed mobility training;gait training;neuromuscular re-education;strengthening;stretching;transfer training;risk factor education;home program guidelines;progressive activity/exercise   Clinical Impression   Criteria for Skilled Therapeutic Intervention yes, treatment indicated   PT Diagnosis Impaired functional mobility   Influenced by the following impairments Impaired strength, balance, endurance, posture, cognition   Functional limitations due to impairments Impaired mobility, limiting return to community at OF   Clinical Presentation Stable/Uncomplicated   Clinical Presentation Rationale Per clinical judgement   Clinical Decision Making (Complexity) Low complexity   Therapy Frequency 5x/week  (if inpatient. 1x eval/treat under observation)   Predicted Duration of Therapy Intervention (days/wks) 1 week   Anticipated Equipment Needs at Discharge   (none anticipated if dc to TCU)   Anticipated Discharge Disposition Transitional Care Facility   Risk & Benefits of therapy have been explained Yes   Patient, Family & other staff in agreement with plan of care Yes   [facility] TM \"6 Clicks\"   2016, Trustees of [facility], under license to [employer].  All rights reserved.   6 Clicks Short Forms Basic Mobility Inpatient Short Form   [facility]  \"6 Clicks\" V.2 Basic Mobility Inpatient Short Form   1. Turning from your back to your side while in a flat bed without using bedrails? 4 - None   2. Moving from lying on your back to sitting on the side of a flat bed without using bedrails? 3 - A Little   3. Moving to and from a bed to a chair (including a wheelchair)? 3 - A Little   4. Standing up from a chair using your arms (e.g., wheelchair, or bedside chair)? 3 - A Little   5. To walk in hospital room? 3 - A Little   6. Climbing 3-5 steps with a railing? 2 - A Lot   Basic Mobility Raw Score (Score out of 24.Lower scores " equate to lower levels of function) 18   Total Evaluation Time   Total Evaluation Time (Minutes) 6

## 2020-08-12 NOTE — PROGRESS NOTES
Neuro: Intermittent confusion- worse during the night. Bed alarm on .   Cardiac: HTN VSS.   Respiratory: Sating 98% on RA. Lungs clear  GI/: Incontinent BM x2  Diet/appetite: Tolerating regular diet. Eating well. Tray set up and help to order  Activity:  Assist of 2 + gait belt and walker, up in halls.  Pain: At acceptable level on current regimen. Denies  Skin: Buttock red. Barrier past applied.   LDA's:  No piv at this time.     Plan: Continue with POC. Notify primary team with changes.

## 2020-08-12 NOTE — TELEPHONE ENCOUNTER
Reason for call:  Patient reporting a symptom    Symptom or request: patient is in the hospital with symptoms of falling down and trouble walking.    Ladi is patient's wife and needs to speak with an RN today ASAP.    She wants Dr. Wegener to be involved with what is going on and work her in the schedule if need be; she will be unable to care for him if they send him home.    Duration (how long have symptoms been present): recently    Have you been treated for this before? No    Additional comments: call Ladi today    Phone Number patient can be reached at:  Home number on file [phone] (home)    Best Time:  asap    Can we leave a detailed message on this number:  YES    Call taken on 8/12/2020 at 9:18 AM by Sruthi Borrego

## 2020-08-12 NOTE — PROGRESS NOTES
"Social Work: Assessment with Discharge Plan    Patient Name:  Vini Loya  :  1940  Age:  79 year old  MRN:  [id number]  Risk/Complexity Score:     Completed assessment with:  Patients wife Kristi over the phone, assessment was not completed with patient as he was having some confusion and deferred to his wife for assessment.     Presenting Information   Reason for Referral:  Discharge plan and Caregiver issues  Date of Intake:  2020  Referral Source:  Physician  Decision Maker:  Patient at baseline  Alternate Decision Maker:  Per [place] policy NOK wife Kristi  Health Care Directive:  Will bring in copy  Living Situation:  House  Previous Functional Status:  The patient required assistance with most IADL's but is able to complete ADLs Wife states he is now struggling with mobility and needs bathing assistance which she feels she cannot provide.   Patient and family understanding of hospitalization:  \"I cant come home if he cant do the stairs, he keeps falling\".   Cultural/Language/Spiritual Considerations:  Not discussed  Adjustment to Illness: Per wife, he is frustrated he cannot do the things he wants to. Wife sounds overwhelmed at home.     Physical Health  Reason for Admission:    1. Fall, initial encounter    2. Parkinson's disease (H)    3. Generalized muscle weakness      Services Needed/Recommended:  TCU    Mental Health/Chemical Dependency  Diagnosis:  None  Support/Services in Place:  NA  Services Needed/Recommended:  NA    Support System  Significant relationship at present time:  Wife, daughter in [place], two other children in [facility] who are less involved. [employer] in [place]  Family of origin is available for support:  Yes, but with patients weakness, isn't able to physically care for him.   Other support available:  None  Gaps in support system:  The patient and his wife do not have any caregiver support in the home.   Patient is caregiver to:  None     Provider Information   Primary " Care Physician:  Wegener, Joel Daniel Irwin   [phone]   Clinic:  [address] [place] [id number]      :  NA    Financial   Income Source:  Social Security  Financial Concerns:  None  Insurance:    Payor/Plan Subscriber Name Rel Member # Group #   UCARE - UCARE MEDICAR* GRAZYNA POTTS  [id number]       [address]       Discharge Plan   Patient and family discharge goal:  Wife would like patient to go to TCU to see if he can get stronger to come home again, otherwise they are looking into LTC.   Provided education on discharge plan:  YES  Patient agreeable to discharge plan:  Patient deferred to his wife for plan  A list of Medicare Certified Facilities was provided to the patient and/or family to encourage patient choice. Patient's choices for facility are:  Facility list was provided via phone. Referrals sent listed below.   Will NH provide Skilled rehabilitation or complex medical:  YES  General information regarding anticipated insurance coverage and possible out of pocket cost was discussed. Patient and patient's family are aware patient may incur the cost of transportation to the facility, pending insurance payment: Yes-Wife may want to transport patient if it is safe to do so.   Barriers to discharge:  Facility acceptance and medical clearance.     Discharge Recommendations   Anticipated Disposition:  Facility:  TBD  Transportation Needs:  Other:  Family VS [facility]    Additional comments   Grazyna Potts is a 79 year old male who presented to the ED after a fall and weakness at home. Currently Grazyna is in observation status for further workup. PT is recommending TCU placement.      Wife reports that [facility] and [facility]. After contacting the [facility], the [facility] (TCU) is closed right now. Also, the patient is only 10% service connected and does not qualify for any supports at this time. Wife updated and chose referrals of Medicare.gov list. We reviewed the  list over the phone. Referrals pending. Pt will need PAS from competed once TCU is identified.     [facility]  [address] [place] [address]  [phone]    [facility]  [address] [place] [address]  [phone]    [facility]  [address], [place] [id number] [phone]    [facility]  [address] [place], [place] [id number] [phone]    [facility]  [address] [place], [place] [address] [phone]      Korin EPPS. LGSW.  Clinical   [facility]    Pager: [phone] Mon-Sat 9 am - 4:30 pm  On-call/after hours pager [phone]

## 2020-08-12 NOTE — PROGRESS NOTES
Vini Loya is a 79 year old male patient.  1. Fall, initial encounter    2. Parkinson's disease (H)    3. Generalized muscle weakness      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     Chronic infection     Bladder     Closed nondisplaced fracture of styloid process of left radius 10/16/2017     Corneal ulcer, unspecified     s/p right corneal tranplant--Herpetic       GENERALIZED ANXIETY DIS 5/22/2007     Hyperlipidemia LDL goal < 100      Hypertension goal BP (blood pressure) < 130/80      Hypertension, goal below 150/90 6/23/2016     Lactose intolerance in adult 12/8/2016     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     Parkinson's disease      Parkinsons disease (H)      Pyelonephritis 10/16/2017     Stented coronary artery      Unspecified transient cerebral ischemia 2006    possible     UTI (urinary tract infection) 12/2/2011 June 23 2015 -- hospitalized due to urine tract infection that became septic, elevated white count and acute kidney injury and mild confusion.      No current outpatient medications on file.     No Known Allergies  Active Problems:    Fall    Blood pressure (!) 160/83, pulse 60, temperature 97.8  F (36.6  C), temperature source Oral, resp. rate 16, weight 76.2 kg (168 lb), SpO2 96 %.    Subjective:  Symptoms:  Stable.    Diet:  Adequate intake.    Activity level: Impaired due to weakness.    Pain:  He reports no pain.      Objective:  General Appearance:  Comfortable.    Vital signs: (most recent): Blood pressure (!) 160/83, pulse 60, temperature 97.8  F (36.6  C), temperature source Oral, resp. rate 16, weight 76.2 kg (168 lb), SpO2 96 %.  Vital signs are normal.    Output: Producing urine.    HEENT: Normal HEENT exam.    Lungs:  Normal effort and normal respiratory rate.  Breath sounds clear to auscultation.    Heart: Normal rate.  Regular rhythm.  S1 normal and S2 normal.     Abdomen: Abdomen is soft.  Bowel sounds are normal.   There is no abdominal tenderness.     Extremities: Normal range of motion.    Pulses: Distal pulses are intact.    Neurological: Patient is alert.  (Parkinsonian tremors).    Pupils:  Pupils are equal, round, and reactive to light.    Skin:  Warm.      Assessment:    Condition: In stable condition.  Improving.   (79 yom with Parkinson presents with falls. An episode of confusion last night but now back to baseline. At least recheck UA for UTI. No chest symptoms.    Awaiting PT input for possible TCU.).       Mary Grace Chaparro MD, MD  8/12/2020    The pt was seen and examined by myself. The case was reviewed and the plan was discussed with the NINA.

## 2020-08-12 NOTE — PROGRESS NOTES
Care Coordinator - Discharge Planning    Admission Date/Time:  8/11/2020  Attending MD:  Conchis att. providers found     Data  Chart reviewed, discussed with interdisciplinary team.   Patient was admitted for:   1. Fall, initial encounter    2. Parkinson's disease (H)    3. Generalized muscle weakness         Assessment   Concerns with insurance coverage for discharge needs: Patient currently admitted under observation status.  [facility] Medicare also [facility] connected.  Current Living Situation: Patient lives with spouse.  Support System: Supportive and Involved  Services Involved: None  Transportation at Discharge: TBD  Transportation to Medical Appointments:    - Name of caregiver: Self and spouse  Barriers to Discharge: Medical stability, PT/Neurology recommendations    Pt admitted to observation unit secondary to weakness.  Pt with a primary medical history significant for Parkinson's disease, HTN, CVA, TIA's, CAD, CKD stage II, ELISHA and MDD.   Pt status reviewed/discussed with Venita, [facility] ED/OBS and Humaira RN.  Pt wife called [facility]a asking about having [facility] the [facility].  Per discussion with Venita pt does not have a medical need for an inpatient transfer to the [facility].  PT currently recommending TCU. Pt with intermittent confusion requiring 1:1.      Spoke with pt spouse via phone.  Introduced RNCC role.  Per discussion with pt wife pt has a PCP through the [facility] Dr. Glover and has a Neurologist through the [facility].   Per pt wife pt is normally independent with his own care needs but since a fall a month ago pt has had increased falls and increasing weakness.   Pt wife states she is not able to care for pt at home given his weakness and needing more hands on assistance.  Pt wife would like home care services but would like pt to go to the [facility] for short term rehab to get stronger.  Pt wife has called [facility] to explore options.  Reviewed PT recommendation.  Pt wife would like to explore short term rehab at the [facility].   Per pt wife she has spoken with pt regarding TCU and his open to this plan.  Agreed to have SW reachout to patient wife to review options.  Reviewed that if [facility] TCU is not able to accept the patient that other [facility] TCU's could be explored.    Updated DREAD [place].         Plan  Anticipated Discharge Date: TBD  Anticipated Discharge Plan:  AMADOU Hill, RN BSN, PHN, ACM-RN  RN Care Coordinator  Internal Medicine  Pager [phone]    8/12/2020 1:55 PM

## 2020-08-12 NOTE — PROGRESS NOTES
Observation Goals Prior to Discharge:  - Vital Signs normal or at patient baseline.: Not met, hypertensive  - Tolerating oral intake to maintain hydration.:Not met  - Diagnostic tests and consults completed and resulted: met  - Cleared for discharge from consultants' standpoint if involved in care: Not met  - Safe disposition plan has been identified: Not met

## 2020-08-12 NOTE — PLAN OF CARE
OBSERVATION GOALS:    List all goals to be met before discharge home:   - Vital Signs normal or at patient baseline. : NO.  - Tolerating oral intake to maintain hydration : NO  - Diagnostic tests and consults completed and resulted :NO.  - Cleared for discharge from consultants' standpoint if involved in care : NO  - Safe disposition plan has been identified : NO, Pt is very confuse and requires a sitter at bedside for safety (getting out of bed and non redirectable).  - Nurse to notify provider when observation goals have been met and patient is ready for discharge.

## 2020-08-12 NOTE — PROGRESS NOTES
[facility] Progress Note - Emergency Department Observation Unit       Date of Admission:  8/11/2020  Assessment & Plan   Vini Loya is a 79 year old male admitted on 8/11/2020. He has a PMH significant for HTN,Parkinson's disease with subsequent gait issues, CVA, TIA, CAD s/p stent placement, CKD stage III, hyperlipidemia, ELISHA, depression, chronic back pain, BPH with h/o obstruction who presents to the ED after a fall last night.     1. Fall:  2. Parkinson's disease:    Patient's wife reports it has been progressively difficult to take care of him. She has been unable to assess him out of bed. His wife reports multiple falls a day since Friday. Patient was confused overnight. Alert and oriented this morning and afternoon.   Neurology consulted in the ED: recommended to continue home requip and Sinemet,  Physical therapy and occupational therapy and follow up with primary neurologist COVID test negative Care coordinator  and PT/OT consulted and recommended TCU. SW and CC working on TCU placement. WBC now WNL, Hgb 16.3, BG 93, Cr 1.20. UA negative.  Orthostatics negative. 500mL NS IV given.  - Continue home Requip and Sinemet     - Up with assist  - Fall precautions  - Orthostatic blood pressures   - Follow up with primary neurologist (sent her a staff message to make her aware of his admission)     3. Hypertension: Patient had previously been on lisinopril and metoprolol. /83 this morning  - restarted Metoprolol 25 mg daily  - Hydralazine IV prn SBP >150 and/or DBP >100.      4. Chronic issues:  # Recurrent UTIs: Continue Bactrim  # CAD, h/o CVA. Continue home baby aspirin.   # Depression. Continue home Paxil and Wellbutrin  # HLD. Continue simvastatin   # BPH. Continue home flomax.   # Constipation: Miralax and Senna S prn          Diet: Regular Diet Adult    DVT Prophylaxis: Low Risk/Ambulatory with no VTE prophylaxis indicated  Palomares Catheter: not  present  Code Status: Full Code           Disposition Plan   Expected discharge: Tomorrow, recommended toTCU once safe disposition plan/ TCU bed available.  Entered: KOTA Huynh CNP 08/12/2020, 7:44 AM       The patient's care was discussed with the Attending Physician, Dr. Chaparro, Bedside Nurse, Care Coordinator/ and Patient.    KOTA Jewell, NP  Emergency Department  [phone] Ex [phone]  ______________________________________________________________________    Interval History   Confused overnight. Received Ativan     Data reviewed today: I reviewed all medications, new labs and imaging results over the last 24 hours.     Physical Exam   Vital Signs: Temp: 97.8  F (36.6  C) Temp src: Oral BP: (!) 160/83 Pulse: 60 Heart Rate: 66 Resp: 16 SpO2: 96 % O2 Device: None (Room air)    Weight: 168 lbs 0 oz  Constitutional: healthy, alert x 3 and no distress   Head: Normocephalic. No masses, lesions, tenderness or abnormalities   Neck: Neck supple. No adenopathy. Thyroid symmetric, normal size,, Carotids without bruits.   ENT: ENT exam normal, no neck nodes or sinus tenderness   Cardiovascular: RRR. No murmurs, clicks gallops or rub   Respiratory: . Good diaphragmatic excursion. Lungs clear   Gastrointestinal: Abdomen soft,. BS normal. No masses, organomegaly.   : Deferred   Musculoskeletal: Normal bulk and tone. No abnormal movements. 5/5 strength in 4/4 extremities.  Left lower extremity tremor. No cogwheel rigidity. Difficulty raising bilateral legs off of bed.   Skin: no suspicious lesions or rashes   Neurologic: Gait normal. Reflexes normal and symmetric. Sensation grossly WNL.   Psychiatric: mentation appears normal and affect normal/bright   Hematologic/Lymphatic/Immunologic: normal ant/post cervical, axillary, supraclavicular and inguinal        Data   Recent Labs   Lab 08/12/20  0622 08/11/20  0720   WBC 10.1 12.1*   HGB 16.3 15.9   MCV 96 96    272    138   POTASSIUM  4.0 4.1   CHLORIDE 104 106   CO2 28 28   BUN 18 19   CR 1.20 1.14   ANIONGAP 6 4   JEMMA 8.8 9.0   GLC 93 129*   ALBUMIN  --  3.4   PROTTOTAL  --  7.4   BILITOTAL  --  1.3   ALKPHOS  --  123   ALT  --  11   AST  --  22     No results found for this or any previous visit (from the past 24 hour(s)).  Medications       aspirin  81 mg Oral Daily     buPROPion  300 mg Oral QAM     carbidopa-levodopa  2 tablet Oral TID     rOPINIRole  0.5 mg Oral TID     simvastatin  40 mg Oral At Bedtime     sodium chloride (PF)  3 mL Intracatheter Q8H     sulfamethoxazole-trimethoprim  1 tablet Oral At Bedtime     tamsulosin  0.4 mg Oral At Bedtime

## 2020-08-12 NOTE — TELEPHONE ENCOUNTER
I talked to Ladi, pt's wife.    Pt is at the [facility] now.  Ladi is trying to get [facility].  She really feels she can't take care of him at home now.  PT needs to be able to get up 4 steps into house.  Pt is having more trouble walking and getting out of the chair.  PT had a lot of confusion at the hospital last night.    PT has a car but really should NOT be driving.  Can PCP write a letter stating pt is not safe to drive. This letter may help them return the leased car early.    I did tell wife that hospital providers take care of the pt now. I will update JW on this situation.  I did discuss that pt's Parkinson's has been worsening. (PT is not so accepting of this per his wife.)    No action needed from clinic. Wife is just overwhelmed with handling all the details and she is now trying to get him transferred to the [facility] system.  Lengthy conversation. Therapeutic listening.    JERI Carlson

## 2020-08-13 VITALS
HEART RATE: 64 BPM | BODY MASS INDEX: 25.17 KG/M2 | OXYGEN SATURATION: 93 % | TEMPERATURE: 97.6 F | DIASTOLIC BLOOD PRESSURE: 69 MMHG | RESPIRATION RATE: 16 BRPM | SYSTOLIC BLOOD PRESSURE: 135 MMHG | WEIGHT: 168 LBS

## 2020-08-13 LAB
ANION GAP SERPL CALCULATED.3IONS-SCNC: 4 MMOL/L (ref 3–14)
BASOPHILS # BLD AUTO: 0.1 10E9/L (ref 0–0.2)
BASOPHILS NFR BLD AUTO: 1 %
BUN SERPL-MCNC: 23 MG/DL (ref 7–30)
CALCIUM SERPL-MCNC: 8.8 MG/DL (ref 8.5–10.1)
CHLORIDE SERPL-SCNC: 104 MMOL/L (ref 94–109)
CO2 SERPL-SCNC: 26 MMOL/L (ref 20–32)
CREAT SERPL-MCNC: 1.1 MG/DL (ref 0.66–1.25)
DIFFERENTIAL METHOD BLD: NORMAL
EOSINOPHIL # BLD AUTO: 0.2 10E9/L (ref 0–0.7)
EOSINOPHIL NFR BLD AUTO: 2 %
ERYTHROCYTE [DISTWIDTH] IN BLOOD BY AUTOMATED COUNT: 13.2 % (ref 10–15)
GFR SERPL CREATININE-BSD FRML MDRD: 63 ML/MIN/{1.73_M2}
GLUCOSE SERPL-MCNC: 108 MG/DL (ref 70–99)
HCT VFR BLD AUTO: 49.2 % (ref 40–53)
HGB BLD-MCNC: 16.5 G/DL (ref 13.3–17.7)
IMM GRANULOCYTES # BLD: 0 10E9/L (ref 0–0.4)
IMM GRANULOCYTES NFR BLD: 0.4 %
LYMPHOCYTES # BLD AUTO: 1.6 10E9/L (ref 0.8–5.3)
LYMPHOCYTES NFR BLD AUTO: 15.8 %
MCH RBC QN AUTO: 31.6 PG (ref 26.5–33)
MCHC RBC AUTO-ENTMCNC: 33.5 G/DL (ref 31.5–36.5)
MCV RBC AUTO: 94 FL (ref 78–100)
MONOCYTES # BLD AUTO: 1.1 10E9/L (ref 0–1.3)
MONOCYTES NFR BLD AUTO: 10.8 %
NEUTROPHILS # BLD AUTO: 7.3 10E9/L (ref 1.6–8.3)
NEUTROPHILS NFR BLD AUTO: 70 %
NRBC # BLD AUTO: 0 10*3/UL
NRBC BLD AUTO-RTO: 0 /100
PLATELET # BLD AUTO: 275 10E9/L (ref 150–450)
POTASSIUM SERPL-SCNC: 4.2 MMOL/L (ref 3.4–5.3)
RBC # BLD AUTO: 5.22 10E12/L (ref 4.4–5.9)
SODIUM SERPL-SCNC: 134 MMOL/L (ref 133–144)
WBC # BLD AUTO: 10.4 10E9/L (ref 4–11)

## 2020-08-13 PROCEDURE — 25000132 ZZH RX MED GY IP 250 OP 250 PS 637: Performed by: STUDENT IN AN ORGANIZED HEALTH CARE EDUCATION/TRAINING PROGRAM

## 2020-08-13 PROCEDURE — 99217 ZZC OBSERVATION CARE DISCHARGE: CPT | Mod: Z6 | Performed by: INTERNAL MEDICINE

## 2020-08-13 PROCEDURE — 96361 HYDRATE IV INFUSION ADD-ON: CPT

## 2020-08-13 PROCEDURE — 25800030 ZZH RX IP 258 OP 636: Performed by: NURSE PRACTITIONER

## 2020-08-13 PROCEDURE — 25000132 ZZH RX MED GY IP 250 OP 250 PS 637: Performed by: NURSE PRACTITIONER

## 2020-08-13 PROCEDURE — G0378 HOSPITAL OBSERVATION PER HR: HCPCS

## 2020-08-13 PROCEDURE — 80048 BASIC METABOLIC PNL TOTAL CA: CPT | Performed by: NURSE PRACTITIONER

## 2020-08-13 PROCEDURE — 36415 COLL VENOUS BLD VENIPUNCTURE: CPT | Performed by: NURSE PRACTITIONER

## 2020-08-13 PROCEDURE — 25000128 H RX IP 250 OP 636: Performed by: NURSE PRACTITIONER

## 2020-08-13 PROCEDURE — 96376 TX/PRO/DX INJ SAME DRUG ADON: CPT

## 2020-08-13 PROCEDURE — 85025 COMPLETE CBC W/AUTO DIFF WBC: CPT | Performed by: NURSE PRACTITIONER

## 2020-08-13 RX ORDER — LISINOPRIL 5 MG/1
5 TABLET ORAL DAILY
Status: DISCONTINUED | OUTPATIENT
Start: 2020-08-13 | End: 2020-08-13

## 2020-08-13 RX ORDER — CARBIDOPA AND LEVODOPA 25; 100 MG/1; MG/1
2 TABLET ORAL 3 TIMES DAILY
Status: ON HOLD | DISCHARGE
Start: 2020-08-13 | End: 2020-09-11

## 2020-08-13 RX ORDER — METOPROLOL TARTRATE 25 MG/1
25 TABLET, FILM COATED ORAL DAILY
Status: DISCONTINUED | OUTPATIENT
Start: 2020-08-13 | End: 2020-08-13

## 2020-08-13 RX ADMIN — ROPINIROLE HYDROCHLORIDE 0.5 MG: 0.5 TABLET, FILM COATED ORAL at 11:13

## 2020-08-13 RX ADMIN — SODIUM CHLORIDE 1000 ML: 9 INJECTION, SOLUTION INTRAVENOUS at 08:23

## 2020-08-13 RX ADMIN — CARBIDOPA AND LEVODOPA 2 TABLET: 25; 100 TABLET ORAL at 06:42

## 2020-08-13 RX ADMIN — BUPROPION HYDROCHLORIDE 300 MG: 150 TABLET, FILM COATED, EXTENDED RELEASE ORAL at 08:20

## 2020-08-13 RX ADMIN — ASPIRIN 81 MG: 81 TABLET, COATED ORAL at 08:20

## 2020-08-13 RX ADMIN — HYDRALAZINE HYDROCHLORIDE 5 MG: 20 INJECTION INTRAMUSCULAR; INTRAVENOUS at 01:46

## 2020-08-13 RX ADMIN — CARBIDOPA AND LEVODOPA 2 TABLET: 25; 100 TABLET ORAL at 11:14

## 2020-08-13 RX ADMIN — ROPINIROLE HYDROCHLORIDE 0.5 MG: 0.5 TABLET, FILM COATED ORAL at 06:42

## 2020-08-13 ASSESSMENT — ENCOUNTER SYMPTOMS
DIETARY ISSUES: ADEQUATE INTAKE
ACTIVITY IMPAIRMENT: IMPAIRED DUE TO WEAKNESS
WEAKNESS: 1
NO PATIENT REPORTED PAIN: 1

## 2020-08-13 NOTE — PROGRESS NOTES
Vini Loya is a 79 year old male patient.  1. Fall, initial encounter    2. Parkinson's disease (H)    3. Generalized muscle weakness      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     Chronic infection     Bladder     Closed nondisplaced fracture of styloid process of left radius 10/16/2017     Corneal ulcer, unspecified     s/p right corneal tranplant--[facility]       GENERALIZED ANXIETY DIS 5/22/2007     Hyperlipidemia LDL goal < 100      Hypertension goal BP (blood pressure) < 130/80      Hypertension, goal below 150/90 6/23/2016     Lactose intolerance in adult 12/8/2016     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     Parkinson's disease      Parkinsons disease (H)      Pyelonephritis 10/16/2017     Stented coronary artery      Unspecified transient cerebral ischemia 2006    possible     UTI (urinary tract infection) 12/2/2011 June 23 2015 -- hospitalized due to urine tract infection that became septic, elevated white count and acute kidney injury and mild confusion.      No current outpatient medications on file.     No Known Allergies  Active Problems:    Fall    Blood pressure (!) 189/91, pulse 64, temperature 97.9  F (36.6  C), temperature source Oral, resp. rate 16, weight 76.2 kg (168 lb), SpO2 93 %.    Subjective:  Symptoms:  Stable.  He reports weakness.    Diet:  Adequate intake.    Activity level: Impaired due to weakness.    Pain:  He reports no pain.      Objective:  General Appearance:  Comfortable.    Vital signs: (most recent): Blood pressure (!) 189/91, pulse 64, temperature 97.9  F (36.6  C), temperature source Oral, resp. rate 16, weight 76.2 kg (168 lb), SpO2 93 %.  Vital signs are normal.    Output: Producing urine.    HEENT: Normal HEENT exam.    Lungs:  Normal effort and normal respiratory rate.  Breath sounds clear to auscultation.    Heart: Normal rate.  Regular rhythm.  S1 normal  and S2 normal.    Abdomen: Abdomen is soft.  Bowel sounds are normal.   There is no abdominal tenderness.     Extremities: Normal range of motion.    Pulses: Distal pulses are intact.    Neurological: Patient is alert.    Pupils:  Pupils are equal, round, and reactive to light.    Skin:  Warm.      Assessment:    Condition: In stable condition.  Unchanged.   (79 yom with Parkinson presents with weakness and falls. An episode of confusion two nights ago but not last night. Trouble sleeping and BP high but spontaneously better this am. -probable trouble sleep related and or Parkinson's autonomic dysfunction.    Awaiting TCU.).       Mary Grace Chaparro MD, MD  8/13/2020    The pt was seen and examined by myself. The case was reviewed and the plan was discussed with the NINA.

## 2020-08-13 NOTE — PROGRESS NOTES
Social Work Services Discharge Note      Patient Name:  Vini Loya     Anticipated Discharge Date:  8/13/20 at 2pm    Discharge Disposition:   TCU:  [facility]   [address] [place] [address]  Admissions: [phone], Fax: [phone]   TCU Fax: [phone]  TCU Adms.: [phone]  Main: [phone]    Following MD:  Mary Grace Chaparro MD     Pre-Admission Screening (PAS) online form has been completed.  The Level of Care (LOC) is:  Determined  Confirmation Code is:  [id number]  Patient/caregiver informed of referral to [facility] for Pre-Admission Screening for skilled nursing facility (SNF) placement and to expect a phone call post discharge from SNF.     Additional Services/Equipment Arranged:  TAURUS spoke to spouse Kristi who will arrive to hospital early afternoon with a plan to transport Vini to TCU at 2pm discharge time.      Patient / Family response to discharge plan:  Both patient and family are agreeable to this plan for TCU level care.  Spouse Kristi states she will continue to look into the level of [facility] supports available to Vini following his TCU stay.      Persons notified of above discharge plan:  Patient, Spouse, 6D RN, 6D Provider, [facility] Admissions    Staff Discharge Instructions:  Please fax discharge orders and signed hard scripts for any controlled substances.  Please print a packet and send with patient.     CTS Handoff completed:  NO    Medicare Notice of Rights provided to the patient/family:  NO, Vini is in the hospital under observation status at this time.     ALMA Babcock, MSW  Social Work Services, 6D support   [facility]  Direct: [phone] Pager: [phone]

## 2020-08-13 NOTE — PLAN OF CARE
Observation Goals Prior to Discharge:  - Vital Signs normal or at patient baseline.: Met  - Tolerating oral intake to maintain hydration.: Met  - Diagnostic tests and consults completed and resulted: Met  - Cleared for discharge from consultants' standpoint if involved in care: Not met  - Safe disposition plan has been identified: Not met    Pt continues to report feeling generalized weakness. Up with 2 assist and walker/gait belt to commode. Given 500 NS bolus x1. VSS. Pt incontinent of stool and urine x1, capo care provided. Blanchable redness on bottom, barrier cream applied. A&Ox4 at 2000 neuro check, but forgetful. Call light within reach, able to make needs known. Will continue to monitor and follow POC.

## 2020-08-13 NOTE — PLAN OF CARE
Observation Goals Prior to Discharge:  - Vital Signs normal or at patient baseline.: Met  - Tolerating oral intake to maintain hydration.: Met  - Diagnostic tests and consults completed and resulted: Met  - Cleared for discharge from consultants' standpoint if involved in care: Not met  - Safe disposition plan has been identified: Not met

## 2020-08-13 NOTE — DISCHARGE SUMMARY
"ED Observation Discharge Summary    Vini Loya   MRN# [id number]  Age: 79 year old   YOB: 1940            Date of Admission: 08/11/2020    Date of Discharge: 08/13/2020  Admitting Physician: Dr. Beltran Nuñez MD  Discharge Physician: Dr. Shankar MD  NP/PA: Penny Grajeda CNP    DISCHARGE DIAGNOSIS:   ##Fall  ##PD  ##HTN    INTERVAL HISTORY: VSS, afebrile. Up with walker. Agrees to TCU discharge.     PHYSICAL EXAM:   Vital signs:  Temp: 97.6  F (36.4  C) Temp src: Oral BP: 135/69 Pulse: 64 Heart Rate: 69 Resp: 16 SpO2: 93 % O2 Device: None (Room air)     Weight: 76.2 kg (168 lb)  Estimated body mass index is 25.17 kg/m  as calculated from the following:    Height as of 7/24/20: 1.74 m (5' 8.5\").    Weight as of this encounter: 76.2 kg (168 lb).    GENERAL APPEARANCE:  A/O x4. NAD.  SKIN: Clean, dry, and intact  HEENT/NECK: NCAT w/out masses, lesions, or abnormalities. Sclera anicteric, PERRLA, EOMI.  Oral mucosa pink and moist   CARDIOVASCULAR: S1, S2 RRR. No murmurs, rubs, or gallops.   RESPIRATORY: Respiratory effort WNL. CTA  bilaterally without crackles/rales/wheeze   GI: Active BS in all 4 quadrants. Abdomen soft and non-tender. No masses or hepatosplenomegaly.  : Deferred  MUSCULOSKELETAL:  Extremities normal, no gross deformities noted, non-tender to palpation.   PV: 2+ bilateral radial and pedal pulses. No edema noted.   NEURO: CN II-XII grossly intact. Speech normal. Appropriate throughout interview.   HEME/LYMPH: No visible bleeding.   PSYCHIATRIC: Mentation and affect appear normal  VASCULAR ACCESS: CDI without erythema or discharge. Non-tender.    PROCEDURES AND IMAGING:   Results for orders placed or performed during the hospital encounter of 08/11/20 (from the past 24 hour(s))   CBC with platelets differential   Result Value Ref Range    WBC 10.4 4.0 - 11.0 10e9/L    RBC Count 5.22 4.4 - 5.9 10e12/L    Hemoglobin 16.5 13.3 - 17.7 g/dL    Hematocrit 49.2 40.0 - 53.0 %    " MCV 94 78 - 100 fl    MCH 31.6 26.5 - 33.0 pg    MCHC 33.5 31.5 - 36.5 g/dL    RDW 13.2 10.0 - 15.0 %    Platelet Count 275 150 - 450 10e9/L    Diff Method Automated Method     % Neutrophils 70.0 %    % Lymphocytes 15.8 %    % Monocytes 10.8 %    % Eosinophils 2.0 %    % Basophils 1.0 %    % Immature Granulocytes 0.4 %    Nucleated RBCs 0 0 /100    Absolute Neutrophil 7.3 1.6 - 8.3 10e9/L    Absolute Lymphocytes 1.6 0.8 - 5.3 10e9/L    Absolute Monocytes 1.1 0.0 - 1.3 10e9/L    Absolute Eosinophils 0.2 0.0 - 0.7 10e9/L    Absolute Basophils 0.1 0.0 - 0.2 10e9/L    Abs Immature Granulocytes 0.0 0 - 0.4 10e9/L    Absolute Nucleated RBC 0.0    Basic metabolic panel   Result Value Ref Range    Sodium 134 133 - 144 mmol/L    Potassium 4.2 3.4 - 5.3 mmol/L    Chloride 104 94 - 109 mmol/L    Carbon Dioxide 26 20 - 32 mmol/L    Anion Gap 4 3 - 14 mmol/L    Glucose 108 (H) 70 - 99 mg/dL    Urea Nitrogen 23 7 - 30 mg/dL    Creatinine 1.10 0.66 - 1.25 mg/dL    GFR Estimate 63 >60 mL/min/[1.73_m2]    GFR Estimate If Black 73 >60 mL/min/[1.73_m2]    Calcium 8.8 8.5 - 10.1 mg/dL     DISCHARGE MEDICATIONS:   Current Discharge Medication List      CONTINUE these medications which have CHANGED    Details   carbidopa-levodopa (SINEMET)  MG tablet Take 2 tablets by mouth 3 times daily Dr. Encinas in-patient note from 8/12/20: Pt is to take two tablets 3 times a day (7, 11 & 4).  Qty:      Associated Diagnoses: Parkinson's disease (H)         CONTINUE these medications which have NOT CHANGED    Details   aspirin (ASA) 81 MG tablet Take 1 tablet (81 mg) by mouth daily  Qty: 90 tablet, Refills: 3    Associated Diagnoses: Coronary artery disease involving native heart without angina pectoris, unspecified vessel or lesion type      buPROPion (WELLBUTRIN XL) 300 MG 24 hr tablet Take 1 tablet (300 mg) by mouth every morning  Qty: 90 tablet, Refills: 1    Associated Diagnoses: Major depressive disorder, single episode, moderate (H)     "  polyethylene glycol (MIRALAX) 17 GM/SCOOP powder Take 17 g (1 capful) by mouth 3 times daily  Qty: 1 Bottle, Refills: 11    Associated Diagnoses: Constipation, unspecified constipation type      rOPINIRole (REQUIP) 0.25 MG tablet Take 0.5 mg by mouth 3 times daily       SENNA-docusate sodium (SENNA S) 8.6-50 MG tablet Take 2 tablets by mouth 2 times daily  Qty: 120 tablet, Refills: 11    Associated Diagnoses: Constipation, unspecified constipation type      simvastatin (ZOCOR) 40 MG tablet TAKE ONE TABLET BY MOUTH EVERY NIGHT AT BEDTIME  Qty: 90 tablet, Refills: 3    Associated Diagnoses: Hyperlipidemia LDL goal <100      sulfamethoxazole-trimethoprim (BACTRIM/SEPTRA) 400-80 MG tablet Take 1 tablet by mouth At Bedtime For UTI prevention  Qty: 90 tablet, Refills: 3    Associated Diagnoses: Urinary tract infection with hematuria, site unspecified      tamsulosin (FLOMAX) 0.4 MG capsule Take 1 capsule (0.4 mg) by mouth daily  Qty: 90 capsule, Refills: 3    Associated Diagnoses: Benign prostatic hyperplasia with nocturia      vitamin D3 (CHOLECALCIFEROL) 2000 units (50 mcg) tablet Take 1 tablet by mouth daily as needed              CONSULTATIONS:   Consultation during this admission received from:  PT  Neurology  SW     BRIEF HISTORY OF PRESENT ILLNESS:   (Adopted from admission H&P).  \"Vnii Loya is a 79 year old male with a medical history significant for Parkinson's disease, hypertension, cerebral embolus with cerebral infarction, TIA, CAD s/p stent placement, CKD stage III, hyperlipidemia, BPH, ELISHA and MDD.  Per review of patient's chart, patient was recently seen in the Emergency Department here on 7/19/2020 after a fall in which he struck the back of his head.  Patient had a CT head and CT cervical spine, both of which were negative.     Patient presents to the Emergency Department today for evaluation of generalized weakness.  Per further review of patient's chart, the patient's wife contacted his neurology " "team on 8/7/2020 with concerns for increased balance issues and falls over the last few weeks.  It was recommended that the patient use a walker at all times and to sit down when feeling dizzy.  Per wife patient is been having more falls over the past week.  She says in the past couple days she is having difficulty getting out of bed by himself.  Patient was in the past able to use his walker and a few steps by himself.  Last night patient was helped to bed around midnight and again ended up falling.  This morning patient was again having difficulty getting out of bed so the wife used the help of her neighbors and brought him to the ER for evaluation.  Patient notes history of constipation and says he uses MiraLAX, senna and stool softeners.  Patient denies any chest pain or shortness of breath.  Patient denies any headache.  Says he has diffuse pain, all over his back from repeated falls. \"     ED OBSERVATION COURSE: Vini Loya is a 79 year old male admitted on 8/11/2020. He has a PMH significant for HTN, Parkinson's disease with subsequent gait issues, CVA, TIA, CAD s/p stent placement, CKD stage III, hyperlipidemia, ELISHA, depression, chronic back pain, BPH with h/o obstruction who presents to the ED after a fall.     ##Fall:  ##Parkinson's disease:   Per discussion with previous provider, patient's wife reports it has been progressively difficult to take care of him with multiple falls at home.  Patient was confused overnight on 8/11-8/12, but is Alert and oriented this morning.  Neurology consulted and recommended to continue home requip and Sinemet. He was confused regarding dosing as was taking more Sinemet than prescribed and less requip. Unclear if this contributed to falls (see Neurology note for more details). PT recommended TCU. Plan to discharge to TCU today.  WBC now WNL. UA negative.  Orthostatics + but without dizziness. He was given 500mL NS. Does appear to have some orthostatic changes. He was " taking Sinemet 3 tabs TID at home. Was supposed to be taking Sinemet 2 tabs TID. Orthostatic changes could be related to medications as well as autonomic dysfunction. Recommend close follow-up with primary Neurologist.  - Continue home Requip and Sinemet.    - Fall precautions  - Discharge to TCU      ##Hypertension: Intermittent with spontaneous resolution.  Up to 200/103 at 0645, then improved to 133/69 without intervention.  Patient had previously been on lisinopril and metoprolol. Given PD and orthostatic changes, I am hesistant to restart antihypertensives here. Suspect component of autonomic dysfunction and medications as cause. Monitor closely at TCU and possible restart antihypertensives.        Chronic Medical Problems:    ##Recurrent UTIs: Continue Bactrim  ##CAD, h/o CVA: Continue home baby aspirin  ##Depression: Continue home Paxil and Wellbutrin  ##HLD: Continue simvastatin   ##BPH: Continue home flomax.   ##Constipation: Miralax and Senna S prn      DISCHARGE DISPOSITION:   Discharged to home. The patient was discharged in a stable condition and agreed to discharge plan.    DISCHARGE INSTRUCTIONS AND FOLLOW-UP:  Discharge Procedure Orders   AntiEmbolism Stockings   Order Comments: Bilateral below knee length.On in the morning, off at night     General info for SNF   Order Comments: Length of Stay Estimate: Short Term Care: Estimated # of Days <30  Condition at Discharge: Stable  Level of care:skilled   Rehabilitation Potential: Fair  Admission H&P remains valid and up-to-date: Yes  Recent Chemotherapy: N/A  Use Nursing Home Standing Orders: Yes     Mantoux instructions   Order Comments: Give two-step Mantoux (PPD) Per Facility Policy Yes     Reason for your hospital stay   Order Comments: You were admitted to the hospital with falls. Neurology evaluated you and did not change any medications. Physical Therapy evaluated you and recommended TCU placement. We will discharge you to TCU.     Glucose monitor  nursing POCT   Order Comments: Before meals and at bedtime     Intake and output   Order Comments: Every shift     Follow Up and recommended labs and tests   Order Comments: Follow up with Nursing home physician.    Monitor blood pressure closely     Activity - Up with assistive device     Order Specific Question Answer Comments   Is discharge order? Yes      Physical Therapy Adult Consult   Order Comments: Evaluate and treat as clinically indicated.    Reason:  Rehab     Occupational Therapy Adult Consult   Order Comments: Evaluate and treat as clinically indicated.    Reason:  Rehab     Fall precautions     Advance Diet as Tolerated   Order Comments: Follow this diet upon discharge: Orders Placed This Encounter      Regular Diet Adult  Be sure to drink plenty of non-caffeinated beverages.     Order Specific Question Answer Comments   Is discharge order? Yes       Attestation:   I have reviewed today's vital signs, notes, medications, labs and imaging.      KOTA Alva, CNP  Nurse Practitioner   Emergency Department Observation Unit

## 2020-08-13 NOTE — PLAN OF CARE
Observation Goals Prior to Discharge:  - Vital Signs normal or at patient baseline: In progress, hypertensive overnight. BP at 0200 192/106, gave 5 mg PRN hydralazine and /79 one hour after dose.   - Tolerating oral intake to maintain hydration: In progress, encouraged fluids overnight  - Diagnostic tests and consults completed and resulted: Met  - Cleared for discharge from consultants' standpoint if involved in care: Not met  - Safe disposition plan has been identified: In progress, waiting for placement in TCU, [facility].     Disoriented to place overnight. Able to use call light appropriately. Inconsistently continent throughout the night, incontinent of urine and stool x1 overnight. Pt very weak, used bedpan and urinal overnight. Slight blanchable redness on buttocks. Pt repositioning self in bed with encouragement. Pleasant and calm. Bed alarm on. Continuing to monitor and follow POC.

## 2020-08-13 NOTE — PROGRESS NOTES
" paged re:\" Mary Jo from [facility] called & said they can accept pt. Can you please give her a call when u can? [phone]\"   "

## 2020-08-13 NOTE — PLAN OF CARE
Observation Goals:    -Vital Signs normal or at patient baseline: not met; orthos positive and pt hypertensive    - Tolerating oral intake to maintain hydration: met    - Diagnostic tests and consults completed and resulted: not met    - Cleared for discharge from consultants' standpoint if involved in care:not met   - Safe disposition plan has been identified: pending TCU placement     BP (!) 189/91 (BP Location: Left arm)   Pulse 64   Temp 97.9  F (36.6  C) (Oral)   Resp 16   Wt 76.2 kg (168 lb)   SpO2 93%   BMI 25.17 kg/m

## 2020-08-13 NOTE — PROVIDER NOTIFICATION
EdObs notified: Pt BP at 0200 was 192/106, gave PRN hydralazine and BP one hour later was 170/79. Do you want to continue monitoring or give additional medications?    Response: Do not give other medications

## 2020-08-13 NOTE — PLAN OF CARE
Observation Goals Prior to Discharge:  - Vital Signs normal or at patient baseline: In progress, hypertensive at last reading  - Tolerating oral intake to maintain hydration: In progress, encouraging fluids  - Diagnostic tests and consults completed and resulted: Met  - Cleared for discharge from consultants' standpoint if involved in care: Not met  - Safe disposition plan has been identified: In progress, finding [facility]

## 2020-08-13 NOTE — PLAN OF CARE
Observation Goals:    -Vital Signs normal or at patient baseline: met  - Tolerating oral intake to maintain hydration: met    - Diagnostic tests and consults completed and resulted: met    - Cleared for discharge from consultants' standpoint if involved in care:met   - Safe disposition plan has been identified: pending TCU placement     Pt will discharge to [facility] via ride from wife.     /69 (BP Location: Left arm)   Pulse 64   Temp 97.6  F (36.4  C) (Oral)   Resp 16   Wt 76.2 kg (168 lb)   SpO2 93%   BMI 25.17 kg/m

## 2020-08-14 ENCOUNTER — TELEPHONE (OUTPATIENT)
Dept: FAMILY MEDICINE | Facility: CLINIC | Age: 80
End: 2020-08-14

## 2020-08-14 NOTE — TELEPHONE ENCOUNTER
ED / Discharge Outreach Protocol    Patient Contact    Attempt # 1    Was call answered?  No.  Left message on voicemail with information to call me back.    Karen Varela RN

## 2020-08-14 NOTE — PLAN OF CARE
Physical Therapy Discharge Summary    Reason for therapy discharge:    Discharged to transitional care facility.    Progress towards therapy goal(s). See goals on Care Plan in [facility] electronic health record for goal details.  Goals not met.  Barriers to achieving goals:   discharge from facility.    Therapy recommendation(s):    Continued therapy is recommended.  Rationale/Recommendations:  at rehab to improve safety with mobility. .

## 2020-08-14 NOTE — TELEPHONE ENCOUNTER
All appropriate advice.  Thank you and thank you for listening I'm sure it helped.     I am sorry this has happened.      Yes - if further communication reassure her that they will be careful not to discharge him back to home unless safe.      If he is discharged and they are struggling at home she should let me know right away.     Joel Wegener,MD

## 2020-08-17 ENCOUNTER — RECORDS - HEALTHEAST (OUTPATIENT)
Dept: LAB | Facility: CLINIC | Age: 80
End: 2020-08-17

## 2020-08-18 LAB
ANION GAP SERPL CALCULATED.3IONS-SCNC: 10 MMOL/L (ref 5–18)
BUN SERPL-MCNC: 16 MG/DL (ref 8–28)
CALCIUM SERPL-MCNC: 9.4 MG/DL (ref 8.5–10.5)
CHLORIDE BLD-SCNC: 101 MMOL/L (ref 98–107)
CO2 SERPL-SCNC: 26 MMOL/L (ref 22–31)
CREAT SERPL-MCNC: 1.14 MG/DL (ref 0.7–1.3)
GFR SERPL CREATININE-BSD FRML MDRD: >60 ML/MIN/1.73M2
GLUCOSE BLD-MCNC: 101 MG/DL (ref 70–125)
POTASSIUM BLD-SCNC: 4.4 MMOL/L (ref 3.5–5)
SODIUM SERPL-SCNC: 137 MMOL/L (ref 136–145)

## 2020-08-24 PROBLEM — R29.6 FALLS FREQUENTLY: Status: ACTIVE | Noted: 2020-08-24

## 2020-08-24 PROBLEM — K59.01 SLOW TRANSIT CONSTIPATION: Status: ACTIVE | Noted: 2020-08-24

## 2020-08-25 ENCOUNTER — DOCUMENTATION ONLY (OUTPATIENT)
Dept: OTHER | Facility: CLINIC | Age: 80
End: 2020-08-25

## 2020-09-02 ENCOUNTER — VIRTUAL VISIT (OUTPATIENT)
Dept: GASTROENTEROLOGY | Facility: CLINIC | Age: 80
End: 2020-09-02
Payer: COMMERCIAL

## 2020-09-02 VITALS — WEIGHT: 170 LBS | BODY MASS INDEX: 25.18 KG/M2 | HEIGHT: 69 IN

## 2020-09-02 DIAGNOSIS — K59.00 CONSTIPATION, UNSPECIFIED CONSTIPATION TYPE: Primary | ICD-10-CM

## 2020-09-02 RX ORDER — ONDANSETRON 4 MG/1
TABLET, ORALLY DISINTEGRATING ORAL
COMMUNITY
Start: 2020-08-19 | End: 2021-04-01

## 2020-09-02 ASSESSMENT — MIFFLIN-ST. JEOR: SCORE: 1463.55

## 2020-09-02 ASSESSMENT — PAIN SCALES - GENERAL: PAINLEVEL: SEVERE PAIN (7)
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
## 2020-09-02 NOTE — PATIENT INSTRUCTIONS
1. Start with Fleets enemas twice daily for the 2 days  2. If your having some stool output with enemas, take a bottle of magnesium citrate.  3. Take docusate/sennosides 3 tabs two times a day.  4. Start Miralax 1 capful three times a day.  5. If no BM for 2-3 days take magnesium - add 1000 mg in addition to daily medications.  6. If no BM for 4 days - start Fleets enemas 1-2 times a day.   7. Contact GI clinic if any issues or concerns   8. Follow-up in GI clinic 6-8 weeks with Dr. Tena or CHANDA Herrera.      If you have any questions, please don't hesitate to contact me through our GI RN Clinic Coordinator, Lary Coles, at [phone].
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
## 2020-09-02 NOTE — NURSING NOTE
"Chief Complaint   Patient presents with     Consult     Constipation       Vitals:    09/02/20 0941   Weight: 77.1 kg (170 lb)   Height: 1.74 m (5' 8.5\")       Body mass index is 25.47 kg/m .      Soco Hnery LPN                          "
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
## 2020-09-03 ENCOUNTER — TELEPHONE (OUTPATIENT)
Dept: GASTROENTEROLOGY | Facility: CLINIC | Age: 80
End: 2020-09-03

## 2020-09-04 ENCOUNTER — PATIENT OUTREACH (OUTPATIENT)
Dept: GASTROENTEROLOGY | Facility: CLINIC | Age: 80
End: 2020-09-04

## 2020-09-04 NOTE — PROGRESS NOTES
"Patient called in with concerns regarding his stool pattern.  Patient states that he is having liquid stools and feels like a \"dam broke\"  And he is wondering if he should take anything today for his constipation due to this.      Explained that he was probably still very full of stool and that he should continue with the care plan below. patient verbalized understanding of plan      1. Start with Fleets enemas twice daily for the 2 days  2. If your having some stool output with enemas, take a bottle of magnesium citrate.  3. Take docusate/sennosides 3 tabs two times a day.  4. Start Miralax 1 capful three times a day.  5. If no BM for 2-3 days take magnesium - add 1000 mg in addition to daily medications.  6. If no BM for 4 days - start Fleets enemas 1-2 times a day.   7. Contact GI clinic if any issues or concerns   8. Follow-up in GI clinic 6-8 weeks with Dr. Tena or CHANDA Herrera.       If you have any questions, please don't hesitate to contact me through our GI RN Clinic Coordinator, Lary Coles, at [phone].     "

## 2020-09-09 ENCOUNTER — HOSPITAL ENCOUNTER (OUTPATIENT)
Facility: CLINIC | Age: 80
Setting detail: OBSERVATION
Discharge: HOME | End: 2020-09-11
Attending: EMERGENCY MEDICINE | Admitting: STUDENT IN AN ORGANIZED HEALTH CARE EDUCATION/TRAINING PROGRAM
Payer: COMMERCIAL

## 2020-09-09 ENCOUNTER — APPOINTMENT (OUTPATIENT)
Dept: GENERAL RADIOLOGY | Facility: CLINIC | Age: 80
End: 2020-09-09
Attending: EMERGENCY MEDICINE
Payer: COMMERCIAL

## 2020-09-09 ENCOUNTER — APPOINTMENT (OUTPATIENT)
Dept: CT IMAGING | Facility: CLINIC | Age: 80
End: 2020-09-09
Attending: EMERGENCY MEDICINE
Payer: COMMERCIAL

## 2020-09-09 ENCOUNTER — APPOINTMENT (OUTPATIENT)
Dept: MRI IMAGING | Facility: CLINIC | Age: 80
End: 2020-09-09
Attending: EMERGENCY MEDICINE
Payer: COMMERCIAL

## 2020-09-09 ENCOUNTER — TELEPHONE (OUTPATIENT)
Dept: FAMILY MEDICINE | Facility: CLINIC | Age: 80
End: 2020-09-09

## 2020-09-09 DIAGNOSIS — I95.1 ORTHOSTATIC HYPOTENSION: Chronic | ICD-10-CM

## 2020-09-09 DIAGNOSIS — K59.00 CONSTIPATION, UNSPECIFIED CONSTIPATION TYPE: ICD-10-CM

## 2020-09-09 DIAGNOSIS — G20.A1 PARKINSON'S DISEASE (H): ICD-10-CM

## 2020-09-09 DIAGNOSIS — I16.1 HYPERTENSIVE EMERGENCY: ICD-10-CM

## 2020-09-09 DIAGNOSIS — R29.6 FALLS FREQUENTLY: Primary | Chronic | ICD-10-CM

## 2020-09-09 DIAGNOSIS — Z20.828 EXPOSURE TO SARS-ASSOCIATED CORONAVIRUS: ICD-10-CM

## 2020-09-09 DIAGNOSIS — K59.01 SLOW TRANSIT CONSTIPATION: ICD-10-CM

## 2020-09-09 LAB
ALBUMIN UR-MCNC: 10 MG/DL
ANION GAP SERPL CALCULATED.3IONS-SCNC: 6 MMOL/L (ref 3–14)
APPEARANCE UR: CLEAR
BASOPHILS # BLD AUTO: 0.1 10E9/L (ref 0–0.2)
BASOPHILS NFR BLD AUTO: 1.1 %
BILIRUB UR QL STRIP: NEGATIVE
BUN SERPL-MCNC: 18 MG/DL (ref 7–30)
CALCIUM SERPL-MCNC: 9.5 MG/DL (ref 8.5–10.1)
CHLORIDE SERPL-SCNC: 103 MMOL/L (ref 94–109)
CO2 SERPL-SCNC: 30 MMOL/L (ref 20–32)
COLOR UR AUTO: YELLOW
CREAT SERPL-MCNC: 1.08 MG/DL (ref 0.66–1.25)
DIFFERENTIAL METHOD BLD: NORMAL
EOSINOPHIL # BLD AUTO: 0.1 10E9/L (ref 0–0.7)
EOSINOPHIL NFR BLD AUTO: 1.4 %
ERYTHROCYTE [DISTWIDTH] IN BLOOD BY AUTOMATED COUNT: 13.4 % (ref 10–15)
GFR SERPL CREATININE-BSD FRML MDRD: 64 ML/MIN/{1.73_M2}
GLUCOSE SERPL-MCNC: 107 MG/DL (ref 70–99)
GLUCOSE UR STRIP-MCNC: NEGATIVE MG/DL
HCT VFR BLD AUTO: 49.7 % (ref 40–53)
HGB BLD-MCNC: 16.6 G/DL (ref 13.3–17.7)
HGB UR QL STRIP: NEGATIVE
IMM GRANULOCYTES # BLD: 0 10E9/L (ref 0–0.4)
IMM GRANULOCYTES NFR BLD: 0.4 %
KETONES UR STRIP-MCNC: NEGATIVE MG/DL
LEUKOCYTE ESTERASE UR QL STRIP: NEGATIVE
LYMPHOCYTES # BLD AUTO: 1.8 10E9/L (ref 0.8–5.3)
LYMPHOCYTES NFR BLD AUTO: 18.3 %
MCH RBC QN AUTO: 31.7 PG (ref 26.5–33)
MCHC RBC AUTO-ENTMCNC: 33.4 G/DL (ref 31.5–36.5)
MCV RBC AUTO: 95 FL (ref 78–100)
MONOCYTES # BLD AUTO: 1.1 10E9/L (ref 0–1.3)
MONOCYTES NFR BLD AUTO: 11.3 %
MUCOUS THREADS #/AREA URNS LPF: PRESENT /LPF
NEUTROPHILS # BLD AUTO: 6.6 10E9/L (ref 1.6–8.3)
NEUTROPHILS NFR BLD AUTO: 67.5 %
NITRATE UR QL: NEGATIVE
NRBC # BLD AUTO: 0 10*3/UL
NRBC BLD AUTO-RTO: 0 /100
PH UR STRIP: 7 PH (ref 5–7)
PLATELET # BLD AUTO: 313 10E9/L (ref 150–450)
POTASSIUM SERPL-SCNC: 4.4 MMOL/L (ref 3.4–5.3)
RBC # BLD AUTO: 5.24 10E12/L (ref 4.4–5.9)
RBC #/AREA URNS AUTO: 1 /HPF (ref 0–2)
SODIUM SERPL-SCNC: 139 MMOL/L (ref 133–144)
SOURCE: ABNORMAL
SP GR UR STRIP: 1.02 (ref 1–1.03)
SQUAMOUS #/AREA URNS AUTO: <1 /HPF (ref 0–1)
TROPONIN I SERPL-MCNC: <0.015 UG/L (ref 0–0.04)
UROBILINOGEN UR STRIP-MCNC: NORMAL MG/DL (ref 0–2)
WBC # BLD AUTO: 9.8 10E9/L (ref 4–11)
WBC #/AREA URNS AUTO: 2 /HPF (ref 0–5)

## 2020-09-09 PROCEDURE — 93010 ELECTROCARDIOGRAM REPORT: CPT | Mod: Z6 | Performed by: EMERGENCY MEDICINE

## 2020-09-09 PROCEDURE — 85025 COMPLETE CBC W/AUTO DIFF WBC: CPT | Performed by: EMERGENCY MEDICINE

## 2020-09-09 PROCEDURE — 25500064 ZZH RX 255 OP 636: Performed by: EMERGENCY MEDICINE

## 2020-09-09 PROCEDURE — 80048 BASIC METABOLIC PNL TOTAL CA: CPT | Performed by: EMERGENCY MEDICINE

## 2020-09-09 PROCEDURE — 81001 URINALYSIS AUTO W/SCOPE: CPT | Performed by: EMERGENCY MEDICINE

## 2020-09-09 PROCEDURE — 84484 ASSAY OF TROPONIN QUANT: CPT | Performed by: EMERGENCY MEDICINE

## 2020-09-09 PROCEDURE — 71045 X-RAY EXAM CHEST 1 VIEW: CPT

## 2020-09-09 PROCEDURE — A9585 GADOBUTROL INJECTION: HCPCS | Performed by: EMERGENCY MEDICINE

## 2020-09-09 PROCEDURE — 96374 THER/PROPH/DIAG INJ IV PUSH: CPT | Performed by: EMERGENCY MEDICINE

## 2020-09-09 PROCEDURE — C9803 HOPD COVID-19 SPEC COLLECT: HCPCS

## 2020-09-09 PROCEDURE — 70450 CT HEAD/BRAIN W/O DYE: CPT

## 2020-09-09 PROCEDURE — 25800030 ZZH RX IP 258 OP 636: Performed by: EMERGENCY MEDICINE

## 2020-09-09 PROCEDURE — 70544 MR ANGIOGRAPHY HEAD W/O DYE: CPT

## 2020-09-09 PROCEDURE — 25000128 H RX IP 250 OP 636: Performed by: EMERGENCY MEDICINE

## 2020-09-09 PROCEDURE — 87086 URINE CULTURE/COLONY COUNT: CPT | Performed by: EMERGENCY MEDICINE

## 2020-09-09 PROCEDURE — 99284 EMERGENCY DEPT VISIT MOD MDM: CPT | Mod: 25 | Performed by: EMERGENCY MEDICINE

## 2020-09-09 PROCEDURE — 93005 ELECTROCARDIOGRAM TRACING: CPT | Performed by: EMERGENCY MEDICINE

## 2020-09-09 PROCEDURE — 99285 EMERGENCY DEPT VISIT HI MDM: CPT | Mod: 25 | Performed by: EMERGENCY MEDICINE

## 2020-09-09 RX ORDER — HYDRALAZINE HYDROCHLORIDE 20 MG/ML
10 INJECTION INTRAMUSCULAR; INTRAVENOUS ONCE
Status: COMPLETED | OUTPATIENT
Start: 2020-09-09 | End: 2020-09-09

## 2020-09-09 RX ORDER — HYDRALAZINE HYDROCHLORIDE 20 MG/ML
5 INJECTION INTRAMUSCULAR; INTRAVENOUS ONCE
Status: COMPLETED | OUTPATIENT
Start: 2020-09-10 | End: 2020-09-10

## 2020-09-09 RX ORDER — GADOBUTROL 604.72 MG/ML
7.5 INJECTION INTRAVENOUS ONCE
Status: COMPLETED | OUTPATIENT
Start: 2020-09-09 | End: 2020-09-09

## 2020-09-09 RX ADMIN — HYDRALAZINE HYDROCHLORIDE 10 MG: 20 INJECTION INTRAMUSCULAR; INTRAVENOUS at 20:13

## 2020-09-09 RX ADMIN — GADOBUTROL 7.5 ML: 604.72 INJECTION INTRAVENOUS at 22:17

## 2020-09-09 RX ADMIN — SODIUM CHLORIDE 100 ML: 9 INJECTION, SOLUTION INTRAVENOUS at 22:27

## 2020-09-09 ASSESSMENT — ENCOUNTER SYMPTOMS
SPEECH DIFFICULTY: 1
HEADACHES: 1
DIZZINESS: 1
COUGH: 1
FEVER: 0

## 2020-09-09 ASSESSMENT — MIFFLIN-ST. JEOR: SCORE: 1455.61

## 2020-09-09 NOTE — ED NOTES
Bed: ED06  Expected date:   Expected time:   Means of arrival:   Comments:  H426 79M htn, HA. No COVID. Yellow

## 2020-09-09 NOTE — ED TRIAGE NOTES
Pt arrived to ER via EMS. Pt presents with c/o HTN. Pt says that physical therapy came today and checked BP. BP for /124. Pt states that he is having balance issues and slurred speech since yesterday. Pt has Hx of TIA.     Pt BP is 210/111.

## 2020-09-09 NOTE — LETTER
Fax   Date:         9/11/2020    To:     Name: [facility]    Message:  Please see attached discharge orders. Patient discharging today, resume home care services.       From:   Jennifer Emanuel RN, BSN  Nurse Care Coordinator   Office: [phone]: [phone]  Fax: [phone]
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
## 2020-09-10 ENCOUNTER — APPOINTMENT (OUTPATIENT)
Dept: PHYSICAL THERAPY | Facility: CLINIC | Age: 80
End: 2020-09-10
Payer: COMMERCIAL

## 2020-09-10 ENCOUNTER — APPOINTMENT (OUTPATIENT)
Dept: OCCUPATIONAL THERAPY | Facility: CLINIC | Age: 80
End: 2020-09-10
Payer: COMMERCIAL

## 2020-09-10 PROBLEM — I16.1 HYPERTENSIVE EMERGENCY: Status: ACTIVE | Noted: 2020-09-10

## 2020-09-10 PROBLEM — M51.369 DEGENERATION OF L4-L5 INTERVERTEBRAL DISC: Chronic | Status: ACTIVE | Noted: 2017-05-04

## 2020-09-10 PROBLEM — R29.6 FALLS FREQUENTLY: Chronic | Status: ACTIVE | Noted: 2020-08-24

## 2020-09-10 PROBLEM — G89.29 CHRONIC MIDLINE LOW BACK PAIN WITHOUT SCIATICA: Chronic | Status: ACTIVE | Noted: 2017-05-04

## 2020-09-10 PROBLEM — S52.515A CLOSED NONDISPLACED FRACTURE OF STYLOID PROCESS OF LEFT RADIUS: Status: RESOLVED | Noted: 2017-10-16 | Resolved: 2018-09-27

## 2020-09-10 PROBLEM — N12 PYELONEPHRITIS: Status: RESOLVED | Noted: 2017-10-16 | Resolved: 2018-09-27

## 2020-09-10 PROBLEM — M54.50 CHRONIC MIDLINE LOW BACK PAIN WITHOUT SCIATICA: Chronic | Status: ACTIVE | Noted: 2017-05-04

## 2020-09-10 PROBLEM — I10 HTN (HYPERTENSION): Chronic | Status: ACTIVE | Noted: 2020-09-10
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
## 2020-09-10 PROBLEM — I16.0 HYPERTENSIVE URGENCY: Status: ACTIVE | Noted: 2020-09-10

## 2020-09-10 PROBLEM — S22.000D COMPRESSION FRACTURE OF THORACIC VERTEBRA, WITH ROUTINE HEALING, SUBSEQUENT ENCOUNTER: Chronic | Status: ACTIVE | Noted: 2017-05-04

## 2020-09-10 PROBLEM — I10 HTN (HYPERTENSION): Status: ACTIVE | Noted: 2020-09-10

## 2020-09-10 PROBLEM — N18.30 CKD (CHRONIC KIDNEY DISEASE) STAGE 3, GFR 30-59 ML/MIN (H): Chronic | Status: ACTIVE | Noted: 2019-06-04

## 2020-09-10 LAB
ANION GAP SERPL CALCULATED.3IONS-SCNC: 8 MMOL/L (ref 3–14)
BACTERIA SPEC CULT: NORMAL
BUN SERPL-MCNC: 18 MG/DL (ref 7–30)
CALCIUM SERPL-MCNC: 9.3 MG/DL (ref 8.5–10.1)
CHLORIDE SERPL-SCNC: 104 MMOL/L (ref 94–109)
CO2 SERPL-SCNC: 26 MMOL/L (ref 20–32)
CREAT SERPL-MCNC: 1.14 MG/DL (ref 0.66–1.25)
ERYTHROCYTE [DISTWIDTH] IN BLOOD BY AUTOMATED COUNT: 13.4 % (ref 10–15)
GFR SERPL CREATININE-BSD FRML MDRD: 60 ML/MIN/{1.73_M2}
GLUCOSE SERPL-MCNC: 106 MG/DL (ref 70–99)
HCT VFR BLD AUTO: 52.8 % (ref 40–53)
HGB BLD-MCNC: 17.2 G/DL (ref 13.3–17.7)
INTERPRETATION ECG - MUSE: NORMAL
Lab: NORMAL
MCH RBC QN AUTO: 31 PG (ref 26.5–33)
MCHC RBC AUTO-ENTMCNC: 32.6 G/DL (ref 31.5–36.5)
MCV RBC AUTO: 95 FL (ref 78–100)
PLATELET # BLD AUTO: 334 10E9/L (ref 150–450)
POTASSIUM SERPL-SCNC: 4.2 MMOL/L (ref 3.4–5.3)
RBC # BLD AUTO: 5.55 10E12/L (ref 4.4–5.9)
SODIUM SERPL-SCNC: 137 MMOL/L (ref 133–144)
SPECIMEN SOURCE: NORMAL
WBC # BLD AUTO: 12.1 10E9/L (ref 4–11)

## 2020-09-10 PROCEDURE — 97162 PT EVAL MOD COMPLEX 30 MIN: CPT | Mod: GP

## 2020-09-10 PROCEDURE — 25000132 ZZH RX MED GY IP 250 OP 250 PS 637: Performed by: STUDENT IN AN ORGANIZED HEALTH CARE EDUCATION/TRAINING PROGRAM

## 2020-09-10 PROCEDURE — G0378 HOSPITAL OBSERVATION PER HR: HCPCS

## 2020-09-10 PROCEDURE — 97116 GAIT TRAINING THERAPY: CPT | Mod: GP

## 2020-09-10 PROCEDURE — 85027 COMPLETE CBC AUTOMATED: CPT | Performed by: STUDENT IN AN ORGANIZED HEALTH CARE EDUCATION/TRAINING PROGRAM

## 2020-09-10 PROCEDURE — 25000132 ZZH RX MED GY IP 250 OP 250 PS 637: Performed by: FAMILY MEDICINE

## 2020-09-10 PROCEDURE — 96376 TX/PRO/DX INJ SAME DRUG ADON: CPT | Performed by: EMERGENCY MEDICINE

## 2020-09-10 PROCEDURE — 25000128 H RX IP 250 OP 636: Performed by: EMERGENCY MEDICINE

## 2020-09-10 PROCEDURE — 80048 BASIC METABOLIC PNL TOTAL CA: CPT | Performed by: STUDENT IN AN ORGANIZED HEALTH CARE EDUCATION/TRAINING PROGRAM

## 2020-09-10 PROCEDURE — 25000128 H RX IP 250 OP 636: Performed by: STUDENT IN AN ORGANIZED HEALTH CARE EDUCATION/TRAINING PROGRAM

## 2020-09-10 PROCEDURE — 97530 THERAPEUTIC ACTIVITIES: CPT | Mod: GP

## 2020-09-10 PROCEDURE — 97535 SELF CARE MNGMENT TRAINING: CPT | Mod: GO

## 2020-09-10 PROCEDURE — U0003 INFECTIOUS AGENT DETECTION BY NUCLEIC ACID (DNA OR RNA); SEVERE ACUTE RESPIRATORY SYNDROME CORONAVIRUS 2 (SARS-COV-2) (CORONAVIRUS DISEASE [COVID-19]), AMPLIFIED PROBE TECHNIQUE, MAKING USE OF HIGH THROUGHPUT TECHNOLOGIES AS DESCRIBED BY CMS-2020-01-R: HCPCS | Performed by: STUDENT IN AN ORGANIZED HEALTH CARE EDUCATION/TRAINING PROGRAM

## 2020-09-10 PROCEDURE — 97165 OT EVAL LOW COMPLEX 30 MIN: CPT | Mod: GO

## 2020-09-10 PROCEDURE — 36415 COLL VENOUS BLD VENIPUNCTURE: CPT | Performed by: STUDENT IN AN ORGANIZED HEALTH CARE EDUCATION/TRAINING PROGRAM

## 2020-09-10 PROCEDURE — 97530 THERAPEUTIC ACTIVITIES: CPT | Mod: GO

## 2020-09-10 RX ORDER — ROPINIROLE 0.5 MG/1
0.5 TABLET, FILM COATED ORAL 3 TIMES DAILY
Status: DISCONTINUED | OUTPATIENT
Start: 2020-09-10 | End: 2020-09-10

## 2020-09-10 RX ORDER — SIMVASTATIN 40 MG
40 TABLET ORAL AT BEDTIME
Status: DISCONTINUED | OUTPATIENT
Start: 2020-09-10 | End: 2020-09-11 | Stop reason: HOSPADM

## 2020-09-10 RX ORDER — HYDRALAZINE HYDROCHLORIDE 20 MG/ML
5 INJECTION INTRAMUSCULAR; INTRAVENOUS EVERY 30 MIN PRN
Status: DISCONTINUED | OUTPATIENT
Start: 2020-09-10 | End: 2020-09-10

## 2020-09-10 RX ORDER — TAMSULOSIN HYDROCHLORIDE 0.4 MG/1
0.4 CAPSULE ORAL DAILY
Status: CANCELLED | OUTPATIENT
Start: 2020-09-10

## 2020-09-10 RX ORDER — ROPINIROLE 0.5 MG/1
0.5 TABLET, FILM COATED ORAL 3 TIMES DAILY
Status: DISCONTINUED | OUTPATIENT
Start: 2020-09-10 | End: 2020-09-11 | Stop reason: HOSPADM

## 2020-09-10 RX ORDER — BISACODYL 10 MG
10 SUPPOSITORY, RECTAL RECTAL DAILY PRN
Status: DISCONTINUED | OUTPATIENT
Start: 2020-09-10 | End: 2020-09-11 | Stop reason: HOSPADM

## 2020-09-10 RX ORDER — SENNA AND DOCUSATE SODIUM 50; 8.6 MG/1; MG/1
2 TABLET, FILM COATED ORAL 2 TIMES DAILY
Status: DISCONTINUED | OUTPATIENT
Start: 2020-09-10 | End: 2020-09-11 | Stop reason: HOSPADM

## 2020-09-10 RX ORDER — CARBIDOPA AND LEVODOPA 25; 100 MG/1; MG/1
2 TABLET ORAL 3 TIMES DAILY
Status: DISCONTINUED | OUTPATIENT
Start: 2020-09-10 | End: 2020-09-11 | Stop reason: HOSPADM

## 2020-09-10 RX ORDER — ASPIRIN 81 MG/1
81 TABLET ORAL DAILY
Status: DISCONTINUED | OUTPATIENT
Start: 2020-09-10 | End: 2020-09-11 | Stop reason: HOSPADM

## 2020-09-10 RX ORDER — SULFAMETHOXAZOLE AND TRIMETHOPRIM 400; 80 MG/1; MG/1
1 TABLET ORAL AT BEDTIME
Status: DISCONTINUED | OUTPATIENT
Start: 2020-09-10 | End: 2020-09-11 | Stop reason: HOSPADM

## 2020-09-10 RX ORDER — HYDRALAZINE HYDROCHLORIDE 20 MG/ML
5 INJECTION INTRAMUSCULAR; INTRAVENOUS EVERY 4 HOURS PRN
Status: DISCONTINUED | OUTPATIENT
Start: 2020-09-10 | End: 2020-09-10

## 2020-09-10 RX ORDER — ONDANSETRON 4 MG/1
4 TABLET, FILM COATED ORAL EVERY 8 HOURS PRN
Status: DISCONTINUED | OUTPATIENT
Start: 2020-09-10 | End: 2020-09-11 | Stop reason: HOSPADM

## 2020-09-10 RX ORDER — ACETAMINOPHEN 325 MG/1
650 TABLET ORAL EVERY 6 HOURS PRN
Status: DISCONTINUED | OUTPATIENT
Start: 2020-09-10 | End: 2020-09-11 | Stop reason: HOSPADM

## 2020-09-10 RX ORDER — SENNOSIDES 8.6 MG
8.6 TABLET ORAL 2 TIMES DAILY
Status: DISCONTINUED | OUTPATIENT
Start: 2020-09-10 | End: 2020-09-10

## 2020-09-10 RX ORDER — BUPROPION HYDROCHLORIDE 300 MG/1
300 TABLET ORAL EVERY MORNING
Status: DISCONTINUED | OUTPATIENT
Start: 2020-09-10 | End: 2020-09-11 | Stop reason: HOSPADM

## 2020-09-10 RX ORDER — POLYETHYLENE GLYCOL 3350 17 G/17G
17 POWDER, FOR SOLUTION ORAL 3 TIMES DAILY PRN
Status: DISCONTINUED | OUTPATIENT
Start: 2020-09-10 | End: 2020-09-11 | Stop reason: HOSPADM

## 2020-09-10 RX ORDER — NALOXONE HYDROCHLORIDE 0.4 MG/ML
.1-.4 INJECTION, SOLUTION INTRAMUSCULAR; INTRAVENOUS; SUBCUTANEOUS
Status: DISCONTINUED | OUTPATIENT
Start: 2020-09-10 | End: 2020-09-11 | Stop reason: HOSPADM

## 2020-09-10 RX ORDER — HYDRALAZINE HYDROCHLORIDE 20 MG/ML
5 INJECTION INTRAMUSCULAR; INTRAVENOUS EVERY 6 HOURS PRN
Status: DISCONTINUED | OUTPATIENT
Start: 2020-09-10 | End: 2020-09-10

## 2020-09-10 RX ORDER — CARBIDOPA AND LEVODOPA 25; 100 MG/1; MG/1
2 TABLET ORAL 3 TIMES DAILY
Status: DISCONTINUED | OUTPATIENT
Start: 2020-09-10 | End: 2020-09-10

## 2020-09-10 RX ORDER — POLYETHYLENE GLYCOL 3350 17 G/17G
17 POWDER, FOR SOLUTION ORAL 2 TIMES DAILY
Status: DISCONTINUED | OUTPATIENT
Start: 2020-09-10 | End: 2020-09-11 | Stop reason: HOSPADM

## 2020-09-10 RX ORDER — LIDOCAINE 40 MG/G
CREAM TOPICAL
Status: DISCONTINUED | OUTPATIENT
Start: 2020-09-10 | End: 2020-09-11 | Stop reason: HOSPADM

## 2020-09-10 RX ORDER — ONDANSETRON 4 MG/1
4 TABLET, ORALLY DISINTEGRATING ORAL EVERY 6 HOURS PRN
Status: DISCONTINUED | OUTPATIENT
Start: 2020-09-10 | End: 2020-09-11 | Stop reason: HOSPADM

## 2020-09-10 RX ORDER — CARBIDOPA AND LEVODOPA 25; 100 MG/1; MG/1
2 TABLET ORAL ONCE
Status: COMPLETED | OUTPATIENT
Start: 2020-09-10 | End: 2020-09-10

## 2020-09-10 RX ADMIN — ASPIRIN 81 MG: 81 TABLET, COATED ORAL at 09:18

## 2020-09-10 RX ADMIN — SENNOSIDES 8.6 MG: 8.6 TABLET, FILM COATED ORAL at 09:19

## 2020-09-10 RX ADMIN — ONDANSETRON 4 MG: 4 TABLET, ORALLY DISINTEGRATING ORAL at 21:33

## 2020-09-10 RX ADMIN — CARBIDOPA AND LEVODOPA 2 TABLET: 25; 100 TABLET ORAL at 09:18

## 2020-09-10 RX ADMIN — HYDRALAZINE HYDROCHLORIDE 5 MG: 20 INJECTION INTRAMUSCULAR; INTRAVENOUS at 05:10

## 2020-09-10 RX ADMIN — ONDANSETRON 4 MG: 4 TABLET, ORALLY DISINTEGRATING ORAL at 13:52

## 2020-09-10 RX ADMIN — DOCUSATE SODIUM 50 MG AND SENNOSIDES 8.6 MG 2 TABLET: 8.6; 5 TABLET, FILM COATED ORAL at 20:29

## 2020-09-10 RX ADMIN — ROPINIROLE HYDROCHLORIDE 0.5 MG: 0.5 TABLET, FILM COATED ORAL at 16:27

## 2020-09-10 RX ADMIN — SULFAMETHOXAZOLE AND TRIMETHOPRIM 1 TABLET: 400; 80 TABLET ORAL at 21:33

## 2020-09-10 RX ADMIN — ACETAMINOPHEN 650 MG: 325 TABLET, FILM COATED ORAL at 06:59

## 2020-09-10 RX ADMIN — HYDRALAZINE HYDROCHLORIDE 5 MG: 20 INJECTION INTRAMUSCULAR; INTRAVENOUS at 00:03

## 2020-09-10 RX ADMIN — ROPINIROLE HYDROCHLORIDE 0.5 MG: 0.5 TABLET, FILM COATED ORAL at 09:19

## 2020-09-10 RX ADMIN — CARBIDOPA AND LEVODOPA 2 TABLET: 25; 100 TABLET ORAL at 14:51

## 2020-09-10 RX ADMIN — POLYETHYLENE GLYCOL 3350 17 G: 17 POWDER, FOR SOLUTION ORAL at 20:29

## 2020-09-10 RX ADMIN — BUPROPION HYDROCHLORIDE 300 MG: 300 TABLET, FILM COATED, EXTENDED RELEASE ORAL at 11:45

## 2020-09-10 RX ADMIN — SIMVASTATIN 40 MG: 40 TABLET, FILM COATED ORAL at 21:33

## 2020-09-10 RX ADMIN — CARBIDOPA AND LEVODOPA 2 TABLET: 25; 100 TABLET ORAL at 16:27

## 2020-09-10 ASSESSMENT — MIFFLIN-ST. JEOR
SCORE: 1446.5
SCORE: 1455.61

## 2020-09-10 ASSESSMENT — PAIN DESCRIPTION - DESCRIPTORS: DESCRIPTORS: THROBBING

## 2020-09-10 ASSESSMENT — ACTIVITIES OF DAILY LIVING (ADL)
ADLS_ACUITY_SCORE: 21

## 2020-09-10 NOTE — PROGRESS NOTES
Pt arrived to floor from ED via litter. Pt able to pivot from litter to bed. No meds or clothes with pt. Shoes and clothing remained with pt in room. Pt set up with telemetry. VS obtained. Pt settled. Report to be given to next shift RN

## 2020-09-10 NOTE — UTILIZATION REVIEW
"  [facility] Utilization Review  Admission Status; Secondary Review Determination     Admission Date: 9/9/2020  5:44 PM      Under the authority of the Utilization Management Committee, the utilization review process indicated a secondary review on the above patient.  The review outcome is based on review of the medical records, discussions with staff, and applying clinical experience noted on the date of the review.        (X) Observation Status Appropriate - This patient does not meet hospital inpatient criteria and is placed in observation status. If this patient's primary payer is Medicare and was admitted as an inpatient, Condition Code 44 should be used and patient status changed to \"observation\".   () Observation Status concurrent Review           RATIONALE FOR DETERMINATION   79-year-old male Parkinson's disease, hypertension, orthostatic falls, admitted with hypertensive emergency, headaches, worsening of dysarthria, MRI brain does not show any signs of PRES. seen by neurology team and recommend abdominal binder, increased fluid intake, sleeping with head of bed elevated.  Patient had been on blood pressure medications that have been recently discontinued due to recurrent falls, urinary retention and is on tamsulosin limits water intake.  Complex patient who is admitted as inpatient status but blood pressures improved, seen by neurology team, and recommendations made, does not meet criteria for inpatient stay, recommend switch to observation status, communicated to family medicine team      The severity of illness, intensity of service provided, expected LOS make the care appropriate for observation status at this time.        The information on this document is developed by the utilization review team in order for the business office to ensure compliance.  This only denotes the appropriateness of proper admission status and does not reflect the quality of care rendered.         The definitions " of Inpatient Status and Observation Status used in making the determination above are those provided in the CMS Coverage Manual, Chapter 1 and Chapter 6, section 70.4.      Sincerely,       Tito Anaya MD  Physician Advisor  Utilization Review-[place]    Phone: [phone]

## 2020-09-10 NOTE — PLAN OF CARE
Pt AOx4. VSS on RA. Up AO1 w/walker. Up with PT today ambulating in halls. Uses bedside commode. No BM this shift. Able to void well. PIV S.L. 2 RN skin check completed, no skin breakdown noted. Skin WDL except dry & flaky. Reg diet, tolerating well. Strict I/O. Order to force fluids. /56 &. NSR on tele. C/o being a little anxious. No other changes. Continue to monitor & follow POC.
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
## 2020-09-10 NOTE — PROGRESS NOTES
[facility], [place]    Progress Note - [facility]        Date of Admission:  9/9/2020    Main Plans for Today    Telemetry monitoring  Elevated WBC-urine culture pending, urine was positive for protein albumin  Neurology consult  PT consult  Speak with wife about current condition at home    Assessment & Plan     Vini Loya is a 79 year old male with a PMH significant for HTN, HLD, CAD w/stent, Parkinson's disease, CKD III, chronic UTI, ELISHA, depression who is admitted for hypertensive emergency.    # hypertensive emergency  # headache  # nausea  BP elevated to 210/110 on admission with associated headache and slurred speech. Per ED note/wife, slurred speech appears to be chronic & intermittent & not new. Head CT & MRI, were negative for stroke or bleed. BP has since improved to 140/70s s/p IV hydralazine x 2. No signs of end organ damage (normal kidney function-BMP & CBC wnl, negative troponin, EKG sinus rhythm w/o signs of ischemia). However, likely hypertensive emergency over urgency due to resolution of symptoms after treating elevated blood pressures.  Of note, pt had fluctuations in BP (up to 200s) during last hospital stay, which resolved w/o intervention. I suspect there is a component of autonomic dysfunction secondary to parkinson's, and would be hesitant to restart daily BP meds, especially with history of falls.  - discontinue IV hydralazine for systolic BP >160/100 due to fluctuating BPs  - Telemetry monitoring  - Vitals Q4h  - Zofran Q8h PRN  - Neuro consult given baseline hx parkinson's & concern for autonomic dysfunction       - recommended trial of abdominal binder       - recommended 2L fluids/day       - recommended sleeping with HOB 30-45 degrees       - taking time during transitions from lying/sitting/standing     # dizziness  # hx recurrent falls  # hx orthostatic hypotension  Dizziness appears to be chronic. Differential favors parkinsons and orthostatic  hypotension, and/or anxiety. Autonomic dysfunction is common in parkinson's, especially orthostatic hypotension. No associated symptoms during dizzy episodes. Normal EKG and negative troponin makes cardiac ischemia or arrhythmia unlikely. No respiratory signs/symptoms. No signs of infection aside from elevated WBC       - hold PTA Flomax and consider stopping at discharge if patient can have f/u within 1 week to check for urinary retention     # Parkinson's  10 year history of PD, follows with neurologist. Has left sided UE & LE tremors.  It is hard to assess pt's neurological state with his underlying parkinson's disease & not knowing his baseline, however I suspect the slurred speech (resolved) and forgetfulness are chronic due to Parkinson's and unrelated to his BP. Short, shuffling gait with walker. Independent with ADLs, and up until hospitalization 1 month ago was going to the gym frequently. No acute changes, will need outpatient follow up with neurologist.  -continue PTA requip, sinemet 7am, 11am, 1600pm  -PT/OT following; recommends TCU tomorrow, progressing home with 24/7 assist     Chronic medical problems:  # Recurrent UTI: pta bactrim daily  # CAD, h/o CVA: pta aspirin 81mg  # Depression/ELISHA: pta wellbutrin 300mg daily  # HLD: pta simvastatin 40mg  # BPH: holding pta flomax   # Constipation: miralax BID, senna BID  ______________________________________________________________________    Interval History   Slept well  Is hungry and nauseous  Thinks that he needs more help at home, as he has had trouble doing things he used to do  Complaining of posterior headache 10/10  Believes that his Wellbutrin is too high (increased to 300mg a few months ago) and would like it to be 200mg    Physical Exam   Vital Signs: Temp: 95.5  F (35.3  C) Temp src: Oral BP: 134/73 Pulse: 80   Resp: 16 SpO2: 95 % O2 Device: None (Room air)    Weight: 170 lbs 0 oz     Constitutional: awake, alert, cooperative, no apparent  distress, has mild resting tremor  Eyes: Lids and lashes normal, pupils equal, round and reactive to light, extra ocular muscles intact, sclera clear, conjunctiva normal  Respiratory: No increased work of breathing, good air exchange, clear to auscultation bilaterally, no crackles or wheezing  Cardiovascular: Normal apical impulse, regular rate and rhythm  GI: normal bowel sounds, soft, non-distended, some tenderness to palpation, no masses palpated, no hepatosplenomegally  Skin: no bruising or bleeding  Neurologic: Awake, alert, oriented to name, place and time.  Masked facies, mild tremor, gait with short shuffling steps walk walker.    Data   Recent Labs   Lab 09/10/20  0543 09/09/20  1810   WBC 12.1* 9.8   HGB 17.2 16.6   MCV 95 95    313    139   POTASSIUM 4.2 4.4   CHLORIDE 104 103   CO2 26 30   BUN 18 18   CR 1.14 1.08   ANIONGAP 8 6   JEMMA 9.3 9.5   * 107*   TROPI  --  <0.015        Disposition Plan   Expected discharge: Tomorrow, recommended to transitional care unit once blood pressure less than 140/80.  Entered: Venita Portillo DO 09/10/2020, 7:32 AM     The patient's care was discussed with the Attending Physician, Dr. Purvis, Dr. Holley, and Patient.    Venita Portillo DO   she/her/hers   PGY-1  p [phone]

## 2020-09-10 NOTE — PROGRESS NOTES
09/10/20 5147   Quick Adds   Type of Visit Initial PT Evaluation       Present no   Living Environment   Lives With spouse   Living Arrangements house   Home Accessibility stairs to enter home;stairs within home   Number of Stairs, Main Entrance 8   Stair Railings, Main Entrance railings on both sides of stairs   Number of Stairs, Within Home, Primary other (see comments)  (12)   Stair Railings, Within Home, Primary railing on left side (ascending)   Transportation Anticipated family or friend will provide   Living Environment Comment PT: Pt lives in [facility] with wife. Wife is around to assist, does a lot of volunteer work at [employer]. Two children who live near by who are able to help out as needed as well. Pt has 8 FRED from back with [facility], reports using these usually, but also has 6STE from front as well. Will need to access basement to shower, bedroom on main level.    Self-Care   Usual Activity Tolerance moderate   Current Activity Tolerance moderate   Regular Exercise Yes   Activity/Exercise Type walking;other (see comments)  (HH PT)   Exercise Amount/Frequency daily;3-5 times/wk   Equipment Currently Used at Home walker, standard   Activity/Exercise/Self-Care Comment PT: Pt recenty admitted (last month) and then dc from TCU a couple weeks back. Has been seeing HH PT. Pt uses FWW at all times, also has 4WW and SEC available to him. Does not use shower chair and has grab bars for shower but these are not yet installed.    Functional Level Prior   Ambulation 1-->assistive equipment   Transferring 1-->assistive equipment   Toileting 0-->independent   Bathing 0-->independent   Communication 0-->understands/communicates without difficulty   Swallowing 0-->swallows foods/liquids without difficulty   Cognition 0 - no cognition issues reported   Fall history within last six months yes   Number of times patient has fallen within last six months 12   Which of the above functional risks had  a recent onset or change? ambulation   Prior Functional Level Comment PT: Pt mod I w/ FWW for mobility and IND w/ ADLs. Reports 12 falls in past 6months but none since last admission. Has been getting dizzy when up, likely related to hypertension.    General Information   Onset of Illness/Injury or Date of Surgery - Date 09/09/20   Referring Physician Janusz Montenegro,     Patient/Family Goals Statement to stay safe at home and get stronger   Pertinent History of Current Problem (include personal factors and/or comorbidities that impact the POC) Vini Loya is a 79 year old male with a PMH significant for HTN, HLD, CAD w/stent, Parkinson's disease, CKD III, chronic UTI, ELISHA, depression who is admitted for hypertensive urgency.   Precautions/Limitations fall precautions   Heart Disease Risk Factors Age;Gender;Medical history;High blood pressure   General Observations PT: Pt sitting EOB, agreeable to session   General Info Comments PT: up w/ assist   Cognitive Status Examination   Orientation orientation to person, place and time   Level of Consciousness alert   Follows Commands and Answers Questions 100% of the time   Personal Safety and Judgment at risk behaviors demonstrated;impulsive   Memory impaired   Cognitive Comment PT: Pt oriented, forgetful at times and needs reminders in writer's cues. Demonstrates decreased safety with transfers in session.    Pain Assessment   Patient Currently in Pain Yes, see Vital Sign flowsheet  (occipital headache, improving)   Integumentary/Edema   Integumentary/Edema Comments none   Posture    Posture Forward head position;Protracted shoulders;Kyphosis   Range of Motion (ROM)   ROM Comment decreased trunk functional ROM, mild decreased ROM in BLEs, flexed posture   Strength   Strength Comments deconditioned but 3/5 for functional mobility. Needs UE support to stand   Bed Mobility   Bed Mobility Comments SBA sit>supine   Transfer Skills   Transfer Comments close SBA for  "sit>stand w/ heavy UE use and multiple attempts from lower surfaces. Needs VCs in safety as uncontrolled descent in to sitting x2.    Gait   Gait Comments SBA for 200' x2 w/ FWW. Mild path deviation. Walks on heels at times. High stepping/marching often as compensation for mild freezing of gait pattern occasionally.    Balance   Balance Comments poor static balance w/o UE support, bracing LEs on bed to assist. Improved with use of FWW   Sensory Examination   Sensory Perception Comments reports no sensory changes   Coordination   Coordination Comments WFL   Muscle Tone   Muscle Tone Comments WFL   Modality Interventions   Planned Modality Interventions Comments none   General Therapy Interventions   Planned Therapy Interventions balance training;bed mobility training;gait training;strengthening;transfer training;risk factor education;home program guidelines;progressive activity/exercise   Clinical Impression   Criteria for Skilled Therapeutic Intervention yes, treatment indicated   PT Diagnosis Impaired functional mobility   Influenced by the following impairments medical status, deconditioning, decreased ROM, impaired functional balance, mild decreased activity tolerance   Functional limitations due to impairments decreased safety with transfers and gait   Clinical Presentation Evolving/Changing   Clinical Presentation Rationale clinical judgement, current status   Clinical Decision Making (Complexity) Moderate complexity   Therapy Frequency 5x/week   Predicted Duration of Therapy Intervention (days/wks) 2-3 days   Anticipated Discharge Disposition Transitional Care Facility;Home with Home Therapy   Risk & Benefits of therapy have been explained Yes   Patient, Family & other staff in agreement with plan of care Yes   Clinical Impression Comments see care plan note   [facility] AM-PAC TM \"6 Clicks\"   2016, Trustees of [facility], under license to [employer].  All rights reserved.   6 Clicks Short " "Forms Basic Mobility Inpatient Short Form   [facility] AM-PAC  \"6 Clicks\" V.2 Basic Mobility Inpatient Short Form   1. Turning from your back to your side while in a flat bed without using bedrails? 4 - None   2. Moving from lying on your back to sitting on the side of a flat bed without using bedrails? 3 - A Little   3. Moving to and from a bed to a chair (including a wheelchair)? 3 - A Little   4. Standing up from a chair using your arms (e.g., wheelchair, or bedside chair)? 3 - A Little   5. To walk in hospital room? 3 - A Little   6. Climbing 3-5 steps with a railing? 3 - A Little   Basic Mobility Raw Score (Score out of 24.Lower scores equate to lower levels of function) 19   Total Evaluation Time   Total Evaluation Time (Minutes) 5     "
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
## 2020-09-10 NOTE — PROGRESS NOTES
"   09/10/20 1300   Quick Adds   Type of Visit Initial Occupational Therapy Evaluation   Living Environment   Lives With spouse   Living Arrangements house   Home Accessibility stairs to enter home;stairs within home   Number of Stairs, Main Entrance 8   Stair Railings, Main Entrance railings on both sides of stairs   Number of Stairs, Within Home, Primary other (see comments)   Stair Railings, Within Home, Primary railing on left side (ascending)   Transportation Anticipated family or friend will provide   Living Environment Comment OT: Pt lives with his wife who can be available to A if need be. Pt has a walk-in shower (with a step to get in) that is in the basement.   Self-Care   Usual Activity Tolerance moderate   Current Activity Tolerance moderate   Regular Exercise Yes   Activity/Exercise Type walking  ( PT)   Exercise Amount/Frequency 3-5 times/wk   Equipment Currently Used at Home raised toilet;walker, standard;cane, straight  (toilet safety frame)   Activity/Exercise/Self-Care Comment Pt reports being IND with ADLs and uses a 2WW or SEC for mobility. Pt reports he does not believe he gets \"clean\" if he uses a shower chair. Pt reports having GB installed in shower this month.   Functional Level   Ambulation 1-->assistive equipment   Transferring 1-->assistive equipment   Toileting 0-->independent   Bathing 0-->independent   Dressing 0-->independent   Eating 0-->independent   Communication 0-->understands/communicates without difficulty   Swallowing 0-->swallows foods/liquids without difficulty   Cognition 0 - no cognition issues reported   Fall history within last six months yes   Number of times patient has fallen within last six months 12   Which of the above functional risks had a recent onset or change? transferring;ambulation;bathing;fall history;cognition   Prior Functional Level Comment Pt reports falls are due to getting dizzy as well as not using his walker. Pt reports he does not take his walker " or SEC downstairs when he showers   General Information   Onset of Illness/Injury or Date of Surgery - Date 09/09/20   Referring Physician Janusz Montenegro DO    Patient/Family Goals Statement To return to [facility]   Additional Occupational Profile Info/Pertinent History of Current Problem Vini Loya is an 79 year old male with pmhx relevant for HTN, HLD, CAD s/p PCI, PD, CKD III, chronic UTI, ELISHA, depression that was admitted 9/9/20 for hypertensive urgency. The general neurology service is consulted to assist with evaluation and management of possible autonomic dysfunction contributing to his labile blood pressures and pres-syncope.    Precautions/Limitations fall precautions   General Info Comments Activity: up with A   Cognitive Status Examination   Orientation orientation to person, place and time   Level of Consciousness alert   Follows Commands (Cognition) follows one step commands;75-90% accuracy;repetition of directions required   Attention Alternating attention impaired, difficulty shifting between tasks;Quiet environment required;Distractible during evaluation   Organization/Problem Solving Problem solving impaired;Prioritizing of information/tasks impaired   Executive Function Self awareness/monitoring impaired;Planning ability impaired   Cognitive Comment Pt repeated information throughout session. Will continue to monitor and assess.   Visual Perception   Visual Perception No deficits were identified   Integumentary/Edema   Integumentary/Edema no deficits were identifed   Range of Motion (ROM)   ROM Comment BUE WFL   Strength   Strength Comments BUE WFL   Hand Strength   Hand Strength Comments  strength WFL   Coordination   Fine Motor Coordination Mild tremors with FM coordination 2/2 to parkinsons   Transfer Skill: Bed to Chair/Chair to Bed   Level of [place]: Bed to Chair contact guard   Physical Assist/Nonphysical Assist: Bed to Chair supervision;1 person assist;verbal cues    Weight-Bearing Restrictions full weight-bearing   Assistive Device - Transfer Skill Bed to Chair Chair to Bed Rehab Eval standard walker   Transfer Skill: Sit to Stand   Level of [place]: Sit/Stand contact guard   Physical Assist/Nonphysical Assist: Sit/Stand verbal cues;1 person assist   Transfer Skill: Toilet Transfer   Level of [place]: Toilet contact guard   Physical Assist/Nonphysical Assist: Toilet verbal cues;1 person assist   Weight-Bearing Restrictions: Toilet full weight-bearing   Assistive Device standard walker   Toilet Transfer Skill Comments Heavy use of grab bars   Lower Body Dressing   Level of [place]: Dress Lower Body other (see comments)  (Mod I to don socks)   Instrumental Activities of Daily Living (IADL)   Previous Responsibilities housekeeping;yardwork;medication management   IADL Comments pt uses a pill box and reports setting it up IND. Reports desire to conintue shoveling this winter. Wife and son can A as needed with all IADLs.   Activities of Daily Living Analysis   Impairments Contributing to Impaired Activities of Daily Living balance impaired;cognition impaired;coordination impaired;fear and anxiety;flexibility decreased;strength decreased   General Therapy Interventions   Planned Therapy Interventions ADL retraining;IADL retraining;bed mobility training;cognition;strengthening;transfer training;progressive activity/exercise;risk factor education   Clinical Impression   Criteria for Skilled Therapeutic Interventions Met yes, treatment indicated   OT Diagnosis Decreased ADL/IADL IND and safety   Influenced by the following impairments medical status, balance, cognition, generalized weakness   Assessment of Occupational Performance 3-5 Performance Deficits   Identified Performance Deficits amb, transfers, bathing, IADLs   Clinical Decision Making (Complexity) Low complexity   Therapy Frequency 6x/week   Predicted Duration of Therapy Intervention (days/wks) 5 days  "  Anticipated Equipment Needs at Discharge shower chair   Anticipated Discharge Disposition Home with Home Therapy;Home with Assist;Transitional Care Facility   Risks and Benefits of Treatment have been explained. Yes   Patient, Family & other staff in agreement with plan of care Yes   Clinical Impression Comments Pt would benefit from skilled OT to progress ADL/IADL IND and safety.   [facility] AM-PAC  \"6 Clicks\" Daily Activity Inpatient Short Form   1. Putting on and taking off regular lower body clothing? 3 - A Little   2. Bathing (including washing, rinsing, drying)? 2 - A Lot   3. Toileting, which includes using toilet, bedpan or urinal? 3 - A Little   4. Putting on and taking off regular upper body clothing? 4 - None   5. Taking care of personal grooming such as brushing teeth? 4 - None   6. Eating meals? 4 - None   Daily Activity Raw Score (Score out of 24.Lower scores equate to lower levels of function) 20   Total Evaluation Time   Total Evaluation Time (Minutes) 8     "

## 2020-09-10 NOTE — PLAN OF CARE
"BP (!) 157/85 (BP Location: Right arm)   Pulse 80   Temp 97.1  F (36.2  C) (Oral)   Resp 16   Ht 1.727 m (5' 8\")   Wt 77.1 kg (170 lb)   SpO2 96%   BMI 25.85 kg/m    Neuro: A&Ox4. Reports headache \"10 out of 10\" denied visual disturbances, radiation of pain. MD notified- tylenol order rec'd and admin.  Cardiac: Afebrile, HTN- 157/85. OVSS. Placed on telemetry.   Respiratory: RA   GI/: Voids spontaneously. No BM this shift.   Diet/appetite: Tolerating reg diet. Denies nausea   Activity: Up with assist 1/gb. Pt reports use of walker/cane at home when feels weak or out in public.  Pain: Tylenol admin for Headache  Skin: 2RN skin inspection not completed.  Lines: piv sl'd    Pt settled on unit. Oriented to call light/unit. Report given to next shift RN. Continue to monitor. Continue POC.    "

## 2020-09-10 NOTE — PLAN OF CARE
Discharge Planner PT   Patient plan for discharge: home  Current status: PT eval completed. Pt w/ soft BP initially in sitting but able to raise to WFL with seated LE exercises, pt asymptomatic. Pt is SBA for sit<>stand w/ heavy UE use to stand and uncontrolled descent x2 into sitting. Pt reports being aware of safety strategies with transfers but needs reminders today. Ambulates 200' x2 w/ close SBA and FWW. Mild path deviation but no LOB, fair tolerance.  Barriers to return to prior living situation: medical, safety with transfers, mobility  Recommendations for discharge: TCU today, may progress to home w/ 24/7 assist and HH PT pending LOS  Rationale for recommendations: Pt mobilizing well w/ some safety concerns given overall balance and transfer safety, as well as current variable BP and hx of symptoms with ambulation. Pt will need to demonstrate safe stairs navigation prior to discharge home.        Entered by: Mariam Bradley 09/10/2020 10:54 AM

## 2020-09-10 NOTE — PLAN OF CARE
"Assumed cares [id number]. Pt was checked hourly on shift    Observation Goals  -diagnostic tests and consults completed and resulted: Not Met  -vital signs normal or at patient baseline: Met  -safe disposition plan has been identified: Met    /62 (BP Location: Left arm)   Pulse 75   Temp 96  F (35.6  C) (Oral)   Resp 16   Ht 1.727 m (5' 8\")   Wt 76.2 kg (167 lb 15.9 oz)   SpO2 95%   BMI 25.54 kg/m       Pt A&O x4. VSS on RA. On telemetry. Pt denies SOB and chest pain. Denies pain. Pt changed to an observation pt, education completed & understood. Up with Ax1 with gaitbet & walker, walked in marie x2. Reg diet, ate 100% of meal. Urinates spontaneously, constipated, no BM, scheduled stool softeners given. Pt states became anxious & nausea after speaking to wife. Pt states she was giving him a \"hard time\" & doesn't understand why she does it; Zofran given x1. Pt states walking helps relieve anxiety. Pt appeared to be sleeping between cares.     Plan of Care: Continuing monitoring patient and notify MD of any changes.    Mary Jo Heredia RN on 9/10/2020 at 10:17 PM    "

## 2020-09-10 NOTE — PLAN OF CARE
5B OT Evaluation    Discharge Planner OT   Patient plan for discharge: home with A   Current status: OT eval completed, tx indicated. Pt with stable BP throughout session, STS x 4 with CGA and heavy use of BUE - pt unable to stand without use of UE, CGA for toilet transfer with heavy use of B grab bars, shower transfer x 2 with mod A with pt having a difficult time maintaining wide YENY and VC/TC for hand placement on walker or grab bars, amb ~150ft with CGA and 2WW, min A sit <> supine with pt having difficulty bridging to reposition self in bed, edu on benefits of shower chair/nonslip mat/hand held showerhead 2/2 to falls risk    ** pt reported that at home he amb downstairs without walker/SEC and then amb to bathroom with no device. Pt washes hair and feet prior to getting into shower 2/2 to fear of falling in the shower. Pt reports he does not want to use a shower chair 2/2 to believing he will not get clean if sitting **    Barriers to return to prior living situation: lack of insight to deficits, falls risk, balance, generalized weakness, must use stairs at home  Recommendations for discharge: TCU as of today, may progress to home with HH OT/PT - will require home safety eval  Rationale for recommendations: at this time pt would benefit from continued skilled IP therapy to progress IND and safety with ADl/IADLs. Pending LOS, pt may progress to discharge home with 24/7 A and HH OT/PT to continue to progress IND and safety in home environment.        Entered by: Miriam Bradley 09/10/2020 2:53 PM

## 2020-09-10 NOTE — PHARMACY-ADMISSION MEDICATION HISTORY
Admission medication history interview status for the 9/9/2020 admission is complete. See Epic admission navigator for allergy information, pharmacy, prior to admission medications and immunization status.     Medication history interview sources:  patient, dispense report, Epic chart review    Changes made to PTA medication list (reason)  Added: none  Deleted: none  Changed: none    Additional medication history information (including reliability of information, actions taken by pharmacist):  -pt was reliable historian of his meds, and reported doses matched up with dispense report. Pt manages his meds himself (though occasional help from his wife), and fills at [facility] [phone] though the pharmacy was not required for this med history interview  -pt was on paroxetine 20 mg daily until ~June 2020 when his PCP tapered him down while initiating bupropion  mg, and pt is now on bupropion  mg daily  -confirmed NKDA      Prior to Admission medications    Medication Sig Last Dose Taking? Auth Provider   aspirin (ASA) 81 MG tablet Take 1 tablet (81 mg) by mouth daily 9/9/2020 at Unknown time Yes Wegener, Joel Daniel Irwin, MD   buPROPion (WELLBUTRIN XL) 300 MG 24 hr tablet Take 1 tablet (300 mg) by mouth every morning Past Week at Unknown time Yes Wegener, Joel Daniel Irwin, MD   carbidopa-levodopa (SINEMET)  MG tablet Take 2 tablets by mouth 3 times daily Dr. Encinas in-patient note from 8/12/20: Pt is to take two tablets 3 times a day (7, 11 & 4).  Patient taking differently: Take 2 tablets by mouth Three times daily at 0700, 1100, 1600. 9/9/2020 at Unknown time Yes Penny Grajeda APRN CNP   ondansetron (ZOFRAN-ODT) 4 MG ODT tab DISSOLVE 1 TAB BY MOUTH EVERY 8 HOURS AS NEEDED FOR NAUSEA Past Week at Unknown time Yes Reported, Patient   polyethylene glycol (MIRALAX) 17 GM/SCOOP powder Take 17 g (1 capful) by mouth 3 times daily  Patient taking differently: Take 1 capful by  mouth 3 times daily as needed for constipation  Past Month at Unknown time Yes Wegener, Joel Daniel Irwin, MD   rOPINIRole (REQUIP) 0.25 MG tablet Take 0.5 mg by mouth 3 times daily  9/9/2020 at Unknown time Yes Reported, Patient   SENNA-docusate sodium (SENNA S) 8.6-50 MG tablet Take 2 tablets by mouth 2 times daily  Patient taking differently: Take 2 tablets by mouth 2 times daily as needed  9/9/2020 at Unknown time Yes Wegener, Joel Daniel Irwin, MD   simvastatin (ZOCOR) 40 MG tablet TAKE ONE TABLET BY MOUTH EVERY NIGHT AT BEDTIME Past Week at Unknown time Yes Wegener, Joel Daniel Irwin, MD   sulfamethoxazole-trimethoprim (BACTRIM/SEPTRA) 400-80 MG tablet Take 1 tablet by mouth At Bedtime For UTI prevention Past Week at Unknown time Yes Wegener, Joel Daniel Irwin, MD   tamsulosin (FLOMAX) 0.4 MG capsule Take 1 capsule (0.4 mg) by mouth daily 9/9/2020 at Unknown time Yes Wegener, Joel Daniel Irwin, MD   vitamin D3 (CHOLECALCIFEROL) 2000 units (50 mcg) tablet Take 1 tablet by mouth daily as needed  Past Month at Unknown time Yes Reported, Patient       Medication history completed by:   New Ding, HennyD, BCPS

## 2020-09-11 ENCOUNTER — APPOINTMENT (OUTPATIENT)
Dept: PHYSICAL THERAPY | Facility: CLINIC | Age: 80
End: 2020-09-11
Payer: COMMERCIAL

## 2020-09-11 VITALS
DIASTOLIC BLOOD PRESSURE: 90 MMHG | OXYGEN SATURATION: 95 % | HEART RATE: 78 BPM | BODY MASS INDEX: 25.46 KG/M2 | TEMPERATURE: 96.4 F | SYSTOLIC BLOOD PRESSURE: 181 MMHG | HEIGHT: 68 IN | WEIGHT: 167.99 LBS | RESPIRATION RATE: 16 BRPM

## 2020-09-11 PROBLEM — I95.1 ORTHOSTATIC HYPOTENSION: Chronic | Status: ACTIVE | Noted: 2020-09-11

## 2020-09-11 PROBLEM — I16.1 HYPERTENSIVE EMERGENCY: Status: RESOLVED | Noted: 2020-09-10 | Resolved: 2020-09-11

## 2020-09-11 LAB
SARS-COV-2 RNA SPEC QL NAA+PROBE: NOT DETECTED
SPECIMEN SOURCE: NORMAL

## 2020-09-11 PROCEDURE — 97116 GAIT TRAINING THERAPY: CPT | Mod: GP

## 2020-09-11 PROCEDURE — G0378 HOSPITAL OBSERVATION PER HR: HCPCS

## 2020-09-11 PROCEDURE — 25000132 ZZH RX MED GY IP 250 OP 250 PS 637: Performed by: FAMILY MEDICINE

## 2020-09-11 PROCEDURE — 25000132 ZZH RX MED GY IP 250 OP 250 PS 637: Performed by: STUDENT IN AN ORGANIZED HEALTH CARE EDUCATION/TRAINING PROGRAM

## 2020-09-11 RX ORDER — SENNA AND DOCUSATE SODIUM 50; 8.6 MG/1; MG/1
2 TABLET, FILM COATED ORAL 2 TIMES DAILY
Qty: 120 TABLET | Refills: 11 | Status: SHIPPED | OUTPATIENT
Start: 2020-09-11

## 2020-09-11 RX ORDER — BISACODYL 10 MG
10 SUPPOSITORY, RECTAL RECTAL DAILY PRN
Qty: 10 SUPPOSITORY | Refills: 0 | Status: SHIPPED | OUTPATIENT
Start: 2020-09-11

## 2020-09-11 RX ORDER — CARBIDOPA AND LEVODOPA 25; 100 MG/1; MG/1
2 TABLET ORAL 3 TIMES DAILY
Start: 2020-09-11 | End: 2020-10-16

## 2020-09-11 RX ADMIN — BUPROPION HYDROCHLORIDE 300 MG: 300 TABLET, FILM COATED, EXTENDED RELEASE ORAL at 08:18

## 2020-09-11 RX ADMIN — ROPINIROLE HYDROCHLORIDE 0.5 MG: 0.5 TABLET, FILM COATED ORAL at 08:18

## 2020-09-11 RX ADMIN — CARBIDOPA AND LEVODOPA 2 TABLET: 25; 100 TABLET ORAL at 11:24

## 2020-09-11 RX ADMIN — ROPINIROLE HYDROCHLORIDE 0.5 MG: 0.5 TABLET, FILM COATED ORAL at 11:25

## 2020-09-11 RX ADMIN — DOCUSATE SODIUM 50 MG AND SENNOSIDES 8.6 MG 2 TABLET: 8.6; 5 TABLET, FILM COATED ORAL at 08:19

## 2020-09-11 RX ADMIN — CARBIDOPA AND LEVODOPA 2 TABLET: 25; 100 TABLET ORAL at 08:18

## 2020-09-11 RX ADMIN — ASPIRIN 81 MG: 81 TABLET, COATED ORAL at 08:18

## 2020-09-11 RX ADMIN — ACETAMINOPHEN 650 MG: 325 TABLET, FILM COATED ORAL at 06:03

## 2020-09-11 NOTE — DISCHARGE SUMMARY
[facility] Discharge Summary- [facility]  Service    Date of Admission:  9/9/2020  Date of Service: 9/11/2020  Date of Discharge:  9/11/2020  Discharging Attending Provider: Dr. Holley  Discharge Team: Tobi    Discharge Diagnoses   Hypertensive Emergency    Follow-ups Needed After Discharge   Primary Care appt 9/18/20      - check for urinary retention and decide whether or not to restart Tamsulosin      - discuss patient's desire to alter Bupropion dose    Follow-up with outpatient neurologist      -discuss long-term plan for blood pressure management    Hospital Course     Vini Loya was admitted on 9/9/2020 for hypertensive emergency.  The following problems were addressed during his hospitalization:     # hypertensive emergency  # headache  # nausea  BP elevated to 210/110 on admission with associated headache and slurred speech. Per ED note/wife, slurred speech appears to be chronic & intermittent & not new. Head CT & MRI, were negative for stroke or bleed. BP improved to 140/70s s/p IV hydralazine x 2. No signs of end organ damage (normal kidney function-BMP & CBC wnl, negative troponin, EKG sinus rhythm w/o signs of ischemia).  We are more concerned about events related to hypotension than sequale of intermittent hypertension so did not initiate hypertensive medications  - Neuro recommendations       - recommended using abdominal binder for blood pressure stabilization       - recommended 2L fluids/day       - recommended sleeping with HOB 30-45 degrees       - taking time during transitions from lying/sitting/standing     # dizziness  # hx recurrent falls  # hx orthostatic hypotension  Dizziness appears to be chronic. Differential favors parkinsons and orthostatic hypotension, and/or anxiety. Autonomic dysfunction is common in parkinson's, especially orthostatic hypotension. No associated symptoms during dizzy episodes. Normal EKG and negative  troponin makes cardiac ischemia or arrhythmia unlikely. No respiratory signs/symptoms. No signs of infection aside from elevated WBC       - Hold home Flomax due to alpha blocking causing low blood pressures, resulting in frequent falls.        - Follow-up with PCP to check for urinary retention     # Parkinson's  10 year history of PD, follows with neurologist. Has left sided UE & LE tremors. Short, shuffling gait with walker. Independent with ADLs. No acute changes,  -Continue home requip, sinemet 7am, 11am, 1600pm  -PT/OT recommendations      --home safety evaluation      --using shower chair      --grab bars installed in upstairs bath, shower      --nonslip mat in shower/bath  -PT/OT home health care     Chronic medical problems:  # Recurrent UTI: pta bactrim daily  # CAD, h/o CVA: pta aspirin 81mg  # Depression/ELISHA: pta wellbutrin 300mg daily  # HLD: pta simvastatin 40mg  # BPH: holding pta flomax   # Constipation: miralax BID, senna BID, Doculax suppository prn    # Discharge Pain Plan:   - Patient currently has NO PAIN and is not being prescribed pain medications on discharge.    Consultations This Hospital Stay   NEUROLOGY GENERAL ADULT IP CONSULT  PHYSICAL THERAPY ADULT IP CONSULT  OCCUPATIONAL THERAPY ADULT IP CONSULT  SOCIAL WORK IP CONSULT  MEDICATION HISTORY IP PHARMACY CONSULT    Code Status   Full Code     The patient was discussed with Dr. Holley, the patient, and his wife    [facility]  [facility]   Pager:[phone]_  ___________________________________________________________________  Review of Systems:  CONSTITUTIONAL: NEGATIVE for fever, chills, change in weight  INTEGUMENTARY/SKIN: NEGATIVE for worrisome rashes, moles or lesions  EYES: NEGATIVE for vision changes or irritation  ENT/MOUTH: NEGATIVE for ear, mouth and throat problems  RESP: NEGATIVE for significant cough or SOB  BREAST: NEGATIVE for masses, tenderness or  discharge  CV: NEGATIVE for chest pain, palpitations or peripheral edema  GI: NEGATIVE for nausea, abdominal pain, heartburn, or change in bowel habits  : NEGATIVE for frequency, dysuria, or hematuria  NEURO: NEGATIVE for dizziness/lightheadedness and visual change, headaches, Positive for tremor, unsteady walk without walker  ENDOCRINE: NEGATIVE for temperature intolerance, skin/hair changes  HEME/ALLERGY: NEGATIVE for bleeding problems  PSYCHIATRIC: NEGATIVE for changes in mood or affect    Physical Exam   Vital Signs: Temp: 96.4  F (35.8  C) Temp src: Oral BP: (!) 186/99 Pulse: 74   Resp: 16 SpO2: 95 % O2 Device: None (Room air)    Weight: 167 lbs 15.85 oz    Constitutional: awake, friendly, cooperative, no apparent distress  Eyes: Lids and lashes normal, pupils equal, round and reactive to light, extra ocular muscles intact, sclera clear, conjunctiva normal  Respiratory: No increased work of breathing, good air exchange, clear to auscultation bilaterally, no crackles or wheezing  Cardiovascular: Normal apical impulse, regular rate and rhythm  GI: normal bowel sounds, soft, non-distended, some tenderness to palpation, no masses palpated, no hepatosplenomegally  Skin: no bruising or bleeding  Neurologic: Awake, alert, oriented to name, place and time.  Masked facies, mild tremor      Significant Results and Procedures   Most Recent 3 CBC's:  Recent Labs   Lab Test 09/10/20  0543 09/09/20 1810 08/13/20  0745   WBC 12.1* 9.8 10.4   HGB 17.2 16.6 16.5   MCV 95 95 94    313 275     Most Recent 3 BMP's:  Recent Labs   Lab Test 09/10/20  0543 09/09/20 1810 08/13/20  0745    139 134   POTASSIUM 4.2 4.4 4.2   CHLORIDE 104 103 104   CO2 26 30 26   BUN 18 18 23   CR 1.14 1.08 1.10   ANIONGAP 8 6 4   JEMMA 9.3 9.5 8.8   * 107* 108*     Most Recent Urinalysis:  Recent Labs   Lab Test 09/09/20  1949  06/25/20  1250   COLOR Yellow   < > Yellow   APPEARANCE Clear   < > Clear   URINEGLC Negative   < >  Negative   URINEBILI Negative   < > Negative   URINEKETONE Negative   < > Negative   SG 1.016   < > 1.025   UBLD Negative   < > Negative   URINEPH 7.0   < > 6.0   PROTEIN 10*   < > Trace*   UROBILINOGEN  --   --  0.2   NITRITE Negative   < > Negative   LEUKEST Negative   < > Negative   RBCU 1   < > O - 2   WBCU 2   < > 0 - 5    < > = values in this interval not displayed.     Pending Results   These results will be followed up by n/a  Unresulted Labs Ordered in the Past 30 Days of this Admission     No orders found from 8/10/2020 to 9/10/2020.        Primary Care Physician   Joel Daniel Wegener    Discharge Disposition   Discharged to home  Condition at discharge: Stable    Discharge Orders   No discharge procedures on file.  Discharge Medications   Current Discharge Medication List      START taking these medications    Details   bisacodyl (DULCOLAX) 10 MG suppository Place 1 suppository (10 mg) rectally daily as needed for constipation  Qty: 10 suppository, Refills: 0    Associated Diagnoses: Slow transit constipation         CONTINUE these medications which have CHANGED    Details   carbidopa-levodopa (SINEMET)  MG tablet Take 2 tablets by mouth 3 times daily Take two tablets 3 times a day, at 7am, 11am & 4pm.    Associated Diagnoses: Parkinson's disease (H)      SENNA-docusate sodium (SENNA S) 8.6-50 MG tablet Take 2 tablets by mouth 2 times daily  Qty: 120 tablet, Refills: 11    Associated Diagnoses: Constipation, unspecified constipation type         CONTINUE these medications which have NOT CHANGED    Details   aspirin (ASA) 81 MG tablet Take 1 tablet (81 mg) by mouth daily  Qty: 90 tablet, Refills: 3    Associated Diagnoses: Coronary artery disease involving native heart without angina pectoris, unspecified vessel or lesion type      buPROPion (WELLBUTRIN XL) 300 MG 24 hr tablet Take 1 tablet (300 mg) by mouth every morning  Qty: 90 tablet, Refills: 1    Associated Diagnoses: Major depressive disorder,  single episode, moderate (H)      ondansetron (ZOFRAN-ODT) 4 MG ODT tab DISSOLVE 1 TAB BY MOUTH EVERY 8 HOURS AS NEEDED FOR NAUSEA      polyethylene glycol (MIRALAX) 17 GM/SCOOP powder Take 17 g (1 capful) by mouth 3 times daily  Qty: 1 Bottle, Refills: 11    Associated Diagnoses: Constipation, unspecified constipation type      rOPINIRole (REQUIP) 0.25 MG tablet Take 0.5 mg by mouth 3 times daily       simvastatin (ZOCOR) 40 MG tablet TAKE ONE TABLET BY MOUTH EVERY NIGHT AT BEDTIME  Qty: 90 tablet, Refills: 3    Associated Diagnoses: Hyperlipidemia LDL goal <100      sulfamethoxazole-trimethoprim (BACTRIM/SEPTRA) 400-80 MG tablet Take 1 tablet by mouth At Bedtime For UTI prevention  Qty: 90 tablet, Refills: 3    Associated Diagnoses: Urinary tract infection with hematuria, site unspecified      vitamin D3 (CHOLECALCIFEROL) 2000 units (50 mcg) tablet Take 1 tablet by mouth daily as needed          STOP taking these medications       tamsulosin (FLOMAX) 0.4 MG capsule Comments:   Reason for Stopping:             Allergies   No Known Allergies      Venita Portillo, DO   she/her/hers   PGY-1  p [phone]

## 2020-09-11 NOTE — PLAN OF CARE
PT/5B-   Discharge Planner PT   Patient plan for discharge: home today  Current status: Pt educated in donning abdominal binder. Pt ambulates [id number] with FWW, min A. No formal LOB, however, unsteady gait. Pt requesting trial short distance without AD, min A needed. Pt educated on rec to continue to use FWW. Pt assisted in ascending/descending flight of stairs with 1 rail and min A. Education provided to facilitate safety with stair negotiation as pt demos significant posterior lean  Barriers to return to prior living situation: medical status, dizziness, falls risk, impaired mobility  Recommendations for discharge: TCU, however, pt declining and is discharging home today. Will need 24/7 assist and HH PT/OT  Rationale for recommendations: Pt will need continued skilled PT to progress strength, balance, gait and IND with mobility to facilitate safety in home  Entered by: Jennifer Linder 09/11/2020 11:47 AM     Physical Therapy Discharge Summary    Reason for therapy discharge:    Discharged to home with home therapy.    Progress towards therapy goal(s). See goals on Care Plan in [facility] electronic health record for goal details.  Goals partially met.  Barriers to achieving goals:   limited tolerance for therapy and discharge from facility.    Therapy recommendation(s):    Continued therapy is recommended.  Rationale/Recommendations:  see above.

## 2020-09-11 NOTE — PROGRESS NOTES
OBSERVATION GOALS:          -diagnostic tests and consults completed and resulted  - NOT MET  -vital signs normal or at patient baseline  - MET  -safe disposition plan has been identified  - MET

## 2020-09-11 NOTE — PLAN OF CARE
Shift: [id number]      Neuro: Alert and oriented x4, able to make needs known, calls appropriately  Cardiac/VS: Hypertensive 186/99 this AM, [place]'s on call aware and okay with pressures  Respiratory: RA  GI: Denies N/V  Diet/Appetite: Regular  : Voids spontaneously via commode  Activity: Up Ax1 with walker   Pain: Reported headache this AM, took tylenol  Skin: No new deficits   LDA(s): PIV SL        Plan: Continue plan of care

## 2020-09-11 NOTE — CONSULTS
SW consult acknowledged. Pt returning home with wife with resumption of home care services. RNCC aware and has worked with pt through discharge planning and establishing HC services.     MICH Marques, [facility]  Acute Care Float   [facility]

## 2020-09-11 NOTE — PROGRESS NOTES
Care Coordinator Progress Note    Admission Date/Time:  9/9/2020  Attending MD:  Afia Holley DO    Data  Chart reviewed, discussed with interdisciplinary team.   Patient was admitted for:    Hypertensive emergency  Parkinson's disease (H)  Constipation, unspecified constipation type.    Concerns with insurance coverage for discharge needs: None.  Current Living Situation: Patient lives with spouse.  Support System: Supportive and Involved  Services Involved: Home Care  Transportation at Discharge: Family or friend will provide  Transportation to Medical Appointments:   - Name of caregiver: Spouse, Ladi  Barriers to Discharge: Medical stability, safe discharge dispo     Assessment  Patient is a 79yr old male with a prior medical history of HTN, HLD, CAD w/stent, Parkinson's disease, CKD III, chronic UTI, ELISHA, and depressions who was admitted for hypertensive emergency. Introduced self and role of RNCC. Patient confirms that he lives locally w/his spouse, Ladi. Patient is open to [facility] for RN/PT/OT, resumption orders placed at this time. Per chart review, patient has recs for TCU, but may progress to home w/resumption of services. Patient notes that his wife is coming to get him at 11am, bedside nurse updated, PT to talk with patient before discharge. Discharge orders faxed to HC at this time, updated on discharge.     [facility] (RN/PT/OT)  Phone: [phone]  Fax: [phone]     Plan  Anticipated Discharge Date:  9/11?  Anticipated Discharge Plan:  TBD, pt plans to discharge to home w/resumption of home care services.     Jennifer Emanuel, AKIL, BSN    [facility]    Medicine Group  [address] [place] [id number]    [email or web address]    Office: [phone] Pager: [phone]  To contact the weekend RNCC, page [phone].

## 2020-09-12 ENCOUNTER — PATIENT OUTREACH (OUTPATIENT)
Dept: CARE COORDINATION | Facility: CLINIC | Age: 80
End: 2020-09-12

## 2020-09-14 ENCOUNTER — PATIENT OUTREACH (OUTPATIENT)
Dept: CARE COORDINATION | Facility: CLINIC | Age: 80
End: 2020-09-14

## 2020-09-14 NOTE — PROGRESS NOTES
[facility]: Post-Discharge Note  SITUATION                                                      Admission:    Admission Date: 09/09/20   Reason for Admission: Hypertensive Emergency  Discharge:   Discharge Date: 09/11/20  Discharge Diagnosis: Hypertensive Emergency    BACKGROUND                                                         # hypertensive emergency  # headache  # nausea  BP elevated to 210/110 on admission with associated headache and slurred speech. Per ED note/wife, slurred speech appears to be chronic & intermittent & not new. Head CT & MRI, were negative for stroke or bleed. BP improved to 140/70s s/p IV hydralazine x 2. No signs of end organ damage (normal kidney function-BMP & CBC wnl, negative troponin, EKG sinus rhythm w/o signs of ischemia).  We are more concerned about events related to hypotension than sequale of intermittent hypertension so did not initiate hypertensive medications  - Neuro recommendations       - recommended using abdominal binder for blood pressure stabilization       - recommended 2L fluids/day       - recommended sleeping with HOB 30-45 degrees       - taking time during transitions from lying/sitting/standing    ASSESSMENT      Discharge Assessment  Patient reports symptoms are: Improved  Does the patient have all of their medications?: Yes  Does patient know what their new medications are for?: Yes  Does patient have a follow-up appointment scheduled?: Yes  Does patient have any other questions or concerns?: No    Post-op  Did the patient have surgery or a procedure: No  Fever: No  Chills: No  Eating & Drinking: eating and drinking without complaints/concerns  PO Intake: regular diet  Bowel Function: constipation  Urinary Status: voiding without complaint/concerns        PLAN                                                      Outpatient Plan:  Primary Care appt 9/18/20      - check for urinary retention and decide whether or not to restart  Tamsulosin      - discuss patient's desire to alter Bupropion dose     Follow-up with outpatient neurologist      -discuss long-term plan for blood pressure management    Future Appointments   Date Time Provider Department Center   9/18/2020  2:40 PM Wegener, Joel Daniel Irwin, MD [facility]   10/16/2020 10:00 AM Billie Abdi PA-C [facility]   1/22/2021 11:30 AM Gaurav Ford MD [facility]           Erika Stephens, CMA

## 2020-09-14 NOTE — PROGRESS NOTES
Clinic Care Coordination Contact  Community Health Worker Initial Outreach    CHW Initial Information Gathering:  Referral Source: ED Follow-Up  Preferred Hospital: [facility]  [phone]  Current living arrangement:: I live in a private home with spouse  Community Resources: Home Care  Informal Support system:: Family, Spouse  No PCP office visit in Past Year: No  CHW Additional Questions  If ED/Hospital discharge, follow-up appointment scheduled as recommended?: Yes  MyChart active?: No    Patient accepts CC: Not at this time. Pt has follow up with PCP on 9/18 to discuss hospitalization and concerns. Pt gave verbal consent for me to speak with his wife. Pt's wife took down my contact information. I encouraged her to call me back if anything comes up. She agreed.

## 2020-09-18 ENCOUNTER — OFFICE VISIT (OUTPATIENT)
Dept: FAMILY MEDICINE | Facility: CLINIC | Age: 80
End: 2020-09-18
Payer: COMMERCIAL

## 2020-09-18 VITALS
BODY MASS INDEX: 25.23 KG/M2 | WEIGHT: 166.5 LBS | TEMPERATURE: 97.5 F | HEIGHT: 68 IN | HEART RATE: 69 BPM | OXYGEN SATURATION: 96 % | RESPIRATION RATE: 14 BRPM | SYSTOLIC BLOOD PRESSURE: 126 MMHG | DIASTOLIC BLOOD PRESSURE: 81 MMHG

## 2020-09-18 DIAGNOSIS — R29.6 FALLS FREQUENTLY: ICD-10-CM

## 2020-09-18 DIAGNOSIS — F41.9 ANXIETY: ICD-10-CM

## 2020-09-18 DIAGNOSIS — Z23 NEED FOR PROPHYLACTIC VACCINATION AND INOCULATION AGAINST INFLUENZA: Primary | ICD-10-CM

## 2020-09-18 DIAGNOSIS — N13.8 BENIGN PROSTATIC HYPERPLASIA WITH URINARY OBSTRUCTION: ICD-10-CM

## 2020-09-18 DIAGNOSIS — N40.1 BENIGN PROSTATIC HYPERPLASIA WITH URINARY OBSTRUCTION: ICD-10-CM

## 2020-09-18 DIAGNOSIS — R42 DIZZINESS: ICD-10-CM

## 2020-09-18 DIAGNOSIS — I95.1 ORTHOSTATIC HYPOTENSION: ICD-10-CM

## 2020-09-18 PROCEDURE — G0008 ADMIN INFLUENZA VIRUS VAC: HCPCS | Performed by: FAMILY MEDICINE

## 2020-09-18 PROCEDURE — 99495 TRANSJ CARE MGMT MOD F2F 14D: CPT | Performed by: FAMILY MEDICINE

## 2020-09-18 PROCEDURE — 90662 IIV NO PRSV INCREASED AG IM: CPT | Performed by: FAMILY MEDICINE

## 2020-09-18 RX ORDER — PAROXETINE 20 MG/1
20 TABLET, FILM COATED ORAL EVERY MORNING
Qty: 90 TABLET | Refills: 3 | Status: SHIPPED | OUTPATIENT
Start: 2020-09-18 | End: 2021-06-15

## 2020-09-18 ASSESSMENT — MIFFLIN-ST. JEOR: SCORE: 1431.8

## 2020-09-18 NOTE — NURSING NOTE

## 2020-09-21 ENCOUNTER — APPOINTMENT (OUTPATIENT)
Dept: PHYSICAL THERAPY | Facility: CLINIC | Age: 80
DRG: 057 | End: 2020-09-21
Attending: NURSE PRACTITIONER
Payer: COMMERCIAL

## 2020-09-21 ENCOUNTER — HOSPITAL ENCOUNTER (INPATIENT)
Facility: CLINIC | Age: 80
LOS: 2 days | Discharge: HOME | DRG: 057 | End: 2020-09-25
Attending: EMERGENCY MEDICINE | Admitting: STUDENT IN AN ORGANIZED HEALTH CARE EDUCATION/TRAINING PROGRAM
Payer: COMMERCIAL

## 2020-09-21 ENCOUNTER — APPOINTMENT (OUTPATIENT)
Dept: CT IMAGING | Facility: CLINIC | Age: 80
DRG: 057 | End: 2020-09-21
Attending: EMERGENCY MEDICINE
Payer: COMMERCIAL

## 2020-09-21 DIAGNOSIS — Z20.828 EXPOSURE TO SARS-ASSOCIATED CORONAVIRUS: ICD-10-CM

## 2020-09-21 DIAGNOSIS — R26.9 GAIT DISTURBANCE, POST-STROKE: Chronic | ICD-10-CM

## 2020-09-21 DIAGNOSIS — R31.9 URINARY TRACT INFECTION WITH HEMATURIA, SITE UNSPECIFIED: ICD-10-CM

## 2020-09-21 DIAGNOSIS — N13.8 BENIGN PROSTATIC HYPERPLASIA WITH URINARY OBSTRUCTION: Chronic | ICD-10-CM

## 2020-09-21 DIAGNOSIS — N40.1 BENIGN PROSTATIC HYPERPLASIA WITH URINARY OBSTRUCTION: Chronic | ICD-10-CM

## 2020-09-21 DIAGNOSIS — S32.10XA CLOSED FRACTURE OF SACRUM, UNSPECIFIED PORTION OF SACRUM, INITIAL ENCOUNTER (H): ICD-10-CM

## 2020-09-21 DIAGNOSIS — G20.A1 PARKINSON'S DISEASE (H): Primary | Chronic | ICD-10-CM

## 2020-09-21 DIAGNOSIS — N39.0 URINARY TRACT INFECTION WITH HEMATURIA, SITE UNSPECIFIED: ICD-10-CM

## 2020-09-21 DIAGNOSIS — N18.30 CKD (CHRONIC KIDNEY DISEASE) STAGE 3, GFR 30-59 ML/MIN (H): Chronic | ICD-10-CM

## 2020-09-21 DIAGNOSIS — M51.27 HERNIATED NUCLEUS PULPOSUS, L5-S1, RIGHT: ICD-10-CM

## 2020-09-21 DIAGNOSIS — F32.1 MAJOR DEPRESSIVE DISORDER, SINGLE EPISODE, MODERATE (H): Chronic | ICD-10-CM

## 2020-09-21 DIAGNOSIS — F41.1 GENERALIZED ANXIETY DISORDER: Chronic | ICD-10-CM
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
## 2020-09-21 DIAGNOSIS — I95.1 ORTHOSTATIC HYPOTENSION: Chronic | ICD-10-CM

## 2020-09-21 DIAGNOSIS — I69.398 GAIT DISTURBANCE, POST-STROKE: Chronic | ICD-10-CM

## 2020-09-21 DIAGNOSIS — R29.6 FALLS FREQUENTLY: Chronic | ICD-10-CM

## 2020-09-21 DIAGNOSIS — S22.49XA CLOSED FRACTURE OF MULTIPLE RIBS, UNSPECIFIED LATERALITY, INITIAL ENCOUNTER: ICD-10-CM

## 2020-09-21 DIAGNOSIS — N17.9 AKI (ACUTE KIDNEY INJURY) (H): ICD-10-CM

## 2020-09-21 LAB
ALBUMIN SERPL-MCNC: 3.5 G/DL (ref 3.4–5)
ALBUMIN UR-MCNC: 10 MG/DL
ALP SERPL-CCNC: 125 U/L (ref 40–150)
ALT SERPL W P-5'-P-CCNC: 11 U/L (ref 0–70)
ANION GAP SERPL CALCULATED.3IONS-SCNC: 3 MMOL/L (ref 3–14)
APPEARANCE UR: CLEAR
AST SERPL W P-5'-P-CCNC: 26 U/L (ref 0–45)
BASOPHILS # BLD AUTO: 0.1 10E9/L (ref 0–0.2)
BASOPHILS NFR BLD AUTO: 0.8 %
BILIRUB SERPL-MCNC: 1.1 MG/DL (ref 0.2–1.3)
BILIRUB UR QL STRIP: NEGATIVE
BUN SERPL-MCNC: 21 MG/DL (ref 7–30)
CALCIUM SERPL-MCNC: 9.2 MG/DL (ref 8.5–10.1)
CHLORIDE SERPL-SCNC: 104 MMOL/L (ref 94–109)
CO2 SERPL-SCNC: 31 MMOL/L (ref 20–32)
COLOR UR AUTO: YELLOW
CREAT SERPL-MCNC: 1.26 MG/DL (ref 0.66–1.25)
DIFFERENTIAL METHOD BLD: ABNORMAL
EOSINOPHIL # BLD AUTO: 0.1 10E9/L (ref 0–0.7)
EOSINOPHIL NFR BLD AUTO: 1 %
ERYTHROCYTE [DISTWIDTH] IN BLOOD BY AUTOMATED COUNT: 13.4 % (ref 10–15)
GFR SERPL CREATININE-BSD FRML MDRD: 53 ML/MIN/{1.73_M2}
GLUCOSE SERPL-MCNC: 111 MG/DL (ref 70–99)
GLUCOSE UR STRIP-MCNC: NEGATIVE MG/DL
HCT VFR BLD AUTO: 48.2 % (ref 40–53)
HGB BLD-MCNC: 15.8 G/DL (ref 13.3–17.7)
HGB UR QL STRIP: NEGATIVE
IMM GRANULOCYTES # BLD: 0.1 10E9/L (ref 0–0.4)
IMM GRANULOCYTES NFR BLD: 0.4 %
INR PPP: 0.97 (ref 0.86–1.14)
INTERPRETATION ECG - MUSE: NORMAL
KETONES UR STRIP-MCNC: NEGATIVE MG/DL
LEUKOCYTE ESTERASE UR QL STRIP: ABNORMAL
LYMPHOCYTES # BLD AUTO: 1.5 10E9/L (ref 0.8–5.3)
LYMPHOCYTES NFR BLD AUTO: 10.2 %
MCH RBC QN AUTO: 31.4 PG (ref 26.5–33)
MCHC RBC AUTO-ENTMCNC: 32.8 G/DL (ref 31.5–36.5)
MCV RBC AUTO: 96 FL (ref 78–100)
MONOCYTES # BLD AUTO: 1.7 10E9/L (ref 0–1.3)
MONOCYTES NFR BLD AUTO: 11.6 %
MUCOUS THREADS #/AREA URNS LPF: PRESENT /LPF
NEUTROPHILS # BLD AUTO: 10.8 10E9/L (ref 1.6–8.3)
NEUTROPHILS NFR BLD AUTO: 76 %
NITRATE UR QL: NEGATIVE
NRBC # BLD AUTO: 0 10*3/UL
NRBC BLD AUTO-RTO: 0 /100
PH UR STRIP: 7 PH (ref 5–7)
PLATELET # BLD AUTO: 319 10E9/L (ref 150–450)
POTASSIUM SERPL-SCNC: 4.5 MMOL/L (ref 3.4–5.3)
PROT SERPL-MCNC: 7.3 G/DL (ref 6.8–8.8)
RBC # BLD AUTO: 5.03 10E12/L (ref 4.4–5.9)
RBC #/AREA URNS AUTO: 1 /HPF (ref 0–2)
SARS-COV-2 RNA SPEC QL NAA+PROBE: NOT DETECTED
SODIUM SERPL-SCNC: 138 MMOL/L (ref 133–144)
SOURCE: ABNORMAL
SP GR UR STRIP: 1.02 (ref 1–1.03)
SPECIMEN SOURCE: NORMAL
SQUAMOUS #/AREA URNS AUTO: 2 /HPF (ref 0–1)
TRANS CELLS #/AREA URNS HPF: <1 /HPF (ref 0–1)
TROPONIN I SERPL-MCNC: <0.015 UG/L (ref 0–0.04)
UROBILINOGEN UR STRIP-MCNC: NORMAL MG/DL (ref 0–2)
WBC # BLD AUTO: 14.2 10E9/L (ref 4–11)
WBC #/AREA URNS AUTO: 2 /HPF (ref 0–5)

## 2020-09-21 PROCEDURE — 80053 COMPREHEN METABOLIC PANEL: CPT | Performed by: EMERGENCY MEDICINE

## 2020-09-21 PROCEDURE — 99233 SBSQ HOSP IP/OBS HIGH 50: CPT | Performed by: PHYSICIAN ASSISTANT

## 2020-09-21 PROCEDURE — 97116 GAIT TRAINING THERAPY: CPT | Mod: GP

## 2020-09-21 PROCEDURE — 74177 CT ABD & PELVIS W/CONTRAST: CPT

## 2020-09-21 PROCEDURE — 25000132 ZZH RX MED GY IP 250 OP 250 PS 637: Performed by: NURSE PRACTITIONER

## 2020-09-21 PROCEDURE — 84484 ASSAY OF TROPONIN QUANT: CPT | Performed by: EMERGENCY MEDICINE

## 2020-09-21 PROCEDURE — 99285 EMERGENCY DEPT VISIT HI MDM: CPT | Mod: 25 | Performed by: EMERGENCY MEDICINE

## 2020-09-21 PROCEDURE — G0378 HOSPITAL OBSERVATION PER HR: HCPCS

## 2020-09-21 PROCEDURE — 85025 COMPLETE CBC W/AUTO DIFF WBC: CPT | Performed by: EMERGENCY MEDICINE

## 2020-09-21 PROCEDURE — C9803 HOPD COVID-19 SPEC COLLECT: HCPCS | Performed by: EMERGENCY MEDICINE

## 2020-09-21 PROCEDURE — 93010 ELECTROCARDIOGRAM REPORT: CPT | Mod: Z6 | Performed by: EMERGENCY MEDICINE

## 2020-09-21 PROCEDURE — 25000128 H RX IP 250 OP 636: Performed by: EMERGENCY MEDICINE

## 2020-09-21 PROCEDURE — 81001 URINALYSIS AUTO W/SCOPE: CPT | Performed by: EMERGENCY MEDICINE

## 2020-09-21 PROCEDURE — 93005 ELECTROCARDIOGRAM TRACING: CPT | Performed by: EMERGENCY MEDICINE

## 2020-09-21 PROCEDURE — 96361 HYDRATE IV INFUSION ADD-ON: CPT

## 2020-09-21 PROCEDURE — 68200002 ZZH TRAUMA EVALUATION W/O CC LEVEL II: Performed by: EMERGENCY MEDICINE

## 2020-09-21 PROCEDURE — 25000132 ZZH RX MED GY IP 250 OP 250 PS 637: Performed by: EMERGENCY MEDICINE

## 2020-09-21 PROCEDURE — 25800030 ZZH RX IP 258 OP 636: Performed by: NURSE PRACTITIONER

## 2020-09-21 PROCEDURE — U0003 INFECTIOUS AGENT DETECTION BY NUCLEIC ACID (DNA OR RNA); SEVERE ACUTE RESPIRATORY SYNDROME CORONAVIRUS 2 (SARS-COV-2) (CORONAVIRUS DISEASE [COVID-19]), AMPLIFIED PROBE TECHNIQUE, MAKING USE OF HIGH THROUGHPUT TECHNOLOGIES AS DESCRIBED BY CMS-2020-01-R: HCPCS | Performed by: EMERGENCY MEDICINE

## 2020-09-21 PROCEDURE — 97161 PT EVAL LOW COMPLEX 20 MIN: CPT | Mod: GP

## 2020-09-21 PROCEDURE — 72131 CT LUMBAR SPINE W/O DYE: CPT

## 2020-09-21 PROCEDURE — 96374 THER/PROPH/DIAG INJ IV PUSH: CPT | Performed by: EMERGENCY MEDICINE

## 2020-09-21 PROCEDURE — 85610 PROTHROMBIN TIME: CPT | Performed by: EMERGENCY MEDICINE

## 2020-09-21 PROCEDURE — 25000132 ZZH RX MED GY IP 250 OP 250 PS 637: Performed by: PHYSICIAN ASSISTANT

## 2020-09-21 RX ORDER — ASPIRIN 81 MG/1
81 TABLET, CHEWABLE ORAL DAILY
Status: DISCONTINUED | OUTPATIENT
Start: 2020-09-21 | End: 2020-09-25 | Stop reason: HOSPADM

## 2020-09-21 RX ORDER — CARBIDOPA AND LEVODOPA 25; 100 MG/1; MG/1
2 TABLET ORAL 3 TIMES DAILY
Status: DISCONTINUED | OUTPATIENT
Start: 2020-09-21 | End: 2020-09-25 | Stop reason: HOSPADM

## 2020-09-21 RX ORDER — HYDRALAZINE HYDROCHLORIDE 20 MG/ML
10 INJECTION INTRAMUSCULAR; INTRAVENOUS ONCE
Status: COMPLETED | OUTPATIENT
Start: 2020-09-21 | End: 2020-09-21

## 2020-09-21 RX ORDER — LIDOCAINE 4 G/G
2 PATCH TOPICAL
Status: DISCONTINUED | OUTPATIENT
Start: 2020-09-21 | End: 2020-09-25 | Stop reason: HOSPADM

## 2020-09-21 RX ORDER — LIDOCAINE 4 G/G
1 PATCH TOPICAL
Status: COMPLETED | OUTPATIENT
Start: 2020-09-21 | End: 2020-09-21

## 2020-09-21 RX ORDER — ONDANSETRON 4 MG/1
4 TABLET, ORALLY DISINTEGRATING ORAL EVERY 6 HOURS PRN
Status: DISCONTINUED | OUTPATIENT
Start: 2020-09-21 | End: 2020-09-25 | Stop reason: HOSPADM

## 2020-09-21 RX ORDER — POLYETHYLENE GLYCOL 3350 17 G/17G
17 POWDER, FOR SOLUTION ORAL DAILY
Status: DISCONTINUED | OUTPATIENT
Start: 2020-09-21 | End: 2020-09-25 | Stop reason: HOSPADM

## 2020-09-21 RX ORDER — ONDANSETRON 2 MG/ML
4 INJECTION INTRAMUSCULAR; INTRAVENOUS EVERY 6 HOURS PRN
Status: DISCONTINUED | OUTPATIENT
Start: 2020-09-21 | End: 2020-09-25 | Stop reason: HOSPADM

## 2020-09-21 RX ORDER — SULFAMETHOXAZOLE AND TRIMETHOPRIM 400; 80 MG/1; MG/1
1 TABLET ORAL AT BEDTIME
Status: DISCONTINUED | OUTPATIENT
Start: 2020-09-21 | End: 2020-09-23

## 2020-09-21 RX ORDER — CARBIDOPA AND LEVODOPA 25; 100 MG/1; MG/1
2 TABLET ORAL ONCE
Status: DISCONTINUED | OUTPATIENT
Start: 2020-09-21 | End: 2020-09-21

## 2020-09-21 RX ORDER — ACETAMINOPHEN 325 MG/1
975 TABLET ORAL ONCE
Status: COMPLETED | OUTPATIENT
Start: 2020-09-21 | End: 2020-09-21

## 2020-09-21 RX ORDER — SIMVASTATIN 40 MG
40 TABLET ORAL AT BEDTIME
Status: DISCONTINUED | OUTPATIENT
Start: 2020-09-21 | End: 2020-09-25 | Stop reason: HOSPADM

## 2020-09-21 RX ORDER — ACETAMINOPHEN 325 MG/1
650 TABLET ORAL EVERY 4 HOURS PRN
Status: DISCONTINUED | OUTPATIENT
Start: 2020-09-21 | End: 2020-09-21

## 2020-09-21 RX ORDER — PAROXETINE 20 MG/1
20 TABLET, FILM COATED ORAL EVERY MORNING
Status: DISCONTINUED | OUTPATIENT
Start: 2020-09-21 | End: 2020-09-25 | Stop reason: HOSPADM

## 2020-09-21 RX ORDER — IBUPROFEN 600 MG/1
600 TABLET, FILM COATED ORAL ONCE
Status: COMPLETED | OUTPATIENT
Start: 2020-09-21 | End: 2020-09-21

## 2020-09-21 RX ORDER — BISACODYL 10 MG
10 SUPPOSITORY, RECTAL RECTAL DAILY PRN
Status: DISCONTINUED | OUTPATIENT
Start: 2020-09-21 | End: 2020-09-21

## 2020-09-21 RX ORDER — CARBIDOPA AND LEVODOPA 50; 200 MG/1; MG/1
1 TABLET, EXTENDED RELEASE ORAL AT BEDTIME
Status: DISCONTINUED | OUTPATIENT
Start: 2020-09-21 | End: 2020-09-23

## 2020-09-21 RX ORDER — ACETAMINOPHEN 325 MG/1
650 TABLET ORAL EVERY 4 HOURS PRN
Status: DISCONTINUED | OUTPATIENT
Start: 2020-09-21 | End: 2020-09-25 | Stop reason: HOSPADM

## 2020-09-21 RX ORDER — AMOXICILLIN 250 MG
2 CAPSULE ORAL 2 TIMES DAILY
Status: DISCONTINUED | OUTPATIENT
Start: 2020-09-21 | End: 2020-09-25 | Stop reason: HOSPADM

## 2020-09-21 RX ORDER — ONDANSETRON 4 MG/1
4 TABLET, ORALLY DISINTEGRATING ORAL EVERY 6 HOURS PRN
Status: DISCONTINUED | OUTPATIENT
Start: 2020-09-21 | End: 2020-09-23

## 2020-09-21 RX ORDER — NALOXONE HYDROCHLORIDE 0.4 MG/ML
.1-.4 INJECTION, SOLUTION INTRAMUSCULAR; INTRAVENOUS; SUBCUTANEOUS
Status: DISCONTINUED | OUTPATIENT
Start: 2020-09-21 | End: 2020-09-25 | Stop reason: HOSPADM

## 2020-09-21 RX ORDER — LIDOCAINE 40 MG/G
CREAM TOPICAL
Status: DISCONTINUED | OUTPATIENT
Start: 2020-09-21 | End: 2020-09-25

## 2020-09-21 RX ORDER — ACETAMINOPHEN 650 MG/1
650 SUPPOSITORY RECTAL EVERY 4 HOURS PRN
Status: DISCONTINUED | OUTPATIENT
Start: 2020-09-21 | End: 2020-09-25 | Stop reason: HOSPADM

## 2020-09-21 RX ORDER — IOPAMIDOL 755 MG/ML
101 INJECTION, SOLUTION INTRAVASCULAR ONCE
Status: COMPLETED | OUTPATIENT
Start: 2020-09-21 | End: 2020-09-21

## 2020-09-21 RX ORDER — BISACODYL 10 MG
10 SUPPOSITORY, RECTAL RECTAL ONCE
Status: COMPLETED | OUTPATIENT
Start: 2020-09-21 | End: 2020-09-21

## 2020-09-21 RX ORDER — CARBIDOPA AND LEVODOPA 25; 100 MG/1; MG/1
2 TABLET ORAL 3 TIMES DAILY
Status: DISCONTINUED | OUTPATIENT
Start: 2020-09-21 | End: 2020-09-21

## 2020-09-21 RX ORDER — POLYETHYLENE GLYCOL 3350 17 G/17G
17 POWDER, FOR SOLUTION ORAL 3 TIMES DAILY PRN
Status: DISCONTINUED | OUTPATIENT
Start: 2020-09-21 | End: 2020-09-21

## 2020-09-21 RX ORDER — ROPINIROLE 0.5 MG/1
0.5 TABLET, FILM COATED ORAL 3 TIMES DAILY
Status: DISCONTINUED | OUTPATIENT
Start: 2020-09-21 | End: 2020-09-25 | Stop reason: HOSPADM

## 2020-09-21 RX ADMIN — ROPINIROLE HYDROCHLORIDE 0.5 MG: 0.5 TABLET, FILM COATED ORAL at 21:32

## 2020-09-21 RX ADMIN — CARBIDOPA AND LEVODOPA 1 TABLET: 50; 200 TABLET, EXTENDED RELEASE ORAL at 21:31

## 2020-09-21 RX ADMIN — BISACODYL 10 MG: 10 SUPPOSITORY RECTAL at 13:54

## 2020-09-21 RX ADMIN — SODIUM CHLORIDE 500 ML: 9 INJECTION, SOLUTION INTRAVENOUS at 15:50

## 2020-09-21 RX ADMIN — DOCUSATE SODIUM 50 MG AND SENNOSIDES 8.6 MG 2 TABLET: 8.6; 5 TABLET, FILM COATED ORAL at 19:38

## 2020-09-21 RX ADMIN — HYDRALAZINE HYDROCHLORIDE 10 MG: 20 INJECTION, SOLUTION INTRAMUSCULAR; INTRAVENOUS at 06:21

## 2020-09-21 RX ADMIN — CARBIDOPA AND LEVODOPA 2 TABLET: 25; 100 TABLET ORAL at 10:41

## 2020-09-21 RX ADMIN — IBUPROFEN 600 MG: 600 TABLET ORAL at 06:21

## 2020-09-21 RX ADMIN — PAROXETINE 20 MG: 20 TABLET, FILM COATED ORAL at 14:13

## 2020-09-21 RX ADMIN — ACETAMINOPHEN 650 MG: 325 TABLET, FILM COATED ORAL at 21:38

## 2020-09-21 RX ADMIN — ROPINIROLE HYDROCHLORIDE 0.5 MG: 0.5 TABLET, FILM COATED ORAL at 14:13

## 2020-09-21 RX ADMIN — POLYETHYLENE GLYCOL 3350 17 G: 17 POWDER, FOR SOLUTION ORAL at 15:51

## 2020-09-21 RX ADMIN — CARBIDOPA AND LEVODOPA 2 TABLET: 25; 100 TABLET ORAL at 15:51

## 2020-09-21 RX ADMIN — IOPAMIDOL 101 ML: 755 INJECTION, SOLUTION INTRAVENOUS at 05:39

## 2020-09-21 RX ADMIN — ASPIRIN 81 MG CHEWABLE TABLET 81 MG: 81 TABLET CHEWABLE at 10:40

## 2020-09-21 RX ADMIN — SIMVASTATIN 40 MG: 40 TABLET, FILM COATED ORAL at 21:35

## 2020-09-21 RX ADMIN — SULFAMETHOXAZOLE AND TRIMETHOPRIM 1 TABLET: 400; 80 TABLET ORAL at 21:35

## 2020-09-21 RX ADMIN — LIDOCAINE 1 PATCH: 560 PATCH PERCUTANEOUS; TOPICAL; TRANSDERMAL at 10:41

## 2020-09-21 RX ADMIN — ROPINIROLE HYDROCHLORIDE 0.5 MG: 0.5 TABLET, FILM COATED ORAL at 11:35

## 2020-09-21 RX ADMIN — ACETAMINOPHEN 975 MG: 325 TABLET, FILM COATED ORAL at 10:40

## 2020-09-21 NOTE — PLAN OF CARE
"Observation goals - Acute Traumatic pain  PRIOR TO DISCHARGE      Comments: List all goals to be met before discharge home:   - Adequate pain control on oral analgesia. YES  - Vital Signs normal or at patient baseline. YES BP (!) 147/74 (BP Location: Left arm)   Pulse 77   Temp 97.7  F (36.5  C) (Oral)   Resp 16   Ht 1.727 m (5' 8\")   SpO2 95%   BMI 25.32 kg/m      - Tolerating oral intake to maintain hydration. YES  - Diagnostic tests and consults completed and resulted NO: waiting on SW consult  - Cleared for discharge from consultants' standpoint if involved in care NO  - Safe disposition plan has been identified NO  - Return to baseline functional status NO  - Nurse to notify provider when observation goals have been met and patient is ready for discharge.               "

## 2020-09-21 NOTE — DISCHARGE INSTRUCTIONS
Rib Fracture    You broke one or more ribs. This is called a rib fracture. Rib fractures don't need a cast like other bones. They will heal by themselves in about 4 to 6 weeks. The first 3 to 4 weeks will be the most painful. During this time deep breathing, coughing, or changing position from sitting to lying down, may cause the broken ends to move slightly.  Home care    Rest. You should not be doing any heavy lifting or strenuous exertion until the pain goes away.    It hurts to breathe when you have a broken rib. This puts you at risk of getting pneumonia from poor airflow through your lungs. To prevent this:  ? Take several very deep breaths once an hour while you're awake. Breathe out through pursed lips as if you are blowing up a balloon. If possible, actually blow up a balloon or a rubber glove. This exercise builds up pressure inside the lung and prevents collapse of the small air sacs of the lung. This exercise may cause some pain at the site of injury. This is normal.  ? You may have gotten a breathing exercise device called an incentive spirometer. Use it at least 4 times a day, or as directed.    Apply an ice pack over the injured area for 15 to 20 minutes every 1 to 2 hours. You should do this for the first 24 to 48 hours. To make an ice pack, put ice cubes in a plastic bag that seals at the top. Wrap the bag in a clean, thin towel or cloth. Never put ice or an ice pack directly on the skin. Keep using ice packs as needed for the relief of pain and swelling.    You may use over-the-counter pain medicine to control pain, unless another pain medicine was prescribed. If you have chronic liver or kidney disease or ever had a stomach ulcer or GI (gastrointestinal) bleeding, talk with your healthcare provider before using these medicines.    If your pain is not controlled, contact your healthcare provider. Sometimes a stronger pain medicine may be needed. A nerve block can be done in case of severe pain.  It will numb the nerve between the ribs.  Follow-up care  Follow up with your healthcare provider, or as advised. In rare cases, a broken rib will cause complications in the first few days that may not be clearly seen during your initial exam. This can include collapsed lung, bleeding around the lung or into the belly (abdomen), or pneumonia. So watch for the signs below.  If X-rays were taken, you will be told of any new findings that may affect your care.  Call 911  Call 911 if you have:    Dizziness, weakness or fainting    Shortness of breath with or without chest discomfort    New or worsening abdominal pain    Discomfort in other areas of your upper body such as your shoulders, jaw, neck, or arms  When to seek medical advice  Call your healthcare provider right away if any of these occur:    Increasing chest pain with breathing    Fever of 100.4 F (38 C) or above, or as directed by your healthcare provider    Congested cough, nausea, or vomiting  Date Last Reviewed: 4/1/2018 2000-2019 [employer]. [address] [place] [address], [place] [address]. All rights reserved. This information is not intended as a substitute for professional medical care. Always follow your healthcare professional's instructions.

## 2020-09-21 NOTE — PLAN OF CARE
" Observation goals - Acute Traumatic pain  PRIOR TO DISCHARGE      Comments: List all goals to be met before discharge home:   - Adequate pain control on oral analgesia. YES  - Vital Signs normal or at patient baseline. YES BP (!) 155/83   Pulse 82   Temp 98.4  F (36.9  C) (Oral)   Resp 12   Ht 1.727 m (5' 8\")   SpO2 96%   BMI 25.32 kg/m      - Tolerating oral intake to maintain hydration. YES  - Diagnostic tests and consults completed and resulted NO: waiting on PT consult  - Cleared for discharge from consultants' standpoint if involved in care NO  - Safe disposition plan has been identified NO  - Return to baseline functional status NO  - Nurse to notify provider when observation goals have been met and patient is ready for discharge.           "
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
## 2020-09-21 NOTE — PROGRESS NOTES
09/21/20 1400   Quick Adds   Type of Visit Initial PT Evaluation   Living Environment   Lives With spouse   Living Arrangements house   Home Accessibility stairs to enter home   Number of Stairs, Main Entrance 4   Stair Railings, Main Entrance railings safe and in good condition   Transportation Anticipated family or friend will provide   Living Environment Comment PT: Pt lives in [facility] with wife. Wife is around to assist, does a lot of volunteer work at [employer]. Two children who live near by who are able to help out as needed as well. Pt has 8 FRED from back with [facility], reports using these usually, but also has 6STE from front as well. Will need to access basement to shower, bedroom on main level.    Self-Care   Usual Activity Tolerance moderate   Current Activity Tolerance fair   Regular Exercise No   Equipment Currently Used at Home walker, rolling   Activity/Exercise/Self-Care Comment Has been seeing  PT. Pt uses FWW at all times, also has 4WW and SEC available to him. Does not use shower chair and has grab bars for shower but these are not yet installed.    Functional Level Prior   Ambulation 1-->assistive equipment   Transferring 1-->assistive equipment   Toileting 0-->independent   Bathing 0-->independent   Communication 0-->understands/communicates without difficulty   Swallowing 0-->swallows foods/liquids without difficulty   Cognition 0 - no cognition issues reported   Fall history within last six months yes   Number of times patient has fallen within last six months 10   Which of the above functional risks had a recent onset or change? ambulation;transferring;fall history   General Information   Onset of Illness/Injury or Date of Surgery - Date 09/21/20   Referring Physician Radha Ratliff, APRN CNP   Patient/Family Goals Statement none stated   Pertinent History of Current Problem (include personal factors and/or comorbidities that impact the POC) 79 year old male  who presents with  left side pain and lower extremity weakness since he had a fall on 9/20/20. Patient is not concise on the time, he just knows it happened when his wife was at [employer]. This is the 3rd fall in 3 months he has been medically seen for. This time he was walking down the stairs and his foot slipped. He tried to turn left to catch himself and hit the left side of his head, left side of chest and arm on the stairs.   Precautions/Limitations fall precautions   Cognitive Status Examination   Orientation orientation to person, place and time   Level of Consciousness alert   Follows Commands and Answers Questions 100% of the time   Personal Safety and Judgment intact   Memory intact   Posture    Posture Kyphosis   Range of Motion (ROM)   ROM Comment WFL   Strength   Strength Comments WFL   Bed Mobility   Bed Mobility Comments min A with Supine to sit   Transfer Skills   Transfer Comments STS with min Ax1 and FWW   Gait   Gait Comments ambulates 100ft with min Ax1 and FWW with frequent posterior LOB   Balance   Balance Comments needing min A and FWW to prevent LOB   General Therapy Interventions   Planned Therapy Interventions balance training;bed mobility training;gait training;motor coordination training;neuromuscular re-education;strengthening;transfer training;risk factor education;home program guidelines;progressive activity/exercise   Clinical Impression   Criteria for Skilled Therapeutic Intervention yes, treatment indicated   PT Diagnosis Impaired functional mobility   Influenced by the following impairments impaired gait and balance, falls risk   Functional limitations due to impairments IND mobility   Clinical Presentation Stable/Uncomplicated   Clinical Presentation Rationale clinical judgement, current status   Clinical Decision Making (Complexity) Low complexity   Therapy Frequency 1x/week   Predicted Duration of Therapy Intervention (days/wks) 2 days   Anticipated Discharge Disposition Transitional Care Facility  "  Risk & Benefits of therapy have been explained Yes   Patient, Family & other staff in agreement with plan of care Yes   [facility] AM-PAC TM \"6 Clicks\"   2016, Trustees of [facility], under license to [employer].  All rights reserved.   6 Clicks Short Forms Basic Mobility Inpatient Short Form   [facility] AM-PAC  \"6 Clicks\" V.2 Basic Mobility Inpatient Short Form   1. Turning from your back to your side while in a flat bed without using bedrails? 4 - None   2. Moving from lying on your back to sitting on the side of a flat bed without using bedrails? 3 - A Little   3. Moving to and from a bed to a chair (including a wheelchair)? 3 - A Little   4. Standing up from a chair using your arms (e.g., wheelchair, or bedside chair)? 3 - A Little   5. To walk in hospital room? 3 - A Little   6. Climbing 3-5 steps with a railing? 2 - A Lot   Basic Mobility Raw Score (Score out of 24.Lower scores equate to lower levels of function) 18   Total Evaluation Time   Total Evaluation Time (Minutes) 10     "

## 2020-09-21 NOTE — PHARMACY-ADMISSION MEDICATION HISTORY
Admission Medication History Completed by Pharmacy    See [facility] Admission Navigator for allergy information, preferred outpatient pharmacy, prior to admission medications and immunization status.     Medication History Sources:     Patient, Surescripts, Discharge summary from 9/11    Changes made to PTA medication list (reason):    Added: None    Deleted: None    Changed: None    Additional Information:    Patient was transitioned back to Paroxetine from Bupropion last Friday, but has not yet been able to  the medication from their pharmacy. Plan to start while in the obs unit.     Prior to Admission medications    Medication Sig Last Dose Taking? Auth Provider   aspirin (ASA) 81 MG tablet Take 1 tablet (81 mg) by mouth daily 9/20/2020 at Unknown time Yes Wegener, Joel Daniel Irwin, MD   carbidopa-levodopa (SINEMET)  MG tablet Take 2 tablets by mouth 3 times daily Take two tablets 3 times a day, at 7am, 11am & 4pm. 9/20/2020 at Unknown time Yes Elizabeth Gaxiola MD   ondansetron (ZOFRAN-ODT) 4 MG ODT tab DISSOLVE 1 TAB BY MOUTH EVERY 8 HOURS AS NEEDED FOR NAUSEA Past Week at Unknown time Yes Reported, Patient   rOPINIRole (REQUIP) 0.25 MG tablet Take 0.5 mg by mouth 3 times daily  9/20/2020 at Unknown time Yes Reported, Patient   SENNA-docusate sodium (SENNA S) 8.6-50 MG tablet Take 2 tablets by mouth 2 times daily 9/20/2020 at Unknown time Yes Elizabeth Gaxiola MD   simvastatin (ZOCOR) 40 MG tablet TAKE ONE TABLET BY MOUTH EVERY NIGHT AT BEDTIME 9/20/2020 at Unknown time Yes Wegener, Joel Daniel Irwin, MD   sulfamethoxazole-trimethoprim (BACTRIM/SEPTRA) 400-80 MG tablet Take 1 tablet by mouth At Bedtime For UTI prevention 9/20/2020 at Unknown time Yes Wegener, Joel Daniel Irwin, MD   vitamin D3 (CHOLECALCIFEROL) 2000 units (50 mcg) tablet Take 1 tablet by mouth daily as needed  Past Month at Unknown time Yes Reported, Patient   bisacodyl (DULCOLAX) 10 MG suppository Place 1 suppository (10 mg)  rectally daily as needed for constipation More than a month at Unknown time  Elizabeth Gaxiola MD   PARoxetine (PAXIL) 20 MG tablet Take 1 tablet (20 mg) by mouth every morning  at never  Wegener, Joel Daniel Irwin, MD   polyethylene glycol (MIRALAX) 17 GM/SCOOP powder Take 17 g (1 capful) by mouth 3 times daily  Patient taking differently: Take 1 capful by mouth 3 times daily as needed for constipation    Wegener, Joel Daniel Irwin, MD       Date completed: 09/21/20    Medication history completed by: Ethan Pavon, PharmD

## 2020-09-21 NOTE — ED NOTES
[facility], [place]   ED Nurse to Floor Handoff     Vini Loya is a 79 year old male who speaks English and lives with a spouse,  in a home  They arrived in the ED by ambulance from home    ED Chief Complaint: Fall and Back Pain    ED Dx;   Final diagnoses:   Closed fracture of multiple ribs, unspecified laterality, initial encounter   Closed fracture of sacrum, unspecified portion of sacrum, initial encounter (H)   Fall on stairs, initial encounter   Fall, initial encounter         Needed?: No    Allergies: No Known Allergies.  Past Medical Hx:   Past Medical History:   Diagnosis Date     CAD (coronary artery disease)     With Stent Placement     Cerebral embolism with cerebral infarction (H) 2/27/2007     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     Chronic infection     Bladder     Closed nondisplaced fracture of styloid process of left radius 10/16/2017     Corneal ulcer, unspecified     s/p right corneal tranplant--Herpetic       Fall 8/11/2020     GENERALIZED ANXIETY DIS 5/22/2007     Gross hematuria 5/31/2013     Hyperlipidemia LDL goal < 100      Hypertension goal BP (blood pressure) < 130/80      Hypertension, goal below 150/90 6/23/2016     Lactose intolerance in adult 12/8/2016     Marital conflict 5/20/2011     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       Baseline Mental status: WDL  Current Mental Status changes: at basesline    Infection present or suspected this encounter: no  Sepsis suspected: No  Isolation type: No active isolations     Activity level - Baseline/Home:   Independent  Activity Level - Current:   Stand with assist x2    Bariatric equipment needed?: No    In the ED these meds were given:   Medications   Lidocaine (LIDOCARE) 4 % Patch 1 patch (has no administration in time range)   lidocaine patch in PLACE (has no administration in time range)   acetaminophen (TYLENOL) tablet 975 mg (has no administration in time range)   carbidopa-levodopa (SINEMET)  MG per tablet 2 tablet (has no administration in time range)   ibuprofen (ADVIL/MOTRIN) tablet 600 mg (600 mg Oral Given 9/21/20 0621)   iopamidol (ISOVUE-370) solution 101 mL (101 mLs Intravenous Given 9/21/20 0539)   sodium chloride (PF) 0.9% PF flush 75 mL (75 mLs Intravenous Given 9/21/20 0539)   hydrALAZINE (APRESOLINE) injection 10 mg (10 mg Intravenous Given 9/21/20 0621)       Drips running?  No    Home pump  No    Current LDAs  Peripheral IV 09/21/20 Right Hand (Active)   Site Assessment [facility] 09/21/20 0408   Line Status Saline locked 09/21/20 0408   Number of days: 0       Peripheral IV 09/21/20 Left Upper forearm (Active)   Site Assessment [facility] 09/21/20 0419   Line Status Saline locked 09/21/20 0419   Number of days: 0       Incision/Surgical Site 05/30/18 Right Eye (Active)   Number of days: 845       Labs results:   Labs Ordered and Resulted from Time of ED Arrival Up to the Time of Departure from the ED   CBC WITH PLATELETS DIFFERENTIAL - Abnormal; Notable for the following components:       Result Value    WBC 14.2 (*)     Absolute Neutrophil 10.8 (*)     Absolute Monocytes 1.7 (*)     All other components within normal limits   COMPREHENSIVE METABOLIC PANEL - Abnormal; Notable for the following components:    Glucose 111 (*)     Creatinine 1.26 (*)     GFR Estimate 53 (*)     All other components within normal limits   ROUTINE UA WITH MICROSCOPIC - Abnormal; Notable for the following components:    Protein Albumin Urine 10 (*)     Leukocyte Esterase Urine Trace (*)     Squamous Epithelial /HPF Urine 2  (*)     Mucous Urine Present (*)     All other components within normal limits   TROPONIN I   INR   COVID-19 VIRUS (CORONAVIRUS) BY PCR       Imaging Studies:   Recent Results (from the past 24 hour(s))   CT Abdomen Pelvis w Contrast    Narrative    EXAM: CT ABDOMEN PELVIS W CONTRAST  LOCATION: [facility]  DATE/TIME: 9/21/2020 5:37 AM    INDICATION: Fall. Left flank pain.  COMPARISON: None.  TECHNIQUE: CT scan of the abdomen and pelvis was performed following injection of IV contrast. Multiplanar reformats were obtained. Dose reduction techniques were used.  CONTRAST: 101 mL Isovue 370.      FINDINGS:   LOWER CHEST: Mild atelectasis and scarring at the lung bases. The heart size is normal.    HEPATOBILIARY: Small cyst in the left lobe of the liver.    PANCREAS: Normal.    SPLEEN: Normal.    ADRENAL GLANDS: Normal.    KIDNEYS/BLADDER: Few tiny renal stones bilaterally. No ureteral stone or hydronephrosis. Urinary bladder is very distended but otherwise appears normal.    BOWEL: There is a large amount of stool in the colon. No bowel obstruction or inflammation. No free intraperitoneal gas or fluid.    LYMPH NODES: Normal.    VASCULATURE: Atherosclerotic calcification of the aorta and its branches. No aneurysm.    PELVIC ORGANS: The prostate gland is mildly enlarged.    MUSCULOSKELETAL: There are fractures of the left lateral eighth, ninth and 10th ribs. Old right lateral rib fracture. Degenerative disease in the spine.      Impression    IMPRESSION:   1.  Left eighth, ninth and 10th rib fractures.  2.  No other acute traumatic abnormality.  3.  Bilateral renal stones. No ureteral stone or hydronephrosis.            Lumbar spine CT w/o contrast    Narrative    EXAM: CT LUMBAR SPINE W/O CONTRAST  LOCATION: [facility]  DATE/TIME: 9/21/2020 5:37 AM    INDICATION: Fall. Trauma. Back pain.  COMPARISON: MRI lumbar spine 12/28/2016.  TECHNIQUE: Routine without IV contrast. Multiplanar reformats.   Dose reduction techniques were used.     FINDINGS:  VERTEBRA:  Transitional lumbosacral junction. Last well-formed disc space will be labeled L5-S1 with partial right L5 sacralization.    Left 10th acute or subacute posterior fracture.    Left 11th subacute posterior fracture.    T12 vertebral body demonstrates a remote mild-moderate compression deformity not significantly changed compared to MRI lumbar spine 12/28/2016.    Right L2 transverse process subtle irregular lucency probably artifact and less likely acute fracture. Please correlate clinically.    Right S2 segment fracture with adjacent sclerosis. This is age-indeterminate possibly acute or subacute.    No additional acute fractures are identified. No acute post traumatic subluxations.    Remaining vertebral heights maintained. No significant subluxations.    Diffuse bony demineralization.    CANAL/FORAMINA: Multilevel spondylosis results in various levels and degrees of central canal stenosis and neuroforaminal stenosis. No critical central canal stenosis.    Irregularity spinous processes suggest Baastrup's disease.    Bilateral sacroiliac joints are intact with degenerative changes.    PARASPINAL: Vascular calcifications.    Mild hiatal hernia.    Left kidney small nonobstructing renal stone.     Left kidney demonstrates multiple subcentimeter low-density lesions too small to fully characterize statistically likely incidental cyst. No specific follow-up recommended.      Impression    IMPRESSION:  1.  Transitional lumbosacral junction. Last well-formed disc space will be labeled L5-S1 with partial right L5 sacralization.  2.  Left 10th acute or subacute posterior fracture.  3.  Left 11th subacute posterior fracture.  4.  T12 vertebral body demonstrates a remote mild-moderate compression deformity not significantly changed compared to MRI lumbar spine 12/28/2016.  5.  Right L2 transverse process subtle irregular lucency probably artifact and less likely  "acute fracture. Please correlate clinically.  6.  Right S2 segment fracture with adjacent sclerosis. This is age-indeterminate possibly acute or subacute.  7.  No additional acute fractures are identified.   8.  Degenerative changes described above.  9.  Diffuse bony demineralization.       Recent vital signs:   BP (!) 160/100   Pulse 78   Temp 98  F (36.7  C) (Oral)   Resp 15   Ht 1.727 m (5' 8\")   SpO2 97%   BMI 25.32 kg/m      [place]ye Coma Scale Score: 15 (09/21/20 0505)       Cardiac Rhythm: Normal Sinus  Pt needs tele? No  Skin/wound Issues: bruising     Code Status: Full Code    Pain control: fair    Nausea control: good    Abnormal labs/tests/findings requiring intervention: See results     Family present during ED course? Yes   Family Comments/Social Situation comments:  Pt uses walker at home. Multiple falls recently.     Tasks needing completion: None    Kenia Beck, RN    [phone] [facility]      "

## 2020-09-21 NOTE — PLAN OF CARE
Discharge Planner PT   Patient plan for discharge: TCU  Current status: Pt below baseline.  Currently able to ambulates 100ft with min Ax1 and FWW.  Frequent LOB posteriorly and lean to R.  High falls risk. STS with min Ax1.  Is impulsive during session increasing falls risk further.   Barriers to return to prior living situation: impulsive, impaired balance and gait  Recommendations for discharge: TCU  Rationale for recommendations: Would benefit from additional PT in order to increase functional mobility to safe home discharge.         Entered by: Christiano Wiggins 09/21/2020 2:55 PM     Physical Therapy Discharge Summary    Reason for therapy discharge:    OBS status- PT recommendations have been given. No further need for PT at this time.     Progress towards therapy goal(s). See goals on Care Plan in [facility] electronic health record for goal details.  Goals not met.  Barriers to achieving goals:   limited tolerance for therapy.    Therapy recommendation(s):    Continued therapy is recommended.  Rationale/Recommendations:  TCU.

## 2020-09-21 NOTE — PROGRESS NOTES
"Social Work Services Progress Note    Hospital Day: 1  Date of Initial Social Work Evaluation:  Social Work Assessment completed 8/12/20 during previous admission; SWA not yet completed for this admission.   Collaborated with:  Spouse Kristi ([phone] ), Obs RN, Chart review    Data:  Vini is an 79 year old male who presented to the ED with his spouse after experiencing 2 falls in the past 12 hours at home.  Vini was admitted to observation for PT assessment and placement.  He was recently in TCU level care at [facility] but was not impressed with care he received.  SW will follow to support placement and safe discharge planning.     PT recommendation for discharge is TCU.    Intervention:  SW spoke to spouse Kristi (prefers \"[facility]\") and reviewed previous list of TCU selections made by [facility] and Vini during his early August 2020 admission.  [facility] selected the following TCUs for referral:    1.) [facility]  [address] [place] [address]  [phone]    2.) [facility]  [address] [place] [address]  [phone]    3[facility]  [address], [place] [id number] [phone]    4.) [facility]  [address] [place], [place] [address]  (p) [phone], admissions (p) [phone] (f) [phone]    5.) [facility]  [address] [address]  Main: [phone], Admissions: [phone], Fax: [phone]      This SW submitted SNF referrals to all TCUs listed above with updates today 9/21 as follows:  -[facility]: Admissions  states that referral looks good clinically.   requests  callback morning of 9/22 for update of Vini's Medicare # to run financials.    Sp[facility] also states [facility], however [facility] and [facility]. "  If SW identifies that they have reopened, then [facility] would appreciate a referral to [facility] in addition to referrals above.    Plan:    Anticipated Disposition:  Facility:  TCU - TBD    Barriers to d/c plan:  None identified.    Follow Up:  SW team will continue to follow to support placement.     ALMA Babcock, MSW  Social Work Services, ED OBS support   [facility]  Direct: [phone] Pager: [phone]

## 2020-09-21 NOTE — ED TRIAGE NOTES
Pt BIBA c/o left lower back pain which caused him to fall 2 days ago. When he fell 2 days ago he was on the last step of the stairs and fell hitting the R side of his head per pt report. Yesterday he went to sit in a chair and missed it, hitting the floor. He also reports another fall yesterday that he cannot remember. Hypertensive with EMS, on going issue and being seen in clinic for.  Hx of TIA, parkinson's, and cardiac stents.
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
## 2020-09-21 NOTE — LETTER
[facility] OBSERVATION UNIT [address] [address]  [phone]    FACSIMILE TRANSMITTAL SHEET    TO: Care Coordination/Administration   COMPANY: [facility]   FAX NUMBER: [phone]  PHONE NUMBER: [phone]     FROM: Korin Whyte,   PHONE: [phone]  DATE: 09/25/20    ___X__URGENT _X____REVIEW ONLY _____PLEASE COMMENT____PLEASE REPLY    NOTES/COMMENTS: Attached are the discharge orders for Vini Loya. He will need resumption of his homecare services including RN, PT, OT. Please contact me if you have any questions.     Thanks!                                      IF YOU DID NOT RECEIVE THE CORRECT NUMBER OF PAGES OR THE FAX DID NOT COME THROUGH CLEARLY, PLEASE CALL THE SENDER     CONFIDENTIALITY STATEMENT: Confidential information that may accompany this transmission contains protected health information under state and federal law and is legally privileged. This information is intended only for the use of the individual or entity named above and may be used only for carrying out treatment, payment or other healthcare operations. The recipient or person responsible for delivering this information is prohibited by law from disclosing this information without proper authorization to any other party, unless required to do so by law or regulation. If you are not the intended recipient, you are hereby notified that any review, dissemination, distribution, or copying of this message is strictly prohibited. If you have received this communication in error, please destroy the materials and contact us immediately by calling the number listed above. No response indicates that the information was received by the appropriate authorized party

## 2020-09-22 ENCOUNTER — APPOINTMENT (OUTPATIENT)
Dept: GENERAL RADIOLOGY | Facility: CLINIC | Age: 80
DRG: 057 | End: 2020-09-22
Attending: INTERNAL MEDICINE
Payer: COMMERCIAL

## 2020-09-22 LAB
ERYTHROCYTE [DISTWIDTH] IN BLOOD BY AUTOMATED COUNT: 13.9 % (ref 10–15)
HCT VFR BLD AUTO: 46.7 % (ref 40–53)
HGB BLD-MCNC: 15.3 G/DL (ref 13.3–17.7)
MCH RBC QN AUTO: 31.6 PG (ref 26.5–33)
MCHC RBC AUTO-ENTMCNC: 32.8 G/DL (ref 31.5–36.5)
MCV RBC AUTO: 97 FL (ref 78–100)
PLATELET # BLD AUTO: 313 10E9/L (ref 150–450)
RBC # BLD AUTO: 4.84 10E12/L (ref 4.4–5.9)
WBC # BLD AUTO: 14.4 10E9/L (ref 4–11)

## 2020-09-22 PROCEDURE — 96376 TX/PRO/DX INJ SAME DRUG ADON: CPT

## 2020-09-22 PROCEDURE — 25000132 ZZH RX MED GY IP 250 OP 250 PS 637: Performed by: PHYSICIAN ASSISTANT

## 2020-09-22 PROCEDURE — G0378 HOSPITAL OBSERVATION PER HR: HCPCS

## 2020-09-22 PROCEDURE — 25000132 ZZH RX MED GY IP 250 OP 250 PS 637: Performed by: NURSE PRACTITIONER

## 2020-09-22 PROCEDURE — 85027 COMPLETE CBC AUTOMATED: CPT | Performed by: PHYSICIAN ASSISTANT

## 2020-09-22 PROCEDURE — 71045 X-RAY EXAM CHEST 1 VIEW: CPT

## 2020-09-22 PROCEDURE — 25000128 H RX IP 250 OP 636: Performed by: PHYSICIAN ASSISTANT

## 2020-09-22 PROCEDURE — 36415 COLL VENOUS BLD VENIPUNCTURE: CPT | Performed by: PHYSICIAN ASSISTANT

## 2020-09-22 RX ORDER — HYDRALAZINE HYDROCHLORIDE 20 MG/ML
5 INJECTION INTRAMUSCULAR; INTRAVENOUS ONCE
Status: COMPLETED | OUTPATIENT
Start: 2020-09-22 | End: 2020-09-22

## 2020-09-22 RX ORDER — SUCRALFATE ORAL 1 G/10ML
1 SUSPENSION ORAL ONCE
Status: COMPLETED | OUTPATIENT
Start: 2020-09-22 | End: 2020-09-22

## 2020-09-22 RX ORDER — BISACODYL 10 MG
10 SUPPOSITORY, RECTAL RECTAL DAILY PRN
Status: DISCONTINUED | OUTPATIENT
Start: 2020-09-22 | End: 2020-09-25 | Stop reason: HOSPADM

## 2020-09-22 RX ORDER — CALCIUM CARBONATE 500 MG/1
500 TABLET, CHEWABLE ORAL DAILY PRN
Status: DISCONTINUED | OUTPATIENT
Start: 2020-09-22 | End: 2020-09-25 | Stop reason: HOSPADM

## 2020-09-22 RX ADMIN — POLYETHYLENE GLYCOL 3350 17 G: 17 POWDER, FOR SOLUTION ORAL at 07:43

## 2020-09-22 RX ADMIN — ASPIRIN 81 MG CHEWABLE TABLET 81 MG: 81 TABLET CHEWABLE at 07:43

## 2020-09-22 RX ADMIN — ROPINIROLE HYDROCHLORIDE 0.5 MG: 0.5 TABLET, FILM COATED ORAL at 15:08

## 2020-09-22 RX ADMIN — ROPINIROLE HYDROCHLORIDE 0.5 MG: 0.5 TABLET, FILM COATED ORAL at 20:02

## 2020-09-22 RX ADMIN — ACETAMINOPHEN 650 MG: 325 TABLET, FILM COATED ORAL at 07:14

## 2020-09-22 RX ADMIN — ACETAMINOPHEN 650 MG: 325 TABLET, FILM COATED ORAL at 03:33

## 2020-09-22 RX ADMIN — CARBIDOPA AND LEVODOPA 2 TABLET: 25; 100 TABLET ORAL at 12:28

## 2020-09-22 RX ADMIN — SULFAMETHOXAZOLE AND TRIMETHOPRIM 1 TABLET: 400; 80 TABLET ORAL at 22:23

## 2020-09-22 RX ADMIN — CARBIDOPA AND LEVODOPA 2 TABLET: 25; 100 TABLET ORAL at 07:44

## 2020-09-22 RX ADMIN — DOCUSATE SODIUM 50 MG AND SENNOSIDES 8.6 MG 2 TABLET: 8.6; 5 TABLET, FILM COATED ORAL at 20:02

## 2020-09-22 RX ADMIN — ROPINIROLE HYDROCHLORIDE 0.5 MG: 0.5 TABLET, FILM COATED ORAL at 07:45

## 2020-09-22 RX ADMIN — HYDRALAZINE HYDROCHLORIDE 5 MG: 20 INJECTION INTRAMUSCULAR; INTRAVENOUS at 08:22

## 2020-09-22 RX ADMIN — CARBIDOPA AND LEVODOPA 2 TABLET: 25; 100 TABLET ORAL at 15:49

## 2020-09-22 RX ADMIN — DOCUSATE SODIUM 50 MG AND SENNOSIDES 8.6 MG 2 TABLET: 8.6; 5 TABLET, FILM COATED ORAL at 07:43

## 2020-09-22 RX ADMIN — CALCIUM CARBONATE (ANTACID) CHEW TAB 500 MG 500 MG: 500 CHEW TAB at 04:47

## 2020-09-22 RX ADMIN — ACETAMINOPHEN 650 MG: 325 TABLET, FILM COATED ORAL at 20:05

## 2020-09-22 RX ADMIN — CARBIDOPA AND LEVODOPA 1 TABLET: 50; 200 TABLET, EXTENDED RELEASE ORAL at 20:05

## 2020-09-22 RX ADMIN — SUCRALFATE 1 G: 1 SUSPENSION ORAL at 08:25

## 2020-09-22 RX ADMIN — SIMVASTATIN 40 MG: 40 TABLET, FILM COATED ORAL at 22:23

## 2020-09-22 RX ADMIN — BISACODYL 10 MG: 10 SUPPOSITORY RECTAL at 16:20

## 2020-09-22 RX ADMIN — LIDOCAINE 2 PATCH: 560 PATCH PERCUTANEOUS; TOPICAL; TRANSDERMAL at 07:44

## 2020-09-22 RX ADMIN — PAROXETINE 20 MG: 20 TABLET, FILM COATED ORAL at 07:43

## 2020-09-22 ASSESSMENT — PAIN DESCRIPTION - DESCRIPTORS
DESCRIPTORS: ACHING;DISCOMFORT
DESCRIPTORS: ACHING;DISCOMFORT

## 2020-09-22 NOTE — PROGRESS NOTES
"Observation goals - Acute Traumatic pain  PRIOR TO DISCHARGE      Comments: List all goals to be met before discharge home:   - Adequate pain control on oral analgesia. YES    - Vital Signs normal or at patient baseline. YES     - Tolerating oral intake to maintain hydration. YES    - Diagnostic tests and consults completed and resulted NO: waiting on SW consult    - Cleared for discharge from consultants' standpoint if involved in care:NO    - Safe disposition plan has been identified: NO, SW working on discharge needs.    - Return to baseline functional status: NO    /54 (BP Location: Left arm)   Pulse 66   Temp 97.7  F (36.5  C) (Oral)   Resp 16   Ht 1.727 m (5' 8\")   SpO2 94%   BMI 25.32 kg/m      - Nurse to notify provider when observation goals have been met and patient is ready for discharge.        "

## 2020-09-22 NOTE — PROGRESS NOTES
"Observation goals - Acute Traumatic pain  PRIOR TO DISCHARGE      Comments: List all goals to be met before discharge home:   - Adequate pain control on oral analgesia. YES, lidocaine patches  - Vital Signs normal or at patient baseline. YES    - Tolerating oral intake to maintain hydration. YES     - Diagnostic tests and consults completed and resulted -Yes, waiting on SW and placement    - Cleared for discharge from consultants' standpoint if involved in care: Yes    - Safe disposition plan has been identified: Yes. Going to TCU 9/23 at 0900  - Return to baseline functional status: NO  BP 99/62 (BP Location: Right arm)   Pulse 72   Temp 98.2  F (36.8  C) (Oral)   Resp 18   Ht 1.727 m (5' 8\")   SpO2 95%   BMI 25.32 kg/m         - Nurse to notify provider when observation goals have been met and patient is ready for discharge.            "

## 2020-09-22 NOTE — PROGRESS NOTES
Observation goals - Acute Traumatic pain  PRIOR TO DISCHARGE      Comments: List all goals to be met before discharge home:     - Adequate pain control on oral analgesia. YES, Tylenol administered and partially effective. Ice applied and slightly effective, but soothing per Pt. Tums administered for upset stomarch.    - Vital Signs normal or at patient baseline. YES     - Diagnostic tests and consults completed and resulted NO: waiting on SW consult    - Cleared for discharge from consultants' standpoint if involved in care:NO    - Safe disposition plan has been identified: NO, SW working on discharge needs.    - Return to baseline functional status: NO     The Pt is heavy assist of 1-2 from bed to bedside commode. Pt utilizes call light and can make needs known. Bed alarm on for safety.     - Nurse to notify provider when observation goals have been met and patient is ready for discharge.

## 2020-09-22 NOTE — PROGRESS NOTES
Observation goals - Acute Traumatic pain  PRIOR TO DISCHARGE      Comments: List all goals to be met before discharge home:   - Adequate pain control on oral analgesia. YES  - Vital Signs normal or at patient baseline. YES    - Tolerating oral intake to maintain hydration. YES     - Diagnostic tests and consults completed and resulted NO: waiting on SW consult    - Cleared for discharge from consultants' standpoint if involved in care:NO    - Safe disposition plan has been identified: NO, SW working on discharge needs.    - Return to baseline functional status: NO     - Nurse to notify provider when observation goals have been met and patient is ready for discharge.
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
## 2020-09-22 NOTE — UTILIZATION REVIEW
"[facility]    Admission Status; Secondary Review Determination     Admission Date: 9/21/2020  4:00 AM      Under the authority of the Utilization Management Committee, the utilization review process indicated a secondary review on the above patient.  The review outcome is based on review of the medical records, discussions with staff, and applying clinical experience noted on the date of the review.          (x) Observation Status Appropriate - This patient does not meet hospital inpatient criteria and is placed in observation status. If this patient's primary payer is Medicare and was admitted as an inpatient, Condition Code 44 should be used and patient status changed to \"observation\".     RATIONALE FOR DETERMINATION   79-year-old male with a history of stroke and severe Parkinson's sustained a fall at home resulting in left eighth through 10th rib fractures.  Currently working with physical therapy.  TCU recommended.  At the time of this review there is no medical necessity for inpatient hospitalization and continued observation is recommended.    The severity of illness, intensity of service provided, expected LOS and risk for adverse outcome make the care appropriate for further observation; however, doesn't meet criteria for hospital inpatient admission.         The information on this document is developed by the utilization review team in order for the business office to ensure compliance.  This only denotes the appropriateness of proper admission status and does not reflect the quality of care rendered.         The definitions of Inpatient Status and Observation Status used in making the determination above are those provided in the CMS Coverage Manual, Chapter 1 and Chapter 6, section 70.4.      Sincerely,     Malcolm Mendenhall DO MPH   Physician Advisor  Utilization Review  [facility]    "

## 2020-09-22 NOTE — PROGRESS NOTES
"Observation goals - Acute Traumatic pain  PRIOR TO DISCHARGE      Comments: List all goals to be met before discharge home:   - Adequate pain control on oral analgesia. YES, Tylenol administered and partially effective. Ice applied and slightly effective, but soothing per Pt.    - Vital Signs normal or at patient baseline. YES, /76 (BP Location: Left arm)   Pulse 64   Temp 98.7  F (37.1  C) (Oral)   Resp 16   Ht 1.727 m (5' 8\")   SpO2 95%   BMI 25.32 kg/m      - Tolerating oral intake to maintain hydration. YES     - Diagnostic tests and consults completed and resulted NO: waiting on SW consult    - Cleared for discharge from consultants' standpoint if involved in care:NO    - Safe disposition plan has been identified: NO, SW working on discharge needs.    - Return to baseline functional status: NO     - Nurse to notify provider when observation goals have been met and patient is ready for discharge.            "

## 2020-09-23 ENCOUNTER — APPOINTMENT (OUTPATIENT)
Dept: ULTRASOUND IMAGING | Facility: CLINIC | Age: 80
DRG: 057 | End: 2020-09-23
Attending: PHYSICIAN ASSISTANT
Payer: COMMERCIAL

## 2020-09-23 PROBLEM — N17.9 AKI (ACUTE KIDNEY INJURY) (H): Status: ACTIVE | Noted: 2020-09-23

## 2020-09-23 PROBLEM — S22.49XA CLOSED FRACTURE OF MULTIPLE RIBS, UNSPECIFIED LATERALITY, INITIAL ENCOUNTER: Status: ACTIVE | Noted: 2020-09-23

## 2020-09-23 LAB
ALBUMIN SERPL-MCNC: 3.5 G/DL (ref 3.4–5)
ALP SERPL-CCNC: 118 U/L (ref 40–150)
ALT SERPL W P-5'-P-CCNC: 11 U/L (ref 0–70)
ANION GAP SERPL CALCULATED.3IONS-SCNC: 4 MMOL/L (ref 3–14)
ANION GAP SERPL CALCULATED.3IONS-SCNC: 5 MMOL/L (ref 3–14)
ANION GAP SERPL CALCULATED.3IONS-SCNC: 7 MMOL/L (ref 3–14)
AST SERPL W P-5'-P-CCNC: 37 U/L (ref 0–45)
BASOPHILS # BLD AUTO: 0.1 10E9/L (ref 0–0.2)
BASOPHILS NFR BLD AUTO: 0.8 %
BILIRUB SERPL-MCNC: 1.1 MG/DL (ref 0.2–1.3)
BUN SERPL-MCNC: 35 MG/DL (ref 7–30)
BUN SERPL-MCNC: 36 MG/DL (ref 7–30)
BUN SERPL-MCNC: 46 MG/DL (ref 7–30)
CALCIUM SERPL-MCNC: 8.6 MG/DL (ref 8.5–10.1)
CALCIUM SERPL-MCNC: 8.7 MG/DL (ref 8.5–10.1)
CALCIUM SERPL-MCNC: 9.2 MG/DL (ref 8.5–10.1)
CHLORIDE SERPL-SCNC: 104 MMOL/L (ref 94–109)
CHLORIDE SERPL-SCNC: 105 MMOL/L (ref 94–109)
CHLORIDE SERPL-SCNC: 107 MMOL/L (ref 94–109)
CO2 SERPL-SCNC: 25 MMOL/L (ref 20–32)
CO2 SERPL-SCNC: 25 MMOL/L (ref 20–32)
CO2 SERPL-SCNC: 26 MMOL/L (ref 20–32)
CREAT SERPL-MCNC: 2.34 MG/DL (ref 0.66–1.25)
CREAT SERPL-MCNC: 2.62 MG/DL (ref 0.66–1.25)
CREAT SERPL-MCNC: 2.69 MG/DL (ref 0.66–1.25)
CREAT UR-MCNC: 94 MG/DL
DIFFERENTIAL METHOD BLD: ABNORMAL
EOSINOPHIL # BLD AUTO: 0.2 10E9/L (ref 0–0.7)
EOSINOPHIL NFR BLD AUTO: 1.4 %
ERYTHROCYTE [DISTWIDTH] IN BLOOD BY AUTOMATED COUNT: 14 % (ref 10–15)
GFR SERPL CREATININE-BSD FRML MDRD: 21 ML/MIN/{1.73_M2}
GFR SERPL CREATININE-BSD FRML MDRD: 22 ML/MIN/{1.73_M2}
GFR SERPL CREATININE-BSD FRML MDRD: 25 ML/MIN/{1.73_M2}
GLUCOSE SERPL-MCNC: 116 MG/DL (ref 70–99)
GLUCOSE SERPL-MCNC: 120 MG/DL (ref 70–99)
GLUCOSE SERPL-MCNC: 146 MG/DL (ref 70–99)
HCT VFR BLD AUTO: 50 % (ref 40–53)
HGB BLD-MCNC: 16.3 G/DL (ref 13.3–17.7)
IMM GRANULOCYTES # BLD: 0.1 10E9/L (ref 0–0.4)
IMM GRANULOCYTES NFR BLD: 0.4 %
LYMPHOCYTES # BLD AUTO: 1.6 10E9/L (ref 0.8–5.3)
LYMPHOCYTES NFR BLD AUTO: 12.2 %
MAGNESIUM SERPL-MCNC: 3.2 MG/DL (ref 1.6–2.3)
MCH RBC QN AUTO: 31.5 PG (ref 26.5–33)
MCHC RBC AUTO-ENTMCNC: 32.6 G/DL (ref 31.5–36.5)
MCV RBC AUTO: 97 FL (ref 78–100)
MONOCYTES # BLD AUTO: 1.6 10E9/L (ref 0–1.3)
MONOCYTES NFR BLD AUTO: 12.7 %
NEUTROPHILS # BLD AUTO: 9.2 10E9/L (ref 1.6–8.3)
NEUTROPHILS NFR BLD AUTO: 72.5 %
NRBC # BLD AUTO: 0 10*3/UL
NRBC BLD AUTO-RTO: 0 /100
OSMOLALITY UR: 404 MMOL/KG (ref 100–1200)
PHOSPHATE SERPL-MCNC: 4.3 MG/DL (ref 2.5–4.5)
PLATELET # BLD AUTO: 312 10E9/L (ref 150–450)
POTASSIUM SERPL-SCNC: 4.7 MMOL/L (ref 3.4–5.3)
POTASSIUM SERPL-SCNC: 5.2 MMOL/L (ref 3.4–5.3)
POTASSIUM SERPL-SCNC: 5.5 MMOL/L (ref 3.4–5.3)
PROT SERPL-MCNC: 7.4 G/DL (ref 6.8–8.8)
RBC # BLD AUTO: 5.18 10E12/L (ref 4.4–5.9)
SODIUM SERPL-SCNC: 134 MMOL/L (ref 133–144)
SODIUM SERPL-SCNC: 137 MMOL/L (ref 133–144)
SODIUM SERPL-SCNC: 138 MMOL/L (ref 133–144)
SODIUM UR-SCNC: 58 MMOL/L
UUN UR-MCNC: 474 MG/DL
UUN/CREAT 24H UR: 5 G/G CR
WBC # BLD AUTO: 12.7 10E9/L (ref 4–11)

## 2020-09-23 PROCEDURE — G0378 HOSPITAL OBSERVATION PER HR: HCPCS

## 2020-09-23 PROCEDURE — 96361 HYDRATE IV INFUSION ADD-ON: CPT

## 2020-09-23 PROCEDURE — 83735 ASSAY OF MAGNESIUM: CPT | Performed by: PHYSICIAN ASSISTANT

## 2020-09-23 PROCEDURE — 85025 COMPLETE CBC W/AUTO DIFF WBC: CPT | Performed by: PHYSICIAN ASSISTANT

## 2020-09-23 PROCEDURE — 36415 COLL VENOUS BLD VENIPUNCTURE: CPT | Performed by: PHYSICIAN ASSISTANT

## 2020-09-23 PROCEDURE — 12000001 ZZH R&B MED SURG/OB UMMC

## 2020-09-23 PROCEDURE — 84540 ASSAY OF URINE/UREA-N: CPT | Performed by: STUDENT IN AN ORGANIZED HEALTH CARE EDUCATION/TRAINING PROGRAM

## 2020-09-23 PROCEDURE — 83935 ASSAY OF URINE OSMOLALITY: CPT | Performed by: STUDENT IN AN ORGANIZED HEALTH CARE EDUCATION/TRAINING PROGRAM

## 2020-09-23 PROCEDURE — 96376 TX/PRO/DX INJ SAME DRUG ADON: CPT

## 2020-09-23 PROCEDURE — 25000132 ZZH RX MED GY IP 250 OP 250 PS 637: Performed by: NURSE PRACTITIONER

## 2020-09-23 PROCEDURE — 80053 COMPREHEN METABOLIC PANEL: CPT | Performed by: PHYSICIAN ASSISTANT

## 2020-09-23 PROCEDURE — 80048 BASIC METABOLIC PNL TOTAL CA: CPT | Performed by: PHYSICIAN ASSISTANT

## 2020-09-23 PROCEDURE — 25800030 ZZH RX IP 258 OP 636: Performed by: STUDENT IN AN ORGANIZED HEALTH CARE EDUCATION/TRAINING PROGRAM

## 2020-09-23 PROCEDURE — 36415 COLL VENOUS BLD VENIPUNCTURE: CPT | Performed by: STUDENT IN AN ORGANIZED HEALTH CARE EDUCATION/TRAINING PROGRAM

## 2020-09-23 PROCEDURE — 84300 ASSAY OF URINE SODIUM: CPT | Performed by: STUDENT IN AN ORGANIZED HEALTH CARE EDUCATION/TRAINING PROGRAM

## 2020-09-23 PROCEDURE — 84100 ASSAY OF PHOSPHORUS: CPT | Performed by: PHYSICIAN ASSISTANT

## 2020-09-23 PROCEDURE — 25000128 H RX IP 250 OP 636: Performed by: STUDENT IN AN ORGANIZED HEALTH CARE EDUCATION/TRAINING PROGRAM

## 2020-09-23 PROCEDURE — 25000132 ZZH RX MED GY IP 250 OP 250 PS 637: Performed by: STUDENT IN AN ORGANIZED HEALTH CARE EDUCATION/TRAINING PROGRAM

## 2020-09-23 PROCEDURE — 76770 US EXAM ABDO BACK WALL COMP: CPT

## 2020-09-23 PROCEDURE — 80048 BASIC METABOLIC PNL TOTAL CA: CPT | Performed by: STUDENT IN AN ORGANIZED HEALTH CARE EDUCATION/TRAINING PROGRAM

## 2020-09-23 PROCEDURE — 25000128 H RX IP 250 OP 636: Performed by: PHYSICIAN ASSISTANT

## 2020-09-23 PROCEDURE — 25800030 ZZH RX IP 258 OP 636: Performed by: PHYSICIAN ASSISTANT

## 2020-09-23 RX ORDER — LIDOCAINE 40 MG/G
CREAM TOPICAL
Status: DISCONTINUED | OUTPATIENT
Start: 2020-09-23 | End: 2020-09-25 | Stop reason: HOSPADM

## 2020-09-23 RX ORDER — HEPARIN SODIUM 5000 [USP'U]/.5ML
5000 INJECTION, SOLUTION INTRAVENOUS; SUBCUTANEOUS EVERY 12 HOURS
Status: DISCONTINUED | OUTPATIENT
Start: 2020-09-23 | End: 2020-09-25 | Stop reason: HOSPADM

## 2020-09-23 RX ORDER — HYDRALAZINE HYDROCHLORIDE 20 MG/ML
2 INJECTION INTRAMUSCULAR; INTRAVENOUS ONCE
Status: COMPLETED | OUTPATIENT
Start: 2020-09-23 | End: 2020-09-23

## 2020-09-23 RX ORDER — CARBIDOPA AND LEVODOPA 50; 200 MG/1; MG/1
1 TABLET, EXTENDED RELEASE ORAL AT BEDTIME
Status: DISCONTINUED | OUTPATIENT
Start: 2020-09-23 | End: 2020-09-25 | Stop reason: HOSPADM

## 2020-09-23 RX ORDER — SODIUM CHLORIDE 9 MG/ML
INJECTION, SOLUTION INTRAVENOUS CONTINUOUS
Status: DISCONTINUED | OUTPATIENT
Start: 2020-09-23 | End: 2020-09-23

## 2020-09-23 RX ORDER — SULFAMETHOXAZOLE AND TRIMETHOPRIM 400; 80 MG/1; MG/1
1 TABLET ORAL
Status: DISCONTINUED | OUTPATIENT
Start: 2020-09-24 | End: 2020-09-25 | Stop reason: HOSPADM

## 2020-09-23 RX ADMIN — HYDRALAZINE HYDROCHLORIDE 2 MG: 20 INJECTION, SOLUTION INTRAMUSCULAR; INTRAVENOUS at 06:19

## 2020-09-23 RX ADMIN — POLYETHYLENE GLYCOL 3350 17 G: 17 POWDER, FOR SOLUTION ORAL at 07:47

## 2020-09-23 RX ADMIN — ACETAMINOPHEN 650 MG: 325 TABLET, FILM COATED ORAL at 22:19

## 2020-09-23 RX ADMIN — CARBIDOPA AND LEVODOPA 2 TABLET: 25; 100 TABLET ORAL at 11:02

## 2020-09-23 RX ADMIN — SODIUM CHLORIDE: 9 INJECTION, SOLUTION INTRAVENOUS at 07:44

## 2020-09-23 RX ADMIN — CARBIDOPA AND LEVODOPA 1 TABLET: 50; 200 TABLET, EXTENDED RELEASE ORAL at 22:15

## 2020-09-23 RX ADMIN — CALCIUM CARBONATE (ANTACID) CHEW TAB 500 MG 500 MG: 500 CHEW TAB at 05:14

## 2020-09-23 RX ADMIN — LIDOCAINE 2 PATCH: 560 PATCH PERCUTANEOUS; TOPICAL; TRANSDERMAL at 07:58

## 2020-09-23 RX ADMIN — CARBIDOPA AND LEVODOPA 2 TABLET: 25; 100 TABLET ORAL at 06:18

## 2020-09-23 RX ADMIN — SIMVASTATIN 40 MG: 40 TABLET, FILM COATED ORAL at 22:15

## 2020-09-23 RX ADMIN — ACETAMINOPHEN 650 MG: 325 TABLET, FILM COATED ORAL at 10:20

## 2020-09-23 RX ADMIN — ROPINIROLE HYDROCHLORIDE 0.5 MG: 0.5 TABLET, FILM COATED ORAL at 07:48

## 2020-09-23 RX ADMIN — SODIUM CHLORIDE: 9 INJECTION, SOLUTION INTRAVENOUS at 17:14

## 2020-09-23 RX ADMIN — SODIUM CHLORIDE 500 ML: 9 INJECTION, SOLUTION INTRAVENOUS at 06:44

## 2020-09-23 RX ADMIN — ACETAMINOPHEN 650 MG: 325 TABLET, FILM COATED ORAL at 01:26

## 2020-09-23 RX ADMIN — ROPINIROLE HYDROCHLORIDE 0.5 MG: 0.5 TABLET, FILM COATED ORAL at 13:47

## 2020-09-23 RX ADMIN — ROPINIROLE HYDROCHLORIDE 0.5 MG: 0.5 TABLET, FILM COATED ORAL at 19:37

## 2020-09-23 RX ADMIN — ASPIRIN 81 MG CHEWABLE TABLET 81 MG: 81 TABLET CHEWABLE at 07:47

## 2020-09-23 RX ADMIN — HEPARIN SODIUM 5000 UNITS: 5000 INJECTION, SOLUTION INTRAVENOUS; SUBCUTANEOUS at 16:09

## 2020-09-23 RX ADMIN — PAROXETINE 20 MG: 20 TABLET, FILM COATED ORAL at 07:48

## 2020-09-23 RX ADMIN — DOCUSATE SODIUM 50 MG AND SENNOSIDES 8.6 MG 2 TABLET: 8.6; 5 TABLET, FILM COATED ORAL at 07:47

## 2020-09-23 RX ADMIN — ACETAMINOPHEN 650 MG: 325 TABLET, FILM COATED ORAL at 05:14

## 2020-09-23 RX ADMIN — CARBIDOPA AND LEVODOPA 2 TABLET: 25; 100 TABLET ORAL at 16:09

## 2020-09-23 RX ADMIN — DOCUSATE SODIUM 50 MG AND SENNOSIDES 8.6 MG 2 TABLET: 8.6; 5 TABLET, FILM COATED ORAL at 19:37

## 2020-09-23 ASSESSMENT — ACTIVITIES OF DAILY LIVING (ADL)
BATHING: 0-->INDEPENDENT
ADLS_ACUITY_SCORE: 16
COGNITION: 0 - NO COGNITION ISSUES REPORTED
TRANSFERRING: 1-->ASSISTIVE EQUIPMENT
WHICH_OF_THE_ABOVE_FUNCTIONAL_RISKS_HAD_A_RECENT_ONSET_OR_CHANGE?: AMBULATION
FALL_HISTORY_WITHIN_LAST_SIX_MONTHS: YES
RETIRED_COMMUNICATION: 0-->UNDERSTANDS/COMMUNICATES WITHOUT DIFFICULTY
NUMBER_OF_TIMES_PATIENT_HAS_FALLEN_WITHIN_LAST_SIX_MONTHS: 10
TOILETING: 0-->INDEPENDENT
SWALLOWING: 0-->SWALLOWS FOODS/LIQUIDS WITHOUT DIFFICULTY
DRESS: 0-->INDEPENDENT
RETIRED_EATING: 0-->INDEPENDENT
AMBULATION: 1-->ASSISTIVE EQUIPMENT
ADLS_ACUITY_SCORE: 20

## 2020-09-23 ASSESSMENT — PAIN DESCRIPTION - DESCRIPTORS
DESCRIPTORS: ACHING
DESCRIPTORS: ACHING;DISCOMFORT
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
## 2020-09-23 NOTE — UTILIZATION REVIEW
Admission Status; Secondary Review Determination    Under the authority of the Utilization Management Committee, the utilization review process indicated a secondary review on the above patient. The review outcome is based on review of the medical records, discussions with staff, and applying clinical experience noted on the date of the review.    (x) Inpatient Status Appropriate - This patient's medical care is consistent with medical management for inpatient care and reasonable inpatient medical practice.    RATIONALE FOR DETERMINATION:79-year-old male with moderately severe Parkinson's disease presented to the hospital after falls on stairs resulting in several rib fractures in addition to acute versus subacute 10th lumbar posterior fracture as well as possible subacute 11th thoracic spine fracture.  While managing patient's acute pain and mobility issues patient developed acute kidney injury doubling his creatinine level while in the hospital.  Patient does require several nights in the hospital appropriate for inpatient care.    At the time of admission with the information available to the attending physician more than 2 nights Hospital complex care was anticipated, based on patient risk of adverse outcome if treated as outpatient and complex care required. Inpatient admission is appropriate based on the Medicare guidelines.    This document was produced using voice recognition software    The information on this document is developed by the utilization review team in order for the business office to ensure compliance. This only denotes the appropriateness of proper admission status and does not reflect the quality of care rendered.    The definitions of Inpatient Status and Observation Status used in making the determination above are those provided in the CMS Coverage Manual, Chapter 1 and Chapter 6, section 70.4.    Sincerely,    Harris Pierce MD  Utilization Review  Physician Advisor  [facility]  Services.

## 2020-09-23 NOTE — PHARMACY-CONSULT NOTE
Pharmacy was consulted to aid in dosing of paroxetine and simvastatin in this patient with YANELIS.    Paroxetine is mainly cleared via hepatic metabolism, and as a result typically does not require dose adjustment in renal impairment. There is an increased risk of metabolite accumulation in renal failure, but given that patient's most recent SCr appears to have stabilized and patient isn't on a high dose, it is appropriate to continue without adjustment.    Simvastatin is also mainly cleared via hepatic metabolism and typically does not require dose adjustment in YANELIS. If YANELIS is suspected to be due to rhabdomyolysis, then simvastatin may be a contributing factor. Given this patient's YANELIS is likely prerenal in nature, no adjustment is necessary.     Recommendations:  1. No dose adjustment is required for paroxetine or simvastatin in this patient.     Thank you for involving pharmacy in the care of this patient!    Ethan Pavon, HennyD

## 2020-09-23 NOTE — PLAN OF CARE
"Shift: 7095-4179  VS: BP (!) 141/88 (BP Location: Left arm)   Pulse 71   Temp 99.2  F (37.3  C) (Oral)   Resp 16   Ht 1.727 m (5' 8\")   SpO2 95%   BMI 25.32 kg/m    Pain: Pt reporting generalized abdominal pain and L sided rib pain. Given PRN tylenol. Lidocaine patch removed.  Neuro: Disoriented to time.   Cardiac: VSS.  Respiratory: Clear and equal bilaterally, IS technique re-educated. Done without difficulty. Denies SOB.  GI/Diet/Appetite: Reports abdominal discomfort. Last BM 9/22.  : Due to void. Straight cathed at 1700, 1600 ml out. Bladder scan at 2230 for 814 ml. Straight cath at 2230, 950 ml out. Mira care provided x1.   LDA's: PIV SL.  Skin: Ecchymotic, CDI.  Activity: Assist 1-2 with walker and gait belt.   Tests/Procedures: Straight cath q shift.     Awaiting urology consult. Call light within reach, able to make needs known. Will continue to monitor and follow POC.    "

## 2020-09-23 NOTE — PROGRESS NOTES
Social Work Services Progress Note    Hospital Day: 2  Date of Initial Social Work Evaluation:  Not completed  Collaborated with:  Patient; pt's wife Ladi via telephone x3; various Admission Coordinators; Brittany RN; Angelita, ED/Obs PA    Data:  SW following for TCU placement.  Per Rounds today, pt is ready to discharge today.  Pt is flagged for needing a MOON completed.   Pt had been approved clinically by [facility] TCU but needs his Medicare number.   Mary Jo in Admissions at [facility], [phone], accepts pt for TCU placement.  Angelica in Admissions at [facility], [phone], also accepts pt for TCU placement but wants to know more information about pt's last TCU stay, number of days, etc.  Eveline at [facility] states she never received pt's referral but they have beds available.    Intervention:  Chart reviewed.    TAURUS called and spoke with staff at the [facility] and inquired if the CLC (TCU) is open.  They state that they are not taking pt's at this time.     SW met with pt and introduced myself, role and reason for the visit.  Informed him of [facility] as pt's wife was interested in this option.    SW reviewed the MOON form with pt however he declined to sign and asked that writer call his wife and review the form with her.  Wife, Ladi, called pt in his room at this time and writer spoke with her over the phone.  Reviewed the MOON form and left pt a copy.  She states pt has never been in Observation status before and was somewhat concerned about the possible extra costs associated with being in Observation status.  Writer informed Ladi and pt of pt's acceptance to [facility], [facility] and [facility].  Wife prefers [facility] as they are both familiar with this facility and it is only 3 blocks from their home.  This is their first choice.  Ladi states she would like to transport pt to the facility.  Writer will check with the  nurses to see what safe transportation recommendations are and will let Ladi know.    Writer called and spoke with  in Admissions at [facility], [phone].  Writer gave  pt's insurance ID number.   states that pt will be going into a shared room that is divided by a wall.  She states they could accept pt later this afternoon but prefer that he admit tomorrow morning.    Writer called and spoke with the nurses and then with CHANDA Goldstein.  The nurses state that pt requires heavy assist thus recommend a wheelchair van ride for safety.    TAURUS called and updated pt's wife Ladi to the nurses recommendation for w/c transportation.  She reluctantly agrees with this plan as she is aware that this will be private pay.  Writer informed her that pt will be billed at a later time.  She states that pt has not moved well for months but that she has handled him in the car.  She states she does not understand why the nurses think he needs so much help right now.  Writer offered supportive listening but will move forward with plan for wheelchair transportation.    CHANDA Goldstein, states that pt is no longer ready to discharge today but anticipates discharge tomorrow.    TAURUS called and updated  in Admissions at [facility] of anticipated discharge tomorrow.    Writer called and arranged [facility] w/c ride for 9:30am pickup.  If pt is not ready to be discharged by this time, ride will need to be delayed or cancelled.      TAURUS called and spoke again with pt's wife at 2000, informing her that pt is not ready to be discharged today but that we anticipate discharge tomorrow.  Also informed her of the ride time at 9:30am.  Ladi is worried that the 9:30am pickup may be too early and too rushed for pt.  Reassured her that SW can delay ride as needed.    She is concerned that pt has been lying in the bed too long and needs to be up walking.  She requests that nurses get pt up and walking every 2-3 hours if they can.   Writer unsure if pt is able to walk safely currently but will pass this request along to nursing.    Writer called and updated Noeym, Charge RN, of above plan and wife's requests.    Writer called and left vm's with [facility] Admissions and spoke with Mary Jo at [facility] and cancelled his referral there.  Did not send referral to [facility].    Assessment:  TCU placement.  Plan for d/c to [facility] TCU tomorrow at 9:30am if medically stable.    Plan:    Anticipated Disposition:  Facility:  [facility]    Barriers to d/c plan:  Medical stability.    Follow Up:  SW will continue to follow.    DEN Lerma  Social Work Services  Emergency Department & Obs  [phone] phone  [phone] pager  8:00am-8:30pm Mon-Sat    On-call pager, [phone], 4:00pm to midnight

## 2020-09-23 NOTE — PROGRESS NOTES
Pt's 0930 H/E ride to TCU was postponed by RN after discussing with provider. Pt will need repeat labs drawn at 1000, may be able to discharge later in the day.

## 2020-09-23 NOTE — PROGRESS NOTES
The Pt bp has been elevated. A one time dose of Hydralazine was administered and bp is now been  monitoring and is trending down. Please see flow sheet. Will continue to monitor and update as needed.Pt creat is elevated and 500 ml NS bolus is infusing for 1 hour. Pt resting in bed.

## 2020-09-23 NOTE — PROGRESS NOTES
Social Work Services Discharge Note      Patient Name:  Vini Loya     Anticipated Discharge Date:  9/28/20    Discharge Disposition:   TCU:   [address] [address]  [phone]-[place]  [phone]-Admissions    Following MD:  Abel Hutton MD      Pre-Admission Screening (PAS) online form has been completed.  The Level of Care (LOC) is:  Determined  Confirmation Code is: [id number]  Patient/caregiver informed of referral to [facility] for Pre-Admission Screening for skilled nursing facility (SNF) placement and to expect a phone call post discharge from SNF.     Additional Services/Equipment Arranged:  [facility] Wheelchair Transport will need to be arranged at discharge.      Patient / Family response to discharge plan:  Patient and wife agreeable to discharge.      Persons notified of above discharge plan:  Patient, Wife, Obs RN, Obs NP, TCU    Staff Discharge Instructions:  Please fax discharge orders and signed hard scripts for any controlled substances.  Please print a packet and send with patient.     CTS Handoff completed:  NO    Medicare Notice of Rights provided to the patient/family:  NO      Addendum: Discharge originally scheduled for 930 on 9/23 however discharge reschedule for later in the afternoon for labs to come back. Nursing home and wife updated. Per Obs NP, labs came back and patient will be moving to inpatient status. He will need to be in the hospital for 1-2 more days. Nursing home updated and reports that they can hold the bed if we call with updates. Wife also updated.       Korin EPPS. [facility].  Clinical   [facility]    Pager: [phone] Mon-Sat 9 am - 4:30 pm  On-call/after hours pager [phone]

## 2020-09-23 NOTE — PROGRESS NOTES
Observation goals - Acute Traumatic pain  PRIOR TO DISCHARGE      Comments: List all goals to be met before discharge home:   - Adequate pain control on oral analgesia.: Yes, Tylenol administered and ice pack applied to left rib.    - Vital Signs normal or at patient baseline. Yes. bp elevated, provider aware, will continue to monitor BP and update as needed.    - Tolerating oral intake to maintain hydration. Yes     - Diagnostic tests and consults completed and resulted -Yes    - Cleared for discharge from consultants' standpoint if involved in care: Yes    - Safe disposition plan has been identified: Yes. Going to TCU today 9/23 at 0900     - Return to baseline functional status: No  .Pt is heavy assist of 2 from bed to bedside commode. Pt is also incontinent of bowel and bladder. Uses call light appropriately. Tums administered for heart burn. Bed alarm remain in place and call light within reach.    - Nurse to notify provider when observation goals have been met and patient is ready

## 2020-09-23 NOTE — PROGRESS NOTES
Brief Nephrology Note full consult to follow      Briefly, 79 yom admitted post fall thought to be mechanical and due to Parkinson's disease related.  Found to also have hypertensive emergency with SBP >200, now managed with hydralazine and pain management.  On 9/21 Cr was 1.2, near baseline of 1.0-1.1 for the past year.  UOP unclear as it is unmeasured but had 3x occurrences yesterday, intake appears modest at ~1L daily.  No proteinuria/hematuria with last UA on 9/21.  With Cr up sharply to 2.7 this am he is getting 500cc and 100cc/hr ongoing IVF which appears reasonable.  Did get 100cc contrast for CT on 9/21 which is likely contributing, had ibuprofen same day which is likely exacerbating injury.  In addition is on Bactrim which will artificially bump Cr slightly without actual injury, Cr already a bit down on recheck this am.  Would continue hydration, urine Na is pending, would be helpful to quantify UOP if possible, ruling out obstruction with renal/bladder US.  I called obs team but pt was being transferred to inpt team, will try to contact them.      Aston Abdi, APRN CNS  Clinical Nurse Specialist  [phone]       Addendum at 0660: Renal US with hydro and bladder distended, planning st cath vs garcia and urology consult.  As his Cr was normal ~48h ago I would anticipate rapid recovery from Cr standpoint although NSAID/Contrast/Bactrim may be factors as well.  Will follow closely.

## 2020-09-23 NOTE — PROGRESS NOTES
"Observation goals - Acute Traumatic pain  PRIOR TO DISCHARGE      Comments: List all goals to be met before discharge home:   - Adequate pain control on oral analgesia.: Yes    - Vital Signs normal or at patient baseline. Yes.     - Tolerating oral intake to maintain hydration. Yes     - Diagnostic tests and consults completed and resulted -Yes    - Cleared for discharge from consultants' standpoint if involved in care: Yes    - Safe disposition plan has been identified: Yes. Going to TCU 9/23 at 0900     - Return to baseline functional status: No     Blood pressure (!) 152/82, pulse 66, temperature 98.5  F (36.9  C), temperature source Oral, resp. rate 18, height 1.727 m (5' 8\"), SpO2 95 %.    - Nurse to notify provider when observation goals have been met and patient is ready for discharge.         "

## 2020-09-23 NOTE — PLAN OF CARE
"Observation Goals:   List all goals to be met before discharge home:   - Adequate pain control on oral analgesia: met. Pt has lidocare patches on Left Ribs. PRN Tylenol as needed   - Vital Signs normal or at patient baseline.: BP elevated this AM, one time dose of hydralazine given, 's/80's now   - Tolerating oral intake to maintain hydration: Continuous IV fluids infusing at 100ml/hr, pt tolerating oral fluids and regular diet  - Diagnostic tests and consults completed and resulted: not met, repeat labs to be drawn at 1000  - Cleared for discharge from consultants' standpoint if involved in care: not met   - Safe disposition plan has been identified: Met, Pt will discharge to TCU when ready    - Return to baseline functional status: pending   - Nurse to notify provider when observation goals have been met and patient is ready for discharge.     /72 (BP Location: Left arm)   Pulse 64   Temp 98.6  F (37  C) (Oral)   Resp 17   Ht 1.727 m (5' 8\")   SpO2 95%   BMI 25.32 kg/m             "

## 2020-09-23 NOTE — PROGRESS NOTES
BP overnight was elevated to 160's-170's/80's-90's. I elected to monitor it closely as patient has a history or orthostatic hypotension. This morning was up to 190's/90's while patient was resting. Elected to give patient hydralazine 2mg IVx 1.    On AM labs  Last Comprehensive Metabolic Panel:  Sodium   Date Value Ref Range Status   09/23/2020 134 133 - 144 mmol/L Final     Potassium   Date Value Ref Range Status   09/23/2020 5.5 (H) 3.4 - 5.3 mmol/L Final     Comment:     Specimen slightly hemolyzed, potassium may be falsely elevated     Chloride   Date Value Ref Range Status   09/23/2020 104 94 - 109 mmol/L Final     Carbon Dioxide   Date Value Ref Range Status   09/23/2020 25 20 - 32 mmol/L Final     Anion Gap   Date Value Ref Range Status   09/23/2020 5 3 - 14 mmol/L Final     Glucose   Date Value Ref Range Status   09/23/2020 116 (H) 70 - 99 mg/dL Final     Urea Nitrogen   Date Value Ref Range Status   09/23/2020 46 (H) 7 - 30 mg/dL Final     Creatinine   Date Value Ref Range Status   09/23/2020 2.69 (H) 0.66 - 1.25 mg/dL Final     GFR Estimate   Date Value Ref Range Status   09/23/2020 21 (L) >60 mL/min/[1.73_m2] Final     Comment:     Non  GFR Calc  Starting 12/18/2018, serum creatinine based estimated GFR (eGFR) will be   calculated using the Chronic Kidney Disease Epidemiology Collaboration   (CKD-EPI) equation.       Calcium   Date Value Ref Range Status   09/23/2020 9.2 8.5 - 10.1 mg/dL Final       BUN Creatine was 21/1.26 on admission on 9/21/20. This is over 50% increase in Cr. Likely prerenal.    Plan  - Place on telemetry  - Avoid nephrotoxins  - Renal adjust medication doses - Requip and Sinemet dont need dose adjustment      Bactrim changed from daily to three times weekly  - Pharmacy inpatient consult to assist with adjust other medications - paxil, zocor  - NS bolus 500ml  - MIVF 100ml/hr  - Repeat BMP at 10am  - continue to monitor BP closely    Pepe Edmond PA-C

## 2020-09-23 NOTE — PLAN OF CARE
Observation Goals:   List all goals to be met before discharge home:   - Adequate pain control on oral analgesia: met. Pt has lidocare patches on Left Ribs. PRN Tylenol as needed   - Vital Signs normal or at patient baseline.: BP elevated this AM, one time dose of hydralazine given, 's/80's now   - Tolerating oral intake to maintain hydration: Continuous IV fluids infusing at 100ml/hr, pt tolerating oral fluids  - Diagnostic tests and consults completed and resulted: not met, repeat labs to be drawn at 1000  - Cleared for discharge from consultants' standpoint if involved in care: not met   - Safe disposition plan has been identified: Met, Pt will discharge to TCU when ready    - Return to baseline functional status: pending   - Nurse to notify provider when observation goals have been met and patient is ready for discharge.

## 2020-09-23 NOTE — PLAN OF CARE
"Shift [id number].   Reason for admission: Fall    Activity: Up with assist x2  Pain: continues to report pain on L side ribcage. Lidocare patches applied and PRN tylenol given with some relief.   Neuro: A&Ox4, bilateral parkinsons tremors at baseline.   Cardiac: BP elevated, One time dose of hydralazine given this AM, 's/80's today.   Respiratory: WNL. Incentive spirometry education reinforced.   GI/: Pt reports BM yesterday. Voiding spontaneously, urine sample needed.   Diet: Pt tolerating regular diet.   Lines: L PIV infusing at 100ml/hr  Labs/imaging/Consults: Creatinine elevated at 2.62. Renal ultrasound completed today, results pending.     BP (!) 143/99 (BP Location: Right arm)   Pulse 71   Temp 98.6  F (37  C) (Oral)   Resp 17   Ht 1.727 m (5' 8\")   SpO2 95%   BMI 25.32 kg/m           "

## 2020-09-24 DIAGNOSIS — R33.9 INCOMPLETE BLADDER EMPTYING: Primary | ICD-10-CM

## 2020-09-24 LAB
ALBUMIN SERPL-MCNC: 2.7 G/DL (ref 3.4–5)
ALP SERPL-CCNC: 102 U/L (ref 40–150)
ALT SERPL W P-5'-P-CCNC: 8 U/L (ref 0–70)
ANION GAP SERPL CALCULATED.3IONS-SCNC: 5 MMOL/L (ref 3–14)
AST SERPL W P-5'-P-CCNC: 24 U/L (ref 0–45)
BILIRUB SERPL-MCNC: 1 MG/DL (ref 0.2–1.3)
BUN SERPL-MCNC: 30 MG/DL (ref 7–30)
CALCIUM SERPL-MCNC: 8.9 MG/DL (ref 8.5–10.1)
CHLORIDE SERPL-SCNC: 107 MMOL/L (ref 94–109)
CO2 SERPL-SCNC: 26 MMOL/L (ref 20–32)
CREAT SERPL-MCNC: 1.67 MG/DL (ref 0.66–1.25)
ERYTHROCYTE [DISTWIDTH] IN BLOOD BY AUTOMATED COUNT: 13.6 % (ref 10–15)
GFR SERPL CREATININE-BSD FRML MDRD: 38 ML/MIN/{1.73_M2}
GLUCOSE SERPL-MCNC: 103 MG/DL (ref 70–99)
HCT VFR BLD AUTO: 45.1 % (ref 40–53)
HGB BLD-MCNC: 14.7 G/DL (ref 13.3–17.7)
MCH RBC QN AUTO: 31.7 PG (ref 26.5–33)
MCHC RBC AUTO-ENTMCNC: 32.6 G/DL (ref 31.5–36.5)
MCV RBC AUTO: 97 FL (ref 78–100)
PLATELET # BLD AUTO: 272 10E9/L (ref 150–450)
POTASSIUM SERPL-SCNC: 4.9 MMOL/L (ref 3.4–5.3)
PROT SERPL-MCNC: 6.4 G/DL (ref 6.8–8.8)
RBC # BLD AUTO: 4.64 10E12/L (ref 4.4–5.9)
SODIUM SERPL-SCNC: 137 MMOL/L (ref 133–144)
WBC # BLD AUTO: 10.4 10E9/L (ref 4–11)

## 2020-09-24 PROCEDURE — 12000001 ZZH R&B MED SURG/OB UMMC

## 2020-09-24 PROCEDURE — 25000132 ZZH RX MED GY IP 250 OP 250 PS 637: Performed by: STUDENT IN AN ORGANIZED HEALTH CARE EDUCATION/TRAINING PROGRAM

## 2020-09-24 PROCEDURE — 80053 COMPREHEN METABOLIC PANEL: CPT | Performed by: STUDENT IN AN ORGANIZED HEALTH CARE EDUCATION/TRAINING PROGRAM

## 2020-09-24 PROCEDURE — 85027 COMPLETE CBC AUTOMATED: CPT | Performed by: STUDENT IN AN ORGANIZED HEALTH CARE EDUCATION/TRAINING PROGRAM

## 2020-09-24 PROCEDURE — 25000128 H RX IP 250 OP 636: Performed by: STUDENT IN AN ORGANIZED HEALTH CARE EDUCATION/TRAINING PROGRAM

## 2020-09-24 PROCEDURE — 36415 COLL VENOUS BLD VENIPUNCTURE: CPT | Performed by: STUDENT IN AN ORGANIZED HEALTH CARE EDUCATION/TRAINING PROGRAM

## 2020-09-24 RX ORDER — DUTASTERIDE 0.5 MG/1
0.5 CAPSULE, LIQUID FILLED ORAL DAILY
Status: DISCONTINUED | OUTPATIENT
Start: 2020-09-24 | End: 2020-09-25 | Stop reason: HOSPADM

## 2020-09-24 RX ADMIN — HEPARIN SODIUM 5000 UNITS: 5000 INJECTION, SOLUTION INTRAVENOUS; SUBCUTANEOUS at 15:58

## 2020-09-24 RX ADMIN — ASPIRIN 81 MG CHEWABLE TABLET 81 MG: 81 TABLET CHEWABLE at 08:43

## 2020-09-24 RX ADMIN — ROPINIROLE HYDROCHLORIDE 0.5 MG: 0.5 TABLET, FILM COATED ORAL at 08:44

## 2020-09-24 RX ADMIN — CARBIDOPA AND LEVODOPA 2 TABLET: 25; 100 TABLET ORAL at 06:05

## 2020-09-24 RX ADMIN — ACETAMINOPHEN 650 MG: 325 TABLET, FILM COATED ORAL at 21:11

## 2020-09-24 RX ADMIN — POLYETHYLENE GLYCOL 3350 17 G: 17 POWDER, FOR SOLUTION ORAL at 08:42

## 2020-09-24 RX ADMIN — ENOXAPARIN SODIUM 40 MG: 40 INJECTION SUBCUTANEOUS at 08:43

## 2020-09-24 RX ADMIN — ACETAMINOPHEN 650 MG: 325 TABLET, FILM COATED ORAL at 03:01

## 2020-09-24 RX ADMIN — DUTASTERIDE 0.5 MG: 0.5 CAPSULE, LIQUID FILLED ORAL at 19:36

## 2020-09-24 RX ADMIN — HEPARIN SODIUM 5000 UNITS: 5000 INJECTION, SOLUTION INTRAVENOUS; SUBCUTANEOUS at 04:16

## 2020-09-24 RX ADMIN — PAROXETINE 20 MG: 20 TABLET, FILM COATED ORAL at 08:43

## 2020-09-24 RX ADMIN — LIDOCAINE 2 PATCH: 560 PATCH PERCUTANEOUS; TOPICAL; TRANSDERMAL at 08:42

## 2020-09-24 RX ADMIN — CARBIDOPA AND LEVODOPA 2 TABLET: 25; 100 TABLET ORAL at 11:15

## 2020-09-24 RX ADMIN — SIMVASTATIN 40 MG: 40 TABLET, FILM COATED ORAL at 21:11

## 2020-09-24 RX ADMIN — DOCUSATE SODIUM 50 MG AND SENNOSIDES 8.6 MG 2 TABLET: 8.6; 5 TABLET, FILM COATED ORAL at 08:43

## 2020-09-24 RX ADMIN — DOCUSATE SODIUM 50 MG AND SENNOSIDES 8.6 MG 2 TABLET: 8.6; 5 TABLET, FILM COATED ORAL at 19:37

## 2020-09-24 RX ADMIN — ROPINIROLE HYDROCHLORIDE 0.5 MG: 0.5 TABLET, FILM COATED ORAL at 19:36

## 2020-09-24 RX ADMIN — CARBIDOPA AND LEVODOPA 1 TABLET: 50; 200 TABLET, EXTENDED RELEASE ORAL at 21:11

## 2020-09-24 RX ADMIN — CARBIDOPA AND LEVODOPA 2 TABLET: 25; 100 TABLET ORAL at 15:58

## 2020-09-24 RX ADMIN — ROPINIROLE HYDROCHLORIDE 0.5 MG: 0.5 TABLET, FILM COATED ORAL at 13:15

## 2020-09-24 ASSESSMENT — ACTIVITIES OF DAILY LIVING (ADL)
ADLS_ACUITY_SCORE: 13
ADLS_ACUITY_SCORE: 18
ADLS_ACUITY_SCORE: 16
ADLS_ACUITY_SCORE: 18
ADLS_ACUITY_SCORE: 13
ADLS_ACUITY_SCORE: 15

## 2020-09-24 ASSESSMENT — PAIN DESCRIPTION - DESCRIPTORS: DESCRIPTORS: ACHING;DISCOMFORT
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
## 2020-09-24 NOTE — PROGRESS NOTES
Neuro: A&Ox4.Intact.    Cardiac: Afebrile, VSS.            Respiratory: RA    GI/: Unable to void. Bladder scan and straight cath during shift. No BM.  Diet/appetite: Regular diet. Tolerating well.  Activity: Up with assist +1 and walker.  Pain: Reported left lower back. Declines pain medication. Lidocaine patches on.  Skin: intact. Some bruising.  Lines: PIV SL.     Patient up in chair. IS encouraged. Resting comfortably within chair. Nephrology consulted. Urology plan pending.
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
## 2020-09-24 NOTE — PROGRESS NOTES
"Shift: 9844-0736  VS: Blood pressure (!) 193/84, pulse 63, temperature 98.2  F (36.8  C), temperature source Oral, resp. rate 16, height 1.727 m (5' 8\"), SpO2 95 %.   Pain: Pt reporting generalized pain and L sided rib pain. Given PRN Tylenol x 1 and ice applied to left side.  Neuro: Disoriented to time.   Cardiac: VSS.  Respiratory: Clear and equal bilaterally, IS done x 2 overnight, without difficulty. Denies SOB.  GI/Diet/Appetite: Reports abdominal discomfort. Last BM 9/22, bladder scaned at 0600 for 900 ml and st cath for 1000 ml straw color urine  :  Bladder scanned for 900 ml, Straight cath at 0600 for 1000 ml. out. Mira care provided x1.   LDA's: PIV SL.  Skin: Ecchymotic, CDI.  Activity: Assist 1-2 with walker and gait belt.   Tests/Procedures: Straight cath q shift.    Pt slept better this night. Abdominal binder ordered and available in room to be used when Pt is up.Will continue to monitor.  Awaiting urology consult. Call light within reach, able to make needs known. Will continue to monitor and follow POC.     "
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
## 2020-09-25 ENCOUNTER — APPOINTMENT (OUTPATIENT)
Dept: PHYSICAL THERAPY | Facility: CLINIC | Age: 80
DRG: 057 | End: 2020-09-25
Payer: COMMERCIAL

## 2020-09-25 VITALS
BODY MASS INDEX: 25.32 KG/M2 | SYSTOLIC BLOOD PRESSURE: 161 MMHG | DIASTOLIC BLOOD PRESSURE: 97 MMHG | TEMPERATURE: 97.7 F | OXYGEN SATURATION: 97 % | HEIGHT: 68 IN | RESPIRATION RATE: 18 BRPM | HEART RATE: 78 BPM

## 2020-09-25 LAB
ALBUMIN SERPL-MCNC: 2.9 G/DL (ref 3.4–5)
ALP SERPL-CCNC: 106 U/L (ref 40–150)
ALT SERPL W P-5'-P-CCNC: 8 U/L (ref 0–70)
ANION GAP SERPL CALCULATED.3IONS-SCNC: 5 MMOL/L (ref 3–14)
AST SERPL W P-5'-P-CCNC: 28 U/L (ref 0–45)
BILIRUB SERPL-MCNC: 1.1 MG/DL (ref 0.2–1.3)
BUN SERPL-MCNC: 21 MG/DL (ref 7–30)
CALCIUM SERPL-MCNC: 8.7 MG/DL (ref 8.5–10.1)
CHLORIDE SERPL-SCNC: 105 MMOL/L (ref 94–109)
CO2 SERPL-SCNC: 27 MMOL/L (ref 20–32)
CREAT SERPL-MCNC: 1.12 MG/DL (ref 0.66–1.25)
ERYTHROCYTE [DISTWIDTH] IN BLOOD BY AUTOMATED COUNT: 13.2 % (ref 10–15)
GFR SERPL CREATININE-BSD FRML MDRD: 61 ML/MIN/{1.73_M2}
GLUCOSE SERPL-MCNC: 102 MG/DL (ref 70–99)
HCT VFR BLD AUTO: 46.2 % (ref 40–53)
HGB BLD-MCNC: 15.3 G/DL (ref 13.3–17.7)
MCH RBC QN AUTO: 31.9 PG (ref 26.5–33)
MCHC RBC AUTO-ENTMCNC: 33.1 G/DL (ref 31.5–36.5)
MCV RBC AUTO: 96 FL (ref 78–100)
PLATELET # BLD AUTO: 284 10E9/L (ref 150–450)
POTASSIUM SERPL-SCNC: 4.7 MMOL/L (ref 3.4–5.3)
PROT SERPL-MCNC: 6.6 G/DL (ref 6.8–8.8)
RBC # BLD AUTO: 4.8 10E12/L (ref 4.4–5.9)
SODIUM SERPL-SCNC: 136 MMOL/L (ref 133–144)
WBC # BLD AUTO: 11.2 10E9/L (ref 4–11)

## 2020-09-25 PROCEDURE — 97530 THERAPEUTIC ACTIVITIES: CPT | Mod: GP

## 2020-09-25 PROCEDURE — 25000132 ZZH RX MED GY IP 250 OP 250 PS 637: Performed by: STUDENT IN AN ORGANIZED HEALTH CARE EDUCATION/TRAINING PROGRAM

## 2020-09-25 PROCEDURE — 85027 COMPLETE CBC AUTOMATED: CPT | Performed by: STUDENT IN AN ORGANIZED HEALTH CARE EDUCATION/TRAINING PROGRAM

## 2020-09-25 PROCEDURE — 97164 PT RE-EVAL EST PLAN CARE: CPT | Mod: GP

## 2020-09-25 PROCEDURE — 25000128 H RX IP 250 OP 636: Performed by: STUDENT IN AN ORGANIZED HEALTH CARE EDUCATION/TRAINING PROGRAM

## 2020-09-25 PROCEDURE — 97116 GAIT TRAINING THERAPY: CPT | Mod: GP

## 2020-09-25 PROCEDURE — 36415 COLL VENOUS BLD VENIPUNCTURE: CPT | Performed by: STUDENT IN AN ORGANIZED HEALTH CARE EDUCATION/TRAINING PROGRAM

## 2020-09-25 PROCEDURE — 80053 COMPREHEN METABOLIC PANEL: CPT | Performed by: STUDENT IN AN ORGANIZED HEALTH CARE EDUCATION/TRAINING PROGRAM

## 2020-09-25 RX ORDER — DUTASTERIDE 0.5 MG/1
0.5 CAPSULE, LIQUID FILLED ORAL DAILY
Qty: 30 CAPSULE | Refills: 0 | Status: SHIPPED | OUTPATIENT
Start: 2020-09-26 | End: 2020-10-06

## 2020-09-25 RX ORDER — CARBIDOPA AND LEVODOPA 50; 200 MG/1; MG/1
1 TABLET, EXTENDED RELEASE ORAL AT BEDTIME
Qty: 30 TABLET | Refills: 0 | Status: SHIPPED | OUTPATIENT
Start: 2020-09-25 | End: 2020-10-16

## 2020-09-25 RX ORDER — DOXAZOSIN 1 MG/1
1 TABLET ORAL AT BEDTIME
Qty: 30 TABLET | Refills: 0 | Status: SHIPPED | OUTPATIENT
Start: 2020-09-25 | End: 2020-10-06

## 2020-09-25 RX ADMIN — DUTASTERIDE 0.5 MG: 0.5 CAPSULE, LIQUID FILLED ORAL at 08:04

## 2020-09-25 RX ADMIN — SULFAMETHOXAZOLE AND TRIMETHOPRIM 1 TABLET: 400; 80 TABLET ORAL at 08:07

## 2020-09-25 RX ADMIN — CARBIDOPA AND LEVODOPA 2 TABLET: 25; 100 TABLET ORAL at 16:38

## 2020-09-25 RX ADMIN — DOCUSATE SODIUM 50 MG AND SENNOSIDES 8.6 MG 2 TABLET: 8.6; 5 TABLET, FILM COATED ORAL at 08:03

## 2020-09-25 RX ADMIN — LIDOCAINE 2 PATCH: 560 PATCH PERCUTANEOUS; TOPICAL; TRANSDERMAL at 08:03

## 2020-09-25 RX ADMIN — ROPINIROLE HYDROCHLORIDE 0.5 MG: 0.5 TABLET, FILM COATED ORAL at 14:00

## 2020-09-25 RX ADMIN — HEPARIN SODIUM 5000 UNITS: 5000 INJECTION, SOLUTION INTRAVENOUS; SUBCUTANEOUS at 05:16

## 2020-09-25 RX ADMIN — ROPINIROLE HYDROCHLORIDE 0.5 MG: 0.5 TABLET, FILM COATED ORAL at 08:04

## 2020-09-25 RX ADMIN — POLYETHYLENE GLYCOL 3350 17 G: 17 POWDER, FOR SOLUTION ORAL at 08:04

## 2020-09-25 RX ADMIN — ASPIRIN 81 MG CHEWABLE TABLET 81 MG: 81 TABLET CHEWABLE at 08:03

## 2020-09-25 RX ADMIN — PAROXETINE 20 MG: 20 TABLET, FILM COATED ORAL at 08:04

## 2020-09-25 RX ADMIN — CARBIDOPA AND LEVODOPA 2 TABLET: 25; 100 TABLET ORAL at 11:21

## 2020-09-25 RX ADMIN — CARBIDOPA AND LEVODOPA 2 TABLET: 25; 100 TABLET ORAL at 07:11

## 2020-09-25 ASSESSMENT — ENCOUNTER SYMPTOMS
FATIGUE: 0
ABDOMINAL DISTENTION: 0
PALPITATIONS: 0
SHORTNESS OF BREATH: 0
ACTIVITY CHANGE: 0
HEMATURIA: 0
FEVER: 0
WHEEZING: 0
FACIAL ASYMMETRY: 0
TREMORS: 1
BLOOD IN STOOL: 0
DIAPHORESIS: 0
VOMITING: 0
RECTAL PAIN: 0
ABDOMINAL PAIN: 0
CONSTIPATION: 1
NAUSEA: 0
DIFFICULTY URINATING: 1
DIARRHEA: 0
DIZZINESS: 0
APPETITE CHANGE: 0
HEADACHES: 0
LIGHT-HEADEDNESS: 0
COUGH: 0
CHEST TIGHTNESS: 0
AGITATION: 0
FREQUENCY: 1
SEIZURES: 0
CONFUSION: 1
FLANK PAIN: 0

## 2020-09-25 ASSESSMENT — ACTIVITIES OF DAILY LIVING (ADL)
ADLS_ACUITY_SCORE: 14
ADLS_ACUITY_SCORE: 15
ADLS_ACUITY_SCORE: 16
ADLS_ACUITY_SCORE: 15
ADLS_ACUITY_SCORE: 15

## 2020-09-25 ASSESSMENT — PAIN DESCRIPTION - DESCRIPTORS: DESCRIPTORS: ACHING;DISCOMFORT

## 2020-09-25 NOTE — PLAN OF CARE
"Shift: 6538-3179  Neuro: Disoriented to place overnight, woke up stating \"I couldn't remember where I was.\" Oriented x4 this AM   Cardiac:  WDL           Respiratory: Lung sounds clear and equal throughout all fields  GI/: Retaining urine, bladder scan of over 700 this AM, straight cath with coude tip due to enlarged prostate with 800 mL out. Bladder scanned after straight cath with around 300 mL still in bladder.   Diet/appetite: Regular diet. Encouraged fluids overnight but intake around 170.   Activity:  Up with A of 1-2  Pain: Denies pain  Skin: No open wounds/sores assessed  Vitals: BP (!) 155/77 (BP Location: Right arm)   Pulse 58   Temp 98.2  F (36.8  C) (Oral)   Resp 18   Ht 1.727 m (5' 8\")   SpO2 93%   BMI 25.32 kg/m    Plan: consults with nephrology and urology before discharge due to retention and increased creatinine    "

## 2020-09-25 NOTE — PLAN OF CARE
Discharge Planner PT   OBS - PT re-eval completed, treatment initiated.  Patient plan for discharge: Home with assist, HH PT/OT.  Current status: Pt transfers sit <> stand with CGA. Pt ambulates ~250ft with FWW and CGA, slow gait speed, no LOB, requires occasional cues for walker management when turning corners. Pt ascends/descends 4 steps 3x - first bout utilizes B HR with CGA; last two bouts utilizes 1 HR, cane, and CGA-occasional Josselyn.  Barriers to return to prior living situation: Medical needs, weakness, impaired balance, decreased activity tolerance, stairs to enter/within home.  Recommendations for discharge: Anticipate home with 24 hour assist/supervision. Recommend use of FWW for safe mobility (pt and wife report they already own one). Recommend HH PT/OT to maximize IND/safety with mobility/ADLs.  Rationale for recommendations: Current level of function, see above.

## 2020-09-25 NOTE — PROVIDER NOTIFICATION
[facility] paged re: Pt cathed at 1900 by RN, 450 ml out. Taught pt how to self cath with teach-back. Pt open to doing it on own before bed.

## 2020-09-25 NOTE — PLAN OF CARE
"Shift: 7532-0911  VS: BP (!) 141/69 (BP Location: Left arm)   Pulse 72   Temp 98.8  F (37.1  C) (Oral)   Resp 18   Ht 1.727 m (5' 8\")   SpO2 93%   BMI 25.32 kg/m    Pain: Pt reporting L sided rib pain and generalized back pain. Given PRN tylenol. Lidocaine patches removed.  Neuro: A&Ox4.  Cardiac: VSS.  Respiratory: Clear and equal bilaterally, IS technique re-educated. Done without difficulty. Denies SOB.  GI/Diet/Appetite: Last BM 9/24. Denies nausea.  : Straight cathed at 1930, 450 ml out. Mira care provided x1. Pt sleeping at this time, will bladder scan and pt to do straight cath with assist if needed.  LDA's: PIV SL.  Skin: Ecchymotic, CDI.  Activity: Assist 1-2 with walker and gait belt.   Tests/Procedures: Straight cath q shift.  Labs: Cr 1.67    Call light within reach, able to make needs known. Will continue to monitor and follow POC.  "

## 2020-09-25 NOTE — PROGRESS NOTES
Brief Nephrology Note      Pt's Cr has returned nearly to baseline with urology interventions, will sign off from nephrology standpoint.  They are restarting BPH meds and following up in clinic.  We certainly are available if any issues arise.      KOTA Silveira CNS  Clinical Nurse Specialist  [phone]

## 2020-09-25 NOTE — PROGRESS NOTES
"   09/25/20 1329   Quick Adds   Type of Visit PT Re-evaluation   Living Environment   Lives With spouse   Living Arrangements house   Home Accessibility stairs to enter home;stairs within home   Number of Stairs, Main Entrance 4   Stair Railings, Main Entrance railings on both sides of stairs   Number of Stairs, Within Home, Primary   (12, 1 HR - to basement where walk-in shower is located.)   Transportation Anticipated family or friend will provide   Self-Care   Usual Activity Tolerance moderate   Current Activity Tolerance fair   Regular Exercise Yes  (Has been working with  PT.)   Equipment Currently Used at Home   (Pt owns cane, FWW, and 4WW.)   Functional Level Prior   Ambulation 1-->assistive equipment   Transferring 0-->independent   Toileting 0-->independent   Bathing 0-->independent   Communication 0-->understands/communicates without difficulty   Swallowing 0-->swallows foods/liquids without difficulty   Cognition 0 - no cognition issues reported   Fall history within last six months yes   Number of times patient has fallen within last six months 10   Which of the above functional risks had a recent onset or change? ambulation;transferring;fall history   Prior Functional Level Comment Pt reports IND-Mod I with mobility and ADLs at baseline. Pt currently uses walker for mobility 2/2 increase in number of falls at home and recent health issues.   General Information   Onset of Illness/Injury or Date of Surgery - Date 09/21/20  (PT re-ordered 9/24/2020.)   Referring Physician Cheryl Perez MD    Patient/Family Goals Statement Pt would like to return home.   Pertinent History of Current Problem (include personal factors and/or comorbidities that impact the POC) Per chart \"Vini Loya is an 79 year old male admitted on 9/21/2020. He has Parkinson's Disease (c/b orthostatic hypotension, urinary retention, constipation), CAD s/p stenting (details unclear), HTN, HLD, CKD III, hx of embolic stroke, hx of " "malignant hyperthermia (5/29/18), chronic UTI,  ELISHA, and MDD. Admitted on 9/21 following 2 mechanical/Parkinson's-related falls at home; in context of recent admission from 9/10-9/11 for hypertensive emergency.\"   Precautions/Limitations fall precautions   General Observations Upon arrival pt supine in bed, agreeable to PT session, wife present.   General Info Comments Activity - ambulate with assist.   Cognitive Status Examination   Orientation orientation to person, place and time   Level of Consciousness alert   Follows Commands and Answers Questions 100% of the time;able to follow single-step instructions   Personal Safety and Judgment at risk behaviors demonstrated   Pain Assessment   Patient Currently in Pain No   Posture    Posture Forward head position;Protracted shoulders;Kyphosis   Range of Motion (ROM)   ROM Comment B LE ROM WFL.   Strength   Strength Comments B LE strength at least >3/5 based on functional mobility assessment; generalized weakness/deconditioning present.   Bed Mobility   Bed Mobility Comments Not assessed.   Transfer Skills   Transfer Comments Pt transfers sit <> stand with CGA - requires use of UEs to assist with transfers.   Gait   Gait Comments Pt utilizes FWW for ambulation. Pt ambulates at slow gait speed with decreased step length/height B.   Balance   Balance Comments Pt requires FWW for standing balance and functional mobility. Pt experiences no overt LOB during ambulation however tends to demonstrate mild posterior lean.   Sensory Examination   Sensory Perception Comments Pt reports no sensory changes.   General Therapy Interventions   Planned Therapy Interventions balance training;bed mobility training;gait training;strengthening;transfer training;home program guidelines;progressive activity/exercise   Clinical Impression   Criteria for Skilled Therapeutic Intervention yes, treatment indicated   PT Diagnosis Impaired functional mobility   Influenced by the following impairments " "LE weakness, impaired balance, decreased activity tolerance   Functional limitations due to impairments Bed mobility, transfers, gait, stairs, functional endurance   Clinical Presentation Evolving/Changing   Clinical Presentation Rationale Clinical judgement   Clinical Decision Making (Complexity) Moderate complexity   Therapy Frequency 6x/week   Predicted Duration of Therapy Intervention (days/wks) 1 week   Anticipated Equipment Needs at Discharge   (TBD.)   Anticipated Discharge Disposition Home with Assist;Home with Home Therapy   Risk & Benefits of therapy have been explained Yes   Patient, Family & other staff in agreement with plan of care Yes   [facility] TM \"6 Clicks\"   2016, Trustees of [facility], under license to [employer].  All rights reserved.   6 Clicks Short Forms Basic Mobility Inpatient Short Form   [facility]  \"6 Clicks\" V.2 Basic Mobility Inpatient Short Form   1. Turning from your back to your side while in a flat bed without using bedrails? 3 - A Little   2. Moving from lying on your back to sitting on the side of a flat bed without using bedrails? 3 - A Little   3. Moving to and from a bed to a chair (including a wheelchair)? 3 - A Little   4. Standing up from a chair using your arms (e.g., wheelchair, or bedside chair)? 3 - A Little   5. To walk in hospital room? 3 - A Little   6. Climbing 3-5 steps with a railing? 3 - A Little   Basic Mobility Raw Score (Score out of 24.Lower scores equate to lower levels of function) 18   Total Evaluation Time   Total Evaluation Time (Minutes) 8     "

## 2020-09-25 NOTE — DISCHARGE SUMMARY
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 bladder (Parkinson) in the setting   of BPH and inability to take flomax (because of dizziness).  Here   in the hospital he was found to have YANELIS and BL hydronephrosis,   both presumed 2/2 retention.  Since straight cath was begun, YANELIS   is  "improving      - Please start avodart (will \"shrink prostate\" - takes months to   years to work, but doesn't cause dizziness)  - Consider starting doxazosin 1mg daily.  Although this is an   alpha blocker and could cause dizziness too, the dose is very   small thus he is more likely to tolerate.  Monitor for   orthostatic hypotension, dizziness.   - Ask nursing to teach straight catheterization four times daily   (Morning, lunch, dinner and before bed).  Use a 14 or 16fr   (preferred) catheter.  The patient is willing to try this, and   hopefully he will be successful.  If he is unable to catheterize,   he will need to discharge with a Palomares catheter.   - Either way, will need followup in urology with a renal   ultrasound and a BMP in the next month.  I Will send message to   clinic for an in-person appointment.    - Ultimately he will likely need outpatient urodynamics.     Clean Intermittent   Self Catheterization    Your physician has asked that you be instructed in the use of   self-intermittent catheterization.  In order to do this, you will   need to pass a small tube into your bladder to allow urine to   drain.  You can do this procedure quite easily, if you relax and take   your time.  There should be very little discomfort, if performed   properly.  In this pamphlet, the process will be described step by step to   make it as easy as possible.  The care, storage and maintenance   of equipment will also be discussed.    When performed by you, this procedure is a clean, but not a   sterile procedure.  Nonetheless, by utilizing good technique, and   by taking good care of your catheterization equipment, the   potential of urinary tract infection is significantly reduced.      Step 1 - Gather all equipment:  ? Catheter  ? Lubricant tubes or packets  ? Soap/Washcloth or Antiseptic towelettes  ? Paper towel or Kleenex  ? Storage bag or plastic container  ? Mirror and perhaps a lamp or flashlight  Step 2 - Wash " your hands  During the procedure, touch only the catheterization equipment to   avoid spreading germs and coming in contact with germs that could   potentially lead to a urinary tract infection.    Step 3 - Wash the end of the penis  Use soap and water when possible.  Disposable antiseptic   towelettes may be used when away from home, while traveling, or   when convenient sources of water are not available.    Step 4 - Preparation of catheter  Open the tube of lubricant and squeeze a generous portion onto a   paper towel or Kleenex.  Then roll the first one to two inches of   the catheter in the lubricant.  (If a paper towel or counter   space is not available, the lubricant can be applied directly to   the tubing.)  Step 5 - Self-catheterization  Hold the large end of the catheter over the basin or toilet.    Grasp the tip end of the catheter just behind the lubricated   surface, using your dominant hand, just as you would grasp a   pencil.  Slowly advance the catheter up into the urethra.  Most   men have some sensitivity as the catheter passes through the   prostate.  Once urine has started to flow, continue to advance   the catheter an additional inch to ensure it remains in the   bladder until emptying is complete.  Allow all urine to empty   into the toilet or basin.    Step 6 - Completion of catheterization  When the catheter stops draining, remove it from the bladder with   a slow, steady, pulling motion.  If urine again starts to flow,   stop briefly and allow drainage to occur.  As the catheter tip   approaches the end of the urethral opening, pinch the catheter   closed and slowly remove it.    Report any of the following to your physician:  ? Difficultly inserting the catheter  ? Significant changes in the amount, odor, or color of the urine  ? Unusual pain or discomfort during the catheterization procedure  ? Unusual discharge or bleeding  ? Redness or swelling  ? Chills or fever    Helpful hints:  1.  You can catheterize standing, sitting, or lying down.  It is   important to learn to catheterize in the position that is the   most practical and comfortable for you.  Learning to catheterize   standing, if possible, can be useful if toilets are dirty or   proper facilities are not available.   2. Never force the catheter.  If an obstruction is encountered,   try to breathe deeply, relax, and use only steady pressure.  If   these techniques fail, remove the catheter, wait awhile, and then   try again.  If still unsuccessful, contact your physician for   assistance.   3. Limiting fluids at 7:00 or 8:00 p.m. may enable you to sleep   through the night without the need to catheterize yourself    Urology will sign off.   Discussed with Dr. Dr. Joel    Thank you for the opportunity to participate in the care of   Vini Loya.     Lupe Hairston PA-C  Urology Physician Assistant  Personal Pager: [phone]    Please call Job Code:   [phone] to reach the Urology resident or PA on call - Weekdays  [phone] to reach the Urology resident or PA on call - Weeknights   and weekends                 Primary Care Physician   Joel Daniel Wegener    Discharge Disposition   Discharged to home  Condition at discharge: Stable    Discharge Orders      Reason for your hospital stay    You were hospitalized initially for a couple of falls at home and then stayed hospitalized because you had kidney injury (which has now resolved).     Activity    Be as active as you can safely be.   Try to use abdominal binder as often as possible.   Be cautious when standing up. Stand up slowly and make sure there are things near by to grab onto if you get lightheaded. Also, try to sit down carefully if you can in the case you get lightheaded.     When to contact your care team    Seek medical care if you experience significant lightheadedness, no urine output in the Palomares, or any new or concerning signs or symptoms.     Discharge Instructions     Follow-directions as listed above. Make sure to check out these sections: follow-up visits and medications.     Adult [facility] Follow-up and recommended labs and tests    Follow up with primary care provider, Joel Daniel Wegener, within 7 days to evaluate medication change and for hospital follow- up.  The following labs/tests are recommended: BMP to assess kidney function.    Also, follow-up with urology. They have said they would coordinate with you to schedule an appointment within one month.       Appointments on [place] and/or [facility] (with [facility] provider or service). Call [phone] if you haven't heard regarding these appointments within 7 days of discharge.     Tubes and drains    You are going home with the following tubes or drains: garcia catheter.  Tube cares per hospital or home care instructions. Please call the hospital if you have questions about how to empty the Garcia bag.     No CPR- Pre-arrest intubation OK     Diet    Follow this diet upon discharge: Eat foods that make you feel energized, nourished, and full. Bonus points if it's lots of veggies and fruits and minimal added sugar.     Discharge Medications   Current Discharge Medication List      START taking these medications    Details   carbidopa-levodopa (SINEMET CR)  MG CR tablet Take 1 tablet by mouth At Bedtime  Qty: 30 tablet, Refills: 0    Associated Diagnoses: Parkinson's disease (H)      doxazosin (CARDURA) 1 MG tablet Take 1 tablet (1 mg) by mouth At Bedtime  Qty: 30 tablet, Refills: 0    Associated Diagnoses: Benign prostatic hyperplasia with urinary obstruction      dutasteride (AVODART) 0.5 MG capsule Take 1 capsule (0.5 mg) by mouth daily  Qty: 30 capsule, Refills: 0    Associated Diagnoses: Benign prostatic hyperplasia with urinary obstruction         CONTINUE these medications which have NOT CHANGED    Details   aspirin (ASA) 81 MG tablet Take 1 tablet (81 mg) by mouth daily  Qty: 90 tablet, Refills: 3     Associated Diagnoses: Coronary artery disease involving native heart without angina pectoris, unspecified vessel or lesion type      carbidopa-levodopa (SINEMET)  MG tablet Take 2 tablets by mouth 3 times daily Take two tablets 3 times a day, at 7am, 11am & 4pm.    Associated Diagnoses: Parkinson's disease (H)      ondansetron (ZOFRAN-ODT) 4 MG ODT tab DISSOLVE 1 TAB BY MOUTH EVERY 8 HOURS AS NEEDED FOR NAUSEA      rOPINIRole (REQUIP) 0.25 MG tablet Take 0.5 mg by mouth 3 times daily       SENNA-docusate sodium (SENNA S) 8.6-50 MG tablet Take 2 tablets by mouth 2 times daily  Qty: 120 tablet, Refills: 11    Associated Diagnoses: Constipation, unspecified constipation type      simvastatin (ZOCOR) 40 MG tablet TAKE ONE TABLET BY MOUTH EVERY NIGHT AT BEDTIME  Qty: 90 tablet, Refills: 3    Associated Diagnoses: Hyperlipidemia LDL goal <100      sulfamethoxazole-trimethoprim (BACTRIM/SEPTRA) 400-80 MG tablet Take 1 tablet by mouth At Bedtime For UTI prevention  Qty: 90 tablet, Refills: 3    Associated Diagnoses: Urinary tract infection with hematuria, site unspecified      vitamin D3 (CHOLECALCIFEROL) 2000 units (50 mcg) tablet Take 1 tablet by mouth daily as needed       bisacodyl (DULCOLAX) 10 MG suppository Place 1 suppository (10 mg) rectally daily as needed for constipation  Qty: 10 suppository, Refills: 0    Associated Diagnoses: Slow transit constipation      PARoxetine (PAXIL) 20 MG tablet Take 1 tablet (20 mg) by mouth every morning  Qty: 90 tablet, Refills: 3    Associated Diagnoses: Anxiety      polyethylene glycol (MIRALAX) 17 GM/SCOOP powder Take 17 g (1 capful) by mouth 3 times daily  Qty: 1 Bottle, Refills: 11    Associated Diagnoses: Constipation, unspecified constipation type           Allergies   No Known Allergies

## 2020-09-25 NOTE — PLAN OF CARE
"Discharge instruction reviewed with patient and wife at bedside. RN educated pt and wife on garcia cares and Forms on Dem[employer] packet sent home for reference. Wife provided verbal and physical teach-back and understanding. However, wife expressed feeling overwhelmed and frustrated, did not want RN to teach how to use the leg-bag. Wife stated she would call their neighbor who is an RN for help if the leg bag is needed. RN expressed urge to call if there are any concerns or questions about the garcia. Pt sent home with supplies needed including leg bag, inventive spirometer, and abdominal binder. PIV removed, patient wheeled to the main lobby. Family awaiting at main lobby. Patient discharged with all belongings and prescriptions.    BP (!) 161/97 (BP Location: Left arm)   Pulse 78   Temp 97.7  F (36.5  C) (Oral)   Resp 18   Ht 1.727 m (5' 8\")   SpO2 97%   BMI 25.32 kg/m      "

## 2020-09-25 NOTE — PROGRESS NOTES
Social Work Services Progress Note    Hospital Day: 5  Date of Initial Social Work Evaluation:  9/28/20  Collaborated with:  PT and Obs team    Data:  SW following for discharge planning    Intervention:  Per PT, patients mobility has increased while here and the patient is now appropriate to go home with home care. Patient and wife are in agreement with this plan. Home care orders will need to be placed at discharge and home care services arranged.     SW contacted [facility] where patient was accepted to inform then the patient will no longer be discharging to TCU.     Assessment: No additional SW noted that this time. The paitent is not medically ready for discharge.     Plan:    Anticipated Disposition:  Home with services    Barriers to d/c plan:  Medical clearance    Follow Up:  SW will continue to follow for discharge needs      Addendum: Patient is discharging this evening. [facility] team paged to add homecare resumption orders for RN/PT/OT. Pt has services through agency below. Discharge orders faxed over to them.     [facility] (RN/PT/OT)  Phone: [phone]  Fax: [phone]     Korin EPPS. SW.  Clinical   [facility]    Pager: [phone] Mon-Sat 9 am - 4:30 pm  On-call/after hours pager [phone]

## 2020-09-26 NOTE — PLAN OF CARE
Physical Therapy Discharge Summary    Reason for therapy discharge:    Discharged to home with home therapy.    Progress towards therapy goal(s). See goals on Care Plan in [facility] electronic health record for goal details.  Goals not met.  Barriers to achieving goals:   discharge from facility.    Therapy recommendation(s):    Continued therapy is recommended.  Rationale/Recommendations:   PT to maximize strength, balance, activity tolerance, and mobility safety/IND.

## 2020-09-28 ENCOUNTER — TELEPHONE (OUTPATIENT)
Dept: FAMILY MEDICINE | Facility: CLINIC | Age: 80
End: 2020-09-28

## 2020-09-28 ENCOUNTER — PATIENT OUTREACH (OUTPATIENT)
Dept: CARE COORDINATION | Facility: CLINIC | Age: 80
End: 2020-09-28

## 2020-09-28 NOTE — PROGRESS NOTES
Clinic Care Coordination Contact  [facility]/Voicemail       Clinical Data: Care Coordinator Outreach  Outreach attempted x 1.  Left message on patient's voicemail with call back information and requested return call.  Plan:Care Coordinator will try to reach patient again in 1-2 business days.

## 2020-09-30 NOTE — PROGRESS NOTES
Clinic Care Coordination Contact  Community Health Worker Initial Outreach    CHW Initial Information Gathering:  Referral Source: ED Follow-Up  Preferred Hospital: [facility]  [phone]  Current living arrangement:: I live in a private home with spouse  Informal Support system:: Family, Spouse  No PCP office visit in Past Year: No  CHW Additional Questions  If ED/Hospital discharge, follow-up appointment scheduled as recommended?: Yes  MyChart active?: No    Patient accepts CC: Not at this time.    Spoke briefly with pt's wife, and she said pt has follow up appointment with PCP on Friday. Pt discharged home with home care, and they are working on getting that set up. I explained my role and how we can support pt, and she thanked me for reaching out. I gave pt's wife my contact information and encouraged them to reach out in the future if there is anything we could help with.

## 2020-09-30 NOTE — TELEPHONE ENCOUNTER
"No action needed from PCP.  SUZETTE.  JERI Carlson        Hospital/TCU/ED for chronic condition Discharge Protocol    \"Hi, my name is Aldo Padilla RN, a registered nurse, and I am calling from [facility].  I am calling to follow up and see how things are going for you after your recent emergency visit/hospital/TCU stay.\"    Tell me how you are doing now that you are home?\" I talked to pt's wife, Kristi.   Everything seems to be going down hill.  Parkinson's seems to be getting worse.  Prostate issue.    [facility]. Difficulty with forms. (She says she [facility] . She declined clinic care coord.)    Home care starts this week. They will be getting nursing, OT/ PT, HHA.  Wife just really sees thing slowly getting worse.  (She has her limits to what she can handle at home.)    PT sees urology in Nov. She feels this should be sooner, like 2 week f/u.)  Having trouble getting f/u with neuro. Still waiting to get an appt. Waiting on call back from clinic.    Leased car issue. She feels pt should not be driving. Challenge of getting rid of car without financial penalty. I did discuss [place] driving test.   If he failed driving test, would that help? PT has not driven since July 2020.    Wife has good support from [employer] friends and sister- in- law.      Discharge Instructions    \"Let's review your discharge instructions.  What is/are the follow-up recommendations?  Pt. Response: see above.    \"Has an appointment with your primary care provider been scheduled?\"   Yes. (confirm) confirmed 10/2/2020 appt with PCP.    \"When you see the provider, I would recommend that you bring your medications with you.\"    Medications    \"Tell me what changed about your medicines when you discharged?\"    Changes to chronic meds?    2 or more - Epic MTM referral needed    \"What questions do you have about your " "medications?\"    None     New diagnoses of heart failure, COPD, diabetes, or MI?    No              Post Discharge Medication Reconciliation Status: discharge medications reconciled, continue medications without change.    Was MTM referral placed (*Make sure to put transitions as reason for referral)?   No    Call Summary    \"What questions or concerns do you have about your recent visit and your follow-up care?\"     none Offered empathetic listening to primary health care provider, wife.WIfe is facing challenges to take care of pt at home. He is not accepting of his health challenges.    \"If you have questions or things don't continue to improve, we encourage you contact us through the main clinic number (give number).  Even if the clinic is not open, triage nurses are available 24/7 to help you.     We would like you to know that our clinic has extended hours (provide information).  We also have urgent care (provide details on closest location and hours/contact info)\"      \"Thank you for your time and take care!\"  JERI Carlson               "

## 2020-10-01 ENCOUNTER — MEDICAL CORRESPONDENCE (OUTPATIENT)
Dept: HEALTH INFORMATION MANAGEMENT | Facility: CLINIC | Age: 80
End: 2020-10-01

## 2020-10-02 ENCOUNTER — OFFICE VISIT (OUTPATIENT)
Dept: FAMILY MEDICINE | Facility: CLINIC | Age: 80
End: 2020-10-02
Payer: COMMERCIAL

## 2020-10-02 VITALS
RESPIRATION RATE: 19 BRPM | OXYGEN SATURATION: 97 % | SYSTOLIC BLOOD PRESSURE: 154 MMHG | HEIGHT: 68 IN | HEART RATE: 67 BPM | WEIGHT: 166 LBS | BODY MASS INDEX: 25.16 KG/M2 | DIASTOLIC BLOOD PRESSURE: 83 MMHG | TEMPERATURE: 98.1 F

## 2020-10-02 DIAGNOSIS — S32.10XD CLOSED FRACTURE OF SACRUM WITH ROUTINE HEALING, UNSPECIFIED PORTION OF SACRUM, SUBSEQUENT ENCOUNTER: Primary | ICD-10-CM

## 2020-10-02 DIAGNOSIS — N17.9 AKI (ACUTE KIDNEY INJURY) (H): ICD-10-CM

## 2020-10-02 DIAGNOSIS — G20.A1 PARKINSON'S DISEASE (H): ICD-10-CM

## 2020-10-02 DIAGNOSIS — R42 DIZZINESS: ICD-10-CM
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
## 2020-10-02 DIAGNOSIS — R33.9 URINARY RETENTION: ICD-10-CM

## 2020-10-02 DIAGNOSIS — S22.49XA CLOSED FRACTURE OF MULTIPLE RIBS, UNSPECIFIED LATERALITY, INITIAL ENCOUNTER: ICD-10-CM

## 2020-10-02 DIAGNOSIS — N39.0 RECURRENT UTI: ICD-10-CM

## 2020-10-02 LAB
ALBUMIN UR-MCNC: 30 MG/DL
APPEARANCE UR: CLEAR
BACTERIA #/AREA URNS HPF: ABNORMAL /HPF
BILIRUB UR QL STRIP: NEGATIVE
COLOR UR AUTO: YELLOW
GLUCOSE UR STRIP-MCNC: NEGATIVE MG/DL
HGB UR QL STRIP: ABNORMAL
KETONES UR STRIP-MCNC: NEGATIVE MG/DL
LEUKOCYTE ESTERASE UR QL STRIP: ABNORMAL
NITRATE UR QL: NEGATIVE
PH UR STRIP: 7.5 PH (ref 5–7)
RBC #/AREA URNS AUTO: ABNORMAL /HPF
SOURCE: ABNORMAL
SP GR UR STRIP: 1.02 (ref 1–1.03)
UROBILINOGEN UR STRIP-ACNC: 0.2 EU/DL (ref 0.2–1)
WBC #/AREA URNS AUTO: ABNORMAL /HPF

## 2020-10-02 PROCEDURE — 99495 TRANSJ CARE MGMT MOD F2F 14D: CPT | Performed by: FAMILY MEDICINE

## 2020-10-02 PROCEDURE — 81001 URINALYSIS AUTO W/SCOPE: CPT | Performed by: FAMILY MEDICINE

## 2020-10-02 PROCEDURE — 87186 SC STD MICRODIL/AGAR DIL: CPT | Performed by: FAMILY MEDICINE

## 2020-10-02 PROCEDURE — 36415 COLL VENOUS BLD VENIPUNCTURE: CPT | Performed by: FAMILY MEDICINE

## 2020-10-02 PROCEDURE — 80048 BASIC METABOLIC PNL TOTAL CA: CPT | Performed by: FAMILY MEDICINE

## 2020-10-02 PROCEDURE — 87088 URINE BACTERIA CULTURE: CPT | Performed by: FAMILY MEDICINE

## 2020-10-02 PROCEDURE — 87086 URINE CULTURE/COLONY COUNT: CPT | Performed by: FAMILY MEDICINE

## 2020-10-02 RX ORDER — PREDNISONE 10 MG/1
10 TABLET ORAL DAILY
Qty: 7 TABLET | Refills: 0 | Status: SHIPPED | OUTPATIENT
Start: 2020-10-02 | End: 2020-10-09

## 2020-10-02 ASSESSMENT — MIFFLIN-ST. JEOR: SCORE: 1429.53

## 2020-10-02 NOTE — LETTER
October 5, 2020      Vini Loya  [address] [id number]        Mr. Loya,     Your kidney function test was improved - almost back to normal.  It was down to 21% when you were in the hospital and now back to 59%.     I see you did a urine test as well.  With a catheter in the main way we diagnose bladder infections is by symptoms ( fever, change in smell of the urine, bladder pain) and the urine culture.     The catheters often get colonized so having bacteria grow on the culture is also quite common.     In any event, the urine culture just returned and grew only only organism - klebsiella so I recommend that we treat you for that.     I sent in a prescription for Augmentin to take twice daily for ten days. (a high powered penicillin.)     I will let you know as soon as I hear back from the urology team about the catheter.     Resulted Orders   Basic metabolic panel   Result Value Ref Range    Sodium 137 133 - 144 mmol/L    Potassium 4.5 3.4 - 5.3 mmol/L    Chloride 104 94 - 109 mmol/L    Carbon Dioxide 25 20 - 32 mmol/L    Anion Gap 8 3 - 14 mmol/L    Glucose 121 (H) 70 - 99 mg/dL    Urea Nitrogen 21 7 - 30 mg/dL    Creatinine 1.15 0.66 - 1.25 mg/dL    GFR Estimate 59 (L) >60 mL/min/[1.73_m2]      Comment:      Non  GFR Calc  Starting 12/18/2018, serum creatinine based estimated GFR (eGFR) will be   calculated using the Chronic Kidney Disease Epidemiology Collaboration   (CKD-EPI) equation.      GFR Estimate If Black 69 >60 mL/min/[1.73_m2]      Comment:       GFR Calc  Starting 12/18/2018, serum creatinine based estimated GFR (eGFR) will be   calculated using the Chronic Kidney Disease Epidemiology Collaboration   (CKD-EPI) equation.      Calcium 9.5 8.5 - 10.1 mg/dL   Urine Culture Aerobic Bacterial   Result Value Ref Range    Specimen Description Catheterized Urine     Culture Micro (A)      >100,000 colonies/mL  hypermucoviscous  Klebsiella pneumoniae       Culture Micro       Hypermucoviscous Klebsiella pneumoniae (hmKp) may reflect a hypervirulent strain.   *UA reflex to Microscopic   Result Value Ref Range    Color Urine Yellow     Appearance Urine Clear     Glucose Urine Negative NEG^Negative mg/dL    Bilirubin Urine Negative NEG^Negative    Ketones Urine Negative NEG^Negative mg/dL    Specific Gravity Urine 1.025 1.003 - 1.035    Blood Urine Large (A) NEG^Negative    pH Urine 7.5 (H) 5.0 - 7.0 pH    Protein Albumin Urine 30 (A) NEG^Negative mg/dL    Urobilinogen Urine 0.2 0.2 - 1.0 EU/dL    Nitrite Urine Negative NEG^Negative    Leukocyte Esterase Urine Moderate (A) NEG^Negative    Source Catheterized Urine    Urine Microscopic   Result Value Ref Range    WBC Urine 10-25 (A) OTO5^0 - 5 /HPF    RBC Urine 10-25 (A) OTO2^O - 2 /HPF    Bacteria Urine Many (A) NEG^Negative /HPF     Best, Joel Daniel Wegener, MD
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
## 2020-10-02 NOTE — LETTER
October 16, 2020      Vini Loya  [address] [address]        Dear ,    We are writing to inform you of your test results.    The augmentin should workwell based on the sensitivies of the bladder infection.  Other results were stable/normal no medication changes needed.  Joel Wegener,MD     Resulted Orders   Basic metabolic panel   Result Value Ref Range    Sodium 137 133 - 144 mmol/L    Potassium 4.5 3.4 - 5.3 mmol/L    Chloride 104 94 - 109 mmol/L    Carbon Dioxide 25 20 - 32 mmol/L    Anion Gap 8 3 - 14 mmol/L    Glucose 121 (H) 70 - 99 mg/dL    Urea Nitrogen 21 7 - 30 mg/dL    Creatinine 1.15 0.66 - 1.25 mg/dL    GFR Estimate 59 (L) >60 mL/min/[1.73_m2]      Comment:      Non  GFR Calc  Starting 12/18/2018, serum creatinine based estimated GFR (eGFR) will be   calculated using the Chronic Kidney Disease Epidemiology Collaboration   (CKD-EPI) equation.      GFR Estimate If Black 69 >60 mL/min/[1.73_m2]      Comment:       GFR Calc  Starting 12/18/2018, serum creatinine based estimated GFR (eGFR) will be   calculated using the Chronic Kidney Disease Epidemiology Collaboration   (CKD-EPI) equation.      Calcium 9.5 8.5 - 10.1 mg/dL   Urine Culture Aerobic Bacterial   Result Value Ref Range    Specimen Description Catheterized Urine     Culture Micro (A)      >100,000 colonies/mL  hypermucoviscous  Klebsiella pneumoniae      Culture Micro       Hypermucoviscous Klebsiella pneumoniae (hmKp) may reflect a hypervirulent strain.   *UA reflex to Microscopic   Result Value Ref Range    Color Urine Yellow     Appearance Urine Clear     Glucose Urine Negative NEG^Negative mg/dL    Bilirubin Urine Negative NEG^Negative    Ketones Urine Negative NEG^Negative mg/dL    Specific Gravity Urine 1.025 1.003 - 1.035    Blood Urine Large (A) NEG^Negative    pH Urine 7.5 (H) 5.0 - 7.0 pH    Protein Albumin Urine 30 (A) NEG^Negative mg/dL    Urobilinogen Urine 0.2 0.2 -  1.0 EU/dL    Nitrite Urine Negative NEG^Negative    Leukocyte Esterase Urine Moderate (A) NEG^Negative    Source Catheterized Urine    Urine Microscopic   Result Value Ref Range    WBC Urine 10-25 (A) OTO5^0 - 5 /HPF    RBC Urine 10-25 (A) OTO2^O - 2 /HPF    Bacteria Urine Many (A) NEG^Negative /HPF       If you have any questions or concerns, please call the clinic at the number listed above.       Sincerely,        Joel Daniel Wegener, MD

## 2020-10-03 ENCOUNTER — TELEPHONE (OUTPATIENT)
Dept: FAMILY MEDICINE | Facility: CLINIC | Age: 80
End: 2020-10-03

## 2020-10-03 LAB
ANION GAP SERPL CALCULATED.3IONS-SCNC: 8 MMOL/L (ref 3–14)
BUN SERPL-MCNC: 21 MG/DL (ref 7–30)
CALCIUM SERPL-MCNC: 9.5 MG/DL (ref 8.5–10.1)
CHLORIDE SERPL-SCNC: 104 MMOL/L (ref 94–109)
CO2 SERPL-SCNC: 25 MMOL/L (ref 20–32)
CREAT SERPL-MCNC: 1.15 MG/DL (ref 0.66–1.25)
GFR SERPL CREATININE-BSD FRML MDRD: 59 ML/MIN/{1.73_M2}
GLUCOSE SERPL-MCNC: 121 MG/DL (ref 70–99)
POTASSIUM SERPL-SCNC: 4.5 MMOL/L (ref 3.4–5.3)
SODIUM SERPL-SCNC: 137 MMOL/L (ref 133–144)

## 2020-10-03 NOTE — TELEPHONE ENCOUNTER
Pt's wife Ladi stopped in twice to check on lab results from yesterday's visit. She would like to know if her  needs any medication addition to his predisone. Please call pt and his wife Ladi when results are in.    Mandie BALBUENA  [facility]

## 2020-10-04 ENCOUNTER — NURSE TRIAGE (OUTPATIENT)
Dept: NURSING | Facility: CLINIC | Age: 80
End: 2020-10-04

## 2020-10-04 DIAGNOSIS — R29.6 FALLS FREQUENTLY: Chronic | ICD-10-CM

## 2020-10-04 DIAGNOSIS — S32.10XD CLOSED FRACTURE OF SACRUM WITH ROUTINE HEALING, UNSPECIFIED PORTION OF SACRUM, SUBSEQUENT ENCOUNTER: ICD-10-CM

## 2020-10-04 DIAGNOSIS — S22.49XA CLOSED FRACTURE OF MULTIPLE RIBS, UNSPECIFIED LATERALITY, INITIAL ENCOUNTER: ICD-10-CM

## 2020-10-04 DIAGNOSIS — G20.A1 PARKINSON'S DISEASE (H): Primary | Chronic | ICD-10-CM

## 2020-10-04 NOTE — TELEPHONE ENCOUNTER
He has Parkinson's and prostate problems, garcia since the 25th. Wife drains it. It's getting worse to take care of him. They were going to put him in a nursing home after he was hospitalized.  Saw Dr Oquendo on Friday. Dr Oquendo is calling on Tuesday. Is there someone that can help pick out a nursing home?  Please call his wife at [phone]. She wants to find him a nursing home. The cares are getting to be too much for her. She declines taking him to the ER this weekend.  Thank you,  Eloina Meyer RN  [facility]    Additional Information    Negative: Nursing judgment    Negative: Nursing judgment    Negative: Nursing judgment    Negative: Nursing judgment    Information only question and nurse able to answer    Protocols used: NO PROTOCOL AVAILABLE - INFORMATION ONLY-A-[place]

## 2020-10-05 ENCOUNTER — PATIENT OUTREACH (OUTPATIENT)
Dept: CARE COORDINATION | Facility: CLINIC | Age: 80
End: 2020-10-05

## 2020-10-05 LAB
BACTERIA SPEC CULT: ABNORMAL
BACTERIA SPEC CULT: ABNORMAL
SPECIMEN SOURCE: ABNORMAL

## 2020-10-05 NOTE — TELEPHONE ENCOUNTER
Care coordination - he was recently hospitalized and I saw him on Friday so we could potentially do a direct nursing home admission I believe.      I would support this.     I placed a CC referral.     Joel Wegener,MD

## 2020-10-05 NOTE — TELEPHONE ENCOUNTER
Triage - please call spouse/patient re: possible uti (see below).     Also I see there is another encounter asking about nursing home admission.     Let them know as well that I will forward that question to our care coordination team to assist.     Joel Wegener,MD    Mr. Loya,     Your kidney function test was improved - almost back to normal.  It was down to 21% when you were in the hospital and now back to 59%.     I see you did a urine test as well.  With a catheter in the main way we diagnose bladder infections is by symptoms ( fever, change in smell of the urine, bladder pain) and the urine culture.     The catheters often get colonized so having bacteria grow on the culture is also quite common.     In any event, the urine culture just returned and grew only only organism - klebsiella so I recommend that we treat you for that.     I sent in a prescription for Augmentin to take twice daily for ten days. (a high powered penicillin.)     I will let you know as soon as I hear back from the urology team about the catheter.     Best, Joel Wegener,MD

## 2020-10-05 NOTE — PROGRESS NOTES
Clinic Care Coordination Contact  [facility]/Voicemail       Clinical Data: Care Coordinator Outreach  Outreach attempted x 1.  Left message on patient's voicemail with call back information and requested return call.  Plan: Care Coordinator will try to reach patient again in 1-2 business days.

## 2020-10-06 ENCOUNTER — VIRTUAL VISIT (OUTPATIENT)
Dept: FAMILY MEDICINE | Facility: CLINIC | Age: 80
End: 2020-10-06
Payer: COMMERCIAL

## 2020-10-06 DIAGNOSIS — R94.4 DECREASED GFR: ICD-10-CM

## 2020-10-06 DIAGNOSIS — N39.0 RECURRENT UTI: ICD-10-CM

## 2020-10-06 DIAGNOSIS — S32.10XD CLOSED FRACTURE OF SACRUM WITH ROUTINE HEALING, UNSPECIFIED PORTION OF SACRUM, SUBSEQUENT ENCOUNTER: ICD-10-CM

## 2020-10-06 DIAGNOSIS — N13.8 BENIGN PROSTATIC HYPERPLASIA WITH URINARY OBSTRUCTION: Chronic | ICD-10-CM

## 2020-10-06 DIAGNOSIS — R42 DIZZINESS: ICD-10-CM

## 2020-10-06 DIAGNOSIS — S22.49XA CLOSED FRACTURE OF MULTIPLE RIBS, UNSPECIFIED LATERALITY, INITIAL ENCOUNTER: ICD-10-CM

## 2020-10-06 DIAGNOSIS — R33.9 URINARY RETENTION: Primary | ICD-10-CM

## 2020-10-06 DIAGNOSIS — N40.1 BENIGN PROSTATIC HYPERPLASIA WITH URINARY OBSTRUCTION: Chronic | ICD-10-CM

## 2020-10-06 PROCEDURE — 99442 PR PHYSICIAN TELEPHONE EVALUATION 11-20 MIN: CPT | Mod: 95 | Performed by: FAMILY MEDICINE

## 2020-10-06 RX ORDER — DUTASTERIDE 0.5 MG/1
0.5 CAPSULE, LIQUID FILLED ORAL DAILY
Qty: 90 CAPSULE | Refills: 3 | Status: SHIPPED | OUTPATIENT
Start: 2020-10-06 | End: 2021-04-01

## 2020-10-06 RX ORDER — DOXAZOSIN 1 MG/1
1 TABLET ORAL AT BEDTIME
Qty: 90 TABLET | Refills: 3 | Status: SHIPPED | OUTPATIENT
Start: 2020-10-06 | End: 2021-01-14

## 2020-10-06 NOTE — TELEPHONE ENCOUNTER
Care coordination.  Mr. Loya and spouse report home care nurse not knowledgeable about long-term care options/process.  Please assist.     Joel Wegener,MD

## 2020-10-06 NOTE — PROGRESS NOTES
"Clinic Care Coordination Contact  Community Health Worker Initial Outreach    CHW Initial Information Gathering:  Referral Source: PCP  Preferred Hospital: [facility]  [phone]  Current living arrangement:: I live in a private home with spouse  Community Resources: Home Care  Supplies used at home:: Other(garcia catheter)  Equipment Currently Used at Home: walker, standard  Informal Support system:: Children, Family, Neighbors, Spouse  No PCP office visit in Past Year: No  Transportation means:: Family  CHW Additional Questions  If ED/Hospital discharge, follow-up appointment scheduled as recommended?: Yes  MyChart active?: No    Patient accepts CC: To be determined. CHW will call pt's wife back tomorrow to check in. Pt's wife will speak with homecare RN today about nursing home placement and if/how homecare can assist. Pt and his wife have phone visit with PCP at 1 pm today. If homecare cannot assist, CHW will schedule time for pt and pt's wife to speak with CC SW.    Called and spoke with Kristi, pt's wife. She said pt's Parkinson's is getting worse and \"taking over his entire body.\"  Her biggest current stressor is draining pt's garcia catheter and caring for it. She said when pt was in the hospital, the urologist had to leave unexpectedly for an emergency and didn't come back to finish explaining what Kristi needed to do with the catheter. Kristi would like to speak with pt's PCP to see if he heard back from the urologist's office about getting a sooner appointment this afternoon.     According to Kristi, pt was offered a room at [facility] but they canceled it. Kristi said pt has been to the hospital four times since July. He stayed in a nursing home for two weeks after one of his hospitalizations, and she said it was \"really tough for him.\" She and their children have been speaking with pt about their wishes and that they want a higher level of " "care for him. Pt's family feels like he is \"tuning them out\" and that he is overwhelmed with everything going on. Pt's wife said she is overwhelmed by the situation and she's worried to leave the house because he might fall again. She also validated that they're concerned about his risk of COVID with going into a nursing home.     Confirmed with Kristi that home care is currently involved. She said he went to [facility] to recover and was discharged home with home care. She said the homecare nurse is scheduled to come over to their house today at 10 am. The OT came yesterday. I asked if she's talked with the nurse about their desire to find a nursing home for pt. She said that she hasn't really, and I encouraged her to ask today. She said she would. I offered to call back tomorrow to follow up and see what home care can do, and if they can't assist I offered CC's support. I let Kristi know that I can set up a time for the SW to call and for them to discuss next steps. Pt's wife said she has a long list of nursing homes that they got when pt was in the hospital.    I offered to send my note to pt's PCP, and she agreed. Confirmed plan with pt's wife that I will call back tomorrow to debrief with her. Reiterated that we are here to support pt and Kristi. Acknowledged everything she is doing to care for her  and to manage this situation. She appreciated the call and took down my name and number.             "
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
## 2020-10-07 ENCOUNTER — PATIENT OUTREACH (OUTPATIENT)
Dept: CARE COORDINATION | Facility: CLINIC | Age: 80
End: 2020-10-07

## 2020-10-07 NOTE — PROGRESS NOTES
"Clinic Care Coordination Contact  Community Health Worker Initial Outreach    CHW Initial Information Gathering:  Referral Source: PCP  Preferred Hospital: [facility]  [phone]  Current living arrangement:: I live in a private home with spouse  Community Resources: Home Care  Supplies used at home:: (Garcia catheter)  Equipment Currently Used at Home: walker, standard  Informal Support system:: Children, Family, Neighbors, Spouse  No PCP office visit in Past Year: No  Transportation means:: Family  CHW Additional Questions  If ED/Hospital discharge, follow-up appointment scheduled as recommended?: N/A  Medication changes made following ED/Hospital discharge?: N/A  MyChart active?: No    Patient accepts CC: Yes. Patient scheduled for assessment with CC TAURUS Gutierrez on Friday, October 9th at 9 am. Patient noted desire to discuss resources/support to find a nursing home for pt.     Called pt's wife Kristi to follow up. I let her know that Dr. Wegener sent me a note after their call yesterday afternoon to ask that we assist pt and his family. Kristi said she did ask the home care nurse if they could help with the nursing home placement, and she said they said no and suggested she work with us. Pt's wife said they figured out how to put the pt's catheter bag on his leg so he has more freedom to move around the house today, which Kristi said has \"lifted his spirits and mood.\" She also said that PCP did talk with them about the garcia catheter and that the urologist would like it to stay in until pt's appointment on 11/3. Kristi thanked me for calling back and said it will be good to speak with the CC SW about this process when it isn't a crisis.             " 27 y/o female with no pmhx presents to ED c/o pelvic pain x 4 days. Intermittent pain, associated with nausea, worse with urinating (denies burning with urination). 5/10 pain, non-radiating. States 1/11/19 at 36 weeks pregnant had an US that showed no HR, pt had a vaginal delivery to stillborn 1/12/19 at [facility] with Dr. Rios. Pt took Ibuprofen 800mg with no relief. No fever, chills, cp, sob, cough, vomiting or diarrhea, hematuria, polyuria, weakness, numbness. 25 y/o female with no pmhx presents to ED c/o pelvic pain x 4 days. Intermittent pain, associated with nausea, worse with urinating (denies burning with urination). Associated with light vaginal bleeding, bleeding has lightened since birth. 5/10 pain, non-radiating. States 1/11/19 at 36 weeks pregnant had an US that showed no HR, pt had a vaginal delivery to stillborn 1/12/19 at [facility] with Dr. Rios. Pt took Ibuprofen 800mg with no relief. No fever, chills, cp, sob, cough, vomiting or diarrhea, hematuria, polyuria, weakness, numbness. 25 y/o female with no pmhx presents to ED c/o pelvic pain x 4 days. Intermittent pain, associated with nausea, worse with urinating (denies burning with urination). Associated with light vaginal bleeding, bleeding has lightened since birth. 5/10 pain, non-radiating. States 1/11/19 at 36 weeks pregnant had an US that showed no HR, pt had a vaginal delivery (s/p episiotomy) with stillborn birth 1/12/19 at [facility] with Dr. Rios. Pt took Ibuprofen 800mg with no relief. No fever, chills, cp, sob, cough, vomiting or diarrhea, hematuria, polyuria, weakness, numbness.

## 2020-10-09 ENCOUNTER — PATIENT OUTREACH (OUTPATIENT)
Dept: NURSING | Facility: CLINIC | Age: 80
End: 2020-10-09
Payer: COMMERCIAL

## 2020-10-09 NOTE — PROGRESS NOTES
Clinic Care Coordination Contact    Clinic Care Coordination Contact  OUTREACH    Referral Information:  Referral Source: PCP    Primary Diagnosis: Psychosocial    Chief Complaint   Patient presents with     Clinic Care Coordination - Initial             [place] Utilization: [facility]   Clinic Utilization  Difficulty keeping appointments:: No  Compliance Concerns: No  No-Show Concerns: No  No PCP office visit in Past Year: No  Utilization    Last refreshed: 10/9/2020  1:40 PM: Hospital Admissions 3           Last refreshed: 10/9/2020  1:40 PM: ED Visits 4           Last refreshed: 10/9/2020  1:40 PM: No Show Count (past year) 2              Current as of: 10/9/2020  1:40 PM              Clinical Concerns:  Current Medical Concerns:  Pt's wife discussed that pt's parkinson's continues to get worse. Pt's wife said that her  is now using a walker, he gets rather confused from time to time. Pt's wife stated they have a home care nurse that comes a few times a week, but she feels it may benefit her to have more help during the week. Pt's wife stated she is helping pt to do things around the house. He is not really interested in doing things outdoors since he has to go down a few steps to get out the front door, otherwise everything was on the first floor. Pt's wife stated it would be great if she could have someone come in every few days and help her  bath. Pt's wife is looking for resources that will help with this.     Current Behavioral Concerns: P's discussed increased in worry, confusion and frustration as his parkinson's continues to get worse.     Education Provided to patient: SW'er talked with pt's wife about different possible resources that would be able to help her  around the house. SW'er suggested using care.com to look for different resources on getting care in the home. SW'er also will be looking into different resources that pt may be able to utilize with the [facility] and will  be sending them to pt's wife by email.     Pain  Pain (GOAL):: No  Health Maintenance Reviewed: Due/Overdue   Health Maintenance Due   Topic Date Due     ZOSTER IMMUNIZATION (3 of 3) 2019     PHQ-9  2020       Clinical Pathway: None    Medication Management:  Pt's wife manages setting up his medications for him weekly in a pill box.      Functional Status:  Dependent ADLs:: Bathing, Dressing, Toileting  Dependent IADLs:: Cleaning, Medication Management, Shopping, Laundry, Cooking, Transportation, Meal Preparation  Bed or wheelchair confined:: No  Mobility Status: Independent w/Device  Fallen 2 or more times in the past year?: No  Any fall with injury in the past year?: No    Living Situation:  Current living arrangement:: I live in a private home with spouse  Type of residence:: Private home - [facility]    Lifestyle & Psychosocial Needs:        Diet:: Regular  Inadequate nutrition (GOAL):: No  Tube Feeding: No  Inadequate activity/exercise (GOAL):: No  Significant changes in sleep pattern (GOAL): No  Transportation means:: Family     Informal Support system:: Children, Family, Neighbors, Spouse   Socioeconomic History     Marital status:      Spouse name: Kristi     Number of children: 3     Years of education: Vocational     Highest education level: Not on file   Occupational History     Occupation:      Employer: RETIRED     Comment: Was      Tobacco Use     Smoking status: Former Smoker     Packs/day: 0.50     Years: 3.00     Pack years: 1.50     Types: Cigarettes     Start date: 1960     Quit date: 3/14/1966     Years since quittin.6     Smokeless tobacco: Former User   Substance and Sexual Activity     Alcohol use: No     Comment: occasional wine on the holidays      Drug use: No     Sexual activity: Yes     Partners: Female               Resources and Interventions:  Current Resources:   List of home care services:: Skilled Nursing  Community Resources: Home  Care  Supplies used at home:: Incontinence Supplies(Palomares catheter)  Equipment Currently Used at Home: walker, standard    Advance Care Plan/Directive  Advanced Care Plans/Directives on file:: No  Advanced Care Plan/Directive Status: Considering Options          Goals:   Goals        General    Medical- [facility] (pt-stated)     Notes - Note edited  10/9/2020  1:52 PM by Kajal Gutierrez BSW    Goal Statement: I would like to see what benefits my  has from the [facility] in the next two months.   Date Goal set: 10/9/2020  Barriers: unsure how to go about this   Strengths: asking for help  Date to Achieve By: 12/9/2020  Patient expressed understanding of goal: yes  Action steps to achieve this goal:  1. I will look over the resources sw'er provided me from the email.   2. I will contact the [facility] to see if I can find out his benefits.           Other- Extra support (pt-stated)           Patient/Caregiver understanding: yes        Future Appointments              In 1 week [facility]    In 1 week Billie Abdi PA-C [facility]    In 1 week Dalila Staples APRN CNP [facility]    In 3 weeks [facility]    In 3 weeks Evelyn Hairston PA [facility]    In 3 months Gaurav Ford MD [facility]          Plan: 1) pt's wife will look into resources of different care providers on [employer].   2) SW will send CC intro and complex care plan.   3) SW will send an email to pt's wife with resources to care.com, the number to the [facility] for explanation of benefits.   4) CHW will follow up in 30 days.   5) SW will follow up in 45 days.     MICH Anderson  Clinic Care Coordinator   [facility]  [phone]

## 2020-10-16 ENCOUNTER — MEDICAL CORRESPONDENCE (OUTPATIENT)
Dept: HEALTH INFORMATION MANAGEMENT | Facility: CLINIC | Age: 80
End: 2020-10-16

## 2020-10-16 ENCOUNTER — OFFICE VISIT (OUTPATIENT)
Dept: NEUROLOGY | Facility: CLINIC | Age: 80
End: 2020-10-16
Payer: COMMERCIAL

## 2020-10-16 VITALS
HEART RATE: 68 BPM | WEIGHT: 170 LBS | RESPIRATION RATE: 16 BRPM | HEIGHT: 68 IN | OXYGEN SATURATION: 96 % | BODY MASS INDEX: 25.76 KG/M2 | DIASTOLIC BLOOD PRESSURE: 77 MMHG | SYSTOLIC BLOOD PRESSURE: 156 MMHG

## 2020-10-16 DIAGNOSIS — N39.0 RECURRENT UTI: ICD-10-CM

## 2020-10-16 DIAGNOSIS — R29.6 FALLS FREQUENTLY: ICD-10-CM

## 2020-10-16 DIAGNOSIS — R94.4 DECREASED GFR: ICD-10-CM

## 2020-10-16 DIAGNOSIS — R26.9 GAIT DIFFICULTY: ICD-10-CM

## 2020-10-16 DIAGNOSIS — R42 DIZZINESS: Primary | ICD-10-CM

## 2020-10-16 DIAGNOSIS — G20.A1 PARKINSON'S DISEASE (H): Chronic | ICD-10-CM

## 2020-10-16 LAB
ALBUMIN UR-MCNC: 30 MG/DL
ANION GAP SERPL CALCULATED.3IONS-SCNC: 5 MMOL/L (ref 3–14)
APPEARANCE UR: ABNORMAL
BACTERIA #/AREA URNS HPF: ABNORMAL /HPF
BILIRUB UR QL STRIP: NEGATIVE
BUN SERPL-MCNC: 21 MG/DL (ref 7–30)
CALCIUM SERPL-MCNC: 9.5 MG/DL (ref 8.5–10.1)
CHLORIDE SERPL-SCNC: 108 MMOL/L (ref 94–109)
CO2 SERPL-SCNC: 27 MMOL/L (ref 20–32)
COLOR UR AUTO: YELLOW
CREAT SERPL-MCNC: 0.98 MG/DL (ref 0.66–1.25)
GFR SERPL CREATININE-BSD FRML MDRD: 72 ML/MIN/{1.73_M2}
GLUCOSE SERPL-MCNC: 85 MG/DL (ref 70–99)
GLUCOSE UR STRIP-MCNC: NEGATIVE MG/DL
HGB UR QL STRIP: ABNORMAL
KETONES UR STRIP-MCNC: ABNORMAL MG/DL
LEUKOCYTE ESTERASE UR QL STRIP: NEGATIVE
NITRATE UR QL: NEGATIVE
NON-SQ EPI CELLS #/AREA URNS LPF: ABNORMAL /LPF
PH UR STRIP: 6 PH (ref 5–7)
POTASSIUM SERPL-SCNC: 4.3 MMOL/L (ref 3.4–5.3)
RBC #/AREA URNS AUTO: ABNORMAL /HPF
SODIUM SERPL-SCNC: 140 MMOL/L (ref 133–144)
SOURCE: ABNORMAL
SP GR UR STRIP: >1.03 (ref 1–1.03)
UROBILINOGEN UR STRIP-ACNC: 0.2 EU/DL (ref 0.2–1)
WBC #/AREA URNS AUTO: ABNORMAL /HPF

## 2020-10-16 PROCEDURE — 87086 URINE CULTURE/COLONY COUNT: CPT | Performed by: FAMILY MEDICINE

## 2020-10-16 PROCEDURE — 80048 BASIC METABOLIC PNL TOTAL CA: CPT | Performed by: FAMILY MEDICINE

## 2020-10-16 PROCEDURE — 36415 COLL VENOUS BLD VENIPUNCTURE: CPT | Performed by: FAMILY MEDICINE

## 2020-10-16 PROCEDURE — 81001 URINALYSIS AUTO W/SCOPE: CPT | Performed by: FAMILY MEDICINE

## 2020-10-16 PROCEDURE — 99215 OFFICE O/P EST HI 40 MIN: CPT | Performed by: NURSE PRACTITIONER

## 2020-10-16 RX ORDER — CARBIDOPA AND LEVODOPA 50; 200 MG/1; MG/1
1 TABLET, EXTENDED RELEASE ORAL AT BEDTIME
Qty: 90 TABLET | Refills: 3 | Status: SHIPPED | OUTPATIENT
Start: 2020-10-16 | End: 2021-04-01

## 2020-10-16 RX ORDER — CARBIDOPA AND LEVODOPA 25; 100 MG/1; MG/1
2 TABLET ORAL 3 TIMES DAILY
Qty: 540 TABLET | Refills: 3 | Status: SHIPPED | OUTPATIENT
Start: 2020-10-16 | End: 2021-07-23

## 2020-10-16 ASSESSMENT — UNIFIED PARKINSONS DISEASE RATING SCALE (UPDRS)
FACIAL_EXPRESSION: MODERATE: MASKED FACIES WITH LIPS PARTED SOME OF THE TIME WHEN THE MOUTH IS AT REST.
AXIAL_SCORE: 19
ARISING_CHAIR: SLIGHT: ARISING IS SLOWER THAN NORMAL, OR MAY NEED MORE THAN ONE ATTEMPT, OR MAY NEED TO MOVE FORWARD IN THE CHAIR TO ARISE.  NO NEED TO USE THE ARMS OF THE CHAIR.
AMPLITUDE_LIP_JAW: MODERATE: > 2 CM BUT < 3 CM IN MAXIMAL AMPLITUDE.
HANDMOVEMENTS_RIGHT: SLIGHT: ANY OF THE FOLLOWING: A) THE REGULAR RHYTHM IS BROKEN WITH ONE WITH ONE OR TWO INTERRUPTIONS OR HESITATIONS OF THE MOVEMENT B) SLIGHT SLOWING C) THE AMPLITUDE DECREMENTS NEAR THE END OF THE 10 MOVEMENTS.
RIGIDITY_RUE: MILD: RIGIDITY DETECTED WITHOUT THE ACTIVATION MANEUVER.  FULL RANGE OF MOTION IS EASILY ACHIEVED.
TOETAPPING_RIGHT: NORMAL
AMPLITUDE_LUE: MILD > 1 CM BUT < 3 CM IN MAXIMAL AMPLITUDE.
SPONTANEITY_OF_MOVEMENT: 2: MILD: MILD GLOBAL SLOWNESS AND POVERTY OF SPONTANEOUS MOVEMENTS.
MOVEMENTS_INTERFERE_WITH_RATINGS: NO
FREEZING_GAIT: NORMAL
RIGIDITY_NECK: MILD: RIGIDITY DETECTED WITHOUT THE ACTIVATION MANEUVER.  FULL RANGE OF MOTION IS EASILY ACHIEVED.
SPEECH: MILD: LOSS OF MODULATION, DICTION OR VOLUME, WITH A FEW WORDS UNCLEAR, BUT THE OVERALL SENTENCES EASY TO FOLLOW.
TOTAL_SCORE: 51
LEG_AGILITY_LEFT: SLIGHT: ANY OF THE FOLLOWING: A) THE REGULAR RHYTHM IS BROKEN WITH ONE WITH ONE OR TWO INTERRUPTIONS OR HESITATIONS OF THE MOVEMENT B) SLIGHT SLOWING C) THE AMPLITUDE DECREMENTS NEAR THE END OF THE 10 MOVEMENTS.
FINGER_TAPPING_RIGHT: MILD: ANY OF THE FOLLOWING: A) 3 TO 5 INTERRUPTIONS DURING TAPPING B) MILD SLOWING C) THE AMPLITUDE DECREMENTS MIDWAY IN THE 10-MOVEMENT SEQUENCE
GAIT: MODERATE: REQUIRES AN ASSISTANCE DEVICE FOR SAFE WALKING (WALKING STICK, WALKER) BUT NOT A PERSON.
PARKINSONS_MEDS: ON
LEG_AGILITY_RIGHT: SLIGHT: ANY OF THE FOLLOWING: A) THE REGULAR RHYTHM IS BROKEN WITH ONE WITH ONE OR TWO INTERRUPTIONS OR HESITATIONS OF THE MOVEMENT B) SLIGHT SLOWING C) THE AMPLITUDE DECREMENTS NEAR THE END OF THE 10 MOVEMENTS.
POSTURAL_STABILITY: MODERATE: STANDS SAFELY, BUT WITH ABSENCE OF POSTURAL RESPONSE,  FALLS IF NOT CAUGHT BY EXAMINER.
DYSKINESIAS_PRESENT: NO
PRONATION_SUPINATION_RIGHT: SLIGHT: ANY OF THE FOLLOWING: A) THE REGULAR RHYTHM IS BROKEN WITH ONE WITH ONE OR TWO INTERRUPTIONS OR HESITATIONS OF THE MOVEMENT B) SLIGHT SLOWING C) THE AMPLITUDE DECREMENTS NEAR THE END OF THE 10 MOVEMENTS.
FINGER_TAPPING_LEFT: MODERATE: ANY OF THE FOLLOWING:  A) MORE THAN 5 INTERRUPTIONS  OR AT LEAST ONE LONGER ARREST (FREEZE) IN ONGOING MOVEMENT  B) MODERATE SLOWING C) THE AMPLITUDE DECREMENTS STARTING AFTER THE FIRST MOVEMENT.
AMPLITUDE_RLE: NORMAL: NO TREMOR.
RIGIDITY_RLE: MILD: RIGIDITY DETECTED WITHOUT THE ACTIVATION MANEUVER.  FULL RANGE OF MOTION IS EASILY ACHIEVED.
POSTURE: 3 MODERATE.  STOOPED POSTURE, SCOLIOSIS, OR LEANING TO ONE SIDE THAT CANNOT BE CORRECTED VOLITIONALLY TO A NORMAL POSTURE BY THE PATIENT.
RIGIDITY_LLE: MILD: RIGIDITY DETECTED WITHOUT THE ACTIVATION MANEUVER.  FULL RANGE OF MOTION IS EASILY ACHIEVED.
HANDMOVEMENTS_LEFT: MILD: ANY OF THE FOLLOWING: A) 3 TO 5 INTERRUPTIONS DURING TAPPING B) MILD SLOWING C) THE AMPLITUDE DECREMENTS MIDWAY IN THE 10-MOVEMENT SEQUENCE
AMPLITUDE_RUE: NORMAL: NO TREMOR.
CONSTANCY_TREMOR_ATREST: SEVERE: TREMOR AT REST IS PRESENT > 75% OF THE ENTIRE EXAMINATION PERIOD.
TOTAL_SCORE: 9
AMPLITUDE_LLE: NORMAL: NO TREMOR.
RIGIDITY_LUE: MODERATE: RIGIDITY DETECTED WITHOUT THE ACTIVATION MANEUVER. FULL RANGE OF MOTION IS ACHIEVED WITH EFFORT.
TOTAL_SCORE_LEFT: 16
PRONATION_SUPINATION_LEFT: SLIGHT: ANY OF THE FOLLOWING: A) THE REGULAR RHYTHM IS BROKEN WITH ONE WITH ONE OR TWO INTERRUPTIONS OR HESITATIONS OF THE MOVEMENT B) SLIGHT SLOWING C) THE AMPLITUDE DECREMENTS NEAR THE END OF THE 10 MOVEMENTS.
TOETAPPING_LEFT: SLIGHT: ANY OF THE FOLLOWING: A) THE REGULAR RHYTHM IS BROKEN WITH ONE WITH ONE OR TWO INTERRUPTIONS OR HESITATIONS OF THE MOVEMENT B) SLIGHT SLOWING C) THE AMPLITUDE DECREMENTS NEAR THE END OF THE 10 MOVEMENTS.

## 2020-10-16 ASSESSMENT — MIFFLIN-ST. JEOR: SCORE: 1447.67

## 2020-10-16 ASSESSMENT — PAIN SCALES - GENERAL: PAINLEVEL: NO PAIN (0)

## 2020-10-16 NOTE — PATIENT INSTRUCTIONS
"October 16, 2020    Dear Mr. Vini Loya,    Thank you for coming today.  During your visit, we have discussed the following:     __  Stay on the same antiparkinsonian medications.     PD Medications 7 am 11 am 4 pm 8 - 9 pm   Sinemet 25/100 mg  2 2 2    Sinemet 50/200 mg    1   Ropinirole 0.25 mg 1 1 1      __  Continue to walk daily.    __ Use a walker all the time when walking to prevent falls.      __  When needed, PT is available.     __  Continue to follow up with the Urologist and Cardiologist.     __  Since your falls are due to balance problems associated with Parkinson's disease and not due to dizziness, I agree with your cardiologist and will not treat the dizziness.    __  Continue to increase fluids and stand up slowly to prevent dizziness.  When you feel dizzy, sit down and wait until it passes and try to stand up slowly.      __  Your clinic blood pressure is high, Blood pressure (!) 156/77, pulse 68, resp. rate 16, height 1.715 m (5' 7.5\"), weight 77.1 kg (170 lb), SpO2 96 %.  Please check your blood pressure at home and if it is consistently >140/80, contact your primary care provider.     __  Return in 3 months. You may return sooner as needed.      For questions, you may send us a [place] message or call [phone]    Fax number: [phone]    KOTA Stone, CNP  [facility]        "

## 2020-10-16 NOTE — Clinical Note
10/16/2020       RE: Vini Loya  [address]  [facility] [address]     Dear Colleague,    Thank you for referring your patient, Vini Loya, to the [facility] [place] [facility]. Please see a copy of my visit note below.    MOVEMENT DISORDERS CLINIC    PATIENT: Vini Loya    : 1940    ALBINA: 2020    REASON FOR VISIT: Frequent falls and follow up for Parkinson's disease (PD).    HPI: Mr. Vini Loya is an 79 year old *** handed *** male who came to the [facility] neurology clinic accompanied by his *** for a follow up visit.  He was last seen in the clinic on *** by *** for ***.  During that visit, the following were discussed: -      He was in the hospital due a falling from stairs.  He sustained closed fracture of multiple ribs and closed fracture of sacrum, unspecified portion of sacrum, initial encounter (H).  He slipped going down stairs. He had socks    I saw him on 2020.  Since then, he was in the hospital on 2020; 2020; 2020; and 2020.  Last admission was a month ago.     Date of Admission:  2020  Date of Discharge:  2020    He had rehab [facility] for 2 weeks after his discharge in .      About a week ago, he was seen by OT for a home assessment and cognitive assessment.  No new recommendation.  The agreement was for his wife to help him with the exercises and contact PT if she thinks that the needs more help.      Patient denies memory problems.  His wife reports that she notices some problems.  His wife uses reminders to help him remember appointment.     PD Medications 7 am 11 am 4 pm 8 - 9 pm   Sinemet 25/100 mg  2 2 2    Sinemet 50/200 mg    1   Ropinirole 0.25 mg 1 1 1      He is walking about 1 hour per day.  He uses a walker when he walks around the block. He has a 4-wheeled walker that has a seat when he gets tired.     He was seen by cardiologist in  "July.  No new medication recommended.  Next visit is on January 2021.     He was discharge with a urinary cathter.  He has a urology appointment on November 3rd.     He sleeps He takes a nap twice a day    MEDICATIONS:   Outpatient Medications Marked as Taking for the 10/16/20 encounter (Office Visit) with Dalila Staples APRN CNP   Medication Sig     aspirin (ASA) 81 MG tablet Take 1 tablet (81 mg) by mouth daily     bisacodyl (DULCOLAX) 10 MG suppository Place 1 suppository (10 mg) rectally daily as needed for constipation     carbidopa-levodopa (SINEMET CR)  MG CR tablet Take 1 tablet by mouth At Bedtime     carbidopa-levodopa (SINEMET)  MG tablet Take 2 tablets by mouth 3 times daily Take two tablets 3 times a day, at 7am, 11am & 4pm.     doxazosin (CARDURA) 1 MG tablet Take 1 tablet (1 mg) by mouth At Bedtime     dutasteride (AVODART) 0.5 MG capsule Take 1 capsule (0.5 mg) by mouth daily     ondansetron (ZOFRAN-ODT) 4 MG ODT tab DISSOLVE 1 TAB BY MOUTH EVERY 8 HOURS AS NEEDED FOR NAUSEA     PARoxetine (PAXIL) 20 MG tablet Take 1 tablet (20 mg) by mouth every morning     polyethylene glycol (MIRALAX) 17 GM/SCOOP powder Take 17 g (1 capful) by mouth 3 times daily (Patient taking differently: Take 1 capful by mouth 3 times daily as needed for constipation )     rOPINIRole (REQUIP) 0.25 MG tablet Take 0.5 mg by mouth 3 times daily      SENNA-docusate sodium (SENNA S) 8.6-50 MG tablet Take 2 tablets by mouth 2 times daily     simvastatin (ZOCOR) 40 MG tablet TAKE ONE TABLET BY MOUTH EVERY NIGHT AT BEDTIME     vitamin D3 (CHOLECALCIFEROL) 2000 units (50 mcg) tablet Take 1 tablet by mouth daily as needed        ALLERGIES: Patient has no known allergies.    PHYSICAL EXAM:    VITAL SIGNS:  Blood pressure (!) 159/81, pulse 67, resp. rate 15, height 1.715 m (5' 7.5\"), weight 77.1 kg (170 lb), SpO2 99 %., Body mass index is 26.23 kg/m .    Repeat Vital Signs:  Blood pressure (!) 156/77, pulse 68, resp. " "rate 16, height 1.715 m (5' 7.5\"), weight 77.1 kg (170 lb), SpO2 96 %.       GENERAL:  Mr. Loya is a pleasant *** male who is {General:[id number]} sitting comfortably in the exam room without any distress.  Affect is appropriate.    MOVEMENT DISORDERS ASSESSMENT:  MDS UPDRS III (Last Sinemet was about 3 hrs ago)  UPDRS Values 10/16/2020   Time: 2:26 PM   Medication On   R Brain DBS: None   L Brain DBS: None   Dyskinesia (LID) No   Did LID interfere No   Speech 2   Facial Expression 3   Rigidity Neck 2   Rigidity RUE 2   Rigidity LUE 3   Rigidity RLE 2   Rigidity LLE 2   Finger Taps R 2   Finger Taps L 3   Hand Mvt R 1   Hand Mvt L 2   Pron-/Supinate R 1   Pron-/Supinate L 1   Toe Tap R 0   Toe Tap L 1   Leg Agility R 1   Leg Agility L 1   Arise From Chair 1   Gait 3   Gait Freezing 0   Postural Stability 3   Posture 3   Global Spont Mvt 2   Postural Tremor RUE 0   Postural Tremor LUE 1   Kinetic Tremor RUE 0   Kinetic Tremor LUE 0   Rest Tremor RUE 0   Rest Tremor LUE 2   Rest Tremor RLE 0   Rest Tremor LLE 0   Rest Tremor Lip/Jaw 3   Rest Tremor Constancy 4   Total Right 9   Total Left 16   Axial Total 19   Total 51       ASSESSMENT:    1)  Parkinson's Disease:  Progressing.  Having     2)  ***:      3)  ***:      4)  ***:       PLAN:    __  Clinic High Blood Pressure Reading:  Today's Blood pressure (!) 156/77, pulse 68, resp. rate 16, height 1.715 m (5' 7.5\"), weight 77.1 kg (170 lb), SpO2 96 %..      __  Due to high BP in the clinic, pt was asked to check it at home.  If BP is consistently over 140/80, pt was instructed to contact his PCP.       __  Stay on the same antiparkinsonian medications.     PD Medications 7 am 11 am 4 pm 8 - 9 pm   Sinemet 25/100 mg  2 2 2    Sinemet 50/200 mg    1   Ropinirole 0.25 mg 1 1 1      __  Continue to walk daily.    __ Use a walker all the time when walking to prevent falls.      __  When needed, PT is available.     __  Continue to follow up with the Urologist and " "Cardiologist.     __  Since your falls are due to balance problems associated with Parkinson's disease and not due to dizziness, I agree with your cardiologist and will not treat the dizziness.    __  Continue to increase fluids and stand up slowly to prevent dizziness.  When you feel dizzy, sit down and wait until it passes and try to stand up slowly.      __  Your clinic blood pressure is high, Blood pressure (!) 156/77, pulse 68, resp. rate 16, height 1.715 m (5' 7.5\"), weight 77.1 kg (170 lb), SpO2 96 %.  Please check your blood pressure at home and if it is consistently >140/80, contact your primary care provider.     __  Return in 3 months. You may return sooner as needed.    Will return to our clinic in *** months or sooner as needed.    The total time spent with the patient was *** minutes, and greater than 50% of this time was spent in counseling and coordination of care.    KOTA Stone,  CNP  [facility]rology [facility]    1:55 PM  2:51 PM        Again, thank you for allowing me to participate in the care of your patient.      Sincerely,    KOTA Carpenter CNP    "

## 2020-10-16 NOTE — PROGRESS NOTES
MOVEMENT DISORDERS CLINIC    PATIENT: Vini Loya    : 1940    ALBINA: 2020    REASON FOR VISIT: Frequent falls and follow up for Parkinson's disease (PD).    HPI: Mr. Vini Loya is an 79 year old  male who came to the [facility] neurology clinic accompanied by his wife for a follow up visit.    He was in the hospital due to a fall.  He slipped going down stairs.  He had socks that didn't .  He sustained closed fracture of multiple ribs and closed fracture of sacrum.  He has been having festinating and freezing of gait affecting his balance.  He has had other falls.      I saw him on 2020.  Since then, he was in the hospital on 2020; 2020; 2020; and 2020.  Last admission was a month ago.     Date of Admission:  2020  Date of Discharge:  2020    He was discharge home with a urinary cathter.  He has a urology appointment on .   He had rehab at [facility] for 2 weeks after his discharge in .      About a week ago, he was seen by OT for a home assessment and cognitive assessment.  No new recommendations.  The agreement was for his wife to help him with the exercises and contact PT if she thinks that the needs more help.    Patient denies memory problems.  His wife reports that she notices some problems.  His wife uses reminders to help him remember appointment.    PD Medications 7 am 11 am 4 pm 8 - 9 pm   Sinemet 25/100 mg  2 2 2    Sinemet 50/200 mg    1   Ropinirole 0.25 mg 1 1 1      He is walking about 1 hour per day.  He uses a walker when he walks around the block. He has a 4-wheeled walker that has a seat when he gets tired.     He was seen by cardiologist in July.  No new medication recommended.  Next visit is on 2021.     He sleeps well at night and takes a naps during the day.    MEDICATIONS:   Outpatient Medications Marked as Taking for the 10/16/20 encounter (Office Visit) with Dalila Staples APRN CNP  "  Medication Sig     aspirin (ASA) 81 MG tablet Take 1 tablet (81 mg) by mouth daily     bisacodyl (DULCOLAX) 10 MG suppository Place 1 suppository (10 mg) rectally daily as needed for constipation     carbidopa-levodopa (SINEMET CR)  MG CR tablet Take 1 tablet by mouth At Bedtime     carbidopa-levodopa (SINEMET)  MG tablet Take 2 tablets by mouth 3 times daily Take two tablets 3 times a day, at 7am, 11am & 4pm.     doxazosin (CARDURA) 1 MG tablet Take 1 tablet (1 mg) by mouth At Bedtime     dutasteride (AVODART) 0.5 MG capsule Take 1 capsule (0.5 mg) by mouth daily     ondansetron (ZOFRAN-ODT) 4 MG ODT tab DISSOLVE 1 TAB BY MOUTH EVERY 8 HOURS AS NEEDED FOR NAUSEA     PARoxetine (PAXIL) 20 MG tablet Take 1 tablet (20 mg) by mouth every morning     polyethylene glycol (MIRALAX) 17 GM/SCOOP powder Take 17 g (1 capful) by mouth 3 times daily (Patient taking differently: Take 1 capful by mouth 3 times daily as needed for constipation )     rOPINIRole (REQUIP) 0.25 MG tablet Take 0.5 mg by mouth 3 times daily      SENNA-docusate sodium (SENNA S) 8.6-50 MG tablet Take 2 tablets by mouth 2 times daily     simvastatin (ZOCOR) 40 MG tablet TAKE ONE TABLET BY MOUTH EVERY NIGHT AT BEDTIME     vitamin D3 (CHOLECALCIFEROL) 2000 units (50 mcg) tablet Take 1 tablet by mouth daily as needed        ALLERGIES: Patient has no known allergies.    PHYSICAL EXAM:    VITAL SIGNS:  Blood pressure (!) 159/81, pulse 67, resp. rate 15, height 1.715 m (5' 7.5\"), weight 77.1 kg (170 lb), SpO2 99 %., Body mass index is 26.23 kg/m .    Repeat Vital Signs:  Blood pressure (!) 156/77, pulse 68, resp. rate 16, height 1.715 m (5' 7.5\"), weight 77.1 kg (170 lb), SpO2 96 %.     GENERAL:  Mr. Loya is a pleasant  male who is well-groomed and well-developed sitting comfortably in the exam room without any distress.  Affect is appropriate.    MOVEMENT DISORDERS ASSESSMENT:  MDS UPDRS III (Last Sinemet was about 3 hrs ago)  UPDRS " Values 10/16/2020   Time: 2:26 PM   Medication On   R Brain DBS: None   L Brain DBS: None   Dyskinesia (LID) No   Did LID interfere No   Speech 2   Facial Expression 3   Rigidity Neck 2   Rigidity RUE 2   Rigidity LUE 3   Rigidity RLE 2   Rigidity LLE 2   Finger Taps R 2   Finger Taps L 3   Hand Mvt R 1   Hand Mvt L 2   Pron-/Supinate R 1   Pron-/Supinate L 1   Toe Tap R 0   Toe Tap L 1   Leg Agility R 1   Leg Agility L 1   Arise From Chair 1   Gait 3   Gait Freezing 0   Postural Stability 3   Posture 3   Global Spont Mvt 2   Postural Tremor RUE 0   Postural Tremor LUE 1   Kinetic Tremor RUE 0   Kinetic Tremor LUE 0   Rest Tremor RUE 0   Rest Tremor LUE 2   Rest Tremor RLE 0   Rest Tremor LLE 0   Rest Tremor Lip/Jaw 3   Rest Tremor Constancy 4   Total Right 9   Total Left 16   Axial Total 19   Total 51       ASSESSMENT:    1)  Parkinson's Disease:  Progressing.  Having festinating and freezing of gait precipitating his falls.     2)  Frequent Falls:  Due to #1.   He had OT and PT.  The plan is to wait and see how he dose with the exercises.  His wife plans to call for more PT if needed.     3)  Clinic High Blood Pressure Reading:  Today's Blood pressure (!) 156/77, pulse 68, resp.    4)  Dizziness:  He has orthostatic hypotension.  But dizziness is not causing the falls.       PLAN:    __  Due to high BP in the clinic, pt/his wife were asked to check it at home.  If BP is consistently over 140/80, pt was instructed to contact his PCP.     __  Will stay on the same antiparkinsonian medications.     PD Medications 7 am 11 am 4 pm 8 - 9 pm   Sinemet 25/100 mg  2 2 2    Sinemet 50/200 mg    1   Ropinirole 0.25 mg 1 1 1      __  Continue to walk daily.    __ Instructed to use a walker all the time when walking to prevent falls.      __  When needed, PT is available.     __  Continue to follow up with the Urologist and Cardiologist.     __  Patient and his wife were informed that since his falls were due to balance  problems associated with PD and not due to dizziness, I agreed with your cardiologist and will not treat the dizziness at this time.     __  Encouraged to continue to increase fluids and stand up slowly to prevent dizziness.  When feeling dizzy, will sit down and wait until it passes and try to stand up slowly.      __  Will return in 3 months. May return sooner as needed.    The total time spent with the patient was 55 minutes, and greater than 50% of this time was spent in counseling and coordination of care.    KOTA Stone,  CNP  [facility]    1:55 PM  2:50 PM

## 2020-10-16 NOTE — NURSING NOTE
Chief Complaint   Patient presents with     [facility] F/U     Fall     [facility] RETURN MOVEMENT DISORDER       Martin Vang, EMT

## 2020-10-17 LAB
BACTERIA SPEC CULT: NO GROWTH
Lab: NORMAL
SPECIMEN SOURCE: NORMAL

## 2020-10-20 ENCOUNTER — TELEPHONE (OUTPATIENT)
Dept: FAMILY MEDICINE | Facility: CLINIC | Age: 80
End: 2020-10-20

## 2020-10-21 NOTE — TELEPHONE ENCOUNTER
Writer called patient's wife, Kristi, and reviewed message per Dr. Wegener.    Kristi verbalized understanding and in agreement with plan.    Per Kristi patient's home care nurse is Maddy with [facility].  Kristi did not have Maddy's number and reviewed main number for [facility]: [phone].    Writer called [facility], spoke with nurse supervisor, Bella, and gave verbal order for garcia catheter removal.    Bella verbalized understanding, stated order would go into patient's chart and patient's nurse, Maddy, would be notified.    MAXIME Gipson, MATEON, RN

## 2020-10-21 NOTE — TELEPHONE ENCOUNTER
Triage-     Please call Mr. Loya's wife    1.  I discussed with Evelyn ARMAS(urology) who felt it was a good plan to have his garcia catheter removed a day or two before his visit with her on November 3rd.     2.  Please call orders to his home care team/home nurse to have catheter removed on Nov 1st or early in the day on Nov 2nd.     Thank you!     Joel Wegener,MD

## 2020-10-22 ENCOUNTER — PRE VISIT (OUTPATIENT)
Dept: UROLOGY | Facility: CLINIC | Age: 80
End: 2020-10-22

## 2020-10-22 NOTE — TELEPHONE ENCOUNTER
Chief Complaint : Follow up - Hospital    Hx/Sx: Urinary retention (Bilat. HydronephrosisBPH w/ LUTs, Hx of UTI    Records/Orders: Available    Pt Contacted: N/a    At Rooming: Possible cath change (16Fr)    Chad Salazar, EMT

## 2020-10-27 ENCOUNTER — TELEPHONE (OUTPATIENT)
Dept: FAMILY MEDICINE | Facility: CLINIC | Age: 80
End: 2020-10-27

## 2020-10-27 NOTE — TELEPHONE ENCOUNTER
Yes, see phone note 10/20.  Orders were supposed to be sent to home care re: catheter removal prior to urology visit.  I did discuss with CHANDA Monte.     Joel Wegener,MD

## 2020-10-27 NOTE — TELEPHONE ENCOUNTER
Faxed order forms to home  health care physician orders/ occupational therapy discharge summary  Fax # [phone]

## 2020-10-27 NOTE — TELEPHONE ENCOUNTER
Dr Wegener-  Per your last note- did you ever talk to urology about the catheter removal?    I will discuss with Evelyn Hairston if it would be reasonable to have catheter removed by home nurse a day or two before their appointment with her on November 3rd and then could be replaced  By her     Thanks!     Bella Moraes RN

## 2020-10-27 NOTE — TELEPHONE ENCOUNTER
Reason for Call:  Other returning call    Detailed comments: patient's wife returned call to RN.    I reached out to RN's, please call back regarding the catheter removal.    [phone] - Kristi    Phone Number Patient can be reached at: Home number on file [phone] (home)    Best Time: asap    Can we leave a detailed message on this number? YES    Call taken on 10/27/2020 at 9:49 AM by Sruthi Borrego

## 2020-10-27 NOTE — TELEPHONE ENCOUNTER
Writer spoke with Bella, nurse supervisor through [facility] on 10/21/20 and gave verbal orders for urinary catheter removal either 11/1/20 or early in the day of 11/2/20.    Writer spoke with patient's wife, Kristi, on 10/21/20 regarding plan.    Writer called Kristi who stated patient's home care nurse was not aware of catheter removal order and no one from home care contacted Kristi.    Writer reviewed with Kristi which nursing supervisor writer spoke with on 10/21/20 and what writer was told about patient's home care nurse being notified of orders.    Kristi stated patient's home care nurse will remove catheter on morning of 11/2/20.    Per Kristi's request, ultrasound and Urology appt location and times on 11/3/20 reviewed.    MATEO RuffN, RN

## 2020-11-03 ENCOUNTER — OFFICE VISIT (OUTPATIENT)
Dept: UROLOGY | Facility: CLINIC | Age: 80
End: 2020-11-03
Payer: COMMERCIAL

## 2020-11-03 ENCOUNTER — ANCILLARY PROCEDURE (OUTPATIENT)
Dept: ULTRASOUND IMAGING | Facility: CLINIC | Age: 80
End: 2020-11-03
Attending: PHYSICIAN ASSISTANT
Payer: COMMERCIAL

## 2020-11-03 VITALS — SYSTOLIC BLOOD PRESSURE: 168 MMHG | HEART RATE: 66 BPM | DIASTOLIC BLOOD PRESSURE: 91 MMHG

## 2020-11-03 DIAGNOSIS — R33.9 INCOMPLETE BLADDER EMPTYING: ICD-10-CM

## 2020-11-03 DIAGNOSIS — R10.2 SUPRAPUBIC PAIN: Primary | ICD-10-CM

## 2020-11-03 LAB
ALBUMIN UR-MCNC: NEGATIVE MG/DL
APPEARANCE UR: CLEAR
BILIRUB UR QL STRIP: NEGATIVE
COLOR UR AUTO: YELLOW
GLUCOSE UR STRIP-MCNC: NEGATIVE MG/DL
HGB UR QL STRIP: NEGATIVE
KETONES UR STRIP-MCNC: 5 MG/DL
LEUKOCYTE ESTERASE UR QL STRIP: NEGATIVE
MUCOUS THREADS #/AREA URNS LPF: PRESENT /LPF
NITRATE UR QL: NEGATIVE
PH UR STRIP: 6 PH (ref 5–7)
RBC #/AREA URNS AUTO: 8 /HPF (ref 0–2)
SOURCE: ABNORMAL
SP GR UR STRIP: 1.02 (ref 1–1.03)
SQUAMOUS #/AREA URNS AUTO: 1 /HPF (ref 0–1)
UROBILINOGEN UR STRIP-MCNC: 0 MG/DL (ref 0–2)
WBC #/AREA URNS AUTO: 20 /HPF (ref 0–5)

## 2020-11-03 PROCEDURE — 51798 US URINE CAPACITY MEASURE: CPT | Performed by: PHYSICIAN ASSISTANT

## 2020-11-03 PROCEDURE — 81001 URINALYSIS AUTO W/SCOPE: CPT | Performed by: PATHOLOGY

## 2020-11-03 PROCEDURE — 87086 URINE CULTURE/COLONY COUNT: CPT | Mod: 90 | Performed by: PATHOLOGY

## 2020-11-03 PROCEDURE — 51702 INSERT TEMP BLADDER CATH: CPT | Performed by: PHYSICIAN ASSISTANT

## 2020-11-03 PROCEDURE — 99213 OFFICE O/P EST LOW 20 MIN: CPT | Mod: 25 | Performed by: PHYSICIAN ASSISTANT

## 2020-11-03 PROCEDURE — 99000 SPECIMEN HANDLING OFFICE-LAB: CPT | Performed by: PATHOLOGY

## 2020-11-03 PROCEDURE — 76770 US EXAM ABDO BACK WALL COMP: CPT | Performed by: RADIOLOGY

## 2020-11-03 ASSESSMENT — PAIN SCALES - GENERAL: PAINLEVEL: SEVERE PAIN (7)

## 2020-11-03 ASSESSMENT — PATIENT HEALTH QUESTIONNAIRE - PHQ9: SUM OF ALL RESPONSES TO PHQ QUESTIONS 1-9: 7

## 2020-11-03 NOTE — PATIENT INSTRUCTIONS
PLAN:   - Increase doxazosin to 2mg daily.  Monitor for worsening dizziness  - Replace Palomares today  - Will send urinalysis and urine culture  - Will message neurologist about FLomax and whether we could try restarting it.   - Schedule Urodynamics  - Schedule appointment with Dr. Zabala for cystoscopy (and to discuss possible surgical options)  - Schedule nurse visit in 1 month - to perform a voiding trial and replace Palomares if he cannot void    CHANDA Donaldson URology

## 2020-11-03 NOTE — PROGRESS NOTES
HPI: Mr. Vini Loya is a 79 year old year old male presenting today for evaluation of chief complaint(s): Urinary Retention    Mr. Loya has PMH significant for HTN, HLD, Parkinson, depression, BPH (on Flomax 0.4mg) and frequent UTIs. He was formerly seen by Dr. Morris in October 2015 after an episode of urosepsis in summer 2015 requiring hospitalization. He met me in 2016, again for UTIs requiring hospitalization.  But afterward was begun on suppressive therapy with Bactrim SS which seemed to minimize his infection risk.      He was last seen in clinic on 11/21/19.  At that time he was doing well with stable voiding symptoms on flomax.  PVR was 84cc.  He was using senna for constipation.  He was continued on flomax and started on finasteride.  Bactrim SS was continued before bed.      Since then he has had recent falls requiring hospitalization:  8/11/20 - 8/13/20 - Admitted for Fall and hypertension.  His antihypertensives were stopped. Flomax was continued.   9/10/20 - 9/11/20 - Admitted for dizziness in the setting of hypertensive urgency (with headache, slurred speech and BP of 210/111).  Dizziness was thought to be chronic at that time and flomax was stopped.   9/21/20 - 9/25/20 - Admitted for Fall in the setting of hypertensive emergency and leukocytosis.  He was found to have a UTI and urinary retention (straight cath'd for 900-1000cc).  Urology was consulted and he was started on avodart and doxazosin 1mg daily. He was unable to learn CIC and was discharged with a Palomares.      Today he returns in routine followup.  His Palomares was removed yesterday am by his PCP to allow a voiding trial.  Unfortunately he has been unable to void since then.  He continues on finasteride and doxazosin 1mg daily.  He sometimes still gets dizzy but this has been a chronic problem.  Denies fevers, chills.  Does have bladder pain currently, which he attributes to urinary retention.  Also had a sore back yesterday after the catheter  was removed and he walked 2 miles.  His back isn't sore right now.    REVIEW OF DIAGNOSTICS:  10/16/20 - UA: WBC 0-5, RBC 10-25, otherwise negative --> UCx negative  10/02/20 - UA: WBC 10-25, RBC, 10-25m otherwise negative 00> UCx: K pneumonia  9/21/20 - UA: WBC 2, RBC 1, otherwise negative.   9/9/20 - UA negative. --> UCx: <10K mixed   8/12/20 - UA negative --> UCx: 10-50K mixed   7/19/20 - UA negative  6/25/20 - UA negative  11/20/19 - UA negative    9/27/18 - UCx: >100K E Coli resistant to Bactrim  12/5/17 - UA: Ketones 5, otherwise negative  11/3/17 - UA: WBC 5-10, RBC 2-5, otherwise negative --> No growth  10/16/17- Cr: 1.17mg/dL  10/15/17 - UA: WBC 53, RBC >182, nitrities positive, protein 300 --> >100K E Coli  12/6/16 - UA: WBC 0-2, RBC 0-2, trace protein    Current Outpatient Medications   Medication Sig Dispense Refill     aspirin (ASA) 81 MG tablet Take 1 tablet (81 mg) by mouth daily 90 tablet 3     bisacodyl (DULCOLAX) 10 MG suppository Place 1 suppository (10 mg) rectally daily as needed for constipation 10 suppository 0     carbidopa-levodopa (SINEMET CR)  MG CR tablet Take 1 tablet by mouth At Bedtime 90 tablet 3     carbidopa-levodopa (SINEMET)  MG tablet Take 2 tablets by mouth 3 times daily Take two tablets 3 times a day, at 7am, 11am & 4pm. 540 tablet 3     doxazosin (CARDURA) 1 MG tablet Take 1 tablet (1 mg) by mouth At Bedtime 90 tablet 3     dutasteride (AVODART) 0.5 MG capsule Take 1 capsule (0.5 mg) by mouth daily 90 capsule 3     ondansetron (ZOFRAN-ODT) 4 MG ODT tab DISSOLVE 1 TAB BY MOUTH EVERY 8 HOURS AS NEEDED FOR NAUSEA       PARoxetine (PAXIL) 20 MG tablet Take 1 tablet (20 mg) by mouth every morning 90 tablet 3     polyethylene glycol (MIRALAX) 17 GM/SCOOP powder Take 17 g (1 capful) by mouth 3 times daily (Patient taking differently: Take 1 capful by mouth 3 times daily as needed for constipation ) 1 Bottle 11     rOPINIRole (REQUIP) 0.25 MG tablet Take 0.5 mg by  mouth 3 times daily        SENNA-docusate sodium (SENNA S) 8.6-50 MG tablet Take 2 tablets by mouth 2 times daily 120 tablet 11     simvastatin (ZOCOR) 40 MG tablet TAKE ONE TABLET BY MOUTH EVERY NIGHT AT BEDTIME 90 tablet 3     sulfamethoxazole-trimethoprim (BACTRIM/SEPTRA) 400-80 MG tablet Take 1 tablet by mouth At Bedtime For UTI prevention (Patient not taking: Reported on 10/16/2020) 90 tablet 3     vitamin D3 (CHOLECALCIFEROL) 2000 units (50 mcg) tablet Take 1 tablet by mouth daily as needed          ALLERGIES: Patient has no known allergies.      REVIEW OF SYSTEMS:  As above in HPI    GENERAL PHYSICAL EXAM:   Vitals: BP (!) 168/91 (BP Location: Left arm, Patient Position: Sitting)   Pulse 66   There is no height or weight on file to calculate BMI.    GENERAL: Well groomed, well developed, well nourished male in NAD.  GI: + mild suprapubic tenderness  No CVAT bilaterally.  MS: Full ROM in extremities, gait normal, normal muscle tone  SKIN: Warm to touch, dry.  No visible rashes or lesions on examined areas.  NEURO: Alert and oriented x 3.  PSYCH: Normal mood and affect, pleasant and agreeable during interview and exam.    PVR: Residual urine by ultrasound was 650 ml.    Palomares placed    RADIOLOGY: The following tests were reviewed:   EXAM: CT ABDOMEN PELVIS W CONTRAST  LOCATION: [facility]  DATE/TIME: 9/21/2020 5:37 AM     INDICATION: Fall. Left flank pain.  COMPARISON: None.  TECHNIQUE: CT scan of the abdomen and pelvis was performed following injection of IV contrast. Multiplanar reformats were obtained. Dose reduction techniques were used.  CONTRAST: 101 mL Isovue 370.       FINDINGS:   LOWER CHEST: Mild atelectasis and scarring at the lung bases. The heart size is normal.     HEPATOBILIARY: Small cyst in the left lobe of the liver.     PANCREAS: Normal.     SPLEEN: Normal.     ADRENAL GLANDS: Normal.     KIDNEYS/BLADDER: Few tiny renal stones bilaterally. No ureteral stone or hydronephrosis.  Urinary bladder is very distended but otherwise appears normal.     BOWEL: There is a large amount of stool in the colon. No bowel obstruction or inflammation. No free intraperitoneal gas or fluid.     LYMPH NODES: Normal.     VASCULATURE: Atherosclerotic calcification of the aorta and its branches. No aneurysm.     PELVIC ORGANS: The prostate gland is mildly enlarged.     MUSCULOSKELETAL: There are fractures of the left lateral eighth, ninth and 10th ribs. Old right lateral rib fracture. Degenerative disease in the spine.                                                                      IMPRESSION:   1.  Left eighth, ninth and 10th rib fractures.  2.  No other acute traumatic abnormality.  3.  Bilateral renal stones. No ureteral stone or hydronephrosis.       LABS: The last test results for Mr. Vini Loya were reviewed:  PSA -   Lab Results   Component Value Date    PSA 3.04 02/01/2019    PSA 1.92 06/26/2013    PSA 3.39 12/08/2011    PSA 2.07 02/01/2011    PSA 1.76 07/20/2009    PSA 1.79 04/15/2008    PSA 1.24 03/14/2006     BMP -   Recent Labs   Lab Test 10/16/20  0949 10/02/20  1703 09/25/20  0814 09/23/20  0518 09/23/20  0518 06/24/15  0834 06/24/15  0834    137 136   < > 134   < > 135   POTASSIUM 4.3 4.5 4.7   < > 5.5*   < > 4.1   CHLORIDE 108 104 105   < > 104   < > 102   CO2 27 25 27   < > 25   < > 22   BUN 21 21 21   < > 46*   < > 26   CR 0.98 1.15 1.12   < > 2.69*   < > 1.33*   GLC 85 121* 102*   < > 116*   < > 113*   JEMMA 9.5 9.5 8.7   < > 9.2   < > 8.5   MAG  --   --   --   --  3.2*  --  1.6   PHOS  --   --   --   --  4.3  --  2.8    < > = values in this interval not displayed.       CBC -   Recent Labs   Lab Test 09/25/20  0814 09/24/20  0524 09/23/20  0518   WBC 11.2* 10.4 12.7*   HGB 15.3 14.7 16.3    272 312       ASSESSMENT:   1) Chronic BPH with luts  2) Urinary retention (off flomax since 9/10/20) - now on doxazosin 1mg daily    PLAN:   - Discussed options --> garcia vs CIC.   Patient unable to manage CIC by himself and his wife prefers not to be responsible for Vini's intermittent catheterization schedule.  Therefore a Palomares will be replaced - Mr. Loya is able to care for Palomares and bag himself.    - Increase doxazosin to 2mg daily.  Monitor for worsening dizziness  - Replace Palomares today - done by nurse  - Will send urinalysis and urine culture to rule out infection given retention and back pain yesterday  - Sent message to neurologist (Dr. Staples) about FLomax and whether we could try restarting it.   - Schedule Urodynamics  - Schedule appointment with Dr. Zabala for cystoscopy (and to discuss possible surgical options)  - Schedule nurse visit in 1 month - to perform a voiding trial and replace Palomares if he cannot void    Lupe Hairston PA-C  Department of Urologic Surgery

## 2020-11-03 NOTE — LETTER
November 5, 2020      Vini Loya  [address] [facility] [address]        Dear Mr. Loya,    We are writing to inform you of your test results.    Your urinalysis is a little strange looking, but this can be explained by your recent Palomares catheterization.  It is reassuring to see that your urine culture is negative.  This means you do not have a urinary tract infection and don't need antibiotics.     Resulted Orders   UA with Microscopic reflex to Culture   Result Value Ref Range    Color Urine Yellow     Appearance Urine Clear     Glucose Urine Negative NEG^Negative mg/dL    Bilirubin Urine Negative NEG^Negative    Ketones Urine 5 (A) NEG^Negative mg/dL    Specific Gravity Urine 1.016 1.003 - 1.035    Blood Urine Negative NEG^Negative    pH Urine 6.0 5.0 - 7.0 pH    Protein Albumin Urine Negative NEG^Negative mg/dL    Urobilinogen mg/dL 0.0 0.0 - 2.0 mg/dL    Nitrite Urine Negative NEG^Negative    Leukocyte Esterase Urine Negative NEG^Negative    Source Catheterized Urine     WBC Urine 20 (H) 0 - 5 /HPF    RBC Urine 8 (H) 0 - 2 /HPF    Squamous Epithelial /HPF Urine 1 0 - 1 /HPF    Mucous Urine Present (A) NEG^Negative /LPF   Urine Culture Aerobic Bacterial   Result Value Ref Range    Specimen Description Catheterized Urine     Special Requests Specimen received in preservative     Culture Micro No growth        If you have any questions or concerns, please call the clinic at the number listed below.       Sincerely,           [facility]  [phone]

## 2020-11-03 NOTE — LETTER
11/3/2020       RE: Vini Loya  [address]  [facility] [address]     Dear Colleague,    Thank you for referring your patient, Vini Loya, to the [facility] [place] [facility]. Please see a copy of my visit note below.    HPI: Mr. Vini Loya is a 79 year old year old male presenting today for evaluation of chief complaint(s): Urinary Retention    Mr. Loya has PMH significant for HTN, HLD, Parkinson, depression, BPH (on Flomax 0.4mg) and frequent UTIs. He was formerly seen by Dr. Morris in October 2015 after an episode of urosepsis in summer 2015 requiring hospitalization. He met me in 2016, again for UTIs requiring hospitalization.  But afterward was begun on suppressive therapy with Bactrim SS which seemed to minimize his infection risk.      He was last seen in clinic on 11/21/19.  At that time he was doing well with stable voiding symptoms on flomax.  PVR was 84cc.  He was using senna for constipation.  He was continued on flomax and started on finasteride.  Bactrim SS was continued before bed.      Since then he has had recent falls requiring hospitalization:  8/11/20 - 8/13/20 - Admitted for Fall and hypertension.  His antihypertensives were stopped. Flomax was continued.   9/10/20 - 9/11/20 - Admitted for dizziness in the setting of hypertensive urgency (with headache, slurred speech and BP of 210/111).  Dizziness was thought to be chronic at that time and flomax was stopped.   9/21/20 - 9/25/20 - Admitted for Fall in the setting of hypertensive emergency and leukocytosis.  He was found to have a UTI and urinary retention (straight cath'd for 900-1000cc).  Urology was consulted and he was started on avodart and doxazosin 1mg daily. He was unable to learn CIC and was discharged with a Palomares.      Today he returns in routine followup.  His Palomares was removed yesterday am by his PCP to allow a voiding trial.  Unfortunately he has  been unable to void since then.  He continues on finasteride and doxazosin 1mg daily.  He sometimes still gets dizzy but this has been a chronic problem.  Denies fevers, chills.  Does have bladder pain currently, which he attributes to urinary retention.  Also had a sore back yesterday after the catheter was removed and he walked 2 miles.  His back isn't sore right now.    REVIEW OF DIAGNOSTICS:  10/16/20 - UA: WBC 0-5, RBC 10-25, otherwise negative --> UCx negative  10/02/20 - UA: WBC 10-25, RBC, 10-25m otherwise negative 00> UCx: K pneumonia  9/21/20 - UA: WBC 2, RBC 1, otherwise negative.   9/9/20 - UA negative. --> UCx: <10K mixed   8/12/20 - UA negative --> UCx: 10-50K mixed   7/19/20 - UA negative  6/25/20 - UA negative  11/20/19 - UA negative    9/27/18 - UCx: >100K E Coli resistant to Bactrim  12/5/17 - UA: Ketones 5, otherwise negative  11/3/17 - UA: WBC 5-10, RBC 2-5, otherwise negative --> No growth  10/16/17- Cr: 1.17mg/dL  10/15/17 - UA: WBC 53, RBC >182, nitrities positive, protein 300 --> >100K E Coli  12/6/16 - UA: WBC 0-2, RBC 0-2, trace protein    Current Outpatient Medications   Medication Sig Dispense Refill     aspirin (ASA) 81 MG tablet Take 1 tablet (81 mg) by mouth daily 90 tablet 3     bisacodyl (DULCOLAX) 10 MG suppository Place 1 suppository (10 mg) rectally daily as needed for constipation 10 suppository 0     carbidopa-levodopa (SINEMET CR)  MG CR tablet Take 1 tablet by mouth At Bedtime 90 tablet 3     carbidopa-levodopa (SINEMET)  MG tablet Take 2 tablets by mouth 3 times daily Take two tablets 3 times a day, at 7am, 11am & 4pm. 540 tablet 3     doxazosin (CARDURA) 1 MG tablet Take 1 tablet (1 mg) by mouth At Bedtime 90 tablet 3     dutasteride (AVODART) 0.5 MG capsule Take 1 capsule (0.5 mg) by mouth daily 90 capsule 3     ondansetron (ZOFRAN-ODT) 4 MG ODT tab DISSOLVE 1 TAB BY MOUTH EVERY 8 HOURS AS NEEDED FOR NAUSEA       PARoxetine (PAXIL) 20 MG tablet Take 1 tablet  (20 mg) by mouth every morning 90 tablet 3     polyethylene glycol (MIRALAX) 17 GM/SCOOP powder Take 17 g (1 capful) by mouth 3 times daily (Patient taking differently: Take 1 capful by mouth 3 times daily as needed for constipation ) 1 Bottle 11     rOPINIRole (REQUIP) 0.25 MG tablet Take 0.5 mg by mouth 3 times daily        SENNA-docusate sodium (SENNA S) 8.6-50 MG tablet Take 2 tablets by mouth 2 times daily 120 tablet 11     simvastatin (ZOCOR) 40 MG tablet TAKE ONE TABLET BY MOUTH EVERY NIGHT AT BEDTIME 90 tablet 3     sulfamethoxazole-trimethoprim (BACTRIM/SEPTRA) 400-80 MG tablet Take 1 tablet by mouth At Bedtime For UTI prevention (Patient not taking: Reported on 10/16/2020) 90 tablet 3     vitamin D3 (CHOLECALCIFEROL) 2000 units (50 mcg) tablet Take 1 tablet by mouth daily as needed          ALLERGIES: Patient has no known allergies.      REVIEW OF SYSTEMS:  As above in HPI    GENERAL PHYSICAL EXAM:   Vitals: BP (!) 168/91 (BP Location: Left arm, Patient Position: Sitting)   Pulse 66   There is no height or weight on file to calculate BMI.    GENERAL: Well groomed, well developed, well nourished male in NAD.  GI: + mild suprapubic tenderness  No CVAT bilaterally.  MS: Full ROM in extremities, gait normal, normal muscle tone  SKIN: Warm to touch, dry.  No visible rashes or lesions on examined areas.  NEURO: Alert and oriented x 3.  PSYCH: Normal mood and affect, pleasant and agreeable during interview and exam.    PVR: Residual urine by ultrasound was 650 ml.    Palomares placed    RADIOLOGY: The following tests were reviewed:   EXAM: CT ABDOMEN PELVIS W CONTRAST  LOCATION: [facility]  DATE/TIME: 9/21/2020 5:37 AM     INDICATION: Fall. Left flank pain.  COMPARISON: None.  TECHNIQUE: CT scan of the abdomen and pelvis was performed following injection of IV contrast. Multiplanar reformats were obtained. Dose reduction techniques were used.  CONTRAST: 101 mL Isovue 370.       FINDINGS:   LOWER CHEST:  Mild atelectasis and scarring at the lung bases. The heart size is normal.     HEPATOBILIARY: Small cyst in the left lobe of the liver.     PANCREAS: Normal.     SPLEEN: Normal.     ADRENAL GLANDS: Normal.     KIDNEYS/BLADDER: Few tiny renal stones bilaterally. No ureteral stone or hydronephrosis. Urinary bladder is very distended but otherwise appears normal.     BOWEL: There is a large amount of stool in the colon. No bowel obstruction or inflammation. No free intraperitoneal gas or fluid.     LYMPH NODES: Normal.     VASCULATURE: Atherosclerotic calcification of the aorta and its branches. No aneurysm.     PELVIC ORGANS: The prostate gland is mildly enlarged.     MUSCULOSKELETAL: There are fractures of the left lateral eighth, ninth and 10th ribs. Old right lateral rib fracture. Degenerative disease in the spine.                                                                      IMPRESSION:   1.  Left eighth, ninth and 10th rib fractures.  2.  No other acute traumatic abnormality.  3.  Bilateral renal stones. No ureteral stone or hydronephrosis.       LABS: The last test results for Mr. Vini Loya were reviewed:  PSA -   Lab Results   Component Value Date    PSA 3.04 02/01/2019    PSA 1.92 06/26/2013    PSA 3.39 12/08/2011    PSA 2.07 02/01/2011    PSA 1.76 07/20/2009    PSA 1.79 04/15/2008    PSA 1.24 03/14/2006     BMP -   Recent Labs   Lab Test 10/16/20  0949 10/02/20  1703 09/25/20  0814 09/23/20  0518 09/23/20  0518 06/24/15  0834 06/24/15  0834    137 136   < > 134   < > 135   POTASSIUM 4.3 4.5 4.7   < > 5.5*   < > 4.1   CHLORIDE 108 104 105   < > 104   < > 102   CO2 27 25 27   < > 25   < > 22   BUN 21 21 21   < > 46*   < > 26   CR 0.98 1.15 1.12   < > 2.69*   < > 1.33*   GLC 85 121* 102*   < > 116*   < > 113*   JEMMA 9.5 9.5 8.7   < > 9.2   < > 8.5   MAG  --   --   --   --  3.2*  --  1.6   PHOS  --   --   --   --  4.3  --  2.8    < > = values in this interval not displayed.       CBC -   Recent  Labs   Lab Test 09/25/20  0814 09/24/20  0524 09/23/20  0518   WBC 11.2* 10.4 12.7*   HGB 15.3 14.7 16.3    272 312       ASSESSMENT:   1) Chronic BPH with luts  2) Urinary retention (off flomax since 9/10/20) - now on doxazosin 1mg daily    PLAN:   - Discussed options --> garcia vs CIC.  Patient unable to manage CIC by himself and his wife prefers not to be responsible for Vini's intermittent catheterization schedule.  Therefore a Garcia will be replaced - Mr. Loya is able to care for Garcia and bag himself.    - Increase doxazosin to 2mg daily.  Monitor for worsening dizziness  - Replace Garcia today - done by nurse  - Will send urinalysis and urine culture to rule out infection given retention and back pain yesterday  - Sent message to neurologist (Dr. Staples) about FLomax and whether we could try restarting it.   - Schedule Urodynamics  - Schedule appointment with Dr. Zabala for cystoscopy (and to discuss possible surgical options)  - Schedule nurse visit in 1 month - to perform a voiding trial and replace Garcia if he cannot void    Lupe Hairston PA-C  Department of Urologic Surgery

## 2020-11-03 NOTE — NURSING NOTE
Chief Complaint   Patient presents with     Urinary Retention       Blood pressure (!) 168/91, pulse 66. There is no height or weight on file to calculate BMI.    Patient Active Problem List   Diagnosis     Generalized anxiety disorder     CAD (coronary artery disease)     Hyperlipidemia LDL goal <100     BPH (benign prostatic hypertrophy) with urinary obstruction     Parkinson's disease (H)     Gait disturbance, post-stroke     Herniated nucleus pulposus, L5-S1, right     Bunion     Other seborrheic keratosis     Allergic conjunctivitis     Diverticulosis     Glaucoma suspect, bilateral     Senile nuclear sclerosis, bilateral     Dry eye, bilateral     History of corneal transplant     Major depressive disorder, single episode, moderate (H)     Actinic keratosis     Watering of eye     Lactose intolerance in adult     Degeneration of L4-L5 intervertebral disc     Compression fracture of thoracic vertebra, with routine healing, subsequent encounter     CKD (chronic kidney disease) stage 3, GFR 30-59 ml/min     Falls frequently     Slow transit constipation     HTN (hypertension)     Orthostatic hypotension     Fall     Closed fracture of multiple ribs, unspecified laterality, initial encounter     YANELIS (acute kidney injury) (H)       No Known Allergies    Current Outpatient Medications   Medication Sig Dispense Refill     aspirin (ASA) 81 MG tablet Take 1 tablet (81 mg) by mouth daily 90 tablet 3     bisacodyl (DULCOLAX) 10 MG suppository Place 1 suppository (10 mg) rectally daily as needed for constipation 10 suppository 0     carbidopa-levodopa (SINEMET CR)  MG CR tablet Take 1 tablet by mouth At Bedtime 90 tablet 3     carbidopa-levodopa (SINEMET)  MG tablet Take 2 tablets by mouth 3 times daily Take two tablets 3 times a day, at 7am, 11am & 4pm. 540 tablet 3     doxazosin (CARDURA) 1 MG tablet Take 1 tablet (1 mg) by mouth At Bedtime 90 tablet 3     dutasteride (AVODART) 0.5 MG capsule Take 1 capsule  (0.5 mg) by mouth daily 90 capsule 3     ondansetron (ZOFRAN-ODT) 4 MG ODT tab DISSOLVE 1 TAB BY MOUTH EVERY 8 HOURS AS NEEDED FOR NAUSEA       PARoxetine (PAXIL) 20 MG tablet Take 1 tablet (20 mg) by mouth every morning 90 tablet 3     polyethylene glycol (MIRALAX) 17 GM/SCOOP powder Take 17 g (1 capful) by mouth 3 times daily (Patient taking differently: Take 1 capful by mouth 3 times daily as needed for constipation ) 1 Bottle 11     rOPINIRole (REQUIP) 0.25 MG tablet Take 0.5 mg by mouth 3 times daily        SENNA-docusate sodium (SENNA S) 8.6-50 MG tablet Take 2 tablets by mouth 2 times daily 120 tablet 11     simvastatin (ZOCOR) 40 MG tablet TAKE ONE TABLET BY MOUTH EVERY NIGHT AT BEDTIME 90 tablet 3     sulfamethoxazole-trimethoprim (BACTRIM/SEPTRA) 400-80 MG tablet Take 1 tablet by mouth At Bedtime For UTI prevention (Patient not taking: Reported on 10/16/2020) 90 tablet 3     vitamin D3 (CHOLECALCIFEROL) 2000 units (50 mcg) tablet Take 1 tablet by mouth daily as needed          Social History     Tobacco Use     Smoking status: Former Smoker     Packs/day: 0.50     Years: 3.00     Pack years: 1.50     Types: Cigarettes     Start date: 1960     Quit date: 3/14/1966     Years since quittin.6     Smokeless tobacco: Former User   Substance Use Topics     Alcohol use: No     Comment: occasional wine on the holidays      Drug use: No         The following medication was given: No medications given as patient stated they were currently taking course of Bactrim      Insertion:  16 Fr Coude tipped latex garcia catheter inserted into urethral meatus in the usual sterile fashion without difficulty.  Balloon filled with 10 mL sterile H2O.  Received 600 ml yellow urine output.   Catheter secured in place with leg strap: Yes.     Patient did tolerate procedure well.     Patient instructed as to where to call or go for pain, fever, leakage, or decreased urine flow.     This service provided today was under the  direct supervision of Lupe Hairston PA-C, who was available if needed.        Chad Salazar, EMT  11/3/2020  10:46 AM

## 2020-11-04 LAB
BACTERIA SPEC CULT: NO GROWTH
Lab: NORMAL
SPECIMEN SOURCE: NORMAL

## 2020-11-06 ENCOUNTER — TELEPHONE (OUTPATIENT)
Dept: UROLOGY | Facility: CLINIC | Age: 80
End: 2020-11-06

## 2020-11-09 NOTE — PATIENT INSTRUCTIONS
Stop wellbutrin.     Re-start paxil the next day.     Follow up with me end of February for annual wellness visit.     Flu shot great.   
phencyclidine

## 2020-11-12 ENCOUNTER — PATIENT OUTREACH (OUTPATIENT)
Dept: CARE COORDINATION | Facility: CLINIC | Age: 80
End: 2020-11-12

## 2020-11-12 NOTE — TELEPHONE ENCOUNTER
"11/6/20:  Corresponded with Dr. Staples from Neurology.  He felt that restarting Flomax would be fine. \"His Parkinson's disease and antiparkinsonian mediations contribute more to his orthostatic hypotension than Flomax.  It's always a matter of balancing and finding a happy medium.\"      I discussed this with Mr. Loya and his wife.  He will stop the doxazosin and restart the tamsulosin.  They have tamsulosin at home so he doesn't need a new prescription.     He will monitor for increasing dizziness.     He is currently scheduled for a void trial in early December then urodynamics in mid-December.  We will try to move that up.  Now that he is restarting flomax I am more optimistic that he will pass a voiding trial.     [facility]    "

## 2020-11-12 NOTE — PROGRESS NOTES
"Clinic Care Coordination Contact  Community Health Worker Follow Up    Goals:   Goals Addressed as of 11/12/2020 at 1:42 PM        Patient Stated      [facility]- [facility] (pt-[facility])     Added 10/9/20 by Kajal Gutierrez BSW     Goal Statement: I would like to see what benefits I have from the [facility] in the next two months.   Date Goal set: 10/9/2020  Barriers: unsure how to go about this   Strengths: asking for help  Date to Achieve By: 12/9/2020  Patient expressed understanding of goal: yes    Action steps to achieve this goal:  1. I will ask my wife to look over the resources CC SW provided her in an email. (completed)  2. I will ask my wife to contact the [facility] to see if she can find out more information about my benefits. (completed)  3. I will ask my wife to res[facility].    Updated: 11/12/20              Other- Extra support (pt-stated)     Added 10/9/20 by Kajal Gutierrez BSW     Goal Statement: I would like my wife to find extra support in the home for me in the next 3 months.   Date Goal set: 10/9/2020  Barriers: unsure where to get help   Strengths: Talking with Sw'er  Date to Achieve By: 1/9/2020  Patient expressed understanding of goal: yes     Action steps to achieve this goal:  1. I will ask my wife to look on [employer] in the next month to see if there are caregivers who could help me with a bath a few times a week.   2. I will ask my wife to give the CHW updates at outreach calls.    Updated: 11/12/20              Intervention and Education during outreach:     Called pt's wife to check in. She said pt is \"doing a lot better than he was\" and overall, things are \"much better.\" Kristi said a home care nurse comes out to see pt once a week and will continue for at least 9 more weeks. She said that has been very helpful. Kristi said pt has not had any falls and is getting much stronger. She reported that he doesn't need to use the walker as much and that she is still " "encouraging him to be careful when walking and going up and down the stairs at their home. Kristi said he does get fatigued at times, but it is much less common than before.    Kristi said pt had a Palomares catheter in for 6 weeks, it was taken out last week and then put back in. According to pt's wife, pt can take care of it himself and doesn't need her help. She said they've been in touch with the urologist and she has been \"very helpful, kind and easy to work with.\" Kristi said they're hoping that the catheter is going to be taken out again within the next week.    I asked if pt's wife was able to follow up with the [facility] regarding services and benefits that pt might qualify for. She said she did and was going to go to [facility] today for an appointment, but with the snow, she called and canceled. Kristi said she plans to reschedule within the next week or so. Pt's wife said she is trying to learn more and is talking with pt and family members about what is going to be best for pt. Even though the [facility] would pay for everything, pt's wife said that their current health insurance is covering everything and what they've paid out of pocket \"is reasonable.\" Kristi said she's hesitant to change pt's care to the [facility] because they really like his providers and they've been seeing pt for years. Updated goal.    I asked if Kristi needs more help at home and if she's had the chance to look for caregivers who could assist pt with baths. She said she got the information that  [employer] sent about [employer] but she \"hasn't looked into it yet.\" I asked if she would be able to do that in the next month, and she said yes. Pt's wife said now that pt is getting stronger and doing better in general, she's not having to assist him with as many activities as before. Updated goal.    Pt's wife mentioned the clinic closing. She said she plans to switch pt's medications to the  pharmacy. Asked if she had any concerns or questions about it, " and she said no. She thanked me for calling to check in. I offered to call back next month to follow up, and she agreed.    CHW Plan: Pt's wife will reschedule her appointment with the [facility] within two weeks to get more information about pt's benefits. Pt's wife will look at [employer] to find a caregiver to help pt with baths within the next month. CHW will follow up with pt's wife in one month.

## 2020-11-16 ENCOUNTER — TELEPHONE (OUTPATIENT)
Dept: UROLOGY | Facility: CLINIC | Age: 80
End: 2020-11-16

## 2020-11-16 NOTE — TELEPHONE ENCOUNTER
Unable to leave message for pt to call and move nurse visit up to 11/23/20 at 9:00AM in held spot.

## 2020-11-16 NOTE — TELEPHONE ENCOUNTER
----- Message from Francisca Bradley RN sent at 11/16/2020  4:23 PM CST -----  Lupe wants this patient moved up for a nurse visit. Can you please help him get set up next Monday at 9am. I will put the spot on hold.

## 2020-11-17 ENCOUNTER — TELEPHONE (OUTPATIENT)
Dept: FAMILY MEDICINE | Facility: CLINIC | Age: 80
End: 2020-11-17

## 2020-11-17 NOTE — TELEPHONE ENCOUNTER
[facility] Call Center    Phone Message    May a detailed message be left on voicemail: yes     Reason for Call: Other: SUZETTE Martini is now scheduled in his held spot on 11/23 at 9am     Action Taken: Message routed to:  [facility] ([facility]  Urology    Travel Screening: Not Applicable

## 2020-11-23 ENCOUNTER — OFFICE VISIT (OUTPATIENT)
Dept: UROLOGY | Facility: CLINIC | Age: 80
End: 2020-11-23
Payer: COMMERCIAL

## 2020-11-23 DIAGNOSIS — R33.9 URINARY RETENTION WITH INCOMPLETE BLADDER EMPTYING: Primary | ICD-10-CM

## 2020-11-23 PROCEDURE — 51798 US URINE CAPACITY MEASURE: CPT

## 2020-11-23 PROCEDURE — 51702 INSERT TEMP BLADDER CATH: CPT

## 2020-11-23 RX ORDER — TAMSULOSIN HYDROCHLORIDE 0.4 MG/1
0.4 CAPSULE ORAL DAILY
COMMUNITY
Start: 2020-08-14 | End: 2021-04-01

## 2020-11-23 NOTE — PROGRESS NOTES
Chief Complaint   Patient presents with     Allied Health Visit     TOV       Patient Active Problem List   Diagnosis     Generalized anxiety disorder     CAD (coronary artery disease)     Hyperlipidemia LDL goal <100     BPH (benign prostatic hypertrophy) with urinary obstruction     Parkinson's disease (H)     Gait disturbance, post-stroke     Herniated nucleus pulposus, L5-S1, right     Bunion     Other seborrheic keratosis     Allergic conjunctivitis     Diverticulosis     Glaucoma suspect, bilateral     Senile nuclear sclerosis, bilateral     Dry eye, bilateral     History of corneal transplant     Major depressive disorder, single episode, moderate (H)     Actinic keratosis     Watering of eye     Lactose intolerance in adult     Degeneration of L4-L5 intervertebral disc     Compression fracture of thoracic vertebra, with routine healing, subsequent encounter     CKD (chronic kidney disease) stage 3, GFR 30-59 ml/min     Falls frequently     Slow transit constipation     HTN (hypertension)     Orthostatic hypotension     Fall     Closed fracture of multiple ribs, unspecified laterality, initial encounter     YANELIS (acute kidney injury) (H)       No Known Allergies    Current Outpatient Medications   Medication Sig Dispense Refill     tamsulosin (FLOMAX) 0.4 MG capsule        aspirin (ASA) 81 MG tablet Take 1 tablet (81 mg) by mouth daily 90 tablet 3     bisacodyl (DULCOLAX) 10 MG suppository Place 1 suppository (10 mg) rectally daily as needed for constipation 10 suppository 0     carbidopa-levodopa (SINEMET CR)  MG CR tablet Take 1 tablet by mouth At Bedtime 90 tablet 3     carbidopa-levodopa (SINEMET)  MG tablet Take 2 tablets by mouth 3 times daily Take two tablets 3 times a day, at 7am, 11am & 4pm. 540 tablet 3     doxazosin (CARDURA) 1 MG tablet Take 1 tablet (1 mg) by mouth At Bedtime 90 tablet 3     dutasteride (AVODART) 0.5 MG capsule Take 1 capsule (0.5 mg) by mouth daily (Patient not  taking: Reported on 2020) 90 capsule 3     ondansetron (ZOFRAN-ODT) 4 MG ODT tab DISSOLVE 1 TAB BY MOUTH EVERY 8 HOURS AS NEEDED FOR NAUSEA       PARoxetine (PAXIL) 20 MG tablet Take 1 tablet (20 mg) by mouth every morning 90 tablet 3     polyethylene glycol (MIRALAX) 17 GM/SCOOP powder Take 17 g (1 capful) by mouth 3 times daily (Patient taking differently: Take 1 capful by mouth 3 times daily as needed for constipation ) 1 Bottle 11     rOPINIRole (REQUIP) 0.25 MG tablet Take 0.5 mg by mouth 3 times daily        SENNA-docusate sodium (SENNA S) 8.6-50 MG tablet Take 2 tablets by mouth 2 times daily 120 tablet 11     simvastatin (ZOCOR) 40 MG tablet TAKE ONE TABLET BY MOUTH EVERY NIGHT AT BEDTIME 90 tablet 3     sulfamethoxazole-trimethoprim (BACTRIM/SEPTRA) 400-80 MG tablet Take 1 tablet by mouth At Bedtime For UTI prevention (Patient not taking: Reported on 10/16/2020) 90 tablet 3     vitamin D3 (CHOLECALCIFEROL) 2000 units (50 mcg) tablet Take 1 tablet by mouth daily as needed          Social History     Tobacco Use     Smoking status: Former Smoker     Packs/day: 0.50     Years: 3.00     Pack years: 1.50     Types: Cigarettes     Start date: 1960     Quit date: 3/14/1966     Years since quittin.7     Smokeless tobacco: Former User   Substance Use Topics     Alcohol use: No     Comment: occasional wine on the holidays      Drug use: No       Vini oLya comes into clinic today at the request of Lupe Hairston PA-C, for TOV / catheter removal.    This service provided today was under the direct supervision of Jaki Morales CNP, who was available if needed.    Vini Loya presented today for a trial of void.  Approximately 450 mL of normal saline instilled into bladder via catheter.  Patient stated he had urge to urinate and an 16Fr coude tipped catheter was removed without difficulty.  Patient was given a graduated cylinder  to measure urine output.  Patient voided approximately 275 mL of clear  urine.   mL.    Based on the amount of retained urine, this patient did not pass TOV. As a result, a garcia catheter was placed.       Insertion:  16 Fr Coude tipped latex garcia catheter inserted into urethral meatus in the usual sterile fashion without difficulty.  Balloon filled with 10 mL sterile H2O.  Received 200 ml yellow urine output.   Catheter secured in place with leg strap: Yes.     Patient did tolerate procedure well.     Patient instructed as to where to call or go for pain, fever, leakage, or decreased urine flow.     This service provided today was under the direct supervision of Jaki Morales CNP, who was available if needed.    Chad Salazar, EMT,  11/23/2020  10:50 AM

## 2020-11-27 ENCOUNTER — TELEPHONE (OUTPATIENT)
Dept: FAMILY MEDICINE | Facility: CLINIC | Age: 80
End: 2020-11-27

## 2020-12-02 ENCOUNTER — PRE VISIT (OUTPATIENT)
Dept: UROLOGY | Facility: CLINIC | Age: 80
End: 2020-12-02

## 2020-12-04 ENCOUNTER — PATIENT OUTREACH (OUTPATIENT)
Dept: CARE COORDINATION | Facility: CLINIC | Age: 80
End: 2020-12-04

## 2020-12-08 ENCOUNTER — MEDICAL CORRESPONDENCE (OUTPATIENT)
Dept: HEALTH INFORMATION MANAGEMENT | Facility: CLINIC | Age: 80
End: 2020-12-08

## 2020-12-09 ENCOUNTER — TELEPHONE (OUTPATIENT)
Dept: FAMILY MEDICINE | Facility: CLINIC | Age: 80
End: 2020-12-09

## 2020-12-09 NOTE — TELEPHONE ENCOUNTER
Received form(s) from [facility] for plan of care.  Placed form(s) in/on [employer] desk.  Forms need to be signed and faxed to number listed on form..[phone]  Call pt to verify form was sent: No  Copy needs to be sent for scanning after completion: Yes

## 2020-12-10 ENCOUNTER — PATIENT OUTREACH (OUTPATIENT)
Dept: CARE COORDINATION | Facility: CLINIC | Age: 80
End: 2020-12-10

## 2020-12-10 ENCOUNTER — OFFICE VISIT (OUTPATIENT)
Dept: UROLOGY | Facility: CLINIC | Age: 80
End: 2020-12-10
Payer: COMMERCIAL

## 2020-12-10 ENCOUNTER — DOCUMENTATION ONLY (OUTPATIENT)
Dept: UROLOGY | Facility: CLINIC | Age: 80
End: 2020-12-10

## 2020-12-10 ENCOUNTER — ANCILLARY PROCEDURE (OUTPATIENT)
Dept: RADIOLOGY | Facility: AMBULATORY SURGERY CENTER | Age: 80
End: 2020-12-10
Attending: PHYSICIAN ASSISTANT
Payer: COMMERCIAL

## 2020-12-10 ENCOUNTER — DOCUMENTATION ONLY (OUTPATIENT)
Dept: CARE COORDINATION | Facility: CLINIC | Age: 80
End: 2020-12-10

## 2020-12-10 VITALS — HEART RATE: 70 BPM | SYSTOLIC BLOOD PRESSURE: 102 MMHG | DIASTOLIC BLOOD PRESSURE: 60 MMHG

## 2020-12-10 DIAGNOSIS — R33.9 URINARY RETENTION WITH INCOMPLETE BLADDER EMPTYING: Primary | ICD-10-CM

## 2020-12-10 PROBLEM — E78.5 HYPERLIPIDEMIA, UNSPECIFIED: Status: ACTIVE | Noted: 2020-08-13

## 2020-12-10 PROBLEM — N18.30 STAGE 3 CHRONIC KIDNEY DISEASE (H): Status: ACTIVE | Noted: 2020-08-13

## 2020-12-10 PROBLEM — H04.209: Status: ACTIVE | Noted: 2020-08-13

## 2020-12-10 PROBLEM — Z63.0 PROBLEMS IN RELATIONSHIP WITH SPOUSE OR PARTNER: Status: ACTIVE | Noted: 2020-08-13

## 2020-12-10 PROBLEM — M21.619 BUNION OF UNSPECIFIED FOOT: Status: ACTIVE | Noted: 2020-08-13

## 2020-12-10 PROBLEM — Z94.7 CORNEAL TRANSPLANT STATUS: Status: ACTIVE | Noted: 2020-08-13

## 2020-12-10 PROBLEM — I25.10 ATHEROSCLEROTIC HEART DISEASE OF NATIVE CORONARY ARTERY WITHOUT ANGINA PECTORIS: Status: ACTIVE | Noted: 2020-08-13

## 2020-12-10 PROBLEM — H25.13 AGE-RELATED NUCLEAR CATARACT, BILATERAL: Status: ACTIVE | Noted: 2020-08-13

## 2020-12-10 PROBLEM — H10.10 ACUTE ATOPIC CONJUNCTIVITIS, UNSPECIFIED EYE: Status: ACTIVE | Noted: 2020-08-13

## 2020-12-10 PROBLEM — M25.551 PAIN IN RIGHT HIP: Status: ACTIVE | Noted: 2020-08-13

## 2020-12-10 PROBLEM — S22.000D: Status: ACTIVE | Noted: 2020-08-13

## 2020-12-10 PROBLEM — R00.1 BRADYCARDIA, UNSPECIFIED: Status: ACTIVE | Noted: 2020-08-13

## 2020-12-10 PROBLEM — M54.50 LOW BACK PAIN: Status: ACTIVE | Noted: 2020-08-13

## 2020-12-10 PROBLEM — M51.369 OTHER INTERVERTEBRAL DISC DEGENERATION, LUMBAR REGION: Status: ACTIVE | Noted: 2020-08-13

## 2020-12-10 PROBLEM — K57.90 DIVERTICULOSIS OF INTESTINE, PART UNSPECIFIED, WITHOUT PERFORATION OR ABSCESS WITHOUT BLEEDING: Status: ACTIVE | Noted: 2020-08-13

## 2020-12-10 PROBLEM — G20.A1 PARKINSON'S DISEASE (H): Status: ACTIVE | Noted: 2020-08-13

## 2020-12-10 PROBLEM — E73.9 LACTOSE INTOLERANCE, UNSPECIFIED: Status: ACTIVE | Noted: 2020-08-13

## 2020-12-10 PROBLEM — G89.29 OTHER CHRONIC PAIN: Status: ACTIVE | Noted: 2020-08-13

## 2020-12-10 PROBLEM — I63.40: Status: ACTIVE | Noted: 2020-08-13

## 2020-12-10 PROBLEM — I69.398 OTHER SEQUELAE OF CEREBRAL INFARCTION: Status: ACTIVE | Noted: 2020-08-13

## 2020-12-10 PROBLEM — N40.1 BENIGN PROSTATIC HYPERPLASIA WITH LOWER URINARY TRACT SYMPTOMS: Status: ACTIVE | Noted: 2020-08-13

## 2020-12-10 PROBLEM — R25.1 TREMOR, UNSPECIFIED: Status: ACTIVE | Noted: 2020-08-13

## 2020-12-10 PROBLEM — H40.003 PREGLAUCOMA, UNSPECIFIED, BILATERAL: Status: ACTIVE | Noted: 2020-08-13

## 2020-12-10 PROBLEM — M51.27 OTHER INTERVERTEBRAL DISC DISPLACEMENT, LUMBOSACRAL REGION: Status: ACTIVE | Noted: 2020-08-13

## 2020-12-10 PROBLEM — Z91.81 HISTORY OF FALLING: Status: ACTIVE | Noted: 2020-08-13

## 2020-12-10 PROBLEM — H04.123 DRY EYE SYNDROME OF BILATERAL LACRIMAL GLANDS: Status: ACTIVE | Noted: 2020-08-13

## 2020-12-10 PROCEDURE — 51728 CYSTOMETROGRAM W/VP: CPT | Performed by: PHYSICIAN ASSISTANT

## 2020-12-10 PROCEDURE — 74455 X-RAY URETHRA/BLADDER: CPT | Performed by: PHYSICIAN ASSISTANT

## 2020-12-10 PROCEDURE — 51600 INJECTION FOR BLADDER X-RAY: CPT | Mod: 51 | Performed by: PHYSICIAN ASSISTANT

## 2020-12-10 PROCEDURE — 51797 INTRAABDOMINAL PRESSURE TEST: CPT | Performed by: PHYSICIAN ASSISTANT

## 2020-12-10 PROCEDURE — 51784 ANAL/URINARY MUSCLE STUDY: CPT | Mod: 51 | Performed by: PHYSICIAN ASSISTANT

## 2020-12-10 PROCEDURE — 51741 ELECTRO-UROFLOWMETRY FIRST: CPT | Mod: 51 | Performed by: PHYSICIAN ASSISTANT

## 2020-12-10 RX ORDER — BUPROPION HYDROCHLORIDE 300 MG/1
1 TABLET ORAL
COMMUNITY
Start: 2020-08-14 | End: 2021-01-14

## 2020-12-10 RX ORDER — ONDANSETRON 4 MG/1
1 TABLET, FILM COATED ORAL
COMMUNITY
Start: 2020-08-17 | End: 2021-01-14

## 2020-12-10 ASSESSMENT — PAIN SCALES - GENERAL: PAINLEVEL: NO PAIN (0)
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
## 2020-12-10 NOTE — PATIENT INSTRUCTIONS
UROLOGY CLINIC VISIT PATIENT INSTRUCTIONS    Follow up as scheduled with Dr. Zabala to discuss urodynamics test results and treatment options.    We gave you a dose of Bactrim in the clinic today, so you can skip your evening dose tonight and resume tomorrow night as usual.     If you have any issues, questions or concerns in the meantime, do not hesitate to contact us at [phone] or via [employer].     It was a pleasure meeting with you today.  Thank you for allowing me and my team the privilege of caring for you today.  YOU are the reason we are here, and I truly hope we provided you with the excellent service you deserve.  Please let us know if there is anything else we can do for you so that we can be sure you are leaving completely satisfied with your care experience.

## 2020-12-10 NOTE — NURSING NOTE
Chief Complaint   Patient presents with     Urodynamics Study       Patient Active Problem List   Diagnosis     Generalized anxiety disorder     CAD (coronary artery disease)     Hyperlipidemia LDL goal <100     BPH (benign prostatic hypertrophy) with urinary obstruction     Parkinson's disease (H)     Gait disturbance, post-stroke     Herniated nucleus pulposus, L5-S1, right     Pain in right hip     Bunion     Other seborrheic keratosis     Allergic conjunctivitis     Diverticulosis     Glaucoma suspect, bilateral     Senile nuclear sclerosis, bilateral     Dry eye, bilateral     History of corneal transplant     Major depressive disorder, single episode, moderate (H)     Actinic keratosis     Watering of eye     Lactose intolerance in adult     Degeneration of L4-L5 intervertebral disc     Compression fracture of thoracic vertebra, with routine healing, subsequent encounter     CKD (chronic kidney disease) stage 3, GFR 30-59 ml/min     Falls frequently     Slow transit constipation     HTN (hypertension)     Orthostatic hypotension     Fall     Closed fracture of multiple ribs, unspecified laterality, initial encounter     YANELIS (acute kidney injury) (H)     Acute atopic conjunctivitis, unspecified eye     Age-related nuclear cataract, bilateral     Bradycardia, unspecified     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 (H)     History of falling     Other chronic pain     Preglaucoma, unspecified, bilateral     Problems in relationship with spouse or partner     Tremor, unspecified     Benign prostatic hyperplasia with lower urinary tract symptoms     Bunion of unspecified foot     Atherosclerotic heart disease of native coronary artery without angina pectoris     Stage 3 chronic kidney disease     Wedge compression fracture of unspecified thoracic vertebra, subsequent encounter for fracture with routine healing     Other intervertebral disc degeneration, lumbar region     Diverticulosis  of intestine, part unspecified, without perforation or abscess without bleeding     Essential (primary) hypertension     Other sequelae of cerebral infarction     Other intervertebral disc displacement, lumbosacral region     Corneal transplant status     Lactose intolerance, unspecified     Low back pain     Hyperlipidemia, unspecified     Dry eye syndrome of bilateral lacrimal glands     Unspecified epiphora, unspecified side       Allergies   Allergen Reactions     Lactose        Current Outpatient Medications   Medication Sig Dispense Refill     buPROPion (WELLBUTRIN XL) 300 MG 24 hr tablet Take 1 tablet by mouth       ondansetron (ZOFRAN) 4 MG tablet Take 1 tablet by mouth       aspirin (ASA) 81 MG tablet Take 1 tablet (81 mg) by mouth daily 90 tablet 3     bisacodyl (DULCOLAX) 10 MG suppository Place 1 suppository (10 mg) rectally daily as needed for constipation 10 suppository 0     carbidopa-levodopa (SINEMET CR)  MG CR tablet Take 1 tablet by mouth At Bedtime 90 tablet 3     carbidopa-levodopa (SINEMET)  MG tablet Take 2 tablets by mouth 3 times daily Take two tablets 3 times a day, at 7am, 11am & 4pm. 540 tablet 3     doxazosin (CARDURA) 1 MG tablet Take 1 tablet (1 mg) by mouth At Bedtime 90 tablet 3     dutasteride (AVODART) 0.5 MG capsule Take 1 capsule (0.5 mg) by mouth daily (Patient not taking: Reported on 11/23/2020) 90 capsule 3     ondansetron (ZOFRAN-ODT) 4 MG ODT tab DISSOLVE 1 TAB BY MOUTH EVERY 8 HOURS AS NEEDED FOR NAUSEA       PARoxetine (PAXIL) 20 MG tablet Take 1 tablet (20 mg) by mouth every morning 90 tablet 3     polyethylene glycol (MIRALAX) 17 GM/SCOOP powder Take 17 g (1 capful) by mouth 3 times daily (Patient taking differently: Take 1 capful by mouth 3 times daily as needed for constipation ) 1 Bottle 11     rOPINIRole (REQUIP) 0.25 MG tablet Take 0.5 mg by mouth 3 times daily        SENNA-docusate sodium (SENNA S) 8.6-50 MG tablet Take 2 tablets by mouth 2 times daily  120 tablet 11     simvastatin (ZOCOR) 40 MG tablet TAKE ONE TABLET BY MOUTH EVERY NIGHT AT BEDTIME 90 tablet 3     sulfamethoxazole-trimethoprim (BACTRIM/SEPTRA) 400-80 MG tablet Take 1 tablet by mouth At Bedtime For UTI prevention (Patient not taking: Reported on 10/16/2020) 90 tablet 3     tamsulosin (FLOMAX) 0.4 MG capsule        vitamin D3 (CHOLECALCIFEROL) 2000 units (50 mcg) tablet Take 1 tablet by mouth daily as needed          Social History     Tobacco Use     Smoking status: Former Smoker     Packs/day: 0.50     Years: 3.00     Pack years: 1.50     Types: Cigarettes     Start date: 1960     Quit date: 3/14/1966     Years since quittin.7     Smokeless tobacco: Former User   Substance Use Topics     Alcohol use: No     Comment: occasional wine on the holidays      Drug use: No       Invasive Procedure Safety Checklist:    Procedure: Urodynamics    Action: Complete sections and checkboxes as appropriate.  Pre-procedure:  1. Patient ID Verified with 2 identifiers (Arianne and  or MRN) : YES    2. Procedure and site verified with patient/designee (when able) : YES    3. Accurate consent documentation in medical record : YES    4. H&P (or appropriate assessment) documented in medical record : N/A  H&P must be up to 30 days prior to procedure an updated within 24 hours of Procedure as applicable.     5. Relevant diagnostic and radiology test results appropriately labeled and displayed as applicable : YES    6. Blood products, implants, devices, and/or special equipment available for the procedure as applicable : YES    7. Procedure site(s) marked with provider initials [Exclusions: none] : NO    8. Marking not required. Reason : Yes  Procedure does not require site marking    Time Out:     Time-Out performed immediately prior to starting procedure, including verbal and active participation of all team members addressing: YES    1. Correct patient identity.  2. Confirmed that the correct side and site are  marked.  3. An accurate procedure to be done.  4. Agreement on the procedure to be done.  5. Correct patient position.  6. Relevant images and results are properly labeled and appropriately displayed.  7. The need to administer antibiotics or fluids for irrigation purposes during the procedure as applicable.  8. Safety precautions based on patient history or medication use.    During Procedure: Verification of correct person, site, and procedure occurs any time the responsibility for care of the patient is transferred to another member of the care team.    The following medication was given: Sulfamethoxazole / Trimethoprim    MEDICATION:  Bactrim  ROUTE: PO  SITE: Orally  DOSE: 800/160mg  LOT #: [id number]  : [employer]  EXPIRATION DATE: 10/2021  NDC#: 7757-0927-11   Was there drug waste? No    Prior to administration, verified patient identity using patient's name and date of birth.  Due to administration, patient instructed to remain in clinic for 15 minutes  afterwards, and to report any adverse reaction to me immediately.    Drug Amount Wasted:  None.  Vial/Syringe: Single dose vial      The following medication was given:     MEDICATION:  Omnipaque (Iohexol Injection) (240mgI/mL)  ROUTE: Provider Administered  SITE: Provider Administered via catheter  DOSE: 200mL  LOT #: [id number]  : [facility]  EXPIRATION DATE: 11/9/2021  NDC#: [address]-17   Was there drug waste? No        The following medication was given: See Above    Removal:  16 Fr Coude tipped latex garcia catheter removed from urethral meatus without difficulty after removing 10 mL of fluid from the balloon.    Insertion:  16 Fr straight tipped latex straight catheter inserted into urethral meatus in the usual sterile fashion without difficulty.  Received 300 ml yellow urine output.     Patient did tolerate procedure well.    Patient instructed as to where to call or go for pain, fever, leakage, or decreased urine flow.          Olga Verma, [facility]  12/10/2020  8:48 AM

## 2020-12-10 NOTE — PROGRESS NOTES
PREPROCEDURE DIAGNOSES:    1. Urinary retention  2. BPH with LUTS    POSTPROCEDURE DIAGNOSES:  -Normal bladder capacity (360 mL) with normal filling sensations.  -Normal bladder compliance without uninhibited detrusor contractions during filling, followed by terminal detrusor overactivity with incontinence when Pdet reaches 46 cm H2O at a volume of 342 mL.   -No stress urinary incontinence.  -Good detrusor contraction during voiding to max Pdet 59 cm H2O with severely reduced flow rate (Qmax 1.8 ml/s) and incomplete bladder emptying ( mL).  -Quiet EMG activity during voiding though BOOI is 45.1 which is suggestive for bladder outlet obstruction. Given history of BPH, plan is to correlate clinically with cystoscopy on 12/22/2020.  -Fluoroscopy reveals a severely trabeculated bladder wall without VUR. The bladder neck was closed during early stages of filling and appeared open when he experienced terminal DOI and during voiding.    PROCEDURE:    1. Sterile urethral catheterization for measurement of residual urine volume.  2. Complex filling cystometrogram with measurement of bladder and rectal pressures.  3. Complex voiding cystometrogram with measurement of bladder and rectal pressures.  4. Electromyography of the pelvic floor during urodynamics.  5. Fluoroscopic imaging of the bladder during urodynamics, at least 3 views.    6. Interpretation of urodynamics and flouroscopic imaging.      INDICATIONS FOR PROCEDURE:  Mr. Vini Loya is a pleasant 80 year old male with BPH with LUTS and urinary retention, currently managed with indwelling Palomares catheter. Baseline video urodynamic assessment is requested today by Lupe Hairston PA-C to better characterize Mr. Vini Loya's voiding dysfunction.      VOIDING DIARY:  Not completed as patient had indwelling Palomares catheter.     DESCRIPTION OF PROCEDURE:  Risks, benefits, and alternatives to urodynamics were discussed with the patient and he wished to proceed.   Urodynamics are planned to better assess the primary etiology for Mr. Loya's urologic dysfunction.  The patient does not currently take anticholinergic medications for his bladder.  After informed consent was obtained, the patient was taken to the procedure room where uroflowmetry was performed. Findings below.     PRE-STUDY UROFLOWMETRY:  Not performed as patient had indwelling Palomares catheter.   Residual by catheter: 10 mL.  Pretest urine dipstick was not performed per clinician discretion given the presence of an indwelling Palomares catheter and complete lack of any UTI symptoms.    Patient's Palomares catheter was removed uneventfully. Next a 7F double-lumen urodynamics catheter was inserted into the bladder under sterile technique via urethra.  A 7F abdominal manometry catheter was placed in the rectum.  EMG pads were placed on both sides of the anal verge.  The bladder was filled with 200 mL of Iohexol at 40 mL/minute and serial pressures were recorded.  With coughing there was an appropriate rise in vesical and abdominal pressures with no change in detrusor pressure, confirming good study catheter placement.    DURING THE FILLING PHASE:  First sensation: 266 mL.  First Desire: 300 mL.  Strong Desire: 332 mL.  Maximum Capacity: 344 mL.    Uninhibited detrusor contractions: terminal DO with incontinence when Pdet reaches 46 cm H2O at a volume of 342 mL.  Compliance: fair/normal compliance until sudden rise in detrusor pressures near capacity.  Continence: terminal DOI as described above. No OPAL.  EMG: concordant during filling.    DURING THE VOIDING PHASE:  Maximum detrusor contraction with void: 59 cm of H2O pressure.  Voided volume: 65 mL.  Maximum flow rate: 1.8 mL/sec.  Average flow rate: 1.4 mL/sec.  Postvoid Residual: 300 mL.  EMG activity: quiet.  Character of voiding curve: low flow, intermittent.  BOOI: 45.1 (suggesting obstruction - see key below)  [obstructed (PATTEN index [BOOI] ? 40); equivocal (no definite    obstruction; BOOI 20-40); and no obstruction (BOOI ? 20)]    FLUOROSCOPIC IMAGING OF THE BLADDER DURING URODYNAMICS:  Please note, image numbers on UDS tracings correlate with iSite series numbers on PACS images. Fluoroscopy during today's procedure demonstrated a severely trabeculated bladder wall. No vesicoureteral reflux was observed.  The bladder neck was closed during early stages of filling and appeared open when he experienced terminal DOI and during voiding.  After voiding to completion, all catheters were removed and Palomares catheter was replaced (see separate nursing note for details).     ASSESSMENT/PLAN:  Mr. Vini Loya is a pleasant 80 year old male with urinary retention who demonstrated the following findings today on urodynamic evaluation:    -Normal bladder capacity (360 mL) with normal filling sensations.  -Normal bladder compliance without uninhibited detrusor contractions during filling, followed by terminal detrusor overactivity with incontinence when Pdet reaches 46 cm H2O at a volume of 342 mL.   -No stress urinary incontinence.  -Good detrusor contraction during voiding to max Pdet 59 cm H2O with severely reduced flow rate (Qmax 1.8 ml/s) and incomplete bladder emptying ( mL).  -Quiet EMG activity during voiding though BOOI is 45.1 which is suggestive for bladder outlet obstruction. Given history of BPH, plan is to correlate clinically with cystoscopy on 12/22/2020.  -Fluoroscopy reveals a severely trabeculated bladder wall without VUR. The bladder neck was closed during early stages of filling and appeared open when he experienced terminal DOI and during voiding.    The patient will follow up as scheduled with Dr. Zabala for cystoscopy and to further discuss today's study results and make plans for how best to proceed.      - A single Bactrim antibiotic was provided for UTI prophylaxis following completion of today's study per department protocol.  The risk of UTI with VUDS is  low at ~2.5-3%.      Thank you for allowing me to participate in the care of Mr. Vini Loya and please don't hesitate to contact me with any questions or concerns.      Mariam Arboleda PA-C  Urology Physician Assistant

## 2020-12-10 NOTE — LETTER
12/10/2020       RE: Vini Loya  [address]  [facility] [address]     Dear Colleague,    Thank you for referring your patient, Vini Loya, to the [facility] [place] at [facility]. Please see a copy of my visit note below.    PREPROCEDURE DIAGNOSES:    1. Urinary retention  2. BPH with LUTS    POSTPROCEDURE DIAGNOSES:  -Normal bladder capacity (360 mL) with normal filling sensations.  -Normal bladder compliance without uninhibited detrusor contractions during filling, followed by terminal detrusor overactivity with incontinence when Pdet reaches 46 cm H2O at a volume of 342 mL.   -No stress urinary incontinence.  -Good detrusor contraction during voiding to max Pdet 59 cm H2O with severely reduced flow rate (Qmax 1.8 ml/s) and incomplete bladder emptying ( mL).  -Quiet EMG activity during voiding though BOOI is 45.1 which is suggestive for bladder outlet obstruction. Given history of BPH, plan is to correlate clinically with cystoscopy on 12/22/2020.  -Fluoroscopy reveals a severely trabeculated bladder wall without VUR. The bladder neck was closed during early stages of filling and appeared open when he experienced terminal DOI and during voiding.    PROCEDURE:    1. Sterile urethral catheterization for measurement of residual urine volume.  2. Complex filling cystometrogram with measurement of bladder and rectal pressures.  3. Complex voiding cystometrogram with measurement of bladder and rectal pressures.  4. Electromyography of the pelvic floor during urodynamics.  5. Fluoroscopic imaging of the bladder during urodynamics, at least 3 views.    6. Interpretation of urodynamics and flouroscopic imaging.      INDICATIONS FOR PROCEDURE:  Mr. Vini Loya is a pleasant 80 year old male with BPH with LUTS and urinary retention, currently managed with indwelling Palomares catheter. Baseline video urodynamic assessment is requested today  by Lupe Hairston PA-C to better characterize Mr. Vini Loya's voiding dysfunction.      VOIDING DIARY:  Not completed as patient had indwelling Palomares catheter.     DESCRIPTION OF PROCEDURE:  Risks, benefits, and alternatives to urodynamics were discussed with the patient and he wished to proceed.  Urodynamics are planned to better assess the primary etiology for Mr. Loya's urologic dysfunction.  The patient does not currently take anticholinergic medications for his bladder.  After informed consent was obtained, the patient was taken to the procedure room where uroflowmetry was performed. Findings below.     PRE-STUDY UROFLOWMETRY:  Not performed as patient had indwelling Palomares catheter.   Residual by catheter: 10 mL.  Pretest urine dipstick was not performed per clinician discretion given the presence of an indwelling Palomares catheter and complete lack of any UTI symptoms.    Patient's Palomares catheter was removed uneventfully. Next a 7F double-lumen urodynamics catheter was inserted into the bladder under sterile technique via urethra.  A 7F abdominal manometry catheter was placed in the rectum.  EMG pads were placed on both sides of the anal verge.  The bladder was filled with 200 mL of Iohexol at 40 mL/minute and serial pressures were recorded.  With coughing there was an appropriate rise in vesical and abdominal pressures with no change in detrusor pressure, confirming good study catheter placement.    DURING THE FILLING PHASE:  First sensation: 266 mL.  First Desire: 300 mL.  Strong Desire: 332 mL.  Maximum Capacity: 344 mL.    Uninhibited detrusor contractions: terminal DO with incontinence when Pdet reaches 46 cm H2O at a volume of 342 mL.  Compliance: fair/normal compliance until sudden rise in detrusor pressures near capacity.  Continence: terminal DOI as described above. No OPAL.  EMG: concordant during filling.    DURING THE VOIDING PHASE:  Maximum detrusor contraction with void: 59 cm of H2O  pressure.  Voided volume: 65 mL.  Maximum flow rate: 1.8 mL/sec.  Average flow rate: 1.4 mL/sec.  Postvoid Residual: 300 mL.  EMG activity: quiet.  Character of voiding curve: low flow, intermittent.  BOOI: 45.1 (suggesting obstruction - see key below)  [obstructed (PATTEN index [BOOI] ? 40); equivocal (no definite   obstruction; BOOI 20-40); and no obstruction (BOOI ? 20)]    FLUOROSCOPIC IMAGING OF THE BLADDER DURING URODYNAMICS:  Please note, image numbers on UDS tracings correlate with iSite series numbers on PACS images. Fluoroscopy during today's procedure demonstrated a severely trabeculated bladder wall. No vesicoureteral reflux was observed.  The bladder neck was closed during early stages of filling and appeared open when he experienced terminal DOI and during voiding.  After voiding to completion, all catheters were removed and Palomares catheter was replaced (see separate nursing note for details).     ASSESSMENT/PLAN:  Mr. Vini Loya is a pleasant 80 year old male with urinary retention who demonstrated the following findings today on urodynamic evaluation:    -Normal bladder capacity (360 mL) with normal filling sensations.  -Normal bladder compliance without uninhibited detrusor contractions during filling, followed by terminal detrusor overactivity with incontinence when Pdet reaches 46 cm H2O at a volume of 342 mL.   -No stress urinary incontinence.  -Good detrusor contraction during voiding to max Pdet 59 cm H2O with severely reduced flow rate (Qmax 1.8 ml/s) and incomplete bladder emptying ( mL).  -Quiet EMG activity during voiding though BOOI is 45.1 which is suggestive for bladder outlet obstruction. Given history of BPH, plan is to correlate clinically with cystoscopy on 12/22/2020.  -Fluoroscopy reveals a severely trabeculated bladder wall without VUR. The bladder neck was closed during early stages of filling and appeared open when he experienced terminal DOI and during voiding.    The  patient will follow up as scheduled with Dr. Zabala for cystoscopy and to further discuss today's study results and make plans for how best to proceed.      - A single Bactrim antibiotic was provided for UTI prophylaxis following completion of today's study per department protocol.  The risk of UTI with VUDS is low at ~2.5-3%.      Thank you for allowing me to participate in the care of Mr. Vini Loya and please don't hesitate to contact me with any questions or concerns.      Mariam Arboleda PA-C  Urology Physician Assistant
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
## 2020-12-16 ENCOUNTER — PRE VISIT (OUTPATIENT)
Dept: UROLOGY | Facility: CLINIC | Age: 80
End: 2020-12-16

## 2020-12-16 NOTE — TELEPHONE ENCOUNTER
Visit Type : Cystoscopy    Hx/Sx: Urinary retention    Records/Orders: Available    Pt Contacted: no    At Rooming: Normal

## 2020-12-22 ENCOUNTER — OFFICE VISIT (OUTPATIENT)
Dept: UROLOGY | Facility: CLINIC | Age: 80
End: 2020-12-22
Payer: COMMERCIAL

## 2020-12-22 VITALS — DIASTOLIC BLOOD PRESSURE: 104 MMHG | SYSTOLIC BLOOD PRESSURE: 173 MMHG | HEART RATE: 56 BPM

## 2020-12-22 DIAGNOSIS — R33.9 URINARY RETENTION: Primary | ICD-10-CM

## 2020-12-22 PROCEDURE — 52000 CYSTOURETHROSCOPY: CPT | Performed by: UROLOGY

## 2020-12-22 ASSESSMENT — PAIN SCALES - GENERAL: PAINLEVEL: NO PAIN (0)

## 2020-12-22 NOTE — PROGRESS NOTES
CYSTOSCOPY PROCEDURE NOTE    Reason for cystoscopy: 79 yo M with hx of Parkinsons and urinary retention.  Has had VUDS which showed high pressure low flow.  Is bothered by indweling catheter.  Desires removal, has failed several voiding trials.  Believes his LUTS have been progressive for years.     CYSTOSCOPY  After obtaining informed consent, the patient was prepped and draped in the standard sterile fashion.  The 15 Persian flexible cystoscope was inserted through the urethral meatus.      The anterior urethra was:  normal without stricture.    The external sphincter was  appropriately coapted.   The prostatic urethra demonstrated bilobar hypertrophy with a high bladder neck.    The bladder neck was nonocclusive .    The bladder was  unremarkable for tumors, erythema or stones.    The ureteral orifices  were identified on each side in orthotopic position with efflux of clear urine.   There were moderate trabeculations.    On retroflexion there was the usual bladder neck hyperemia.    There was minimal intravesical protrusion of the prostate.      The patient tolerated the procedure well without complication.      Assessment/Plan: 79 yo M with urinary retention, Parkinsons  -After discussion of medical and surgical treatments for BPH and retention, Mr. Loya has decided to proceed with Holmium Laser Enucleation of the Prostate (HoLEP).    We discussed the associated risks of this procedure included but not limited to the following:  -Bleeding, potentially significant enough to require clot evacuation and blood transfusion  -Infection, for which we will plan to treat preoperatively based on targeted antibiotic therapy  -Damage to the bladder, urethra and penis including the risk of urethral stricture and bladder neck contracture  -Risk of incidentally discovered prostate cancer.  We discussed that this would not preclude him from further therapy though it could prolong recovery and potentially increase risk of  complications associated with cancer treatment.  Further preoperative workup to assess for prostate cancer prior to surgery was offered but patient deferred.  -Risk of retrograde ejaculation which would be expected to occur in the majority if not all men after HoLEP  -Risk of urinary incontinence.  We discussed that in the majority of men this is a transient process that is generally self limited to the first 6-12 weeks after surgery though could take longer to resolve depending on baseline bladder instability.  -Risk of postperative urinary retention though in published series HoLEP has been found to be associated with high success rates of achieving spontaneous voiding even in men with underactive and atonic bladders.  -We will proceed with preoperative clearance with preference to minimize all anticoagulation as deemed acceptable by his primary care provider.       I, Michael Zabala saw and evaluated this patient and agree with the plan as stated above.  I personally performed all listed procedures.

## 2020-12-22 NOTE — PROGRESS NOTES
Pre Op Teaching Flowsheet       Pre and Post op Patient Education  Relevant Diagnosis: BPH    Patient was given [facility] handouts during this visit.     Motivation Level:  Asks Questions: Yes  Eager to Learn:  Yes  Cooperative: Yes  Receptive (willing/able to accept information):  Yes  Patient demonstrates understanding of the following:  Date and time of surgery:  TBD-unable to schedule at this visit. Patient informed that our surgery scheduler will be reaching out to help him get set up.   Location of surgery: TBD  History and Physical and any other testing necessary prior to surgery: Yes  Required time line for completion of History and Physical and any pre-op testing: Yes    NPO Guidelines: Nothing to eat 8 hours prior to surgery. Can have clear liquids up to 2 hours prior to sugery    Patient demonstrates understanding of the following:  Pre-op bowel prep: N/A  Pre-op showering/scrub information with Hibiclens Soap: Yes  Medications to take the day of surgery:  Per PCP  Blood thinner medications discussed and when to stop (if applicable):  Yes  Diabetes medication management (if applicable):  N/A  Discussed pain control after surgery: pain scale, pain medications and pain management techniques  Infection Prevention: Patient demonstrates understanding of the following:  Patient instructed on hand hygiene:  Yes  Surgical procedure site care taught: N/A  Signs and symptoms of infection taught:  Yes  Wound care will be taught at the time of discharge.  Central venous catheter care will be taught at the time of discharge (if applicable).    Post-op follow-up:  Discussed how to contact the hospital, nurse, and clinic scheduling staff if necessary.    Instructional materials used/given/mailed:  [facility] Booklet, post op teaching sheet, Map, Soap, and arrival/location information.    Surgical instructions given to patient in clinic: Yes.    Instructional Materials given:  Before your surgery packet , Medications  to avoid before surgery , Showering or Bathing instructions before surgery  and What to expect after surgery  Total time with patient: 10 minutes    Francisca Bradley RN

## 2020-12-22 NOTE — LETTER
12/22/2020       RE: Vini Loya  [address]  [place] [address]     Dear Colleague,    Thank you for referring your patient, Vini Loya, to the [facility]OLOG[facility] [place] at [facility]. Please see a copy of my visit note below.    CYSTOSCOPY PROCEDURE NOTE    Reason for cystoscopy: 79 yo M with hx of Parkinsons and urinary retention.  Has had VUDS which showed high pressure low flow.  Is bothered by indweling catheter.  Desires removal, has failed several voiding trials.  Believes his LUTS have been progressive for years.     CYSTOSCOPY  After obtaining informed consent, the patient was prepped and draped in the standard sterile fashion.  The 15 [place] flexible cystoscope was inserted through the urethral meatus.      The anterior urethra was:  normal without stricture.    The external sphincter was  appropriately coapted.   The prostatic urethra demonstrated bilobar hypertrophy with a high bladder neck.    The bladder neck was nonocclusive .    The bladder was  unremarkable for tumors, erythema or stones.    The ureteral orifices  were identified on each side in orthotopic position with efflux of clear urine.   There were moderate trabeculations.    On retroflexion there was the usual bladder neck hyperemia.    There was minimal intravesical protrusion of the prostate.      The patient tolerated the procedure well without complication.      Assessment/Plan: 79 yo M with urinary retention, Parkinsons  -After discussion of medical and surgical treatments for BPH and retention, Mr. Loya has decided to proceed with Holmium Laser Enucleation of the Prostate (HoLEP).    We discussed the associated risks of this procedure included but not limited to the following:  -Bleeding, potentially significant enough to require clot evacuation and blood transfusion  -Infection, for which we will plan to treat preoperatively based on targeted antibiotic  therapy  -Damage to the bladder, urethra and penis including the risk of urethral stricture and bladder neck contracture  -Risk of incidentally discovered prostate cancer.  We discussed that this would not preclude him from further therapy though it could prolong recovery and potentially increase risk of complications associated with cancer treatment.  Further preoperative workup to assess for prostate cancer prior to surgery was offered but patient deferred.  -Risk of retrograde ejaculation which would be expected to occur in the majority if not all men after HoLEP  -Risk of urinary incontinence.  We discussed that in the majority of men this is a transient process that is generally self limited to the first 6-12 weeks after surgery though could take longer to resolve depending on baseline bladder instability.  -Risk of postperative urinary retention though in published series HoLEP has been found to be associated with high success rates of achieving spontaneous voiding even in men with underactive and atonic bladders.  -We will proceed with preoperative clearance with preference to minimize all anticoagulation as deemed acceptable by his primary care provider.       IMichael saw and evaluated this patient and agree with the plan as stated above.  I personally performed all listed procedures.

## 2020-12-22 NOTE — PATIENT INSTRUCTIONS
Please schedule a nurse visit for catheter removal in 1 month.    It was a pleasure meeting with you today.  Thank you for allowing me and my team the privilege of caring for you today.  YOU are the reason we are here, and I truly hope we provided you with the excellent service you deserve.  Please let us know if there is anything else we can do for you so that we can be sure you are leaving completely satisfied with your care experience.

## 2020-12-28 ENCOUNTER — PATIENT OUTREACH (OUTPATIENT)
Dept: CARE COORDINATION | Facility: CLINIC | Age: 80
End: 2020-12-28

## 2020-12-28 NOTE — PROGRESS NOTES
Clinic Care Coordination Contact  [facility]/Voicemail       Clinical Data: Care Coordinator Outreach  Outreach attempted x 1.  Left message on patient's voicemail with call back information and requested return call.  Plan: Care Coordinator will try to reach patient again in 10 business days.

## 2020-12-30 ENCOUNTER — TELEPHONE (OUTPATIENT)
Dept: UROLOGY | Facility: CLINIC | Age: 80
End: 2020-12-30

## 2020-12-30 PROBLEM — R33.9 URINARY RETENTION: Status: ACTIVE | Noted: 2020-12-30

## 2020-12-30 NOTE — TELEPHONE ENCOUNTER
Patient is scheduled for surgery with Dr. Zabala      Spoke or left message with: spoke with Kristi    Date of Surgery: 1/27/2021    Location: [place]    Informed patient they will need an adult  yes    Pre-op with surgeon (if applicable): n/a    H&P: Scheduled with pcp at [facility]    Additional imaging/appointments: n/a    Surgery packet: Mailed on 12/30/2020     Additional comments: COVID test scheduled at  COVID LAB on 1/23/2021 @ 11am

## 2021-01-04 DIAGNOSIS — Z11.59 ENCOUNTER FOR SCREENING FOR OTHER VIRAL DISEASES: ICD-10-CM

## 2021-01-08 ENCOUNTER — TELEPHONE (OUTPATIENT)
Dept: FAMILY MEDICINE | Facility: CLINIC | Age: 81
End: 2021-01-08

## 2021-01-08 NOTE — TELEPHONE ENCOUNTER
Reason for Call:  Form, our goal is to have forms completed with 72 hours, however, some forms may require a visit or additional information.    Type of letter, form or note:  medical    Who is the form from[facility]    Where did the form come from: form was faxed in    What clinic location was the form placed at?: [place]    Where the form was placed: placed on provider desk    What number is listed as a contact on the form?:   Phone: [phone]    FAX: [phone]       Additional comments: n/a    Call taken on 1/8/2021 at 3:34 PM by Alessia Rivera    '

## 2021-01-14 ENCOUNTER — OFFICE VISIT (OUTPATIENT)
Dept: FAMILY MEDICINE | Facility: CLINIC | Age: 81
End: 2021-01-14
Payer: COMMERCIAL

## 2021-01-14 ENCOUNTER — TELEPHONE (OUTPATIENT)
Dept: UROLOGY | Facility: CLINIC | Age: 81
End: 2021-01-14

## 2021-01-14 VITALS
SYSTOLIC BLOOD PRESSURE: 138 MMHG | TEMPERATURE: 97.5 F | WEIGHT: 181 LBS | HEART RATE: 58 BPM | DIASTOLIC BLOOD PRESSURE: 78 MMHG | OXYGEN SATURATION: 95 % | RESPIRATION RATE: 16 BRPM | BODY MASS INDEX: 27.93 KG/M2

## 2021-01-14 DIAGNOSIS — R33.8 BENIGN PROSTATIC HYPERPLASIA WITH URINARY RETENTION: ICD-10-CM

## 2021-01-14 DIAGNOSIS — N40.1 BENIGN PROSTATIC HYPERPLASIA WITH URINARY RETENTION: ICD-10-CM

## 2021-01-14 DIAGNOSIS — R33.9 URINARY RETENTION: ICD-10-CM

## 2021-01-14 DIAGNOSIS — Z01.818 PREOP GENERAL PHYSICAL EXAM: Primary | ICD-10-CM

## 2021-01-14 LAB
ERYTHROCYTE [DISTWIDTH] IN BLOOD BY AUTOMATED COUNT: 12.8 % (ref 10–15)
HCT VFR BLD AUTO: 44.9 % (ref 40–53)
HGB BLD-MCNC: 14.4 G/DL (ref 13.3–17.7)
MCH RBC QN AUTO: 31.2 PG (ref 26.5–33)
MCHC RBC AUTO-ENTMCNC: 32.1 G/DL (ref 31.5–36.5)
MCV RBC AUTO: 97 FL (ref 78–100)
PLATELET # BLD AUTO: 287 10E9/L (ref 150–450)
RBC # BLD AUTO: 4.61 10E12/L (ref 4.4–5.9)
WBC # BLD AUTO: 10.8 10E9/L (ref 4–11)

## 2021-01-14 PROCEDURE — 87086 URINE CULTURE/COLONY COUNT: CPT | Performed by: FAMILY MEDICINE

## 2021-01-14 PROCEDURE — 99215 OFFICE O/P EST HI 40 MIN: CPT | Performed by: FAMILY MEDICINE

## 2021-01-14 PROCEDURE — 85027 COMPLETE CBC AUTOMATED: CPT | Performed by: FAMILY MEDICINE

## 2021-01-14 PROCEDURE — 87088 URINE BACTERIA CULTURE: CPT | Performed by: FAMILY MEDICINE

## 2021-01-14 PROCEDURE — 87186 SC STD MICRODIL/AGAR DIL: CPT | Performed by: FAMILY MEDICINE

## 2021-01-14 PROCEDURE — 80048 BASIC METABOLIC PNL TOTAL CA: CPT | Performed by: FAMILY MEDICINE

## 2021-01-14 PROCEDURE — 36415 COLL VENOUS BLD VENIPUNCTURE: CPT | Performed by: FAMILY MEDICINE

## 2021-01-14 NOTE — TELEPHONE ENCOUNTER
[facility] Call [facility]    Phone Message    May a detailed message be left on voicemail: yes     Reason for Call: Other: Kristi called to get some more information on Vini's surgery on 1/27/ Please call Kristi to discuss.      Action Taken: Other:  Urology    Travel Screening: Not Applicable

## 2021-01-14 NOTE — LETTER
January 18, 2021      Vini Loya  [address] [id number]        Mr. Loya, a pre-op urine culture came back to me.  Some bacteria did grow so I would recommend treatment with Augmentin twice daily for 7 days.  I will let Dr. Michaud know incase a different plan is recommended by the urology team.     I'll have my triage team call you as well to let you know.     Joel Wegener,MD     Resulted Orders   Urine Culture Aerobic Bacterial   Result Value Ref Range    Specimen Description Midstream Urine     Special Requests Specimen received in preservative     Culture Micro >100,000 colonies/mL  Enterococcus faecalis   (A)     Culture Micro (A)      >100,000 colonies/mL  Strain 2  Enterococcus faecalis      Culture Micro Susceptibility testing in progress
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
## 2021-01-14 NOTE — Clinical Note
Pt seen today for pre-op. Will have patient follow-up with cardiology, can have surgery pending cardiology clearance.

## 2021-01-14 NOTE — PATIENT INSTRUCTIONS

## 2021-01-15 ENCOUNTER — TELEPHONE (OUTPATIENT)
Dept: UROLOGY | Facility: CLINIC | Age: 81
End: 2021-01-15

## 2021-01-15 LAB
ANION GAP SERPL CALCULATED.3IONS-SCNC: <1 MMOL/L (ref 3–14)
BUN SERPL-MCNC: 22 MG/DL (ref 7–30)
CALCIUM SERPL-MCNC: 9 MG/DL (ref 8.5–10.1)
CHLORIDE SERPL-SCNC: 106 MMOL/L (ref 94–109)
CO2 SERPL-SCNC: 33 MMOL/L (ref 20–32)
CREAT SERPL-MCNC: 1.1 MG/DL (ref 0.66–1.25)
GFR SERPL CREATININE-BSD FRML MDRD: 63 ML/MIN/{1.73_M2}
GLUCOSE SERPL-MCNC: 97 MG/DL (ref 70–99)
POTASSIUM SERPL-SCNC: 4.7 MMOL/L (ref 3.4–5.3)
SODIUM SERPL-SCNC: 139 MMOL/L (ref 133–144)

## 2021-01-15 NOTE — TELEPHONE ENCOUNTER
[facility] Call Center    Phone Message    May a detailed message be left on voicemail: yes     Reason for Call: Other: Pt Spouse Kristi called on Pt behalf requesting pre-OP instructions. Please call her to kevin.     Action Taken: Message routed to:  [facility]    Travel Screening: Not Applicable

## 2021-01-15 NOTE — TELEPHONE ENCOUNTER
I called and spoke with wife. She wanted to know what time he needed to be at the hospital for his surgery. I gave her the time that he is currently scheduled for but I did let her know to be very flexible that day because surgery time can and often do change. I also let her know that he will be getting a call from a pre-op nurse a day or so prior to the surgery and will all the instructions. If the time is different from what I just told her to, please with what that nurse tells her. She agrees with plan. Francisca Bradley RN

## 2021-01-17 ENCOUNTER — TELEPHONE (OUTPATIENT)
Dept: FAMILY MEDICINE | Facility: CLINIC | Age: 81
End: 2021-01-17

## 2021-01-17 DIAGNOSIS — N30.00 ACUTE CYSTITIS WITHOUT HEMATURIA: Primary | ICD-10-CM

## 2021-01-17 NOTE — TELEPHONE ENCOUNTER
Triage please call pt re: below.  I sent prescription to [facility]    Dr. Keily beavers in case you recommend a different plan.     Joel Wegener,MD      Mr. Loya, a pre-op urine culture came back to me.  Some bacteria did grow so I would recommend treatment with Augmentin twice daily for 7 days.  I will let Dr. Michaud know incase a different plan is recommended by the urology team.     I'll have my triage team call you as well to let you know.     Joel Wegener,MD

## 2021-01-17 NOTE — PROGRESS NOTES
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 He was last evaluated by Dr. Eloise Shannon July 2020 and denied cardiac symptoms. His last stress echo was 6/2019 which demonstrated no ischemia.    Mr. Loya presents to the visit accompanied by his wife. He has no specific cardiac concerns today. He tells me he has been more active over the past 6 months and has noticed that his strength has improved. He is no longer going to the gym due to COVID-19 but remains active walking around his house for 45-55 minutes daily. He denies exertional chest pain or dyspnea, nor has he experienced exertional fatigue reminiscent of his prior angina. He denies recent NTG use. He has no PND, orthopnea, weight gain, lightheadedness, palpitations or syncope. He reports chronic right lower extremity swelling due to a old leg injury. He reports balance issues and a history of mechanical falls related to Parkinsons. His last fall was in September 2020.    He tells me he was diagnosed with a UTI today during his pre-op. He suspected he had an infection as his tremors have been worse recently, which they often are in the setting of an infection. His home blood pressures are labile but generally run 130's/70's, lately they have been 150's/80's which he attributes to his UTI. He is compliant with his cardiac medications and denies side effects.     Past medical history:  Past Medical History:   Diagnosis Date     CAD (coronary artery disease)     With Stent Placement     Cerebral embolism with cerebral infarction (H) 2/27/2007      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     Chronic infection     Bladder     Closed nondisplaced fracture of styloid process of left radius 10/16/2017     Corneal ulcer, unspecified     s/p right corneal tranplant--Herp[facility]       Fall 8/11/2020     GENERALIZED ANXIETY DIS 5/22/2007     Gross hematuria 5/31/2013     Hyperlipidemia LDL goal < 100      Hypertension goal BP (blood pressure) < 130/80      Hypertension, goal below 150/90 6/23/2016     Lactose intolerance in adult 12/8/2016     Marital conflict 5/20/2011     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      Medications:       amoxicillin-clavulanate (AUGMENTIN) 875-125 MG tablet, Take 1 tablet by mouth 2 times daily for 7 days       aspirin (ASA) 81 MG tablet, Take 1 tablet (81 mg) by mouth daily       bisacodyl (DULCOLAX) 10 MG suppository, Place 1 suppository (10 mg) rectally daily as needed for constipation       carbidopa-levodopa (SINEMET CR)  MG CR tablet, Take 1 tablet by mouth At Bedtime       carbidopa-levodopa (SINEMET)  MG tablet, Take 2 tablets by mouth 3 times daily Take two tablets 3 times a day, at 7am, 11am & 4pm.       dutasteride (AVODART) 0.5 MG capsule, Take 1 capsule (0.5 mg) by mouth daily       ondansetron (ZOFRAN-ODT) 4 MG ODT tab, DISSOLVE 1 TAB BY MOUTH EVERY 8 HOURS AS NEEDED FOR NAUSEA       PARoxetine (PAXIL) 20 MG tablet, Take 1 tablet (20 mg) by mouth every morning       polyethylene glycol (MIRALAX) 17 GM/SCOOP powder, Take 17 g (1 capful) by mouth 3 times daily (Patient taking differently: Take 1 capful by mouth 3 times daily as  needed for constipation )       rOPINIRole (REQUIP) 0.25 MG tablet, Take 0.5 mg by mouth 3 times daily        SENNA-docusate sodium (SENNA S) 8.6-50 MG tablet, Take 2 tablets by mouth 2 times daily       simvastatin (ZOCOR) 40 MG tablet, TAKE ONE TABLET BY MOUTH EVERY NIGHT AT BEDTIME       sulfamethoxazole-trimethoprim (BACTRIM/SEPTRA) 400-80 MG tablet, Take 1 tablet by mouth At Bedtime For UTI prevention       tamsulosin (FLOMAX) 0.4 MG capsule,        vitamin D3 (CHOLECALCIFEROL) 2000 units (50 mcg) tablet, Take 1 tablet by mouth daily as needed     No current facility-administered medications on file prior to visit.     Allergies:   Allergies   Allergen Reactions     Lactose      Family and social history:  Family History   Problem Relation Age of Onset     C.A.D. Mother      C.A.D. Father      Lung Cancer Sister      Hypertension Sister      Hypertension Sister      Hypertension Sister      Hypertension Sister      Hypertension Brother      Hypertension Brother      Hypertension Brother      Lipids Brother      Lipids Brother      Lipids Brother      Lipids Sister      Neurologic Disorder Sister 50        MS     Depression Sister         due to MS diagnosis     Depression Sister         due to losing      Depression Brother      Respiratory Sister         Asthma     Depression Sister         due to losing      Neurologic Disorder Daughter 33     Cancer Brother         Throat CA     Social History     Socioeconomic History     Marital status:      Spouse name: Kristi     Number of children: 3     Years of education: Vocational     Highest education level: Not on file   Occupational History     Occupation:      Employer: RETIRED     Comment: [employer]    Social Needs     Financial resource strain: Not on file     Food insecurity     Worry: Not on file     Inability: Not on file     Transportation needs     Medical: Not on file     Non-medical: Not on file   Tobacco Use      Smoking status: Former Smoker     Packs/day: 0.50     Years: 3.00     Pack years: 1.50     Types: Cigarettes     Start date: 1960     Quit date: 3/14/1966     Years since quittin.8     Smokeless tobacco: Former User   Substance and Sexual Activity     Alcohol use: No     Comment: occasional wine on the holidays      Drug use: No     Sexual activity: Yes     Partners: Female   Lifestyle     Physical activity     Days per week: Not on file     Minutes per session: Not on file     Stress: Not on file   Relationships     Social connections     Talks on phone: Not on file     Gets together: Not on file     Attends Sabianist service: Not on file     Active member of club or organization: Not on file     Attends meetings of clubs or organizations: Not on file     Relationship status: Not on file     Intimate partner violence     Fear of current or ex partner: Not on file     Emotionally abused: Not on file     Physically abused: Not on file     Forced sexual activity: Not on file   Other Topics Concern     Parent/sibling w/ CABG, MI or angioplasty before 65F 55M? No      Service Not Asked     Blood Transfusions Not Asked     Caffeine Concern Not Asked     Comment: 1/2 Decalf coffee every once in a while Uses Decaf most of the time     Occupational Exposure Not Asked     Hobby Hazards Not Asked     Sleep Concern Not Asked     Stress Concern Not Asked     Weight Concern Not Asked     Special Diet Not Asked     Back Care Not Asked     Exercise Not Asked     Comment: 3 to 4 times a week. Treadmill, Walking Track, Weights     Bike Helmet Not Asked     Seat Belt Not Asked     Self-Exams Not Asked   Social History Narrative    Balanced Diet - Yes    Osteoporosis Preventative measures-  Dairy servings per day: 1-2    Regular Exercise -  Yes Describe wlak the dog every day Bike for MS  Physical job    Dental Exam up - YES - Date: 10/05        Eye Exam - due    Self Testicular Exam -  Yes    Do you have any concerns  "about STD's -  No    Abuse: Current or Past (Physical, Sexual or Emotional)- No    Do you feel safe in your environment - Yes    Guns stored in the home - Yes    Sunscreen used - Yes    Seatbelts used - Yes    Lipids - YES - Date: 6/12/03    Glucose -  YES - Date: 6/12/03        Colon Cancer Screening - Colonoscopy 8/05    Hemoccults - YES - Date: Partcipated in the study    PSA - YES - Date: 8/05        Digital Rectal Exam - YES - Date: 8/05    Immunizations reviewed and up to date - No    Jaziel WILCOX     Review of Systems:  Skin: No skin rash or ulcers.  Respiratory: No cough or hemoptysis.  Cardiovascular: See HPI.    Gastrointestinal: No abdominal pain, nausea, vomiting, hematemesis or melena.  Genitourinary: No increased frequency or urgency of urine. No dysuria or hematuria.  Musculoskeletal: No polyarthralgia or myalgias.  Neurologic: No headaches, seizure or focal weakness.  Hematologic/Lymphatic/Immunologic: No bleeding tendency.    Vital signs:  BP (!) 151/81 (BP Location: Right arm, Patient Position: Chair, Cuff Size: Adult Regular)   Pulse 62   Ht 1.727 m (5' 8\")   Wt 79.8 kg (176 lb)   SpO2 99%   BMI 26.76 kg/m     Wt Readings from Last 2 Encounters:   01/14/21 82.1 kg (181 lb)   10/16/20 77.1 kg (170 lb)     Physical Exam:  Gen: NAD.    HEENT: No conjunctival pallor or scleral icterus, MMM. Clear oropharynx.    Neck: No JVD. No thyroid enlargement or cervical adenopathy.    Chest: Clear to auscultation bilaterally.    CV: Normal first and second heart sounds. No murmurs or gallop appreciated.    Abdomen: Soft, non-tender, non-distended, BS+.  Ext: No edema. Warm and well perfused with normal capillary refill.    Skin: No skin rash or ulcers.  Neuro: alert, oriented and appropriately conversant.    Psych: Normal affect and speech.    Labs:  Last Basic Metabolic Panel:  Lab Results   Component Value Date     01/14/2021      Lab Results   Component Value Date    POTASSIUM 4.7 01/14/2021 "     Lab Results   Component Value Date    CHLORIDE 106 01/14/2021     Lab Results   Component Value Date    JEMMA 9.0 01/14/2021     Lab Results   Component Value Date    CO2 33 01/14/2021     Lab Results   Component Value Date    BUN 22 01/14/2021     Lab Results   Component Value Date    CR 1.10 01/14/2021     Lab Results   Component Value Date    GLC 97 01/14/2021       Lab Results   Component Value Date    WBC 10.8 01/14/2021     Lab Results   Component Value Date    RBC 4.61 01/14/2021     Lab Results   Component Value Date    HGB 14.4 01/14/2021     Lab Results   Component Value Date    HCT 44.9 01/14/2021     Lab Results   Component Value Date    MCV 97 01/14/2021     Lab Results   Component Value Date    MCH 31.2 01/14/2021     Lab Results   Component Value Date    MCHC 32.1 01/14/2021     Lab Results   Component Value Date    RDW 12.8 01/14/2021     Lab Results   Component Value Date     01/14/2021       Lab Results   Component Value Date    INR 0.97 09/21/2020    INR 0.96 10/15/2017    INR 0.98 07/02/2017    INR 0.97 05/21/2014    INR 0.95 02/17/2012    INR 0.96 11/23/2011    INR 0.94 06/18/2010    INR 0.92 06/17/2010    INR 0.99 10/01/2009    INR 0.89 09/29/2009    INR 0.96 01/23/2008    INR 0.97 11/06/2007         Diagnostics:    EKG today:   Personally reviewed and interpreted by me.  SB HR 58 without ischemia or infarction.     Exercise stress echo 6/27/19:    Interpretation Summary  Normal exercise echocardiogram without evidence of inducible ischemia.  Target heart rate was achieved. Heart rate response to exercise was normal,  with hypertensive BP response. Normal LV function and wall motion at rest.  With stress, the left ventricular ejection fraction increased from 55-60% to  greater than 65% and the left ventricular size decreased appropriately. No  regional wall motion abnormality with stress.  Normal functional capacity. No subjective symptoms to suggest ischemia.  There was no ECG  evidence of ischemia.  No significant valve disease on screening doppler evaluation. The aortic root  and visualized ascending aorta are normal.  _____________________________________________________________________________  __     Stress  Limiting Symptom: None.  Peak MVO2 25.6 ml/kg/min .  Percent predicted MVO2 120 %.  RPP 29,280.  Target Heart Rate was achieved.  Exercise was stopped due to fatigue.  Exercise stopped due to a hypertensive response and fatigue.     Stress Results                                       Maximum Predicted HR:   141 bpm             Target HR: 120 bpm        % Maximum Predicted HR: 85 %                                 Stage    Heart Rate  BP                                         (bpm)                             Baseline      63    148/87                           Peak Exercse   120    244/97                            Stress Duration:   9:40 mm:ss *                      Maximum Stress HR: 120 bpm     Contrast  Definity (NDC #00887-879-89) given intravenously. Patient was given 10ml  mixture of 1.5ml Definity and 8.5ml saline. 0 ml wasted. Definity Lot # [id number] .  Definity Expiration 11/01/2019 .     Procedure  Stress Echo Bike with two dimensional, color and spectral Doppler performed.     Assessment and Plan:  Vini Loya is a 80 year old gentleman with a history of coronary artery disease s/p PCI of the LAD and RCA in 2009 with normal stress test in June 2019 who presents for pre-op evaluation. According to RCRI criteria, patient is at intermediate risk for a intermediate risk surgery. His RCRI score is 2 (CAD and CVA) which corresponds with 2.4% risk of major cardiac event in perioperative period. Patient is able to achieve > 4 METS without cardiovascular symptoms and his EKG today shows no evidence of ischemia or new infarction, therefore no further cardiac testing recommended prior to surgery. Continue ASA 81 mg and statin perioperatively without interruption.     Revised  [facility] Cardiac Risk Index:   Six independent predictors of major cardiac complications (high risk type surgery, ischemic heart disease, CHF, Cerebrovascular disease, diabetes on insulin, pre-op creat >2.0): Rate of cardiac death, nonfatal myocardial infarction, and nonfatal cardiac arrest according to the number of predictors: No risk factors 0.4 percent (95% CI: 0.1-0.8), One risk factor - 1.0 percent (95% CI: 0.5-1.4), Two risk factors - 2.4 percent (95% CI: 1.3-3.5), Three or more risk factors - 5.4 percent (95% CI: 2.8-7.9)    Follow-up: RTC in 6 months.     A total of 30 minutes spent personally reviewing and interpreting diagnostic imaging, providing face to face patient care and coordinating care.

## 2021-01-18 ENCOUNTER — OFFICE VISIT (OUTPATIENT)
Dept: CARDIOLOGY | Facility: CLINIC | Age: 81
End: 2021-01-18
Attending: NURSE PRACTITIONER
Payer: COMMERCIAL

## 2021-01-18 ENCOUNTER — TELEPHONE (OUTPATIENT)
Dept: UROLOGY | Facility: CLINIC | Age: 81
End: 2021-01-18

## 2021-01-18 VITALS
WEIGHT: 176 LBS | SYSTOLIC BLOOD PRESSURE: 151 MMHG | OXYGEN SATURATION: 99 % | HEIGHT: 68 IN | BODY MASS INDEX: 26.67 KG/M2 | DIASTOLIC BLOOD PRESSURE: 81 MMHG | HEART RATE: 62 BPM

## 2021-01-18 DIAGNOSIS — I25.10 CORONARY ARTERY DISEASE INVOLVING NATIVE CORONARY ARTERY OF NATIVE HEART WITHOUT ANGINA PECTORIS: ICD-10-CM

## 2021-01-18 DIAGNOSIS — Z01.810 ENCOUNTER FOR PRE-OPERATIVE CARDIOVASCULAR CLEARANCE: Primary | ICD-10-CM

## 2021-01-18 LAB
BACTERIA SPEC CULT: ABNORMAL
BACTERIA SPEC CULT: ABNORMAL
Lab: ABNORMAL
SPECIMEN SOURCE: ABNORMAL

## 2021-01-18 PROCEDURE — 99214 OFFICE O/P EST MOD 30 MIN: CPT | Mod: 25 | Performed by: NURSE PRACTITIONER

## 2021-01-18 PROCEDURE — 93005 ELECTROCARDIOGRAM TRACING: CPT

## 2021-01-18 PROCEDURE — G0463 HOSPITAL OUTPT CLINIC VISIT: HCPCS | Mod: 25

## 2021-01-18 ASSESSMENT — PAIN SCALES - GENERAL: PAINLEVEL: NO PAIN (0)

## 2021-01-18 ASSESSMENT — MIFFLIN-ST. JEOR: SCORE: 1477.83

## 2021-01-18 NOTE — PATIENT INSTRUCTIONS
You were seen today in the Cardiovascular Clinic at the [facility].    Cardiology provider you saw during your visit Jenni Reid NP.     1. Your ECG shows normal rhythm without concerning findings.   2. Given that you are active without heart symptoms, no further heart testing needed prior to surgery.   3. Continue aspirin perioperatively without interruption - I will relay this information to the pre-op team.  4. Follow-up with urology regarding whether or not your catheter should be exchanged given UTI, as well as if surgery will be delayed given UTI.   5. Follow-up with Dr. Eloise Shannon in 6 months with labs prior to visit.     Questions and [phone].   First press #1 for the University and then press #3 for Medical Questions to reach the Cardiology triage nurse.     On Call Cardiologist for after hours or on weekends: [phone], press option #4 and ask to speak to the on-call Cardiologist.

## 2021-01-18 NOTE — LETTER
1/18/2021      RE: Vini Loya  [address]  [facility] [address]       Dear Colleague,    Thank you for the opportunity to participate in the care of your patient, Vini Loya, at the [facility] [place] [facility]. Please see a copy of my visit note below.    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 He was last evaluated by Dr. Eloise Shannon July 2020 and denied cardiac symptoms. His last stress echo was 6/2019 which demonstrated no ischemia.    Mr. Loya presents to the visit accompanied by his wife. He has no specific cardiac concerns today. He tells me he has been more active over the past 6 months and has noticed that his strength has improved. He is no longer going to the gym due to COVID-19 but remains active walking around his house for 45-55 minutes daily. He denies exertional chest pain or dyspnea, nor has he experienced exertional fatigue reminiscent of his prior angina. He denies recent NTG use. He has no PND, orthopnea, weight gain, lightheadedness, palpitations or syncope. He reports chronic right lower extremity swelling due to a old leg injury. He reports balance issues and a history of mechanical falls related to Parkinsons. His last fall was in September 2020.    He tells me he was diagnosed with a UTI today during his pre-op. He suspected he had an infection as his tremors have been worse recently, which they often are in the setting of an infection. His home blood pressures are labile but generally  run 130's/70's, lately they have been 150's/80's which he attributes to his UTI. He is compliant with his cardiac medications and denies side effects.     Past medical history:  Past Medical History:   Diagnosis Date     CAD (coronary artery disease)     With Stent Placement     Cerebral embolism with cerebral infarction (H) 2/27/2007     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     Chronic infection     Bladder     Closed nondisplaced fracture of styloid process of left radius 10/16/2017     Corneal ulcer, unspecified     s/p right corneal tranplant--Herpetic       Fall 8/11/2020     GENERALIZED ANXIETY DIS 5/22/2007     Gross hematuria 5/31/2013     Hyperlipidemia LDL goal < 100      Hypertension goal BP (blood pressure) < 130/80      Hypertension, goal below 150/90 6/23/2016     Lactose intolerance in adult 12/8/2016     Marital conflict 5/20/2011     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      Medications:       amoxicillin-clavulanate (AUGMENTIN) 875-125 MG tablet, Take 1 tablet by mouth 2 times daily for 7 days       aspirin (ASA) 81 MG tablet, Take 1 tablet (81 mg) by mouth daily       bisacodyl (DULCOLAX) 10 MG suppository, Place 1 suppository (10 mg) rectally daily as needed for constipation       carbidopa-levodopa (SINEMET CR)  MG CR tablet, Take 1 tablet by mouth At Bedtime       carbidopa-levodopa (SINEMET)  MG tablet, Take 2 tablets by mouth 3 times daily Take two tablets 3 times a day, at 7am, 11am & 4pm.       dutasteride (AVODART) 0.5 MG capsule, Take 1 capsule (0.5 mg) by mouth daily       ondansetron  (ZOFRAN-ODT) 4 MG ODT tab, DISSOLVE 1 TAB BY MOUTH EVERY 8 HOURS AS NEEDED FOR NAUSEA       PARoxetine (PAXIL) 20 MG tablet, Take 1 tablet (20 mg) by mouth every morning       polyethylene glycol (MIRALAX) 17 GM/SCOOP powder, Take 17 g (1 capful) by mouth 3 times daily (Patient taking differently: Take 1 capful by mouth 3 times daily as needed for constipation )       rOPINIRole (REQUIP) 0.25 MG tablet, Take 0.5 mg by mouth 3 times daily        SENNA-docusate sodium (SENNA S) 8.6-50 MG tablet, Take 2 tablets by mouth 2 times daily       simvastatin (ZOCOR) 40 MG tablet, TAKE ONE TABLET BY MOUTH EVERY NIGHT AT BEDTIME       sulfamethoxazole-trimethoprim (BACTRIM/SEPTRA) 400-80 MG tablet, Take 1 tablet by mouth At Bedtime For UTI prevention       tamsulosin (FLOMAX) 0.4 MG capsule,        vitamin D3 (CHOLECALCIFEROL) 2000 units (50 mcg) tablet, Take 1 tablet by mouth daily as needed     No current facility-administered medications on file prior to visit.     Allergies:   Allergies   Allergen Reactions     Lactose      Family and social history:  Family History   Problem Relation Age of Onset     C.A.D. Mother      C.A.D. Father      Lung Cancer Sister      Hypertension Sister      Hypertension Sister      Hypertension Sister      Hypertension Sister      Hypertension Brother      Hypertension Brother      Hypertension Brother      Lipids Brother      Lipids Brother      Lipids Brother      Lipids Sister      Neurologic Disorder Sister 50        MS     Depression Sister         due to MS diagnosis     Depression Sister         due to losing      Depression Brother      Respiratory Sister         Asthma     Depression Sister         due to losing      Neurologic Disorder Daughter 33     Cancer Brother         Throat CA     Social History     Socioeconomic History     Marital status:      Spouse name: Kristi     Number of children: 3     Years of education: Vocational     Highest education level:  Not on file   Occupational History     Occupation:      Employer: RETIRED     Comment: Was    Social Needs     Financial resource strain: Not on file     Food insecurity     Worry: Not on file     Inability: Not on file     Transportation needs     Medical: Not on file     Non-medical: Not on file   Tobacco Use     Smoking status: Former Smoker     Packs/day: 0.50     Years: 3.00     Pack years: 1.50     Types: Cigarettes     Start date: 1960     Quit date: 3/14/1966     Years since quittin.8     Smokeless tobacco: Former User   Substance and Sexual Activity     Alcohol use: No     Comment: occasional wine on the holidays      Drug use: No     Sexual activity: Yes     Partners: Female   Lifestyle     Physical activity     Days per week: Not on file     Minutes per session: Not on file     Stress: Not on file   Relationships     Social connections     Talks on phone: Not on file     Gets together: Not on file     Attends Protestant service: Not on file     Active member of club or organization: Not on file     Attends meetings of clubs or organizations: Not on file     Relationship status: Not on file     Intimate partner violence     Fear of current or ex partner: Not on file     Emotionally abused: Not on file     Physically abused: Not on file     Forced sexual activity: Not on file   Other Topics Concern     Parent/sibling w/ CABG, MI or angioplasty before 65F 55M? No      Service Not Asked     Blood Transfusions Not Asked     Caffeine Concern Not Asked     Comment: 1/2 Decalf coffee every once in a while Uses Decaf most of the time     Occupational Exposure Not Asked     Hobby Hazards Not Asked     Sleep Concern Not Asked     Stress Concern Not Asked     Weight Concern Not Asked     Special Diet Not Asked     Back Care Not Asked     Exercise Not Asked     Comment: 3 to 4 times a week. Treadmill, Walking Track, Weights     Bike Helmet Not Asked     Seat Belt Not Asked      "Self-Exams Not Asked   Social History Narrative    Balanced Diet - Yes    Osteoporosis Preventative measures-  Dairy servings per day: 1-2    Regular Exercise -  Yes Describe wlak the dog every day Bike for MS  Physical job    Dental Exam up - YES - Date: 10/05        Eye Exam - due    Self Testicular Exam -  Yes    Do you have any concerns about STD's -  No    Abuse: Current or Past (Physical, Sexual or Emotional)- No    Do you feel safe in your environment - Yes    Guns stored in the home - Yes    Sunscreen used - Yes    Seatbelts used - Yes    Lipids - YES - Date: 6/12/03    Glucose -  YES - Date: 6/12/03        Colon Cancer Screening - Colonoscopy 8/05    Hemoccults - YES - Date: Partcipated in the study    PSA - YES - Date: 8/05        Digital Rectal Exam - YES - Date: 8/05    Immunizations reviewed and up to date - No    Jaziel WILCOX     Review of Systems:  Skin: No skin rash or ulcers.  Respiratory: No cough or hemoptysis.  Cardiovascular: See HPI.    Gastrointestinal: No abdominal pain, nausea, vomiting, hematemesis or melena.  Genitourinary: No increased frequency or urgency of urine. No dysuria or hematuria.  Musculoskeletal: No polyarthralgia or myalgias.  Neurologic: No headaches, seizure or focal weakness.  Hematologic/Lymphatic/Immunologic: No bleeding tendency.    Vital signs:  BP (!) 151/81 (BP Location: Right arm, Patient Position: Chair, Cuff Size: Adult Regular)   Pulse 62   Ht 1.727 m (5' 8\")   Wt 79.8 kg (176 lb)   SpO2 99%   BMI 26.76 kg/m     Wt Readings from Last 2 Encounters:   01/14/21 82.1 kg (181 lb)   10/16/20 77.1 kg (170 lb)     Physical Exam:  Gen: NAD.    HEENT: No conjunctival pallor or scleral icterus, MMM. Clear oropharynx.    Neck: No JVD. No thyroid enlargement or cervical adenopathy.    Chest: Clear to auscultation bilaterally.    CV: Normal first and second heart sounds. No murmurs or gallop appreciated.    Abdomen: Soft, non-tender, non-distended, BS+.  Ext: No edema. " Warm and well perfused with normal capillary refill.    Skin: No skin rash or ulcers.  Neuro: alert, oriented and appropriately conversant.    Psych: Normal affect and speech.    Labs:  Last Basic Metabolic Panel:  Lab Results   Component Value Date     01/14/2021      Lab Results   Component Value Date    POTASSIUM 4.7 01/14/2021     Lab Results   Component Value Date    CHLORIDE 106 01/14/2021     Lab Results   Component Value Date    JEMMA 9.0 01/14/2021     Lab Results   Component Value Date    CO2 33 01/14/2021     Lab Results   Component Value Date    BUN 22 01/14/2021     Lab Results   Component Value Date    CR 1.10 01/14/2021     Lab Results   Component Value Date    GLC 97 01/14/2021       Lab Results   Component Value Date    WBC 10.8 01/14/2021     Lab Results   Component Value Date    RBC 4.61 01/14/2021     Lab Results   Component Value Date    HGB 14.4 01/14/2021     Lab Results   Component Value Date    HCT 44.9 01/14/2021     Lab Results   Component Value Date    MCV 97 01/14/2021     Lab Results   Component Value Date    MCH 31.2 01/14/2021     Lab Results   Component Value Date    MCHC 32.1 01/14/2021     Lab Results   Component Value Date    RDW 12.8 01/14/2021     Lab Results   Component Value Date     01/14/2021       Lab Results   Component Value Date    INR 0.97 09/21/2020    INR 0.96 10/15/2017    INR 0.98 07/02/2017    INR 0.97 05/21/2014    INR 0.95 02/17/2012    INR 0.96 11/23/2011    INR 0.94 06/18/2010    INR 0.92 06/17/2010    INR 0.99 10/01/2009    INR 0.89 09/29/2009    INR 0.96 01/23/2008    INR 0.97 11/06/2007         Diagnostics:    EKG today:   Personally reviewed and interpreted by me.  SB HR 58 without ischemia or infarction.     Exercise stress echo 6/27/19:    Interpretation Summary  Normal exercise echocardiogram without evidence of inducible ischemia.  Target heart rate was achieved. Heart rate response to exercise was normal,  with hypertensive BP response.  Normal LV function and wall motion at rest.  With stress, the left ventricular ejection fraction increased from 55-60% to  greater than 65% and the left ventricular size decreased appropriately. No  regional wall motion abnormality with stress.  Normal functional capacity. No subjective symptoms to suggest ischemia.  There was no ECG evidence of ischemia.  No significant valve disease on screening doppler evaluation. The aortic root  and visualized ascending aorta are normal.  _____________________________________________________________________________  __     Stress  Limiting Symptom: None.  Peak MVO2 25.6 ml/kg/min .  Percent predicted MVO2 120 %.  RPP 29,280.  Target Heart Rate was achieved.  Exercise was stopped due to fatigue.  Exercise stopped due to a hypertensive response and fatigue.     Stress Results                                       Maximum Predicted HR:   141 bpm             Target HR: 120 bpm        % Maximum Predicted HR: 85 %                                 Stage    Heart Rate  BP                                         (bpm)                             Baseline      63    148/87                           Peak Exercse   120    244/97                            Stress Duration:   9:40 mm:ss *                      Maximum Stress HR: 120 bpm     Contrast  Definity (NDC #56398-890-96) given intravenously. Patient was given 10ml  mixture of 1.5ml Definity and 8.5ml saline. 0 ml wasted. Definity Lot # [id number] .  Definity Expiration 11/01/2019 .     Procedure  Stress Echo Bike with two dimensional, color and spectral Doppler performed.     Assessment and Plan:  Vini Loya is a 80 year old gentleman with a history of coronary artery disease s/p PCI of the LAD and RCA in 2009 with normal stress test in June 2019 who presents for pre-op evaluation. According to RCRI criteria, patient is at intermediate risk for a intermediate risk surgery. His RCRI score is 2 (CAD and CVA) which corresponds with 2.4% risk  of major cardiac event in perioperative period. Patient is able to achieve > 4 METS without cardiovascular symptoms and his EKG today shows no evidence of ischemia or new infarction, therefore no further cardiac testing recommended prior to surgery. Continue ASA 81 mg and statin perioperatively without interruption.     Revised [facility] Cardiac Risk Index:   Six independent predictors of major cardiac complications (high risk type surgery, ischemic heart disease, CHF, Cerebrovascular disease, diabetes on insulin, pre-op creat >2.0): Rate of cardiac death, nonfatal myocardial infarction, and nonfatal cardiac arrest according to the number of predictors: No risk factors 0.4 percent (95% CI: 0.1-0.8), One risk factor - 1.0 percent (95% CI: 0.5-1.4), Two risk factors - 2.4 percent (95% CI: 1.3-3.5), Three or more risk factors - 5.4 percent (95% CI: 2.8-7.9)    Follow-up: RTC in 6 months.     A total of 30 minutes spent personally reviewing and interpreting diagnostic imaging, providing face to face patient care and coordinating care.       Please do not hesitate to contact me if you have any questions/concerns.     Sincerely,     Jenni Reid NP

## 2021-01-18 NOTE — TELEPHONE ENCOUNTER
Spoke with patient.  Reviewed message below, Medication information and directions.    Verbalizes understanding  Kristin Luciano RN

## 2021-01-18 NOTE — TELEPHONE ENCOUNTER
Pt arrived to clinic with his wife after his cardiology appointment. She wanted to report that her  had a UTI, that the cardiologist wants Vini to continue aspirin, and they are still waiting for a surgery date.      Message routed to RN CC.

## 2021-01-19 LAB — INTERPRETATION ECG - MUSE: NORMAL

## 2021-01-21 ENCOUNTER — TELEPHONE (OUTPATIENT)
Dept: FAMILY MEDICINE | Facility: CLINIC | Age: 81
End: 2021-01-21

## 2021-01-21 NOTE — TELEPHONE ENCOUNTER
Reason for Call:  Form, our goal is to have forms completed with 72 hours, however, some forms may require a visit or additional information.    Type of letter, form or note:  Plan of care    Who is the form from?: home health care (if other please explain)    Where did the form come from: form was faxed in    What clinic location was the form placed at?: [facility]    Where the form was placed: Given to physician    What number is listed as a contact on the form?:  [phone] fax [phone]       Additional comments:     Call taken on 1/21/2021 at 3:30 PM by Braydon Riojas

## 2021-01-22 ENCOUNTER — MEDICAL CORRESPONDENCE (OUTPATIENT)
Dept: HEALTH INFORMATION MANAGEMENT | Facility: CLINIC | Age: 81
End: 2021-01-22

## 2021-01-22 ENCOUNTER — OFFICE VISIT (OUTPATIENT)
Dept: NEUROLOGY | Facility: CLINIC | Age: 81
End: 2021-01-22
Payer: COMMERCIAL

## 2021-01-22 VITALS
BODY MASS INDEX: 26.07 KG/M2 | HEART RATE: 61 BPM | OXYGEN SATURATION: 97 % | DIASTOLIC BLOOD PRESSURE: 71 MMHG | HEIGHT: 69 IN | RESPIRATION RATE: 16 BRPM | WEIGHT: 176 LBS | SYSTOLIC BLOOD PRESSURE: 119 MMHG

## 2021-01-22 DIAGNOSIS — G20.A1 PARKINSON'S DISEASE (H): Primary | ICD-10-CM

## 2021-01-22 DIAGNOSIS — R26.89 BALANCE PROBLEMS: ICD-10-CM

## 2021-01-22 DIAGNOSIS — R49.0 DYSPHONIA: ICD-10-CM

## 2021-01-22 DIAGNOSIS — F41.9 ANXIETY: ICD-10-CM

## 2021-01-22 PROCEDURE — 99215 OFFICE O/P EST HI 40 MIN: CPT | Performed by: NURSE PRACTITIONER

## 2021-01-22 ASSESSMENT — MIFFLIN-ST. JEOR: SCORE: 1485.77

## 2021-01-22 ASSESSMENT — UNIFIED PARKINSONS DISEASE RATING SCALE (UPDRS)
FREEZING_GAIT: NORMAL
FINGER_TAPPING_RIGHT: SLIGHT: ANY OF THE FOLLOWING: A) THE REGULAR RHYTHM IS BROKEN WITH ONE WITH ONE OR TWO INTERRUPTIONS OR HESITATIONS OF THE MOVEMENT B) SLIGHT SLOWING C) THE AMPLITUDE DECREMENTS NEAR THE END OF THE 10 MOVEMENTS.
RIGIDITY_RLE: MILD: RIGIDITY DETECTED WITHOUT THE ACTIVATION MANEUVER.  FULL RANGE OF MOTION IS EASILY ACHIEVED.
FINGER_TAPPING_LEFT: MODERATE: ANY OF THE FOLLOWING:  A) MORE THAN 5 INTERRUPTIONS  OR AT LEAST ONE LONGER ARREST (FREEZE) IN ONGOING MOVEMENT  B) MODERATE SLOWING C) THE AMPLITUDE DECREMENTS STARTING AFTER THE FIRST MOVEMENT.
POSTURE: 3 MODERATE.  STOOPED POSTURE, SCOLIOSIS, OR LEANING TO ONE SIDE THAT CANNOT BE CORRECTED VOLITIONALLY TO A NORMAL POSTURE BY THE PATIENT.
TOTAL_SCORE: 7
RIGIDITY_NECK: MILD: RIGIDITY DETECTED WITHOUT THE ACTIVATION MANEUVER.  FULL RANGE OF MOTION IS EASILY ACHIEVED.
RIGIDITY_LUE: MILD: RIGIDITY DETECTED WITHOUT THE ACTIVATION MANEUVER.  FULL RANGE OF MOTION IS EASILY ACHIEVED.
HANDMOVEMENTS_RIGHT: SLIGHT: ANY OF THE FOLLOWING: A) THE REGULAR RHYTHM IS BROKEN WITH ONE WITH ONE OR TWO INTERRUPTIONS OR HESITATIONS OF THE MOVEMENT B) SLIGHT SLOWING C) THE AMPLITUDE DECREMENTS NEAR THE END OF THE 10 MOVEMENTS.
PRONATION_SUPINATION_LEFT: SLIGHT: ANY OF THE FOLLOWING: A) THE REGULAR RHYTHM IS BROKEN WITH ONE WITH ONE OR TWO INTERRUPTIONS OR HESITATIONS OF THE MOVEMENT B) SLIGHT SLOWING C) THE AMPLITUDE DECREMENTS NEAR THE END OF THE 10 MOVEMENTS.
TOETAPPING_RIGHT: NORMAL
TOTAL_SCORE: 42
CONSTANCY_TREMOR_ATREST: SEVERE: TREMOR AT REST IS PRESENT > 75% OF THE ENTIRE EXAMINATION PERIOD.
AMPLITUDE_LUE: NORMAL: NO TREMOR.
TOTAL_SCORE_LEFT: 12
SPEECH: MILD: LOSS OF MODULATION, DICTION OR VOLUME, WITH A FEW WORDS UNCLEAR, BUT THE OVERALL SENTENCES EASY TO FOLLOW.
PARKINSONS_MEDS: ON
RIGIDITY_RUE: MILD: RIGIDITY DETECTED WITHOUT THE ACTIVATION MANEUVER.  FULL RANGE OF MOTION IS EASILY ACHIEVED.
POSTURAL_STABILITY: MODERATE: STANDS SAFELY, BUT WITH ABSENCE OF POSTURAL RESPONSE,  FALLS IF NOT CAUGHT BY EXAMINER.
GAIT: SLIGHT: INDEPENDENT WALKING WITH MINOR GAIT IMPAIRMENT.
LEG_AGILITY_LEFT: NORMAL
ARISING_CHAIR: SLIGHT: ARISING IS SLOWER THAN NORMAL, OR MAY NEED MORE THAN ONE ATTEMPT, OR MAY NEED TO MOVE FORWARD IN THE CHAIR TO ARISE.  NO NEED TO USE THE ARMS OF THE CHAIR.
HANDMOVEMENTS_LEFT: MILD: ANY OF THE FOLLOWING: A) 3 TO 5 INTERRUPTIONS DURING TAPPING B) MILD SLOWING C) THE AMPLITUDE DECREMENTS MIDWAY IN THE 10-MOVEMENT SEQUENCE
MOVEMENTS_INTERFERE_WITH_RATINGS: NO
AXIAL_SCORE: 16
TOETAPPING_LEFT: SLIGHT: ANY OF THE FOLLOWING: A) THE REGULAR RHYTHM IS BROKEN WITH ONE WITH ONE OR TWO INTERRUPTIONS OR HESITATIONS OF THE MOVEMENT B) SLIGHT SLOWING C) THE AMPLITUDE DECREMENTS NEAR THE END OF THE 10 MOVEMENTS.
AMPLITUDE_RUE: NORMAL: NO TREMOR.
SPONTANEITY_OF_MOVEMENT: 1: SLIGHT: SLIGHT GLOBAL SLOWNESS AND POVERTY OF SPONTANEOUS MOVEMENTS.
RIGIDITY_LLE: MILD: RIGIDITY DETECTED WITHOUT THE ACTIVATION MANEUVER.  FULL RANGE OF MOTION IS EASILY ACHIEVED.
PRONATION_SUPINATION_RIGHT: SLIGHT: ANY OF THE FOLLOWING: A) THE REGULAR RHYTHM IS BROKEN WITH ONE WITH ONE OR TWO INTERRUPTIONS OR HESITATIONS OF THE MOVEMENT B) SLIGHT SLOWING C) THE AMPLITUDE DECREMENTS NEAR THE END OF THE 10 MOVEMENTS.
FACIAL_EXPRESSION: MODERATE: MASKED FACIES WITH LIPS PARTED SOME OF THE TIME WHEN THE MOUTH IS AT REST.
AMPLITUDE_LIP_JAW: MODERATE: > 2 CM BUT < 3 CM IN MAXIMAL AMPLITUDE.
AMPLITUDE_RLE: NORMAL: NO TREMOR.
DYSKINESIAS_PRESENT: NO
LEG_AGILITY_RIGHT: NORMAL
AMPLITUDE_LLE: NORMAL: NO TREMOR.

## 2021-01-22 ASSESSMENT — PAIN SCALES - GENERAL: PAINLEVEL: MODERATE PAIN (4)

## 2021-01-22 NOTE — PATIENT INSTRUCTIONS
Dear Mr. Vini Loya,    Thank you for coming today.  During your visit, we have discussed the following:     ASSESSMENT:    1)  Parkinson's Disease:  Progressing. Tremor and freezing of gait improved after UTI was treated.       2)  Dysarthria: Has not done speech therapy.     3)  Balance problems: Ongoing problem.      PLAN:    __  Start going to the gym.  Call the gym and ask for less busy time.     __  Stay on the same antiparkinsonian medications.       PD Medications 7 am 11 am 4 pm 8 - 9 pm   Sinemet 25/100 mg  2 2 2     Sinemet 50/200 mg       1   Ropinirole 0.25 mg 1 1 1       __  Consider Speech Therapy to improve voice.     __  Work on your balance. When you do the balance exercises, stay close to the wall so that you can hold on to it if you experience lose of balance.     Will return to our clinic in 4 - 6 months or sooner as needed.      For questions, you may send us a Anonymous You message or call [phone]    Fax number: [phone]    KTOA Stone, CNP  [facility]

## 2021-01-22 NOTE — NURSING NOTE
Chief Complaint   Patient presents with     RECHECK     [facility] Return Movement Disorder - 3 mo f/u     Bob Morel

## 2021-01-22 NOTE — PROGRESS NOTES
MOVEMENT DISORDERS CLINIC    PATIENT: Vini Loya    : 1940    ALBINA: 2021    REASON FOR VISIT: Parkinson's disease (PD) follow up.    HPI: Mr. Vini Loya is an 80 year old left - handed  male who came to the [facility] neurology clinic accompanied by his wife for a follow up visit. I saw him last in the clinic on 10/20/2020 for a routine follow up visit.  During that visit, the following were discussed: -    Chart reviewed from last visit:     ASSESSMENT:     1)  Parkinson's Disease:  Progressing.  Having festinating and freezing of gait precipitating his falls.   2)  Frequent Falls:  Due to #1.   He had OT and PT.  The plan is to wait and see how he dose with the exercises.  His wife plans to call for more PT if needed.   3)  Clinic High Blood Pressure Reading:  Today's Blood pressure (!) 156/77, pulse 68, resp.  4)  Dizziness:  He has orthostatic hypotension.  But dizziness is not causing the falls.      PLAN:     __  Due to high BP in the clinic, pt/his wife were asked to check it at home.  If BP is consistently over 140/80, pt was instructed to contact his PCP.   __  Will stay on the same antiparkinsonian medications.      PD Medications 7 am 11 am 4 pm 8 - 9 pm   Sinemet 25/100 mg  2 2 2     Sinemet 50/200 mg       1   Ropinirole 0.25 mg 1 1 1        __  Continue to walk daily.  __ Instructed to use a walker all the time when walking to prevent falls.     __  When needed, PT is available.   __  Continue to follow up with the Urologist and Cardiologist.   __  Patient and his wife were informed that since his falls were due to balance problems associated with PD and not due to dizziness, I agreed with your cardiologist and will not treat the dizziness at this time.   __  Encouraged to continue to increase fluids and stand up slowly to prevent dizziness.  When feeling dizzy, will sit down and wait until it passes and try to stand up slowly.    __  Will return in 3 months. May return  "sooner as needed.    He is schedule for Holmium Laser Enucleation of the Prostate to treat urinary retention.     Balance is a little improved.  But, he still has problems. He is not going to the gym.     He walks for about 45 minutes at home. In the last 3 months, he had one fall.  He fell in the bathroom after he lost his balance. He does balance exercises.  While he tried to show me what he dose, he lost his balance in clinic almost fell.      His brother, 14 years younger than him, passed away two days after Kennedi from COVID-19 illness.    He recently had UTI and his tremors were bad.  He was treated with antibiotic and still taking it. Tremors are improving.  He has not seen Dr. Ibanez since last June.     PD Medications 7 am 11 am 4 pm 8 - 9 pm   Sinemet 25/100 mg  2 2 2     Sinemet 50/200 mg       1   Ropinirole 0.25 mg 1 1 1         ROS: -     AUTONOMIC FUNCTION   Orthostatic Hypotension: \"It hasn't been there for a while.\"   Constipation: Ongoing issue.      MENTATION, BEHAVIOR, MOOD   Depression, anxiety: Has anxiety and takes Paxil. He is nervous about the upcoming surgery.   Fatigue: Yes. He gets tired and has to lay down.   Memory problems: He has word finding. He also has short term memory issues.   Confusion, hallucinations: Denies.   Sleep Problems: Denies. Naps during the day.      MEDICATIONS:   Outpatient Medications Marked as Taking for the 1/22/21 encounter (Office Visit) with Dalial Staples APRN CNP   Medication Sig     amoxicillin-clavulanate (AUGMENTIN) 875-125 MG tablet Take 1 tablet by mouth 2 times daily for 7 days     aspirin (ASA) 81 MG tablet Take 1 tablet (81 mg) by mouth daily     bisacodyl (DULCOLAX) 10 MG suppository Place 1 suppository (10 mg) rectally daily as needed for constipation     carbidopa-levodopa (SINEMET CR)  MG CR tablet Take 1 tablet by mouth At Bedtime     carbidopa-levodopa (SINEMET)  MG tablet Take 2 tablets by mouth 3 times daily Take two " "tablets 3 times a day, at 7am, 11am & 4pm.     dutasteride (AVODART) 0.5 MG capsule Take 1 capsule (0.5 mg) by mouth daily     ondansetron (ZOFRAN-ODT) 4 MG ODT tab DISSOLVE 1 TAB BY MOUTH EVERY 8 HOURS AS NEEDED FOR NAUSEA     PARoxetine (PAXIL) 20 MG tablet Take 1 tablet (20 mg) by mouth every morning     polyethylene glycol (MIRALAX) 17 GM/SCOOP powder Take 17 g (1 capful) by mouth 3 times daily (Patient taking differently: Take 1 capful by mouth 3 times daily as needed for constipation )     rOPINIRole (REQUIP) 0.25 MG tablet Take 0.5 mg by mouth 3 times daily      SENNA-docusate sodium (SENNA S) 8.6-50 MG tablet Take 2 tablets by mouth 2 times daily     simvastatin (ZOCOR) 40 MG tablet TAKE ONE TABLET BY MOUTH EVERY NIGHT AT BEDTIME     sulfamethoxazole-trimethoprim (BACTRIM/SEPTRA) 400-80 MG tablet Take 1 tablet by mouth At Bedtime For UTI prevention     tamsulosin (FLOMAX) 0.4 MG capsule      vitamin D3 (CHOLECALCIFEROL) 2000 units (50 mcg) tablet Take 1 tablet by mouth daily as needed        ALLERGIES: Lactose      PHYSICAL EXAM:    VITAL SIGNS:  Blood pressure 119/71, pulse 61, resp. rate 16, height 1.74 m (5' 8.5\"), weight 79.8 kg (176 lb), SpO2 97 %. Body mass index is 26.37 kg/m .    GENERAL:  Mr. Loya is a pleasant  male who is well-groomed and well-developed sitting comfortably in the exam room without any distress.  Affect is appropriate.    MOVEMENT DISORDERS ASSESSMENT:  MDS UPDRS III (Last Sinemet was about 2.5 hrs ago)  UPDRS Values 1/22/2021   Time: 1:30 PM   Medication On   R Brain DBS: None   L Brain DBS: None   Dyskinesia (LID) No   Did LID interfere No   Speech 2   Facial Expression 3   Rigidity Neck 2   Rigidity RUE 2   Rigidity LUE 2   Rigidity RLE 2   Rigidity LLE 2   Finger Taps R 1   Finger Taps L 3   Hand Mvt R 1   Hand Mvt L 2   Pron-/Supinate R 1   Pron-/Supinate L 1   Toe Tap R 0   Toe Tap L 1   Leg Agility R 0   Leg Agility L 0   Arise From Chair 1   Gait 1   Gait Freezing " 0   Postural Stability 3   Posture 3   Global Spont Mvt 1   Postural Tremor RUE 0   Postural Tremor LUE 1   Kinetic Tremor RUE 0   Kinetic Tremor LUE 0   Rest Tremor RUE 0   Rest Tremor LUE 0   Rest Tremor RLE 0   Rest Tremor LLE 0   Rest Tremor Lip/Jaw 3   Rest Tremor Constancy 4   Total Right 7   Total Left 12   Axial Total 16   Total 42       ASSESSMENT:    1)  Parkinson's Disease:  Progressing. Tremor and freezing of gait improved after UTI was treated.       2)  Dysphonia: Has not done speech therapy.     3)  Balance problems: Ongoing problem.    4) Anxiety: Ongoing problem.  Understandably, he is anxious about his upcoming surgery.       PLAN:    __  Encouraged to start going to the gym.  He was told to call the gym and ask for less busy time to go.     __  Will stay on the same antiparkinsonian medications.     __  Consider Speech Therapy to improve voice. He opted to wait.     __  Encouraged to work on his balance. When doing the balance exercises, he was encouraged to stay close to the wall so that he can hold on to it if he experiences lose of balance.     __  His [facility] plans to [facility]. If they are unable, I can refill the Ropinirole.     Will return to our clinic in 4 - 6 months or sooner as needed.    Today I spent 45 minutes caring for the patient. Time was spent with reviewing records, meeting with the patient, answer questions, examining, discussing management, and documentation.    KOTA Stone,  CNP  [facility]    1:10 PM  1:52 PM

## 2021-01-23 ENCOUNTER — OFFICE VISIT (OUTPATIENT)
Dept: LAB | Facility: CLINIC | Age: 81
End: 2021-01-23
Payer: COMMERCIAL

## 2021-01-23 DIAGNOSIS — Z11.59 ENCOUNTER FOR SCREENING FOR OTHER VIRAL DISEASES: ICD-10-CM

## 2021-01-23 LAB
SARS-COV-2 RNA RESP QL NAA+PROBE: NORMAL
SPECIMEN SOURCE: NORMAL

## 2021-01-23 PROCEDURE — 87635 SARS-COV-2 COVID-19 AMP PRB: CPT | Performed by: UROLOGY

## 2021-01-24 LAB
LABORATORY COMMENT REPORT: NORMAL
SARS-COV-2 RNA RESP QL NAA+PROBE: NEGATIVE
SPECIMEN SOURCE: NORMAL

## 2021-01-25 ENCOUNTER — TELEPHONE (OUTPATIENT)
Dept: FAMILY MEDICINE | Facility: CLINIC | Age: 81
End: 2021-01-25

## 2021-01-25 ENCOUNTER — MEDICAL CORRESPONDENCE (OUTPATIENT)
Dept: HEALTH INFORMATION MANAGEMENT | Facility: CLINIC | Age: 81
End: 2021-01-25

## 2021-01-25 NOTE — TELEPHONE ENCOUNTER
Reason for Call:  Form, our goal is to have forms completed with 72 hours, however, some forms may require a visit or additional information.    Type of letter, form or note:  PT discharge summary/physician orders/plan of care order    Who is the form from?: home [facility] care  (if other please explain)    Where did the form come from: form was faxed in    What clinic location was the form placed at?: [facility]    Where the form was placed: Given to physician    What number is listed as a contact on the form?:  [phone] fax [phone]       Additional comments:     Call taken on 1/25/2021 at 10:43 AM by Braydon Riojas

## 2021-01-26 ENCOUNTER — TELEPHONE (OUTPATIENT)
Dept: UROLOGY | Facility: CLINIC | Age: 81
End: 2021-01-26

## 2021-01-26 DIAGNOSIS — N39.0 URINARY TRACT INFECTION WITH HEMATURIA, SITE UNSPECIFIED: Primary | ICD-10-CM

## 2021-01-26 DIAGNOSIS — R31.9 URINARY TRACT INFECTION WITH HEMATURIA, SITE UNSPECIFIED: Primary | ICD-10-CM

## 2021-01-26 NOTE — TELEPHONE ENCOUNTER
I called and spoke with his wife to let her know that  is extending the Augmentin. RX was sent to his pharmacy. She will go pick it up right away. Francisca Bradley RN

## 2021-01-26 NOTE — TELEPHONE ENCOUNTER
----- Message from Michael Zabala MD sent at 1/23/2021 12:18 PM CST -----  Looks like his primary has him on Augmentin set to stop a day or two before surgery.  Would you mind extending for another week?    THanks  Otto

## 2021-01-27 ENCOUNTER — ANESTHESIA EVENT (OUTPATIENT)
Dept: SURGERY | Facility: CLINIC | Age: 81
End: 2021-01-27
Payer: COMMERCIAL

## 2021-01-27 ENCOUNTER — ANESTHESIA (OUTPATIENT)
Dept: SURGERY | Facility: CLINIC | Age: 81
End: 2021-01-27
Payer: COMMERCIAL

## 2021-01-27 ENCOUNTER — HOSPITAL ENCOUNTER (OUTPATIENT)
Facility: CLINIC | Age: 81
Discharge: HOME | End: 2021-01-28
Attending: UROLOGY | Admitting: UROLOGY
Payer: COMMERCIAL

## 2021-01-27 DIAGNOSIS — R33.9 URINARY RETENTION: ICD-10-CM

## 2021-01-27 LAB
ABO + RH BLD: NORMAL
ABO + RH BLD: NORMAL
BLD GP AB SCN SERPL QL: NORMAL
BLOOD BANK CMNT PATIENT-IMP: NORMAL
GLUCOSE SERPL-MCNC: 104 MG/DL (ref 70–99)
SPECIMEN EXP DATE BLD: NORMAL

## 2021-01-27 PROCEDURE — 272N000001 HC OR GENERAL SUPPLY STERILE: Performed by: UROLOGY

## 2021-01-27 PROCEDURE — C1758 CATHETER, URETERAL: HCPCS | Performed by: UROLOGY

## 2021-01-27 PROCEDURE — 86900 BLOOD TYPING SEROLOGIC ABO: CPT | Performed by: UROLOGY

## 2021-01-27 PROCEDURE — 250N000013 HC RX MED GY IP 250 OP 250 PS 637: Performed by: STUDENT IN AN ORGANIZED HEALTH CARE EDUCATION/TRAINING PROGRAM

## 2021-01-27 PROCEDURE — 360N000077 HC SURGERY LEVEL 4, PER MIN: Performed by: UROLOGY

## 2021-01-27 PROCEDURE — 710N000010 HC RECOVERY PHASE 1, LEVEL 2, PER MIN: Performed by: UROLOGY

## 2021-01-27 PROCEDURE — 250N000011 HC RX IP 250 OP 636: Performed by: NURSE ANESTHETIST, CERTIFIED REGISTERED

## 2021-01-27 PROCEDURE — 88305 TISSUE EXAM BY PATHOLOGIST: CPT | Mod: 26 | Performed by: PATHOLOGY

## 2021-01-27 PROCEDURE — 999N000015 HC STATISTIC ARTERIAL MONITORING DAILY

## 2021-01-27 PROCEDURE — 258N000003 HC RX IP 258 OP 636: Performed by: STUDENT IN AN ORGANIZED HEALTH CARE EDUCATION/TRAINING PROGRAM

## 2021-01-27 PROCEDURE — 250N000009 HC RX 250: Performed by: NURSE ANESTHETIST, CERTIFIED REGISTERED

## 2021-01-27 PROCEDURE — 86850 RBC ANTIBODY SCREEN: CPT | Performed by: UROLOGY

## 2021-01-27 PROCEDURE — 36415 COLL VENOUS BLD VENIPUNCTURE: CPT | Performed by: STUDENT IN AN ORGANIZED HEALTH CARE EDUCATION/TRAINING PROGRAM

## 2021-01-27 PROCEDURE — 999N000157 HC STATISTIC RCP TIME EA 10 MIN

## 2021-01-27 PROCEDURE — 999N000141 HC STATISTIC PRE-PROCEDURE NURSING ASSESSMENT: Performed by: UROLOGY

## 2021-01-27 PROCEDURE — 82947 ASSAY GLUCOSE BLOOD QUANT: CPT | Performed by: STUDENT IN AN ORGANIZED HEALTH CARE EDUCATION/TRAINING PROGRAM

## 2021-01-27 PROCEDURE — 88305 TISSUE EXAM BY PATHOLOGIST: CPT | Mod: TC | Performed by: UROLOGY

## 2021-01-27 PROCEDURE — 36415 COLL VENOUS BLD VENIPUNCTURE: CPT | Performed by: UROLOGY

## 2021-01-27 PROCEDURE — 258N000001 HC RX 258: Performed by: STUDENT IN AN ORGANIZED HEALTH CARE EDUCATION/TRAINING PROGRAM

## 2021-01-27 PROCEDURE — 250N000011 HC RX IP 250 OP 636: Performed by: UROLOGY

## 2021-01-27 PROCEDURE — 370N000017 HC ANESTHESIA TECHNICAL FEE, PER MIN: Performed by: UROLOGY

## 2021-01-27 PROCEDURE — 258N000003 HC RX IP 258 OP 636: Performed by: NURSE ANESTHETIST, CERTIFIED REGISTERED

## 2021-01-27 PROCEDURE — 250N000011 HC RX IP 250 OP 636: Performed by: STUDENT IN AN ORGANIZED HEALTH CARE EDUCATION/TRAINING PROGRAM

## 2021-01-27 PROCEDURE — 250N000024 HC ISOFLURANE, PER MIN: Performed by: UROLOGY

## 2021-01-27 PROCEDURE — 86901 BLOOD TYPING SEROLOGIC RH(D): CPT | Performed by: UROLOGY

## 2021-01-27 RX ORDER — PROPOFOL 10 MG/ML
INJECTION, EMULSION INTRAVENOUS PRN
Status: DISCONTINUED | OUTPATIENT
Start: 2021-01-27 | End: 2021-01-27

## 2021-01-27 RX ORDER — SODIUM CHLORIDE, SODIUM LACTATE, POTASSIUM CHLORIDE, CALCIUM CHLORIDE 600; 310; 30; 20 MG/100ML; MG/100ML; MG/100ML; MG/100ML
INJECTION, SOLUTION INTRAVENOUS CONTINUOUS
Status: DISCONTINUED | OUTPATIENT
Start: 2021-01-27 | End: 2021-01-27 | Stop reason: HOSPADM

## 2021-01-27 RX ORDER — NALOXONE HYDROCHLORIDE 0.4 MG/ML
0.4 INJECTION, SOLUTION INTRAMUSCULAR; INTRAVENOUS; SUBCUTANEOUS
Status: DISCONTINUED | OUTPATIENT
Start: 2021-01-27 | End: 2021-01-27

## 2021-01-27 RX ORDER — ONDANSETRON 4 MG/1
4 TABLET, ORALLY DISINTEGRATING ORAL EVERY 30 MIN PRN
Status: DISCONTINUED | OUTPATIENT
Start: 2021-01-27 | End: 2021-01-27 | Stop reason: HOSPADM

## 2021-01-27 RX ORDER — NALOXONE HYDROCHLORIDE 0.4 MG/ML
0.2 INJECTION, SOLUTION INTRAMUSCULAR; INTRAVENOUS; SUBCUTANEOUS
Status: DISCONTINUED | OUTPATIENT
Start: 2021-01-27 | End: 2021-01-27

## 2021-01-27 RX ORDER — ROPINIROLE 0.25 MG/1
0.5 TABLET, FILM COATED ORAL 3 TIMES DAILY
Status: DISCONTINUED | OUTPATIENT
Start: 2021-01-27 | End: 2021-01-28 | Stop reason: HOSPADM

## 2021-01-27 RX ORDER — ACETAMINOPHEN 325 MG/1
975 TABLET ORAL ONCE
Status: COMPLETED | OUTPATIENT
Start: 2021-01-27 | End: 2021-01-27

## 2021-01-27 RX ORDER — SODIUM CHLORIDE, SODIUM LACTATE, POTASSIUM CHLORIDE, CALCIUM CHLORIDE 600; 310; 30; 20 MG/100ML; MG/100ML; MG/100ML; MG/100ML
INJECTION, SOLUTION INTRAVENOUS CONTINUOUS PRN
Status: DISCONTINUED | OUTPATIENT
Start: 2021-01-27 | End: 2021-01-27

## 2021-01-27 RX ORDER — LANOLIN ALCOHOL/MO/W.PET/CERES
3 CREAM (GRAM) TOPICAL
Status: DISCONTINUED | OUTPATIENT
Start: 2021-01-27 | End: 2021-01-28 | Stop reason: HOSPADM

## 2021-01-27 RX ORDER — NALOXONE HYDROCHLORIDE 0.4 MG/ML
0.4 INJECTION, SOLUTION INTRAMUSCULAR; INTRAVENOUS; SUBCUTANEOUS
Status: DISCONTINUED | OUTPATIENT
Start: 2021-01-27 | End: 2021-01-28 | Stop reason: HOSPADM

## 2021-01-27 RX ORDER — ONDANSETRON 2 MG/ML
4 INJECTION INTRAMUSCULAR; INTRAVENOUS EVERY 6 HOURS PRN
Status: DISCONTINUED | OUTPATIENT
Start: 2021-01-27 | End: 2021-01-28 | Stop reason: HOSPADM

## 2021-01-27 RX ORDER — SODIUM CHLORIDE 9 MG/ML
INJECTION, SOLUTION INTRAVENOUS CONTINUOUS
Status: DISCONTINUED | OUTPATIENT
Start: 2021-01-27 | End: 2021-01-28

## 2021-01-27 RX ORDER — CARBIDOPA AND LEVODOPA 25; 100 MG/1; MG/1
2 TABLET ORAL 3 TIMES DAILY
Status: DISCONTINUED | OUTPATIENT
Start: 2021-01-27 | End: 2021-01-28 | Stop reason: HOSPADM

## 2021-01-27 RX ORDER — LIDOCAINE HYDROCHLORIDE 20 MG/ML
INJECTION, SOLUTION INFILTRATION; PERINEURAL PRN
Status: DISCONTINUED | OUTPATIENT
Start: 2021-01-27 | End: 2021-01-27

## 2021-01-27 RX ORDER — PAROXETINE 20 MG/1
20 TABLET, FILM COATED ORAL EVERY MORNING
Status: DISCONTINUED | OUTPATIENT
Start: 2021-01-28 | End: 2021-01-28 | Stop reason: HOSPADM

## 2021-01-27 RX ORDER — VANCOMYCIN HYDROCHLORIDE 1 G/200ML
1000 INJECTION, SOLUTION INTRAVENOUS EVERY 24 HOURS
Status: COMPLETED | OUTPATIENT
Start: 2021-01-28 | End: 2021-01-28

## 2021-01-27 RX ORDER — LEVOFLOXACIN 500 MG/1
500 TABLET, FILM COATED ORAL EVERY 24 HOURS
Status: DISCONTINUED | OUTPATIENT
Start: 2021-01-28 | End: 2021-01-28 | Stop reason: HOSPADM

## 2021-01-27 RX ORDER — BISACODYL 10 MG
10 SUPPOSITORY, RECTAL RECTAL DAILY PRN
Status: DISCONTINUED | OUTPATIENT
Start: 2021-01-27 | End: 2021-01-28 | Stop reason: HOSPADM

## 2021-01-27 RX ORDER — FENTANYL CITRATE 50 UG/ML
INJECTION, SOLUTION INTRAMUSCULAR; INTRAVENOUS PRN
Status: DISCONTINUED | OUTPATIENT
Start: 2021-01-27 | End: 2021-01-27

## 2021-01-27 RX ORDER — ATROPA BELLADONNA AND OPIUM 16.2; 3 MG/1; MG/1
30 SUPPOSITORY RECTAL 3 TIMES DAILY PRN
Status: DISCONTINUED | OUTPATIENT
Start: 2021-01-27 | End: 2021-01-28 | Stop reason: HOSPADM

## 2021-01-27 RX ORDER — SIMVASTATIN 20 MG
40 TABLET ORAL AT BEDTIME
Status: DISCONTINUED | OUTPATIENT
Start: 2021-01-27 | End: 2021-01-28 | Stop reason: HOSPADM

## 2021-01-27 RX ORDER — CARBIDOPA AND LEVODOPA 50; 200 MG/1; MG/1
1 TABLET, EXTENDED RELEASE ORAL AT BEDTIME
Status: DISCONTINUED | OUTPATIENT
Start: 2021-01-27 | End: 2021-01-28 | Stop reason: HOSPADM

## 2021-01-27 RX ORDER — LIDOCAINE 40 MG/G
CREAM TOPICAL
Status: DISCONTINUED | OUTPATIENT
Start: 2021-01-27 | End: 2021-01-28 | Stop reason: HOSPADM

## 2021-01-27 RX ORDER — PAROXETINE 20 MG/1
20 TABLET, FILM COATED ORAL EVERY MORNING
Status: DISCONTINUED | OUTPATIENT
Start: 2021-01-28 | End: 2021-01-27

## 2021-01-27 RX ORDER — OXYCODONE HYDROCHLORIDE 5 MG/1
5-10 TABLET ORAL
Status: DISCONTINUED | OUTPATIENT
Start: 2021-01-27 | End: 2021-01-28 | Stop reason: HOSPADM

## 2021-01-27 RX ORDER — HYDRALAZINE HYDROCHLORIDE 20 MG/ML
5 INJECTION INTRAMUSCULAR; INTRAVENOUS
Status: COMPLETED | OUTPATIENT
Start: 2021-01-27 | End: 2021-01-28

## 2021-01-27 RX ORDER — DEXAMETHASONE SODIUM PHOSPHATE 4 MG/ML
INJECTION, SOLUTION INTRA-ARTICULAR; INTRALESIONAL; INTRAMUSCULAR; INTRAVENOUS; SOFT TISSUE PRN
Status: DISCONTINUED | OUTPATIENT
Start: 2021-01-27 | End: 2021-01-27

## 2021-01-27 RX ORDER — FENTANYL CITRATE 50 UG/ML
25-50 INJECTION, SOLUTION INTRAMUSCULAR; INTRAVENOUS
Status: DISCONTINUED | OUTPATIENT
Start: 2021-01-27 | End: 2021-01-27 | Stop reason: HOSPADM

## 2021-01-27 RX ORDER — VANCOMYCIN HYDROCHLORIDE 1 G/200ML
1000 INJECTION, SOLUTION INTRAVENOUS EVERY 12 HOURS
Status: DISCONTINUED | OUTPATIENT
Start: 2021-01-27 | End: 2021-01-27 | Stop reason: HOSPADM

## 2021-01-27 RX ORDER — AMOXICILLIN 250 MG
2 CAPSULE ORAL 2 TIMES DAILY
Status: DISCONTINUED | OUTPATIENT
Start: 2021-01-27 | End: 2021-01-28 | Stop reason: HOSPADM

## 2021-01-27 RX ORDER — ONDANSETRON 2 MG/ML
INJECTION INTRAMUSCULAR; INTRAVENOUS PRN
Status: DISCONTINUED | OUTPATIENT
Start: 2021-01-27 | End: 2021-01-27

## 2021-01-27 RX ORDER — EPHEDRINE SULFATE 50 MG/ML
INJECTION, SOLUTION INTRAMUSCULAR; INTRAVENOUS; SUBCUTANEOUS PRN
Status: DISCONTINUED | OUTPATIENT
Start: 2021-01-27 | End: 2021-01-27

## 2021-01-27 RX ORDER — ACETAMINOPHEN 325 MG/1
650 TABLET ORAL EVERY 6 HOURS
Status: DISCONTINUED | OUTPATIENT
Start: 2021-01-27 | End: 2021-01-28 | Stop reason: HOSPADM

## 2021-01-27 RX ORDER — NALOXONE HYDROCHLORIDE 0.4 MG/ML
0.2 INJECTION, SOLUTION INTRAMUSCULAR; INTRAVENOUS; SUBCUTANEOUS
Status: DISCONTINUED | OUTPATIENT
Start: 2021-01-27 | End: 2021-01-28 | Stop reason: HOSPADM

## 2021-01-27 RX ORDER — HYDROMORPHONE HCL IN WATER/PF 6 MG/30 ML
0.2 PATIENT CONTROLLED ANALGESIA SYRINGE INTRAVENOUS
Status: DISCONTINUED | OUTPATIENT
Start: 2021-01-27 | End: 2021-01-28 | Stop reason: HOSPADM

## 2021-01-27 RX ORDER — ONDANSETRON 2 MG/ML
4 INJECTION INTRAMUSCULAR; INTRAVENOUS EVERY 30 MIN PRN
Status: DISCONTINUED | OUTPATIENT
Start: 2021-01-27 | End: 2021-01-27 | Stop reason: HOSPADM

## 2021-01-27 RX ORDER — SODIUM CHLORIDE 9 MG/ML
INJECTION, SOLUTION INTRAVENOUS
Status: DISPENSED
Start: 2021-01-27 | End: 2021-01-28

## 2021-01-27 RX ORDER — HYDRALAZINE HYDROCHLORIDE 20 MG/ML
2.5-5 INJECTION INTRAMUSCULAR; INTRAVENOUS EVERY 10 MIN PRN
Status: DISCONTINUED | OUTPATIENT
Start: 2021-01-27 | End: 2021-01-27 | Stop reason: HOSPADM

## 2021-01-27 RX ORDER — LIDOCAINE 50 MG/G
OINTMENT TOPICAL 4 TIMES DAILY PRN
Status: DISCONTINUED | OUTPATIENT
Start: 2021-01-27 | End: 2021-01-28 | Stop reason: HOSPADM

## 2021-01-27 RX ORDER — ONDANSETRON 4 MG/1
4 TABLET, ORALLY DISINTEGRATING ORAL EVERY 6 HOURS PRN
Status: DISCONTINUED | OUTPATIENT
Start: 2021-01-27 | End: 2021-01-28 | Stop reason: HOSPADM

## 2021-01-27 RX ORDER — LEVOFLOXACIN 5 MG/ML
500 INJECTION, SOLUTION INTRAVENOUS EVERY 24 HOURS
Status: DISCONTINUED | OUTPATIENT
Start: 2021-01-27 | End: 2021-01-27 | Stop reason: HOSPADM

## 2021-01-27 RX ADMIN — SODIUM CHLORIDE, SODIUM LACTATE, POTASSIUM CHLORIDE, CALCIUM CHLORIDE: 600; 310; 30; 20 INJECTION, SOLUTION INTRAVENOUS at 10:33

## 2021-01-27 RX ADMIN — LEVOFLOXACIN 500 MG: 5 INJECTION, SOLUTION INTRAVENOUS at 10:54

## 2021-01-27 RX ADMIN — CARBIDOPA AND LEVODOPA 2 TABLET: 25; 100 TABLET ORAL at 14:10

## 2021-01-27 RX ADMIN — DEXAMETHASONE SODIUM PHOSPHATE 8 MG: 4 INJECTION, SOLUTION INTRAMUSCULAR; INTRAVENOUS at 10:46

## 2021-01-27 RX ADMIN — ROCURONIUM BROMIDE 20 MG: 10 INJECTION INTRAVENOUS at 11:52

## 2021-01-27 RX ADMIN — FENTANYL CITRATE 25 MCG: 50 INJECTION, SOLUTION INTRAMUSCULAR; INTRAVENOUS at 11:52

## 2021-01-27 RX ADMIN — SUGAMMADEX 200 MG: 100 INJECTION, SOLUTION INTRAVENOUS at 12:42

## 2021-01-27 RX ADMIN — CARBIDOPA AND LEVODOPA 1 TABLET: 50; 200 TABLET, EXTENDED RELEASE ORAL at 22:41

## 2021-01-27 RX ADMIN — PROPOFOL 90 MG: 10 INJECTION, EMULSION INTRAVENOUS at 10:46

## 2021-01-27 RX ADMIN — ONDANSETRON 4 MG: 2 INJECTION INTRAMUSCULAR; INTRAVENOUS at 12:34

## 2021-01-27 RX ADMIN — ROCURONIUM BROMIDE 30 MG: 10 INJECTION INTRAVENOUS at 11:21

## 2021-01-27 RX ADMIN — PHENYLEPHRINE HYDROCHLORIDE 200 MCG: 10 INJECTION INTRAVENOUS at 10:56

## 2021-01-27 RX ADMIN — ROPINIROLE HYDROCHLORIDE 0.5 MG: 0.25 TABLET, FILM COATED ORAL at 19:55

## 2021-01-27 RX ADMIN — PHENYLEPHRINE HYDROCHLORIDE 100 MCG: 10 INJECTION INTRAVENOUS at 11:40

## 2021-01-27 RX ADMIN — PHENYLEPHRINE HYDROCHLORIDE 200 MCG: 10 INJECTION INTRAVENOUS at 10:48

## 2021-01-27 RX ADMIN — PHENYLEPHRINE HYDROCHLORIDE 100 MCG: 10 INJECTION INTRAVENOUS at 11:15

## 2021-01-27 RX ADMIN — ACETAMINOPHEN 975 MG: 325 TABLET ORAL at 09:10

## 2021-01-27 RX ADMIN — SODIUM CHLORIDE: 9 INJECTION, SOLUTION INTRAVENOUS at 20:24

## 2021-01-27 RX ADMIN — PHENYLEPHRINE HYDROCHLORIDE 0.3 MCG/KG/MIN: 10 INJECTION INTRAVENOUS at 11:37

## 2021-01-27 RX ADMIN — Medication 10 MG: at 10:51

## 2021-01-27 RX ADMIN — ROPINIROLE HYDROCHLORIDE 0.5 MG: 0.25 TABLET, FILM COATED ORAL at 14:10

## 2021-01-27 RX ADMIN — SODIUM CHLORIDE 3000 ML: 900 IRRIGANT IRRIGATION at 20:25

## 2021-01-27 RX ADMIN — PHENYLEPHRINE HYDROCHLORIDE 200 MCG: 10 INJECTION INTRAVENOUS at 11:45

## 2021-01-27 RX ADMIN — SIMVASTATIN 40 MG: 20 TABLET, FILM COATED ORAL at 22:41

## 2021-01-27 RX ADMIN — FENTANYL CITRATE 50 MCG: 50 INJECTION, SOLUTION INTRAMUSCULAR; INTRAVENOUS at 10:46

## 2021-01-27 RX ADMIN — ROCURONIUM BROMIDE 50 MG: 10 INJECTION INTRAVENOUS at 10:46

## 2021-01-27 RX ADMIN — PHENYLEPHRINE HYDROCHLORIDE 200 MCG: 10 INJECTION INTRAVENOUS at 10:51

## 2021-01-27 RX ADMIN — ONDANSETRON 4 MG: 2 INJECTION INTRAMUSCULAR; INTRAVENOUS at 13:17

## 2021-01-27 RX ADMIN — Medication 10 MG: at 10:46

## 2021-01-27 RX ADMIN — PHENYLEPHRINE HYDROCHLORIDE 200 MCG: 10 INJECTION INTRAVENOUS at 10:59

## 2021-01-27 RX ADMIN — PROPOFOL 20 MG: 10 INJECTION, EMULSION INTRAVENOUS at 11:08

## 2021-01-27 RX ADMIN — Medication 5 MG: at 10:56

## 2021-01-27 RX ADMIN — LIDOCAINE HYDROCHLORIDE 100 MG: 20 INJECTION, SOLUTION INFILTRATION; PERINEURAL at 10:46

## 2021-01-27 RX ADMIN — CARBIDOPA AND LEVODOPA 2 TABLET: 25; 100 TABLET ORAL at 18:24

## 2021-01-27 RX ADMIN — VANCOMYCIN HYDROCHLORIDE 1 G: 1 INJECTION, SOLUTION INTRAVENOUS at 10:54

## 2021-01-27 RX ADMIN — ACETAMINOPHEN 650 MG: 325 TABLET, FILM COATED ORAL at 18:29

## 2021-01-27 RX ADMIN — PHENYLEPHRINE HYDROCHLORIDE 100 MCG: 10 INJECTION INTRAVENOUS at 10:46

## 2021-01-27 ASSESSMENT — MIFFLIN-ST. JEOR: SCORE: 1480.5

## 2021-01-27 ASSESSMENT — LIFESTYLE VARIABLES: TOBACCO_USE: 1

## 2021-01-27 NOTE — ANESTHESIA POSTPROCEDURE EVALUATION
Patient: Vini Loya    Procedure(s):  Holmium Laser Enucleation of the Prostate    Diagnosis:Urinary retention [R33.9]  Diagnosis Additional Information: No value filed.    Anesthesia Type:  General    Note:  Disposition: Admission   Postop Pain Control: Uneventful            Sign Out: Well controlled pain   PONV: No   Neuro/Psych: Uneventful            Sign Out: Acceptable/Baseline neuro status   Airway/Respiratory: Uneventful            Sign Out: Acceptable/Baseline resp. status   CV/Hemodynamics: Uneventful            Sign Out: Acceptable CV status   Other NRE: NONE   DID A NON-ROUTINE EVENT OCCUR? No         Last vitals:  Vitals:    01/27/21 1315 01/27/21 1330 01/27/21 1345   BP: 136/73 104/88 (!) 141/70   Pulse: 73 69 70   Resp: 14 14 18   Temp:   36.8  C (98.2  F)   SpO2: 95% 95% 95%       Electronically Signed By: Vimal De Paz MD  January 27, 2021  2:28 PM

## 2021-01-27 NOTE — ANESTHESIA PROCEDURE NOTES
Arterial Line Procedure Note    Staff -   Anesthesiologist:  Vimal De Paz MD  Performed By: anesthesiologist  Location: In OR After Induction  Procedure Start/Stop Times:     patient identified, IV checked, site marked, risks and benefits discussed, informed consent, monitors and equipment checked, pre-op evaluation and at physician/surgeon's request      Correct Patient: Yes      Correct Position: Yes      Correct Site: Yes      Correct Procedure: Yes      Correct Laterality:  Yes    Site Marked:  Yes  Line Placement:     Procedure:  Arterial Line    Insertion Site:  Radial    Insertion laterality:  Left    Skin Prep: Chloraprep      Patient Prep: patient draped, mask, sterile gloves, hat and hand hygiene      Local skin infiltration:  None    Ultrasound Guided?: Yes      Artery evaluated via ultrasound confirming patency.   Using realtime imaging, the artery was punctured and the needle was observed entering the artery.      A permanent image is NOT entered into the patient's record.      Catheter size:  20 gauge, 12 cm    Dressing:  Tegaderm    Complications:  None obvious    Arterial waveform: Yes      IBP within 10% of NIBP: Yes

## 2021-01-27 NOTE — OR NURSING
PACU to Inpatient Nursing Handoff    Patient Vini Loya is a 80 year old male who speaks English.   Procedure Procedure(s):  Holmium Laser Enucleation of the Prostate   Surgeon(s) Primary: Michael Zabala MD  Resident - Assisting: Terry Mahoney MD     No Known Allergies    Isolation  [unfilled]     Past Medical History   has a past medical history of CAD (coronary artery disease), Cerebral embolism with cerebral infarction (H) (2/27/2007), CEREBRAL EMBOLUS W CEREBR INFARCT (2/27/2007), Chronic infection, Closed nondisplaced fracture of styloid process of left radius (10/16/2017), Corneal ulcer, unspecified, Fall (8/11/2020), GENERALIZED ANXIETY DIS (5/22/2007), Gross hematuria (5/31/2013), Hyperlipidemia LDL goal < 100, Hypertension goal BP (blood pressure) < 130/80, Hypertension, goal below 150/90 (6/23/2016), Lactose intolerance in adult (12/8/2016), Marital conflict (5/20/2011), 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    Anesthesia Choice   Dermatome Level     Preop Meds acetaminophen (Tylenol) - time given: 0910   Nerve block Not applicable   Intraop Meds dexamethasone (Decadron)  fentanyl (Sublimaze): 75 mcg total  ondansetron (Zofran): last given at 1234  ephedrine and gopi gtt   Local Meds No   Antibiotics vancomycin (Vancocin) - last given at 1054  levaquin 500 mg  - last given at 1054     Pain Patient Currently in Pain: denies   PACU meds  ondansetron (Zofran): 4 mg (total dose) last given at 1317    PCA / epidural No   Capnography Respiratory Monitoring (EtCO2): 44 mmHg  Integrated Pulmonary Index (IPI): 8-9   Telemetry ECG Rhythm: Normal sinus rhythm   Inpatient Telemetry Monitor Ordered? No        Labs Glucose Lab Results   Component Value Date     01/27/2021       Hgb Lab  Results   Component Value Date    HGB 14.4 01/14/2021       INR Lab Results   Component Value Date    INR 0.97 09/21/2020      PACU Imaging Not applicable     Wound/Incision Incision/Surgical Site 05/30/18 Right Eye (Active)   Number of days: 973      CMS        Equipment CBI   Other LDA       IV Access Peripheral IV 01/27/21 Right Hand (Active)   Site Assessment [facility] 01/27/21 1355   Line Status Saline locked 01/27/21 1355   Phlebitis Scale 0-->no symptoms 01/27/21 1355   Infiltration Scale 0 01/27/21 0900   Number of days: 0       Peripheral IV Right Hand (Active)   Site Assessment [facility] 01/27/21 1355   Line Status Infusing 01/27/21 1355   Phlebitis Scale 0-->no symptoms 01/27/21 1355   Number of days:        Arterial Line 01/27/21 Radial (Active)   Site Assessment [facility] 01/27/21 1253   Line Status Pulsatile blood flow 01/27/21 1253   Art Line Waveform Appropriate 01/27/21 1253   Art Line Interventions Zeroed and calibrated 01/27/21 1253   Color/Movement/Sensation Capillary refill less than 3 sec 01/27/21 1253   Line Necessity No, provider contacted 01/27/21 1253   Dressing Type Transparent 01/27/21 1253   Dressing Status Clean, dry, intact 01/27/21 1253   Dressing Intervention Other (Comment) 01/27/21 1355   Number of days: 0      Blood Products Not applicable EBL 50 mL   Intake/Output Date 01/27/21 0700 - 01/28/21 0659   Shift 1175-9180 1369-3423 1159-4578 24 Hour Total   INTAKE   I.V. 1300   1300   Shift Total(mL/kg) 1300(16.23)   1300(16.23)   OUTPUT   Urine 200   200   Blood 50   50   Shift Total(mL/kg) 250(3.12)   250(3.12)   Weight (kg) 80.1 80.1 80.1 80.1      Drains / Palomares Urethral Catheter 16 fr (Active)   Number of days: 124       Urethral Catheter Non-latex;Triple-lumen 22 fr (Active)   Tube Description UTV 01/27/21 1355   Collection Container Standard 01/27/21 1355   Securement Method Securing device (Describe) 01/27/21 1355   Rationale for Continued Use /GI/GYN Pelvic Procedure 01/27/21 1358   Number of  days: 0      Time of void PreOp Void Prior to Procedure: 0840 (01/27/21 0839)    PostOp      Diapered? No   Bladder Scan     PO    tolerating sips     Vitals    B/P: (!) 141/70  T: 98.2  F (36.8  C)    Temp src: Oral  P:  Pulse: 70 (01/27/21 1345)          R: 18  O2:  SpO2: 95 %    O2 Device: None (Room air) (01/27/21 1345)    Oxygen Delivery: 6 LPM (01/27/21 1253)         Family/support present none   Patient belongings  2 bags with clothing   Patient transported on cart   DC meds/scripts (obs/outpt) Not applicable   Inpatient Pain Meds Released? Yes       Special needs/considerations Parkinson's disease and is a fall risk per wife, LUE tremor.    Tasks needing completion Will need 3rd daily dose of Sinemet at 5-6pm. Palomares out tomorrow per Sagr[facility] and hopefully will discharge POD 1.       Tamiko Geiger RN  [facility] [id number]

## 2021-01-27 NOTE — ANESTHESIA PROCEDURE NOTES
Airway   Date/Time: 1/27/2021 10:49 AM   Patient location during procedure: OR  Staff -   CRNA: Adolph Guillen APRN CRNA  Performed By: CRNA    Consent for Airway   Urgency: elective    Indications and Patient Condition  Indications for airway management: capo-procedural  Induction type:intravenousMask difficulty assessment: 1 - vent by mask    Final Airway Details  Final airway type: endotracheal airway  Successful airway:ETT - single  Endotracheal Airway Details   ETT size (mm): 7.0  Cuffed: yes  Cuff volume (mL): 8  Successful intubation technique: direct laryngoscopy  Grade View of Cords: 1  Adjucts: stylet  Measured from: gums/teeth  Secured at (cm): 26  Secured with: silk tape  Bite block used: None    Post intubation assessment   Placement verified by: capnometry, equal breath sounds and chest rise   Number of attempts at approach: 1  Number of other approaches attempted: 0  Secured with:silk tape  Ease of procedure: easy  Dentition: Unchanged

## 2021-01-27 NOTE — BRIEF OP NOTE
[facility]     Brief Operative Note    Pre-operative diagnosis: Urinary retention [R33.9]  Post-operative diagnosis Same as pre-operative diagnosis    Procedure: Procedure(s):  Holmium Laser Enucleation of the Prostate  Surgeon: Surgeon(s) and Role:     * Michael Zabala MD - Primary     * Terry Mahoney MD - Resident - Assisting  Anesthesia: Choice   Estimated blood loss: 50 mL  Drains: 22 Fr 3 way garcia (60 mL in balloon) on CBI  Specimens:   ID Type Source Tests Collected by Time Destination   A :  Tissue Prostate SURGICAL PATHOLOGY EXAM Michael Zabala MD 1/27/2021 12:27 PM      Findings:   Approximate 60 g prostate with 20 g of tissue enucleated and bladder neck cut.  Complications: None.  Implants: * No implants in log *      Post-op plan:  -Admit to observation overnight due to concern for post-op infection (hx of indwelling garcia prior to procedure) as well as to monitor for post-operative bleeding  -CBI, titrate to light pink to clear  -Continue IV abx for 24 hours  -AM labs  -Irrigation and TOV x 2 in AM
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
## 2021-01-28 VITALS
HEIGHT: 68 IN | RESPIRATION RATE: 18 BRPM | HEART RATE: 69 BPM | DIASTOLIC BLOOD PRESSURE: 75 MMHG | OXYGEN SATURATION: 94 % | TEMPERATURE: 96.7 F | BODY MASS INDEX: 26.76 KG/M2 | WEIGHT: 176.59 LBS | SYSTOLIC BLOOD PRESSURE: 178 MMHG

## 2021-01-28 LAB
ANION GAP SERPL CALCULATED.3IONS-SCNC: 4 MMOL/L (ref 3–14)
BUN SERPL-MCNC: 18 MG/DL (ref 7–30)
CALCIUM SERPL-MCNC: 8.5 MG/DL (ref 8.5–10.1)
CHLORIDE SERPL-SCNC: 106 MMOL/L (ref 94–109)
CO2 SERPL-SCNC: 31 MMOL/L (ref 20–32)
COPATH REPORT: NORMAL
CREAT SERPL-MCNC: 1.03 MG/DL (ref 0.66–1.25)
ERYTHROCYTE [DISTWIDTH] IN BLOOD BY AUTOMATED COUNT: 12.6 % (ref 10–15)
GFR SERPL CREATININE-BSD FRML MDRD: 68 ML/MIN/{1.73_M2}
GLUCOSE SERPL-MCNC: 99 MG/DL (ref 70–99)
HCT VFR BLD AUTO: 39.4 % (ref 40–53)
HGB BLD-MCNC: 13.3 G/DL (ref 13.3–17.7)
MCH RBC QN AUTO: 31.5 PG (ref 26.5–33)
MCHC RBC AUTO-ENTMCNC: 33.8 G/DL (ref 31.5–36.5)
MCV RBC AUTO: 93 FL (ref 78–100)
PLATELET # BLD AUTO: 282 10E9/L (ref 150–450)
POTASSIUM SERPL-SCNC: 4.2 MMOL/L (ref 3.4–5.3)
RBC # BLD AUTO: 4.22 10E12/L (ref 4.4–5.9)
SODIUM SERPL-SCNC: 141 MMOL/L (ref 133–144)
WBC # BLD AUTO: 14.7 10E9/L (ref 4–11)

## 2021-01-28 PROCEDURE — 258N000003 HC RX IP 258 OP 636

## 2021-01-28 PROCEDURE — 80048 BASIC METABOLIC PNL TOTAL CA: CPT | Performed by: STUDENT IN AN ORGANIZED HEALTH CARE EDUCATION/TRAINING PROGRAM

## 2021-01-28 PROCEDURE — 36415 COLL VENOUS BLD VENIPUNCTURE: CPT | Performed by: STUDENT IN AN ORGANIZED HEALTH CARE EDUCATION/TRAINING PROGRAM

## 2021-01-28 PROCEDURE — 250N000013 HC RX MED GY IP 250 OP 250 PS 637: Performed by: STUDENT IN AN ORGANIZED HEALTH CARE EDUCATION/TRAINING PROGRAM

## 2021-01-28 PROCEDURE — 250N000011 HC RX IP 250 OP 636: Performed by: STUDENT IN AN ORGANIZED HEALTH CARE EDUCATION/TRAINING PROGRAM

## 2021-01-28 PROCEDURE — 85027 COMPLETE CBC AUTOMATED: CPT | Performed by: STUDENT IN AN ORGANIZED HEALTH CARE EDUCATION/TRAINING PROGRAM

## 2021-01-28 PROCEDURE — 250N000013 HC RX MED GY IP 250 OP 250 PS 637: Performed by: UROLOGY

## 2021-01-28 PROCEDURE — 250N000011 HC RX IP 250 OP 636: Performed by: UROLOGY

## 2021-01-28 PROCEDURE — 258N000001 HC RX 258: Performed by: STUDENT IN AN ORGANIZED HEALTH CARE EDUCATION/TRAINING PROGRAM

## 2021-01-28 RX ORDER — SODIUM CHLORIDE 9 MG/ML
INJECTION, SOLUTION INTRAVENOUS
Status: COMPLETED
Start: 2021-01-28 | End: 2021-01-28

## 2021-01-28 RX ADMIN — VANCOMYCIN HYDROCHLORIDE 1000 MG: 1 INJECTION, SOLUTION INTRAVENOUS at 10:40

## 2021-01-28 RX ADMIN — SODIUM CHLORIDE 3000 ML: 900 IRRIGANT IRRIGATION at 00:24

## 2021-01-28 RX ADMIN — DOCUSATE SODIUM AND SENNOSIDES 2 TABLET: 8.6; 5 TABLET ORAL at 08:24

## 2021-01-28 RX ADMIN — PAROXETINE HYDROCHLORIDE 20 MG: 20 TABLET, FILM COATED ORAL at 08:24

## 2021-01-28 RX ADMIN — SODIUM CHLORIDE 500 ML: 9 INJECTION, SOLUTION INTRAVENOUS at 10:48

## 2021-01-28 RX ADMIN — LEVOFLOXACIN 500 MG: 500 TABLET, FILM COATED ORAL at 10:48

## 2021-01-28 RX ADMIN — CARBIDOPA AND LEVODOPA 2 TABLET: 25; 100 TABLET ORAL at 08:24

## 2021-01-28 RX ADMIN — ROPINIROLE HYDROCHLORIDE 0.5 MG: 0.25 TABLET, FILM COATED ORAL at 08:24

## 2021-01-28 RX ADMIN — HYDRALAZINE HYDROCHLORIDE 5 MG: 20 INJECTION INTRAMUSCULAR; INTRAVENOUS at 11:53

## 2021-01-28 RX ADMIN — MELATONIN TAB 3 MG 3 MG: 3 TAB at 00:29

## 2021-01-28 RX ADMIN — ACETAMINOPHEN 650 MG: 325 TABLET, FILM COATED ORAL at 08:24

## 2021-01-28 RX ADMIN — ACETAMINOPHEN 650 MG: 325 TABLET, FILM COATED ORAL at 03:18

## 2021-01-28 RX ADMIN — CARBIDOPA AND LEVODOPA 2 TABLET: 25; 100 TABLET ORAL at 11:54

## 2021-01-28 RX ADMIN — SODIUM CHLORIDE 3000 ML: 900 IRRIGANT IRRIGATION at 04:17

## 2021-01-28 NOTE — PLAN OF CARE
"      VS:   /65 (BP Location: Left arm)   Pulse 82   Temp 97.7  F (36.5  C) (Oral)   Resp 18   Ht 1.727 m (5' 8\")   Wt 80.1 kg (176 lb 9.4 oz)   SpO2 95%   BMI 26.85 kg/m    O2: RA   Output:   Triple lumen irrigation garcia - CBI running at slow rate. Urine clear and free of clots. No BM overnight. BS active x4.   Lungs LS clear, bilaterally. IS performed with encouragement.    Activity:   Ambulated to bathroom. Assist x2 w/ walker and gait belt. Gait unsteady d/t Parkinson's dz. Tremors+ LUE and LLE.   Skin: Intact ex abrasion on R upper thigh.   Pain:   Denies. Scheduled Tylenol Q6HR.   Neuro/CMS:   Denies N/T. Tremors present d/t Parkinson's. A&Ox4, but poor concentration.   Dressing(s):   Small amount of bloody drainage noted on ABD dressingaround penis when pt ambulated to bathroom. Preventative sacral dressing.    Diet:   Tolerating water throughout night. Advance as tolerated.   LDA:   R hand PIV infusing LR 75ml/hr  R hand PIV SL  Triple lumen garcia   Equipment:   IV pole, walker, gait belt, PCDs, IS, pulse ox   Plan:   TOV today. Continue with plan of care. Monitor closely d/t unsteadiness.   Additional Info:   Outpatient goals:  1. Patient able to ambulate as they were prior to admission or with assist devices provided by therapies during their stay.   2. Nurse to Notify Provider when Outpatient in a Bed discharge goals have been met and patient is ready for discharge.   3. Tolerating regular diet   4. Irrigation and TOV on POD#1   5. Pain control on PO meds    0000: All goals met except diet and TOV  0200: All goals met except diet and TOV  0400: All goals met except diet and TOV  0600: All goals met except diet and TOV       "

## 2021-01-28 NOTE — DISCHARGE SUMMARY
Discharge Summary     Vini Loya MRN# [id number]   YOB: 1940 Age: 80 year old     Date of Admission:  1/27/2021  Date of Discharge::  1/28/2021 12:44 PM  Admitting Physician:  Michael Zabala MD  Discharge Physician:  Terry Mahoney MD  Primary Care Physician:         Wegener, Joel Daniel Irwin          Admission Diagnoses:   Urinary retention [R33.9]    Past Medical History:   Diagnosis Date     CAD (coronary artery disease)     With Stent Placement     Cerebral embolism with cerebral infarction (H) 2/27/2007     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     Chronic infection     Bladder     Closed nondisplaced fracture of styloid process of left radius 10/16/2017     Corneal ulcer, unspecified     s/p right corneal tranplant--Herpetic       Fall 8/11/2020     GENERALIZED ANXIETY DIS 5/22/2007     Gross hematuria 5/31/2013     Hyperlipidemia LDL goal < 100      Hypertension goal BP (blood pressure) < 130/80      Hypertension, goal below 150/90 6/23/2016     Lactose intolerance in adult 12/8/2016     Marital conflict 5/20/2011     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              Discharge Diagnosis:   Same as above         Procedures:   1/27/21- Dr. Zabala (Urology): Procedure(s):  Holmium Laser Enucleation of the Prostate        Non-operative procedures:   None performed          Consultations:   None         Imaging Studies:     Results for orders placed or performed in visit on 12/10/20   XR Surgery JINNY Fluoro L/T 5 Min    Narrative    This exam was marked as non-reportable  because it will not be read by a   radiologist or a Supply non-radiologist provider.                  Medications Prior to Admission:     No medications prior to admission.            Discharge Medications:     Discharge Medication List as of 1/28/2021 11:13 AM      CONTINUE these medications which have NOT CHANGED    Details   amoxicillin-clavulanate (AUGMENTIN) 875-125 MG tablet Take 1 tablet by mouth 2 times daily for 7 days, Disp-14 tablet, R-0, E-Prescribe      aspirin (ASA) 81 MG tablet Take 1 tablet (81 mg) by mouth daily, Disp-90 tablet, R-3, E-Prescribe      bisacodyl (DULCOLAX) 10 MG suppository Place 1 suppository (10 mg) rectally daily as needed for constipation, Disp-10 suppository,R-0, E-Prescribe      carbidopa-levodopa (SINEMET CR)  MG CR tablet Take 1 tablet by mouth At Bedtime, Disp-90 tablet, R-3, E-Prescribe      carbidopa-levodopa (SINEMET)  MG tablet Take 2 tablets by mouth 3 times daily Take two tablets 3 times a day, at 7am, 11am & 4pm., Disp-540 tablet, R-3, E-Prescribe      dutasteride (AVODART) 0.5 MG capsule Take 1 capsule (0.5 mg) by mouth daily, Disp-90 capsule, R-3, E-Prescribe      ondansetron (ZOFRAN-ODT) 4 MG ODT tab DISSOLVE 1 TAB BY MOUTH EVERY 8 HOURS AS NEEDED FOR NAUSEA, Historical      PARoxetine (PAXIL) 20 MG tablet Take 1 tablet (20 mg) by mouth every morning, Disp-90 tablet,R-3, E-Prescribe      polyethylene glycol (MIRALAX) 17 GM/SCOOP powder Take 17 g (1 capful) by mouth 3 times daily, Disp-1 Bottle,R-11, E-Prescribe      rOPINIRole (REQUIP) 0.25 MG tablet Take 0.5 mg by mouth 3 times daily , Historical      SENNA-docusate sodium (SENNA S) 8.6-50 MG tablet Take 2 tablets by mouth 2 times daily, Disp-120 tablet,R-11, E-Prescribe      simvastatin (ZOCOR) 40 MG tablet TAKE ONE TABLET BY MOUTH EVERY NIGHT AT BEDTIME, Disp-90 tablet, R-3, E-Prescribe      sulfamethoxazole-trimethoprim (BACTRIM/SEPTRA) 400-80 MG tablet Take 1 tablet by mouth At Bedtime For UTI  prevention, Disp-90 tablet, R-3, E-Prescribe      tamsulosin (FLOMAX) 0.4 MG capsule Take 0.4 mg by mouth daily , Historical      vitamin D3 (CHOLECALCIFEROL) 2000 units (50 mcg) tablet Take 1 tablet by mouth daily as needed , Historical                    Brief History of Illness:   Reason for admission requiring a surgical or invasive procedure:   Urinary retention [R33.9]   The patient underwent the following procedure(s):   See above   There were no immediate complications during this procedure.    Please refer to the full operative summary for details.           Hospital Course:   The patient's hospital course was unremarkable.  Vini Loya recovered as anticipated and experienced no post-operative complications. His catheter was irrigated on POD#1 with return of minimal clot. He successfully passed a trial of void on POD#1. On POD#1 patient was ambulating without assitance, tolerating a regular diet, had pain controlled with PO medications to go home with, and requiring no IV medications or fluids. Patient was discharged home with appropriate contact information, follow-up and instructions as seen below in the discharge paperwork. He will finish his antibiotic course prescribed pre-operatively once he gets home (Augmentin). He will follow up with Dr. Zabala as scheduled on 3/16/21.         Final Pathology Result:   Pending at time of discharge         Discharge Instructions and Follow-Up:     Discharge Procedure Orders   Reason for your hospital stay   Order Comments: Prostate surgery     Adult [facility] Follow-up and recommended labs and tests   Order Comments: Follow up per discharge instructions    Appointments on [place] and/or [facility] (with [facility] or [facility] provider or service). Call [phone] if you haven't heard regarding these appointments within 7 days of discharge.     Activity   Order Comments: Your activity upon discharge: per discharge instructions     Order Specific Question Answer  Comments   Is discharge order? Yes      Discharge Instructions   Order Comments: Activity  - No strenuous exercise for 6 weeks.  - No lifting, pushing, pulling more than 10 pounds for 6 weeks.   - Do not strain with bowel movements.  - Do not drive until you can press the brake pedal quickly and fully without pain.   - Do not operate a motor vehicle while taking narcotic pain medications.     Urination  - Your catheter was removed prior to discharge  - Some light redness (up to a watermelon-like-pink in color) is expected in the upcoming 7-10 days.   - If having hematuria (blood in the urine), make sure to increase your water intake and monitor for improvement  - If you are unable to urinate you should return urgently to the ED or call clinic to try to arrange for an urgent visit same day.     -Medications  -Finish your pre-operative antibiotic once you get home (Augmentin)  - Use prescribed tylenol (acetaminophen) or over the counter ibuprofen (as long as you do not have contraindications to these medications) for pain and wean off as you are able. Do not take more than 4,000mg of Tylenol (acetaminophen) or 3,200 mg ibuprofen from all sources in any 24 hour period.  - Take an over the counter stool softener or an over the counter fiber (metamucil or benefiber) and stool softeners (miralax, docusate or senna) to avoid constipation post-operatively, but stop if you develop diarrhea.    Follow-Up:  - Follow up with Dr. Zabala (Urology) as scheduled on 3/16/21.  - Call your primary care provider to touch base regarding your recent admission.    - Call or return sooner than your regularly scheduled visit if you develop any of the following: fever (greater than 101.5), uncontrolled pain, uncontrolled nausea or vomiting, as well as increased redness, swelling, or drainage from your wound.     Phone numbers:   - Monday through Friday 8am to 4:30pm: Call [phone] with questions or to schedule or confirm appointment.   "  - Nights or weekends: call the after hours emergency pager - [phone] and tell the  \"I would like to page the Urology Resident on call.\"  - For emergencies, call 911     Diet   Order Comments: Follow this diet upon discharge: regular     Order Specific Question Answer Comments   Is discharge order? Yes             Discharge Disposition:     Discharged to Home      Condition at discharge: Good    --    Terry Mahoney MD  Urology Resident    10:42 AM, 1/28/2021    "

## 2021-01-28 NOTE — PLAN OF CARE
"Pt arrived to unit from [facility] around 1440 on cart. Transferred to bed. CBI running very slow ratem foly bag light pink output. VSS on 2lpm. Pt A&Ox3. LUE tremor- baseline. PCDs, call light within reach.           VS:   BP (!) 166/76 (BP Location: Right arm)   Pulse 79   Temp 96.7  F (35.9  C) (Oral)   Resp 18   Ht 1.727 m (5' 8\")   Wt 80.1 kg (176 lb 9.4 oz)   SpO2 96%   BMI 26.85 kg/m     BP (!) 173/85 (BP Location: Right arm)   Pulse 82   Temp 97.7  F (36.5  C) (Oral)   Resp 18   Ht 1.727 m (5' 8\")   Wt 80.1 kg (176 lb 9.4 oz)   SpO2 95%   BMI 26.85 kg/m      VSS on 1LPM NC. Denies CP or SOB. Encouraging IS    Output:   Triple lumen irrigation garcia- GI//Procedure. Watermelon colored output on slow irrigation rate. No clots noted this shift.  BS+    Lungs LS clear  Pt CO mucous stuck in throat- encuraging IS and coughing    Activity:   Pt stood at edge of bed and marched in place. Pt eager to get back moving.   Pt able to help reposition in bed. LUE increased tremor.    Skin: Intact    Pain:   Denies    Neuro/CMS:   A&O, denies N/T    Dressing(s):   None    Diet:   Tolerating clear liquids. Had broth this evening. No nausea since coming to our floor. Zofran given in PACU    LDA:   R hand PIV infusing 75ml/hr  R hand PIV SL    Equipment:   PCDs, IS, CBI, IV pole, O2, Pulse OX    Plan:   Continue POC, will need trial void in morning. Frequently have eyes on pt.    Additional Info:   Hoa FERNANDEZ paged about Increased BP- Hydralazine ordered for Systolic >170.        "

## 2021-01-28 NOTE — PROGRESS NOTES
"Urology  Progress Note  01/28/2021    -JOSE JO,   -Last night AF, HTN up to SBP 170s, other vitals WNL  -Doing well this morning  -No pain  -Tolerated broth with no N/V  -Reports he got out of bed last night    Exam  /65 (BP Location: Left arm)   Pulse 82   Temp 97.7  F (36.5  C) (Oral)   Resp 18   Ht 1.727 m (5' 8\")   Wt 80.1 kg (176 lb 9.4 oz)   SpO2 95%   BMI 26.85 kg/m    No acute distress  Unlabored breathing  Abdomen soft, nontender, nondistended.  Garcia with clear urine in tubing    I/O's (last 24/since midnight)  UOP - [CBI]    Labs  WBC 14.7 (10.8)  Hgb 13.3 (14.4)  Cr 1.01 (1.1)    Assessment/Plan  80  year old male POD#1 s/p HoLEP for BPH with urinary retention.     Neuro: scheduled tylenol, prn IV dilaudid, prn oxy for pain control  CV: HDS  Pulm: Supplemental O2, wean as able; incentive spirometry while awake  FEN/GI: Regular diet, stop MIVF, bowel regimen  Endo: ERIK  : Irrigated and garcia removed. TOV x 2 this morning, please page Urology at [phone] with results. Monitor UOP.  Heme/ID: Hgb down to 13.3. Mild leukocytosis. Monitor fevers.  Activity: Up ad shira  PPx: SCDs  Dispo: Home today pending TOV.    Seen and examined. Will discuss with Dr. Zabala.    Terry Mahoney MD  Urology Resident, PGY-3     Contacting the Urology Team     Please use the following job codes to reach the Urology Team. Note that you must use an in house phone and that job codes cannot receive text pages.     On weekdays, dial [phone] (or star-star-star [phone] on the new [employer] telephones) then [phone] to reach the Adult Urology resident or PA on call    On weekdays, dial [phone] (or star-star-star 777 on the new [employer] telephones) then [phone] to reach the Pediatric Urology resident    On weeknights and weekends, dial [phone] (or star-star-star 777 on the new [employer] telephones) then [phone] to reach the Urology resident on call (for both Adult and Pediatrics)              "

## 2021-01-31 NOTE — OP NOTE
Operative Report  1/31/2021    PREOPERATIVE DIAGNOSIS:  Prostatic hypertrophy with urinary retention  POSTOPERATIVE DIAGNOSIS: Same as above    PROCEDURE PERFORMED: Holmium laser enucleation of the prostate  ATTENDING SURGEON: Michael Zabala MD  RESIDENT SURGEON: Terry Mahoney MD    FINDINGS: Approximately 50 gm prostate with bilateral lobe hypertrophy. Bladder with moderate trabeculation  ANESTHESIA: General  INTRAVENOUS FLUIDS: See anesthesia records  ESTIMATED BLOOD LOSS: Less than 100 ml   SPECIMENS: Prostate adenoma  DRAINS: 22-Persian 3-way catheter with 60 ml in balloon     INDICATIONS FOR PROCEDURE: Vini Loya is a(n) 80 year old male who was seen in consultation for urinary retention. He has elected treatment with laser enucleation. Prostate volume estimated to be 60 grams. His digital rectal exam was unremarkable.  After discussion of the risks, benefits and alternatives of the procedure, the patient agreed to proceed with the above stated procdure.    DESCRIPTION OF PROCEDURE: After obtaining informed consent, the patient was taken to the operating room and placed under general anesthesia.  He was repositioned in dorsal lithotomy making sure that the legs were positioned and padded safely.  He was then prepped and draped in standard sterile fashion.  Culture directed antibiotics were administered and bilateral sequential compression devices were placed.  A time out was performed confirming the appropriate patient identity and planned procedure.     The procedure was begun by generously lubricating the urethra.  The urethra was noted to be patent and did not require use of the sea urethrotome in order to place the 26F outer sheath.  The outer sheath was placed over a deflecting obturator and we then looked the scope through the posterior urethra and into the bladder.  The prostate was noted to have a bilobar configuration.  At this point we inserted the 550 micron holmium laser through the 7F  "laser catheter.    We began enucleation by making an apical incision adjacent to the veromontanum.  We developed the apical plane on the left side and carried this laterally until 3 oclock.  We then proceeded to make a counter incision in the same fashion on the right side.  We next dissected the prostate out in a circumferential fashion, coming over the top of the gland and entering the bladder neck.  We next identified the mucosal strip anteriorly and transected it with the laser.  We then proceeded to take down the remaining lateral and posterior attachments which allowed us to push the adenoma in an en-bloc fashion into the bladder. Total enucleation time was 42 minutes.\"}    At this point there was a moderate amount of bleeding and approximately 10 minutes were spent identifying bleeding vessels in the fossa and coagulating them with the laser.  Once hemostasis was adequate we switched from the laser resectoscope to the 26F offset telescope with the Piranha morcellation device.  We added an extra inflow to distend the bladder.  The blades were adjusted and set at a rate of 1500 RPM.  We proceeded with morcellation making sure that we morcellated the entirety of the adenoma.  Total morcellation time was 6 minutes.     We then switched back to the laser resectoscope one final time to ensure that no residual tissue was left in the bladder.  We also confirmed that the bladder was unharmed from morcellation and that both ureteral orifices were unharmed during the procedure.  We inspected the fossa and obtained final hemostasis.   We looked the scope out noting that the sphincter was completely intact without evidence of thermal or mechanical injury.     We lubricated the urethra again and passed a 22F 3-way garcia catheter over a catheter guide.  The urine was noted to be clear.  We filled the balloon with 60 ml of sterile water and did not place the catheter on traction.  The patient was woken from anesthesia and " taken to the recovery room in stable condition.     The specimen was weighed and found to be approximately 20 g.     POSTOPERATIVE PLAN:   -We will monitor the patient post operatively on continuous bladder irrigation for signs of ongling bleeding.  If clear we will consider discharge with garcia removal as an outpatient.  If continues to have ongoing bleeding concerning for clot formation without bladder irrigation will plan to admit for observation    Michael Zabala

## 2021-02-05 ENCOUNTER — PATIENT OUTREACH (OUTPATIENT)
Dept: CARE COORDINATION | Facility: CLINIC | Age: 81
End: 2021-02-05

## 2021-02-05 NOTE — PROGRESS NOTES
"Clinic Care Coordination Contact  Community Health Worker Follow Up    Goals:   Goals Addressed as of 2/5/2021 at 2:39 PM                 Today       Patient Stated      [facility]   [facility]    Added 10/9/20 by Kajal Gutierrez BSW     Goal Statement: I would like to see what benefits I have from the [facility] in the next two months.   Date Goal set: 10/9/2020  Barriers: unsure how to go about this   Strengths: asking for help  Date to Achieve By: 12/9/2020  Patient expressed understanding of goal: yes    Action steps to achieve this goal:  1. I will ask my wife to speak with a [facility]  when I go in for my COVID vaccine about any services or supports I might qualify for.  2. I will ask my wife to give the CHW updates at outreach calls.    Updated: 2/5/21                  Intervention and Education during outreach:     Called and spoke with pt's wife, Kristi. Noted pt's prostate surgery last week and asked how it went and how pt is feeling. Kristi said it went okay and that pt can \"go to the bathroom, which he is thrilled about.\" Kristi said they kept pt overnight for observation, which was helpful. She said they have follow up appointments in March scheduled. Pt's wife asked if I could call back after those appointments (3/15 & 3/16) to follow up, and I agreed.    Checked in with pt's wife about the goal regarding more support at home for pt. I asked if Kristi had looked into the resources on [employer] for respite, and she said yes. She said she didn't end up reaching out to anyone or hiring anyone. Kristi said she has the information and will use it and/or reach out to  again in the future if this becomes a concern. Completed goal, per pt's wife's request.    I asked if they'd been able to follow up with the [facility] regarding any supports or resources that pt might qualify for. She said yes, they have an appointment to speak with a [facility]  in a few weeks and pt will be getting his first dose " of the COVID vaccine. Thanked her for the update. She said she will let me know how it goes and if she has any questions. Updated goal.    Asked if pt's wife had any other questions or concerns today, and she declined. Confirmed that she has my contact information and encouraged her to reach out to me in the meantime if needed.    CHW Plan: Pt will attend his follow up appointments in March. Pt will attend his upcoming appointment [facility] the [facility] with his wife to get more information about services/resources and to get his first dose of the COVID vaccine. Pt's wife will reach out to the CHW if she has questions or updates before next outreach. CHW will follow up with pt's wife in one month.

## 2021-02-16 ENCOUNTER — PATIENT OUTREACH (OUTPATIENT)
Dept: CARE COORDINATION | Facility: CLINIC | Age: 81
End: 2021-02-16

## 2021-02-16 NOTE — PROGRESS NOTES
Clinic Care Coordination Contact    Situation: Patient chart reviewed by care coordinator.    Background:  RN JITENDRA's initial assessment and enrollment to Care Coordination was 10/9/20.  Patient centered goals were developed with participation from patient.   CC handed patient off to CHW for continued outreach every 30 days.    Assessment: CHW has been checking in with patient on a regular basis. Last check in was on 2/5 and patient's wife Kristi shared that patient was doing wonderful after prostate surgery and asked CHW to call back after follow up appt's (3/15 and 3/16)  Kristi states patient has an appointment with a [facility]  coming up to see what resources and supports pt may qualify for, pt will also receive his Covid vaccine at this time.    No other concerns/needs at this time    Plan/Recommendations: Patient and his wife will meet with [facility]  to see what supports he may qualify for.  -CHW will continue complete outreaches every 30 days.  -SW will complete next outreach in 45 days.

## 2021-02-26 ENCOUNTER — ANCILLARY PROCEDURE (OUTPATIENT)
Dept: GENERAL RADIOLOGY | Facility: CLINIC | Age: 81
End: 2021-02-26
Attending: FAMILY MEDICINE
Payer: COMMERCIAL

## 2021-02-26 ENCOUNTER — OFFICE VISIT (OUTPATIENT)
Dept: FAMILY MEDICINE | Facility: CLINIC | Age: 81
End: 2021-02-26
Payer: COMMERCIAL

## 2021-02-26 VITALS
BODY MASS INDEX: 26.55 KG/M2 | SYSTOLIC BLOOD PRESSURE: 128 MMHG | OXYGEN SATURATION: 97 % | WEIGHT: 174.6 LBS | TEMPERATURE: 97.4 F | DIASTOLIC BLOOD PRESSURE: 76 MMHG | HEART RATE: 67 BPM

## 2021-02-26 DIAGNOSIS — M54.50 CHRONIC MIDLINE LOW BACK PAIN WITHOUT SCIATICA: Primary | ICD-10-CM

## 2021-02-26 DIAGNOSIS — I10 ESSENTIAL HYPERTENSION WITH GOAL BLOOD PRESSURE LESS THAN 140/90: ICD-10-CM

## 2021-02-26 DIAGNOSIS — F32.1 MAJOR DEPRESSIVE DISORDER, SINGLE EPISODE, MODERATE (H): ICD-10-CM

## 2021-02-26 DIAGNOSIS — N18.31 CHRONIC KIDNEY DISEASE, STAGE 3A (H): ICD-10-CM

## 2021-02-26 DIAGNOSIS — E78.5 HYPERLIPIDEMIA LDL GOAL <100: Chronic | ICD-10-CM

## 2021-02-26 DIAGNOSIS — Z91.81 PERSONAL HISTORY OF FALL: ICD-10-CM

## 2021-02-26 DIAGNOSIS — G89.29 CHRONIC MIDLINE LOW BACK PAIN WITHOUT SCIATICA: Primary | ICD-10-CM

## 2021-02-26 DIAGNOSIS — I25.10 CORONARY ARTERY DISEASE INVOLVING NATIVE HEART WITHOUT ANGINA PECTORIS, UNSPECIFIED VESSEL OR LESION TYPE: Chronic | ICD-10-CM

## 2021-02-26 LAB
CHOLEST SERPL-MCNC: 158 MG/DL
CREAT UR-MCNC: 172 MG/DL
HDLC SERPL-MCNC: 59 MG/DL
LDLC SERPL CALC-MCNC: 75 MG/DL
MICROALBUMIN UR-MCNC: 311 MG/L
MICROALBUMIN/CREAT UR: 180.81 MG/G CR (ref 0–17)
NONHDLC SERPL-MCNC: 99 MG/DL
TRIGL SERPL-MCNC: 119 MG/DL

## 2021-02-26 PROCEDURE — 99214 OFFICE O/P EST MOD 30 MIN: CPT | Performed by: FAMILY MEDICINE

## 2021-02-26 PROCEDURE — 80061 LIPID PANEL: CPT | Performed by: FAMILY MEDICINE

## 2021-02-26 PROCEDURE — 72100 X-RAY EXAM L-S SPINE 2/3 VWS: CPT | Performed by: RADIOLOGY

## 2021-02-26 PROCEDURE — 72220 X-RAY EXAM SACRUM TAILBONE: CPT | Performed by: RADIOLOGY

## 2021-02-26 PROCEDURE — 36415 COLL VENOUS BLD VENIPUNCTURE: CPT | Performed by: FAMILY MEDICINE

## 2021-02-26 PROCEDURE — 82043 UR ALBUMIN QUANTITATIVE: CPT | Performed by: FAMILY MEDICINE

## 2021-02-26 RX ORDER — DOXAZOSIN 1 MG/1
TABLET ORAL
COMMUNITY
Start: 2020-10-17 | End: 2021-04-01

## 2021-02-26 NOTE — PATIENT INSTRUCTIONS
[facility]  Physical therapy to get you back in the game of life   The [facility], a service of [facility], provides orthopedic and sports physical therapy at over 30 [facility] locations. In addtion to physical therapy, [facility] offers chiropractic and athletic training services.   Appointments [phone]    Patient Education     Coccyx or Sacrum Bruise (Contusion)    You have a bruise (contusion of the tailbone (coccyx) or sacrum. The sacrum is the triangular bone at the base of the spine that joins the pelvic bones. The coccyx is the last bone of the sacrum that hangs down in a point like a small tail. Symptoms include swelling and some bleeding under the skin. This injury often takes a few weeks to heal. During that time, the bruise will typically change in color from reddish, to purple-blue, to greenish-yellow, then to yellow-brown. A crack (fracture) in the coccyx bone causes the same symptoms as a contusion in this area. X-rays are often not taken because the treatment is the same. If you have fracture of the tailbone as well as a contusion, healing generally takes 4 weeks or longer.   Home care    Try to find a position of comfort. Try lying on your side with your knees bent up towards your chest and a pillow between your knees.    A bruised tailbone causes pain when sitting. You may try using a doughnut pillow. This is a foam pillow with a hole in the center to prevent pressure on the tailbone. You can buy this at a pharmacy or orthopedic supply store.    Ice the injured area to help reduce pain and swelling. Wrap a cold source (ice pack or ice cubes in a plastic bag) in a thin towel. Apply to the bruised area for 20 minutes every 1 to 2 hours the first day. Continue this 3 to 4 times a day until the pain and swelling goes away.    Unless another medicine was prescribed, you can take acetaminophen, ibuprofen, or naproxen to control pain. Talk with  your healthcare provider before taking these medicines if you have chronic liver or kidney disease or ever had a stomach ulcer or digestive bleeding.    Follow-up care  Follow up with your healthcare provider, or as advised. Call if you do not get better in 2 weeks.   When to seek medical advice   Call your healthcare provider right away if you have any of the following:    Pain or swelling gets worse    Pain spreads to one or both legs    Weakness or numbness in one or both legs    Loss of bowel or bladder control    Numbness in the groin area    Signs of infection. These include warmth, drainage, or increased redness.    Frequent bruising for unknown reasons  [employer] last reviewed this educational content on 8/1/2019 2000-2020 [employer]. [address] [place] [address], [place] [address]. All rights reserved. This information is not intended as a substitute for professional medical care. Always follow your healthcare professional's instructions.

## 2021-02-26 NOTE — PROGRESS NOTES
Assessment & Plan     Chronic midline low back pain without sciatica  Personal history of fall  I personally reviewed xrays today, no significant abnormalities/fractures noted, DDD noted.  Recommend ongoing activity, therapy as desired, referral placed.  - XR Lumbar Spine 2/3 Views  - XR Sacrum and Coccyx 2 Views  - [facility] CH[facility] REFERRAL; Future    Essential hypertension with goal blood pressure less than 140/90  BP Readings from Last 3 Encounters:   02/26/21 128/76   01/28/21 (!) 178/75   01/22/21 119/71    At goal  The current medical regimen is effective;  continue present plan and medications.    - Albumin Random Urine Quantitative with Creat Ratio    Hyperlipidemia LDL goal <100  LDL Cholesterol Calculated   Date Value Ref Range Status   02/11/2020 71 <100 mg/dL Final     Comment:     Desirable:       <100 mg/dl    at goal  - Lipid panel reflex to direct LDL Fasting    Coronary artery disease involving native heart without angina pectoris, unspecified vessel or lesion type  No acute symptoms, very active without cardiac symptoms, see HPI  - Lipid panel reflex to direct LDL Fasting    Chronic kidney disease, stage 3a  GFR Estimate   Date Value Ref Range Status   01/28/2021 68 >60 mL/min/[1.73_m2] Final     Comment:     Non  GFR Calc  Starting 12/18/2018, serum creatinine based estimated GFR (eGFR) will be   calculated using the Chronic Kidney Disease Epidemiology Collaboration   (CKD-EPI) equation.     01/14/2021 63 >60 mL/min/[1.73_m2] Final     Comment:     Non  GFR Calc  Starting 12/18/2018, serum creatinine based estimated GFR (eGFR) will be   calculated using the Chronic Kidney Disease Epidemiology Collaboration   (CKD-EPI) equation.     10/16/2020 72 >60 mL/min/[1.73_m2] Final     Comment:     Non  GFR Calc  Starting 12/18/2018, serum creatinine based estimated GFR (eGFR) will be   calculated using the Chronic Kidney Disease Epidemiology  Collaboration   (CKD-EPI) equation.     stable, due for recheck    Major depressive disorder, single episode, moderate (H)  PHQ 2019 2020 11/3/2020   PHQ-9 Total Score 4 3 7   Q9: Thoughts of better off dead/self-harm past 2 weeks Not at all Not at all Not at all    slight worsening he attributes to pain, reassess at next visit      Return in about 4 weeks (around 3/26/2021) for preventive visit (wellness/annual physical exam).    Seelne Land MD  [facility]    Cecilia Martini is a 80 year old who presents for the following health issues     HPI     Depression Followup    How are you doing with your depression since your last visit? No change    Are you having other symptoms that might be associated with depression? No    Have you had a significant life event?  Health Concerns     Are you feeling anxious or having panic attacks?   No    Do you have any concerns with your use of alcohol or other drugs? No    Social History     Tobacco Use     Smoking status: Former Smoker     Packs/day: 0.50     Years: 3.00     Pack years: 1.50     Types: Cigarettes     Start date: 1960     Quit date: 3/14/1966     Years since quittin.9     Smokeless tobacco: Former User   Substance Use Topics     Alcohol use: No     Comment: occasional wine on the holidays      Drug use: No     PHQ 2019 2020 11/3/2020   PHQ-9 Total Score 4 3 7   Q9: Thoughts of better off dead/self-harm past 2 weeks Not at all Not at all Not at all     ELISHA-7 SCORE 2016 2017 3/15/2018   Total Score - - -   Total Score 0 0 0       Hyperlipidemia Follow-Up      Are you regularly taking any medication or supplement to lower your cholesterol?   Yes- simvastatin 40 mg daily    Are you having muscle aches or other side effects that you think could be caused by your cholesterol lowering medication?  No    Hypertension Follow-up      Do you check your blood pressure regularly outside of the  clinic? Yes     Are you following a low salt diet? Yes    Are your blood pressures ever more than 140 on the top number (systolic) OR more   than 90 on the bottom number (diastolic), for example 140/90? No   Usually 120's/70's, gets regular exercise (walks one hour daily)    Vascular Disease Follow-up      How often do you take nitroglycerin? Never    Do you take an aspirin every day? Yes    Chronic Kidney Disease Follow-up    Do you take any over the counter pain medicine?: Yes  What over the counter medicine are you taking for your pain?:  Acetaminophen Aleve      How often do you take this medicine?:  A few times a week     GFR Estimate   Date Value Ref Range Status   01/28/2021 68 >60 mL/min/[1.73_m2] Final     Comment:     Non  GFR Calc  Starting 12/18/2018, serum creatinine based estimated GFR (eGFR) will be   calculated using the Chronic Kidney Disease Epidemiology Collaboration   (CKD-EPI) equation.     01/14/2021 63 >60 mL/min/[1.73_m2] Final     Comment:     Non  GFR Calc  Starting 12/18/2018, serum creatinine based estimated GFR (eGFR) will be   calculated using the Chronic Kidney Disease Epidemiology Collaboration   (CKD-EPI) equation.     10/16/2020 72 >60 mL/min/[1.73_m2] Final     Comment:     Non  GFR Calc  Starting 12/18/2018, serum creatinine based estimated GFR (eGFR) will be   calculated using the Chronic Kidney Disease Epidemiology Collaboration   (CKD-EPI) equation.       Back Pain  Onset/Duration: Couple of months ago fell onto step, hurt his back  He used lidocaine patches initially but doesn't seem to help now  Description:   Location of pain: low back right  Character of pain: sharp and intermittent  Pain radiation: none  New numbness or weakness in legs, not attributed to pain: no   Intensity: Currently 6/10  Progression of Symptoms: worsening and intermittent  History:   Specific cause: fall   Pain interferes with job: not  applicable  History of back problems: no prior back problems  Any previous MRI or X-rays: None  Sees a specialist for back pain: No  Alleviating factors:   Improved by: walking and heat    Precipitating factors:  Worsened by: Standing  Therapies tried and outcome:aleve -2 tab every other day and acetaminophen (Tylenol)    LUMBAR SPINE TWO-THREE  VIEWS  5/22/2020 2:30 PM      HISTORY: Chronic bilateral low back pain with left-sided sciatica;  Chronic bilateral low back pain with left-sided sciatica     COMPARISON: Plain film dated 2/6/2016     FINDINGS: Mild chronic anterior wedging of T12 is again noted. This is  unchanged since 2016. There is loss of disc space height at L3-4,  L4-5, and L5-S1. Degenerative changes are seen in the facet joints.  There are prominent spinous processes with degenerative change between  the spinous processes.                                                                      IMPRESSION:  1. Multilevel degenerative change. The patient had similar findings on  the study of 12/6/2016.  2. Chronic compression fracture of T12.      Review of Systems   Constitutional, HEENT, cardiovascular, pulmonary, GI, , musculoskeletal, neuro, skin, endocrine and psych systems are negative, except as otherwise noted.      Objective    /76 (BP Location: Left arm, Patient Position: Sitting, Cuff Size: Adult Regular)   Pulse 67   Temp 97.4  F (36.3  C) (Tympanic)   Wt 79.2 kg (174 lb 9.6 oz)   SpO2 97%   BMI 26.55 kg/m    Body mass index is 26.55 kg/m .  Physical Exam   GENERAL: healthy, alert, frail and elderly  NECK: no adenopathy, no asymmetry, masses, or scars and thyroid normal to palpation  RESP: lungs clear to auscultation - no rales, rhonchi or wheezes  CV: regular rate and rhythm, normal S1 S2, no S3 or S4, no murmur, click or rub, no peripheral edema and peripheral pulses strong  MS: slowed, hesitant step, asymmetrical gait and stance  DTR: reflexes 3+ (patellar)  NEURO: sensory  exam grossly normal and mentation intact, affect flat,  intermittent resting tremor and intention tremor noted, consistent with known PD diagnosis      Reviewed imaging as per HPI

## 2021-03-02 NOTE — RESULT ENCOUNTER NOTE
Hello!  It was a pleasure to see you in clinic!  Thank you for getting labs done.     Your xrays look good!  There is no sign of a fracture or anything else immediately concerning. 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 Tylenol can help. Please let me know if the pain is getting worse or limiting your walks!    Your microalbumen is elevated. This means you have some protein in the urine. It indicates that your kidneys are being affected by hypertension. Keeping blood pressure and blood fats low is the best treatment in order to keep this  stable. People with microalbumen should avoid anti-inflamatory agents such as motrin, alleve and ibuprofen as much as possible.     Good news, your LDL (or bad cholesterol) is at goal. Your medication is working well. Please continue to take your cholesterol lowering medication as you have been doing.    If you have any questions, please contact the clinic or schedule an appointment with me, thank you!    Sincerely,  Dr. Selene Land MD  3/2/2021

## 2021-03-03 NOTE — PROGRESS NOTES
[facility] Initial Evaluation    Subjective:  Vini Loya is a 80 year old male with a lumbar condition. Symptoms commenced as a result of: tripped and fell on step - landed on low back. Condition occurred in the following environment: at home. Onset of symptoms: September 2020; date on PT order: 2/26/21. Location of symptoms: right low back. Pain level on number scale: 6/10. Quality of pain: sharp. Associated symptoms: stiffness. Pain frequency (constant/intermittent): intermittent. Symptoms are exacerbated by: standing, washing, dressing, lifting, walking, sitting, sleeping. Symptoms are relieved by: walking, heat. Progression of symptoms since onset (same/better/worse): worse. Special tests (x-ray, MRI, CT scan, EMG, bone scan): x-ray. Previous treatment: None. Improvement with previous treatment: NA. General health as reported by patient is fair. Pertinent medical history includes: Parkinson's disease, See Epic. Medical allergies: see Epic. Other pertinent surgeries: see Epic. Current medications: See Epic. Occupation: retired . Patient is (working in normal job without restrictions/working in normal job with restrictions/working in an alternate job/not working due to present treatment problem): NA. Primary job tasks: NA. Barriers at home/work: None reported by patient. Red flags: None reported by patient.    Objective  Posture    Sitting (good/fair/poor): poor  Standing (good/fair/poor):  poor  Lateral shift (right/left/nil):  Right - patient states he has been shifted for 10+ years, unable to maintain corrected posture  Relevant lateral shift (yes/no):  TBD  Correction of posture (better/worse/no effect): better    Lumbar Movement Loss Cooper Mod Min Nil Pain   Flexion    x ++   Extension x    +   Side Gliding L  x      Side Gliding R    x ++     Assessment/Plan:    Patient is a 80 year old male with lumbar complaints.    Patient has the following significant findings with  corresponding treatment plan.                Diagnosis 1:  Right LBP  Pain -  manual therapy, self management, education, directional preference exercise and home program  Decreased ROM/flexibility - manual therapy and therapeutic exercise  Decreased joint mobility - manual therapy and therapeutic exercise  Decreased strength - therapeutic exercise and therapeutic activities  Impaired balance - neuro re-education and therapeutic activities  Decreased proprioception - neuro re-education and therapeutic activities  Impaired gait - gait training  Impaired muscle performance - neuro re-education  Decreased function - therapeutic activities  Impaired posture - neuro re-education    Therapy Evaluation Codes:   1) History comprised of:   Personal factors that impact the plan of care:      None.    Comorbidity factors that impact the plan of care are:      Heart problems, High blood pressure and Stroke.     Medications impacting care: Anti-depressant.  2) Examination of Body Systems comprised of:   Body structures and functions that impact the plan of care:      Lumbar spine.   Activity limitations that impact the plan of care are:      Bathing, Bending, Cooking, Driving, Dressing, Lifting, Sitting, Squatting/kneeling, Stairs, Standing, Walking, Sleeping and Laying down.  3) Clinical presentation characteristics are:   Stable/Uncomplicated.  4) Decision-Making    Moderate complexity using standardized patient assessment instrument and/or measureable assessment of functional outcome.  Cumulative Therapy Evaluation is: Moderate complexity.    Previous and current functional limitations:  (See Goal Flow Sheet for this information)    Short term and Long term goals: (See Goal Flow Sheet for this information)     Communication ability:  Patient appears to be able to clearly communicate and understand verbal and written communication and follow directions correctly.  Treatment Explanation - The following has been discussed with  the patient:   RX ordered/plan of care  Anticipated outcomes  Possible risks and side effects  This patient would benefit from PT intervention to resume normal activities.   Rehab potential is good.    Frequency:  1 X week, once daily  Duration:  for 4 weeks tapering to 2 X a month over 1 month  Discharge Plan:  Achieve all LTG.  Independent in home treatment program.  Reach maximal therapeutic benefit.    Please refer to the daily flowsheet for treatment today, total treatment time and time spent performing 1:1 timed codes.

## 2021-03-04 ENCOUNTER — THERAPY VISIT (OUTPATIENT)
Dept: PHYSICAL THERAPY | Facility: CLINIC | Age: 81
End: 2021-03-04
Attending: FAMILY MEDICINE
Payer: COMMERCIAL

## 2021-03-04 DIAGNOSIS — G89.29 CHRONIC RIGHT-SIDED LOW BACK PAIN WITHOUT SCIATICA: ICD-10-CM

## 2021-03-04 DIAGNOSIS — G89.29 CHRONIC MIDLINE LOW BACK PAIN WITHOUT SCIATICA: ICD-10-CM

## 2021-03-04 DIAGNOSIS — M54.50 CHRONIC RIGHT-SIDED LOW BACK PAIN WITHOUT SCIATICA: ICD-10-CM

## 2021-03-04 DIAGNOSIS — Z91.81 PERSONAL HISTORY OF FALL: ICD-10-CM

## 2021-03-04 DIAGNOSIS — M54.50 CHRONIC MIDLINE LOW BACK PAIN WITHOUT SCIATICA: ICD-10-CM

## 2021-03-04 PROCEDURE — 97530 THERAPEUTIC ACTIVITIES: CPT | Mod: GP | Performed by: PHYSICAL THERAPIST

## 2021-03-04 PROCEDURE — 97162 PT EVAL MOD COMPLEX 30 MIN: CPT | Mod: GP | Performed by: PHYSICAL THERAPIST

## 2021-03-04 PROCEDURE — 97110 THERAPEUTIC EXERCISES: CPT | Mod: GP | Performed by: PHYSICAL THERAPIST

## 2021-03-10 ENCOUNTER — THERAPY VISIT (OUTPATIENT)
Dept: PHYSICAL THERAPY | Facility: CLINIC | Age: 81
End: 2021-03-10
Payer: COMMERCIAL

## 2021-03-10 ENCOUNTER — TELEPHONE (OUTPATIENT)
Dept: FAMILY MEDICINE | Facility: CLINIC | Age: 81
End: 2021-03-10

## 2021-03-10 DIAGNOSIS — M54.50 CHRONIC LEFT-SIDED LOW BACK PAIN WITHOUT SCIATICA: ICD-10-CM

## 2021-03-10 DIAGNOSIS — M54.50 CHRONIC RIGHT-SIDED LOW BACK PAIN WITHOUT SCIATICA: ICD-10-CM

## 2021-03-10 DIAGNOSIS — G89.29 CHRONIC RIGHT-SIDED LOW BACK PAIN WITHOUT SCIATICA: ICD-10-CM

## 2021-03-10 DIAGNOSIS — G89.29 CHRONIC LEFT-SIDED LOW BACK PAIN WITHOUT SCIATICA: ICD-10-CM

## 2021-03-10 PROCEDURE — 97110 THERAPEUTIC EXERCISES: CPT | Mod: GP | Performed by: PHYSICAL THERAPIST

## 2021-03-10 NOTE — TELEPHONE ENCOUNTER
Reason for Call:  Form, our goal is to have forms completed with 72 hours, however, some forms may require a visit or additional information.    Type of letter, form or note:  home care dc summary    Who is the form from?: home th care (if other please explain)    Where did the form come from: form was faxed in    What clinic location was the form placed at?: [facility]    Where the form was placed: [facility] Box/Folder    What number is listed as a contact on the form?:  [phone]       Additional comments:     Call taken on 3/10/2021 at 2:08 PM by Braydon Riojas

## 2021-03-11 ENCOUNTER — PRE VISIT (OUTPATIENT)
Dept: UROLOGY | Facility: CLINIC | Age: 81
End: 2021-03-11

## 2021-03-11 ENCOUNTER — MEDICAL CORRESPONDENCE (OUTPATIENT)
Dept: HEALTH INFORMATION MANAGEMENT | Facility: CLINIC | Age: 81
End: 2021-03-11

## 2021-03-11 NOTE — TELEPHONE ENCOUNTER
Reason for visit: post op check      Relevant information: s/p enrique 1/21/21    Records/imaging/labs/orders: available    Pt called: MACIE    At Rooming: telephone

## 2021-03-15 ENCOUNTER — OFFICE VISIT (OUTPATIENT)
Dept: UROLOGY | Facility: CLINIC | Age: 81
End: 2021-03-15
Payer: COMMERCIAL

## 2021-03-15 ENCOUNTER — TELEPHONE (OUTPATIENT)
Dept: UROLOGY | Facility: CLINIC | Age: 81
End: 2021-03-15

## 2021-03-15 DIAGNOSIS — R33.9 URINARY RETENTION WITH INCOMPLETE BLADDER EMPTYING: Primary | ICD-10-CM

## 2021-03-15 DIAGNOSIS — N40.1 BENIGN PROSTATIC HYPERPLASIA WITH LOWER URINARY TRACT SYMPTOMS, SYMPTOM DETAILS UNSPECIFIED: ICD-10-CM

## 2021-03-15 PROCEDURE — 99024 POSTOP FOLLOW-UP VISIT: CPT

## 2021-03-15 NOTE — TELEPHONE ENCOUNTER
Called patient, informed patient that we would like for them to have a comfortably full bladder for their nurse appointment today.     Chad Salazar, EMT  03/15/21

## 2021-03-15 NOTE — PROGRESS NOTES
Chief Complaint   Patient presents with     Allied Health Visit     Flow/PVR       There were no vitals taken for this visit. There is no height or weight on file to calculate BMI.    Patient Active Problem List   Diagnosis     Generalized anxiety disorder     CAD (coronary artery disease)     Hyperlipidemia LDL goal <100     BPH (benign prostatic hypertrophy) with urinary obstruction     Parkinson's disease (H)     Gait disturbance, post-stroke     Herniated nucleus pulposus, L5-S1, right     Pain in right hip     Bunion     Other seborrheic keratosis     Allergic conjunctivitis     Diverticulosis     Glaucoma suspect, bilateral     Senile nuclear sclerosis, bilateral     Dry eye, bilateral     History of corneal transplant     Major depressive disorder, single episode, moderate (H)     Actinic keratosis     Watering of eye     Lactose intolerance in adult     Degeneration of L4-L5 intervertebral disc     Compression fracture of thoracic vertebra, with routine healing, subsequent encounter     CKD (chronic kidney disease) stage 3, GFR 30-59 ml/min     Falls frequently     Slow transit constipation     HTN (hypertension)     Orthostatic hypotension     Fall     Closed fracture of multiple ribs, unspecified laterality, initial encounter     YANELIS (acute kidney injury) (H)     Acute atopic conjunctivitis, unspecified eye     Age-related nuclear cataract, bilateral     Bradycardia, unspecified     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 (H)     History of falling     Other chronic pain     Preglaucoma, unspecified, bilateral     Problems in relationship with spouse or partner     Tremor, unspecified     Benign prostatic hyperplasia with lower urinary tract symptoms     Bunion of unspecified foot     Atherosclerotic heart disease of native coronary artery without angina pectoris     Chronic kidney disease, stage 3a     Wedge compression fracture of unspecified thoracic vertebra, subsequent  encounter for fracture with routine healing     Other intervertebral disc degeneration, lumbar region     Diverticulosis of intestine, part unspecified, without perforation or abscess without bleeding     Essential hypertension with goal blood pressure less than 140/90     Other sequelae of cerebral infarction     Other intervertebral disc displacement, lumbosacral region     Corneal transplant status     Lactose intolerance, unspecified     Low back pain     Hyperlipidemia, unspecified     Dry eye syndrome of bilateral lacrimal glands     Unspecified epiphora, unspecified side     Urinary retention     Chronic left-sided low back pain without sciatica     Chronic right-sided low back pain without sciatica       No Known Allergies    Current Outpatient Medications   Medication Sig Dispense Refill     dutasteride (AVODART) 0.5 MG capsule Take 1 capsule (0.5 mg) by mouth daily 90 capsule 3     tamsulosin (FLOMAX) 0.4 MG capsule Take 0.4 mg by mouth daily        aspirin (ASA) 81 MG tablet Take 1 tablet (81 mg) by mouth daily 90 tablet 3     bisacodyl (DULCOLAX) 10 MG suppository Place 1 suppository (10 mg) rectally daily as needed for constipation 10 suppository 0     carbidopa-levodopa (SINEMET CR)  MG CR tablet Take 1 tablet by mouth At Bedtime 90 tablet 3     carbidopa-levodopa (SINEMET)  MG tablet Take 2 tablets by mouth 3 times daily Take two tablets 3 times a day, at 7am, 11am & 4pm. 540 tablet 3     doxazosin (CARDURA) 1 MG tablet        ondansetron (ZOFRAN-ODT) 4 MG ODT tab DISSOLVE 1 TAB BY MOUTH EVERY 8 HOURS AS NEEDED FOR NAUSEA       PARoxetine (PAXIL) 20 MG tablet Take 1 tablet (20 mg) by mouth every morning 90 tablet 3     polyethylene glycol (MIRALAX) 17 GM/SCOOP powder Take 17 g (1 capful) by mouth 3 times daily (Patient taking differently: Take 1 capful by mouth 3 times daily as needed for constipation ) 1 Bottle 11     rOPINIRole (REQUIP) 0.25 MG tablet Take 0.5 mg by mouth 3 times daily         SENNA-docusate sodium (SENNA S) 8.6-50 MG tablet Take 2 tablets by mouth 2 times daily 120 tablet 11     simvastatin (ZOCOR) 40 MG tablet TAKE ONE TABLET BY MOUTH EVERY NIGHT AT BEDTIME 90 tablet 3     sulfamethoxazole-trimethoprim (BACTRIM/SEPTRA) 400-80 MG tablet Take 1 tablet by mouth At Bedtime For UTI prevention 90 tablet 3     vitamin D3 (CHOLECALCIFEROL) 2000 units (50 mcg) tablet Take 1 tablet by mouth daily as needed          Social History     Tobacco Use     Smoking status: Former Smoker     Packs/day: 0.50     Years: 3.00     Pack years: 1.50     Types: Cigarettes     Start date: 1960     Quit date: 3/14/1966     Years since quittin.0     Smokeless tobacco: Former User   Substance Use Topics     Alcohol use: No     Comment: occasional wine on the holidays      Drug use: No       Vini Loya presented to clinic by request of Dr. Michael Zabala for Uroflow/PVR. Patient's name and  were verified, allergies and medications were reviewed. Patient voided into uroflow however for some reason uroflowmetry was not obtained. I suspect the system motor was obstructed when patient reclined to sit on the system. Patient states that they feel they have a very strong urine stream after the HoLEP procedure. Post void residual was 30 mL.       This service was provided under the direct supervision of Dr. Aston Reynolds, who was available at any point if needed.

## 2021-03-16 ENCOUNTER — VIRTUAL VISIT (OUTPATIENT)
Dept: UROLOGY | Facility: CLINIC | Age: 81
End: 2021-03-16
Payer: COMMERCIAL

## 2021-03-16 DIAGNOSIS — R33.9 URINARY RETENTION: Primary | ICD-10-CM

## 2021-03-16 PROCEDURE — 99024 POSTOP FOLLOW-UP VISIT: CPT | Mod: TEL | Performed by: UROLOGY

## 2021-03-16 ASSESSMENT — PAIN SCALES - GENERAL: PAINLEVEL: MODERATE PAIN (5)
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
## 2021-03-16 NOTE — NURSING NOTE
Chief Complaint   Patient presents with     Surgical Followup     post op check up, questions about which medications to continue taking     - Beverly Hook,   EMT Clinic Support

## 2021-03-19 ENCOUNTER — THERAPY VISIT (OUTPATIENT)
Dept: PHYSICAL THERAPY | Facility: CLINIC | Age: 81
End: 2021-03-19
Payer: COMMERCIAL

## 2021-03-19 DIAGNOSIS — M54.50 CHRONIC RIGHT-SIDED LOW BACK PAIN WITHOUT SCIATICA: ICD-10-CM

## 2021-03-19 DIAGNOSIS — M54.50 CHRONIC LEFT-SIDED LOW BACK PAIN WITHOUT SCIATICA: ICD-10-CM

## 2021-03-19 DIAGNOSIS — G89.29 CHRONIC RIGHT-SIDED LOW BACK PAIN WITHOUT SCIATICA: ICD-10-CM

## 2021-03-19 DIAGNOSIS — G89.29 CHRONIC LEFT-SIDED LOW BACK PAIN WITHOUT SCIATICA: ICD-10-CM

## 2021-03-19 PROCEDURE — 97110 THERAPEUTIC EXERCISES: CPT | Mod: GP | Performed by: PHYSICAL THERAPIST

## 2021-03-31 ENCOUNTER — PATIENT OUTREACH (OUTPATIENT)
Dept: CARE COORDINATION | Facility: CLINIC | Age: 81
End: 2021-03-31

## 2021-03-31 NOTE — PROGRESS NOTES
Clinic Care Coordination Contact  [facility]/Voicemail       Clinical Data: Care Coordinator Outreach  Outreach attempted x 1.  Left message on pt's voicemail with call back information and requested return call.  Plan: Care Coordinator will try to reach patient again in 10 business days.

## 2021-04-01 ENCOUNTER — OFFICE VISIT (OUTPATIENT)
Dept: FAMILY MEDICINE | Facility: CLINIC | Age: 81
End: 2021-04-01
Payer: COMMERCIAL

## 2021-04-01 VITALS
WEIGHT: 176 LBS | HEART RATE: 54 BPM | BODY MASS INDEX: 26.76 KG/M2 | SYSTOLIC BLOOD PRESSURE: 132 MMHG | OXYGEN SATURATION: 99 % | RESPIRATION RATE: 18 BRPM | TEMPERATURE: 97.6 F | DIASTOLIC BLOOD PRESSURE: 72 MMHG

## 2021-04-01 DIAGNOSIS — G20.A1 PARKINSON'S DISEASE (H): Chronic | ICD-10-CM

## 2021-04-01 DIAGNOSIS — M46.1 SI JOINT ARTHRITIS (H): ICD-10-CM

## 2021-04-01 DIAGNOSIS — R31.9 URINARY TRACT INFECTION WITH HEMATURIA, SITE UNSPECIFIED: ICD-10-CM

## 2021-04-01 DIAGNOSIS — F32.1 MAJOR DEPRESSIVE DISORDER, SINGLE EPISODE, MODERATE (H): ICD-10-CM

## 2021-04-01 DIAGNOSIS — I10 ESSENTIAL HYPERTENSION WITH GOAL BLOOD PRESSURE LESS THAN 140/90: ICD-10-CM

## 2021-04-01 DIAGNOSIS — Z00.00 ENCOUNTER FOR MEDICARE ANNUAL WELLNESS EXAM: Primary | ICD-10-CM

## 2021-04-01 DIAGNOSIS — E78.5 HYPERLIPIDEMIA LDL GOAL <100: ICD-10-CM

## 2021-04-01 DIAGNOSIS — N39.0 URINARY TRACT INFECTION WITH HEMATURIA, SITE UNSPECIFIED: ICD-10-CM

## 2021-04-01 PROBLEM — N17.9 AKI (ACUTE KIDNEY INJURY) (H): Status: RESOLVED | Noted: 2020-09-23 | Resolved: 2021-04-01

## 2021-04-01 PROBLEM — S22.49XA CLOSED FRACTURE OF MULTIPLE RIBS, UNSPECIFIED LATERALITY, INITIAL ENCOUNTER: Status: RESOLVED | Noted: 2020-09-23 | Resolved: 2021-04-01

## 2021-04-01 PROCEDURE — 99214 OFFICE O/P EST MOD 30 MIN: CPT | Mod: 25 | Performed by: FAMILY MEDICINE

## 2021-04-01 PROCEDURE — 99397 PER PM REEVAL EST PAT 65+ YR: CPT | Performed by: FAMILY MEDICINE

## 2021-04-01 PROCEDURE — 96127 BRIEF EMOTIONAL/BEHAV ASSMT: CPT | Mod: 59 | Performed by: FAMILY MEDICINE

## 2021-04-01 RX ORDER — SULFAMETHOXAZOLE AND TRIMETHOPRIM 400; 80 MG/1; MG/1
1 TABLET ORAL AT BEDTIME
Qty: 90 TABLET | Refills: 3 | Status: SHIPPED | OUTPATIENT
Start: 2021-04-01 | End: 2022-05-13

## 2021-04-01 RX ORDER — CARBIDOPA AND LEVODOPA 50; 200 MG/1; MG/1
1 TABLET, EXTENDED RELEASE ORAL AT BEDTIME
Qty: 90 TABLET | Refills: 3 | Status: SHIPPED | OUTPATIENT
Start: 2021-04-01 | End: 2022-05-03

## 2021-04-01 RX ORDER — SIMVASTATIN 40 MG
TABLET ORAL
Qty: 90 TABLET | Refills: 3 | Status: SHIPPED | OUTPATIENT
Start: 2021-04-01 | End: 2021-07-23

## 2021-04-01 RX ORDER — CARBIDOPA AND LEVODOPA 25; 100 MG/1; MG/1
2 TABLET ORAL 3 TIMES DAILY
Qty: 540 TABLET | Refills: 3 | Status: CANCELLED | OUTPATIENT
Start: 2021-04-01

## 2021-04-01 ASSESSMENT — ANXIETY QUESTIONNAIRES
5. BEING SO RESTLESS THAT IT IS HARD TO SIT STILL: MORE THAN HALF THE DAYS
7. FEELING AFRAID AS IF SOMETHING AWFUL MIGHT HAPPEN: NOT AT ALL
4. TROUBLE RELAXING: MORE THAN HALF THE DAYS
7. FEELING AFRAID AS IF SOMETHING AWFUL MIGHT HAPPEN: NOT AT ALL
GAD7 TOTAL SCORE: 7
2. NOT BEING ABLE TO STOP OR CONTROL WORRYING: NOT AT ALL
3. WORRYING TOO MUCH ABOUT DIFFERENT THINGS: MORE THAN HALF THE DAYS
1. FEELING NERVOUS, ANXIOUS, OR ON EDGE: SEVERAL DAYS
GAD7 TOTAL SCORE: 7
6. BECOMING EASILY ANNOYED OR IRRITABLE: NOT AT ALL
GAD7 TOTAL SCORE: 7

## 2021-04-01 ASSESSMENT — ENCOUNTER SYMPTOMS
FREQUENCY: 0
SHORTNESS OF BREATH: 0
FEVER: 0
ABDOMINAL PAIN: 0
HEMATOCHEZIA: 0
ARTHRALGIAS: 1
DIARRHEA: 0
CONSTIPATION: 1
EYE PAIN: 0
CHILLS: 0
PARESTHESIAS: 0
NAUSEA: 0
DYSURIA: 0
JOINT SWELLING: 0
PALPITATIONS: 0
WEAKNESS: 1
SORE THROAT: 0
HEMATURIA: 0
HEARTBURN: 0
COUGH: 1
HEADACHES: 0
NERVOUS/ANXIOUS: 1
MYALGIAS: 1
DIZZINESS: 0

## 2021-04-01 ASSESSMENT — PATIENT HEALTH QUESTIONNAIRE - PHQ9
SUM OF ALL RESPONSES TO PHQ QUESTIONS 1-9: 13
SUM OF ALL RESPONSES TO PHQ QUESTIONS 1-9: 13
10. IF YOU CHECKED OFF ANY PROBLEMS, HOW DIFFICULT HAVE THESE PROBLEMS MADE IT FOR YOU TO DO YOUR WORK, TAKE CARE OF THINGS AT HOME, OR GET ALONG WITH OTHER PEOPLE: VERY DIFFICULT

## 2021-04-01 ASSESSMENT — ACTIVITIES OF DAILY LIVING (ADL): CURRENT_FUNCTION: HOUSEWORK REQUIRES ASSISTANCE
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
## 2021-04-01 NOTE — PROGRESS NOTES
The patient reports that he has difficulty with activities of daily living. I have asked that the patient make a follow up appointment in *** weeks where this issue will be further evaluated and addressed.  He is at risk for falling and has been provided with information to reduce the risk of falling at home.

## 2021-04-01 NOTE — PATIENT INSTRUCTIONS
Acetaminophen: You can take 2000 mg every 24 hours, for instance, 1000 mg every 12 hours, or 500 mg every 6 hours, every day.    Patient Education   Personalized Prevention Plan  You are due for the preventive services outlined below.  Your care team is available to assist you in scheduling these services.  If you have already completed any of these items, please share that information with your care team to update in your medical record.  Health Maintenance Due   Topic Date Due     COVID-19 Vaccine (1) Never done     Zoster (Shingles) Vaccine (3 of 3) 03/29/2019     Preventive Health Recommendations  See your health care provider every year to    Review health changes.     Discuss preventive care.      Review your medicines if your doctor has prescribed any.    Talk with your health care provider about whether you should have a test to screen for prostate cancer (PSA).    Every 3 years, have a diabetes test (fasting glucose). If you are at risk for diabetes, you should have this test more often.    Every 5 years, have a cholesterol test. Have this test more often if you are at risk for high cholesterol or heart disease.     Every 10 years, have a colonoscopy. Or, have a yearly FIT test (stool test). These exams will check for colon cancer.    Talk to with your health care provider about screening for Abdominal Aortic Aneurysm if you have a family history of AAA or have a history of smoking.    Shots:     Get a flu shot each year.     Get a tetanus shot every 10 years.     Talk to your doctor about your pneumonia vaccines. There are now two you should receive - Pneumovax (PPSV 23) and Prevnar (PCV 13).    Talk to your pharmacist about a shingles vaccine.     Talk to your doctor about the hepatitis B vaccine.    Nutrition:     Eat at least 5 servings of fruits and vegetables each day.     Eat whole-grain bread, whole-wheat pasta and brown rice instead of white grains and rice.     Get adequate Calcium and Vitamin D.      Lifestyle    Exercise for at least 150 minutes a week (30 minutes a day, 5 days a week). This will help you control your weight and prevent disease.     Limit alcohol to one drink per day.     No smoking.     Wear sunscreen to prevent skin cancer.     See your dentist every six months for an exam and cleaning.     See your eye doctor every 1 to 2 years to screen for conditions such as glaucoma, macular degeneration and cataracts.    Personalized Prevention Plan  You are due for the preventive services outlined below.  Your care team is available to assist you in scheduling these services.  If you have already completed any of these items, please share that information with your care team to update in your medical record.  Health Maintenance   Topic Date Due     COVID-19 Vaccine (1) Never done     ZOSTER IMMUNIZATION (3 of 3) 03/29/2019     PHQ-9  05/03/2021     CMP  09/25/2021     FALL RISK ASSESSMENT  10/09/2021     LIPID  02/26/2022     MICROALBUMIN  02/26/2022     MEDICARE ANNUAL WELLNESS VISIT  04/01/2022     ADVANCE CARE PLANNING  04/01/2026     DTAP/TDAP/TD IMMUNIZATION (3 - Td) 10/01/2028     DEPRESSION ACTION PLAN  Completed     INFLUENZA VACCINE  Completed     Pneumococcal Vaccine: 65+ Years  Completed     Pneumococcal Vaccine: Pediatrics (0 to 5 Years) and At-Risk Patients (6 to 64 Years)  Aged Out     IPV IMMUNIZATION  Aged Out     MENINGITIS IMMUNIZATION  Aged Out     HEPATITIS B IMMUNIZATION  Aged Out     Activities of Daily Living    Your Health Risk Assessment indicates you have difficulties with activities of daily living such as housework, bathing, preparing meals, taking medication, etc. Please make a follow up appointment for us to address this issue in more detail.    Preventing Falls at Home  A person can fall for many reasons. Older adults may fall because reaction time slows down as we age. Your muscles and joints may get stiff, weak, or less flexible because of illness, medicines, or a  physical condition.   Other health problems that make falls more likely include:     Arthritis    Dizziness or lightheadedness when you stand up (orthostatic hypotension)    History of a stroke    Dizziness    Anemia    Certain medicines taken for mental illness or to control blood pressure.    Problems with balance or gait    Bladder or urinary problems    History of falling    Changes in vision (vision impairment)    Changes in thinking skills and memory (cognitive impairment)  Injuries from a fall can include serious injuries such as broken bones, dislocated joints, internal bleeding and cuts. Injuries like these can limit your independence.   Prevention tips  To help prevent falls and fall-related injuries, follow the tips below.    Floors  To make floors safer:     Put nonskid pads under area rugs.    Remove small rugs.    Replace worn floor coverings.    Tack carpets firmly to each step on carpeted stairs. Put nonskid strips on the edges of uncarpeted stairs.    Keep floors and stairs free of clutter and cords.    Arrange furniture so there are clear pathways.    Clean up any spills right away.  Bathrooms    To make bathrooms safer:     Install grab bars in the tub or shower.    Apply nonskid strips or put a nonskid rubber mat in the tub or shower.    Sit on a bath chair to bathe.    Use bathmats with nonskid backing.  Lighting  To improve visibility in your home:      Keep a flashlight in each room. Or put a lamp next to the bed within easy reach.    Put nightlights in the bedrooms, hallways, kitchen, and bathrooms.    Make sure all stairways have good lighting.    Take your time when going up and down stairs.    Put handrails on both sides of stairs and in walkways for more support. To prevent injury to your wrist or arm, don t use handrails to pull yourself up.    Install grab bars to pull yourself up.    Move or rearrange items that you use often. This will make them easier to find or reach.    Look at  your home to find any safety hazards. Especially look at doorways, walkways, and the driveway. Remove or repair any safety problems that you find.  Other changes to make    Look around to find any safety hazards. Look closely at doorways, walkways, and the driveway. Remove or repair any safety problems that you find.    Wear shoes that fit well.    Take your time when going up and down stairs.    Put handrails on both sides of stairs and in walkways for more support. To prevent injury to your wrist or arm, don t use handrails to pull yourself up.    Install grab bars wherever needed to pull yourself up.    Arrange items that you use often. This will make them easier to find or reach.    [employer] last reviewed this educational content on 3/1/2020    1287-9270 [employer]. All rights reserved. This information is not intended as a substitute for professional medical care. Always follow your healthcare professional's instructions.           At your visit today, we discussed your risk for falls and preventive options.    Fall Prevention  Falls often occur due to slipping, tripping or losing your balance. Millions of people fall every year and injure themselves. Here are ways to reduce your risk of falling again.     Think about your fall, was there anything that caused your fall that can be fixed, removed, or replaced?    Make your home safe by keeping walkways clear of objects you may trip over, such as electric cords.    Use non-slip pads under rugs. Don't use area rugs or small throw rugs.    Use non-slip mats in bathtubs and showers.    Install handrails and lights on staircases. The handrails should be on both sides of the stairs.    Don't walk in poorly lit areas.    Don't stand on chairs or wobbly ladders.    Use caution when reaching overhead or looking upward. This position can cause a loss of balance.    Be sure your shoes fit properly, have non-slip bottoms and are in good condition.     Wear  shoes both inside and out. Don't go barefoot or wear slippers.    Be cautious when going up and down stairs, curbs, and when walking on uneven sidewalks.    If your balance is poor, consider using a cane or walker.    If your fall was related to alcohol use, stop or limit alcohol intake.     If your fall was related to use of sleeping medicines, talk to your healthcare provider about this. You may need to reduce your dosage at bedtime if you awaken during the night to go to the bathroom.      To reduce the need for nighttime bathroom trips:  ? Don't drink fluids for several hours before going to bed  ? Empty your bladder before going to bed  ? Men can keep a urinal at the bedside    Stay as active as you can. Balance, flexibility, strength, and endurance all come from exercise. They all play a role in preventing falls. Ask your healthcare provider which types of activity are right for you.    Get your vision checked on a regular basis.    If you have pets, know where they are before you stand up or walk so you don't trip over them.    Use night lights.    Go over all your medicines with a pharmacist or other healthcare provider to see if any of them could make you more likely to fall.  [email or web address] last reviewed this educational content on 4/1/2018 2000-2020 [employer]. All rights reserved. This information is not intended as a substitute for professional medical care. Always follow your healthcare professional's instructions.

## 2021-04-02 ASSESSMENT — PATIENT HEALTH QUESTIONNAIRE - PHQ9: SUM OF ALL RESPONSES TO PHQ QUESTIONS 1-9: 13

## 2021-04-02 ASSESSMENT — ANXIETY QUESTIONNAIRES: GAD7 TOTAL SCORE: 7

## 2021-04-06 ENCOUNTER — PATIENT OUTREACH (OUTPATIENT)
Dept: CARE COORDINATION | Facility: CLINIC | Age: 81
End: 2021-04-06

## 2021-04-06 NOTE — PROGRESS NOTES
Care Coordination Clinician Chart Review  Situation: Patient chart reviewed by care coordinator.       Background: Care Coordination initial assessment and enrollment to Care Coordination was 10/9/2020.   Patient centered goals were developed with participation from patient. TAUR[facility] HAM handed patient off to CHW for continued outreach every 30 days.        Assessment: Per chart review, patient outreach completed by CC CHW on 3/31/21.  Patient is actively working to accomplish goal.  Patient's goal remains appropriate and relevant at this time.       Goals        Patient Stated      [facility]- [facility] (pt-stated)      Goal Statement: I would like to see what benefits I have from the [facility] in the next two months.   Date Goal set: 10/9/2020  Barriers: unsure how to go about this   Strengths: asking for help  Date to Achieve By: 12/9/2020  Patient expressed understanding of goal: yes    Action steps to achieve this goal:  1. I will ask my wife to speak with a [facility]  when I go in for my COVID vaccine about any services or supports I might qualify for.  2. I will ask my wife to give the CHW updates at outreach calls.    Updated: 2/5/21                      Plan/Recommendations: The patient will continue working with Care Coordination to achieve goal as above.  CHW will involve [facility] as needed or if patient is ready to move to maintenance.  [facility] will continue to monitor progress to goals and [facility]W outreaches every 6 weeks.

## 2021-04-07 ENCOUNTER — OFFICE VISIT (OUTPATIENT)
Dept: URGENT CARE | Facility: URGENT CARE | Age: 81
End: 2021-04-07
Payer: COMMERCIAL

## 2021-04-07 VITALS
SYSTOLIC BLOOD PRESSURE: 118 MMHG | TEMPERATURE: 98.4 F | BODY MASS INDEX: 26.52 KG/M2 | DIASTOLIC BLOOD PRESSURE: 74 MMHG | RESPIRATION RATE: 15 BRPM | HEIGHT: 68 IN | WEIGHT: 175 LBS | HEART RATE: 70 BPM

## 2021-04-07 DIAGNOSIS — M25.551 HIP PAIN, RIGHT: Primary | ICD-10-CM

## 2021-04-07 PROCEDURE — 99213 OFFICE O/P EST LOW 20 MIN: CPT | Performed by: FAMILY MEDICINE

## 2021-04-07 ASSESSMENT — MIFFLIN-ST. JEOR: SCORE: 1473.29

## 2021-04-07 NOTE — PROGRESS NOTES
Assessment & Plan     Hip pain, right  Recent xray imaging from several months reviewed showing moderate arthritis. Presents today without trauma or fall and has a steady gait -- concern for fracture is low. Recommend tylenol PRN along with topical voltaren gel. Schedule f/u with sports medicine to discuss treatment plans -- recommended continuing physical therapy  - Orthopedic & Spine  Referral        Sloan Glover MD   [facility]    Cecilia Martini is a 80 year old male who presents to clinic today for the following health issues:  Chief Complaint   Patient presents with     Urgent Care     Musculoskeletal Problem     right hip pain for months. had xrays beginning of month, normal      HPI    No recent falls or injuries. RIght hip pain present for > 5 months -- happened to be getting worse during the pandemic since it started a year ago. Prior to this he was going to the gym (since unable to go) and riding bike and walking without issues.   Tylenol has provided slight relief at the most.    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     Priority: Medium     Chronic left-sided low back pain without sciatica 03/04/2021     Priority: Medium     Chronic right-sided low back pain without sciatica 03/04/2021     Priority: Medium     Urinary retention 12/30/2020     Priority: Medium     Added automatically from request for surgery [id number]       Orthostatic hypotension 09/11/2020     Priority: Medium     Falls frequently 08/24/2020     Priority: Medium     Slow transit constipation 08/24/2020     Priority: Medium     Parkinson's disease (H) 08/13/2020     Priority: Medium     Pain in right hip 08/13/2020     Priority: Medium     Acute atopic conjunctivitis, unspecified eye 08/13/2020     Priority: Medium     Age-related  nuclear cataract, bilateral 08/13/2020     Priority: Medium     Bradycardia, unspecified 08/13/2020     Priority: Medium     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 (H) 08/13/2020     Priority: Medium     History of falling 08/13/2020     Priority: Medium     Other chronic pain 08/13/2020     Priority: Medium     Preglaucoma, unspecified, bilateral 08/13/2020     Priority: Medium     Problems in relationship with spouse or partner 08/13/2020     Priority: Medium     Tremor, unspecified 08/13/2020     Priority: Medium     Benign prostatic hyperplasia with lower urinary tract symptoms 08/13/2020     Priority: Medium     Bunion of unspecified foot 08/13/2020     Priority: Medium     Atherosclerotic heart disease of native coronary artery without angina pectoris 08/13/2020     Priority: Medium     Chronic kidney disease, stage 3a 08/13/2020     Priority: Medium     Wedge compression fracture of unspecified thoracic vertebra, subsequent encounter for fracture with routine healing 08/13/2020     Priority: Medium     Other intervertebral disc degeneration, lumbar region 08/13/2020     Priority: Medium     Diverticulosis of intestine, part unspecified, without perforation or abscess without bleeding 08/13/2020     Priority: Medium     Essential hypertension with goal blood pressure less than 140/90 08/13/2020     Priority: Medium     Other sequelae of cerebral infarction 08/13/2020     Priority: Medium     Other intervertebral disc displacement, lumbosacral region 08/13/2020     Priority: Medium     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     Priority: Medium     Lactose intolerance, unspecified 08/13/2020     Priority: Medium     Low back pain 08/13/2020     Priority: Medium     Hyperlipidemia, unspecified 08/13/2020     Priority: Medium     Dry eye syndrome of bilateral lacrimal glands 08/13/2020     Priority: Medium     Unspecified epiphora, unspecified side 08/13/2020     Priority: Medium     CKD  (chronic kidney disease) stage 3, GFR 30-59 ml/min 06/04/2019     Priority: Medium     Degeneration of L4-L5 intervertebral disc 05/04/2017     Priority: Medium     Compression fracture of thoracic vertebra, with routine healing, subsequent encounter 05/04/2017     Priority: Medium     Lactose intolerance in adult 12/08/2016     Priority: Medium     Major depressive disorder, single episode, moderate (H) 06/24/2016     Priority: Medium     Glaucoma suspect, bilateral 10/01/2015     Priority: Medium     Senile nuclear sclerosis, bilateral 10/01/2015     Priority: Medium     Dry eye, bilateral 10/01/2015     Priority: Medium     History of corneal transplant 10/01/2015     Priority: Medium     Diverticulosis 12/09/2014     Priority: Medium     Allergic conjunctivitis 06/02/2014     Priority: Medium     Bunion 03/06/2014     Priority: Medium     Herniated nucleus pulposus, L5-S1, right 05/31/2013     Priority: Medium     with severe right foraminal stenosis noted MRI 5/29/13       Gait disturbance, post-stroke 05/24/2013     Priority: Medium     BPH (benign prostatic hypertrophy) with urinary obstruction 01/04/2012     Priority: Medium     Hyperlipidemia LDL goal <100 05/09/2010     Priority: Medium     CAD (coronary artery disease)      Priority: Medium     With Stent Placement       Generalized anxiety disorder 05/22/2007     Priority: Medium       Current Outpatient Medications   Medication     aspirin (ASA) 81 MG tablet     bisacodyl (DULCOLAX) 10 MG suppository     carbidopa-levodopa (SINEMET CR)  MG CR tablet     carbidopa-levodopa (SINEMET)  MG tablet     PARoxetine (PAXIL) 20 MG tablet     polyethylene glycol (MIRALAX) 17 GM/SCOOP powder     rOPINIRole (REQUIP) 0.25 MG tablet     SENNA-docusate sodium (SENNA S) 8.6-50 MG tablet     simvastatin (ZOCOR) 40 MG tablet     sulfamethoxazole-trimethoprim (BACTRIM) 400-80 MG tablet     vitamin D3 (CHOLECALCIFEROL) 2000 units (50 mcg) tablet     No current  "facility-administered medications for this visit.            Objective    /74   Pulse 70   Temp 98.4  F (36.9  C) (Oral)   Resp 15   Ht 1.727 m (5' 8\")   Wt 79.4 kg (175 lb)   BMI 26.61 kg/m    Physical Exam     R hip: able to internally rotate 20 degrees and externally rotate > 45 degrees, no lateral hip discomfort  Gait: normal/straight    No results found for any visits on 04/07/21.                  The use of Dragon/[employer] dictation services may have been used to construct the content in this note; any grammatical or spelling errors are non-intentional. Please contact the author of this note directly if you are in need of any clarification.   "

## 2021-04-07 NOTE — PATIENT INSTRUCTIONS
Tylenol 650mg every 4-6 hours as needed for pain       Topical lidocaine patches or voltaren gel applied to the area can help provide relief      Schedule appointment with the orthopedic surgeon      Continue with physical therapy as prescribed

## 2021-04-08 NOTE — TELEPHONE ENCOUNTER
DIAGNOSIS: Hip pain, right /Dr Glover/ no hip images   APPOINTMENT DATE: 4.13.21   NOTES STATUS DETAILS   OFFICE NOTE from referring provider N/A    OFFICE NOTE from other specialist Internal 4.7.21 [facility]   DISCHARGE SUMMARY from hospital N/A    DISCHARGE REPORT from the ER N/A    OPERATIVE REPORT N/A    EMG report N/A    MEDICATION LIST Internal    MRI N/A    DEXA (osteoporosis/bone health) N/A    CT SCAN N/A    XRAYS (IMAGES & REPORTS) Internal 2.26.21 sacrum and coccyx, lumbar spine  More in Epic

## 2021-04-13 ENCOUNTER — PRE VISIT (OUTPATIENT)
Dept: ORTHOPEDICS | Facility: CLINIC | Age: 81
End: 2021-04-13

## 2021-04-13 ENCOUNTER — OFFICE VISIT (OUTPATIENT)
Dept: ORTHOPEDICS | Facility: CLINIC | Age: 81
End: 2021-04-13
Payer: COMMERCIAL

## 2021-04-13 VITALS — BODY MASS INDEX: 26.52 KG/M2 | WEIGHT: 175 LBS | HEIGHT: 68 IN

## 2021-04-13 DIAGNOSIS — M54.16 LUMBAR BACK PAIN WITH RADICULOPATHY AFFECTING RIGHT LOWER EXTREMITY: Primary | ICD-10-CM

## 2021-04-13 DIAGNOSIS — M25.551 HIP PAIN, RIGHT: ICD-10-CM

## 2021-04-13 PROCEDURE — 99204 OFFICE O/P NEW MOD 45 MIN: CPT | Performed by: FAMILY MEDICINE

## 2021-04-13 ASSESSMENT — MIFFLIN-ST. JEOR: SCORE: 1473.29

## 2021-04-13 NOTE — PROGRESS NOTES
CHIEF COMPLAINT:  Pain of the Right Hip       HISTORY OF PRESENT ILLNESS  Mr. Loya is a pleasant 80 year old year old male with past medical history of Parkinson disease, chronic left-sided low back pain who presents to clinic today with right hip pain.  Vini explains that when he walks it cracks and snaps since September. States he had a fall back in September 2020 and fell down the stairs, he did not go see anyone right away but then pain down right leg has increased so he saw PCP.  Recently has been in PT working to improve lumbar strength and ROM.  Improved walking tolerance.     Despite measures above still has right buttock pain and travels down leg to foot at times.  Clicking or popping in right hip however this pain is not painful.  Working with Bethel Pablo in PT, has been using tylenol.     Evaluated by     Onset: gradual  Location: right hip  Quality:  aching  Duration: 6 months   Severity: 10/10 at worst  Timing:constant  Modifying factors:  resting and non-use makes it better, movement and use makes it worse  Associated signs & symptoms: burning and pain  Previous similar pain: No  Treatments to date: Physical therapy,     Additional history: as documented    Review of Systems:    Have you recently had a a fever, chills, weight loss? No    Do you have any vision problems? No    Do you have any chest pain or edema? No    Do you have any shortness of breath or wheezing?  No    Do you have stomach problems? No    Do you have any numbness or focal weakness? No    Do you have diabetes? No    Do you have problems with bleeding or clotting? No    Do you have an rashes or other skin lesions? No    MEDICAL HISTORY  Patient Active Problem List   Diagnosis     Generalized anxiety disorder     CAD (coronary artery disease)     Hyperlipidemia LDL goal <100     BPH (benign prostatic hypertrophy) with urinary obstruction     Parkinson's disease (H)     Gait disturbance, post-stroke     Herniated nucleus pulposus,  L5-S1, right     Pain in right hip     Bunion     Allergic conjunctivitis     Diverticulosis     Glaucoma suspect, bilateral     Senile nuclear sclerosis, bilateral     Dry eye, bilateral     History of corneal transplant     Major depressive disorder, single episode, moderate (H)     Lactose intolerance in adult     Degeneration of L4-L5 intervertebral disc     Compression fracture of thoracic vertebra, with routine healing, subsequent encounter     CKD (chronic kidney disease) stage 3, GFR 30-59 ml/min     Falls frequently     Slow transit constipation     Orthostatic hypotension     Acute atopic conjunctivitis, unspecified eye     Age-related nuclear cataract, bilateral     Bradycardia, unspecified     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 (H)     History of falling     Other chronic pain     Preglaucoma, unspecified, bilateral     Problems in relationship with spouse or partner     Tremor, unspecified     Benign prostatic hyperplasia with lower urinary tract symptoms     Bunion of unspecified foot     Atherosclerotic heart disease of native coronary artery without angina pectoris     Chronic kidney disease, stage 3a     Wedge compression fracture of unspecified thoracic vertebra, subsequent encounter for fracture with routine healing     Other intervertebral disc degeneration, lumbar region     Diverticulosis of intestine, part unspecified, without perforation or abscess without bleeding     Essential hypertension with goal blood pressure less than 140/90     Other sequelae of cerebral infarction     Other intervertebral disc displacement, lumbosacral region     Corneal transplant status     Lactose intolerance, unspecified     Low back pain     Hyperlipidemia, unspecified     Dry eye syndrome of bilateral lacrimal glands     Unspecified epiphora, unspecified side     Urinary retention     Chronic left-sided low back pain without sciatica     Chronic right-sided low back pain without  sciatica     SI joint arthritis       Current Outpatient Medications   Medication Sig Dispense Refill     aspirin (ASA) 81 MG tablet Take 1 tablet (81 mg) by mouth daily 90 tablet 3     bisacodyl (DULCOLAX) 10 MG suppository Place 1 suppository (10 mg) rectally daily as needed for constipation 10 suppository 0     carbidopa-levodopa (SINEMET CR)  MG CR tablet Take 1 tablet by mouth At Bedtime 90 tablet 3     carbidopa-levodopa (SINEMET)  MG tablet Take 2 tablets by mouth 3 times daily Take two tablets 3 times a day, at 7am, 11am & 4pm. 540 tablet 3     PARoxetine (PAXIL) 20 MG tablet Take 1 tablet (20 mg) by mouth every morning 90 tablet 3     polyethylene glycol (MIRALAX) 17 GM/SCOOP powder Take 17 g (1 capful) by mouth 3 times daily (Patient taking differently: Take 1 capful by mouth 3 times daily as needed for constipation ) 1 Bottle 11     rOPINIRole (REQUIP) 0.25 MG tablet Take 0.5 mg by mouth 2 times daily        SENNA-docusate sodium (SENNA S) 8.6-50 MG tablet Take 2 tablets by mouth 2 times daily 120 tablet 11     simvastatin (ZOCOR) 40 MG tablet TAKE ONE TABLET BY MOUTH EVERY NIGHT AT BEDTIME 90 tablet 3     sulfamethoxazole-trimethoprim (BACTRIM) 400-80 MG tablet Take 1 tablet by mouth At Bedtime For UTI prevention 90 tablet 3     vitamin D3 (CHOLECALCIFEROL) 2000 units (50 mcg) tablet Take 1 tablet by mouth daily as needed          No Known Allergies    Family History   Problem Relation Age of Onset     C.A.D. Mother      C.A.D. Father      Lung Cancer Sister      Hypertension Sister      Hypertension Sister      Hypertension Sister      Hypertension Sister      Hypertension Brother      Hypertension Brother      Hypertension Brother      Lipids Brother      Lipids Brother      Lipids Brother      Lipids Sister      Neurologic Disorder Sister 50        MS     Depression Sister         due to MS diagnosis     Depression Sister         due to losing      Depression Brother       Respiratory Sister         Asthma     Depression Sister         due to losing      Neurologic Disorder Daughter 33     Cancer Brother         Throat CA       Additional medical/Social/Surgical histories reviewed in [facility] and updated as appropriate.       PHYSICAL EXAM  There were no vitals taken for this visit.    General  - normal appearance, in no obvious distress  Musculoskeletal - lumbar spine  - stance: stooped posture, shuffling gait  - inspection: normal bone and joint alignment, no obvious scoliosis  - palpation: no paravertebral or bony tenderness  - ROM: flexion exacerbates pain, normal extension, sidebending, rotation  - strength: lower extremities 5/5 in all planes  - special tests:  (+) straight leg raise  Musculoskeletal - hips  Nontender to palpation anterior or laterally.  Mild tenderness right gluteal region.  Rigidity of lower extremities.  ER/IR without pain, symmetric, no audible clicking or popping today. Strength intact overall  (-)FADIR  Neuro  - patellar and Achilles DTRs 2+ bilaterally, no lower extremity sensory deficit throughout L5 distribution, grossly normal coordination, normal muscle tone  Skin  - no ecchymosis, erythema, warmth, or induration, no obvious rash  Psych  - interactive, appropriate, normal mood and affect    IMAGING : XR [id number].  LUMBAR SPINE TWO TO THREE VIEWS February 26, 2021 11:38 AM      HISTORY: Personal history of fall.     COMPARISON: Two view lumbar spine 5/22/2020.                                                                      IMPRESSION: Five lumbar type vertebrae. Alignment remains normal.  Vertebral body heights remain normal. No evidence for fracture. Mild  degenerative endplate spurring at all levels of the lumbar spine again  noted. Facet arthropathy at the L3-L4, L4-L5 and L5-S1 levels again  noted.     STEVE CABRAL MD    Previous MRI 2016                                                                   Impression:   1. Chronic  compression deformities of T6 and T12.  2. Degenerative changes throughout the lumbar spine. Diffuse disc  bulge and facet arthropathy contribute to severe right and moderate to  severe left neural foraminal narrowing at L5-S1. There is likely  impingement of the right L5 and left L4 nerve roots.           I have personally reviewed the examination and initial interpretation  and I agree with the findings.     AMARI BURNS MD     ASSESSMENT & PLAN  Mr. Loya is a 80 year old year old male hx of lumbar DDD who presents to clinic today with acute worsening of lumbar back pain with right sided radiculitis, not improving with current HEP/PT, medications and conservative measures.      MR previously with DDD and impingement at L4,L5 2016.    Diagnosis  (M54.16) Lumbar back pain with radiculopathy affecting right lower extremity  (primary encounter diagnosis)    At this time we agreed to focus more closely on his radicular symptoms as they are the most debilitating.  His right hip with pain is popping is likely secondary to mild degenerative changes, not reproduced today with intra-articular exam.    MRI lumbar spine ordered and patient is in agreement to consider epidural steroid injection if appropriate based on results.  Continue Tylenol, PT/HEP.    Follow-up after MRI virtually in person    It was a pleasure seeing Vini today.a    Abel Wilcox DO, CAQSM  Primary Care Sports Medicine    40 minutes on date of the encounter doing chart review, history and examination, independent imaging review, documentation, and additional activities noted above.    Reviewed MRI from 2016, reviewed most recent SI joint x-rays which included hips, 2/26 family medicine note reviewed as well as most recent urgent care note from 4/7.

## 2021-04-13 NOTE — LETTER
4/13/2021         RE: Vini Loya  [address]  [place] [id number]        Dear Colleague,    Thank you for referring your patient, Vini Loya, to the [facility] [place]. Please see a copy of my visit note below.    CHIEF COMPLAINT:  Pain of the Right Hip       HISTORY OF PRESENT ILLNESS  Mr. Loya is a pleasant 80 year old year old male with past medical history of Parkinson disease, chronic left-sided low back pain who presents to clinic today with right hip pain.  Vini explains that when he walks it cracks and snaps since September. States he had a fall back in September 2020 and fell down the stairs, he did not go see anyone right away but then pain down right leg has increased so he saw PCP.  Recently has been in PT working to improve lumbar strength and ROM.  Improved walking tolerance.     Despite measures above still has right buttock pain and travels down leg to foot at times.  Clicking or popping in right hip however this pain is not painful.  Working with Bethel Pablo in PT, has been using tylenol.     Evaluated by     Onset: gradual  Location: right hip  Quality:  aching  Duration: 6 months   Severity: 10/10 at worst  Timing:constant  Modifying factors:  resting and non-use makes it better, movement and use makes it worse  Associated signs & symptoms: burning and pain  Previous similar pain: No  Treatments to date: Physical therapy,     Additional history: as documented    Review of Systems:    Have you recently had a a fever, chills, weight loss? No    Do you have any vision problems? No    Do you have any chest pain or edema? No    Do you have any shortness of breath or wheezing?  No    Do you have stomach problems? No    Do you have any numbness or focal weakness? No    Do you have diabetes? No    Do you have problems with bleeding or clotting? No    Do you have an rashes or other skin lesions? No    MEDICAL HISTORY  Patient Active Problem  List   Diagnosis     Generalized anxiety disorder     CAD (coronary artery disease)     Hyperlipidemia LDL goal <100     BPH (benign prostatic hypertrophy) with urinary obstruction     Parkinson's disease (H)     Gait disturbance, post-stroke     Herniated nucleus pulposus, L5-S1, right     Pain in right hip     Bunion     Allergic conjunctivitis     Diverticulosis     Glaucoma suspect, bilateral     Senile nuclear sclerosis, bilateral     Dry eye, bilateral     History of corneal transplant     Major depressive disorder, single episode, moderate (H)     Lactose intolerance in adult     Degeneration of L4-L5 intervertebral disc     Compression fracture of thoracic vertebra, with routine healing, subsequent encounter     CKD (chronic kidney disease) stage 3, GFR 30-59 ml/min     Falls frequently     Slow transit constipation     Orthostatic hypotension     Acute atopic conjunctivitis, unspecified eye     Age-related nuclear cataract, bilateral     Bradycardia, unspecified     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 (H)     History of falling     Other chronic pain     Preglaucoma, unspecified, bilateral     Problems in relationship with spouse or partner     Tremor, unspecified     Benign prostatic hyperplasia with lower urinary tract symptoms     Bunion of unspecified foot     Atherosclerotic heart disease of native coronary artery without angina pectoris     Chronic kidney disease, stage 3a     Wedge compression fracture of unspecified thoracic vertebra, subsequent encounter for fracture with routine healing     Other intervertebral disc degeneration, lumbar region     Diverticulosis of intestine, part unspecified, without perforation or abscess without bleeding     Essential hypertension with goal blood pressure less than 140/90     Other sequelae of cerebral infarction     Other intervertebral disc displacement, lumbosacral region     Corneal transplant status     Lactose intolerance,  unspecified     Low back pain     Hyperlipidemia, unspecified     Dry eye syndrome of bilateral lacrimal glands     Unspecified epiphora, unspecified side     Urinary retention     Chronic left-sided low back pain without sciatica     Chronic right-sided low back pain without sciatica     SI joint arthritis       Current Outpatient Medications   Medication Sig Dispense Refill     aspirin (ASA) 81 MG tablet Take 1 tablet (81 mg) by mouth daily 90 tablet 3     bisacodyl (DULCOLAX) 10 MG suppository Place 1 suppository (10 mg) rectally daily as needed for constipation 10 suppository 0     carbidopa-levodopa (SINEMET CR)  MG CR tablet Take 1 tablet by mouth At Bedtime 90 tablet 3     carbidopa-levodopa (SINEMET)  MG tablet Take 2 tablets by mouth 3 times daily Take two tablets 3 times a day, at 7am, 11am & 4pm. 540 tablet 3     PARoxetine (PAXIL) 20 MG tablet Take 1 tablet (20 mg) by mouth every morning 90 tablet 3     polyethylene glycol (MIRALAX) 17 GM/SCOOP powder Take 17 g (1 capful) by mouth 3 times daily (Patient taking differently: Take 1 capful by mouth 3 times daily as needed for constipation ) 1 Bottle 11     rOPINIRole (REQUIP) 0.25 MG tablet Take 0.5 mg by mouth 2 times daily        SENNA-docusate sodium (SENNA S) 8.6-50 MG tablet Take 2 tablets by mouth 2 times daily 120 tablet 11     simvastatin (ZOCOR) 40 MG tablet TAKE ONE TABLET BY MOUTH EVERY NIGHT AT BEDTIME 90 tablet 3     sulfamethoxazole-trimethoprim (BACTRIM) 400-80 MG tablet Take 1 tablet by mouth At Bedtime For UTI prevention 90 tablet 3     vitamin D3 (CHOLECALCIFEROL) 2000 units (50 mcg) tablet Take 1 tablet by mouth daily as needed          No Known Allergies    Family History   Problem Relation Age of Onset     C.A.D. Mother      C.A.D. Father      Lung Cancer Sister      Hypertension Sister      Hypertension Sister      Hypertension Sister      Hypertension Sister      Hypertension Brother      Hypertension Brother       Hypertension Brother      Lipids Brother      Lipids Brother      Lipids Brother      Lipids Sister      Neurologic Disorder Sister 50        MS     Depression Sister         due to MS diagnosis     Depression Sister         due to losing      Depression Brother      Respiratory Sister         Asthma     Depression Sister         due to losing      Neurologic Disorder Daughter 33     Cancer Brother         Throat CA       Additional medical/Social/Surgical histories reviewed in [facility] and updated as appropriate.       PHYSICAL EXAM  There were no vitals taken for this visit.    General  - normal appearance, in no obvious distress  Musculoskeletal - lumbar spine  - stance: stooped posture, shuffling gait  - inspection: normal bone and joint alignment, no obvious scoliosis  - palpation: no paravertebral or bony tenderness  - ROM: flexion exacerbates pain, normal extension, sidebending, rotation  - strength: lower extremities 5/5 in all planes  - special tests:  (+) straight leg raise  Musculoskeletal - hips  Nontender to palpation anterior or laterally.  Mild tenderness right gluteal region.  Rigidity of lower extremities.  ER/IR without pain, symmetric, no audible clicking or popping today. Strength intact overall  (-)FADIR  Neuro  - patellar and Achilles DTRs 2+ bilaterally, no lower extremity sensory deficit throughout L5 distribution, grossly normal coordination, normal muscle tone  Skin  - no ecchymosis, erythema, warmth, or induration, no obvious rash  Psych  - interactive, appropriate, normal mood and affect    IMAGING : XR [id number].  LUMBAR SPINE TWO TO THREE VIEWS February 26, 2021 11:38 AM      HISTORY: Personal history of fall.     COMPARISON: Two view lumbar spine 5/22/2020.                                                                      IMPRESSION: Five lumbar type vertebrae. Alignment remains normal.  Vertebral body heights remain normal. No evidence for fracture. Mild  degenerative  endplate spurring at all levels of the lumbar spine again  noted. Facet arthropathy at the L3-L4, L4-L5 and L5-S1 levels again  noted.     STEVE CABRAL MD    Previous MRI 2016                                                                   Impression:   1. Chronic compression deformities of T6 and T12.  2. Degenerative changes throughout the lumbar spine. Diffuse disc  bulge and facet arthropathy contribute to severe right and moderate to  severe left neural foraminal narrowing at L5-S1. There is likely  impingement of the right L5 and left L4 nerve roots.           I have personally reviewed the examination and initial interpretation  and I agree with the findings.     AMARI BURNS MD     ASSESSMENT & PLAN  Mr. Loya is a 80 year old year old male hx of lumbar DDD who presents to clinic today with acute worsening of lumbar back pain with right sided radiculitis, not improving with current HEP/PT, medications and conservative measures.      MR previously with DDD and impingement at L4,L5 2016.    Diagnosis  (M54.16) Lumbar back pain with radiculopathy affecting right lower extremity  (primary encounter diagnosis)    At this time we agreed to focus more closely on his radicular symptoms as they are the most debilitating.  His right hip with pain is popping is likely secondary to mild degenerative changes, not reproduced today with intra-articular exam.    MRI lumbar spine ordered and patient is in agreement to consider epidural steroid injection if appropriate based on results.  Continue Tylenol, PT/HEP.    Follow-up after MRI virtually in person    It was a pleasure seeing Vini today.a    Abel Wilcox DO, [facility]  Primary Care Sports Medicine    40 minutes on date of the encounter doing chart review, history and examination, independent imaging review, documentation, and additional activities noted above.    Reviewed MRI from 2016, reviewed most recent SI joint x-rays which included hips, 2/26 family medicine  note reviewed as well as most recent urgent care note from 4/7.

## 2021-04-16 ENCOUNTER — HOSPITAL ENCOUNTER (OUTPATIENT)
Dept: MRI IMAGING | Facility: CLINIC | Age: 81
Discharge: HOME | End: 2021-04-16
Attending: FAMILY MEDICINE | Admitting: FAMILY MEDICINE
Payer: COMMERCIAL

## 2021-04-16 DIAGNOSIS — M54.16 LUMBAR BACK PAIN WITH RADICULOPATHY AFFECTING RIGHT LOWER EXTREMITY: ICD-10-CM

## 2021-04-16 PROCEDURE — 72148 MRI LUMBAR SPINE W/O DYE: CPT

## 2021-04-16 PROCEDURE — 72148 MRI LUMBAR SPINE W/O DYE: CPT | Mod: 26 | Performed by: STUDENT IN AN ORGANIZED HEALTH CARE EDUCATION/TRAINING PROGRAM

## 2021-04-20 ENCOUNTER — VIRTUAL VISIT (OUTPATIENT)
Dept: ORTHOPEDICS | Facility: CLINIC | Age: 81
End: 2021-04-20
Payer: COMMERCIAL

## 2021-04-20 DIAGNOSIS — M51.369 LUMBAR DEGENERATIVE DISC DISEASE: ICD-10-CM

## 2021-04-20 DIAGNOSIS — M54.16 LUMBAR BACK PAIN WITH RADICULOPATHY AFFECTING RIGHT LOWER EXTREMITY: Primary | ICD-10-CM

## 2021-04-20 DIAGNOSIS — Z11.59 ENCOUNTER FOR SCREENING FOR OTHER VIRAL DISEASES: ICD-10-CM

## 2021-04-20 PROCEDURE — 99442 PR PHYSICIAN TELEPHONE EVALUATION 11-20 MIN: CPT | Performed by: FAMILY MEDICINE

## 2021-04-20 NOTE — PROGRESS NOTES
ESTABLISHED PATIENT FOLLOW-UP VIRTUAL:  Follow Up (MRI f/u lumbar)       This encounter was conducted via telephone in lieu of face-to-face encounter due to precautions implemented during COVID-19 pandemic.     What phone number would you like to be contacted at? [phone]  How would you like to obtain your AVS? MyChart      HISTORY OF PRESENT ILLNESS  Mr. Loya is a pleasant 80 year old year old male who presents via telephone today for follow-up of Lumbar MRI    Mentions pain has remained quite persistent    Date of injury: 9/2020  Date last seen: 4/13/21  Following Therapeutic Plan: MRI  Pain: unchanged  Function: unchanged  Interval History: MRI     Additional medical/Social/Surgical histories reviewed in [facility] and updated as appropriate.    REVIEW OF SYSTEMS (4/20/2021)  CONSTITUTIONAL: Denies fever and weight loss  GASTROINTESTINAL: Denies abdominal pain, nausea, vomiting  MUSCULOSKELETAL: See HPI  SKIN: Denies any recent rash or lesion  NEUROLOGICAL: Denies numbness or focal weakness    IMAGING : Lumbar MRI                                                          Impression: Multilevel degenerative changes as described above without  high-grade spinal canal stenosis. Most significant findings are  follows;     1. Disc bulge asymmetric to the right at L5-S1. There is marked right  facet joint hypertrophy and superimposed inflammatory  pseudoarticulation of right L5 transverse process with the adjacent  right sacral ala. These are contributing in severe stenosis of right  L5-S1 neural foramen with likely impingement of right exiting L5. In  addition, there is bone marrow edema in the right L5 pedicle,  transverse process and sacral ala secondary to inflammation, likely  degenerative and  in nature.  2. Small amount with mild bone marrow edema in the opposing endplates  of L2-3.  3. Chronic wedging deformity at T12 and partially visualized at T7  based on  images.  4. Sequela of old right occipital  lobe infarct, partially visualized  on  images.     IGLESIA WORKMAN MD     ASSESSMENT & PLAN  Mr. Loya is a 80 year old year old male who presents via telephone today with Follow Up (MRI f/u lumbar)    Lumbar DDD and impingement of L5 nerve root matches his presentation.  Given debility we discussed and will proceed with lumbar RAHEEL TF at L5.    Follow up 2 weeks after RAHEEL.     It was a pleasure seeing Vini.    Total Telephone Time: 14 min  Abel Wilcox DO, CAQSM  Primary Care Sports Medicine  Department of Orthopedic Surgery  [facility]

## 2021-04-20 NOTE — LETTER
4/20/2021         RE: Vini Loya  [address]  [facility] [address]        Dear Colleague,    Thank you for referring your patient, Vini Loya, to the [facility] [place]. Please see a copy of my visit note below.    ESTABLISHED PATIENT FOLLOW-UP VIRTUAL:  Follow Up (MRI f/u lumbar)       This encounter was conducted via telephone in lieu of face-to-face encounter due to precautions implemented during COVID-19 pandemic.     What phone number would you like to be contacted at? [phone]  How would you like to obtain your AVS? MyChart      HISTORY OF PRESENT ILLNESS  Mr. Loya is a pleasant 80 year old year old male who presents via telephone today for follow-up of Lumbar MRI    Mentions pain has remained quite persistent    Date of injury: 9/2020  Date last seen: 4/13/21  Following Therapeutic Plan: MRI  Pain: unchanged  Function: unchanged  Interval History: MRI     Additional medical/Social/Surgical histories reviewed in [facility] and updated as appropriate.    REVIEW OF SYSTEMS (4/20/2021)  CONSTITUTIONAL: Denies fever and weight loss  GASTROINTESTINAL: Denies abdominal pain, nausea, vomiting  MUSCULOSKELETAL: See HPI  SKIN: Denies any recent rash or lesion  NEUROLOGICAL: Denies numbness or focal weakness    IMAGING : Lumbar MRI                                                          Impression: Multilevel degenerative changes as described above without  high-grade spinal canal stenosis. Most significant findings are  follows;     1. Disc bulge asymmetric to the right at L5-S1. There is marked right  facet joint hypertrophy and superimposed inflammatory  pseudoarticulation of right L5 transverse process with the adjacent  right sacral ala. These are contributing in severe stenosis of right  L5-S1 neural foramen with likely impingement of right exiting L5. In  addition, there is bone marrow edema in the right L5 pedicle,  transverse process and sacral ala  secondary to inflammation, likely  degenerative and  in nature.  2. Small amount with mild bone marrow edema in the opposing endplates  of L2-3.  3. Chronic wedging deformity at T12 and partially visualized at T7  based on  images.  4. 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     IGLESIA WORKMAN MD     ASSESSMENT & PLAN  Mr. Loya is a 80 year old year old male who presents via telephone today with Follow Up (MRI f/u lumbar)    Lumbar DDD and impingement of L5 nerve root matches his presentation.  Given debility we discussed and will proceed with lumbar RAHEEL TF at L5.    Follow up 2 weeks after RAHEEL.     It was a pleasure seeing Vini.    Total Telephone Time: 14 min  Abel Wilcox DO, JAYLEN  Primary Care Sports Medicine  Department of Orthopedic Surgery  [facility]

## 2021-04-22 DIAGNOSIS — Z11.59 ENCOUNTER FOR SCREENING FOR OTHER VIRAL DISEASES: ICD-10-CM

## 2021-04-22 LAB
LABORATORY COMMENT REPORT: NORMAL
SARS-COV-2 RNA RESP QL NAA+PROBE: NEGATIVE
SARS-COV-2 RNA RESP QL NAA+PROBE: NORMAL
SPECIMEN SOURCE: NORMAL
SPECIMEN SOURCE: NORMAL

## 2021-04-22 PROCEDURE — U0003 INFECTIOUS AGENT DETECTION BY NUCLEIC ACID (DNA OR RNA); SEVERE ACUTE RESPIRATORY SYNDROME CORONAVIRUS 2 (SARS-COV-2) (CORONAVIRUS DISEASE [COVID-19]), AMPLIFIED PROBE TECHNIQUE, MAKING USE OF HIGH THROUGHPUT TECHNOLOGIES AS DESCRIBED BY CMS-2020-01-R: HCPCS | Performed by: FAMILY MEDICINE

## 2021-04-22 PROCEDURE — U0005 INFEC AGEN DETEC AMPLI PROBE: HCPCS | Performed by: FAMILY MEDICINE

## 2021-04-23 ENCOUNTER — TELEPHONE (OUTPATIENT)
Dept: MEDSURG UNIT | Facility: CLINIC | Age: 81
End: 2021-04-23

## 2021-04-23 ENCOUNTER — VIRTUAL VISIT (OUTPATIENT)
Dept: ORTHOPEDICS | Facility: CLINIC | Age: 81
End: 2021-04-23
Payer: COMMERCIAL

## 2021-04-23 DIAGNOSIS — M51.369 LUMBAR DEGENERATIVE DISC DISEASE: Primary | ICD-10-CM

## 2021-04-23 PROCEDURE — 99441 PR PHYSICIAN TELEPHONE EVALUATION 5-10 MIN: CPT | Mod: 95 | Performed by: FAMILY MEDICINE

## 2021-04-23 NOTE — LETTER
4/23/2021         RE: Vini Loya  [address]  [facility] [address]        Dear Colleague,    Thank you for referring your patient, Vini Loya, to the [facility] [place]. Please see a copy of my visit note below.    ESTABLISHED PATIENT FOLLOW-UP VIRTUAL:  RECHECK (Discuss lumbar epidural injection. Already has injection scheduled on monday )       This encounter was conducted via telephone in lieu of face-to-face encounter due to precautions implemented during COVID-19 pandemic.     What phone number would you like to be contacted at? home  How would you like to obtain your AVS? MyChart      HISTORY OF PRESENT ILLNESS  Mr. Loya is a pleasant 80 year old year old male who presents via telephone today for follow-up of our discussion regarding his lumbar degenerative disc disease. He has questions surrounding severity of his symptoms. He notes that he is continuing experience these symptoms today. He complete his Covid testing yesterday and he is scheduled his epidural steroid injection on Monday.    He is hoping that there is some minimally invasive procedure that could be available to him. His questions surrounding this.    Additional medical/Social/Surgical histories reviewed in [facility] and updated as appropriate.    REVIEW OF SYSTEMS (4/23/2021)  CONSTITUTIONAL: Denies fever and weight loss  GASTROINTESTINAL: Denies abdominal pain, nausea, vomiting  MUSCULOSKELETAL: See HPI  SKIN: Denies any recent rash or lesion  NEUROLOGICAL: Denies numbness or focal weakness     ASSESSMENT & PLAN  Mr. Loya is a 80 year old year old male who presents via telephone today with RECHECK (Discuss lumbar epidural injection. Already has injection scheduled on monday )    Made robust discussion surrounding the severity of his low back symptoms. I did provide information and description that his L5-S1 right neuroforaminal narrowing is severe in nature. We discussed that if there is no  improvement to date with his injection in 3 days, I may have him see one of my spine surgical colleagues for further discussion of candidacy for procedure..    Follow-up 2 weeks after epidural start injection.    It was a pleasure seeing Vini.    Total Telephone Time: 8 min  Abel Wilcox DO, JAYLEN  Primary Care Sports Medicine  Department of Orthopedic Surgery  [facility]          Again, thank you for allowing me to participate in the care of your patient.        Sincerely,        Abel Wilcox DO     (3) walks occasionally

## 2021-04-23 NOTE — PROGRESS NOTES
ESTABLISHED PATIENT FOLLOW-UP VIRTUAL:  RECHECK (Discuss lumbar epidural injection. Already has injection scheduled on monday )       This encounter was conducted via telephone in lieu of face-to-face encounter due to precautions implemented during COVID-19 pandemic.     What phone number would you like to be contacted at? home  How would you like to obtain your AVS? MyChart      HISTORY OF PRESENT ILLNESS  Mr. Loya is a pleasant 80 year old year old male who presents via telephone today for follow-up of our discussion regarding his lumbar degenerative disc disease. He has questions surrounding severity of his symptoms. He notes that he is continuing experience these symptoms today. He complete his Covid testing yesterday and he is scheduled his epidural steroid injection on Monday.    He is hoping that there is some minimally invasive procedure that could be available to him. His questions surrounding this.    Additional medical/Social/Surgical histories reviewed in [facility] and updated as appropriate.    REVIEW OF SYSTEMS (4/23/2021)  CONSTITUTIONAL: Denies fever and weight loss  GASTROINTESTINAL: Denies abdominal pain, nausea, vomiting  MUSCULOSKELETAL: See HPI  SKIN: Denies any recent rash or lesion  NEUROLOGICAL: Denies numbness or focal weakness     ASSESSMENT & PLAN  Mr. Loya is a 80 year old year old male who presents via telephone today with RECHECK (Discuss lumbar epidural injection. Already has injection scheduled on monday )    Made robust discussion surrounding the severity of his low back symptoms. I did provide information and description that his L5-S1 right neuroforaminal narrowing is severe in nature. We discussed that if there is no improvement to date with his injection in 3 days, I may have him see one of my spine surgical colleagues for further discussion of candidacy for procedure..    Follow-up 2 weeks after epidural start injection.    It was a pleasure seeing Vini.    Total Telephone Time: 8  liat Wilcox DO, CAQSM  Primary Care Sports Medicine  Department of Orthopedic Surgery  [facility]

## 2021-04-23 NOTE — LETTER
4/23/2021         RE: Vini Loya  [address]  [facility] [address]        Dear Colleague,    Thank you for referring your patient, Vini Loya, to the [facility] [place]. Please see a copy of my visit note below.    ESTABLISHED PATIENT FOLLOW-UP VIRTUAL:  RECHECK (Discuss lumbar epidural injection. Already has injection scheduled on monday )       This encounter was conducted via telephone in lieu of face-to-face encounter due to precautions implemented during COVID-19 pandemic.     What phone number would you like to be contacted at? home  How would you like to obtain your AVS? MyChart      HISTORY OF PRESENT ILLNESS  Mr. Loya is a pleasant 80 year old year old male who presents via telephone today for follow-up of our discussion regarding his lumbar degenerative disc disease. He has questions surrounding severity of his symptoms. He notes that he is continuing experience these symptoms today. He complete his Covid testing yesterday and he is scheduled his epidural steroid injection on Monday.    He is hoping that there is some minimally invasive procedure that could be available to him. His questions surrounding this.    Additional medical/Social/Surgical histories reviewed in [facility] and updated as appropriate.    REVIEW OF SYSTEMS (4/23/2021)  CONSTITUTIONAL: Denies fever and weight loss  GASTROINTESTINAL: Denies abdominal pain, nausea, vomiting  MUSCULOSKELETAL: See HPI  SKIN: Denies any recent rash or lesion  NEUROLOGICAL: Denies numbness or focal weakness     ASSESSMENT & PLAN  Mr. Loya is a 80 year old year old male who presents via telephone today with RECHECK (Discuss lumbar epidural injection. Already has injection scheduled on monday )    Made robust discussion surrounding the severity of his low back symptoms. I did provide information and description that his L5-S1 right neuroforaminal narrowing is severe in nature. We discussed that if there is no  improvement to date with his injection in 3 days, I may have him see one of my spine surgical colleagues for further discussion of candidacy for procedure..    Follow-up 2 weeks after epidural start injection.    It was a pleasure seeing Vini.    Total Telephone Time: 8 min  Abel Wilcox DO, JAYLEN  Primary Care Sports Medicine  Department of Orthopedic Surgery  [facility]          Again, thank you for allowing me to participate in the care of your patient.        Sincerely,        Abel Wilcox DO

## 2021-04-23 NOTE — TELEPHONE ENCOUNTER
Pre-Procedure Negative COVID Test Results    Results Reviewed  The patient has a negative COVID test result within the required timeframe for the scheduled procedure.     No COVID pre-call needed. No hold on 81 mg ASA per Lena ARMAS.    ATIF Anderson RN

## 2021-04-26 ENCOUNTER — HOSPITAL ENCOUNTER (OUTPATIENT)
Dept: GENERAL RADIOLOGY | Facility: CLINIC | Age: 81
End: 2021-04-26
Attending: FAMILY MEDICINE
Payer: COMMERCIAL

## 2021-04-26 ENCOUNTER — HOSPITAL ENCOUNTER (OUTPATIENT)
Facility: CLINIC | Age: 81
Discharge: HOME | End: 2021-04-26
Admitting: PHYSICIAN ASSISTANT
Payer: COMMERCIAL

## 2021-04-26 VITALS — SYSTOLIC BLOOD PRESSURE: 143 MMHG | HEART RATE: 56 BPM | DIASTOLIC BLOOD PRESSURE: 82 MMHG | OXYGEN SATURATION: 99 %

## 2021-04-26 VITALS — OXYGEN SATURATION: 97 % | DIASTOLIC BLOOD PRESSURE: 99 MMHG | SYSTOLIC BLOOD PRESSURE: 186 MMHG

## 2021-04-26 DIAGNOSIS — M51.369 LUMBAR DEGENERATIVE DISC DISEASE: ICD-10-CM

## 2021-04-26 DIAGNOSIS — M54.16 LUMBAR BACK PAIN WITH RADICULOPATHY AFFECTING RIGHT LOWER EXTREMITY: ICD-10-CM

## 2021-04-26 PROCEDURE — 999N000154 HC STATISTIC RADIOLOGY XRAY, US, CT, MAR, NM

## 2021-04-26 PROCEDURE — 255N000002 HC RX 255 OP 636: Performed by: FAMILY MEDICINE

## 2021-04-26 PROCEDURE — 250N000011 HC RX IP 250 OP 636: Performed by: FAMILY MEDICINE

## 2021-04-26 PROCEDURE — 62323 NJX INTERLAMINAR LMBR/SAC: CPT

## 2021-04-26 PROCEDURE — 250N000009 HC RX 250: Performed by: FAMILY MEDICINE

## 2021-04-26 RX ORDER — DEXAMETHASONE SODIUM PHOSPHATE 10 MG/ML
20 INJECTION, SOLUTION INTRAMUSCULAR; INTRAVENOUS ONCE
Status: COMPLETED | OUTPATIENT
Start: 2021-04-26 | End: 2021-04-26

## 2021-04-26 RX ORDER — IOPAMIDOL 408 MG/ML
10 INJECTION, SOLUTION INTRATHECAL ONCE
Status: COMPLETED | OUTPATIENT
Start: 2021-04-26 | End: 2021-04-26

## 2021-04-26 RX ORDER — LIDOCAINE HYDROCHLORIDE 10 MG/ML
30 INJECTION, SOLUTION EPIDURAL; INFILTRATION; INTRACAUDAL; PERINEURAL ONCE
Status: COMPLETED | OUTPATIENT
Start: 2021-04-26 | End: 2021-04-26

## 2021-04-26 RX ADMIN — IOPAMIDOL 4 ML: 408 INJECTION, SOLUTION INTRATHECAL at 09:34

## 2021-04-26 RX ADMIN — LIDOCAINE HYDROCHLORIDE 7 ML: 10 INJECTION, SOLUTION EPIDURAL; INFILTRATION; INTRACAUDAL; PERINEURAL at 09:43

## 2021-04-26 RX ADMIN — DEXAMETHASONE SODIUM PHOSPHATE 20 MG: 10 INJECTION, SOLUTION INTRAMUSCULAR; INTRAVENOUS at 09:43

## 2021-04-26 NOTE — PROGRESS NOTES
Care Suites Discharge Nursing Note    Patient Information  Name: Vini Loya  Age: 80 year old    Discharge Education:  Discharge instructions reviewed: Yes  Additional education/resources provided: NA  Patient/patient representative verbalizes understanding: Yes  Patient discharging on new medications: N/A  Medication education completed: N/A    Discharge Plans:   Discharge location: home  Discharge ride contacted: Yes  Approximate discharge time: 1030    Discharge Criteria:  Discharge criteria met and vital signs stable: Yes. Bandaid to lower right back is C/D/I, no numbness/tingling, tolerating PO, ready for discharge.       Patient Belongs:  Patient belongings returned to patient: Yes    Stacy Randall, RN

## 2021-04-26 NOTE — DISCHARGE INSTRUCTIONS
Steroid Injection Discharge Instructions     After you go home:      You may resume your normal diet.    Care of Puncture Site:      If you have a bandaid on your puncture site, you may remove it the next morning    You may shower tomorrow    No bath tubs, whirlpools or swimming for at least 3 days     Activity:      You may go back to normal activity in 24 hours    You should let pain be your guide as to the extent of your activities    Maintain any activity limitations as ordered by your provider    Do NOT drive a vehicle if you develop numbness in your arm or leg    Medicines:      You may resume all medications    Resume your Warfarin/Coumadin at your regular dose today. Follow up with your provider to have your INR rechecked    Resume your Platelet Inhibitors and Aspirin tomorrow at your regular dose    For minor pain, you may take Acetaminophen (Tylenol) or Ibuprofen (Advil)    Pain:       You may experience increased or different pain over the next 24-48 hours    For the next 48 hrs - you may use ice packs for discomfort     Call your primary care doctor if:      You have severe pain that does not improve with pain medication    You have chills or a fever greater than 101 F (38 C)    The site is red, swollen, hot or tender    New problems with your bowel or bladder    Any questions or concerns    Other Instructions:      New numbness down your leg post injection is temporary and may last for up to 6 hours. You may need assistance with activity until your leg has normal sensation.    If you are diabetic, monitor your blood sugar closely. Contact the provider who manages your diabetes to help you control your blood sugar if needed.    For Your Information:      A steroid was injected to help decrease swelling and may help to reduce pain. It may take up to 7-10 days to obtain full results.    Some patients will get lasting relief from a single injection. Others may require up to 3 injections to get results. If  you have more than one steroid injection, they should be given 2 weeks apart.    Side effects of your steroid injection are mild and will go away in 2-3 days  - Insomnia  - Heartburn  - Flushed face  - Water retention  - Increased appetite  - Increased blood sugar      If you have questions call:        Loc [facility] Radiology Dept @ [phone]      The provider who performed your procedure was _________________.

## 2021-04-26 NOTE — PROCEDURES
[facility]    Procedure: Right L5-S1 transforaminal RAHEEL    Date/Time: 4/26/2021 9:53 AM  Performed by: Lena Manning PA-C  Authorized by: Lena Manning PA-C     UNIVERSAL PROTOCOL   Site Marked: Yes  Prior Images Obtained and Reviewed:  Yes  Required items: Required blood products, implants, devices and special equipment available    Patient identity confirmed:  Verbally with patient  NA - No sedation, light sedation, or local anesthesia  Confirmation Checklist:  Patient's identity using two indicators, relevant allergies, procedure was appropriate and matched the consent or emergent situation and correct equipment/implants were available  Time out: Immediately prior to the procedure a time out was called    Universal Protocol: the Joint Commission Universal Protocol was followed    Preparation: Patient was prepped and draped in usual sterile fashion           ANESTHESIA    Anesthesia: Local infiltration  Local Anesthetic:  Lidocaine 1% without epinephrine  Anesthetic Total (mL):  4      SEDATION    Patient Sedated: No    See dictated procedure note for full details.  PROCEDURE   Patient Tolerance:  Patient tolerated the procedure well with no immediate complications    Length of time physician/provider present for 1:1 monitoring during sedation: 0

## 2021-04-27 ENCOUNTER — PATIENT OUTREACH (OUTPATIENT)
Dept: NURSING | Facility: CLINIC | Age: 81
End: 2021-04-27
Payer: COMMERCIAL

## 2021-04-27 NOTE — PROGRESS NOTES
Clinic Care Coordination Contact    Follow Up Progress Note 10/4/2020  Reason for Referral: Care Transition: inpatient, outpatient to nursing home desired.       Additional pertinent details:  See last note.         Assessment: CC RN spoke to wife and patient.  Patient has a Lumbar epidural injection yesterday and he reports he is so much better.  Patient is able to get in an d out of a chair with ease   Wife states at this time the patient is doing well and she doesn't need additional assistance.  Wife is going to request OT evaluation to assist with every day activities such as putting on shoes etc.   Goals addressed this encounter:   Goals Addressed                 This Visit's Progress      Medical- [facility] (pt-stated)   50%     Goal Statement: I would like to see what benefits I have from the VA in the next two months.   Date Goal set: 10/9/2020  Barriers: unsure how to go about this   Strengths: asking for help  Date to Achieve By: 12/9/2020  Patient expressed understanding of goal: yes    Action steps to achieve this goal:  1. I will ask my wife to speak with a [facility]  when I go in for my COVID vaccine about any services or supports I might qualify for.  2. I will ask my wife to give the CHW updates at outreach calls.    Updated: 2/5/21                   Intervention/Education provided during outreach: Informed patient and wife Zoraida Bradley CHW is no longer at the clinic  New CHW is Natividad Nuñez      Outreach Frequency: 6 weeks    Plan:   Wife will discuss what VA benefits are available for the patient at his VA visit mid May   Care Coordinator will follow up after VA appointment mid May     [facility]   Alicia Kulkarni RN, Care Coordinator   [facility] [place]   E-mail [email or web address]   [phone]

## 2021-05-04 ENCOUNTER — OFFICE VISIT (OUTPATIENT)
Dept: ORTHOPEDICS | Facility: CLINIC | Age: 81
End: 2021-05-04
Payer: COMMERCIAL

## 2021-05-04 DIAGNOSIS — R53.83 FATIGUE, UNSPECIFIED TYPE: ICD-10-CM

## 2021-05-04 DIAGNOSIS — M54.16 LUMBAR BACK PAIN WITH RADICULOPATHY AFFECTING RIGHT LOWER EXTREMITY: Primary | ICD-10-CM

## 2021-05-04 DIAGNOSIS — M51.369 LUMBAR DEGENERATIVE DISC DISEASE: ICD-10-CM

## 2021-05-04 DIAGNOSIS — G20.A1 PARKINSON'S DISEASE (H): ICD-10-CM

## 2021-05-04 PROCEDURE — 99214 OFFICE O/P EST MOD 30 MIN: CPT | Performed by: FAMILY MEDICINE

## 2021-05-04 RX ORDER — GABAPENTIN 300 MG/1
300 CAPSULE ORAL AT BEDTIME
Qty: 30 CAPSULE | Refills: 0 | Status: SHIPPED | OUTPATIENT
Start: 2021-05-04 | End: 2021-06-03

## 2021-05-04 NOTE — PROGRESS NOTES
ESTABLISHED PATIENT FOLLOW-UP:  Follow Up of the Lower Back       HISTORY OF PRESENT ILLNESS  Mr. Loya is a pleasant 80 year old year old male who presents to clinic today for follow-up of RAHEEL lumbar.    Date of injury/onset: 9/2020  Date last seen: 4/23/21 - virtual  Following Therapeutic Plan: yes RAHEEL  Pain: unchanged  Function: unchanged  Interval History: RAHEEL [place] helpful briefly. Vini notes his pain has returned fully and persists down right lower extremity. He remains quite debilitated due to this pain.  Some pain at night.     Mentions that overall, he has been feeling more fatigued and sleepy. This comes on all of a sudden. No urinary symptoms. No other med changes.    Review of Systems:    Do you have fever, chills, weight loss? No    Do you have any vision problems? No    Do you have any chest pain or edema? No    Do you have any shortness of breath or wheezing?  No    Do you have stomach problems? Yes, general stomach issues - constipation    Do you have any numbness or focal weakness? Yes, overall weakness - suddenly from OK to sleepy    Do you have diabetes? No    Do you have problems with bleeding or clotting? No    Do you have an rashes or other skin lesions? No    MEDICAL HISTORY  Patient Active Problem List   Diagnosis     Generalized anxiety disorder     CAD (coronary artery disease)     Hyperlipidemia LDL goal <100     BPH (benign prostatic hypertrophy) with urinary obstruction     Parkinson's disease (H)     Gait disturbance, post-stroke     Herniated nucleus pulposus, L5-S1, right     Pain in right hip     Bunion     Allergic conjunctivitis     Diverticulosis     Glaucoma suspect, bilateral     Senile nuclear sclerosis, bilateral     Dry eye, bilateral     History of corneal transplant     Major depressive disorder, single episode, moderate (H)     Lactose intolerance in adult     Degeneration of L4-L5 intervertebral disc     Compression fracture of thoracic vertebra, with routine  healing, subsequent encounter     CKD (chronic kidney disease) stage 3, GFR 30-59 ml/min     Falls frequently     Slow transit constipation     Orthostatic hypotension     Acute atopic conjunctivitis, unspecified eye     Age-related nuclear cataract, bilateral     Bradycardia, unspecified     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 (H)     History of falling     Other chronic pain     Preglaucoma, unspecified, bilateral     Problems in relationship with spouse or partner     Tremor, unspecified     Benign prostatic hyperplasia with lower urinary tract symptoms     Bunion of unspecified foot     Atherosclerotic heart disease of native coronary artery without angina pectoris     Chronic kidney disease, stage 3a     Wedge compression fracture of unspecified thoracic vertebra, subsequent encounter for fracture with routine healing     Other intervertebral disc degeneration, lumbar region     Diverticulosis of intestine, part unspecified, without perforation or abscess without bleeding     Essential hypertension with goal blood pressure less than 140/90     Other sequelae of cerebral infarction     Other intervertebral disc displacement, lumbosacral region     Corneal transplant status     Lactose intolerance, unspecified     Low back pain     Hyperlipidemia, unspecified     Dry eye syndrome of bilateral lacrimal glands     Unspecified epiphora, unspecified side     Urinary retention     Chronic left-sided low back pain without sciatica     Chronic right-sided low back pain without sciatica     SI joint arthritis       Current Outpatient Medications   Medication Sig Dispense Refill     aspirin (ASA) 81 MG tablet Take 1 tablet (81 mg) by mouth daily 90 tablet 3     bisacodyl (DULCOLAX) 10 MG suppository Place 1 suppository (10 mg) rectally daily as needed for constipation 10 suppository 0     carbidopa-levodopa (SINEMET CR)  MG CR tablet Take 1 tablet by mouth At Bedtime 90 tablet 3      carbidopa-levodopa (SINEMET)  MG tablet Take 2 tablets by mouth 3 times daily Take two tablets 3 times a day, at 7am, 11am & 4pm. 540 tablet 3     PARoxetine (PAXIL) 20 MG tablet Take 1 tablet (20 mg) by mouth every morning 90 tablet 3     polyethylene glycol (MIRALAX) 17 GM/SCOOP powder Take 17 g (1 capful) by mouth 3 times daily (Patient taking differently: Take 1 capful by mouth 3 times daily as needed for constipation ) 1 Bottle 11     rOPINIRole (REQUIP) 0.25 MG tablet Take 0.5 mg by mouth 2 times daily        SENNA-docusate sodium (SENNA S) 8.6-50 MG tablet Take 2 tablets by mouth 2 times daily 120 tablet 11     simvastatin (ZOCOR) 40 MG tablet TAKE ONE TABLET BY MOUTH EVERY NIGHT AT BEDTIME 90 tablet 3     sulfamethoxazole-trimethoprim (BACTRIM) 400-80 MG tablet Take 1 tablet by mouth At Bedtime For UTI prevention 90 tablet 3     vitamin D3 (CHOLECALCIFEROL) 2000 units (50 mcg) tablet Take 1 tablet by mouth daily as needed          No Known Allergies    Family History   Problem Relation Age of Onset     C.A.D. Mother      C.A.D. Father      Lung Cancer Sister      Hypertension Sister      Hypertension Sister      Hypertension Sister      Hypertension Sister      Hypertension Brother      Hypertension Brother      Hypertension Brother      Lipids Brother      Lipids Brother      Lipids Brother      Lipids Sister      Neurologic Disorder Sister 50        MS     Depression Sister         due to MS diagnosis     Depression Sister         due to losing      Depression Brother      Respiratory Sister         Asthma     Depression Sister         due to losing      Neurologic Disorder Daughter 33     Cancer Brother         Throat CA       Additional medical/Social/Surgical histories reviewed in [facility] and updated as appropriate.       PHYSICAL EXAM  There were no vitals taken for this visit.    General  - normal appearance, in no obvious distress  Musculoskeletal - lumbar spine  - stance: stooped  posture, shuffling gait  - inspection: normal bone and joint alignment, no obvious scoliosis  - palpation: no paravertebral or bony tenderness  - ROM: flexion exacerbates pain, normal extension, sidebending, rotation  - strength: lower extremities 5/5 in all planes  - special tests:  (+) straight leg raise  Musculoskeletal - hips  Nontender to palpation anterior or laterally.  Mild tenderness right gluteal region.  Rigidity of lower extremities.  ER/IR without pain, symmetric, no audible clicking or popping today. Strength intact overall  (-)FADIR  Neuro  - patellar and Achilles DTRs 2+ bilaterally, no lower extremity sensory deficit throughout L5 distribution, grossly normal coordination, normal muscle tone  Skin  - no ecchymosis, erythema, warmth, or induration, no obvious rash  Psych  - interactive, appropriate, normal mood and affect     ASSESSMENT & PLAN  Mr. Loya is a 80 year old year old male who presents to clinic today with Follow Up of the Lower Back    Diagnosis  Severe L5-S1 right neuroforaminal stenosis  Parkinson Disease  Fatigue    No improvement with RAHEEL, PT, oral analgesics.  Start gabapentin at bedtime and will avoid during the day for now as he notes significant issues with fatigue.     Referral to spine surgeon to discuss surgical options as Vini feels quite debilitated at this time.    In regard to his increasing fatigue, he will follow up with his PCP. No recent lab work. Possibly related to current parkison regimen. Avoid daytime gabapentin for now due to this.     Continue current parkinson regimen of sinemet/requip.    It was a pleasure seeing Vini.    Abel Wilcox DO, CAM  Primary Care Sports Medicine

## 2021-05-04 NOTE — LETTER
5/4/2021      RE: Vini Loya  [address]  [facility] [address]       ESTABLISHED PATIENT FOLLOW-UP:  Follow Up of the Lower Back       HISTORY OF PRESENT ILLNESS  Mr. Loya is a pleasant 80 year old year old male who presents to clinic today for follow-up of RAHEEL lumbar.    Date of injury/onset: 9/2020  Date last seen: 4/23/21 - virtual  Following Therapeutic Plan: yes RAHEEL  Pain: unchanged  Function: unchanged  Interval History: RAHEEL [place] helpful briefly. Vini notes his pain has returned fully and persists down right lower extremity. He remains quite debilitated due to this pain.  Some pain at night.     Mentions that overall, he has been feeling more fatigued and sleepy. This comes on all of a sudden. No urinary symptoms. No other med changes.    Review of Systems:    Do you have fever, chills, weight loss? No    Do you have any vision problems? No    Do you have any chest pain or edema? No    Do you have any shortness of breath or wheezing?  No    Do you have stomach problems? Yes, general stomach issues - constipation    Do you have any numbness or focal weakness? Yes, overall weakness - suddenly from OK to sleepy    Do you have diabetes? No    Do you have problems with bleeding or clotting? No    Do you have an rashes or other skin lesions? No    MEDICAL HISTORY  Patient Active Problem List   Diagnosis     Generalized anxiety disorder     CAD (coronary artery disease)     Hyperlipidemia LDL goal <100     BPH (benign prostatic hypertrophy) with urinary obstruction     Parkinson's disease (H)     Gait disturbance, post-stroke     Herniated nucleus pulposus, L5-S1, right     Pain in right hip     Bunion     Allergic conjunctivitis     Diverticulosis     Glaucoma suspect, bilateral     Senile nuclear sclerosis, bilateral     Dry eye, bilateral     History of corneal transplant     Major depressive disorder, single episode, moderate (H)     Lactose intolerance in adult     Degeneration of L4-L5  intervertebral disc     Compression fracture of thoracic vertebra, with routine healing, subsequent encounter     CKD (chronic kidney disease) stage 3, GFR 30-59 ml/min     Falls frequently     Slow transit constipation     Orthostatic hypotension     Acute atopic conjunctivitis, unspecified eye     Age-related nuclear cataract, bilateral     Bradycardia, unspecified     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 (H)     History of falling     Other chronic pain     Preglaucoma, unspecified, bilateral     Problems in relationship with spouse or partner     Tremor, unspecified     Benign prostatic hyperplasia with lower urinary tract symptoms     Bunion of unspecified foot     Atherosclerotic heart disease of native coronary artery without angina pectoris     Chronic kidney disease, stage 3a     Wedge compression fracture of unspecified thoracic vertebra, subsequent encounter for fracture with routine healing     Other intervertebral disc degeneration, lumbar region     Diverticulosis of intestine, part unspecified, without perforation or abscess without bleeding     Essential hypertension with goal blood pressure less than 140/90     Other sequelae of cerebral infarction     Other intervertebral disc displacement, lumbosacral region     Corneal transplant status     Lactose intolerance, unspecified     Low back pain     Hyperlipidemia, unspecified     Dry eye syndrome of bilateral lacrimal glands     Unspecified epiphora, unspecified side     Urinary retention     Chronic left-sided low back pain without sciatica     Chronic right-sided low back pain without sciatica     SI joint arthritis       Current Outpatient Medications   Medication Sig Dispense Refill     aspirin (ASA) 81 MG tablet Take 1 tablet (81 mg) by mouth daily 90 tablet 3     bisacodyl (DULCOLAX) 10 MG suppository Place 1 suppository (10 mg) rectally daily as needed for constipation 10 suppository 0     carbidopa-levodopa  (SINEMET CR)  MG CR tablet Take 1 tablet by mouth At Bedtime 90 tablet 3     carbidopa-levodopa (SINEMET)  MG tablet Take 2 tablets by mouth 3 times daily Take two tablets 3 times a day, at 7am, 11am & 4pm. 540 tablet 3     PARoxetine (PAXIL) 20 MG tablet Take 1 tablet (20 mg) by mouth every morning 90 tablet 3     polyethylene glycol (MIRALAX) 17 GM/SCOOP powder Take 17 g (1 capful) by mouth 3 times daily (Patient taking differently: Take 1 capful by mouth 3 times daily as needed for constipation ) 1 Bottle 11     rOPINIRole (REQUIP) 0.25 MG tablet Take 0.5 mg by mouth 2 times daily        SENNA-docusate sodium (SENNA S) 8.6-50 MG tablet Take 2 tablets by mouth 2 times daily 120 tablet 11     simvastatin (ZOCOR) 40 MG tablet TAKE ONE TABLET BY MOUTH EVERY NIGHT AT BEDTIME 90 tablet 3     sulfamethoxazole-trimethoprim (BACTRIM) 400-80 MG tablet Take 1 tablet by mouth At Bedtime For UTI prevention 90 tablet 3     vitamin D3 (CHOLECALCIFEROL) 2000 units (50 mcg) tablet Take 1 tablet by mouth daily as needed          No Known Allergies    Family History   Problem Relation Age of Onset     C.A.D. Mother      C.A.D. Father      Lung Cancer Sister      Hypertension Sister      Hypertension Sister      Hypertension Sister      Hypertension Sister      Hypertension Brother      Hypertension Brother      Hypertension Brother      Lipids Brother      Lipids Brother      Lipids Brother      Lipids Sister      Neurologic Disorder Sister 50        MS     Depression Sister         due to MS diagnosis     Depression Sister         due to losing      Depression Brother      Respiratory Sister         Asthma     Depression Sister         due to losing      Neurologic Disorder Daughter 33     Cancer Brother         Throat CA       Additional medical/Social/Surgical histories reviewed in [facility] and updated as appropriate.       PHYSICAL EXAM  There were no vitals taken for this visit.    General  - normal  appearance, in no obvious distress  Musculoskeletal - lumbar spine  - stance: stooped posture, shuffling gait  - inspection: normal bone and joint alignment, no obvious scoliosis  - palpation: no paravertebral or bony tenderness  - ROM: flexion exacerbates pain, normal extension, sidebending, rotation  - strength: lower extremities 5/5 in all planes  - special tests:  (+) straight leg raise  Musculoskeletal - hips  Nontender to palpation anterior or laterally.  Mild tenderness right gluteal region.  Rigidity of lower extremities.  ER/IR without pain, symmetric, no audible clicking or popping today. Strength intact overall  (-)FADIR  Neuro  - patellar and Achilles DTRs 2+ bilaterally, no lower extremity sensory deficit throughout L5 distribution, grossly normal coordination, normal muscle tone  Skin  - no ecchymosis, erythema, warmth, or induration, no obvious rash  Psych  - interactive, appropriate, normal mood and affect     ASSESSMENT & PLAN  Mr. Loya is a 80 year old year old male who presents to clinic today with Follow Up of the Lower Back    Diagnosis  Severe L5-S1 right neuroforaminal stenosis  Parkinson Disease  Fatigue    No improvement with RAHEEL, PT, oral analgesics.  Start gabapentin at bedtime and will avoid during the day for now as he notes significant issues with fatigue.     Referral to spine surgeon to discuss surgical options as Vini feels quite debilitated at this time.    In regard to his increasing fatigue, he will follow up with his PCP. No recent lab work. Possibly related to current parkison regimen. Avoid daytime gabapentin for now due to this.     Continue current parkinson regimen of sinemet/requip.    It was a pleasure seeing Vini.    Abel Wilcox DO, CAQSM  Primary Care Sports Medicine

## 2021-05-04 NOTE — LETTER
5/4/2021      RE: Vini Loya  [address]  [facility] [address]       ESTABLISHED PATIENT FOLLOW-UP:  Follow Up of the Lower Back       HISTORY OF PRESENT ILLNESS  Mr. Loya is a pleasant 80 year old year old male who presents to clinic today for follow-up of RAHEEL lumbar.    Date of injury/onset: 9/2020  Date last seen: 4/23/21 - virtual  Following Therapeutic Plan: yes RAHEEL  Pain: unchanged  Function: unchanged  Interval History: RAHEEL [place] helpful briefly. Vini notes his pain has returned fully and persists down right lower extremity. He remains quite debilitated due to this pain.  Some pain at night.     Mentions that overall, he has been feeling more fatigued and sleepy. This comes on all of a sudden. No urinary symptoms. No other med changes.    Review of Systems:    Do you have fever, chills, weight loss? No    Do you have any vision problems? No    Do you have any chest pain or edema? No    Do you have any shortness of breath or wheezing?  No    Do you have stomach problems? Yes, general stomach issues - constipation    Do you have any numbness or focal weakness? Yes, overall weakness - suddenly from OK to sleepy    Do you have diabetes? No    Do you have problems with bleeding or clotting? No    Do you have an rashes or other skin lesions? No    MEDICAL HISTORY  Patient Active Problem List   Diagnosis     Generalized anxiety disorder     CAD (coronary artery disease)     Hyperlipidemia LDL goal <100     BPH (benign prostatic hypertrophy) with urinary obstruction     Parkinson's disease (H)     Gait disturbance, post-stroke     Herniated nucleus pulposus, L5-S1, right     Pain in right hip     Bunion     Allergic conjunctivitis     Diverticulosis     Glaucoma suspect, bilateral     Senile nuclear sclerosis, bilateral     Dry eye, bilateral     History of corneal transplant     Major depressive disorder, single episode, moderate (H)     Lactose intolerance in adult     Degeneration of L4-L5  intervertebral disc     Compression fracture of thoracic vertebra, with routine healing, subsequent encounter     CKD (chronic kidney disease) stage 3, GFR 30-59 ml/min     Falls frequently     Slow transit constipation     Orthostatic hypotension     Acute atopic conjunctivitis, unspecified eye     Age-related nuclear cataract, bilateral     Bradycardia, unspecified     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 (H)     History of falling     Other chronic pain     Preglaucoma, unspecified, bilateral     Problems in relationship with spouse or partner     Tremor, unspecified     Benign prostatic hyperplasia with lower urinary tract symptoms     Bunion of unspecified foot     Atherosclerotic heart disease of native coronary artery without angina pectoris     Chronic kidney disease, stage 3a     Wedge compression fracture of unspecified thoracic vertebra, subsequent encounter for fracture with routine healing     Other intervertebral disc degeneration, lumbar region     Diverticulosis of intestine, part unspecified, without perforation or abscess without bleeding     Essential hypertension with goal blood pressure less than 140/90     Other sequelae of cerebral infarction     Other intervertebral disc displacement, lumbosacral region     Corneal transplant status     Lactose intolerance, unspecified     Low back pain     Hyperlipidemia, unspecified     Dry eye syndrome of bilateral lacrimal glands     Unspecified epiphora, unspecified side     Urinary retention     Chronic left-sided low back pain without sciatica     Chronic right-sided low back pain without sciatica     SI joint arthritis       Current Outpatient Medications   Medication Sig Dispense Refill     aspirin (ASA) 81 MG tablet Take 1 tablet (81 mg) by mouth daily 90 tablet 3     bisacodyl (DULCOLAX) 10 MG suppository Place 1 suppository (10 mg) rectally daily as needed for constipation 10 suppository 0     carbidopa-levodopa  (SINEMET CR)  MG CR tablet Take 1 tablet by mouth At Bedtime 90 tablet 3     carbidopa-levodopa (SINEMET)  MG tablet Take 2 tablets by mouth 3 times daily Take two tablets 3 times a day, at 7am, 11am & 4pm. 540 tablet 3     PARoxetine (PAXIL) 20 MG tablet Take 1 tablet (20 mg) by mouth every morning 90 tablet 3     polyethylene glycol (MIRALAX) 17 GM/SCOOP powder Take 17 g (1 capful) by mouth 3 times daily (Patient taking differently: Take 1 capful by mouth 3 times daily as needed for constipation ) 1 Bottle 11     rOPINIRole (REQUIP) 0.25 MG tablet Take 0.5 mg by mouth 2 times daily        SENNA-docusate sodium (SENNA S) 8.6-50 MG tablet Take 2 tablets by mouth 2 times daily 120 tablet 11     simvastatin (ZOCOR) 40 MG tablet TAKE ONE TABLET BY MOUTH EVERY NIGHT AT BEDTIME 90 tablet 3     sulfamethoxazole-trimethoprim (BACTRIM) 400-80 MG tablet Take 1 tablet by mouth At Bedtime For UTI prevention 90 tablet 3     vitamin D3 (CHOLECALCIFEROL) 2000 units (50 mcg) tablet Take 1 tablet by mouth daily as needed          No Known Allergies    Family History   Problem Relation Age of Onset     C.A.D. Mother      C.A.D. Father      Lung Cancer Sister      Hypertension Sister      Hypertension Sister      Hypertension Sister      Hypertension Sister      Hypertension Brother      Hypertension Brother      Hypertension Brother      Lipids Brother      Lipids Brother      Lipids Brother      Lipids Sister      Neurologic Disorder Sister 50        MS     Depression Sister         due to MS diagnosis     Depression Sister         due to losing      Depression Brother      Respiratory Sister         Asthma     Depression Sister         due to losing      Neurologic Disorder Daughter 33     Cancer Brother         Throat CA       Additional medical/Social/Surgical histories reviewed in [facility] and updated as appropriate.       PHYSICAL EXAM  There were no vitals taken for this visit.    General  - normal  appearance, in no obvious distress  Musculoskeletal - lumbar spine  - stance: stooped posture, shuffling gait  - inspection: normal bone and joint alignment, no obvious scoliosis  - palpation: no paravertebral or bony tenderness  - ROM: flexion exacerbates pain, normal extension, sidebending, rotation  - strength: lower extremities 5/5 in all planes  - special tests:  (+) straight leg raise  Musculoskeletal - hips  Nontender to palpation anterior or laterally.  Mild tenderness right gluteal region.  Rigidity of lower extremities.  ER/IR without pain, symmetric, no audible clicking or popping today. Strength intact overall  (-)FADIR  Neuro  - patellar and Achilles DTRs 2+ bilaterally, no lower extremity sensory deficit throughout L5 distribution, grossly normal coordination, normal muscle tone  Skin  - no ecchymosis, erythema, warmth, or induration, no obvious rash  Psych  - interactive, appropriate, normal mood and affect     ASSESSMENT & PLAN  Mr. Loya is a 80 year old year old male who presents to clinic today with Follow Up of the Lower Back    Diagnosis  Severe L5-S1 right neuroforaminal stenosis  Parkinson Disease  Fatigue    No improvement with RAHEEL, PT, oral analgesics.  Start gabapentin at bedtime and will avoid during the day for now as he notes significant issues with fatigue.     Referral to spine surgeon to discuss surgical options as Vini feels quite debilitated at this time.    In regard to his increasing fatigue, he will follow up with his PCP. No recent lab work. Possibly related to current parkison regimen. Avoid daytime gabapentin for now due to this.     Continue current parkinson regimen of sinemet/requip.    It was a pleasure seeing Vini.    Abel Wilcox DO, CAM  Primary Care Sports Medicine          Abel Wilcox DO

## 2021-05-08 NOTE — LETTER
August 1, 2019      Vini Loya  [address] [facility] [address]        Dear ,    We are writing to inform you of your test results.    Here are your results for your recent lab which was normal. Please call or message me if you have questions or concerns      Resulted Orders   CBC with platelets and differential   Result Value Ref Range    WBC 8.5 4.0 - 11.0 10e9/L    RBC Count 4.80 4.4 - 5.9 10e12/L    Hemoglobin 15.2 13.3 - 17.7 g/dL    Hematocrit 45.3 40.0 - 53.0 %    MCV 94 78 - 100 fl    MCH 31.7 26.5 - 33.0 pg    MCHC 33.6 31.5 - 36.5 g/dL    RDW 13.6 10.0 - 15.0 %    Platelet Count 239 150 - 450 10e9/L    % Neutrophils 69.2 %    % Lymphocytes 19.0 %    % Monocytes 9.9 %    % Eosinophils 1.3 %    % Basophils 0.6 %    Absolute Neutrophil 5.9 1.6 - 8.3 10e9/L    Absolute Lymphocytes 1.6 0.8 - 5.3 10e9/L    Absolute Monocytes 0.8 0.0 - 1.3 10e9/L    Absolute Eosinophils 0.1 0.0 - 0.7 10e9/L    Absolute Basophils 0.1 0.0 - 0.2 10e9/L    Diff Method Automated Method      If you have any questions or concerns, please call the clinic at the number listed above.   Sincerely,  Peg Walton MD/nr       Chest pain, unspecified type

## 2021-05-11 PROBLEM — G89.29 CHRONIC RIGHT-SIDED LOW BACK PAIN WITHOUT SCIATICA: Status: RESOLVED | Noted: 2021-03-04 | Resolved: 2021-05-11

## 2021-05-11 PROBLEM — G89.29 CHRONIC LEFT-SIDED LOW BACK PAIN WITHOUT SCIATICA: Status: RESOLVED | Noted: 2021-03-04 | Resolved: 2021-05-11

## 2021-05-11 PROBLEM — M54.50 CHRONIC LEFT-SIDED LOW BACK PAIN WITHOUT SCIATICA: Status: RESOLVED | Noted: 2021-03-04 | Resolved: 2021-05-11

## 2021-05-11 PROBLEM — M54.50 CHRONIC RIGHT-SIDED LOW BACK PAIN WITHOUT SCIATICA: Status: RESOLVED | Noted: 2021-03-04 | Resolved: 2021-05-11

## 2021-05-11 NOTE — PROGRESS NOTES
Discharge Note    Progress reporting period is from initial evaluation date (please see noted date below) to Mar 19, 2021.  Linked Episodes   Type: Episode: Status: Noted: Resolved: Last update: Updated by:   PHYSICAL THERAPY lbp Active 3/4/2021  3/19/2021  9:55 AM Bethel Pablo, PT      Comments:       Vini failed to follow up and current status is unknown.  Please see information below for last relevant information on current status.  Patient seen for 3 visits.    SUBJECTIVE  Subjective changes noted by patient:  Patient reports he is feeling better than last PT session. Exercises are going fine. Still occasional difficulty putting on shoes and socks. Using pain patch helps to reduce pain. Able to walk 8 blocks yesterday and felt fine.   .  Current pain level is 8/10.     Previous pain level was  6/10.   Changes in function:  Yes (See Goal flowsheet attached for changes in current functional level)  Adverse reaction to treatment or activity: None    OBJECTIVE  Changes noted in objective findings: lumbar AROM: FL = WNL and pain, EXT = mod loss and pain; repeat sit<>stand with arms crossed = 10 reps then fatigued; pain level reduced to 5/10 post treatment     ASSESSMENT/PLAN  Diagnosis: LBP   Updated problem list and treatment plan:     STG/LTGs have been met or progress has been made towards goals:  Yes, please see goal flowsheet for most current information  Assessment of Progress: current status is unknown.    Last current status: Pt is progressing as expected   Self Management Plans:  HEP  I have re-evaluated this patient and find that the nature, scope, duration and intensity of the therapy is appropriate for the medical condition of the patient.  Vini continues to require the following intervention to meet STG and LTG's:  HEP.    Recommendations:  Discharge with current home program.  Patient to follow up with MD as needed.    Please refer to the daily flowsheet for treatment today, total treatment time  and time spent performing 1:1 timed codes.

## 2021-05-14 DIAGNOSIS — M54.50 LOW BACK PAIN: Primary | ICD-10-CM

## 2021-05-14 NOTE — TELEPHONE ENCOUNTER
DIAGNOSIS: SS routine ([facility])  Lumbar back pain with radiculopathy affecting right lower extremity /Dr Wilcox/ MRI/ [facility]/ ortho con   APPOINTMENT DATE: 5/18/2021   NOTES STATUS DETAILS   OFFICE NOTE from referring provider Internal 5/4/2021 OV from    Abel Wilcox DO        OFFICE NOTE from other specialist N/A    DISCHARGE SUMMARY from hospital N/A    DISCHARGE REPORT from the ER N/A    OPERATIVE REPORT N/A    MEDICATION LIST Internal    EMG (for Spine) N/A    IMPLANT RECORD/STICKER N/A    LABS     CBC/DIFF Internal 1/28/2021   CULTURES N/A    INJECTIONS DONE IN RADIOLOGY N/A    MRI Internal 4/16/2021   CT SCAN Internal 9/21/2020   XRAYS (IMAGES & REPORTS) Internal 5/18/2021 4/26/2021 2/26/2021   TUMOR     PATHOLOGY  Slides & report N/A

## 2021-05-18 ENCOUNTER — OFFICE VISIT (OUTPATIENT)
Dept: ORTHOPEDICS | Facility: CLINIC | Age: 81
End: 2021-05-18
Payer: COMMERCIAL

## 2021-05-18 ENCOUNTER — PRE VISIT (OUTPATIENT)
Dept: ORTHOPEDICS | Facility: CLINIC | Age: 81
End: 2021-05-18

## 2021-05-18 ENCOUNTER — PATIENT OUTREACH (OUTPATIENT)
Dept: CARE COORDINATION | Facility: CLINIC | Age: 81
End: 2021-05-18

## 2021-05-18 ENCOUNTER — ANCILLARY PROCEDURE (OUTPATIENT)
Dept: GENERAL RADIOLOGY | Facility: CLINIC | Age: 81
End: 2021-05-18
Attending: ORTHOPAEDIC SURGERY
Payer: COMMERCIAL

## 2021-05-18 VITALS — WEIGHT: 170 LBS | HEIGHT: 69 IN | BODY MASS INDEX: 25.18 KG/M2

## 2021-05-18 DIAGNOSIS — Z86.39 PERSONAL HISTORY OF OTHER ENDOCRINE, NUTRITIONAL AND METABOLIC DISEASE: ICD-10-CM

## 2021-05-18 DIAGNOSIS — E55.9 VITAMIN D DEFICIENCY, UNSPECIFIED: ICD-10-CM

## 2021-05-18 DIAGNOSIS — M54.16 LUMBAR RADICULOPATHY, ACUTE: Primary | ICD-10-CM

## 2021-05-18 PROCEDURE — 72100 X-RAY EXAM L-S SPINE 2/3 VWS: CPT | Performed by: RADIOLOGY

## 2021-05-18 PROCEDURE — 99204 OFFICE O/P NEW MOD 45 MIN: CPT | Performed by: ORTHOPAEDIC SURGERY

## 2021-05-18 ASSESSMENT — MIFFLIN-ST. JEOR: SCORE: 1458.55

## 2021-05-18 NOTE — NURSING NOTE
"Reason For Visit:   Chief Complaint   Patient presents with     Consult     lumbar back pain with radiculopathy. right sided radiating pain down his leg for about 1 year. has been having cerpitus with walking in his low back. has tried an injection. has tried PT. no previous surgeries on his spine.        Primary MD: Selene Land  Ref. MD: Brenden    ?  No  Date of injury: a couple years ago   Type of injury: chronic .  Date of surgery: none   Type of surgery: none .  Smoker: No  Request smoking cessation information: No    Ht 1.74 m (5' 8.5\")   Wt 77.1 kg (170 lb)   BMI 25.47 kg/m           Oswestry (SEGUN) Questionnaire    OSWESTRY DISABILITY INDEX 3/19/2021   Count 10   Sum 24   Oswestry Score (%) 48   Some recent data might be hidden            Neck Disability Index (NDI) Questionnaire    No flowsheet data found.                Promis 10 Assessment    No flowsheet data found.             Bautista Maciel, ATC  "

## 2021-05-18 NOTE — PROGRESS NOTES
Clinic Care Coordination Contact    Situation: Patient chart reviewed by care coordinator.    Background: Patient enrolled in care coordination with goals surrounding [employer] benefits.    Assessment: Patient has appt mid-May to check on benefits. RN[facility] called last week to follow up    Plan/Recommendations: [facility] will follow up in 1 week.    KEVIN Osuna   [facility] Care Coordination  Tel: [phone]
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
## 2021-05-19 NOTE — TELEPHONE ENCOUNTER
FUTURE VISIT INFORMATION      SURGERY INFORMATION:    Date: [facility]    Consult: OV 21    RECORDS REQUESTED FROM:       Primary Care Provider: Wegener, Joel Daniel Irwin, MD- [facility]    Pertinent Medical History: CAD, bradycardia, hypertension    Most recent EKG+ Tracin21    Most recent Cardiac Stress Test: 19

## 2021-05-26 ENCOUNTER — PATIENT OUTREACH (OUTPATIENT)
Dept: NURSING | Facility: CLINIC | Age: 81
End: 2021-05-26
Payer: COMMERCIAL
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
## 2021-06-03 ENCOUNTER — PRE VISIT (OUTPATIENT)
Dept: SURGERY | Facility: CLINIC | Age: 81
End: 2021-06-03

## 2021-06-03 ENCOUNTER — ANCILLARY PROCEDURE (OUTPATIENT)
Dept: CT IMAGING | Facility: CLINIC | Age: 81
End: 2021-06-03
Attending: ORTHOPAEDIC SURGERY
Payer: COMMERCIAL

## 2021-06-03 DIAGNOSIS — M54.16 LUMBAR RADICULOPATHY, ACUTE: ICD-10-CM

## 2021-06-03 DIAGNOSIS — E55.9 VITAMIN D DEFICIENCY, UNSPECIFIED: ICD-10-CM

## 2021-06-03 DIAGNOSIS — Z86.39 PERSONAL HISTORY OF OTHER ENDOCRINE, NUTRITIONAL AND METABOLIC DISEASE: ICD-10-CM

## 2021-06-03 LAB
ALBUMIN SERPL-MCNC: 3.8 G/DL (ref 3.4–5)
DEPRECATED CALCIDIOL+CALCIFEROL SERPL-MC: 46 UG/L (ref 20–75)
HBA1C MFR BLD: 5.8 % (ref 0–5.6)

## 2021-06-03 PROCEDURE — 72131 CT LUMBAR SPINE W/O DYE: CPT | Performed by: RADIOLOGY

## 2021-06-03 PROCEDURE — 82040 ASSAY OF SERUM ALBUMIN: CPT | Performed by: PATHOLOGY

## 2021-06-03 PROCEDURE — 36415 COLL VENOUS BLD VENIPUNCTURE: CPT | Performed by: PATHOLOGY

## 2021-06-03 PROCEDURE — 83036 HEMOGLOBIN GLYCOSYLATED A1C: CPT | Performed by: PATHOLOGY

## 2021-06-03 PROCEDURE — 82306 VITAMIN D 25 HYDROXY: CPT | Mod: 90 | Performed by: PATHOLOGY

## 2021-06-03 RX ORDER — ROPINIROLE 0.5 MG/1
0.5 TABLET, FILM COATED ORAL 3 TIMES DAILY
COMMUNITY

## 2021-06-03 NOTE — PROGRESS NOTES
Preoperative Assessment Center Medication History Note    Medication history completed on Heidy 3, 2021 by this writer. See Epic admission navigator for prior to admission medications. Operating room staff will still need to confirm medications and last dose information on day of surgery.     Medication history interview sources:  patient's wife, patient's med list from home, payor information, call to [facility] pharmacy to verify requip dose.      Changes made to PTA medication list (reason)  Added: none  Deleted: gabapentin,   Changed: requip dose/sig (confirmed with [facility] pharmacy)    Additional medication history information (including reliability of information, actions taken by pharmacist):    -- is on daily prophylactic bactrim for UTI prevention.   -- No recent (within 30 days) course of systemic steroids  -- Patient declines being on any other prescription or over-the-counter medications    Prior to Admission medications    Medication Sig Last Dose Taking? Auth Provider   aspirin (ASA) 81 MG tablet Take 1 tablet (81 mg) by mouth daily Taking Yes Wegener, Joel Daniel Irwin, MD   bisacodyl (DULCOLAX) 10 MG suppository Place 1 suppository (10 mg) rectally daily as needed for constipation Taking Yes Elizabeth Gaxiola MD   carbidopa-levodopa (SINEMET CR)  MG CR tablet Take 1 tablet by mouth At Bedtime Taking Yes Selene Land MD   carbidopa-levodopa (SINEMET)  MG tablet Take 2 tablets by mouth 3 times daily Take two tablets 3 times a day, at 7am, 11am & 4pm. Taking Yes Dalila Staples APRN CNP   PARoxetine (PAXIL) 20 MG tablet Take 1 tablet (20 mg) by mouth every morning Taking Yes Wegener, Joel Daniel Irwin, MD   polyethylene glycol (MIRALAX) 17 GM/SCOOP powder Take 17 g (1 capful) by mouth 3 times daily  Patient taking differently: Take 1 capful by mouth 3 times daily as needed for constipation  Taking Yes Wegener, Joel Daniel Irwin, MD   rOPINIRole (REQUIP) 0.5 MG tablet Take 0.5 mg by  mouth 3 times daily Taking Yes Unknown, Entered By History   SENNA-docusate sodium (SENNA S) 8.6-50 MG tablet Take 2 tablets by mouth 2 times daily Taking Yes Elizabeth Gaxiola MD   simvastatin (ZOCOR) 40 MG tablet TAKE ONE TABLET BY MOUTH EVERY NIGHT AT BEDTIME Taking Yes Selene Land MD   sulfamethoxazole-trimethoprim (BACTRIM) 400-80 MG tablet Take 1 tablet by mouth At Bedtime For UTI prevention Taking Yes Selene Land MD   vitamin D3 (CHOLECALCIFEROL) 2000 units (50 mcg) tablet Take 1 tablet by mouth daily as needed  Taking Yes Reported, Patient          Medication history completed by: Mateo Zarate [facility]

## 2021-06-04 ENCOUNTER — ANESTHESIA EVENT (OUTPATIENT)
Dept: SURGERY | Facility: CLINIC | Age: 81
End: 2021-06-04

## 2021-06-04 ENCOUNTER — OFFICE VISIT (OUTPATIENT)
Dept: SURGERY | Facility: CLINIC | Age: 81
End: 2021-06-04
Attending: ORTHOPAEDIC SURGERY
Payer: COMMERCIAL

## 2021-06-04 VITALS
BODY MASS INDEX: 26.67 KG/M2 | OXYGEN SATURATION: 95 % | SYSTOLIC BLOOD PRESSURE: 143 MMHG | TEMPERATURE: 98.2 F | RESPIRATION RATE: 16 BRPM | WEIGHT: 176 LBS | HEART RATE: 54 BPM | DIASTOLIC BLOOD PRESSURE: 79 MMHG | HEIGHT: 68 IN

## 2021-06-04 DIAGNOSIS — M54.50 LOW BACK PAIN: ICD-10-CM

## 2021-06-04 DIAGNOSIS — Z01.818 PREOP EXAMINATION: Primary | ICD-10-CM

## 2021-06-04 DIAGNOSIS — M54.16 LUMBAR RADICULOPATHY: ICD-10-CM

## 2021-06-04 DIAGNOSIS — M54.16 LUMBAR RADICULOPATHY, ACUTE: ICD-10-CM

## 2021-06-04 DIAGNOSIS — M54.16 LUMBAR RADICULOPATHY, ACUTE: Primary | ICD-10-CM

## 2021-06-04 LAB
ANION GAP SERPL CALCULATED.3IONS-SCNC: 4 MMOL/L (ref 3–14)
BUN SERPL-MCNC: 19 MG/DL (ref 7–30)
CALCIUM SERPL-MCNC: 9 MG/DL (ref 8.5–10.1)
CHLORIDE SERPL-SCNC: 109 MMOL/L (ref 94–109)
CO2 SERPL-SCNC: 29 MMOL/L (ref 20–32)
CREAT SERPL-MCNC: 1.1 MG/DL (ref 0.66–1.25)
ERYTHROCYTE [DISTWIDTH] IN BLOOD BY AUTOMATED COUNT: 13.5 % (ref 10–15)
GFR SERPL CREATININE-BSD FRML MDRD: 62 ML/MIN/{1.73_M2}
GLUCOSE SERPL-MCNC: 103 MG/DL (ref 70–99)
HCT VFR BLD AUTO: 43.9 % (ref 40–53)
HGB BLD-MCNC: 14.4 G/DL (ref 13.3–17.7)
MCH RBC QN AUTO: 31.4 PG (ref 26.5–33)
MCHC RBC AUTO-ENTMCNC: 32.8 G/DL (ref 31.5–36.5)
MCV RBC AUTO: 96 FL (ref 78–100)
PLATELET # BLD AUTO: 260 10E9/L (ref 150–450)
POTASSIUM SERPL-SCNC: 4 MMOL/L (ref 3.4–5.3)
RBC # BLD AUTO: 4.58 10E12/L (ref 4.4–5.9)
SODIUM SERPL-SCNC: 143 MMOL/L (ref 133–144)
WBC # BLD AUTO: 9.2 10E9/L (ref 4–11)

## 2021-06-04 PROCEDURE — 86900 BLOOD TYPING SEROLOGIC ABO: CPT | Performed by: PATHOLOGY

## 2021-06-04 PROCEDURE — 86901 BLOOD TYPING SEROLOGIC RH(D): CPT | Performed by: PATHOLOGY

## 2021-06-04 PROCEDURE — 36415 COLL VENOUS BLD VENIPUNCTURE: CPT | Performed by: PATHOLOGY

## 2021-06-04 PROCEDURE — 80048 BASIC METABOLIC PNL TOTAL CA: CPT | Performed by: PATHOLOGY

## 2021-06-04 PROCEDURE — 86850 RBC ANTIBODY SCREEN: CPT | Performed by: PATHOLOGY

## 2021-06-04 PROCEDURE — 85027 COMPLETE CBC AUTOMATED: CPT | Performed by: PATHOLOGY

## 2021-06-04 PROCEDURE — 99204 OFFICE O/P NEW MOD 45 MIN: CPT | Performed by: NURSE PRACTITIONER

## 2021-06-04 ASSESSMENT — PAIN SCALES - GENERAL: PAINLEVEL: EXTREME PAIN (8)

## 2021-06-04 ASSESSMENT — LIFESTYLE VARIABLES: TOBACCO_USE: 1

## 2021-06-04 ASSESSMENT — MIFFLIN-ST. JEOR: SCORE: 1477.83

## 2021-06-04 ASSESSMENT — PATIENT HEALTH QUESTIONNAIRE - PHQ9: SUM OF ALL RESPONSES TO PHQ QUESTIONS 1-9: 9
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
## 2021-06-04 NOTE — PATIENT INSTRUCTIONS
Preparing for Your Surgery      Name:  Vini Loya   MRN:  [id number]   :  1940   Today's Date:  2021       Arriving for surgery:  Surgery date:   Your surgery has not yet been scheduled.  An RN from the Pre Admission Nursing Office will call you      1-2 days before your procedure to confirm date, location, arrival time and fasting instructions   Arrival time:      Restrictions due to COVID 19:  One consistent visitor per patient is allowed.  The visitor will be allowed in the pre-op area.  Visitors are asked to leave the building during the surgery.  No ill visitors.  All visitors must wear face mask.    [employer] [employer] parking is available for anyone with mobility limitations or disabilities.  ([place]  24 hours/ 7 days a week; [place]  7 am- 3:30 pm, Mon- Fri)    Please come to:     [facility] [address]  [facility]     [address] [place]  [address]    Parking is available in the Green Ramp     -Proceed to the 3rd floor, check in at the Adult Surgery Waiting Lounge. [phone]    If an escort is needed stop at the Information Desk in the lobby.         What can I eat or drink?  -  You may eat and drink normally for up to 8 hours before your surgery.  -  You may have clear liquids until 2 hours before surgery.     Examples of clear liquids:  Water  Clear broth  Juices (apple, white grape, white cranberry  and cider) without pulp  Noncarbonated, powder based beverages  (lemonade and Paul-Aid)  Sodas (Sprite, 7-Up, ginger ale and seltzer)  Coffee or tea (without milk or cream)  Gatorade    -  No Alcohol for at least 24 hours before surgery     Which medicines can I take?    Hold Aspirin for 7 days before surgery.     Hold Multivitamins for 7 days before surgery.  Hold Supplements for 7 days before surgery.  Hold Ibuprofen (Advil, Motrin) for 1 day before surgery--unless otherwise directed by surgeon.  Hold Naproxen  (Aleve) for 4 days before surgery.    -  DO NOT take these medications the day of surgery:  Miralax  Vitamin D      -  PLEASE TAKE these medications the day of surgery:  Carbidopa   Paroxetine   Ropinirole   Senna       How do I prepare myself?  - Please take 2 showers before surgery using Scrubcare or Hibiclens soap.    Use this soap only from the neck to your toes.     Leave the soap on your skin for one minute--then rinse thoroughly.      You may use your own shampoo and conditioner; no other hair products.   - Please remove all jewelry and body piercings.  - No lotions, deodorants or fragrance.  - No makeup or fingernail polish.   - Bring your ID and insurance card.    - All patients are required to have a Covid-19 test within 4 days of surgery/procedure.      -Patients will be contacted by the [facility] scheduling team within 1 week of surgery to make an appointment.      - Patients may call the Scheduling team at [phone] if they have not been scheduled within 4 days of  surgery.      Questions or Concerns:    - For any questions regarding the day of surgery or your hospital stay, please contact the Pre Admission Nursing Office at [phone].       - If you have health changes between today and your surgery please call your surgeon.       For questions after surgery please call your surgeons office.

## 2021-06-08 ENCOUNTER — OFFICE VISIT (OUTPATIENT)
Dept: ORTHOPEDICS | Facility: CLINIC | Age: 81
End: 2021-06-08
Payer: COMMERCIAL

## 2021-06-08 VITALS — BODY MASS INDEX: 26.67 KG/M2 | WEIGHT: 176 LBS | HEIGHT: 68 IN

## 2021-06-08 DIAGNOSIS — M54.41 CHRONIC RIGHT-SIDED LOW BACK PAIN WITH RIGHT-SIDED SCIATICA: ICD-10-CM

## 2021-06-08 DIAGNOSIS — M54.16 LUMBAR RADICULOPATHY, ACUTE: Primary | ICD-10-CM

## 2021-06-08 DIAGNOSIS — G89.29 CHRONIC RIGHT-SIDED LOW BACK PAIN WITH RIGHT-SIDED SCIATICA: ICD-10-CM

## 2021-06-08 PROCEDURE — 99213 OFFICE O/P EST LOW 20 MIN: CPT | Performed by: ORTHOPAEDIC SURGERY

## 2021-06-08 ASSESSMENT — MIFFLIN-ST. JEOR: SCORE: 1477.83

## 2021-06-08 NOTE — NURSING NOTE
"Reason For Visit:   Chief Complaint   Patient presents with     RECHECK     PAC review and discuss surgery        Ht 1.727 m (5' 8\")   Wt 79.8 kg (176 lb)   BMI 26.76 kg/m           Bautista Maciel ATC  "
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
## 2021-06-10 ENCOUNTER — THERAPY VISIT (OUTPATIENT)
Dept: PHYSICAL THERAPY | Facility: CLINIC | Age: 81
End: 2021-06-10
Attending: ORTHOPAEDIC SURGERY
Payer: COMMERCIAL

## 2021-06-10 ENCOUNTER — OFFICE VISIT (OUTPATIENT)
Dept: FAMILY MEDICINE | Facility: CLINIC | Age: 81
End: 2021-06-10
Payer: COMMERCIAL

## 2021-06-10 VITALS
WEIGHT: 173 LBS | DIASTOLIC BLOOD PRESSURE: 92 MMHG | HEIGHT: 68 IN | HEART RATE: 58 BPM | TEMPERATURE: 99 F | BODY MASS INDEX: 26.22 KG/M2 | RESPIRATION RATE: 16 BRPM | SYSTOLIC BLOOD PRESSURE: 170 MMHG | OXYGEN SATURATION: 95 %

## 2021-06-10 DIAGNOSIS — G20.A1 PARKINSON'S DISEASE (H): ICD-10-CM

## 2021-06-10 DIAGNOSIS — G89.29 CHRONIC RIGHT-SIDED LOW BACK PAIN WITH RIGHT-SIDED SCIATICA: ICD-10-CM

## 2021-06-10 DIAGNOSIS — M54.41 CHRONIC RIGHT-SIDED LOW BACK PAIN WITH RIGHT-SIDED SCIATICA: ICD-10-CM

## 2021-06-10 DIAGNOSIS — M54.16 LUMBAR RADICULOPATHY, ACUTE: ICD-10-CM

## 2021-06-10 LAB
ABO + RH BLD: NORMAL
ABO + RH BLD: NORMAL
BLD GP AB SCN SERPL QL: NORMAL
BLOOD BANK CMNT PATIENT-IMP: NORMAL
BLOOD BANK CMNT PATIENT-IMP: NORMAL
SPECIMEN EXP DATE BLD: NORMAL

## 2021-06-10 PROCEDURE — 99214 OFFICE O/P EST MOD 30 MIN: CPT | Performed by: FAMILY MEDICINE

## 2021-06-10 PROCEDURE — 97110 THERAPEUTIC EXERCISES: CPT | Mod: GP | Performed by: PHYSICAL THERAPIST

## 2021-06-10 PROCEDURE — 97161 PT EVAL LOW COMPLEX 20 MIN: CPT | Mod: GP | Performed by: PHYSICAL THERAPIST

## 2021-06-10 ASSESSMENT — MIFFLIN-ST. JEOR: SCORE: 1464.22
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
## 2021-06-14 ENCOUNTER — TELEPHONE (OUTPATIENT)
Dept: FAMILY MEDICINE | Facility: CLINIC | Age: 81
End: 2021-06-14

## 2021-06-14 DIAGNOSIS — M54.16 LUMBAR RADICULOPATHY: Primary | ICD-10-CM

## 2021-06-14 DIAGNOSIS — F41.9 ANXIETY: ICD-10-CM

## 2021-06-14 NOTE — TELEPHONE ENCOUNTER
Spouse calling- States Dr Walton was going to contact them last Friday or today with a plan regarding pt's back pain. Wants to know what the plan is and if she needs to  ap prescription. Call back [phone]

## 2021-06-14 NOTE — TELEPHONE ENCOUNTER
Dr. Walton-Please review, sign if agree and may close encounter.    Writer called Kristi and reviewed message per Dr. Walton.    Kristi verbalized understanding and stated:  1. Patient has been using over-the-counter Lidocaine patches for weeks   A. Sometimes they help pain  2. Agreeable to trying Duloxetine-would like notation made on medication order to call when medication ready for .    Pharmacy confirmed with Kristi and medication pended.    Thank you!  MATEO RuffN, RN  [facility]

## 2021-06-14 NOTE — TELEPHONE ENCOUNTER
Lets have pt get over the counter lidocaine patch.  I would have recommended switching from paxil to cymbalta which should help with pain along with anxiety. If pt is agreeable then can come up with plan and then have him follow up with pain clinic.  Thanks!  KANDACE

## 2021-06-15 PROBLEM — G89.29 CHRONIC RIGHT-SIDED LOW BACK PAIN WITH RIGHT-SIDED SCIATICA: Status: ACTIVE | Noted: 2021-06-15

## 2021-06-15 PROBLEM — M54.41 CHRONIC RIGHT-SIDED LOW BACK PAIN WITH RIGHT-SIDED SCIATICA: Status: ACTIVE | Noted: 2021-06-15

## 2021-06-15 RX ORDER — DULOXETIN HYDROCHLORIDE 30 MG/1
30 CAPSULE, DELAYED RELEASE ORAL DAILY
Qty: 30 CAPSULE | Refills: 0 | Status: SHIPPED | OUTPATIENT
Start: 2021-06-15 | End: 2022-03-18 | Stop reason: DRUGHIGH

## 2021-06-15 NOTE — TELEPHONE ENCOUNTER
Writer called home number listed for Kristi and patient answered phone.  Writer reviewed plan per Dr. Walton.  Patient verbalized understanding and in agreement with plan.    Patient stated Kristi currently out of the house but writer can try cell phone.  Confirmed Kristi's cell number.    Writer called Kristi's mobile number twice and received automated message the phone number is not in service.    Recall Kristi at home phone number at another time.    MATEO RuffN, RN  [facility]

## 2021-06-15 NOTE — TELEPHONE ENCOUNTER
Writer called Kristi and reviewed message per Dr. Walton.    Kristi verbalized understanding and in agreement with plan.    CONCHA Ruff, RN  [facility]

## 2021-06-21 ENCOUNTER — TELEPHONE (OUTPATIENT)
Dept: CARDIOLOGY | Facility: CLINIC | Age: 81
End: 2021-06-21

## 2021-06-25 ENCOUNTER — PATIENT OUTREACH (OUTPATIENT)
Dept: CARE COORDINATION | Facility: CLINIC | Age: 81
End: 2021-06-25

## 2021-06-25 ENCOUNTER — PATIENT OUTREACH (OUTPATIENT)
Dept: NURSING | Facility: CLINIC | Age: 81
End: 2021-06-25
Payer: COMMERCIAL

## 2021-06-25 NOTE — PROGRESS NOTES
Clinic Care Coordination Contact  [facility]/Voicemail       Clinical Data: Care Coordinator Outreach  Outreach attempted x 1.  Spoke to Kristi (Wife), left message with call back information and requested return call.    Kristi expressed frustration with patients lack motivation to do anything. States, patient is having a lot of back pain and is sleeping. The ortho surgeon will not do back surgery because of patients Parkinson. They have a follow up appointment with Dr. Walton on Thur 7/1 as the duloxetine is not helping the back pain.   No consent for communication on record. CHW encouraged that patient return call. CHW encouraged calling for sooner appointment or calling clinic for nurse triage with any concerns or if symptoms worsen. Kristi agreed and states she ll have patient call today or Monday.     Plan: Care Coordinator will try to reach patient again in 10 business days.    Catalina Torres  [facility] Care Coordination  [place], [facility]'s, and [facility]    Phone: [phone]

## 2021-06-25 NOTE — PROGRESS NOTES
Clinic Care Coordination Contact  Community Health Worker Follow Up  Spoke with Patient and Kristi (Wife)  Goals:   Goals Addressed as of 6/25/2021 at 2:49 PM                 Today    5/26/21      [facility]   On track  70%    Added 10/9/20 by Kajal Gutierrez BSW     Goal Statement: I would like to see what benefits I have [facility] the [facility] in the next two months.   Date Goal set: 10/9/2020  Barriers: unsure how to go about this   Strengths: asking for help  Date to Achieve By: 12/9/2020  Patient expressed understanding of goal: yes    Action steps to achieve this goal:  1. I will ask my wife to speak with a [facility]  when I go in for my COVID vaccine about any services or supports I might qualify for.  2. I will ask my wife to give the CHW updates at outreach calls.    Updated: 2/5/21 6/25/21 CHW    Patient gave verbal permission to speak with wife Kristi. Kristi states that she has not follow [facility] with [facility] regarding services or support that patient might qualify for.     Kristi does not have a direct contact and is unsure of who to speak with. CHW called [facility] [phone], left message in the social work department advocating for return call to patient or Kristi, to clarify services or support that patient might qualify for.                Other (pt-stated)         Added 6/25/21 by Catalina Torres     Goal Statement: I would like a referral for counseling.  Date Goal set: 06/25/21   Barriers: Not sure if should schedule through [facility] or a in network provider   Date to Achieve By: 12/25/21   Patient expressed understanding of goal: yes     Action steps to achieve this goal:   1. I will work with CHW and wife on referral for counseling.   2. I will ask my wife to speak with a [facility]  about any counseling referral and services or supports I might qualify for.  I will ask my wife to give the CHW updates at outreach calls.    6/25/21 CHW    Kristi does not have a  direct contact and is unsure of who to speak with. CHW called [facility] [phone], left message in the social work department advocating for return call to patient or Kristi, to clarify services or support that patient might qualify for.      Kristi will notify CHW if no response from [facility] or if assistance is needed. She will help patient inquire about counseling referral and services or supports that patient might qualify for.                   Intervention and Education during outreach: CHW introduced self and role with CC. Patient states he's tried Aleve, Tylenol, and Duloxetine for back pain, and nothing is helping. Patient declined need for consult with PCP's nurse regarding back pain. Patient states he will wait for appointment with Dr. Walton on 7/1. Patient reports that he used to be very active and can no longer work out. CHW inquired if patient is open to talking with a therapist and he agreed. Kristi states that she does not have a direct contact and is unsure of who to speak with at the [facility]. Kristi agreed with CHW calling the [facility] for clarification. CHW called [facility] [phone], left message in the social work department advocating for return call to patient or Kristi, to clarify services or support that patient might qualify for.       CHW Plan: CHW to follow up in 2 weeks.       Catalina Torres  [facility] Care Coordination  [facility], and [facility]    Phone: [phone]

## 2021-07-01 ENCOUNTER — OFFICE VISIT (OUTPATIENT)
Dept: FAMILY MEDICINE | Facility: CLINIC | Age: 81
End: 2021-07-01
Payer: COMMERCIAL

## 2021-07-01 VITALS
DIASTOLIC BLOOD PRESSURE: 72 MMHG | OXYGEN SATURATION: 95 % | TEMPERATURE: 98.1 F | BODY MASS INDEX: 25.85 KG/M2 | SYSTOLIC BLOOD PRESSURE: 130 MMHG | WEIGHT: 170 LBS | HEART RATE: 59 BPM | RESPIRATION RATE: 18 BRPM

## 2021-07-01 DIAGNOSIS — G20.A1 PARKINSON'S DISEASE (H): Primary | ICD-10-CM

## 2021-07-01 DIAGNOSIS — M54.16 LUMBAR RADICULOPATHY: ICD-10-CM

## 2021-07-01 DIAGNOSIS — F41.9 ANXIETY: ICD-10-CM

## 2021-07-01 PROCEDURE — 99214 OFFICE O/P EST MOD 30 MIN: CPT | Performed by: FAMILY MEDICINE

## 2021-07-01 RX ORDER — DULOXETIN HYDROCHLORIDE 60 MG/1
60 CAPSULE, DELAYED RELEASE ORAL DAILY
Qty: 90 CAPSULE | Refills: 1 | Status: SHIPPED | OUTPATIENT
Start: 2021-07-01 | End: 2021-12-02

## 2021-07-01 RX ORDER — OMEPRAZOLE 20 MG/1
20 TABLET, DELAYED RELEASE ORAL DAILY
COMMUNITY

## 2021-07-01 RX ORDER — DULOXETIN HYDROCHLORIDE 30 MG/1
30 CAPSULE, DELAYED RELEASE ORAL DAILY
Qty: 30 CAPSULE | Refills: 0 | Status: CANCELLED | OUTPATIENT
Start: 2021-07-01

## 2021-07-01 NOTE — PROGRESS NOTES
Assessment & Plan     Lumbar radiculopathy, parkinson's disease  - not controlled and pt not surgical candidate and OTC medication including epidural not helpful  - advised below  Increase Cymbalta 60 mg   Follow up in three months - we will repeat your kidney functions at that visit. Follow up with PM&R provider as scheduled end of the month.  - DULoxetine (CYMBALTA) 60 MG capsule; Take 1 capsule (60 mg) by mouth daily  Dispense: 90 capsule; Refill: 1    Anxiety  - DULoxetine (CYMBALTA) 60 MG capsule; Take 1 capsule (60 mg) by mouth daily  Dispense: 90 capsule; Refill: 1    Return in about 3 months (around 10/1/2021) for Routine Visit.    Peg Walton MD  [facility]    Cecilia Martini is a 80 year old who presents for the following health issues     HPI   [facility] Diclofenac 50 MG & Omeprazole 20 MG which was started on 6/28/21.   Chronic/Recurring Back Pain Follow Up    Medication Followup of DULoxetine (CYMBALTA) 30 MG capsule  3rd week taking medication     Taking Medication as prescribed: yes    Side Effects:  None    Medication Helping Symptoms: not sure       He is taking Dicflonec BID PRN which has been helping with the medication.   He will get [facility]val[facility] for [facility]. They will also schedule him another injection.           Review of Systems         Objective    There were no vitals taken for this visit.  There is no height or weight on file to calculate BMI.  Physical Exam

## 2021-07-01 NOTE — PATIENT INSTRUCTIONS
Increase Cymbalta 60 mg   Follow up in three months - we will repeat your kidney functions at that visit

## 2021-07-02 ENCOUNTER — PATIENT OUTREACH (OUTPATIENT)
Dept: CARE COORDINATION | Facility: CLINIC | Age: 81
End: 2021-07-02

## 2021-07-02 NOTE — PROGRESS NOTES
Clinic Care Coordination Contact    Care Coordination Clinician Chart Review  Situation: Patient chart reviewed by care coordinator.       Background: Care Coordination initial assessment and enrollment to Care Coordination was successful.   Patient centered goals were developed with participation from patient.  [facility] handed patient off to CHW for continued outreach every 30 days.        Assessment: Per chart review, patient outreach completed by CC CHW on 6/25/21.  Patient is actively working to accomplish goals.  Patient's goals remain appropriate and relevant at this time.   Patient is not yet due for updated Complex Care Plan.  Annual assessment will be due 10/9/21.      Goals       [facility]- [facility] ([facility])      Goal Statement: I would like to see what benefits I have from the [facility] in the next two months.   Date Goal set: 10/9/2020  Barriers: unsure how to go about this   Strengths: asking for help  Date to Achieve By: 12/9/2020  Patient expressed understanding of goal: yes    Action steps to achieve this goal:  1. I will ask my wife to speak with a [facility]  when I go in for my COVID vaccine about any services or supports I might qualify for.  2. I will ask my wife to give the CHW updates at outreach calls.    Updated: 2/5/21              Other (pt-stated)      Goal Statement: I would like a referral for counseling.  Date Goal set: 06/25/21   Barriers: Not sure if should schedule through [facility] or a in network provider   Date to Achieve By: 12/25/21   Patient expressed understanding of goal: yes     Action steps to achieve this goal:   1. I will work with CHW and wife on referral for counseling.   2. I will ask my wife to speak with a [facility]  about any services or supports I might qualify for.I will ask my wife to give the CHW updates at outreach calls.                Plan/Recommendations: The patient will continue working with Care Coordination to achieve goals as above.  CHW will involve [facility] as  needed or if patient is ready to move to maintenance.   CC will continue to monitor progress to goals and CHW outreaches every 6 weeks.   Care Plan updated and mailed to patient: KEVIN Quiñones   [facility] Care Coordination  Tel: [phone]

## 2021-07-14 ENCOUNTER — PATIENT OUTREACH (OUTPATIENT)
Dept: NURSING | Facility: CLINIC | Age: 81
End: 2021-07-14
Payer: COMMERCIAL

## 2021-07-14 NOTE — PROGRESS NOTES
Clinic Care Coordination Contact  Community Health Worker Follow Up  Spoke with patient and Ladi (Wife)      Goals:   Goals Addressed as of 7/14/2021 at 2:51 PM                    Today    6/25/21       [facility])   30%  On track    Added 10/9/20 by Kajal Gutierrez BSW      Goal Statement: I would like to see what benefits I have from the [facility] in the next two months.   Date Goal set: 10/9/2020  Barriers: unsure how to go about this   Strengths: asking for help  Date to Achieve By: 12/9/2020  Patient expressed understanding of goal: yes    Action steps to achieve this goal:  1. I will ask my wife to speak with a [facility]  when I go in for my COVID vaccine about any services or supports I might qualify for.  2. I will ask my wife to give the CHW updates at outreach calls.    Updated: 2/5/21 7/14/21 CHW    Patient states he's doing better today and gave verbal permission for CHW to speak with his wife Ladi.      Ladi states that [facility] from [facility] called her. Isabel mailed a folder with different things that need to be filled out. Ladi has a friend who helps at the [facility] and she will stop by to help the with paperwork. Ladi will notify [facility] at [facility] or  if any assistance is needed.     Ladi plans to schedule follow up with [facility] provider soon.                Other (pt-stated)   30%      Added 6/25/21 by Catalina Torres      Goal Statement: I would like a referral for counseling.  Date Goal set: 06/25/21   Barriers: Not sure if should schedule through [facility] or a in network provider   Date to Achieve By: 12/25/21   Patient expressed understanding of goal: yes     Action steps to achieve this goal:   1. I will work with CHW and wife on referral for counseling.   2. I will ask my wife to speak with a [facility]  about any services or supports I might qualify for.I will ask my wife to give the CHW updates at outreach calls.    7/14/21 CHW    Patient states he's doing better today and gave verbal  permission for CHW to speak with his wife Ladi.      Ladi states that [facility] from [facility] called her. Isabel mailed a folder with different things that need to be filled out. Ladi has a friend who helps at the [facility] and she will stop by to help the with paperwork. Ladi will notify [facility] at [facility] or  if any assistance is needed.     Ladi plans to schedule follow up with [facility] provider soon. She will notify VA provider or [facility]UR[facility] about referral for counseling.           Intervention and Education during outreach: Patient states he's doing better today and gave verbal permission for CHW to speak with his wife Ladi. Ladi states, she plans to schedule follow up with [facility] provider for a second opinion on back surgery. Patient also needs [facility] and [facility] the [facility]. She will ask [facility] provider or [facility]UR[facility] about counseling referral. Ladi shared that the [facility] provider increased patients pain med dosage and it seems to be helping with the back pain. CHW inquired if patient needs any additional support or resources at this time however Ladi declined.     CHW Plan: CHW to follow up in 1 month.    Catalina Torres  [facility] Care Coordination  [facility]'s, and [facility]    Phone: [phone]  _____________________    Next Outreach:  8/14/21  Planned Outreach Frequency: Monthly  Preferred Phone Number: [phone]    Enrollment Date:  10/9/20  Last Care Plan Assessment:  N/A

## 2021-07-23 ENCOUNTER — OFFICE VISIT (OUTPATIENT)
Dept: NEUROLOGY | Facility: CLINIC | Age: 81
End: 2021-07-23
Payer: COMMERCIAL

## 2021-07-23 ENCOUNTER — OFFICE VISIT (OUTPATIENT)
Dept: CARDIOLOGY | Facility: CLINIC | Age: 81
End: 2021-07-23
Attending: INTERNAL MEDICINE
Payer: COMMERCIAL

## 2021-07-23 ENCOUNTER — LAB (OUTPATIENT)
Dept: LAB | Facility: CLINIC | Age: 81
End: 2021-07-23

## 2021-07-23 VITALS
WEIGHT: 172 LBS | DIASTOLIC BLOOD PRESSURE: 93 MMHG | BODY MASS INDEX: 26.15 KG/M2 | OXYGEN SATURATION: 94 % | SYSTOLIC BLOOD PRESSURE: 181 MMHG | RESPIRATION RATE: 16 BRPM | HEART RATE: 58 BPM

## 2021-07-23 VITALS
SYSTOLIC BLOOD PRESSURE: 208 MMHG | DIASTOLIC BLOOD PRESSURE: 97 MMHG | HEART RATE: 56 BPM | OXYGEN SATURATION: 97 % | WEIGHT: 172 LBS | BODY MASS INDEX: 26.15 KG/M2

## 2021-07-23 DIAGNOSIS — R03.0 ELEVATED BLOOD PRESSURE READING: ICD-10-CM

## 2021-07-23 DIAGNOSIS — M54.41 CHRONIC RIGHT-SIDED LOW BACK PAIN WITH RIGHT-SIDED SCIATICA: ICD-10-CM

## 2021-07-23 DIAGNOSIS — G89.29 CHRONIC RIGHT-SIDED LOW BACK PAIN WITH RIGHT-SIDED SCIATICA: ICD-10-CM

## 2021-07-23 DIAGNOSIS — R09.89 LABILE BLOOD PRESSURE: Primary | ICD-10-CM

## 2021-07-23 DIAGNOSIS — G20.A1 PARKINSON'S DISEASE (H): Primary | ICD-10-CM

## 2021-07-23 DIAGNOSIS — E78.5 HYPERLIPIDEMIA LDL GOAL <70: ICD-10-CM

## 2021-07-23 DIAGNOSIS — I25.10 CORONARY ARTERY DISEASE INVOLVING NATIVE CORONARY ARTERY OF NATIVE HEART WITHOUT ANGINA PECTORIS: ICD-10-CM

## 2021-07-23 DIAGNOSIS — G20.A1 PARKINSON DISEASE (H): ICD-10-CM

## 2021-07-23 DIAGNOSIS — R53.82 CHRONIC FATIGUE: ICD-10-CM

## 2021-07-23 LAB
ANION GAP SERPL CALCULATED.3IONS-SCNC: 1 MMOL/L (ref 3–14)
BUN SERPL-MCNC: 19 MG/DL (ref 7–30)
CALCIUM SERPL-MCNC: 9.3 MG/DL (ref 8.5–10.1)
CHLORIDE BLD-SCNC: 109 MMOL/L (ref 94–109)
CHOLEST SERPL-MCNC: 141 MG/DL
CO2 SERPL-SCNC: 31 MMOL/L (ref 20–32)
CREAT SERPL-MCNC: 1.1 MG/DL (ref 0.66–1.25)
FASTING STATUS PATIENT QL REPORTED: YES
GFR SERPL CREATININE-BSD FRML MDRD: 63 ML/MIN/1.73M2
GLUCOSE BLD-MCNC: 113 MG/DL (ref 70–99)
HDLC SERPL-MCNC: 41 MG/DL
LDLC SERPL CALC-MCNC: 79 MG/DL
NONHDLC SERPL-MCNC: 100 MG/DL
POTASSIUM BLD-SCNC: 4.3 MMOL/L (ref 3.4–5.3)
SODIUM SERPL-SCNC: 141 MMOL/L (ref 133–144)
TRIGL SERPL-MCNC: 103 MG/DL

## 2021-07-23 PROCEDURE — 36415 COLL VENOUS BLD VENIPUNCTURE: CPT | Performed by: PATHOLOGY

## 2021-07-23 PROCEDURE — G0463 HOSPITAL OUTPT CLINIC VISIT: HCPCS

## 2021-07-23 PROCEDURE — 99213 OFFICE O/P EST LOW 20 MIN: CPT | Mod: GC | Performed by: INTERNAL MEDICINE

## 2021-07-23 PROCEDURE — 99215 OFFICE O/P EST HI 40 MIN: CPT | Performed by: NURSE PRACTITIONER

## 2021-07-23 PROCEDURE — 80048 BASIC METABOLIC PNL TOTAL CA: CPT | Performed by: PATHOLOGY

## 2021-07-23 PROCEDURE — 80061 LIPID PANEL: CPT | Performed by: PATHOLOGY

## 2021-07-23 RX ORDER — ATORVASTATIN CALCIUM 20 MG/1
20 TABLET, FILM COATED ORAL DAILY
Qty: 90 TABLET | Refills: 3 | Status: SHIPPED | OUTPATIENT
Start: 2021-07-23 | End: 2022-07-19

## 2021-07-23 RX ORDER — HYDRALAZINE HYDROCHLORIDE 25 MG/1
25 TABLET, FILM COATED ORAL 2 TIMES DAILY PRN
Qty: 90 TABLET | Refills: 3 | Status: SHIPPED | OUTPATIENT
Start: 2021-07-23 | End: 2021-09-28

## 2021-07-23 RX ORDER — CARBIDOPA AND LEVODOPA 25; 100 MG/1; MG/1
2 TABLET ORAL 3 TIMES DAILY
Qty: 540 TABLET | Refills: 3 | Status: SHIPPED | OUTPATIENT
Start: 2021-07-23

## 2021-07-23 ASSESSMENT — PAIN SCALES - GENERAL
PAINLEVEL: MODERATE PAIN (5)
PAINLEVEL: NO PAIN (0)
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
## 2021-07-23 NOTE — PATIENT INSTRUCTIONS
"You were seen today in the Cardiovascular Clinic at the [facility] ER.     Cardiology Providers you saw during your visit: Dr. Gaurav Shannon    Diagnosis:   Encounter Diagnoses   Name Primary?     Coronary artery disease involving native coronary artery of native heart without angina pectoris Yes     Labile blood pressure      Parkinson disease (H)      Hyperlipidemia LDL goal <70           Orders:   Orders Placed This Encounter   Procedures     Follow-Up with Cardiologist       Recommendations:   1. Stop simvastatin.  2. Start atorvastatin 20mg daily.  3. Record blood pressures twice a day for 2 weeks and call us with the results.  4. Take 1 tablet of hydralazine 25mg if systolic blood pressure (top number) is larger than 180.  5. Follow up with Dr. Eloise Shannon in 3 months.       Please feel free to call me with any questions or concerns.       Kenia BAUGH LPN     Questions: [phone]  First press #1 for [facility] for \"Medical Questions\" to reach us Cardiology Nurses.   [phone]   First press #1 for the [facility] and then press #1     On Call Cardiologist for after hours or on weekends: [phone]   option #4 and ask to speak to the on-call Cardiologist.          If you need a medication refill please contact your pharmacy.  Please allow 3 business days for your refill to be completed.  --------------------------------------------------------------   "

## 2021-07-23 NOTE — NURSING NOTE
Chief Complaint   Patient presents with     Follow Up     6 month return w/ labs     Vitals were taken and medications reconciled.    Jordin Shaikh, EMT  1:34 PM

## 2021-07-23 NOTE — NURSING NOTE
Chief Complaint   Patient presents with     RECHECK     [facility] RETURN MOVEMENT DISORDER - f/u f2f     Vu Harvey
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
## 2021-07-23 NOTE — PATIENT INSTRUCTIONS
Dear Mr. Vini Loya,    Thank you for coming today.  During your visit, we have discussed the following:     __  Stay on the same antiparkinsonian medication.     __  [facility].     __  Continue to do the Home Exercises for your back.    __  Your clinic blood pressure was high. Blood pressure (!) 168/87, pulse 59. And the repeat was Blood pressure (!) 181/93, pulse 58.   Please check your blood pressure at home and if it is consistently >140/80, contact your primary care provider.     __  Return in 4 months. You may return sooner as needed.      For questions, you may send us a [employer] travel message or call [phone]    Fax number: [phone]    KOTA Stone, CNP  [facility]

## 2021-07-23 NOTE — PROGRESS NOTES
"  ASSESSMENT:    Parkinson's Disease: Patient has tremors, balance problems, and fatigue.  He is most bothered by back pain and fatigue.    Chronic fatigue: Has multiple contributing factors, including PD, side effect of medications, pain, age, and inactivity.    Low back pain: This is chronic and bothersome. Pain has affected his quality of life and activity level. He is in the process of getting a [facility].     Clinic High Blood Pressure Reading:  Today's clinic blood pressure was high. Blood pressure (!) 168/87, pulse 59. And the repeat was Blood pressure (!) 181/93, pulse 58. He hasn't been checking his BP at home.  He has a blood pressure cuff.       PLAN:    __  The following visit summary provided: -      __  Stay on the same antiparkinsonian medication. Sinemet Rx refilled.    __  [facility] to [facility].     __  Continue to do the Home Exercises for your back.    __  Your clinic blood pressure was high. Blood pressure (!) 168/87, pulse 59. And the repeat was Blood pressure (!) 181/93, pulse 58.   Please check your blood pressure at home and if it is consistently >140/80, contact your primary care provider.     Will return to our clinic in 4 months or sooner as needed.        MOVEMENT DISORDERS CLINIC           PATIENT: Vini Loya    : 1940    ALBINA: 2021    REASON FOR VISIT: Parkinson's disease (PD) follow up.    HPI: Mr. Vini Loya is an 80 year old who came to the [facility] neurology clinic accompanied by his wife for a follow up visit.    The biggest issue he is having is back pain. Endurance and strength have reduced. He hasn't been exercising like he did before. Partly due to the COVID-19 lock down.    PD Medications 7 am 11 am 4 pm 8 - 9 pm   Sinemet 25/100 mg  2 2 2     Sinemet 50/200 mg       1   Ropinirole 0.25 mg 1 1 1          He reports getting tired. \"I need a magic bullet.\"    [facility].  Patient was given a prescription for back pain control Voltaren 1 - 2 tabs per day. Pt takes a dose in the morning as his back hurts when he gets up in the morning.    He is considering back surgery. The surgeon at [facility] didn't recommend surgery due to the complications of recover due to PD.  Pt and his wife are exploring a second opinion [facility]. He is scheduled for an MRI.     He will be seen by the Pain Clinic and Physical Medicine and Rehab through [facility] clinics. He is doing home exercises for his back.     Lumbar CT - 6/3//2021  Impression: Multilevel cervical spondylosis unchanged from the prior MRI. No high-grade spinal canal stenosis.    ALLERGIES: Patient has no known allergies.    MEDICATIONS:   Outpatient Medications Marked as Taking for the 7/23/21 encounter (Office Visit) with Dalila Staples APRN CNP   Medication Sig     aspirin (ASA) 81 MG tablet Take 1 tablet (81 mg) by mouth daily     bisacodyl (DULCOLAX) 10 MG suppository Place 1 suppository (10 mg) rectally daily as needed for constipation     carbidopa-levodopa (SINEMET CR)  MG CR tablet Take 1 tablet by mouth At Bedtime     carbidopa-levodopa (SINEMET)  MG tablet Take 2 tablets by mouth 3 times daily Take two tablets 3 times a day, at 7am, 11am & 4pm.     diclofenac (VOLTAREN) 50 MG EC tablet Take 50 mg by mouth 2 times daily Take one tablet by mouth twice a day for Pain** Take with food  Prescribed by [facility]: Shamar Garnica     DULoxetine (CYMBALTA) 60 MG capsule Take 1 capsule (60 mg) by mouth daily     omeprazole (PRILOSEC OTC) 20 MG EC tablet Take 20 mg by mouth daily Prescribed by [facility]: Shamar Garnica     polyethylene glycol (MIRALAX) 17 GM/SCOOP powder Take 17 g (1 capful) by mouth 3 times daily (Patient taking differently: Take 1 capful by mouth 3 times daily as needed for constipation )     rOPINIRole (REQUIP) 0.5 MG tablet Take 0.5 mg by mouth 3  times daily     SENNA-docusate sodium (SENNA S) 8.6-50 MG tablet Take 2 tablets by mouth 2 times daily     simvastatin (ZOCOR) 40 MG tablet TAKE ONE TABLET BY MOUTH EVERY NIGHT AT BEDTIME     sulfamethoxazole-trimethoprim (BACTRIM) 400-80 MG tablet Take 1 tablet by mouth At Bedtime For UTI prevention     vitamin D3 (CHOLECALCIFEROL) 2000 units (50 mcg) tablet Take 1 tablet by mouth daily as needed        PHYSICAL EXAM:    VITAL SIGNS:  Blood pressure (!) 168/87, pulse 59, resp. rate 16, weight 78 kg (172 lb), SpO2 99 %. Body mass index is 26.15 kg/m .   Blood pressure (!) 181/93, pulse 58, resp. rate 16, weight 78 kg (172 lb), SpO2 94 %.    GENERAL:  Mr. Loya is a pleasant  male who is well-groomed and well-developed sitting comfortably in the exam room without any distress.  Affect is appropriate.    MOVEMENT DISORDERS ASSESSMENT:    Speech: Monotone, slurred but understandable; moderately impaired.  Facial Expression: Mouth tremor noted through patient's mask.  Rest Tremor: Mild in amplitude and persistent left hand. Absent in other extremities.   Arising from chair - arms folded across chest: Slow; or may need more than one attempt.  Posture: Mildly stooped posture, slightly leaning to one side.  Gait: Used a 4-wheeled walker.   Freezing of gait:  None noted.    Body Bradykinesia: Mild degree of slowness and poverty of movement.   Dyskinesias:  Absent.      Today I spent 48 minutes caring for the patient. Time was spent with reviewing records, meeting with the patient, answering questions, examining, refilling/ordering medication,  and documentation.    KOTA Stone,  CNP  [facility]

## 2021-07-24 ENCOUNTER — NURSE TRIAGE (OUTPATIENT)
Dept: NURSING | Facility: CLINIC | Age: 81
End: 2021-07-24

## 2021-07-24 NOTE — TELEPHONE ENCOUNTER
BP high.   Saw neurologist and cardiologist and BP was high. Started on a prn BP medication that was prescribed yesterday by cardiologist.  Hydralazine 25 mg. Take 1 tablet (25 mg) by mouth 2 times daily as needed (Take 1 tablet if systolic blood pressure is >180)    230/114 this am before 7 and then took a hydralazine 25 mg of hydralazine. Sitting in a chair at the table.   Wife is thinking the batteries are running low. Unknown when the last time the battery were changed so wife is not sure this is a correct BP.    Advised new batteries and to date them on the back of the cuff.   187/87 last night at 645pm.   He took a prn hydralazine last night.    Wife is rechecking the BP now.   178/86 after hydralazine.   After 5 minutes the BP was 178/83 now.  Acting normal per wife. Not dizzy, lightheaded and denies chest pain.     Per RN protocol patient should be seen within 4 hours. Advised to bring cuff with to [facility] and to buy new batteries.     Susan Julio RN on 7/24/2021 at 7:54 AM      Reason for Disposition    [1] Systolic BP  >= 200 OR Diastolic >= 120  AND [2] having NO cardiac or neurologic symptoms    Additional Information    Negative: Difficult to awaken or acting confused (e.g., disoriented, slurred speech)    Negative: Severe difficulty breathing (e.g., struggling for each breath, speaks in single words)    Negative: [1] Weakness of the face, arm or leg on one side of the body AND [2] new onset    Negative: [1] Numbness (i.e., loss of sensation) of the face, arm or leg on one side of the body AND [2] new onset    Negative: [1] Chest pain lasts > 5 minutes AND [2] history of heart disease  (i.e., heart attack, bypass surgery, angina, angioplasty, CHF)    Negative: [1] Chest pain AND [2] took nitrogylcerin AND [3] pain was not relieved    Negative: Sounds like a life-threatening emergency to the triager    Negative: Symptom is main concern  (e.g., headache, chest pain)    Negative: Low blood pressure is main  "concern    Negative: [1] Systolic BP  >= 160 OR Diastolic >= 100 AND [2] cardiac or neurologic symptoms (e.g., chest pain, difficulty breathing, unsteady gait, blurred vision)    Negative: [1] Pregnant > 20 weeks (or postpartum < 6 weeks) AND [2] new hand or face swelling    Negative: [1] Pregnant > 20 weeks AND [2] BP Systolic BP  >= 140 OR Diastolic >= 90    Answer Assessment - Initial Assessment Questions  1. BLOOD PRESSURE: \"What is the blood pressure?\" \"Did you take at least two measurements 5 minutes apart?\"      230/114, recheck 178/86 after 30 minutes of taking hydralazine  2. ONSET: \"When did you take your blood pressure?\"      743 am   3. HOW: \"How did you obtain the blood pressure?\" (e.g., visiting nurse, automatic home BP monitor)      Home BP monitor.  4. HISTORY: \"Do you have a history of high blood pressure?\"      Yes  5. MEDICATIONS: \"Are you taking any medications for blood pressure?\" \"Have you missed any doses recently?\"      Taking prn BP medication.   6. OTHER SYMPTOMS: \"Do you have any symptoms?\" (e.g., headache, chest pain, blurred vision, difficulty breathing, weakness)      No  7. PREGNANCY: \"Is there any chance you are pregnant?\" \"When was your last menstrual period?\"      NA    Protocols used: HIGH BLOOD PRESSURE-A-AH      "

## 2021-07-27 ENCOUNTER — OFFICE VISIT (OUTPATIENT)
Dept: PHYSICAL MEDICINE AND REHAB | Facility: CLINIC | Age: 81
End: 2021-07-27
Attending: ORTHOPAEDIC SURGERY
Payer: COMMERCIAL

## 2021-07-27 VITALS
WEIGHT: 171.6 LBS | HEART RATE: 73 BPM | DIASTOLIC BLOOD PRESSURE: 76 MMHG | SYSTOLIC BLOOD PRESSURE: 131 MMHG | OXYGEN SATURATION: 96 % | RESPIRATION RATE: 16 BRPM | BODY MASS INDEX: 26.09 KG/M2

## 2021-07-27 DIAGNOSIS — R26.9 GAIT ABNORMALITY: ICD-10-CM

## 2021-07-27 DIAGNOSIS — M54.41 CHRONIC RIGHT-SIDED LOW BACK PAIN WITH RIGHT-SIDED SCIATICA: Primary | ICD-10-CM

## 2021-07-27 DIAGNOSIS — G20.A1 PARKINSON'S DISEASE (H): ICD-10-CM

## 2021-07-27 DIAGNOSIS — G89.29 CHRONIC RIGHT-SIDED LOW BACK PAIN WITH RIGHT-SIDED SCIATICA: Primary | ICD-10-CM

## 2021-07-27 DIAGNOSIS — M51.27 HERNIATED NUCLEUS PULPOSUS, L5-S1, RIGHT: ICD-10-CM

## 2021-07-27 PROCEDURE — 99205 OFFICE O/P NEW HI 60 MIN: CPT | Performed by: PHYSICAL MEDICINE & REHABILITATION

## 2021-07-27 ASSESSMENT — PAIN SCALES - GENERAL: PAINLEVEL: MILD PAIN (3)
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
## 2021-07-27 NOTE — NURSING NOTE
Chief Complaint   Patient presents with     Consult     [facility] - lumbar radiculopathy, acute low back pain     Vu Harvey

## 2021-07-29 ENCOUNTER — OFFICE VISIT (OUTPATIENT)
Dept: ANESTHESIOLOGY | Facility: CLINIC | Age: 81
End: 2021-07-29
Attending: ORTHOPAEDIC SURGERY
Payer: COMMERCIAL

## 2021-07-29 VITALS — SYSTOLIC BLOOD PRESSURE: 144 MMHG | HEART RATE: 68 BPM | OXYGEN SATURATION: 97 % | DIASTOLIC BLOOD PRESSURE: 74 MMHG

## 2021-07-29 DIAGNOSIS — M54.16 LUMBAR RADICULOPATHY, ACUTE: ICD-10-CM

## 2021-07-29 DIAGNOSIS — G89.29 CHRONIC RIGHT-SIDED LOW BACK PAIN WITH RIGHT-SIDED SCIATICA: ICD-10-CM

## 2021-07-29 DIAGNOSIS — M54.41 CHRONIC RIGHT-SIDED LOW BACK PAIN WITH RIGHT-SIDED SCIATICA: ICD-10-CM

## 2021-07-29 PROCEDURE — 99203 OFFICE O/P NEW LOW 30 MIN: CPT | Performed by: ANESTHESIOLOGY

## 2021-07-29 ASSESSMENT — ENCOUNTER SYMPTOMS
CONSTIPATION: 0
JAUNDICE: 0
DIARRHEA: 0
SYNCOPE: 0
LIGHT-HEADEDNESS: 0
ORTHOPNEA: 0
BOWEL INCONTINENCE: 0
RECTAL PAIN: 0
EXERCISE INTOLERANCE: 0
NAUSEA: 0
ABDOMINAL PAIN: 0
HEARTBURN: 0
HYPOTENSION: 0
PALPITATIONS: 0
LEG PAIN: 0
SLEEP DISTURBANCES DUE TO BREATHING: 0
VOMITING: 0
BLOATING: 0
HYPERTENSION: 1

## 2021-07-29 ASSESSMENT — PAIN SCALES - GENERAL: PAINLEVEL: MODERATE PAIN (5)
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
## 2021-07-29 NOTE — PATIENT INSTRUCTIONS
Treatment planning:    We discussed some treatment options with you today. Please contact our clinic if you are needing our services in the future.       Recommended Follow up:      As needed.        To speak with a nurse, schedule/reschedule/cancel a clinic appointment, or request a medication refill call: [phone], option #1.    You can also reach us by [employer]: [email or web address]

## 2021-07-30 NOTE — PROGRESS NOTES
1930: Bedside and Verbal shift change report given to [address] (oncoming nurse) by Ralph Peterson RN (offgoing nurse). Report included the following information SBAR, Kardex, ED Summary, Procedure Summary, MAR, Recent Results, and Cardiac Rhythm NSR .     2240: CRRT with alarms for high TMP. Able to return 125ml. Jaydon Grigsby notified. 0330: CRRT restarted. Observation goals - Acute Traumatic pain  PRIOR TO DISCHARGE      Comments: List all goals to be met before discharge home:   - Adequate pain control on oral analgesia. YES  - Vital Signs normal or at patient baseline. YES    - Tolerating oral intake to maintain hydration. YES     - Diagnostic tests and consults completed and resulted -Yes, waiting on SW and placement    - Cleared for discharge from consultants' standpoint if involved in care: NO    - Safe disposition plan has been identified: NO, SW working on placement  - Return to baseline functional status: NO     - Nurse to notify provider when observation goals have been met and patient is ready for discharge.

## 2021-08-06 ASSESSMENT — ENCOUNTER SYMPTOMS
HEARTBURN: 0
CONSTIPATION: 0
PALPITATIONS: 0
NAUSEA: 0
VOMITING: 0
DIARRHEA: 0
ORTHOPNEA: 0
ABDOMINAL PAIN: 0
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
## 2021-08-10 ENCOUNTER — OFFICE VISIT (OUTPATIENT)
Dept: FAMILY MEDICINE | Facility: CLINIC | Age: 81
End: 2021-08-10
Payer: COMMERCIAL

## 2021-08-10 VITALS
OXYGEN SATURATION: 98 % | RESPIRATION RATE: 16 BRPM | DIASTOLIC BLOOD PRESSURE: 70 MMHG | WEIGHT: 174 LBS | BODY MASS INDEX: 26.37 KG/M2 | SYSTOLIC BLOOD PRESSURE: 124 MMHG | HEIGHT: 68 IN | TEMPERATURE: 98.1 F | HEART RATE: 68 BPM

## 2021-08-10 DIAGNOSIS — L57.0 ACTINIC KERATOSIS: Primary | ICD-10-CM

## 2021-08-10 PROCEDURE — 17000 DESTRUCT PREMALG LESION: CPT | Performed by: FAMILY MEDICINE

## 2021-08-10 ASSESSMENT — MIFFLIN-ST. JEOR: SCORE: 1468.76

## 2021-08-10 NOTE — PROGRESS NOTES
"    Assessment & Plan     Actinic keratosis  Discussed if not improving, consider shave biopsy.   - DESTRUCT PREMALIGNANT LESION, FIRST                 No follow-ups on file.    Franki Rees MD, MD  [facility]    Cecilia Martini is a 80 year old who presents for the following health issues     HPI         WOUND CARE/LACERATION    Onset: 3 month(s) ago that will not heal    Description:   Location: top of head     History of how wound occured:    Fell down stairs and hit head on cement    Current dressing/therapies tried :   topical antibiotics    Accompanying Signs and Symptoms:        Actively bleeding: no        Redness: YES        Warmth: YES        Drainage: no        Able to move area: not applicable        Numbness and/or tingling: no        Fevers: no    Wound Care needs today:         Wound care: none        Its been around 3-4 months. Feels pain when touching.       Review of Systems         Objective    /70 (BP Location: Left arm, Patient Position: Sitting, Cuff Size: Adult Regular)   Pulse 68   Temp 98.1  F (36.7  C) (Oral)   Resp 16   Ht 1.727 m (5' 8\")   Wt 78.9 kg (174 lb)   SpO2 98%   BMI 26.46 kg/m    Body mass index is 26.46 kg/m .  Physical Exam       posterior scalp parietal area actinic keratosis present - one lesion  Has male pattern baldness.     After verbal consent, discussion of risks, benefit and complications of cryotherapy one lesion treated with liquid nitrogen x 2. Patient tolerated procedure well. After procedure instructions given.   Total of 1 lesions treated.         "

## 2021-08-16 ENCOUNTER — PATIENT OUTREACH (OUTPATIENT)
Dept: NURSING | Facility: CLINIC | Age: 81
End: 2021-08-16
Payer: COMMERCIAL

## 2021-08-16 NOTE — PROGRESS NOTES
Clinic Care Coordination Contact    Community Health Worker Follow Up  Spoke with patient and Ladi (wife)    Care Gaps: Care gaps reviewed with Ladi.    Health Maintenance Due   Topic Date Due     COVID-19 Vaccine (1) Never done     ZOSTER IMMUNIZATION (3 of 3) 03/29/2019     Currently there are no Care Gaps. Ladi states that patient had COVID vaccine and shingles shot. She cannot remember dates of vaccines.    Goals:   Goals Addressed as of 8/16/2021 at 2:44 PM                    Today    7/14/21       [facility]- [facility] (pt-stated)   30%  30%    Added 10/9/20 by Kajal Gutierrez, BSW      Goal Statement: I would like to see what benefits I have from the [facility] in the next two months.   Date Goal set: 10/9/2020  Barriers: unsure how to go about this   Strengths: asking for help  Date to Achieve By: 12/9/2020  Patient expressed understanding of goal: yes    Action steps to achieve this goal:  1. I will ask my wife to speak with a [facility]  when I go in for my COVID vaccine about any services or supports I might qualify for.  2. I will ask my wife to give the CHW updates at outreach calls.    Updated: 2/5/21 8/16/21 CHW     Patient states doesn't have an update on above goals and gave verbal permission to speak with his wife Ladi. Ladi is at the store, CHW left contact for return call.     Ladi returned call, states she has not completed paperwork packet that TAURUS Hall from [facility] mailed her. She received a follow up call from a man at the [facility] who told her that he will follow up in October to review VA benefits.    Ladi will continue to help patient with completing VA paperwork and wait for follow up from VA in Oct.                Other (pt-stated)   30%  30%    Added 6/25/21 by Catalina Torres      Goal Statement: I would like a referral for counseling.  Date Goal set: 06/25/21   Barriers: Not sure if should schedule through VA or a in network provider   Date to Achieve By: 12/25/21   Patient expressed understanding of  goal: yes     Action steps to achieve this goal:   1. I will work with CHW and wife on referral for counseling.   2. I will ask my wife to speak with a [facility]  about any services or supports I might qualify for.I will ask my wife to give the CHW updates at outreach calls.    8/16/21 CHW     Patient states doesn't have an update on above goals and gave verbal permission to speak with his wife Ladi. Ladi is at the store, CHW left contact for return call.   8/16/21 CHW     Patient states doesn't have an update on above goals and gave verbal permission to speak with his wife Ladi. Ladi is at the store, CHW left contact for return call.     Ladi returned call, states she has not completed paperwork packet that TAURUS Hall from [facility] mailed her. She received a follow up call from a man at the [facility] who told her that he will follow up in October to review VA benefits.    Ladi will continue to help patient with completing VA paperwork and will wait for follow up from VA in Oct.         Intervention and Education during outreach: CHW inquired about how patient is doing and if he needs any additional support. Patient states he's doing well and no concerns. CHW inquired about update regarding VA benefits however patient did not have any updates and gave verbal permission to speak with his wife Ladi. Patient wrote down CHW's contact and will have Ladi call back.   CHW inquired if okay for verbal permission to try and reach wife Ladi in 1 week if no return call and patient agreed.     CHW Plan: CHW to follow up in 1 week.    Ladi returned call, states she has not completed paperwork packet that TAURUS Hall from [facility] mailed her. She received a follow up call from a man at the [facility] who told her that he will follow up in October to review VA benefits. Ladi will continue to help patient with completing VA paperwork and will wait for follow up from VA in Oct.   Ladi will notify CC if any assistance is needed.     Catalina  [facility] Care Coordination  [facility]    Phone: [phone]  ___________________    Next Outreach:  9/16/21  Planned Outreach Frequency: Monthly  Preferred Phone Number: [phone]    Enrollment Date:  10/9/20  Last Care Plan Assessment:  N/A

## 2021-08-18 ENCOUNTER — PATIENT OUTREACH (OUTPATIENT)
Dept: CARE COORDINATION | Facility: CLINIC | Age: 81
End: 2021-08-18

## 2021-08-18 NOTE — PROGRESS NOTES
Care Coordination Clinician Chart Review  Situation: Patient chart reviewed by care coordinator.       Background: Care Coordination initial assessment and enrollment to Care Coordination was successful.   Patient centered goals were developed with participation from patient.  TAURUS HAM handed patient off to CHW for continued outreach every 30 days.        Assessment: Per chart review, patient outreach completed by CC CHW on 8/16/21.  Patient is actively working to accomplish goals.  Patient's goals remain appropriate and relevant at this time.   Patient is not yet due for updated Complex Care Plan.  Annual assessment will be due 10/21.      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   Date Goal set: 10/9/2020  Barriers: unsure how to go about this   Strengths: asking for help  Date to Achieve By: 12/9/2020  Patient expressed understanding of goal: yes    Action steps to achieve this goal:  1. I will ask my wife to speak with a [facility]  when I go in for my COVID vaccine about any services or supports I might qualify for.  2. I will ask my wife to give the CHW updates at outreach calls.    Updated: 2/5/21               Other (pt-stated)       Goal Statement: I would like a referral for counseling.  Date Goal set: 06/25/21   Barriers: Not sure if should schedule through VA or a in network provider   Date to Achieve By: 12/25/21   Patient expressed understanding of goal: yes     Action steps to achieve this goal:   1. I will work with CHW and wife on referral for counseling.   2. I will ask my wife to speak with a [facility]  about any services or supports I might qualify for.I will ask my wife to give the CHW updates at outreach calls.                Plan/Recommendations: The patient will continue working with Care Coordination to achieve goals as above.  CHW will involve TAURUS HAM as needed or if patient is ready to move  to maintenance.   CC will continue to monitor progress to goals and CHW outreaches every 6 weeks.   Care Plan updated and mailed to patient: KEVIN Quiñones   [facility] Care Coordination  Tel: [phone]

## 2021-08-18 NOTE — TELEPHONE ENCOUNTER
"I am not sure what CC refers to as \"Care gaps\".  I will send this message to PCP.  JERI Carlson    "

## 2021-08-20 ENCOUNTER — TELEPHONE (OUTPATIENT)
Dept: CARDIOLOGY | Facility: CLINIC | Age: 81
End: 2021-08-20

## 2021-08-20 NOTE — TELEPHONE ENCOUNTER
[facility] Call Center    Phone Message    May a detailed message be left on voicemail: yes     Reason for Call: Other: Pt would like a call back as his BP is still high and he feels his medication is not working     Action Taken: Message routed to:  [facility] CArdio    Travel Screening: Not Applicable

## 2021-08-23 NOTE — TELEPHONE ENCOUNTER
Called pt back to discuss BP concerns. Pt was sleeping but I was able to talk with his wife. Wife states pt's BP continue to be very high. His SBP today after waking up was 213, he took his hydralazine dose and it came down to 177.     Wife states this is how his blood pressure have been running. She sounds very frustrated that they continue to be so elevated. I stated I would reach out to Dr. Eloise Shannon to see if he wanted to make any medication adjustments.     I will reach out to pt when I hear back from Dr. Eloise Shannon.     Phuc Diggs, RN   Cardiology Nurse Coordinator

## 2021-08-25 NOTE — TELEPHONE ENCOUNTER
Called pt to set up time for nurse visit. Pt's wife answered. States they can come in tomorrow, 8/26 at 9:00 AM. They will bring blood pressure machine as she feels they may not be using it right. They will also bring a list of BP's pt has been writing down at home.     Phuc Digsg, RN   Cardiology Nurse Coordinator

## 2021-08-26 ENCOUNTER — OFFICE VISIT (OUTPATIENT)
Dept: CARDIOLOGY | Facility: CLINIC | Age: 81
End: 2021-08-26
Attending: NURSE PRACTITIONER
Payer: COMMERCIAL

## 2021-08-26 VITALS — OXYGEN SATURATION: 95 % | DIASTOLIC BLOOD PRESSURE: 91 MMHG | HEART RATE: 65 BPM | SYSTOLIC BLOOD PRESSURE: 192 MMHG

## 2021-08-26 DIAGNOSIS — I10 ESSENTIAL HYPERTENSION WITH GOAL BLOOD PRESSURE LESS THAN 140/90: Primary | ICD-10-CM

## 2021-08-26 NOTE — LETTER
8/26/2021      RE: Vini Loya  [address] [facility] [address]       .         CVC CMA

## 2021-08-26 NOTE — LETTER
8/26/2021      RE: Vini Loya  [address]  [facility] [address]       Dear Colleague,    Thank you for the opportunity to participate in the care of your patient, Vini Loya, at the [facility] at [facility]. Please see a copy of my visit note below.    .        Please do not hesitate to contact me if you have any questions/concerns.     Sincerely,      CVC CMA

## 2021-08-27 ENCOUNTER — TELEPHONE (OUTPATIENT)
Dept: CARDIOLOGY | Facility: CLINIC | Age: 81
End: 2021-08-27

## 2021-08-27 NOTE — TELEPHONE ENCOUNTER
Spoke with pt's wife about Dr. Eloise Shannon's recommendations. I stated they may want to look into getting a new BP machine. Wife states she can go to the pharmacy today and get a new one.      Phuc Diggs, RN   Cardiology Nurse Coordinator

## 2021-08-27 NOTE — TELEPHONE ENCOUNTER
----- Message from Gaurav Shannon MD sent at 8/26/2021  9:33 AM CDT -----  Thank you Phuc,  I have no problem with the 166/85. I'm sure his blood pressure is labile with the Parkinson's disease.     I think he needs a new blood pressure cuff, one that correlates better with our machine. It is likely that most of the blood pressure readings that his wife has been worried about are actually in an acceptable range and do not need to be treated.    Thanks,  [facility]  ----- Message -----  From: Billie Nava RN  Sent: 8/26/2021   9:26 AM CDT  To: Gaurav Shannon MD    [facility] Dr. Eloise Martini was just in for his BP check today. His BP was 166/85 with our cuff/machine. Pt also brought in his own BP cuff. We compared readings, his BP machine read 192/91.     He also brought in a list of BP's from 7/23 - 8/15. Readings are a little all over the place. I will scan and add to pt's chart but it might take a while to see them. I will put the readings in your mail box to review when you are in clinic.     In the meantime, do yo have any further recommendations? Please let me know.     Phuc Diggs, JERI   Cardiology Nurse Coordinator

## 2021-09-10 ENCOUNTER — TELEPHONE (OUTPATIENT)
Dept: CARDIOLOGY | Facility: CLINIC | Age: 81
End: 2021-09-10

## 2021-09-10 NOTE — TELEPHONE ENCOUNTER
[facility] Call Center    Phone Message    May a detailed message be left on voicemail: yes     Reason for Call: Other: . pt is calling, looking to discuss his fluctuating BP readings, please call Vini, thank you    Action Taken: Message routed to:  [facility] heart    Travel Screening: Not Applicable

## 2021-09-10 NOTE — TELEPHONE ENCOUNTER
Called pt to discuss recent BP readings. States BP's have been high 180-200's. He reports a mild headache. States he is feeling very bloated as well. Denies chest pain and SOB. States he does have some pain in his left shoulder, he took some aspirin for this and it did not help.     Pt checked blood pressure while we were on the phone. States he after taking his Hydralazine his BP was down to 140 for a little while today. Pt states his arms have been very shaky lately d/t Parkinsons.     Stated I would pass this information along to Dr. Eloise Shannon, he may want to order 24 hour ambulatory BP monitor. Instructed pt to go to the ED right away if his headache worsens or if he develops any chest pain or SOB.     Pt reports understanding and denies further questions at this time.     Phuc Diggs RN   Cardiology Nurse Coordinator

## 2021-09-17 ENCOUNTER — TELEPHONE (OUTPATIENT)
Dept: CARDIOLOGY | Facility: CLINIC | Age: 81
End: 2021-09-17

## 2021-09-17 DIAGNOSIS — R09.89 LABILE BLOOD PRESSURE: Primary | ICD-10-CM

## 2021-09-17 DIAGNOSIS — I51.89 OTHER ILL-DEFINED HEART DISEASES: ICD-10-CM

## 2021-09-17 DIAGNOSIS — R03.0 ELEVATED BLOOD-PRESSURE READING, WITHOUT DIAGNOSIS OF HYPERTENSION: ICD-10-CM

## 2021-09-17 DIAGNOSIS — I10 ESSENTIAL HYPERTENSION WITH GOAL BLOOD PRESSURE LESS THAN 140/90: ICD-10-CM

## 2021-09-17 NOTE — TELEPHONE ENCOUNTER
Called pt to discuss Dr. Eloise Castellanos recommendation for 24 hour BP monitor. Set up appointment for pt to come in on Monday 9/20/21 at 2:00 PM.     Verified this time and date with pt. He states this appointment will work.     Also reports BP will range from 130-170. States he continues to be bloated. States abdomen is hard as a rock. Pt states he has been having bowel movements every 1-3 days. I instructed pt to ensure he is moving around enough everyday and drinking enough water to ensure he has daily BM's. He states he has been taking miralax but it can take a few days to work. I suggested he contact his PCP about his bloating. He states he has an appointment with them on Monday morning.     Phuc Diggs RN   Cardiology Nurse Coordinator

## 2021-09-20 ENCOUNTER — HOSPITAL ENCOUNTER (OUTPATIENT)
Dept: CARDIOLOGY | Facility: CLINIC | Age: 81
Discharge: HOME | End: 2021-09-20
Attending: INTERNAL MEDICINE | Admitting: INTERNAL MEDICINE
Payer: COMMERCIAL

## 2021-09-20 DIAGNOSIS — R09.89 LABILE BLOOD PRESSURE: ICD-10-CM

## 2021-09-20 DIAGNOSIS — R03.0 ELEVATED BLOOD-PRESSURE READING, WITHOUT DIAGNOSIS OF HYPERTENSION: ICD-10-CM

## 2021-09-20 DIAGNOSIS — I51.89 OTHER ILL-DEFINED HEART DISEASES: ICD-10-CM

## 2021-09-20 PROCEDURE — 93790 AMBL BP MNTR W/SW I&R: CPT | Performed by: INTERNAL MEDICINE

## 2021-09-20 PROCEDURE — 93788 AMBL BP MNTR W/SW A/R: CPT

## 2021-09-21 DIAGNOSIS — M54.16 LUMBAR RADICULOPATHY: Primary | ICD-10-CM

## 2021-09-22 ENCOUNTER — TELEPHONE (OUTPATIENT)
Dept: ANESTHESIOLOGY | Facility: CLINIC | Age: 81
End: 2021-09-22

## 2021-09-22 NOTE — TELEPHONE ENCOUNTER
[facility] Call Center    Phone Message    May a detailed message be left on voicemail: no     Reason for Call: Other: Patient's spouse called in regarding possibly setting up an appointment for an injection and would like a call back to discuss.     Action Taken: Message routed to:  [facility] LALO    Travel Screening: Not Applicable

## 2021-09-23 ENCOUNTER — TELEPHONE (OUTPATIENT)
Dept: ANESTHESIOLOGY | Facility: CLINIC | Age: 81
End: 2021-09-23

## 2021-09-23 NOTE — TELEPHONE ENCOUNTER
RN returned call to patient's spouse, Kristi, and she is interested in scheduling patient's procedure. Writer discussed will have procedure  reach out to patient to schedule. Dr. Ha placed the order for an LESI on 9/21. Kristi verbalized understanding and had no further questions at this time.    Kayley Spencer RN

## 2021-09-23 NOTE — TELEPHONE ENCOUNTER
Attempt # 1    I called Vini, but was not able to leave a voicemail. If Vini calls, please ask for a good call back time and I will attempt to reach out to them then. If there is a better number to reach them, please leave that as well.    Shira BORREGO  Pain Clinic Mira-Op Coordinator

## 2021-09-24 ENCOUNTER — TELEPHONE (OUTPATIENT)
Dept: CARDIOLOGY | Facility: CLINIC | Age: 81
End: 2021-09-24

## 2021-09-24 DIAGNOSIS — Z11.59 ENCOUNTER FOR SCREENING FOR OTHER VIRAL DISEASES: ICD-10-CM

## 2021-09-24 DIAGNOSIS — R09.89 LABILE BLOOD PRESSURE: ICD-10-CM

## 2021-09-24 PROBLEM — M54.16 LUMBAR RADICULOPATHY: Status: ACTIVE | Noted: 2021-09-24

## 2021-09-24 NOTE — TELEPHONE ENCOUNTER
[facility] Call Center    Phone Message    May a detailed message be left on voicemail: no     Reason for Call: Other: Please reach out to Vini regarding the B/P cuff, he can be reached at [phone].     Action Taken: Message routed to:  [facility] Cardiology    Travel Screening: Not Applicable

## 2021-09-27 ENCOUNTER — PATIENT OUTREACH (OUTPATIENT)
Dept: CARE COORDINATION | Facility: CLINIC | Age: 81
End: 2021-09-27

## 2021-09-27 NOTE — PROGRESS NOTES
Clinic Care Coordination Contact    Community Health Worker Follow Up  Spoke with patient and Ladi (Wife)    Care Gaps:     Health Maintenance Due   Topic Date Due     COVID-19 Vaccine (1) Never done     ZOSTER IMMUNIZATION (3 of 3) 03/29/2019     INFLUENZA VACCINE (1) 09/01/2021     CMP  09/25/2021     CHW addressed care gaps due with Ladi (Wife). Ladi states that patient had COVID vaccine and shingles shot. She cannot remember dates of vaccines.    Goals:   Goals Addressed as of 10/1/2021 at 9:55 AM                    9/27/21 8/16/21       Medical- [facility] (pt-stated)   30%  30%    Added 10/9/20 by Kajal Gutierrez BSW      Goal Statement: I would like to see what benefits I have from the VA in the next two months.   Date Goal set: 10/9/2020  Barriers: unsure how to go about this   Strengths: asking for help  Date to Achieve By: 12/9/2020  Patient expressed understanding of goal: yes    Action steps to achieve this goal:  1. I will ask my wife to speak with a VA  when I go in for my COVID vaccine about any services or supports I might qualify for. (Completed)  2. I will ask my wife to give the CHW updates at outreach calls.    Updated: 2/5/21    10/02/21 CHW    CHW spoke with patient. Patient gives verbal permission to speak with his wife Ladi.    Ladi states, she was going to start working on VA paperwork today. She's been putting it off because she was told by someone at [facility] that his benefits would be reviewed in Oct.    CHW encouraged Ladi to complete paperwork and notify CC or VA SW if she needs any support.    Ladi agreed and shares that it's possible she might qualify for home cares services too through patients benefits.                Other (pt-stated)   30%  30%    Added 6/25/21 by Catalina Torres      Goal Statement: I would like a referral for counseling.  Date Goal set: 06/25/21   Barriers: Not sure if should schedule through VA or a in network provider   Date to Achieve By: 12/25/21    Patient expressed understanding of goal: yes     Action steps to achieve this goal:   1. I will work with CHW and wife on referral for counseling.   2. I will ask my wife to speak with a VA  about any services or supports I might qualify for.I will ask my wife to give the CHW updates at outreach calls.    10/02/21 CHW    Ladi states, she was going to start working on VA paperwork today. She's been putting it off because she was told by someone at [facility] that his benefits would be reviewed in Oct.    CHW encouraged Ladi to complete paperwork and notify CC or VA SW if she needs any support.    Ladi agreed and shares that it's possible she might qualify for home cares services too through patients benefits.    CHW reviewed that will assist with counseling referral as needed once Ladi completes VA paperwork. Ladi and patient would prefer counseling through VA for patient as it's free.           Intervention and Education during outreach: CHW spoke with patient. Patient gives verbal permission to speak with his wife Ladi. CHW inquired if patient needs any additional support or resources however Ladi declined. Ladi shared that patient is seeing cardiologist today due to patient having high BP. CHW inquired if patient needs any support however Ladi states that they plan to go to cardiology appointment today. CHW reviewed CC care team and encourage RNCC as needed for education. Call clinic or triage with any concerns.   CHW encouraged Ladi to complete VA paperwork and notify CC or VA SW if she needs any support. Ladi agreed.    CHW Plan: CHW to follow up in 1 month    [facility]  [facility], and [facility]    Phone: [phone]  ____________________    Next Outreach:  11/01/21  Planned Outreach Frequency: Monthly  Preferred Phone Number: [phone]    Enrollment Date:  10/9/20  Last Care Plan Assessment:  N/A

## 2021-09-28 ENCOUNTER — PATIENT OUTREACH (OUTPATIENT)
Dept: CARE COORDINATION | Facility: CLINIC | Age: 81
End: 2021-09-28

## 2021-09-28 RX ORDER — HYDRALAZINE HYDROCHLORIDE 25 MG/1
25 TABLET, FILM COATED ORAL 2 TIMES DAILY PRN
Qty: 90 TABLET | Refills: 3 | Status: SHIPPED | OUTPATIENT
Start: 2021-09-28 | End: 2021-10-01

## 2021-09-28 NOTE — PROGRESS NOTES
Situation: Patient chart reviewed by care coordinator.    Background: Patient is currently enrolled in care coordination. Patient is followed by CHW and SWCC    Assessment: CHW still trying to reach patient for monthly outreach.     Plan/Recommendations: SWCC will remain available to patient if needs arise before successful outreach is completed or resources are needed. Outreach will be made if patient is seen in ED or admitted prior to outreach. Next anticipated chart review will be in 2 weeks.    Updated complex care plan has not yet been sent as no outreach has been made.    Viji Tate, SHELLY     [facility] Care Coordination

## 2021-09-28 NOTE — TELEPHONE ENCOUNTER
Called pt to discuss 24 hour BP results. Dr. Eloise Shannon states BP continues to be elevated and would like pt to come in on 10/1 to discuss BP control. Pt states he is in agreement with this plan. We scheduled him to come in on 10/1/21 at 3:30 PM.     Pt states he also needs more hydralazine. Send refill to pt's preferred pharmacy.      Phuc Diggs RN   Cardiology Nurse Coordinator

## 2021-09-28 NOTE — LETTER
[facility]  Patient Centered Plan of Care  About Me:        Patient Name:  Vini Potts    YOB: 1940  Age:         80 year old   Loc MRN:    [id number] Telephone Information:  Home Phone [phone]   Mobile Not on file.       Address:  [address]  [facility] [address] Email address:  [email or web address]      Emergency Contact(s)    Name Relationship Lgl Grd Work Phone Home Phone Mobile Phone   1. GEORGIE POTTS Spouse No  [phone]   2. ALVAREZ POTTS Relative No [phone]    3. FOREIGN GRAVES Relative No  [phone]    4. NAZARIO POTTS Relative No  [phone]            Primary language:  English     needed? No   [facility] [employer] [facility]:  [phone] op. 1  Other communication barriers: Cognitive impairment  Preferred Method of Communication:  Mail  Current living arrangement:    Mobility Status/ Medical Equipment:      Health Maintenance  Health Maintenance Reviewed:      My Access Plan  Medical Emergency 911   Primary Clinic Line [facility] [phone]   24 Hour Appointment Line [phone] or  [phone]) (toll-free)   24 Hour Nurse Line [phone] (toll-free)   Preferred Urgent Care     Preferred Hospital     Preferred Pharmacy [facility] [place], [place] - [address] [facility] [address]     Behavioral Health Crisis Line The National Suicide Prevention Lifeline at 1-887.873.2843 or 911             My Care Team Members  Patient Care Team       Relationship Specialty Notifications Start End    Selene Land MD PCP - General Family Medicine  4/1/21     Phone: [phone] Fax: [phone] [address] [place] [address]    Yudith Mcdonnell MD MD Ophthalmology  9/28/15     Phone: [phone] Fax: [phone]         [address] [address]    Evelyn Hairston PA Physician Assistant Physician Assistant  9/28/16     Phone:  [phone] Fax: [phone]         [address] [facility] [address] [facility] [address]    Dalila Staples APRN CNP Referring Physician Nurse Practitioner  11/7/16     Referring to Neuropsych    Phone: [phone] Fax: [phone]         [address] [facility] [facility] [address]    Lena Velazquez, PhD LP MD Psychology  11/7/16     Phone: [phone] Fax: [phone]         [address] [facility] [address]    Abel Stone MD MD Orthopedics  6/23/17     Phone: [phone] Fax: [phone]         [address] [place] [address]    Gaurav Ford MD MD Internal Medicine  7/15/20     Phone: [phone] Pager: [phone] Fax: [phone]        [address] [place] [address] [address] [facility] [address]    Kalee Tena MD MD Internal Medicine  7/30/20     Phone: [phone] Fax: [phone]         [address] [place] [address]    Michael Zabala MD Assigned Surgical Provider   12/27/20     Phone: [phone] Fax: [phone]         [address] [place] [address]    Abel Wilcox DO Assigned Musculoskeletal Provider   4/18/21     Phone: [phone] Fax: [phone]          [facility] [address]    Viji Tate LSW Lead Care Coordinator Primary Care - CC Admissions 5/5/21     Phone: [phone]         Maninder Bond MD MD Physical Medicine and Rehabilitation  6/4/21     Phone: [phone] Fax: [phone]         [facility] [address] [place] [address]    Catalina Torres [facility] Health Worker Primary Care - CC  6/10/21     Dalila Staples APRN CNP Assigned Neuroscience Provider   6/13/21     Phone: [phone] Fax: [phone]         [address] [place], [place] [address] [address]    Gaurav Ford MD Assigned Heart and Vascular Provider   8/1/21     Phone: [phone] Pager: [phone] Fax: [phone]        [address] [place] [address]     Selene Land MD Assigned PCP   9/19/21     Phone: [phone] Fax: [phone] [address] [place] [address]            My Care Plans  Self Management and Treatment Plan  Goals and (Comments)  Goals        General     Medical- VA (pt-stated)      Notes - Note edited  2/5/2021  2:38 PM by Estrella Bradley     Goal Statement: I would like to see what benefits I have from the VA in the next two months.   Date Goal set: 10/9/2020  Barriers: unsure how to go about this   Strengths: asking for help  Date to Achieve By: 12/9/2020  Patient expressed understanding of goal: yes    Action steps to achieve this goal:  1. I will ask my wife to speak with a VA  when I go in for my COVID vaccine about any services or supports I might qualify for.  2. I will ask my wife to give the CHW updates at outreach calls.    Updated: 2/5/21             Other (pt-stated)      Notes - Note edited  6/25/2021  2:49 PM by Catalina Torres     Goal Statement: I would like a referral for counseling.  Date Goal set: 06/25/21   Barriers: Not sure if should schedule through VA or a in network provider   Date to Achieve By: 12/25/21   Patient expressed understanding of goal: yes     Action steps to achieve this goal:   1. I will work with CHW and wife on referral for counseling.   2. I will ask my wife to speak with a VA  about any services or supports I might qualify for.I will ask my wife to give the CHW updates at outreach calls.             Action Plans on File:            Depression          Advance Care Plans/Directives Type:        My Medical and Care Information  Problem List   Patient Active Problem List   Diagnosis     Generalized anxiety disorder     CAD (coronary artery disease)     Hyperlipidemia LDL goal <100     BPH (benign prostatic hypertrophy) with urinary obstruction     Parkinson's disease (H)     Gait disturbance, post-stroke     Herniated nucleus pulposus, L5-S1, right     Pain  in right hip     Bunion     Allergic conjunctivitis     Diverticulosis     Glaucoma suspect, bilateral     Senile nuclear sclerosis, bilateral     Dry eye, bilateral     History of corneal transplant     Major depressive disorder, single episode, moderate (H)     Lactose intolerance in adult     Degeneration of L4-L5 intervertebral disc     Compression fracture of thoracic vertebra, with routine healing, subsequent encounter     CKD (chronic kidney disease) stage 3, GFR 30-59 ml/min     Falls frequently     Slow transit constipation     Orthostatic hypotension     Acute atopic conjunctivitis, unspecified eye     Age-related nuclear cataract, bilateral     Bradycardia, unspecified     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 (H)     History of falling     Other chronic pain     Preglaucoma, unspecified, bilateral     Problems in relationship with spouse or partner     Tremor, unspecified     Benign prostatic hyperplasia with lower urinary tract symptoms     Bunion of unspecified foot     Atherosclerotic heart disease of native coronary artery without angina pectoris     Chronic kidney disease, stage 3a     Wedge compression fracture of unspecified thoracic vertebra, subsequent encounter for fracture with routine healing     Other intervertebral disc degeneration, lumbar region     Diverticulosis of intestine, part unspecified, without perforation or abscess without bleeding     Essential hypertension with goal blood pressure less than 140/90     Other sequelae of cerebral infarction     Other intervertebral disc displacement, lumbosacral region     Corneal transplant status     Lactose intolerance, unspecified     Low back pain     Hyperlipidemia, unspecified     Dry eye syndrome of bilateral lacrimal glands     Unspecified epiphora, unspecified side     Urinary retention     SI joint arthritis     Chronic right-sided low back pain with right-sided sciatica     Lumbar radiculopathy       Current Medications and Allergies:  See printed Medication Report.    Care Coordination Start Date: 10/9/2020   Frequency of Care Coordination: 6 weeks   Form Last Updated: 09/28/2021

## 2021-09-28 NOTE — LETTER
9/28/2021        RE: Vini Loya  [address] [facility] [address]    [facility] CARE COORDINATION  [address] [place] [address]    September 28, 2021        Vini [address] [facility] [address]          Dear Vini,     Attached is an updated Patient Centered Plan of Care for your continued enrollment in Care Coordination. Please let us know if you have additional questions, concerns, or goals that we can assist with.    Sincerely,    Viji Tate, [facility] Care Coordination  Tel: [phone]

## 2021-10-01 ENCOUNTER — APPOINTMENT (OUTPATIENT)
Dept: NURSING | Facility: CLINIC | Age: 81
End: 2021-10-01
Payer: COMMERCIAL

## 2021-10-01 ENCOUNTER — OFFICE VISIT (OUTPATIENT)
Dept: CARDIOLOGY | Facility: CLINIC | Age: 81
End: 2021-10-01
Attending: INTERNAL MEDICINE
Payer: COMMERCIAL

## 2021-10-01 VITALS
HEART RATE: 51 BPM | WEIGHT: 177 LBS | BODY MASS INDEX: 26.83 KG/M2 | OXYGEN SATURATION: 96 % | HEIGHT: 68 IN | SYSTOLIC BLOOD PRESSURE: 187 MMHG | DIASTOLIC BLOOD PRESSURE: 89 MMHG

## 2021-10-01 DIAGNOSIS — G20.A1 PARKINSON'S DISEASE (H): ICD-10-CM

## 2021-10-01 DIAGNOSIS — R09.89 LABILE BLOOD PRESSURE: ICD-10-CM

## 2021-10-01 DIAGNOSIS — I10 ESSENTIAL HYPERTENSION: Primary | ICD-10-CM

## 2021-10-01 PROCEDURE — G0463 HOSPITAL OUTPT CLINIC VISIT: HCPCS

## 2021-10-01 PROCEDURE — 99214 OFFICE O/P EST MOD 30 MIN: CPT | Mod: GC | Performed by: INTERNAL MEDICINE

## 2021-10-01 RX ORDER — ENALAPRIL MALEATE 2.5 MG/1
2.5 TABLET ORAL 2 TIMES DAILY
Qty: 180 TABLET | Refills: 3 | Status: SHIPPED | OUTPATIENT
Start: 2021-10-01 | End: 2022-02-04

## 2021-10-01 RX ORDER — HYDRALAZINE HYDROCHLORIDE 25 MG/1
25 TABLET, FILM COATED ORAL 2 TIMES DAILY PRN
Qty: 90 TABLET | Refills: 3 | Status: SHIPPED | OUTPATIENT
Start: 2021-10-01 | End: 2022-07-06

## 2021-10-01 ASSESSMENT — MIFFLIN-ST. JEOR: SCORE: 1487.24

## 2021-10-01 ASSESSMENT — PAIN SCALES - GENERAL: PAINLEVEL: NO PAIN (0)

## 2021-10-01 NOTE — PATIENT INSTRUCTIONS
You were seen today in the Cardiovascular Clinic at the [facility].     Cardiology Providers you saw during your visit: Dr. Eloise Shannon     Diagnosis:   No diagnosis found.       Orders:   No orders of the defined types were placed in this encounter.      Current Medication List  Current Outpatient Medications   Medication Sig Dispense Refill     aspirin (ASA) 81 MG tablet Take 1 tablet (81 mg) by mouth daily 90 tablet 3     atorvastatin (LIPITOR) 20 MG tablet Take 1 tablet (20 mg) by mouth daily 90 tablet 3     bisacodyl (DULCOLAX) 10 MG suppository Place 1 suppository (10 mg) rectally daily as needed for constipation 10 suppository 0     carbidopa-levodopa (SINEMET CR)  MG CR tablet Take 1 tablet by mouth At Bedtime 90 tablet 3     carbidopa-levodopa (SINEMET)  MG tablet Take 2 tablets by mouth 3 times daily Take two tablets 3 times a day, at 7am, 11am & 4pm. 540 tablet 3     DULoxetine (CYMBALTA) 60 MG capsule Take 1 capsule (60 mg) by mouth daily 90 capsule 1     hydrALAZINE (APRESOLINE) 25 MG tablet Take 1 tablet (25 mg) by mouth 2 times daily as needed (Take 1 tablet if systolic blood pressure is >180) 90 tablet 3     polyethylene glycol (MIRALAX) 17 GM/SCOOP powder Take 17 g (1 capful) by mouth 3 times daily (Patient taking differently: Take 1 capful by mouth 3 times daily as needed for constipation ) 1 Bottle 11     rOPINIRole (REQUIP) 0.5 MG tablet Take 0.5 mg by mouth 3 times daily       SENNA-docusate sodium (SENNA S) 8.6-50 MG tablet Take 2 tablets by mouth 2 times daily 120 tablet 11     sulfamethoxazole-trimethoprim (BACTRIM) 400-80 MG tablet Take 1 tablet by mouth At Bedtime For UTI prevention 90 tablet 3     vitamin D3 (CHOLECALCIFEROL) 2000 units (50 mcg) tablet Take 1 tablet by mouth daily as needed        diclofenac (VOLTAREN) 50 MG EC tablet Take 50 mg by mouth 2 times daily Take one tablet by mouth twice a day for Pain** Take with food  Prescribed by [facility] doctor: Shamar  "Nahun (Patient not taking: Reported on 10/1/2021)       DULoxetine (CYMBALTA) 30 MG capsule Take 1 capsule (30 mg) by mouth daily 30 capsule 0     omeprazole (PRILOSEC OTC) 20 MG EC tablet Take 20 mg by mouth daily Prescribed by [facility] doctor: Shamar Garnica (Patient not taking: Reported on 10/1/2021)           Medications Discontinued:  There are no discontinued medications.      Recommendations:   1. Start enalapril 2.5mg twice a day. Before taking the morning dose, check your blood pressure. If the top number is <130mmHg, do NOT take enalapril.  2. If you check your blood pressure during the day and the top number is >180mmHg, take hydralazine 25mg.  3. Follow up with Dr. Eloise Shannon in 3 months.       Please feel free to call me with any questions or concerns.       Kavon Faulkner LPN     Questions: [phone].   First press #1 for the [employer] and then press #4 for \"Medical Questions\" to reach us Cardiology Nurses.     [phone].   First press #1 for the So[employer] and then press #1     On Call Cardiologist for after hours or on weekends: [phone]   option #4 and ask to speak to the on-call Cardiologist.          If you need a medication refill please contact your pharmacy.  Please allow 3 business days for your refill to be completed.  ________________________________________________________________________________________________________________________________         "
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
## 2021-10-01 NOTE — NURSING NOTE
Chief Complaint   Patient presents with     Follow Up     [facility] return     Vitals were taken and medications reconciled.    JENNA Pena  3:55 PM
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
## 2021-10-12 ENCOUNTER — APPOINTMENT (OUTPATIENT)
Dept: LAB | Facility: CLINIC | Age: 81
End: 2021-10-12
Payer: COMMERCIAL

## 2021-10-12 ENCOUNTER — LAB (OUTPATIENT)
Dept: LAB | Facility: CLINIC | Age: 81
End: 2021-10-12
Payer: COMMERCIAL

## 2021-10-12 DIAGNOSIS — Z11.59 ENCOUNTER FOR SCREENING FOR OTHER VIRAL DISEASES: ICD-10-CM

## 2021-10-12 DIAGNOSIS — I10 ESSENTIAL HYPERTENSION: ICD-10-CM

## 2021-10-12 LAB
ANION GAP SERPL CALCULATED.3IONS-SCNC: 2 MMOL/L (ref 3–14)
BUN SERPL-MCNC: 22 MG/DL (ref 7–30)
CALCIUM SERPL-MCNC: 9.4 MG/DL (ref 8.5–10.1)
CHLORIDE BLD-SCNC: 108 MMOL/L (ref 94–109)
CO2 SERPL-SCNC: 29 MMOL/L (ref 20–32)
CREAT SERPL-MCNC: 1.18 MG/DL (ref 0.66–1.25)
GFR SERPL CREATININE-BSD FRML MDRD: 58 ML/MIN/1.73M2
GLUCOSE BLD-MCNC: 116 MG/DL (ref 70–99)
POTASSIUM BLD-SCNC: 4.2 MMOL/L (ref 3.4–5.3)
SODIUM SERPL-SCNC: 139 MMOL/L (ref 133–144)

## 2021-10-12 PROCEDURE — 80048 BASIC METABOLIC PNL TOTAL CA: CPT

## 2021-10-12 PROCEDURE — 36415 COLL VENOUS BLD VENIPUNCTURE: CPT

## 2021-10-12 PROCEDURE — U0003 INFECTIOUS AGENT DETECTION BY NUCLEIC ACID (DNA OR RNA); SEVERE ACUTE RESPIRATORY SYNDROME CORONAVIRUS 2 (SARS-COV-2) (CORONAVIRUS DISEASE [COVID-19]), AMPLIFIED PROBE TECHNIQUE, MAKING USE OF HIGH THROUGHPUT TECHNOLOGIES AS DESCRIBED BY CMS-2020-01-R: HCPCS

## 2021-10-12 PROCEDURE — U0005 INFEC AGEN DETEC AMPLI PROBE: HCPCS

## 2021-10-13 LAB — SARS-COV-2 RNA RESP QL NAA+PROBE: NEGATIVE

## 2021-10-14 ENCOUNTER — ANCILLARY PROCEDURE (OUTPATIENT)
Dept: RADIOLOGY | Facility: AMBULATORY SURGERY CENTER | Age: 81
End: 2021-10-14
Attending: ANESTHESIOLOGY
Payer: COMMERCIAL

## 2021-10-14 ENCOUNTER — HOSPITAL ENCOUNTER (OUTPATIENT)
Facility: AMBULATORY SURGERY CENTER | Age: 81
Discharge: HOME | End: 2021-10-14
Attending: ANESTHESIOLOGY | Admitting: ANESTHESIOLOGY
Payer: COMMERCIAL

## 2021-10-14 VITALS
DIASTOLIC BLOOD PRESSURE: 97 MMHG | TEMPERATURE: 98 F | SYSTOLIC BLOOD PRESSURE: 175 MMHG | HEART RATE: 60 BPM | RESPIRATION RATE: 16 BRPM | OXYGEN SATURATION: 98 %

## 2021-10-14 DIAGNOSIS — R52 PAIN: ICD-10-CM

## 2021-10-14 DIAGNOSIS — M54.16 LUMBAR RADICULOPATHY: ICD-10-CM

## 2021-10-14 PROCEDURE — 62323 NJX INTERLAMINAR LMBR/SAC: CPT | Mod: GC | Performed by: ANESTHESIOLOGY

## 2021-10-14 PROCEDURE — 62323 NJX INTERLAMINAR LMBR/SAC: CPT

## 2021-10-14 RX ORDER — IOPAMIDOL 408 MG/ML
INJECTION, SOLUTION INTRATHECAL PRN
Status: DISCONTINUED | OUTPATIENT
Start: 2021-10-14 | End: 2021-10-14 | Stop reason: HOSPADM

## 2021-10-14 RX ORDER — METHYLPREDNISOLONE ACETATE 40 MG/ML
INJECTION, SUSPENSION INTRA-ARTICULAR; INTRALESIONAL; INTRAMUSCULAR; SOFT TISSUE PRN
Status: DISCONTINUED | OUTPATIENT
Start: 2021-10-14 | End: 2021-10-14 | Stop reason: HOSPADM

## 2021-10-14 RX ORDER — LIDOCAINE HYDROCHLORIDE 10 MG/ML
INJECTION, SOLUTION EPIDURAL; INFILTRATION; INTRACAUDAL; PERINEURAL PRN
Status: DISCONTINUED | OUTPATIENT
Start: 2021-10-14 | End: 2021-10-14 | Stop reason: HOSPADM

## 2021-10-14 RX ORDER — BUPIVACAINE HYDROCHLORIDE 2.5 MG/ML
INJECTION, SOLUTION EPIDURAL; INFILTRATION; INTRACAUDAL PRN
Status: DISCONTINUED | OUTPATIENT
Start: 2021-10-14 | End: 2021-10-14 | Stop reason: HOSPADM

## 2021-10-14 NOTE — DISCHARGE INSTRUCTIONS
Home Care Instructions after an Epidural Steroid Pain Injection    A lumbar epidural steroid injection delivers steroid medication directly into the area that may be causing your lower back pain and/or leg pain. A cervical or thoracic epidural steroid injection delivers steroids into the epidural space surrounding spinal nerve roots to help relieve pain in the upper spine/neck.    Activity  -Rest today  -Do not work today  -Resume normal activity tomorrow  -DO NOT shower for 24 hours  -DO NOT remove bandaid for 24 hours    Pain  -You may experience soreness at the injection site for one or two days  -You may use an ice pack for 20 minutes every 2 hours for the first 24 hours  -You may use a heating pad after the first 24 hours  -You may use Tylenol (acetaminophen) every 4 hours or other pain medicines as     directed by your physician    You may experience numbness radiating into your legs or arms (depending on the procedure location). This numbness may last several hours. Until sensation returns to normal; please use caution in walking, climbing stairs, and stepping out of your vehicle, etc.          Common side effects of steroids:  Not everyone will experience corticosteroid side effects. If side effects are experienced, they will gradually subside in the 7-10 day period following an injection. Most common side effects include:  -Flushed face and/or chest  -Feeling of warmth, particularly in the face but could be an overall feeling of warmth  -Increased blood sugar in diabetic patients  -Menstrual irregularities my occur. If taking hormone-based birth control an alternate method of birth control is recommended  -Sleep disturbances and/or mood swings are possible  -Leg cramps    Please contact us if you have:  -Severe pain  -Fever more than 101.5 degrees Fahrenheit  -Signs of infection at the injection site (redness, swelling, or drainage)    If you have questions, please contact our office at [phone] between  the hours of 7:00 am and 3:00 pm Monday through Friday. After office hours you can contact the on call provider by dialing [phone]. If you need immediate attention, we recommend that you go to a hospital emergency room or dial [phone].

## 2021-10-14 NOTE — OP NOTE
Lumbar Epidural Steroid Injection    Procedure:  1. Lumbar epidural steroid injection at right L5-S1  2. Fluoroscopy for needle guidance    Pre-operative Diagnosis:  1. Intervertebral disc disorders w radiculopathy, lumbosacral region  2. Other intervertebral disc degeneration, lumbosacral region    Post-Operative Diagnosis:  1. Intervertebral disc disorders w radiculopathy, lumbosacral region  2. Other intervertebral disc degeneration, lumbosacral region    Anesthesia:  Local anesthesia    Staff: STELLA Gill  Resident: Javan Yarbrough MD    Medical Indications:  The patient presents to the ambulatory surgery center today for the treatment of persistent pain. We believe that the pain is spinally mediated. The patient has been exhibiting symptoms consistent with lumbar intraspinal inflammation and radiculopathy. Symptoms have been persistent, disabling and intermittently severe. The patient has been evaluated in the pain clinic and scheduled today for a spinal injection to aid in the diagnosis and treatment of presumed spinal pain. The risks, benefits, potential complications, and alternatives were discussed with the patient as per the signed consent form, and informed consent was obtained. The patient has received information about the procedure and its risks. The physician personally confirmed the procedure, side, and site to be injected with the patient and marked the site in the pre-procedure area.    Description of Procedure:  An additional intraoperative timeout, specifically to confirm accurate localization, was conducted by the attending physician. The patient remained awake and alert throughout the procedure. The patient was placed in the prone position. The skin was prepped with chlorhexidine and sterile drapes were applied. The skin and soft tissues were anesthetized with lidocaine 1%. A 22 gauge 3.5 inch touhy needle epidural needle was inserted percutaneously and advanced under fluoroscopic  guidance using AP, and lateral projections. Using loss of resistance to air and a right sided interlaminar approach, the L5/S1 posterior epidural space was entered after the lamina was contacted with the epidural needle. No paraesthesias occurred and aspiration was negative. Epidurography and radiographic interpretation: Epidurography was performed by injection of Isovue 200 under live fluoroscopy. Multiple Xray images were obtained. Radiologic examination confirmed proper spread of the contrast medium within the epidural space. There was no evidence of intrathecal or intravascular runoff. The needle was injected with 40mg methylprednisolone mixed with 3 ml of 0.25% bupivacaine. The needle was removed after flushing with 1% lidocaine. A sterile bandage was applied. The patient did not experience any hemodynamic or neurologic sequelae. The patient was transferred to the recovery area. Post operative instructions were explained and given to the patient.    I was present with the resident all times and helped with all portions of the procedure.     Complications:  No procedural or immediate post-procedural complications occurred and the patient was transferred to PACU in stable condition.  Procedure Images:    Post-Operative Plan:  The patient will monitor pain relief from today's procedure. They will call the clinic for any problems related to today's procedure. A copy of our written post-procedure instructions was provided. They will return to clinic as scheduled.

## 2021-10-14 NOTE — PROGRESS NOTES
Clinic Care Coordination Contact    Care Coordination Clinician Chart Review  Situation: Patient chart reviewed by care coordinator.       Background: Care Coordination initial assessment and enrollment to Care Coordination was successful.   Patient centered goals were developed with participation from patient.  TAURUS HAM handed patient off to CHW for continued outreach every 30 days.        Assessment: Per chart review, patient outreach completed by CC CHW on 9/27/21.  Patient is actively working to accomplish goals.  Patient's goals remain appropriate and relevant at this time.   Patient is not yet due for updated Plan of Care.  Annual assessment will be due 10/22.      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   Date Goal set: 10/9/2020  Barriers: unsure how to go about this   Strengths: asking for help  Date to Achieve By: 12/9/2020  Patient expressed understanding of goal: yes    Action steps to achieve this goal:  1. I will ask my wife to speak with a [facility]  when I go in for my COVID vaccine about any services or supports I might qualify for.  2. I will ask my wife to give the CHW updates at outreach calls.    Updated: 2/5/21               Other (pt-stated)       Goal Statement: I would like a referral for counseling.  Date Goal set: 06/25/21   Barriers: Not sure if should schedule through [facility] or a in network provider   Date to Achieve By: 12/25/21   Patient expressed understanding of goal: yes     Action steps to achieve this goal:   1. I will work with CHW and wife on referral for counseling.   2. I will ask my wife to speak with a [facility]  about any services or supports I might qualify for.I will ask my wife to give the CHW updates at outreach calls.                Plan/Recommendations: The patient will continue working with Care Coordination to achieve goals as above.  CHW will involve TAURUS HAM as needed  or if patient is ready to move to maintenance.   CC will continue to monitor progress to goals and CHW outreaches every 6 weeks.   Plan of Care updated and mailed to patient: No    Patient due for annual assessment    CHW will:  CHW Delegation:   1)  Due Date:  10/31/21       Delegation: Please schedule annual assessment    2)  Due Date:          Delegation:    3)  Due Date:        Delegation      KEVIN Osuna   [facility] Care Coordination  Tel: [phone]
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
## 2021-11-08 ENCOUNTER — PATIENT OUTREACH (OUTPATIENT)
Dept: CARE COORDINATION | Facility: CLINIC | Age: 81
End: 2021-11-08
Payer: COMMERCIAL

## 2021-11-08 NOTE — PROGRESS NOTES
Clinic Care Coordination Contact  [facility]    Clinical Data: Care Coordinator Outreach  Outreach attempted x 2. CHW spoke with patient. Patient states, he prefers that CHW talk to his wife Kristi who's at the store. He will have Kristi call CHW back and gives verbal permission for her to speak with CHW.     Plan: Care Coordinator will try to reach patient again in 10 business days.    Delegation from FOUZIA Osuna, on 9/28/21:  1)  Due Date:  10/31/21       Delegation: Please schedule annual assessment  Delegation is pending.    Catalina Torres  [facility] Care Coordination  [facility], and [facility]    Phone: [phone]  ____________________    Next Outreach:  11/23/21  Planned Outreach Frequency: Monthly  Preferred Phone Number: [phone]    Enrollment Date:  10/09/21  Last Care Plan Assessment:  9/28/21

## 2021-11-19 ENCOUNTER — OFFICE VISIT (OUTPATIENT)
Dept: NEUROLOGY | Facility: CLINIC | Age: 81
End: 2021-11-19
Payer: COMMERCIAL

## 2021-11-19 VITALS
HEART RATE: 80 BPM | BODY MASS INDEX: 26.82 KG/M2 | OXYGEN SATURATION: 98 % | SYSTOLIC BLOOD PRESSURE: 154 MMHG | RESPIRATION RATE: 16 BRPM | WEIGHT: 178 LBS | DIASTOLIC BLOOD PRESSURE: 78 MMHG

## 2021-11-19 DIAGNOSIS — M54.16 LUMBAR RADICULOPATHY: ICD-10-CM

## 2021-11-19 DIAGNOSIS — K59.01 SLOW TRANSIT CONSTIPATION: ICD-10-CM

## 2021-11-19 DIAGNOSIS — G20.A1 PARKINSON'S DISEASE (H): Primary | ICD-10-CM

## 2021-11-19 PROCEDURE — 99214 OFFICE O/P EST MOD 30 MIN: CPT | Performed by: NURSE PRACTITIONER

## 2021-11-19 RX ORDER — BUPROPION HYDROCHLORIDE 300 MG/1
TABLET ORAL
COMMUNITY

## 2021-11-19 ASSESSMENT — PAIN SCALES - GENERAL: PAINLEVEL: NO PAIN (0)

## 2021-11-19 NOTE — PROGRESS NOTES
"ASSESSMENT:    Parkinson's Disease:     Low back pain:     Constipation:      PLAN:    __  The following visit summary provided: -      __  Stay on the same antiparkinsonian medication.     PD Medications 7 am 11 am 4 pm 8 - 9 pm   Sinemet 25/100 mg  2 2 2     Sinemet 50/200 mg       1   Ropinirole 0.25 mg 1 1 1       __  [facility]     __  Continue to use the stationary Bike, but not the rowing machine as it might hurt your back.     __  Talk to your Primary Care about getting Massage Therapy where you did Physical Therapy to help your back.     __  Use stool softener and drink adequate water to prevent constipation.     __  Return in 3 - 4 months. You may return sooner as needed.        MOVEMENT DISORDERS CLINIC           PATIENT: Vini Loya    : 1940    ALBINA: 2021    REASON FOR VISIT: Parkinson's disease (PD) follow up.    HPI: Mr. Vini Loya is an 80 year old who came to the [facility] neurology clinic accompanied by his wife for a follow up visit.    Since his last visit, he had two steroid injections in his lower back without improvement. He is followed by Dr. Ha and the Epidural Steroid injections were given in the Lumbar 5 Sacral 1. Patient reports that \"it's not helping.\" His next appointment is on . The right side of his back pain makes it difficult to get up. No pain when sitting. Walking short distance is okay but the longer he walks, it gets tighter and the pain increases.     Dr. Ibanez didn't recommend back surgery due the long recovery required for pts with PD.    Tremors are present. It gets worse with stress & with increased pain.     PD Medications 7 am 11 am 4 pm 8 - 9 pm   Sinemet 25/100 mg  2 2 2     Sinemet 50/200 mg       1   Ropinirole 0.25 mg 1 1 1       He is using the stationary bike. He is also using the rowing machine. Sometimes his back hurts when he uses the rowing machine as his back is not supported.  Pt and wife " "asked what kind of exercise to do for his back.     No hallucinations.  Has vivid drams.     Constipation is \"rough once in a while.\" Hydration helps. He uses a stool softer.     MEDICATIONS:   Outpatient Medications Marked as Taking for the 11/19/21 encounter (Office Visit) with Dalila Staples APRN CNP   Medication Sig     aspirin (ASA) 81 MG tablet Take 1 tablet (81 mg) by mouth daily     atorvastatin (LIPITOR) 20 MG tablet Take 1 tablet (20 mg) by mouth daily     bisacodyl (DULCOLAX) 10 MG suppository Place 1 suppository (10 mg) rectally daily as needed for constipation     buPROPion (WELLBUTRIN XL) 300 MG 24 hr tablet 1 tablet in the morning     carbidopa-levodopa (SINEMET CR)  MG CR tablet Take 1 tablet by mouth At Bedtime     carbidopa-levodopa (SINEMET)  MG tablet Take 2 tablets by mouth 3 times daily Take two tablets 3 times a day, at 7am, 11am & 4pm.     DULoxetine (CYMBALTA) 60 MG capsule Take 1 capsule (60 mg) by mouth daily     enalapril (VASOTEC) 2.5 MG tablet Take 1 tablet (2.5 mg) by mouth 2 times daily     hydrALAZINE (APRESOLINE) 25 MG tablet Take 1 tablet (25 mg) by mouth 2 times daily as needed (Take 1 tablet if systolic blood pressure is >180)     omeprazole (PRILOSEC OTC) 20 MG EC tablet Take 20 mg by mouth daily Prescribed by [facility]: Shamar Garnica      polyethylene glycol (MIRALAX) 17 GM/SCOOP powder Take 17 g (1 capful) by mouth 3 times daily (Patient taking differently: Take 1 capful by mouth 3 times daily as needed for constipation )     rOPINIRole (REQUIP) 0.5 MG tablet Take 0.5 mg by mouth 3 times daily     SENNA-docusate sodium (SENNA S) 8.6-50 MG tablet Take 2 tablets by mouth 2 times daily     sulfamethoxazole-trimethoprim (BACTRIM) 400-80 MG tablet Take 1 tablet by mouth At Bedtime For UTI prevention     vitamin D3 (CHOLECALCIFEROL) 2000 units (50 mcg) tablet Take 1 tablet by mouth daily as needed        PHYSICAL EXAM:    VITAL SIGNS:  Blood pressure (!) " 154/78, pulse 80, resp. rate 16, weight 80.7 kg (178 lb), SpO2 98 %.     GENERAL:  Mr. Loya is a pleasant  male who is well-groomed and well-developed sitting comfortably in the exam room without any distress.  Affect is appropriate.    MOVEMENT DISORDERS ASSESSMENT:    Speech: Monotone, slurred but understandable; moderately impaired.  Rest Tremor: Mild in amplitude and intermittent in left hand. Absent in other extremities.   Arising from chair - arms folded across chest: Slow; or may need more than one attempt.  Posture: Mildly stooped posture, slightly leaning to one side.  Dyskinesias:  Absent.      Today I spent 32 minutes caring for the patient. Time was spent with reviewing records, meeting with the patient, answering questions, examining, and documentation.    KOTA Stone,  CNP  [facility]

## 2021-11-19 NOTE — PATIENT INSTRUCTIONS
Dear  Vini Loya,    Thank you for coming today.  During your visit, we have discussed the following:     __  Stay on the same antiparkinsonian medication.     PD Medications 7 am 11 am 4 pm 8 - 9 pm   Sinemet 25/100 mg  2 2 2     Sinemet 50/200 mg       1   Ropinirole 0.25 mg 1 1 1       __  [facility]     __  Continue to use the stationary Bike, but not the rowing machine as it might hurt your back.     __  Talk to your Primary Care about getting Massage Therapy where you did Physical Therapy to help your back.     __  Use stool softener and drink adequate water to prevent constipation.     __  Return in 3 - 4 months. You may return sooner as needed.      For questions, you may send us a [employer]aft[employer] message or call [phone]    Fax number: [phone]    KOTA Stone, CNP  [facility]

## 2021-11-22 ENCOUNTER — OFFICE VISIT (OUTPATIENT)
Dept: ANESTHESIOLOGY | Facility: CLINIC | Age: 81
End: 2021-11-22
Payer: COMMERCIAL

## 2021-11-22 VITALS — DIASTOLIC BLOOD PRESSURE: 94 MMHG | OXYGEN SATURATION: 97 % | SYSTOLIC BLOOD PRESSURE: 193 MMHG | HEART RATE: 58 BPM

## 2021-11-22 DIAGNOSIS — M54.16 LUMBAR RADICULOPATHY: Primary | ICD-10-CM

## 2021-11-22 PROCEDURE — 99213 OFFICE O/P EST LOW 20 MIN: CPT | Performed by: ANESTHESIOLOGY

## 2021-11-22 ASSESSMENT — ENCOUNTER SYMPTOMS
PARALYSIS: 0
LOSS OF CONSCIOUSNESS: 0
LIGHT-HEADEDNESS: 0
SLEEP DISTURBANCES DUE TO BREATHING: 0
TINGLING: 0
MEMORY LOSS: 0
LEG PAIN: 0
WEAKNESS: 1
EXERCISE INTOLERANCE: 0
HEADACHES: 1
SYNCOPE: 0
ORTHOPNEA: 0
SEIZURES: 0
DIZZINESS: 1
NUMBNESS: 0
DISTURBANCES IN COORDINATION: 1
PALPITATIONS: 0
HYPOTENSION: 0
TREMORS: 1
HYPERTENSION: 1
SPEECH CHANGE: 0

## 2021-11-22 ASSESSMENT — ANXIETY QUESTIONNAIRES
GAD7 TOTAL SCORE: 0
3. WORRYING TOO MUCH ABOUT DIFFERENT THINGS: NOT AT ALL
5. BEING SO RESTLESS THAT IT IS HARD TO SIT STILL: NOT AT ALL
8. IF YOU CHECKED OFF ANY PROBLEMS, HOW DIFFICULT HAVE THESE MADE IT FOR YOU TO DO YOUR WORK, TAKE CARE OF THINGS AT HOME, OR GET ALONG WITH OTHER PEOPLE?: NOT DIFFICULT AT ALL
2. NOT BEING ABLE TO STOP OR CONTROL WORRYING: NOT AT ALL
GAD7 TOTAL SCORE: 0
GAD7 TOTAL SCORE: 0
1. FEELING NERVOUS, ANXIOUS, OR ON EDGE: NOT AT ALL
7. FEELING AFRAID AS IF SOMETHING AWFUL MIGHT HAPPEN: NOT AT ALL
7. FEELING AFRAID AS IF SOMETHING AWFUL MIGHT HAPPEN: NOT AT ALL
4. TROUBLE RELAXING: NOT AT ALL
6. BECOMING EASILY ANNOYED OR IRRITABLE: NOT AT ALL

## 2021-11-22 ASSESSMENT — PAIN SCALES - GENERAL: PAINLEVEL: MILD PAIN (2)
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
## 2021-11-22 NOTE — NURSING NOTE
Patient presents with:  RECHECK: [facility] RETURN, RM 10, patient reports, right side lower back pain, 2/10       Mild Pain (2)     Pain Medications     Salicylates Refills Start End     aspirin (ASA) 81 MG tablet    3 2/11/2020     Sig - Route: Take 1 tablet (81 mg) by mouth daily - Oral    Class: E-Prescribe          What medications are you using for pain?   Ibuprofen        (Return Patients only) What refills are you needing today? N/A      Glenroy Guerrero, EMT

## 2021-11-23 ASSESSMENT — ANXIETY QUESTIONNAIRES: GAD7 TOTAL SCORE: 0

## 2021-11-29 ENCOUNTER — PATIENT OUTREACH (OUTPATIENT)
Dept: CARE COORDINATION | Facility: CLINIC | Age: 81
End: 2021-11-29
Payer: COMMERCIAL

## 2021-11-29 NOTE — PROGRESS NOTES
Clinic Care Coordination Contact    Community Health Worker Follow Up  Spoke with Ladi (Wife)    Care Gaps:     Health Maintenance Due   Topic Date Due     COVID-19 Vaccine (1) Never done     ZOSTER IMMUNIZATION (3 of 3) 03/29/2019     INFLUENZA VACCINE (1) 09/01/2021     CMP  09/25/2021     PHQ-9  12/04/2021     Appointment scheduled 1/20/22 with Dr. Herrera.    Goals:   Goals Addressed as of 11/30/2021 at 3:53 PM                    11/29/21 9/27/21       [facility]- [facility] (pt-stated)   30%  30%    Added 10/9/20 by Kajal Gutierrez BSW      Goal Statement: I would like to see what benefits I have from the [facility] in the next two months.   Date Goal set: 10/9/2020  Barriers: unsure how to go about this   Strengths: asking for help  Date to Achieve By: 12/9/2020  Patient expressed understanding of goal: yes    Action steps to achieve this goal:  1. I will ask my wife to speak with a [facility]  when I go in for my COVID vaccine about any services or supports I might qualify for.  2. I will ask my wife to give the CHW updates at outreach calls.    Updated: 2/5/21 11/30/21 CHW     Ladi states, she has not filled out paperwork from [facility]  yet and plans to do so. CHW encouraged reaching out to CC if she needs any assistance.     Ladi shares that patient received covid vaccine and has booster scheduled 12/7th.          Other (pt-stated)   30%  30%    Added 6/25/21 by Catalina Torres      Goal Statement: I would like a referral for counseling.  Date Goal set: 06/25/21   Barriers: Not sure if should schedule through [facility] or a in network provider   Date to Achieve By: 12/25/21   Patient expressed understanding of goal: yes     Action steps to achieve this goal:   1. I will work with CHW and wife on referral for counseling.   2. I will ask my wife to speak with a [facility]  about any services or supports I might qualify for. I will ask my wife to give the CHW updates at outreach calls.    1/30/21 CHW     Ladi  states, she has not filled out paperwork from [facility]  yet and plans to do so. CHW encouraged reaching out to CC if she needs any assistance.         Intervention and Education during outreach: CHW inquired if patient needs any additional support or resources however Ladi declined. CHW informed Ladi that per FOUZIA Osuna, need to schedule annual assessment, appointment scheduled 12/8th at 2PM.    CHW Plan: CHW to follow up in 1 month.    Delegation from FOUZIA Osuna, on 9/28/21:  1)  Due Date:  10/31/21       Delegation: Please schedule annual assessment  Delegation is completed.    Catalina Torres  [facility] Care Coordination  [facility]    Phone: [phone]

## 2021-11-30 ENCOUNTER — APPOINTMENT (OUTPATIENT)
Dept: NURSING | Facility: CLINIC | Age: 81
End: 2021-11-30
Payer: COMMERCIAL

## 2021-12-05 NOTE — ADDENDUM NOTE
Addended by: WEGENER, JOEL D on: 2/3/2019 11:25 AM     Modules accepted: Orders    
Patient/Caregiver provided printed discharge information.

## 2021-12-06 ENCOUNTER — PATIENT OUTREACH (OUTPATIENT)
Dept: CARE COORDINATION | Facility: CLINIC | Age: 81
End: 2021-12-06
Payer: COMMERCIAL

## 2021-12-06 NOTE — PROGRESS NOTES
Clinic Care Coordination Contact  Care Coordination Clinician Chart Review  Situation: Patient chart reviewed by care coordinator.       Background: Care Coordination initial assessment and enrollment to Care Coordination was successful.   Patient centered goals were developed with participation from patient.  TAURUS HAM handed patient off to CHW for continued outreach every 30 days.        Assessment: Per chart review, patient outreach completed by CC CHW on 11/30/21  Patient is actively working to accomplish goals.  Patient's goals remain appropriate and relevant at this time.   Patient is not yet due for updated Plan of Care.  Annual assessment will be due 12/8/21 (scheduled).      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   Date Goal set: 10/9/2020  Barriers: unsure how to go about this   Strengths: asking for help  Date to Achieve By: 12/9/2020  Patient expressed understanding of goal: yes    Action steps to achieve this goal:  1. I will ask my wife to speak with a [facility]  when I go in for my COVID vaccine about any services or supports I might qualify for.  2. I will ask my wife to give the CHW updates at outreach calls.    Updated: 2/5/21               Other (pt-stated)       Goal Statement: I would like a referral for counseling.  Date Goal set: 06/25/21   Barriers: Not sure if should schedule through [facility] or a in network provider   Date to Achieve By: 12/25/21   Patient expressed understanding of goal: yes     Action steps to achieve this goal:   1. I will work with CHW and wife on referral for counseling.   2. I will ask my wife to speak with a [facility]  about any services or supports I might qualify for. I will ask my wife to give the CHW updates at outreach calls.                Plan/Recommendations: The patient will continue working with Care Coordination to achieve goals as above.  CHW will involve TA[facility]  CC as needed or if patient is ready to move to maintenance.  SW CC will continue to monitor progress to goals and CHW outreaches every 6 weeks.   Plan of Care updated and mailed to patient: KEVIN Quiñones   [facility] Care Coordination  Tel: [phone]

## 2021-12-08 ENCOUNTER — PATIENT OUTREACH (OUTPATIENT)
Dept: NURSING | Facility: CLINIC | Age: 81
End: 2021-12-08
Payer: COMMERCIAL

## 2021-12-08 NOTE — PROGRESS NOTES
Clinic Care Coordination Contact    Clinic Care Coordination Contact  OUTREACH    Referral Information:  Referral Source: PCP    ANNUAL ASSESMENT     Primary Diagnosis: Psychosocial    Chief Complaint   Patient presents with     Clinic Care Coordination - Follow-up     Annual Assessment        Universal Utilization:   Clinic Utilization  Difficulty keeping appointments:: No  Compliance Concerns: No  No-Show Concerns: No  No PCP office visit in Past Year: No  Utilization    Hospital Admissions  2             ED Visits  0             No Show Count (past year)  0                Current as of: 12/8/2021 12:53 AM              Clinical Concerns:  Current Medical Concerns:    Patient Active Problem List   Diagnosis     Generalized anxiety disorder     CAD (coronary artery disease)     Hyperlipidemia LDL goal <100     BPH (benign prostatic hypertrophy) with urinary obstruction     Parkinson's disease (H)     Gait disturbance, post-stroke     Herniated nucleus pulposus, L5-S1, right     Pain in right hip     Bunion     Allergic conjunctivitis     Diverticulosis     Glaucoma suspect, bilateral     Senile nuclear sclerosis, bilateral     Dry eye, bilateral     History of corneal transplant     Major depressive disorder, single episode, moderate (H)     Lactose intolerance in adult     Degeneration of L4-L5 intervertebral disc     Compression fracture of thoracic vertebra, with routine healing, subsequent encounter     CKD (chronic kidney disease) stage 3, GFR 30-59 ml/min (H)     Falls frequently     Slow transit constipation     Orthostatic hypotension     Acute atopic conjunctivitis, unspecified eye     Age-related nuclear cataract, bilateral     Bradycardia, unspecified     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 (H)     History of falling     Other chronic pain     Preglaucoma, unspecified, bilateral     Problems in relationship with spouse or partner     Tremor, unspecified     Benign  prostatic hyperplasia with lower urinary tract symptoms     Bunion of unspecified foot     Atherosclerotic heart disease of native coronary artery without angina pectoris     Chronic kidney disease, stage 3a (H)     Wedge compression fracture of unspecified thoracic vertebra, subsequent encounter for fracture with routine healing     Other intervertebral disc degeneration, lumbar region     Diverticulosis of intestine, part unspecified, without perforation or abscess without bleeding     Essential hypertension with goal blood pressure less than 140/90     Other sequelae of cerebral infarction     Other intervertebral disc displacement, lumbosacral region     Corneal transplant status     Lactose intolerance, unspecified     Low back pain     Hyperlipidemia, unspecified     Dry eye syndrome of bilateral lacrimal glands     Unspecified epiphora, unspecified side     Urinary retention     SI joint arthritis     Chronic right-sided low back pain with right-sided sciatica     Lumbar radiculopathy         Current Behavioral Concerns: Patient gets very tired very easily.         Education Provided to patient: Patient   Pain  Pain (GOAL):: No  Health Maintenance Reviewed: Due/Overdue   Health Maintenance Due   Topic Date Due     COVID-19 Vaccine (1) Never done     ZOSTER IMMUNIZATION (3 of 3) 03/29/2019     INFLUENZA VACCINE (1) 09/01/2021     CMP  09/25/2021     PHQ-9  12/04/2021       Clinical Pathway: None    Medication Management:  Medication review status: Medications reviewed and no changes reported per patient.          Current Outpatient Medications   Medication Sig Dispense Refill     aspirin (ASA) 81 MG tablet Take 1 tablet (81 mg) by mouth daily 90 tablet 3     atorvastatin (LIPITOR) 20 MG tablet Take 1 tablet (20 mg) by mouth daily 90 tablet 3     bisacodyl (DULCOLAX) 10 MG suppository Place 1 suppository (10 mg) rectally daily as needed for constipation 10 suppository 0     buPROPion (WELLBUTRIN XL) 300 MG 24  hr tablet 1 tablet in the morning       carbidopa-levodopa (SINEMET CR)  MG CR tablet Take 1 tablet by mouth At Bedtime 90 tablet 3     carbidopa-levodopa (SINEMET)  MG tablet Take 2 tablets by mouth 3 times daily Take two tablets 3 times a day, at 7am, 11am & 4pm. 540 tablet 3     diclofenac (VOLTAREN) 50 MG EC tablet Take 50 mg by mouth 2 times daily Take one tablet by mouth twice a day for Pain** Take with food  [facility]: Shamar Garnica        DULoxetine (CYMBALTA) 30 MG capsule Take 1 capsule (30 mg) by mouth daily 30 capsule 0     DULoxetine (CYMBALTA) 60 MG capsule TAKE 1 CAPSULE (60 MG) BY MOUTH DAILY 90 capsule 3     enalapril (VASOTEC) 2.5 MG tablet Take 1 tablet (2.5 mg) by mouth 2 times daily 180 tablet 3     hydrALAZINE (APRESOLINE) 25 MG tablet Take 1 tablet (25 mg) by mouth 2 times daily as needed (Take 1 tablet if systolic blood pressure is >180) 90 tablet 3     omeprazole (PRILOSEC OTC) 20 MG EC tablet Take 20 mg by mouth daily Prescribed by [facility] doctor: Shamar Garnica        polyethylene glycol (MIRALAX) 17 GM/SCOOP powder Take 17 g (1 capful) by mouth 3 times daily (Patient taking differently: Take 1 capful by mouth 3 times daily as needed for constipation ) 1 Bottle 11     rOPINIRole (REQUIP) 0.5 MG tablet Take 0.5 mg by mouth 3 times daily       SENNA-docusate sodium (SENNA S) 8.6-50 MG tablet Take 2 tablets by mouth 2 times daily 120 tablet 11     sulfamethoxazole-trimethoprim (BACTRIM) 400-80 MG tablet Take 1 tablet by mouth At Bedtime For UTI prevention 90 tablet 3     vitamin D3 (CHOLECALCIFEROL) 2000 units (50 mcg) tablet Take 1 tablet by mouth daily as needed        Patient's wife handles patient's medications. He does not have side effects. Patient also takes fish oil and patient's wife is concerned regarding this.     Patient's wife is interested in MTM help.       Functional Status:  Dependent ADLs:: Bathing,Dressing,Toileting  Dependent IADLs::  Cleaning,Medication Management,Shopping,Laundry,Cooking,Transportation,Meal Preparation  Bed or wheelchair confined:: No  Mobility Status: Independent w/Device  Fallen 2 or more times in the past year?: No  Any fall with injury in the past year?: No    Living Situation:  Current living arrangement:: I live in a private home with spouse  Type of residence:: Private home - stairs    Lifestyle & Psychosocial Needs:    Social Determinants of Health     Tobacco Use: Medium Risk     Smoking Tobacco Use: Former Smoker     Smokeless Tobacco Use: Former User   Alcohol Use: Not on file   Financial Resource Strain: Not on file   Food Insecurity: Not on file   Transportation Needs: Not on file   Physical Activity: Not on file   Stress: Not on file   Social Connections: Not on file   Intimate Partner Violence: Not on file   Depression: Not at risk     PHQ-2 Score: 0   Housing Stability: Not on file     Diet:: Regular  Inadequate nutrition (GOAL):: No  Tube Feeding: No  Inadequate activity/exercise (GOAL):: No  Significant changes in sleep pattern (GOAL): No  Transportation means:: Family     Islam or spiritual beliefs that impact treatment:: No  Mental health DX:: No  Mental health management concern (GOAL):: No  Chemical Dependency Status: No Current Concerns  Informal Support system:: Children,Family,Neighbors,Spouse        [facility] called patient for annual assessment that was scheduled today. Patient did PT in the summer. They went to a PT who tried to get Vini to do some things that stretched his limits. Patient's wife would like patient to do OT/PT. Patient and wife are very busy with leeroy.     Patient is not feeling good right now and he is constipated. Patient's wife sounded frustrated but shares patient just wants to do stuff his way. She doesn't like caring for him like a little kid. Patient gets very tired very quickly. They went to the [employer] for his birthday and were gone for nearly 4-5 hours. He had to lie  down for 3 hours.    Patient's wife handles patients medications.       Resources and Interventions:  Current Resources:      Community Resources: Other (see comment) (PT/OT)  Supplies used at home:: Incontinence Supplies (Palomares catheter)  Equipment Currently Used at Home: walker, standard  Employment Status: retired         Advance Care Plan/Directive  Advanced Care Plans/Directives on file:: No  Type Advanced Care Plans/Directives: Advanced Directive - On File  Advanced Care Plan/Directive Status: Considering Options    Referrals Placed: Outpatient Services     Goals:   Goals        General     Medical- [facility] (pt-stated)      Notes - Note edited  2/5/2021  2:38 PM by Estrella Bradley     Goal Statement: I would like to see what benefits I have from the VA in the next two months.   Date Goal set: 10/9/2020  Barriers: unsure how to go about this   Strengths: asking for help  Date to Achieve By: 12/9/2020  Patient expressed understanding of goal: yes    Action steps to achieve this goal:  1. I will ask my wife to speak with a [facility]  when I go in for my COVID vaccine about any services or supports I might qualify for.  2. I will ask my wife to give the [facility] updates at outreach calls.    Updated: 2/5/21             Other (pt-stated)      Notes - Note edited  11/30/2021  3:53 PM by Catalina Torres     Goal Statement: I would like a referral for counseling.  Date Goal set: 06/25/21   Barriers: Not sure if should schedule through VA or a in network provider   Date to Achieve By: 12/25/21   Patient expressed understanding of goal: yes     Action steps to achieve this goal:   1. I will work with CHW and wife on referral for counseling.   2. I will ask my wife to speak with a [facility]  about any services or supports I might qualify for. I will ask my wife to give the [facility] updates at outreach calls.            Patient/Caregiver understanding: Patient verbalized understanding of the goal & action steps to achieve  goal. Patient is in agreement with plan.      Outreach Frequency: 6 weeks  Future Appointments              Today [facility] SW M [facility] [place]    In 1 month Gaurav Ford MD [facility]    In 1 month Selene Land MD [facility], [place]    In 3 months Dalila Staples, APRN CNP [facility] Neurology [facility]          Plan: 1. Patient's wife to call VA to get help with paperwork.   2. Patient's wife to schedule OT/PT through VA.  3. Patient to rest and hydrate.  4. Care Coordinator will review progress to goals and plan of care in 6 weeks.    KEVIN Osuna   [facility] Care Coordination  Tel: [phone]

## 2021-12-11 ENCOUNTER — OFFICE VISIT (OUTPATIENT)
Dept: URGENT CARE | Facility: URGENT CARE | Age: 81
End: 2021-12-11
Payer: COMMERCIAL

## 2021-12-11 ENCOUNTER — ANCILLARY PROCEDURE (OUTPATIENT)
Dept: GENERAL RADIOLOGY | Facility: CLINIC | Age: 81
End: 2021-12-11
Attending: INTERNAL MEDICINE
Payer: COMMERCIAL

## 2021-12-11 VITALS
SYSTOLIC BLOOD PRESSURE: 162 MMHG | DIASTOLIC BLOOD PRESSURE: 78 MMHG | OXYGEN SATURATION: 98 % | BODY MASS INDEX: 25.62 KG/M2 | HEART RATE: 88 BPM | TEMPERATURE: 99.3 F | RESPIRATION RATE: 16 BRPM | WEIGHT: 170 LBS

## 2021-12-11 DIAGNOSIS — K59.00 CONSTIPATION, UNSPECIFIED CONSTIPATION TYPE: ICD-10-CM

## 2021-12-11 DIAGNOSIS — K59.00 CONSTIPATION, UNSPECIFIED CONSTIPATION TYPE: Primary | ICD-10-CM

## 2021-12-11 PROCEDURE — 74019 RADEX ABDOMEN 2 VIEWS: CPT | Performed by: RADIOLOGY

## 2021-12-11 PROCEDURE — 99213 OFFICE O/P EST LOW 20 MIN: CPT | Performed by: INTERNAL MEDICINE

## 2021-12-12 NOTE — PATIENT INSTRUCTIONS
Use a fiber supplement (Citrucel) 1 teaspoon in 8 ounces water twice daily.    For the next couple of days, use Miralax once daily unless you're having liquid stool.  In that case, hold off on the Miralax for that day.    Keep the fiber in your diet steady.

## 2021-12-12 NOTE — PROGRESS NOTES
Assessment & Plan     Constipation, unspecified constipation type  Patient Instructions   Use a fiber supplement (Citrucel) 1 teaspoon in 8 ounces water twice daily.    For the next couple of days, use Miralax once daily unless you're having liquid stool.  In that case, hold off on the Miralax for that day.    Keep the fiber in your diet steady.   - XR Abdomen 2 Views; Future    Rory Washington MD  [facility]    Cecilia Martini is a 81 year old who presents for the following health issues  accompanied by his spouse.    HPI   Noting that he hasn't passed bowel movement in several days and is getting more bloated. He took a dose of Miralax yesterday and he also uses senna-docusate twice daily every day.  Used a dulcolax suppository today.  Also took mag citrate today.  He has Parkinson's, hypertension.  Has been troubled with more constipation in the past year.          Objective    BP (!) 162/78   Pulse 88   Temp 99.3  F (37.4  C) (Tympanic)   Resp 16   Wt 77.1 kg (170 lb)   SpO2 98%   BMI 25.62 kg/m    Body mass index is 25.62 kg/m .  Physical Exam   GENERAL APPEARANCE: healthy, alert and no distress  ABD: bowel sounds are present, soft and nontender,nondistended  RECTAL: bronw liquid stool in the vault, poor tone, no impaction    Plain films of the abdomen show fluid in the intestine with moderate stool; no air fluid levels or dilated loops.  No free air.

## 2022-01-12 ENCOUNTER — PATIENT OUTREACH (OUTPATIENT)
Dept: NURSING | Facility: CLINIC | Age: 82
End: 2022-01-12
Payer: COMMERCIAL

## 2022-01-12 NOTE — PROGRESS NOTES
Clinic Care Coordination Contact    Follow Up Progress Note      Assessment: [facility] called patient's wife, Kristi for pro active monthly outreach. Patient's wife shares she is in a grumpy mood. Shares she is concerned about the virus but she doesn't think it is as bad as people are saying. She shares the hospitals are full and it is depressing her. Patient wasn't feeling good a few days ago and asked to go to the hospital- patient's wife shared that it wasn't serious and she offered to drop him off.    [facility] inquired if patient has started OT/PT and patient's wife shares that they have not called to get it set up yet. She shared she is dragging her feet because patient is resistant to PT/OT. She has not worked with the [facility] to determine what benefits and assistance may be available.    Care Gaps:    Health Maintenance Due   Topic Date Due     COVID-19 Vaccine (1) Never done     ZOSTER IMMUNIZATION (3 of 3) 03/29/2019     INFLUENZA VACCINE (1) 09/01/2021     CMP  09/25/2021     PHQ-9  12/04/2021       Patient accepted scheduling phone number for [facility] but sounded resistant to calling.  to schedule independently     Goals addressed this encounter:   Goals Addressed                    This Visit's Progress       Medical- [facility] (pt-stated)   50%      Goal Statement: I would like to see what benefits I have from the VA in the next two months.   Date Goal set: 10/9/2020  Barriers: unsure how to go about this   Strengths: asking for help  Date to Achieve By: 12/9/2020  Patient expressed understanding of goal: yes    Action steps to achieve this goal:  1. I will ask my wife to speak with a [facility]  when I go in for my COVID vaccine about any services or supports I might qualify for.  2. I will ask my wife to give the [facility] updates at outreach calls.    Updated: 2/5/21                  Intervention/Education provided during outreach: [facility] reviewed goals and used active listening.     Outreach Frequency: 6  weeks    Plan:   Patient's wife to call the [facility]  Care Coordinator will follow up in 1 month.    KEVIN Osuna   [facility] Care Coordination  Tel: [phone]

## 2022-01-20 ENCOUNTER — OFFICE VISIT (OUTPATIENT)
Dept: FAMILY MEDICINE | Facility: CLINIC | Age: 82
End: 2022-01-20
Payer: COMMERCIAL

## 2022-01-20 VITALS
RESPIRATION RATE: 16 BRPM | HEART RATE: 58 BPM | OXYGEN SATURATION: 99 % | SYSTOLIC BLOOD PRESSURE: 138 MMHG | HEIGHT: 68 IN | TEMPERATURE: 96.8 F | WEIGHT: 176 LBS | DIASTOLIC BLOOD PRESSURE: 92 MMHG | BODY MASS INDEX: 26.67 KG/M2

## 2022-01-20 DIAGNOSIS — G89.29 CHRONIC RIGHT-SIDED LOW BACK PAIN WITH RIGHT-SIDED SCIATICA: ICD-10-CM

## 2022-01-20 DIAGNOSIS — K59.00 CONSTIPATION, UNSPECIFIED CONSTIPATION TYPE: ICD-10-CM

## 2022-01-20 DIAGNOSIS — M54.41 CHRONIC RIGHT-SIDED LOW BACK PAIN WITH RIGHT-SIDED SCIATICA: ICD-10-CM

## 2022-01-20 DIAGNOSIS — F32.1 MAJOR DEPRESSIVE DISORDER, SINGLE EPISODE, MODERATE (H): ICD-10-CM

## 2022-01-20 DIAGNOSIS — I10 ESSENTIAL HYPERTENSION WITH GOAL BLOOD PRESSURE LESS THAN 140/90: ICD-10-CM

## 2022-01-20 DIAGNOSIS — N18.31 CHRONIC KIDNEY DISEASE, STAGE 3A (H): ICD-10-CM

## 2022-01-20 DIAGNOSIS — G20.A1 PARKINSON'S DISEASE (H): Primary | ICD-10-CM

## 2022-01-20 PROCEDURE — 99214 OFFICE O/P EST MOD 30 MIN: CPT | Performed by: FAMILY MEDICINE

## 2022-01-20 ASSESSMENT — PATIENT HEALTH QUESTIONNAIRE - PHQ9
10. IF YOU CHECKED OFF ANY PROBLEMS, HOW DIFFICULT HAVE THESE PROBLEMS MADE IT FOR YOU TO DO YOUR WORK, TAKE CARE OF THINGS AT HOME, OR GET ALONG WITH OTHER PEOPLE: NOT DIFFICULT AT ALL
SUM OF ALL RESPONSES TO PHQ QUESTIONS 1-9: 10
SUM OF ALL RESPONSES TO PHQ QUESTIONS 1-9: 10

## 2022-01-20 ASSESSMENT — MIFFLIN-ST. JEOR: SCORE: 1472.83

## 2022-01-20 NOTE — PATIENT INSTRUCTIONS
Constipation  1. Miralax.  I  recommend taking miralax up to 4 times per day but increase slowly.  Take miralax every single day.      2.  Make sure you're getting #3 8 ounce glasses of water when taking citracel.    3. Great job getting daily exercise, this helps move the stool!

## 2022-01-20 NOTE — PROGRESS NOTES
Assessment & Plan     (G20) Parkinson's disease (H)  (primary encounter diagnosis)  Slow worsening, limiting activity  Continue to follow up with Neurologist as scheduled    (F32.1) Major depressive disorder, single episode, moderate (H)  Comment:   PHQ 4/1/2021 6/4/2021 1/20/2022   PHQ-9 Total Score 13 9 10   Q9: Thoughts of better off dead/self-harm past 2 weeks More than half the days Not at all Not at all   F/U: Thoughts of suicide or self-harm No - -   F/U: Safety concerns No - -      Plan: aggravated by physical disease and chronic pain  onging support    (N18.31) Chronic kidney disease, stage 3a (H)  Comment:   GFR Estimate   Date Value Ref Range Status   10/12/2021 58 (L) >60 mL/min/1.73m2 Final     Comment:     As of July 11, 2021, eGFR is calculated by the CKD-EPI creatinine equation, without race adjustment. eGFR can be influenced by muscle mass, exercise, and diet. The reported eGFR is an estimation only and is only applicable if the renal function is stable.   07/23/2021 63 >60 mL/min/1.73m2 Final     Comment:     As of July 11, 2021, eGFR is calculated by the CKD-EPI creatinine equation, without race adjustment. eGFR can be influenced by muscle mass, exercise, and diet. The reported eGFR is an estimation only and is only applicable if the renal function is stable.   06/04/2021 62 >60 mL/min/[1.73_m2] Final     Comment:     Non  GFR Calc  Starting 12/18/2018, serum creatinine based estimated GFR (eGFR) will be   calculated using the Chronic Kidney Disease Epidemiology Collaboration   (CKD-EPI) equation.     01/28/2021 68 >60 mL/min/[1.73_m2] Final     Comment:     Non  GFR Calc  Starting 12/18/2018, serum creatinine based estimated GFR (eGFR) will be   calculated using the Chronic Kidney Disease Epidemiology Collaboration   (CKD-EPI) equation.     01/14/2021 63 >60 mL/min/[1.73_m2] Final     Comment:     Non  GFR Calc  Starting 12/18/2018, serum  "creatinine based estimated GFR (eGFR) will be   calculated using the Chronic Kidney Disease Epidemiology Collaboration   (CKD-EPI) equation.         Plan: stable    (I10) Essential hypertension with goal blood pressure less than 140/90  Comment:   BP Readings from Last 3 Encounters:   01/20/22 (!) 138/92   12/11/21 (!) 162/78   11/22/21 (!) 193/94      Plan: continue prn medications for blood pressure spikes as per cardiologist    (M54.41,  G89.29) Chronic right-sided low back pain with right-sided sciatica  Comment: he would like to try physical therapy again, see order  Plan: [facility]            (K59.00) Constipation, unspecified constipation type  Comment: discussed taking daily (up to 4 times daily) Miralax  Return to clinic if symptoms worsen or progress.        BMI:   Estimated body mass index is 26.76 kg/m  as calculated from the following:    Height as of this encounter: 1.727 m (5' 8\").    Weight as of this encounter: 79.8 kg (176 lb).         Return in about 3 months (around 4/20/2022) for chronic disease management, Hypertension follow up.    Selene Land MD  [facility]    Cecilia Martini is a 81 year old who presents for the following health issues  accompanied by his spouse.    History of Present Illness       Hypertension: He presents for follow up of hypertension.  He does not check blood pressure  regularly outside of the clinic. Outside blood pressures have been over 140/90. He does not follow a low salt diet.         Depression Followup    How are you doing with your depression since your last visit? No change    Are you having other symptoms that might be associated with depression? No    Have you had a significant life event?  No     Are you feeling anxious or having panic attacks?   No    Do you have any concerns with your use of alcohol or other drugs? No     He has labile blood pressure, has been prescribed prn blood pressure " medications for sbp > 180, which works well.    Social History     Tobacco Use     Smoking status: Former Smoker     Packs/day: 0.50     Years: 3.00     Pack years: 1.50     Types: Cigarettes     Start date: 1960     Quit date: 3/14/1966     Years since quittin.8     Smokeless tobacco: Former User   Vaping Use     Vaping Use: Never used   Substance Use Topics     Alcohol use: No     Comment: occasional wine on the holidays      Drug use: No     PHQ 2021   PHQ-9 Total Score 13 9 10   Q9: Thoughts of better off dead/self-harm past 2 weeks More than half the days Not at all Not at all   F/U: Thoughts of suicide or self-harm No - -   F/U: Safety concerns No - -     ELISHA-7 SCORE 3/15/2018 2021 2021   Total Score - - -   Total Score - 7 (mild anxiety) 0 (minimal anxiety)   Total Score 0 7 0     Constipation  Chronic, using citracel but seems to make symptoms worse sometime. He takes miralax once per day, tried to drink plenty of water most days, and exercises regularly. He takes senna daily.    Chronic Kidney Disease Follow-up    Do you take any over the counter pain medicine?: Yes  What over the counter medicine are you taking for your pain?:  Voltaren, acetaminophen, lidocaine patches      How often do you take this medicine?:  One time daily   .  GFR Estimate   Date Value Ref Range Status   10/12/2021 58 (L) >60 mL/min/1.73m2 Final     Comment:     As of 2021, eGFR is calculated by the CKD-EPI creatinine equation, without race adjustment. eGFR can be influenced by muscle mass, exercise, and diet. The reported eGFR is an estimation only and is only applicable if the renal function is stable.   2021 63 >60 mL/min/1.73m2 Final     Comment:     As of 2021, eGFR is calculated by the CKD-EPI creatinine equation, without race adjustment. eGFR can be influenced by muscle mass, exercise, and diet. The reported eGFR is an estimation only and is only applicable if  "the renal function is stable.   06/04/2021 62 >60 mL/min/[1.73_m2] Final     Comment:     Non  GFR Calc  Starting 12/18/2018, serum creatinine based estimated GFR (eGFR) will be   calculated using the Chronic Kidney Disease Epidemiology Collaboration   (CKD-EPI) equation.     01/28/2021 68 >60 mL/min/[1.73_m2] Final     Comment:     Non  GFR Calc  Starting 12/18/2018, serum creatinine based estimated GFR (eGFR) will be   calculated using the Chronic Kidney Disease Epidemiology Collaboration   (CKD-EPI) equation.     01/14/2021 63 >60 mL/min/[1.73_m2] Final     Comment:     Non  GFR Calc  Starting 12/18/2018, serum creatinine based estimated GFR (eGFR) will be   calculated using the Chronic Kidney Disease Epidemiology Collaboration   (CKD-EPI) equation.           Chronic/Recurring Back Pain Follow Up      Where is your back pain located? (Select all that apply) chronic low back pain due to known DDD, pain not well controlled, does exercises daily or twice daily    Since your last visit, have you tried any new treatment? No, would like physical therapy again        Review of Systems   Constitutional, HEENT, cardiovascular, pulmonary, GI, , musculoskeletal, neuro, skin, endocrine and psych systems are negative, except as otherwise noted.      Objective    BP (!) 138/92 (BP Location: Left arm, Patient Position: Sitting, Cuff Size: Adult Regular)   Pulse 58   Temp 96.8  F (36  C) (Temporal)   Resp 16   Ht 1.727 m (5' 8\")   Wt 79.8 kg (176 lb)   SpO2 99%   BMI 26.76 kg/m    Body mass index is 26.76 kg/m .  Physical Exam   GENERAL: healthy, alert, no distress and flat facies  NECK: no adenopathy, no asymmetry, masses, or scars and thyroid normal to palpation  RESP: lungs clear to auscultation - no rales, rhonchi or wheezes  CV: regular rate and rhythm, normal S1 S2, no S3 or S4, no murmur, click or rub, no peripheral edema and peripheral pulses strong  ABDOMEN: soft, " nontender, no hepatosplenomegaly, no masses and bowel sounds normal  MS: moves stiflly and slowly, wide based gait, hesitant stepsNEURO: sensory exam grossly normal and mentation intact  BACK: no CVA tenderness, no paralumbar tenderness  PSYCH: mentation appears normal, affect flat and judgement and insight intact                Answers for HPI/ROS submitted by the patient on 1/20/2022  If you checked off any problems, how difficult have these problems made it for you to do your work, take care of things at home, or get along with other people?: Not difficult at all  PHQ9 TOTAL SCORE: 10

## 2022-01-21 ASSESSMENT — PATIENT HEALTH QUESTIONNAIRE - PHQ9: SUM OF ALL RESPONSES TO PHQ QUESTIONS 1-9: 10

## 2022-01-25 DIAGNOSIS — I10 ESSENTIAL HYPERTENSION: Primary | ICD-10-CM

## 2022-01-28 ENCOUNTER — THERAPY VISIT (OUTPATIENT)
Dept: PHYSICAL THERAPY | Facility: CLINIC | Age: 82
End: 2022-01-28
Payer: COMMERCIAL

## 2022-01-28 DIAGNOSIS — M54.41 CHRONIC RIGHT-SIDED LOW BACK PAIN WITH RIGHT-SIDED SCIATICA: ICD-10-CM

## 2022-01-28 DIAGNOSIS — M54.41 RIGHT-SIDED LOW BACK PAIN WITH RIGHT-SIDED SCIATICA: ICD-10-CM

## 2022-01-28 DIAGNOSIS — G89.29 CHRONIC RIGHT-SIDED LOW BACK PAIN WITH RIGHT-SIDED SCIATICA: ICD-10-CM

## 2022-01-28 PROCEDURE — 97110 THERAPEUTIC EXERCISES: CPT | Mod: GP | Performed by: PHYSICAL THERAPIST

## 2022-01-28 PROCEDURE — 97162 PT EVAL MOD COMPLEX 30 MIN: CPT | Mod: GP | Performed by: PHYSICAL THERAPIST

## 2022-01-28 NOTE — PROGRESS NOTES
[facility]    OUTPATIENT Physical Therapy ORTHOPEDIC EVALUATION  PLAN OF TREATMENT FOR OUTPATIENT REHABILITATION  (COMPLETE FOR INITIAL CLAIMS ONLY)  Patient's Last Name, First Name, M.I.  YOB: 1940  Vini Loya    Provider s Name:  [facility]   Medical Record No.  [id number]   Start of Care Date:  01/28/22   Onset Date:   10/14/21   Type:     _X__PT   ___OT Medical Diagnosis:    Encounter Diagnoses   Name Primary?     Chronic right-sided low back pain with right-sided sciatica      Right-sided low back pain with right-sided sciatica         Treatment Diagnosis:  Chronic R sided LBP         Goals:     01/28/22 0500   Body Part   Goals listed below are for R sided LBP    Goal #1   Goal #1 standing   Previous Functional Level No restrictions   Current Functional Level Minutes patient can stand   Performance level 10' pain 6/10; R lateral trunk lean   STG Target Performance Minutes patient will be able to stand   Performance level >20' pain 3/10 or less   Rationale for housekeeping tasks such as vacuuming, bed making, mowing, gardening;for personal hygiene;for meal preparation   Due date 02/25/22   LTG Target Performance Minutes patient will be able to stand   Performance Level >30' painfree   Rationale for housekeeping tasks such as vacuuming, bed making, mowing;for personal hygiene;for meal preparation   Due date 03/25/22       Therapy Frequency:  1x week  Predicted Duration of Therapy Intervention:  8 weeks    Clinton Cedeno, PT                 I CERTIFY THE NEED FOR THESE SERVICES FURNISHED UNDER        THIS PLAN OF TREATMENT AND WHILE UNDER MY CARE     (Physician attestation of this document indicates review and certification of the therapy plan).                       Certification Date From:  01/28/22   Certification Date To:  04/26/22    Referring Provider:   Selene Land    Initial Assessment        See Epic Evaluation SOC Date: 01/28/22

## 2022-01-28 NOTE — PROGRESS NOTES
Physical Therapy Initial Evaluation  Subjective:    Therapist Generated HPI Evaluation  Problem details: Pt presents to PT with a chief complaint of chronic R sided LBP with R sided sciatica with recent RAHEEL on 10/14/21. Pt reports improvement in R LE pain, however his R sided LBP has remained the same. Pt has completed previous PT in 03/2021 and reports no significant change. Primary pain location identified at R lower lumbar spine and worse with standing and walking. .         Type of problem:  Lumbar.    This is a chronic condition.  Condition occurred with:  Degenerative joint disease.  Where condition occurred: for unknown reasons.  Patient reports pain:  Lower lumbar spine and lumbar spine right.  Pain is described as sharp and is intermittent.  Radiates to: previous radiation into R Lower leg. Pain is worse in the P.M..  Since onset symptoms are gradually worsening.  Associated symptoms:  Loss of motion/stiffness. Symptoms are exacerbated by walking, standing and lifting  and relieved by NSAID's and rest.  Special tests included:  MRI and x-ray.  Previous treatment includes physical therapy. There was none improvement following previous treatment.  Barriers include:  None as reported by patient.    Patient Health History         Pain is reported as 6/10 on pain scale.  General health as reported by patient is poor.  Pertinent medical history includes: high blood pressure (parkinsons).   Red flags:  None as reported by patient.  Medical allergies: none.       Current medications:  High blood pressure medication.    Occupation: retired .                                       Objective:    Gait:  Significant R lateral trunk lean with walking     Gait Type:  Antalgic                    Lumbar/SI Evaluation  ROM:    AROM Lumbar:   Flexion:          Mod loss  Ext:                    Mod loss, pain ++   Side Bend:        Left:  Min loss    Right:  Mod loss, pain ++  Rotation:           Left:     Right:    Side Glide:        Left:     Right:           Lumbar Myotomes:  normal                Lumbar Dermtomes:  normal                  Lumbar Palpation:      Tenderness present at Right: Erector Spinae      Spinal Segmental Conclusions:     Level: Hypo noted at L5 and L4                                                   General     ROS    Assessment/Plan:    Patient is a 81 year old male with lumbar complaints.    Patient has the following significant findings with corresponding treatment plan.                Diagnosis 1:  R sided LBP  Pain -  manual therapy, self management, education, directional preference exercise and home program  Decreased ROM/flexibility - manual therapy and therapeutic exercise  Decreased joint mobility - manual therapy and therapeutic exercise  Decreased strength - therapeutic exercise and therapeutic activities  Impaired gait - gait training  Impaired muscle performance - neuro re-education    Therapy Evaluation Codes:   1) History comprised of:   Personal factors that impact the plan of care:      Anxiety, Overall behavior pattern, Past/current experiences and Time since onset of symptoms.    Comorbidity factors that impact the plan of care are:      Weakness.     Medications impacting care: High blood pressure.  2) Examination of Body Systems comprised of:   Body structures and functions that impact the plan of care:      Lumbar spine.   Activity limitations that impact the plan of care are:      Bathing, Bending, Cooking, Dressing, Lifting, Standing and Walking.  3) Clinical presentation characteristics are:   Evolving/Changing.  4) Decision-Making    Moderate complexity using standardized patient assessment instrument and/or measureable assessment of functional outcome.  Cumulative Therapy Evaluation is: Moderate complexity.    Previous and current functional limitations:  (See Goal Flow Sheet for this information)    Short term and Long term goals: (See Goal Flow Sheet for this  information)     Communication ability:  Patient appears to be able to clearly communicate and understand verbal and written communication and follow directions correctly.  Treatment Explanation - The following has been discussed with the patient:   RX ordered/plan of care  Anticipated outcomes  Possible risks and side effects  This patient would benefit from PT intervention to resume normal activities.   Rehab potential is good.    Frequency:  1 X week, once daily  Duration:  for 8 weeks  Discharge Plan:  Achieve all LTG.  Independent in home treatment program.  Reach maximal therapeutic benefit.    Please refer to the daily flowsheet for treatment today, total treatment time and time spent performing 1:1 timed codes.

## 2022-02-03 ENCOUNTER — THERAPY VISIT (OUTPATIENT)
Dept: PHYSICAL THERAPY | Facility: CLINIC | Age: 82
End: 2022-02-03
Payer: COMMERCIAL

## 2022-02-03 DIAGNOSIS — M54.41 RIGHT-SIDED LOW BACK PAIN WITH RIGHT-SIDED SCIATICA: ICD-10-CM

## 2022-02-03 PROCEDURE — 97140 MANUAL THERAPY 1/> REGIONS: CPT | Mod: GP | Performed by: PHYSICAL THERAPIST

## 2022-02-03 PROCEDURE — 97112 NEUROMUSCULAR REEDUCATION: CPT | Mod: GP | Performed by: PHYSICAL THERAPIST

## 2022-02-03 PROCEDURE — 97110 THERAPEUTIC EXERCISES: CPT | Mod: GP | Performed by: PHYSICAL THERAPIST

## 2022-02-04 ENCOUNTER — LAB (OUTPATIENT)
Dept: LAB | Facility: CLINIC | Age: 82
End: 2022-02-04
Payer: COMMERCIAL

## 2022-02-04 ENCOUNTER — OFFICE VISIT (OUTPATIENT)
Dept: CARDIOLOGY | Facility: CLINIC | Age: 82
End: 2022-02-04
Attending: INTERNAL MEDICINE
Payer: COMMERCIAL

## 2022-02-04 VITALS
BODY MASS INDEX: 27.13 KG/M2 | WEIGHT: 178.4 LBS | SYSTOLIC BLOOD PRESSURE: 160 MMHG | OXYGEN SATURATION: 100 % | DIASTOLIC BLOOD PRESSURE: 94 MMHG | HEART RATE: 61 BPM

## 2022-02-04 DIAGNOSIS — R09.89 LABILE BLOOD PRESSURE: Primary | ICD-10-CM

## 2022-02-04 DIAGNOSIS — G20.A1 PARKINSON'S DISEASE (H): ICD-10-CM

## 2022-02-04 DIAGNOSIS — I10 ESSENTIAL HYPERTENSION: ICD-10-CM

## 2022-02-04 LAB
ANION GAP SERPL CALCULATED.3IONS-SCNC: 5 MMOL/L (ref 3–14)
BUN SERPL-MCNC: 20 MG/DL (ref 7–30)
CALCIUM SERPL-MCNC: 9.4 MG/DL (ref 8.5–10.1)
CHLORIDE BLD-SCNC: 107 MMOL/L (ref 94–109)
CO2 SERPL-SCNC: 30 MMOL/L (ref 20–32)
CREAT SERPL-MCNC: 1.13 MG/DL (ref 0.66–1.25)
GFR SERPL CREATININE-BSD FRML MDRD: 65 ML/MIN/1.73M2
GLUCOSE BLD-MCNC: 95 MG/DL (ref 70–99)
POTASSIUM BLD-SCNC: 4.4 MMOL/L (ref 3.4–5.3)
SODIUM SERPL-SCNC: 142 MMOL/L (ref 133–144)

## 2022-02-04 PROCEDURE — 99213 OFFICE O/P EST LOW 20 MIN: CPT | Mod: GC | Performed by: INTERNAL MEDICINE

## 2022-02-04 PROCEDURE — 80048 BASIC METABOLIC PNL TOTAL CA: CPT | Performed by: PATHOLOGY

## 2022-02-04 PROCEDURE — 36415 COLL VENOUS BLD VENIPUNCTURE: CPT | Performed by: PATHOLOGY

## 2022-02-04 PROCEDURE — G0463 HOSPITAL OUTPT CLINIC VISIT: HCPCS

## 2022-02-04 RX ORDER — TAMSULOSIN HYDROCHLORIDE 0.4 MG/1
1 CAPSULE ORAL PRN
COMMUNITY

## 2022-02-04 RX ORDER — ENALAPRIL MALEATE 5 MG/1
5 TABLET ORAL 2 TIMES DAILY
Qty: 180 TABLET | Refills: 3 | Status: SHIPPED | OUTPATIENT
Start: 2022-02-04

## 2022-02-04 ASSESSMENT — PAIN SCALES - GENERAL: PAINLEVEL: NO PAIN (0)

## 2022-02-04 NOTE — PATIENT INSTRUCTIONS
"You were seen today in the Cardiovascular Clinic at the [facility].     Cardiology Providers you saw during your visit: Dr. Eloise Shannon     Diagnosis:   Encounter Diagnoses   Name Primary?     Labile blood pressure      Essential hypertension         Recommendations:   1. Increase evening dose of enalapril to 5mg and continue the morning of 2.5mg for 2 weeks. If your blood pressure is consistently over 160 and you do not feel lightheaded, increase the morning to 5mg as well.   2. Follow up with Dr. Eloise Shannon in 3-4 months.        Please feel free to call me with any questions or concerns.       Alessia Musa RN     Questions: [phone].   First press #1 for the [employer] and then press #4 for \"Medical Questions\" to reach us Cardiology Nurses.     [phone].   First press #1 for the [employer] and then press #1     On Call Cardiologist for after hours or on weekends: [phone]   option #4 and ask to speak to the on-call Cardiologist.          If you need a medication refill please contact your pharmacy.  Please allow 3 business days for your refill to be completed.  ________________________________________________________________________________________________________________________________         "

## 2022-02-04 NOTE — NURSING NOTE
Chief Complaint   Patient presents with     Follow Up     3 month follow up with Brandon'leonard Shannon     Vitals were taken and medications reconciled.    Keon Delgado, EMT  1:30 PM
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
## 2022-02-08 ENCOUNTER — THERAPY VISIT (OUTPATIENT)
Dept: PHYSICAL THERAPY | Facility: CLINIC | Age: 82
End: 2022-02-08
Payer: COMMERCIAL

## 2022-02-08 DIAGNOSIS — M54.41 RIGHT-SIDED LOW BACK PAIN WITH RIGHT-SIDED SCIATICA: ICD-10-CM

## 2022-02-08 PROCEDURE — 97110 THERAPEUTIC EXERCISES: CPT | Mod: GP | Performed by: PHYSICAL THERAPIST

## 2022-02-08 PROCEDURE — 97112 NEUROMUSCULAR REEDUCATION: CPT | Mod: GP | Performed by: PHYSICAL THERAPIST

## 2022-02-16 ENCOUNTER — THERAPY VISIT (OUTPATIENT)
Dept: PHYSICAL THERAPY | Facility: CLINIC | Age: 82
End: 2022-02-16
Payer: COMMERCIAL

## 2022-02-16 DIAGNOSIS — M54.41 RIGHT-SIDED LOW BACK PAIN WITH RIGHT-SIDED SCIATICA: ICD-10-CM

## 2022-02-16 PROCEDURE — 97110 THERAPEUTIC EXERCISES: CPT | Mod: GP | Performed by: PHYSICAL THERAPIST

## 2022-02-16 PROCEDURE — 97112 NEUROMUSCULAR REEDUCATION: CPT | Mod: GP | Performed by: PHYSICAL THERAPIST

## 2022-02-18 ENCOUNTER — PATIENT OUTREACH (OUTPATIENT)
Dept: NURSING | Facility: CLINIC | Age: 82
End: 2022-02-18
Payer: COMMERCIAL

## 2022-02-23 ENCOUNTER — THERAPY VISIT (OUTPATIENT)
Dept: PHYSICAL THERAPY | Facility: CLINIC | Age: 82
End: 2022-02-23
Payer: COMMERCIAL

## 2022-02-23 DIAGNOSIS — M54.41 RIGHT-SIDED LOW BACK PAIN WITH RIGHT-SIDED SCIATICA: ICD-10-CM

## 2022-02-23 PROCEDURE — 97110 THERAPEUTIC EXERCISES: CPT | Mod: GP | Performed by: PHYSICAL THERAPIST

## 2022-02-23 PROCEDURE — 97112 NEUROMUSCULAR REEDUCATION: CPT | Mod: GP | Performed by: PHYSICAL THERAPIST

## 2022-02-24 ENCOUNTER — PATIENT OUTREACH (OUTPATIENT)
Dept: CARE COORDINATION | Facility: CLINIC | Age: 82
End: 2022-02-24
Payer: COMMERCIAL

## 2022-02-24 NOTE — LETTER
[facility]  Patient Centered Plan of Care  About Me:        Patient Name:  Vini Potts    YOB: 1940  Age:         81 year old   Loc MRN:    [id number] Telephone Information:  Home Phone [phone]   Mobile [phone]       Address:  [address]  [facility] [address] Email address:  [email or web address]      Emergency Contact(s)    Name Relationship Lgl Grd Work Phone Home Phone Mobile Phone   1. GEORGIE POTTS Spouse No  [phone]   2. ALVAREZ POTTS Relative No [phone]    3. FOREIGN GRAVES Relative No  [phone]    4. NAZARIO POTTS Relative No  [phone]            Primary language:  English     needed? No   [facility] [employer] [facility]:  [phone] op. 1  Other communication barriers:Cognitive impairment    Preferred Method of Communication:  Mail  Current living arrangement: I live in a private home with spouse    Mobility Status/ Medical Equipment: Independent w/Device        Health Maintenance  Health Maintenance Reviewed: Due/Overdue   Health Maintenance Due   Topic Date Due     ZOSTER IMMUNIZATION (3 of 3) 03/29/2019     CMP  09/25/2021     MICROALBUMIN  02/26/2022           My Access Plan  Medical Emergency 911   Primary Clinic Line [facility] - [phone]   24 Hour Appointment Line [phone] or  [phone]) (toll-free)   24 Hour Nurse Line [phone] (toll-free)   Preferred Urgent Care [facility], [phone]     [facility]  [phone]     Preferred Pharmacy [facility] [address]     Behavioral Health Crisis Line The National Suicide Prevention Lifeline at 1-127.592.5609 or 911             My Care Team Members  Patient Care Team       Relationship Specialty Notifications Start End    Selene Land MD PCP - General  Family Medicine  4/1/21     Phone: [phone] Fax: [phone]         [address] [place] [address]    Yudith Mcdonnell MD MD Ophthalmology  9/28/15     Phone: [phone] Fax: [phone]         [address] [facility] [address] [facility] [address]    Evelyn Hairston PA Physician Assistant Physician Assistant  9/28/16     Phone: [phone] Fax: [phone]         [address] [facility] [address] [facility] [address]    Dalila Staples, APRN CNP Referring Physician Nurse Practitioner  11/7/16     Referring to Neuropsych    Phone: [phone] Fax: [phone]         [address] [place], [place] [id number] [facility] [address]    Lena Velazquez, PhD LP MD Psychology  11/7/16     Phone: [phone] Fax: [phone]         [address] [place] [address]    Abel Stone MD MD Orthopedics  6/23/17     Phone: [phone] Fax: [phone]         [facility] [place] [id number] [facility] [address]    Gaurav Ford MD MD Internal Medicine  7/15/20     Phone: [phone] Pager: [phone] Fax: [phone]        [address] [place] [address] [address] [facility] [address]    Kalee Tena MD MD Internal Medicine  7/30/20     Phone: [phone] Fax: [phone]         [address] [place] [address]    Michael Zabala MD Assigned Surgical Provider   12/27/20     Phone: [phone] Fax: [phone]         [address] [place] [address]    Abel Wilcox DO Assigned Musculoskeletal Provider   4/18/21     Phone: [phone] Fax: [phone]         [address] [place] [address]    Viji Tate LSW Lead Care Coordinator Primary Care - CC Admissions 5/5/21     Phone: [phone]         Maninder Bond MD MD Physical Medicine and Rehabilitation  6/4/21     Phone: [phone] Fax: [phone]         [facility] OF NEUROLOGY [address] [place] [address]    Dalila Staples APRN CNP Assigned Neuroscience Provider   6/13/21     Phone:  [phone] Fax: [phone]         [address] [facility] [id number] [facility] [address]    Gaurav Ford MD Assigned Heart and Vascular Provider   8/1/21     Phone: [phone] Pager: [phone] Fax: [phone]        [address]  [facility] [address]    Alessia Musa, RN Specialty Care Coordinator Cardiology  12/29/21     Selene Land MD Assigned PCP   1/23/22     Phone: [phone] Fax: [phone]         [address] [place] [address]    Natividad Nuñez Community Health Worker  Admissions 2/16/22             My Care Plans  Self Management and Treatment Plan  Goals and (Comments)  Goals        General     Medical- [facility] (pt-stated)      Notes - Note edited  2/18/2022  1:49 PM by Natividad Nuñez     Goal Statement: I would like to see what benefits I have from the VA in the next two months.   Date Goal set: 10/9/2020  Barriers: unsure how to go about this   Strengths: asking for help  Date to Achieve By: 5/18/22  Patient expressed understanding of goal: yes    Action steps to achieve this goal:  1. I will ask my wife to speak with a [facility]  when I go in for my COVID vaccine about any services or supports I might qualify for.  2. I will ask my wife to give the CHW updates at outreach calls.    Updated: 2/5/21, 2/18/22             Other (pt-stated)      Notes - Note edited  11/30/2021  3:53 PM by Catalina Torres     Goal Statement: I would like a referral for counseling.  Date Goal set: 06/25/21   Barriers: Not sure if should schedule through VA or a in network provider   Date to Achieve By: 12/25/21   Patient expressed understanding of goal: yes     Action steps to achieve this goal:   1. I will work with CHW and wife on referral for counseling.   2. I will ask my wife to speak with a [facility]  about any services or supports I might qualify for. I will ask my wife to give the CHW updates at outreach calls.             Action Plans on File:             Depression          Advance Care Plans/Directives Type:   Advanced Directive - On File      My Medical and Care Information  Problem List   Patient Active Problem List   Diagnosis     Generalized anxiety disorder     CAD (coronary artery disease)     Hyperlipidemia LDL goal <100     BPH (benign prostatic hypertrophy) with urinary obstruction     Parkinson's disease (H)     Gait disturbance, post-stroke     Herniated nucleus pulposus, L5-S1, right     Pain in right hip     Bunion     Allergic conjunctivitis     Diverticulosis     Glaucoma suspect, bilateral     Senile nuclear sclerosis, bilateral     Dry eye, bilateral     History of corneal transplant     Major depressive disorder, single episode, moderate (H)     Lactose intolerance in adult     Degeneration of L4-L5 intervertebral disc     Compression fracture of thoracic vertebra, with routine healing, subsequent encounter     CKD (chronic kidney disease) stage 3, GFR 30-59 ml/min (H)     Falls frequently     Slow transit constipation     Orthostatic hypotension     Acute atopic conjunctivitis, unspecified eye     Age-related nuclear cataract, bilateral     Bradycardia, unspecified     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 (H)     History of falling     Other chronic pain     Preglaucoma, unspecified, bilateral     Problems in relationship with spouse or partner     Tremor, unspecified     Benign prostatic hyperplasia with lower urinary tract symptoms     Bunion of unspecified foot     Atherosclerotic heart disease of native coronary artery without angina pectoris     Chronic kidney disease, stage 3a (H)     Wedge compression fracture of unspecified thoracic vertebra, subsequent encounter for fracture with routine healing     Other intervertebral disc degeneration, lumbar region     Diverticulosis of intestine, part unspecified, without perforation or abscess without bleeding     Essential hypertension with goal blood pressure less  than 140/90     Other sequelae of cerebral infarction     Other intervertebral disc displacement, lumbosacral region     Corneal transplant status     Lactose intolerance, unspecified     Low back pain     Hyperlipidemia, unspecified     Dry eye syndrome of bilateral lacrimal glands     Unspecified epiphora, unspecified side     Urinary retention     SI joint arthritis     Lumbar radiculopathy     Right-sided low back pain with right-sided sciatica      Current Medications and Allergies:  See printed Medication Report.    Care Coordination Start Date: No linked episodes   Frequency of Care Coordination: 6 weeks     Form Last Updated: 02/24/2022

## 2022-02-24 NOTE — PROGRESS NOTES
Care Coordination Clinician Chart Review     Situation: Patient chart reviewed by care coordinator.?     Background: Initial assessment and enrollment to Care Coordination was successful.?? Patient centered goals were developed with participation from patient.? Lead CC handed patient off to CHW for continued outreach every 30 days.??     Assessment: Per chart review, patient outreach completed by CC CHW on 2/18/22.? Patient is actively working to accomplish goal(s).? Patient's goal(s) remain(s) appropriate at this time.? Patient is due for updated Plan of Care.? Annual assessment will be due 9/22.     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   Date Goal set: 10/9/2020  Barriers: unsure how to go about this   Strengths: asking for help  Date to Achieve By: 5/18/22  Patient expressed understanding of goal: yes    Action steps to achieve this goal:  1. I will ask my wife to speak with a [facility]  when I go in for my COVID vaccine about any services or supports I might qualify for.  2. I will ask my wife to give the CHW updates at outreach calls.    Updated: 2/5/21, 2/18/22               Other (pt-stated)       Goal Statement: I would like a referral for counseling.  Date Goal set: 06/25/21   Barriers: Not sure if should schedule through VA or a in network provider   Date to Achieve By: 12/25/21   Patient expressed understanding of goal: yes     Action steps to achieve this goal:   1. I will work with CHW and wife on referral for counseling.   2. I will ask my wife to speak with a [facility]  about any services or supports I might qualify for. I will ask my wife to give the CHW updates at outreach calls.        ??     Plan/Recommendations: The patient will continue working with Care Coordination to achieve above goal(s).? CHW will involve Lead CC as needed or if patient is ready to move to maintenance.? Lead CC  will continue to monitor CHW s monthly outreaches and progress to goal(s) every 6 weeks.?     Plan of Care updated and sent to patient: Yes, via usps    KEVIN Osuna   [facility] Care Coordination  Tel: [phone]

## 2022-03-18 ENCOUNTER — OFFICE VISIT (OUTPATIENT)
Dept: NEUROLOGY | Facility: CLINIC | Age: 82
End: 2022-03-18
Payer: COMMERCIAL

## 2022-03-18 VITALS
OXYGEN SATURATION: 98 % | SYSTOLIC BLOOD PRESSURE: 136 MMHG | RESPIRATION RATE: 16 BRPM | HEART RATE: 65 BPM | DIASTOLIC BLOOD PRESSURE: 74 MMHG

## 2022-03-18 DIAGNOSIS — G89.29 CHRONIC RIGHT-SIDED LOW BACK PAIN WITH RIGHT-SIDED SCIATICA: ICD-10-CM

## 2022-03-18 DIAGNOSIS — M54.41 CHRONIC RIGHT-SIDED LOW BACK PAIN WITH RIGHT-SIDED SCIATICA: ICD-10-CM

## 2022-03-18 DIAGNOSIS — F32.89 OTHER DEPRESSION: ICD-10-CM

## 2022-03-18 DIAGNOSIS — G20.A1 PARKINSON'S DISEASE (H): Primary | ICD-10-CM

## 2022-03-18 PROCEDURE — 99215 OFFICE O/P EST HI 40 MIN: CPT | Performed by: NURSE PRACTITIONER

## 2022-03-18 ASSESSMENT — PAIN SCALES - GENERAL: PAINLEVEL: EXTREME PAIN (9)

## 2022-03-18 NOTE — PATIENT INSTRUCTIONS
Dear  Vini Loya,    Thank you for coming today.  During your visit, we have discussed the following:     __  I would recommend that you talk to Dr. Ibanez that your back pain has worsened so that he gives you a referral to a spine specialist to get another opinion for surgical treatment.     __  Continue taking the same antiparkinsonian mediations.     __  Before seeing Dr. Ibanez consider clarifying the medications you are taking for pain so that we know what you have tried and the doses.  (Look at Voltaren and Cymbalta).     __  Also, talk to Dr. Land regarding depression.  I'm not sure if you are taking Duloxetine (Cymbalta) or not.     FYI, Duloxetine (Cymbalta) is an antidepressant that also helps with pain.  You are also taking Bupropion (Wellbutrin) for depression.      __  Return in to our clinic as needed.      For questions, you may send us a PAIEON message or call [phone]    Fax number: [phone]    KOTA Stone, CNP  [facility]

## 2022-03-18 NOTE — PROGRESS NOTES
"ASSESSMENT:    Parkinson's Disease: He has tremors that are not bothersome.    Chronic right lower back pain: Pain is bothersome and has worsened.  I spent time figuring out what medications he is taking for pain.  Unable to determine if he is still taking Diclofenac and Cymbalta.  On one hand, patient reports that nothing is helping.  On the other hand, he is not sure what medications he is taking.    Depression:  He asked for a dose increase of a \"blue pill.\"      PLAN:    Since patient has 2 movement disorders specialist [facility] and myself, I asked patient and his wife if transferring his care to Dr. Ibanez would be easier.  They will think about this.  No future appointment was made today.    __  The following visit summary provided: -      __  I would recommend that you talk to Dr. Ibanez that your back pain has worsened so that he gives you a referral to a spine specialist to get another opinion for surgical treatment.     __  Continue taking the same antiparkinsonian mediations.     __  Before seeing Dr. Ibanez consider clarifying the medications you are taking for pain so that we know what you have tried and the doses.  (Look at Voltaren and Cymbalta).     __  Also, talk to Dr. Land regarding depression.  I'm not sure if you are taking Duloxetine (Cymbalta) or not.     FYI, Duloxetine (Cymbalta) is an antidepressant that also helps with pain.  You are also taking Bupropion (Wellbutrin) for depression.      __  Return in to our clinic as needed.        MOVEMENT DISORDERS CLINIC           PATIENT: Vini Loya    : 1940    ALBINA: 2022    REASON FOR VISIT: Parkinson's disease (PD) follow up.    HPI: Mr. Vini Loya is an 81 year old who came to the [facility] neurology clinic accompanied by his wife for a follow up visit.    Patient reports his main issue is right lower back pain.  He reports doing poorly after a physical therapy session.  It was unclear which physical " "therapy session he had issues with. He said, one of the physical therapist \"pushed my back to stretch me\" and since then his back pain has worsened. He reports the Right lower back pain is present all the time, including when sitting, walking, and sleeping. Tylenol and Diclofenac haven't helped.  Patient and his wife are not sure if he is still taking Diclofenac.  His record shows that he is on Cymbalta.  There are 2 prescriptions for 30 mg and 60 mg.  Patient and his wife are not sure if he is taking this.    Record shows that he was seen by Dr. Ha.  He has received steroid injection for lumbar radiculopathy in October 2021 without much improvement.  During his November 2021 visit, Dr. Ha recommended optimizing conservative treatment with physical therapy.  Patient and his wife are not sure what to do as his pain has increased and is interfering with his ADLs.    Pt has an appointment with Dr. Ibanez on Monday. During last visit with Dr. Ibanez, he had recommended against back surgery. His wife asked not to refill his antiparkinsonian medications as pt has a refill from me and from Dr. Ibanez. The pharmacy has sent them \"a lot of pills.\"     He still exercises twice a day doing wall push-ups and standing exercises by leaning on a chair for balance.    Tremors have improved.  However he noticed that his movements have been slow.     PD Medications 7 am 11 am 4 pm 8 - 9 pm   Sinemet 25/100 mg  2 2 2     Sinemet 50/200 mg       1   Ropinirole 0.25 mg 1 1 1       Pt reports if I could increase his depression medication, \"the blue pill.\"  It is unclear what medication he was referring to. His med list shows that he is taking Wellbutrin  mg 24 hr tab. Unclear about Cymbalta.     PHYSICAL EXAM:    VITAL SIGNS:  Blood pressure 136/74, pulse 65, resp. rate 16, SpO2 98 %.     GENERAL:  Mr. Loya is a pleasant  male who is well-groomed and well-developed sitting comfortably in the exam room without any " distress.  Affect is appropriate.    MOVEMENT DISORDERS ASSESSMENT:    Speech: Monotone, slurred but understandable; moderately impaired.  Rest Tremor: Slight in amplitude and intermittent in left hand. Absent in other extremities.   Arising from chair - arms folded across chest: Slow; or may need more than one attempt.  Posture: Mildly stooped posture, slightly leaning to one side.  Dyskinesias:  Absent.   Gait: Slow with short strides.  Posture: Mildly stooped.      Today I spent 40 minutes caring for the patient. Time was spent with reviewing records, meeting with the patient, answering questions, examining, and documentation.    KOTA Stone,  CNP  [facility]

## 2022-03-23 ENCOUNTER — TELEPHONE (OUTPATIENT)
Dept: CARE COORDINATION | Facility: CLINIC | Age: 82
End: 2022-03-23

## 2022-03-23 ENCOUNTER — PATIENT OUTREACH (OUTPATIENT)
Dept: NURSING | Facility: CLINIC | Age: 82
End: 2022-03-23
Payer: COMMERCIAL

## 2022-03-23 NOTE — TELEPHONE ENCOUNTER
Reese Johns and the  Triage Team,    I spoke with pt's spouse, Ladi, this afternoon. I do not see that we have a CTC form signed to speak with Ladi. She and pt are scheduled to see Nat on 4/5/22 for a new [facility] visit. Could your team please have pt/Ladi sign the CTC and scan into EPIC?    Thanks so much!    Natividad Nuñez  Community Health Worker  [facility]  [phone]

## 2022-03-23 NOTE — PROGRESS NOTES
"Clinic Care Coordination Contact    Pt was sleeping this afternoon, so spoke with pt's wife, Ladi.    Community Health Worker Follow Up    Care Gaps: Discussed care gaps today. Ladi, pt's spouse, will call to make Medicare Annual Wellness Visit with Dr. Land soon.    Health Maintenance Due   Topic Date Due     ZOSTER IMMUNIZATION (3 of 3) 03/29/2019     CMP  09/25/2021     MICROALBUMIN  02/26/2022     MEDICARE ANNUAL WELLNESS VISIT  04/01/2022     HEMOGLOBIN  06/04/2022       Care Gap Goal set: Yes    Goals:   Goals Addressed as of 3/23/2022 at 2:25 PM        1. Medical (pt-stated)     Added 3/23/22 by Natividad Nuñez      Goal Statement: I will complete my overdue health maintenance.   Date Goal set: 3/23/22  Barriers: none noted  Strengths: enrolled in CC, supportive spouse   Date to Achieve By: 6/23/22  Patient expressed understanding of goal: Yes  Action steps to achieve this goal:  1. My spouse, Kristi, will call to make appointment for Medicare annual wellness visit  2. I will notify my care team once health maintenance item(s) are completed.   3. I will contact my Care Coordination staff or care team with any questions or concerns I may have.             Intervention and Education during outreach: Ladi states, \"We have gotten a lot done since you last called us.\" Pt [facility]. During that visit, pt and Ladi spoke with a SW from the [facility] who provided them with many resources available to pt thorough the [facility]. Pt was scheduled to begin MH counseling the end of April 2022. They were told about respite care, where pt could go 2x/wk for the entire day for activities/companionship opportunities. They are considering this option. Ladi states pt does have a PCP, Dr. Jackson, at the [facility] as well, but he like seeing Dr. Land and UMBERTO Reynolds, at the  clinic too. 2 goals completed today.    At appointment with Dr. [facility]iz[facility] at the [facility], it was determined " that pt will get another injection for his back at an upcoming visit. If the back injection does not to significantly reduce pt's back pain, they might discuss surgery. He is also scheduled for a neck MRI.     Discussed care gaps and added goal for Medicare Annual Wellness Visit (Ladi to schedule). Health maintenance goal established.    CHW asks if Ladi has ever filled out a CTC form to allow us to communicate with her and she does not think she has. CHW will route a telephone message to UMBERTO Frankel, at [facility] and the  reception staff to have pt and Ladi fill the CTC form at the 4/5/22 [facility] visit. Ladi verbalizes understanding.    CHW asks if pt has any further concerns or need for resources as this time. Ladi states she does not. CHW made sure pt has CHW contact information. Pt will contact CHW with questions or updates before next outreach.     CHW Plan: CHW will follow up with pt in one month.    Natividad Nuñez  Community Health Worker  [facility]  [phone]    ___________________________________________________________    Next Outreach:  4/23/22  Planned Outreach Frequency: monthly  Preferred Phone Number: [phone]     Enrollment Date: 10/9/2020   Last Care Plan Assessment:  9/28/21

## 2022-03-28 ENCOUNTER — PATIENT OUTREACH (OUTPATIENT)
Dept: CARE COORDINATION | Facility: CLINIC | Age: 82
End: 2022-03-28
Payer: COMMERCIAL

## 2022-03-28 NOTE — PROGRESS NOTES
Care Coordination Clinician Chart Review     Situation: Patient chart reviewed by care coordinator.?     Background: Initial assessment and enrollment to Care Coordination was successful.?? Patient centered goals were developed with participation from patient.? Lead CC handed patient off to CHW for continued outreach every 30 days.??     Assessment: Per chart review, patient outreach completed by CC CHW on 3/23.? Patient is actively working to accomplish goal(s).? Patient's goal(s) remain(s) appropriate at this time.? Patient is not due for updated Plan of Care.? Annual assessment will be due 10/9/22.     Goals        1. Medical (pt-stated)       Goal Statement: I will complete my overdue health maintenance.   Date Goal set: 3/23/22  Barriers: none noted  Strengths: enrolled in CC, supportive spouse   Date to Achieve By: 6/23/22  Patient expressed understanding of goal: Yes  Action steps to achieve this goal:  1. My spouse, Kristi, will call to make appointment for Medicare annual wellness visit  2. I will notify my care team once health maintenance item(s) are completed.   3. I will contact my Care Coordination staff or care team with any questions or concerns I may have.           ??     Plan/Recommendations: The patient will continue working with Care Coordination to achieve above goal(s).? CHW will involve Lead CC as needed or if patient is ready to move to maintenance.? Lead CC will continue to monitor CHW s monthly outreaches and progress to goal(s) every 6 weeks.?     Plan of Care updated and sent to patient: No sent 2/2022    Viji Tate [facility] Care Coordination  Tel: [phone]

## 2022-03-30 PROBLEM — M54.41 RIGHT-SIDED LOW BACK PAIN WITH RIGHT-SIDED SCIATICA: Status: RESOLVED | Noted: 2022-01-28 | Resolved: 2022-03-30

## 2022-03-30 NOTE — PROGRESS NOTES
Discharge Note    Progress reporting period is from initial evaluation date (please see noted date below) to Feb 23, 2022.  Linked Episodes   Type: Episode: Status: Noted: Resolved: Last update: Updated by:   PHYSICAL THERAPY R sided LBP 1/28/22 Active 1/28/2022 2/23/2022  3:45 PM Clinton Cedeno, PT      Comments:       Vini was seen for 5 visits addressing his chronic R sided LBP. Pt has attended 3 separate PT episodes of care since 03/2021 for his chronic LBP without reported relief. Pt ambulates with significant R lateral trunk lean which seems to have worsened over the past year. PT treatment has consisted of functional strengthening, core stabilization, lumbar jt mobilizations, and lumbar ROM/stretching. Pt reports no significant change in his pain and even slight worsening with some of the stretches. PT recommends patient follow up with MD for further evaluation given his lack of progress.     SUBJECTIVE  Subjective changes noted by patient:  Pt reports feeling about the same overall. Continues to have significant R sided LBP with radiating LE pain at times. Having difficulty sleeping  .  Current pain level is 4/10.     Previous pain level was  6/10.   Changes in function:  Yes (See Goal flowsheet attached for changes in current functional level)  Adverse reaction to treatment or activity: None    OBJECTIVE  Changes noted in objective findings: R sided LBP remains constant with significant R lateral trunk lean while sitting and walking. Tolerates HEP well      ASSESSMENT/PLAN  Diagnosis: Chronic R sided LBP    Updated problem list and treatment plan:   Pain - HEP  Decreased ROM/flexibility - HEP  Decreased strength - HEP  Impaired posture - HEP  STG/LTGs have been met or progress has been made towards goals:  Yes, please see goal flowsheet for most current information  Assessment of Progress: current status is unknown.    Last current status: Pt is progressing as expected   Self Management Plans:  HEP  I  have re-evaluated this patient and find that the nature, scope, duration and intensity of the therapy is appropriate for the medical condition of the patient.  Vini continues to require the following intervention to meet STG and LTG's:  HEP.    Recommendations:  Discharge with current home program.  Patient to follow up with MD as needed.    Please refer to the daily flowsheet for treatment today, total treatment time and time spent performing 1:1 timed codes.

## 2022-04-21 ENCOUNTER — TELEPHONE (OUTPATIENT)
Dept: CARDIOLOGY | Facility: CLINIC | Age: 82
End: 2022-04-21
Payer: COMMERCIAL

## 2022-04-21 NOTE — TELEPHONE ENCOUNTER
[facility] Call Center    Phone Message    May a detailed message be left on voicemail: yes     Reason for Call: Medication Question or concern regarding medication   Prescription Clarification  Name of Medication: Enalapril and hydrALAZINE  Prescribing Provider: Eloise Shannon   Pharmacy:    What on the order needs clarification? Per pt wife the bottle says there is no more refills wants to know if pt should continue taking these meds and if so will need a new prescription so they can try to refill in a couple of weeks. Please call back to discuss.          Action Taken: Message routed to:  [facility] Cardiology    Travel Screening: Not Applicable

## 2022-04-25 ENCOUNTER — PATIENT OUTREACH (OUTPATIENT)
Dept: NURSING | Facility: CLINIC | Age: 82
End: 2022-04-25
Payer: COMMERCIAL

## 2022-04-25 NOTE — PROGRESS NOTES
Clinic Care Coordination Contact  [facility]/Voicemail    Clinical Data: Care Coordinator Outreach  Outreach attempted x 1.  Left message on patient's voicemail with call back information and requested return call.  Plan: Care Coordinator will try to reach patient again in 3-5 business days.    Navya Khan, Community Health Worker, MPH  Care Coordination   [facility]  Office: [phone]  pronouns: she/her    [facility]

## 2022-04-28 ENCOUNTER — PATIENT OUTREACH (OUTPATIENT)
Dept: CARE COORDINATION | Facility: CLINIC | Age: 82
End: 2022-04-28
Payer: COMMERCIAL

## 2022-04-28 NOTE — PROGRESS NOTES
Care Coordination Clinician Chart Review     Situation: Patient chart reviewed by care coordinator.?     Background: Initial assessment and enrollment to Care Coordination was 10/2020 .?? Patient centered goals were developed with participation from patient.? Lead CC handed patient off to CHW for continued outreach every 30 days.??     Assessment: Per chart review, patient outreach completed by CC CHW on 4/25/2022.? Patient is actively working to accomplish goal(s).? Patient's goal(s) remain(s) appropriate at this time.? Patient is not due for updated Plan of Care.? Annual assessment will be due 10/2022.      Goals        1. Medical (pt-stated)       Goal Statement: I will complete my overdue health maintenance.   Date Goal set: 3/23/22  Barriers: none noted  Strengths: enrolled in CC, supportive spouse   Date to Achieve By: 6/23/22  Patient expressed understanding of goal: Yes  Action steps to achieve this goal:  1. My spouse, Kristi, will call to make appointment for Medicare annual wellness visit  2. I will notify my care team once health maintenance item(s) are completed.   3. I will contact my Care Coordination staff or care team with any questions or concerns I may have.             ??     Plan/Recommendations: The patient will continue working with Care Coordination to achieve above goal(s).? CHW will involve Lead CC as needed or if patient is ready to move to maintenance.? Lead CC will continue to monitor CHW s monthly outreaches and progress to goal(s) every 6 weeks.?     Plan of Care updated and sent to patient: No    KEVIN Cleaning   [facility] - Clinic Care Coordination  [phone]

## 2022-05-02 ENCOUNTER — PATIENT OUTREACH (OUTPATIENT)
Dept: NURSING | Facility: CLINIC | Age: 82
End: 2022-05-02
Payer: COMMERCIAL

## 2022-05-02 NOTE — PROGRESS NOTES
Clinic Care Coordination Contact    Community Health Worker Follow Up    Care Gaps:     Health Maintenance Due   Topic Date Due     ZOSTER IMMUNIZATION (3 of 3) 03/29/2019     CMP  09/25/2021     MICROALBUMIN  02/26/2022     MEDICARE ANNUAL WELLNESS VISIT  04/01/2022     HEMOGLOBIN  06/04/2022       [facility]     Goals:    Goals Addressed as of 5/2/2022 at 2:46 PM                    Today    4/28/22       1. Medical (pt-stated)   50%  50%    Added 3/23/22 by Natividad Nuñez      Goal Statement: I will complete my overdue health maintenance.   Date Goal set: 3/23/22  Barriers: none noted  Strengths: enrolled in CC, supportive spouse   Date to Achieve By: 6/23/22  Patient expressed understanding of goal: Yes  Action steps to achieve this goal:  1. My spouse, Kristi, will call to make appointment for Medicare annual wellness visit  2. I will notify my care team once health maintenance item(s) are completed.   3. I will contact my Care Coordination staff or care team with any questions or concerns I may have.                 Intervention and Education during outreach:     Writer was able to connect with Patient's wife and introduce self and follow up on Patient goal.     Patient's wife, Kristi stated that Patient is doing well. She notes that [facility] and that he only sees one provider at [facility]. For this reason she is thinking they would like to hold off on scheduling the Medicare Annual Wellness exam.     Writer provided Kristi with CHW phone number and encouraged her to call if any questions or concerns arise.     CHW Plan:   CHW will follow up in one month to monitor the progression of goals.

## 2022-05-03 DIAGNOSIS — G20.A1 PARKINSON'S DISEASE (H): Chronic | ICD-10-CM

## 2022-05-03 RX ORDER — CARBIDOPA AND LEVODOPA 50; 200 MG/1; MG/1
TABLET, EXTENDED RELEASE ORAL
Qty: 90 TABLET | Refills: 3 | Status: SHIPPED | OUTPATIENT
Start: 2022-05-03

## 2022-05-20 DIAGNOSIS — I25.10 CORONARY ARTERY DISEASE INVOLVING NATIVE HEART WITHOUT ANGINA PECTORIS, UNSPECIFIED VESSEL OR LESION TYPE: ICD-10-CM

## 2022-05-20 DIAGNOSIS — I10 ESSENTIAL HYPERTENSION WITH GOAL BLOOD PRESSURE LESS THAN 140/90: Primary | ICD-10-CM

## 2022-06-08 ENCOUNTER — PATIENT OUTREACH (OUTPATIENT)
Dept: CARE COORDINATION | Facility: CLINIC | Age: 82
End: 2022-06-08
Payer: COMMERCIAL

## 2022-06-08 NOTE — PROGRESS NOTES
Clinic Care Coordination Contact    Community Health Worker Follow Up    CHW spoke with patient's wife Kristi. She stated that patient is doing ok. She stated that [facility] and next Monday 6/13/2022 they are meeting with a pain management specialist. She also stated that patient is doing OT, PT, and speech therapy with doctors every other time. Patient's wife is going to continue to work with [facility] and make more appointments, holding off on making Medicare Annual Wellness exam.       Care Gaps:     Health Maintenance Due   Topic Date Due     Pneumococcal Vaccine: 65+ Years (2 - PPSV23 or PCV20) 12/03/2016     ZOSTER IMMUNIZATION (3 of 3) 03/29/2019     CMP  09/25/2021     MICROALBUMIN  02/26/2022     MEDICARE ANNUAL WELLNESS VISIT  04/01/2022     COVID-19 Vaccine (4 - Booster for Pfizer series) 04/07/2022     HEMOGLOBIN  06/04/2022       Postponed to next outreach     Goals:    Goals Addressed as of 6/8/2022 at 2:00 PM                    Today    5/2/22       1. Medical (pt-stated)   50%  50%    Added 3/23/22 by Natividad Nuñez      Goal Statement: I will complete my overdue health maintenance.   Date Goal set: 3/23/22  Barriers: none noted  Strengths: enrolled in CC, supportive spouse   Date to Achieve By: 6/23/22  Patient expressed understanding of goal: Yes  Action steps to achieve this goal:  1. My spouse, Kristi, will call to make appointment for Medicare annual wellness visit  2. I will notify my care team once health maintenance item(s) are completed.   3. I will contact my Care Coordination staff or care team with any questions or concerns I may have.                 CHW Plan: follow up in one month         Audrey Elias  Community Health Worker  [facility] Care Coordination  [email or web address]  Office: [phone]

## 2022-06-14 ENCOUNTER — PATIENT OUTREACH (OUTPATIENT)
Dept: CARE COORDINATION | Facility: CLINIC | Age: 82
End: 2022-06-14
Payer: COMMERCIAL

## 2022-06-14 NOTE — LETTER
[facility]  Patient Centered Plan of Care  About Me:        Patient Name:  Vini Potts    YOB: 1940  Age:         81 year old   Loc MRN:    [id number] Telephone Information:  Home Phone [phone]   Mobile [phone]       Address:  [address]  [facility] [address] Email address:  [email or web address]      Emergency Contact(s)    Name Relationship Lgl Grd Work Phone Home Phone Mobile Phone   1. GEORGIE POTTS Spouse No  [phone]   2. ALVAREZ POTTS Relative No [phone]    3. FOREIGN GRAVES Relative No  [phone]    4. NAZARIO POTTS Relative No  [phone]            Primary language:  English     needed? No   [facility]:  [phone] op. 1  Other communication barriers:Cognitive impairment    Preferred Method of Communication:  Mail  Current living arrangement: I live in a private home with spouse    Mobility Status/ Medical Equipment: Independent w/Device        Health Maintenance  Health Maintenance Reviewed: Due/Overdue   Health Maintenance Due   Topic Date Due     Pneumococcal Vaccine: 65+ Years (2 - PPSV23 or PCV20) 12/03/2016     ZOSTER IMMUNIZATION (3 of 3) 03/29/2019     CMP  09/25/2021     MICROALBUMIN  02/26/2022     MEDICARE ANNUAL WELLNESS VISIT  04/01/2022     COVID-19 Vaccine (4 - Booster for Pfizer series) 04/07/2022     HEMOGLOBIN  06/04/2022         My Access Plan  Medical Emergency 911   Primary Clinic Line [facility] - [phone]   24 Hour Appointment Line [phone] or  [phone]) (toll-free)   24 Hour Nurse Line [phone] (toll-free)   Preferred Urgent Care [facility], [phone]     [facility] [place] [facility]  [phone]     Preferred Pharmacy [facility] [place] [address]     Behavioral Health Crisis Line [facility]  Suicide Prevention Lifeline at 1-331.951.8283 or 911             My Care Team Members  Patient Care Team       Relationship Specialty Notifications Start End    Selene Land MD PCP - General Family Medicine  4/1/21     Phone: [phone] Fax: [phone]         [address] [place] [address]    Yudith Mcdonnell MD MD Ophthalmology  9/28/15     Phone: [phone] Fax: [phone]         [address] [facility] [address] [facility] [address]    Evelyn Hairston PA Physician Assistant Physician Assistant  9/28/16     Phone: [phone] Fax: [phone]         [address] [facility] [address] [facility] [address]    Dalila Staples APRN CNP Referring Physician Nurse Practitioner  11/7/16     Referring to Neuropsych    Phone: [phone] Fax: [phone]         [address] [id number] [facility] [address]    Lena Velazquez, PhD LP MD Psychology  11/7/16     Phone: [phone] Fax: [phone]         [address] [facility] [address]    Abel Stone MD MD Orthopedics  6/23/17     Phone: [phone] Fax: [phone]         [facility] [address] [facility] [address]    Gaurav Ford MD MD Internal Medicine  7/15/20     Phone: [phone] Pager: [phone] Fax: [phone]        [address] [place] [address] [address] [facility] [address]    Kalee Tena MD MD Internal Medicine  7/30/20     Phone: [phone] Fax: [phone]         [address] [facility] [address]    Michael Zabala MD Assigned Surgical Provider   12/27/20     Phone: [phone] Fax: [phone]         [address] [place] [address]    Supik, Efraín, DO Assigned Musculoskeletal Provider   4/18/21     Phone: [phone] Fax: [phone] [address] [facility] [address]    Maninder Bond MD MD Physical Medicine and Rehabilitation  6/4/21     Phone: [phone] Fax: [phone]         [facility] OF NEUROLOGY [address] [place] [address]  [address]    Dalila Staples APRN CNP Assigned Neuroscience Provider   6/13/21     Phone: [phone] Fax: [phone]         [address] [facility] [id number] [facility] [address]    Gaurav Ford MD Assigned Heart and Vascular Provider   8/1/21     Phone: [phone] Pager: [phone] Fax: [phone]        [address] [facility] [address] [facility] [address]    Alessia Musa, RN Specialty Care Coordinator Cardiology Admissions 12/29/21     Selene Land MD Assigned PCP   1/23/22     Phone: [phone] Fax: [phone] [address] [place] [address]    Navya Khan Community Health Worker Primary Care - CC  4/7/22     Dorina Sims Lead Care Coordinator  Admissions 4/22/22             My Care Plans  Self Management and Treatment Plan  Goals and (Comments)   Goals        General     1. Medical (pt-stated)      Notes - Note created  3/23/2022  2:24 PM by Natividad Nuñez     Goal Statement: I will complete my overdue health maintenance.   Date Goal set: 3/23/22  Barriers: none noted  Strengths: enrolled in CC, supportive spouse   Date to Achieve By: 6/23/22  Patient expressed understanding of goal: Yes  Action steps to achieve this goal:  1. My spouse, Kristi, will call to make appointment for Medicare annual wellness visit  2. I will notify my care team once health maintenance item(s) are completed.   3. I will contact my Care Coordination staff or care team with any questions or concerns I may have.                  Action Plans on File:            Depression          Advance Care Plans/Directives Type:   Advanced Directive - On File      My Medical and Care Information  Problem List   Patient Active Problem List   Diagnosis     Generalized anxiety disorder     CAD (coronary artery disease)     Hyperlipidemia LDL goal <100     BPH (benign prostatic hypertrophy) with urinary obstruction     Parkinson's disease (H)     Gait disturbance, post-stroke     Herniated nucleus pulposus,  L5-S1, right     Pain in right hip     Bunion     Allergic conjunctivitis     Diverticulosis     Glaucoma suspect, bilateral     Senile nuclear sclerosis, bilateral     Dry eye, bilateral     History of corneal transplant     Major depressive disorder, single episode, moderate (H)     Lactose intolerance in adult     Degeneration of L4-L5 intervertebral disc     Compression fracture of thoracic vertebra, with routine healing, subsequent encounter     CKD (chronic kidney disease) stage 3, GFR 30-59 ml/min (H)     Falls frequently     Slow transit constipation     Orthostatic hypotension     Acute atopic conjunctivitis, unspecified eye     Age-related nuclear cataract, bilateral     Bradycardia, unspecified     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 (H)     History of falling     Other chronic pain     Preglaucoma, unspecified, bilateral     Problems in relationship with spouse or partner     Tremor, unspecified     Benign prostatic hyperplasia with lower urinary tract symptoms     Bunion of unspecified foot     Atherosclerotic heart disease of native coronary artery without angina pectoris     Chronic kidney disease, stage 3a (H)     Wedge compression fracture of unspecified thoracic vertebra, subsequent encounter for fracture with routine healing     Other intervertebral disc degeneration, lumbar region     Diverticulosis of intestine, part unspecified, without perforation or abscess without bleeding     Essential hypertension with goal blood pressure less than 140/90     Other sequelae of cerebral infarction     Other intervertebral disc displacement, lumbosacral region     Corneal transplant status     Lactose intolerance, unspecified     Low back pain     Hyperlipidemia, unspecified     Dry eye syndrome of bilateral lacrimal glands     Unspecified epiphora, unspecified side     Urinary retention     SI joint arthritis     Lumbar radiculopathy      Current Medications and Allergies:   See printed Medication Report.    Care Coordination Start Date: 10/9/2020   Frequency of Care Coordination: 6 weeks     Form Last Updated: 06/14/2022

## 2022-06-14 NOTE — PROGRESS NOTES
Care Coordination Clinician Chart Review     Situation: Patient chart reviewed by care coordinator.?     Background: Initial assessment and enrollment to Care Coordination was 10/9/2020.?? Patient centered goals were developed with participation from patient.? Lead CC handed patient off to CHW for continued outreach every 30 days.??     Assessment: Per chart review, patient outreach completed by CC CHW on 6/8/2022.? Patient is actively working to accomplish goal(s).? Patient's goal(s) remain(s) appropriate at this time.? Patient is due for updated Plan of Care.? Annual assessment will be due 10/2022.      Goals        1. Medical (pt-stated)       Goal Statement: I will complete my overdue health maintenance.   Date Goal set: 3/23/22  Barriers: none noted  Strengths: enrolled in CC, supportive spouse   Date to Achieve By: 6/23/22  Patient expressed understanding of goal: Yes  Action steps to achieve this goal:  1. My spouse, Kristi, will call to make appointment for Medicare annual wellness visit  2. I will notify my care team once health maintenance item(s) are completed.   3. I will contact my Care Coordination staff or care team with any questions or concerns I may have.             ??     Plan/Recommendations: The patient will continue working with Care Coordination to achieve above goal(s).? CHW will involve Lead CC as needed or if patient is ready to move to maintenance.? Lead CC will continue to monitor CHW s monthly outreaches and progress to goal(s) every 6 weeks.?     Plan of Care updated and sent to patient: Yes, via USPS    KEVIN Cleaning   [facility] - Clinic Care Coordination  [phone]

## 2022-07-06 DIAGNOSIS — I10 ESSENTIAL HYPERTENSION: ICD-10-CM

## 2022-07-06 DIAGNOSIS — R09.89 LABILE BLOOD PRESSURE: ICD-10-CM

## 2022-07-06 RX ORDER — HYDRALAZINE HYDROCHLORIDE 25 MG/1
25 TABLET, FILM COATED ORAL 2 TIMES DAILY PRN
Qty: 90 TABLET | Refills: 1 | Status: SHIPPED | OUTPATIENT
Start: 2022-07-06

## 2022-07-06 NOTE — TELEPHONE ENCOUNTER
[facility] Call Center    Phone Message    May a detailed message be left on voicemail: no     Reason for Call: Medication Question or concern regarding medication   Prescription Clarification  Name of Medication: hydrALAZINE (APRESOLINE) 25 MG tablet  Prescribing Provider: Gaurav Slater MD    Pharmacy: [facility] - [place], [place] - [address]   What on the order needs clarification?   Refill request, Rx has been misplaced           Action Taken: Message routed to:  [facility] Cardiology    Travel Screening: Not Applicable

## 2022-07-06 NOTE — TELEPHONE ENCOUNTER
Last Clinic Visit: 2/4/2022  [facility]  [place] 7/21/22  Call to Kristi, advised prescription sent to requested pharmacy

## 2022-07-11 ENCOUNTER — PATIENT OUTREACH (OUTPATIENT)
Dept: CARE COORDINATION | Facility: CLINIC | Age: 82
End: 2022-07-11

## 2022-07-11 NOTE — PROGRESS NOTES
Clinic Care Coordination Contact  [facility]/Voicemail    Clinical Data: Care Coordinator Outreach  Outreach attempted x 1.  Left message on patient's wife's voicemail with call back information and requested return call.    Plan: Care Coordinator will try to reach patient again in 10 business days.    Amanda Davenport CHW, B.A. [facility] Care Coordination  [facility]:   [facility]  [phone]
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
## 2022-07-16 DIAGNOSIS — E78.5 HYPERLIPIDEMIA LDL GOAL <70: ICD-10-CM

## 2022-07-16 DIAGNOSIS — I25.10 CORONARY ARTERY DISEASE INVOLVING NATIVE CORONARY ARTERY OF NATIVE HEART WITHOUT ANGINA PECTORIS: ICD-10-CM

## 2022-07-19 RX ORDER — ATORVASTATIN CALCIUM 20 MG/1
20 TABLET, FILM COATED ORAL DAILY
Qty: 90 TABLET | Refills: 0 | Status: SHIPPED | OUTPATIENT
Start: 2022-07-19

## 2022-07-19 NOTE — TELEPHONE ENCOUNTER
atorvastatin (LIPITOR) 20 MG tablet      Last Written Prescription Date:  7/23/2021  Last Fill Quantity: 90,   # refills: 3  Last Office Visit : 2/4/2022  Future Office visit:  7/21/2022 *with labs    Routing refill request to provider for review/approval because:  Future Visit with labs 7/21/2022  - future lab order for Lipid's in chart

## 2022-07-21 ENCOUNTER — LAB (OUTPATIENT)
Dept: LAB | Facility: CLINIC | Age: 82
End: 2022-07-21
Payer: COMMERCIAL

## 2022-07-21 ENCOUNTER — OFFICE VISIT (OUTPATIENT)
Dept: CARDIOLOGY | Facility: CLINIC | Age: 82
End: 2022-07-21
Attending: INTERNAL MEDICINE
Payer: COMMERCIAL

## 2022-07-21 VITALS
DIASTOLIC BLOOD PRESSURE: 73 MMHG | WEIGHT: 167.3 LBS | HEIGHT: 68 IN | SYSTOLIC BLOOD PRESSURE: 146 MMHG | OXYGEN SATURATION: 98 % | HEART RATE: 54 BPM | BODY MASS INDEX: 25.36 KG/M2

## 2022-07-21 DIAGNOSIS — I25.10 CORONARY ARTERY DISEASE INVOLVING NATIVE HEART WITHOUT ANGINA PECTORIS, UNSPECIFIED VESSEL OR LESION TYPE: ICD-10-CM

## 2022-07-21 DIAGNOSIS — R09.89 LABILE BLOOD PRESSURE: ICD-10-CM

## 2022-07-21 DIAGNOSIS — E78.5 HYPERLIPIDEMIA LDL GOAL <70: Primary | ICD-10-CM

## 2022-07-21 DIAGNOSIS — N18.31 CHRONIC KIDNEY DISEASE, STAGE 3A (H): ICD-10-CM

## 2022-07-21 DIAGNOSIS — I10 ESSENTIAL HYPERTENSION: ICD-10-CM

## 2022-07-21 LAB
ALBUMIN SERPL-MCNC: 3.7 G/DL (ref 3.4–5)
ALP SERPL-CCNC: 75 U/L (ref 40–150)
ALT SERPL W P-5'-P-CCNC: 6 U/L (ref 0–70)
ANION GAP SERPL CALCULATED.3IONS-SCNC: <1 MMOL/L (ref 3–14)
AST SERPL W P-5'-P-CCNC: 16 U/L (ref 0–45)
BILIRUB SERPL-MCNC: 1 MG/DL (ref 0.2–1.3)
BUN SERPL-MCNC: 24 MG/DL (ref 7–30)
CALCIUM SERPL-MCNC: 9.2 MG/DL (ref 8.5–10.1)
CHLORIDE BLD-SCNC: 112 MMOL/L (ref 94–109)
CHOLEST SERPL-MCNC: 119 MG/DL
CO2 SERPL-SCNC: 29 MMOL/L (ref 20–32)
CREAT SERPL-MCNC: 1.03 MG/DL (ref 0.66–1.25)
CREAT UR-MCNC: 194 MG/DL
FASTING STATUS PATIENT QL REPORTED: YES
GFR SERPL CREATININE-BSD FRML MDRD: 73 ML/MIN/1.73M2
GLUCOSE BLD-MCNC: 164 MG/DL (ref 70–99)
HDLC SERPL-MCNC: 43 MG/DL
HGB BLD-MCNC: 13.8 G/DL (ref 13.3–17.7)
LDLC SERPL CALC-MCNC: 58 MG/DL
MICROALBUMIN UR-MCNC: 192 MG/L
MICROALBUMIN/CREAT UR: 98.97 MG/G CR (ref 0–17)
NONHDLC SERPL-MCNC: 76 MG/DL
POTASSIUM BLD-SCNC: 4.2 MMOL/L (ref 3.4–5.3)
PROT SERPL-MCNC: 6.8 G/DL (ref 6.8–8.8)
SODIUM SERPL-SCNC: 137 MMOL/L (ref 133–144)
TRIGL SERPL-MCNC: 90 MG/DL

## 2022-07-21 PROCEDURE — 85018 HEMOGLOBIN: CPT | Performed by: PATHOLOGY

## 2022-07-21 PROCEDURE — 99215 OFFICE O/P EST HI 40 MIN: CPT | Performed by: PHYSICIAN ASSISTANT

## 2022-07-21 PROCEDURE — G0463 HOSPITAL OUTPT CLINIC VISIT: HCPCS

## 2022-07-21 PROCEDURE — 36415 COLL VENOUS BLD VENIPUNCTURE: CPT | Performed by: PATHOLOGY

## 2022-07-21 PROCEDURE — 82043 UR ALBUMIN QUANTITATIVE: CPT | Performed by: PATHOLOGY

## 2022-07-21 PROCEDURE — 80061 LIPID PANEL: CPT | Performed by: PATHOLOGY

## 2022-07-21 PROCEDURE — 80053 COMPREHEN METABOLIC PANEL: CPT | Performed by: PATHOLOGY

## 2022-07-21 ASSESSMENT — PAIN SCALES - GENERAL: PAINLEVEL: NO PAIN (0)

## 2022-07-21 NOTE — NURSING NOTE
Chief Complaint   Patient presents with     Follow Up     3 month follow up (Brandon'leonard Shannon pt)     Vitals were taken and medications reconciled.    David Fuentes, EMT  10:44 AM
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
## 2022-07-21 NOTE — PATIENT INSTRUCTIONS
You were seen today in the Cardiovascular Clinic at the [facility] by:       Ofelia Scott     Your visit summary and instructions are as follows:    NO cahnges to medicatiosn  Make sure to plese check your blood pressure before taking hydralazine.  Continue to work toward the recommendation of 150 minutes of moderate intensity exercise/week and maintain a healthy lifestyle (avoid illicit drugs, smoking and moderate alcohol consumption)      Return to cardiology clinic 3 months with Dr. Eloise Shannon     Thank you for your visit today!     Please MyChart message me or call my nurse if you have any questions or concerns.      During Business Hours:  [phone], option # 1 (University) then option # 4 (medical questions) and ask to speak with my nurse.     After hours, weekends or holidays:   [phone], Option #4  Ask to speak to the On-Call Cardiologist. Inform them you are a cardiology patient at the [facility].

## 2022-07-21 NOTE — RESULT ENCOUNTER NOTE
Reese Martini,    Thanks for coming to clinic.  The clinician who ordered these tests is currently out of the office, but we wanted to let you know that your results have been reviewed and everything looks fine.  Your clinician may get back to you with further information and a specific care plan for you when they return.       Please let us know if you have any questions or concerns.       Dr. Radha Bradley MD / [facility]   [phone]

## 2022-07-27 ENCOUNTER — PATIENT OUTREACH (OUTPATIENT)
Dept: CARE COORDINATION | Facility: CLINIC | Age: 82
End: 2022-07-27

## 2022-07-27 NOTE — PROGRESS NOTES
Clinic Care Coordination Contact    Community Health Worker Follow Up    Care Gaps:     Health Maintenance Due   Topic Date Due     Pneumococcal Vaccine: 65+ Years (2 - PPSV23 or PCV20) 12/03/2016     ZOSTER IMMUNIZATION (3 of 3) 03/29/2019     MEDICARE ANNUAL WELLNESS VISIT  04/01/2022     COVID-19 Vaccine (4 - Booster for Pfizer series) 04/07/2022     PHQ-9  07/20/2022       Care gaps were adressed. Patient's wife, Kristi, stated that she plans on scheduling the patient's annual wellness visit sometime this week.    Goals:    Goals Addressed as of 7/27/2022 at 4:24 PM                    6/14/22 6/8/22       1. Medical (pt-stated)   50%  50%    Added 3/23/22 by Natividad Nuñez      Goal Statement: I will complete my overdue health maintenance.   Date Goal set: 3/23/22  Barriers: none noted  Strengths: enrolled in CC, supportive spouse   Date to Achieve By: 6/23/22  Patient expressed understanding of goal: Yes  Action steps to achieve this goal:  1. My spouse, Kristi, will call to make appointment for Medicare annual wellness visit (In progress)  2. I will notify my care team once health maintenance item(s) are completed.   3. I will contact my Care Coordination staff or care team with any questions or concerns I may have.     Goal updated 7/27/22              Intervention and Education during outreach:     CHW spoke with patient's wife, Kristi. Kristi states that the patient is doing good. He recently [facility] park[facility]'s [facility]er [facility] which has been [facility] for him. Kristi also states that they have been working with the [facility] more. Through the [facility], they are getting set up with a nurse, , and dietician- all of whom go to the patient's house to do the assessments. Kristi states that it  has been very convenient for them. Kristi still plans on keeping the patient's PCP and cardiologist through [place].    Kristi has not been able to call and schedule an annual wellness visit for the  patient, but plans to do so during this next week.CHW provided Kristi with CHW contact information and encouraged her to reach out if she has any questions or concerns that arise.    CHW Plan: CHW will do next out reach in one month.     JAMARI Thornton, B.A. Critical access hospital Care Coordination  [facility]:   [facility]  [phone]

## 2022-07-28 ENCOUNTER — PATIENT OUTREACH (OUTPATIENT)
Dept: CARE COORDINATION | Facility: CLINIC | Age: 82
End: 2022-07-28

## 2022-07-28 NOTE — PROGRESS NOTES
Care Coordination Clinician Chart Review     Situation: Patient chart reviewed by care coordinator.?     Background: Initial assessment and enrollment to Care Coordination was 10/9/2020.?? Patient centered goals were developed with participation from patient.? Lead CC handed patient off to CHW for continued outreach every 30 days.??     Assessment: Per chart review, patient outreach completed by CC CHW on 7/27/2022.? Patient is actively working to accomplish goal(s).? Patient's goal(s) remain(s) appropriate at this time.? Patient is not due for updated Plan of Care.? Annual assessment will be due 10/2023.      Goals        1. Medical (pt-stated)       Goal Statement: I will complete my overdue health maintenance.   Date Goal set: 3/23/22  Barriers: none noted  Strengths: enrolled in CC, supportive spouse   Date to Achieve By: 6/23/22  Patient expressed understanding of goal: Yes  Action steps to achieve this goal:  1. My spouse, Kristi, will call to make appointment for Medicare annual wellness visit (In progress)  2. I will notify my care team once health maintenance item(s) are completed.   3. I will contact my Care Coordination staff or care team with any questions or concerns I may have.     Goal updated 7/27/22          ??     Plan/Recommendations: The patient will continue working with Care Coordination to achieve above goal(s).? CHW will involve Lead CC as needed or if patient is ready to move to maintenance.? Lead CC will continue to monitor CHW s monthly outreaches and progress to goal(s) every 6 weeks.?     Plan of Care updated and sent to patient: No    KEVIN Cleaning   [facility] Primary Care - Clinic Care Coordination  [phone]

## 2022-08-18 ENCOUNTER — PATIENT OUTREACH (OUTPATIENT)
Dept: CARE COORDINATION | Facility: CLINIC | Age: 82
End: 2022-08-18

## 2022-08-18 NOTE — PROGRESS NOTES
Clinic Care Coordination - Chart Review Only    Situation/Background: Patient chart reviewed by care coordinator related to [facility] [facility] conversion.    Assessment: Patient continues to be followed by Clinic Care Coordination.    Plan: Patient's chart updated to align with [employer] program for ongoing patient management.    KEVIN Cleaning   [facility] Primary Care - Clinic Care Coordination  [phone]

## 2022-08-30 ENCOUNTER — PATIENT OUTREACH (OUTPATIENT)
Dept: CARE COORDINATION | Facility: CLINIC | Age: 82
End: 2022-08-30

## 2022-08-30 NOTE — PROGRESS NOTES
Clinic Care Coordination Contact    Community Health Worker Follow Up    Care Gaps:     Health Maintenance Due   Topic Date Due     Pneumococcal Vaccine: 65+ Years (2 - PPSV23 or PCV20) 12/03/2016     ZOSTER IMMUNIZATION (3 of 3) 03/29/2019     MEDICARE ANNUAL WELLNESS VISIT  04/01/2022     COVID-19 Vaccine (4 - Booster for Pfizer series) 04/07/2022     PHQ-9  07/20/2022     INFLUENZA VACCINE (1) 09/01/2022       Patient's wife states that the patient is getting services and [facility].    Care Plan:   Care Plan: Health Maintenance     Problem: Insufficient In-home support     Goal: Establish health maintenance tasks     Start Date: 3/23/2022    Note:     Goal Statement: I will complete my overdue health maintenance.   Date Goal set: 3/23/22  Barriers: none noted  Strengths: enrolled in CC, supportive spouse   Date to Achieve By: 6/23/22  Patient expressed understanding of goal: Yes  Action steps to achieve this goal:  1. My spouse, Kristi, will call to make appointment for Medicare annual wellness visit (In progress)  2. I will notify my care team once health maintenance item(s) are completed.   3. I will contact my Care Coordination staff or care team with any questions or concerns I may have.     Goal updated 7/27/22                        Intervention and Education during outreach:     CHW spoke to patient's wife, Kristi. Kristi states that the patient has been doing good. They have been [facility]. A doctor from the [facility] came to their home and did an initial assessment last week. Kristi stated that they would still like to keep their cardiologist at [place], but other than that, they have been doing good with the [facility]'s services.     Kristi states that she has not scheduled an annual wellness visit and doesn't plan on scheduling one for the patient since they're already receiving services from the [facility]. CHW acknowledged and encouraged her to reach out to CHW if  they need any assistance scheduling future appointments with [place]. CHW asked if one more patient out reach can be done just to ensure that everything is in place before graduating the patient from the CC program. Kristi agreed. Kristi states that the patient has no other needs or concerns for CC at this time.    CHW Plan: CHW will route patient to [facility] to review for maintenance. CHW will do next patient out reach in one month.    JAMARI Thornton, B.A. [facility] Care Coordination  [facility]:   [facility]  [phone]

## 2022-08-30 NOTE — PROGRESS NOTES
Clinic Care Coordination Contact    Assessment: Care Coordinator contacted patient for 1 month follow up.  Patient has continued to follow the plan of care and assessment is negative for any new needs or concerns.    Enrollment status: Maintenenance     Plan: Care Coordination to follow up in 2 months.  Patient will continue to follow the plan of care.      KEVIN Cleaning   [facility] Primary Care - Clinic Care Coordination  [phone]

## 2022-09-30 ENCOUNTER — PATIENT OUTREACH (OUTPATIENT)
Dept: CARE COORDINATION | Facility: CLINIC | Age: 82
End: 2022-09-30

## 2022-09-30 NOTE — PROGRESS NOTES
Clinic Care Coordination Contact  [facility]/Voicemail       Clinical Data: Care Coordinator Outreach  Outreach attempted x 1.  Left message on patient's voicemail with call back information and requested return call.    Plan: Care Coordinator will try to reach patient again in 10 business days.    Amanda Davenport CHW, B.A. [facility] Care Coordination  [facility]:   [facility]  [phone]

## 2022-10-14 ENCOUNTER — PATIENT OUTREACH (OUTPATIENT)
Dept: CARE COORDINATION | Facility: CLINIC | Age: 82
End: 2022-10-14

## 2022-10-14 NOTE — PROGRESS NOTES
Clinic Care Coordination Contact    Community Health Worker Follow Up    Care Gaps:     Health Maintenance Due   Topic Date Due     HEPATITIS B IMMUNIZATION (1 of 3 - 3-dose series) Never done     Pneumococcal Vaccine: 65+ Years (2 - PPSV23 if available, else PCV20) 12/03/2016     ZOSTER IMMUNIZATION (3 of 3) 03/29/2019     COVID-19 Vaccine (4 - Booster for Pfizer series) 02/01/2022     MEDICARE ANNUAL WELLNESS VISIT  04/01/2022     PHQ-9  07/20/2022     INFLUENZA VACCINE (1) 09/01/2022       Patient's wife states that she would like to hold off scheduling appointments for care gaps because [facility] and are considering [facility] over completely because of convenience    Care Plan:   Care Plan: Health Maintenance     Problem: Insufficient In-home support     Goal: Establish health maintenance tasks     Start Date: 3/23/2022    Note:     Goal Statement: I will complete my overdue health maintenance.   Date Goal set: 3/23/22  Barriers: none noted  Strengths: enrolled in CC, supportive spouse   Date to Achieve By: 6/23/22  Patient expressed understanding of goal: Yes  Action steps to achieve this goal:  1. My spouse, Kristi, will call to make appointment for Medicare annual wellness visit (In progress)  2. I will notify my care team once health maintenance item(s) are completed.   3. I will contact my Care Coordination staff or care team with any questions or concerns I may have.     Goal updated 7/27/22                        Intervention and Education during outreach:     CHW spoke to patient's wife, Kristi. Kristi states that [facility] has been [facility] good. Kristi and the patient are looking into adult day cares through the [facility] as well. Kristi states that they are considering switching all their [facility] because it's more convenient that way. CHW acknowledged and suggested graduating the patient from [facility]. Kristi accepted  and stated that it was okay to graduate the patient. CHW encouraged Kristi to reach out if anything comes up in the future. Kristi thanked CHW.    CHW Plan: CHW will route patient to [facility] to review for graduation.    JAMARI Thornton, B.A. [facility] Care Coordination  [facility]:   [facility]  [phone]

## 2022-10-14 NOTE — PROGRESS NOTES
Clinic Care Coordination Contact    Assessment: Care Coordinator contacted patient for 1 month follow up.  Patient has continued to follow the plan of care and assessment is negative for any new needs or concerns.    Enrollment status: Graduated.      Plan: No further outreaches at this time.  Patient will continue to follow the plan of care.  If new needs arise a new Care Coordination referral may be placed.      KEVIN Cleaning   [facility] - Clinic Care Coordination  [phone]

## 2022-10-14 NOTE — LETTER
[facility] CARE COORDINATION  [address] [place] [address]  October 14, 2022    Vini Loya  [address]  [facility] [address]    Dear Vini,  Your Care Team congratulates you on your journey to maintain wellness. This document will help guide you on your journey to maintain a healthy lifestyle.  You can use this to help you overcome any barriers you may encounter.  If you should have any questions or concerns, you can contact the members of your Care Team or contact your Primary Care Clinic for assistance.     Health Maintenance  Health Maintenance Reviewed:      My Access Plan  Medical Emergency 911   Primary Clinic Line [facility] - [phone]   24 Hour Appointment Line [phone] or  [phone]) (toll-free)   24 Hour Nurse Line [phone] (toll-free)   Preferred Urgent Care     Preferred Hospital     Preferred Pharmacy [facility] [address] [facility] [address]     Behavioral Health Crisis Line The National Suicide Prevention Lifeline at 1-700.764.7696 or 911     My Care Team Members  Patient Care Team       Relationship Specialty Notifications Start End    Selene Land MD PCP - General Family Medicine  4/1/21     Phone: [phone] Fax: [phone]         [address] [place] [address]    Yudith Mcdonnell MD MD Ophthalmology  9/28/15     Phone: [phone] Fax: [phone]         [address] [facility] [address] [facility] [address]    Evelyn Hairston PA Physician Assistant Physician Assistant  9/28/16     Phone: [phone] Fax: [phone]         [address] [facility] [address] [facility] [address]    Dalila Staples APRN CNP Referring Physician Nurse Practitioner  11/7/16     Referring to Neuropsych    Phone: [phone] Fax: [phone]         [address] [facility] [facility] [address]    Lena Velazquez, PhD LP MD Psychology  11/7/16     Phone: [phone] Fax:  [phone]         [address] [place] [address]    Abel Stone MD MD Orthopedics  6/23/17     Phone: [phone] Fax: [phone]         [address] [place] [address]    Gaurav Ford MD MD Internal Medicine  7/15/20     Phone: [phone] Pager: [phone] Fax: [phone]        [address] [place] [address]    Kalee Tena MD MD Internal Medicine  7/30/20     Phone: [phone] Fax: [phone]         [address] [place] [address]    Dorina Sims Lead Care Coordinator  Admissions 10/9/20 10/14/22    Abel Wilcox DO Assigned Musculoskeletal Provider   4/18/21     Phone: [phone] Fax: [phone]         [address] [place] [address]    Maninder Bond MD MD Physical Medicine and Rehabilitation  6/4/21     Phone: [phone] Fax: [phone]         [facility] [address] [place] [address]    Dalila Staples APRN CNP Assigned Neuroscience Provider   6/13/21     Phone: [phone] Fax: [phone]         [address] [place] [address] [id number] [facility] [address]    Gaurav Ford MD Assigned Heart and Vascular Provider   8/1/21     Phone: [phone] Pager: [phone] Fax: [phone]        [address] [place] [address]    Alessia Musa RN Specialty Care Coordinator Cardiology Admissions 12/29/21     Selene Land MD Assigned PCP   1/23/22     Phone: [phone] Fax: [phone]         [address] [place] [address]               Goals        COMPLETED: Medical- [facility] (pt-stated)       Goal Statement: I would like to see what benefits I have from the [facility] in the next two months.   Date Goal set: 10/9/2020  Barriers: unsure how to go about this   Strengths: asking for help  Date to Achieve By: 5/18/22  Patient expressed understanding of goal: yes    Action steps to achieve this goal:  1. I will ask my wife to speak with a [facility] social  worker when I go in for my COVID vaccine about any services or supports I might qualify for.  2. I will ask my wife to give the CHW updates at outreach calls.    Updated: 2/5/21, 2/18/22               COMPLETED: Other (pt-stated)       Goal Statement: I would like a referral for counseling.  Date Goal set: 06/25/21   Barriers: Not sure if should schedule through VA or a in network provider   Date to Achieve By: 12/25/21   Patient expressed understanding of goal: yes     Action steps to achieve this goal:   1. I will work with CHW and wife on referral for counseling.   2. I will ask my wife to speak with a [facility]  about any services or supports I might qualify for. I will ask my wife to give the CHW updates at outreach calls.         COMPLETED: Other- Extra support (pt-stated)       Goal Statement: I would like my wife to find extra support in the home for me in the next 3 months.   Date Goal set: 10/9/2020  Barriers: unsure where to get help   Strengths: Talking with Sw'er  Date to Achieve By: 1/9/2020  Patient expressed understanding of goal: yes     Action steps to achieve this goal:  1. I will ask my wife to look on [email or web address] in the next month to see if there are caregivers who could help me with a bath a few times a week.   2. I will ask my wife to give the CHW updates at outreach calls.    Updated: 11/12/20                 It has been your Clinic Care Team's pleasure to work with you on your goals.    Regards,  Your Clinic Care Team
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
## 2022-10-28 ENCOUNTER — OFFICE VISIT (OUTPATIENT)
Dept: CARDIOLOGY | Facility: CLINIC | Age: 82
End: 2022-10-28
Attending: PHYSICIAN ASSISTANT
Payer: COMMERCIAL

## 2022-10-28 VITALS
HEIGHT: 68 IN | HEART RATE: 61 BPM | DIASTOLIC BLOOD PRESSURE: 84 MMHG | BODY MASS INDEX: 25.19 KG/M2 | OXYGEN SATURATION: 99 % | WEIGHT: 166.2 LBS | SYSTOLIC BLOOD PRESSURE: 162 MMHG

## 2022-10-28 DIAGNOSIS — G20.A1 PARKINSON'S DISEASE (H): ICD-10-CM

## 2022-10-28 DIAGNOSIS — I25.10 CORONARY ARTERY DISEASE INVOLVING NATIVE CORONARY ARTERY OF NATIVE HEART WITHOUT ANGINA PECTORIS: ICD-10-CM

## 2022-10-28 DIAGNOSIS — R09.89 LABILE BLOOD PRESSURE: Primary | ICD-10-CM

## 2022-10-28 DIAGNOSIS — I10 ESSENTIAL HYPERTENSION: ICD-10-CM

## 2022-10-28 PROCEDURE — 99213 OFFICE O/P EST LOW 20 MIN: CPT | Mod: GC | Performed by: INTERNAL MEDICINE

## 2022-10-28 PROCEDURE — G0463 HOSPITAL OUTPT CLINIC VISIT: HCPCS

## 2022-10-28 ASSESSMENT — PAIN SCALES - GENERAL: PAINLEVEL: NO PAIN (0)

## 2022-10-28 NOTE — NURSING NOTE
Chief Complaint   Patient presents with     Follow Up     [facility] F/U     Vitals were taken and medications reconciled.    David Fuentes, EMT  12:36 PM
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
## 2022-10-28 NOTE — PATIENT INSTRUCTIONS
"You were seen today in the Cardiovascular Clinic at the [facility].     Cardiology Providers you saw during your visit: Dr. Eloise Shannon     Diagnosis:   Encounter Diagnoses   Name Primary?    Labile blood pressure Yes    Essential hypertension     Parkinson's disease (H)         Recommendations:   1. Check your blood pressure once day. We will call in about 2 weeks to check on the blood pressure and see if we need to adjust the dose.  2. Follow up with Dr. Eloise Shannon or [facility] in [facility].        Please feel free to call me with any questions or concerns.       Alessia Musa RN     Questions: [phone].   First press #1 for the lifeaction games and then press #4 for \"Medical Questions\" to reach us Cardiology Nurses.     [phone].   First press #1 for the lifeaction games and then press #1     On Call Cardiologist for after hours or on weekends: [phone]   option #4 and ask to speak to the on-call Cardiologist.          If you need a medication refill please contact your pharmacy.  Please allow 3 business days for your refill to be completed.  ________________________________________________________________________________________________________________________________         "

## 2022-11-09 NOTE — TELEPHONE ENCOUNTER
Health Maintenance Due   Topic Date Due   • Diabetes Eye Exam  03/11/2021   • COVID-19 Vaccine (3 - Moderna risk series) 07/02/2021   • Diabetes Foot Exam  06/16/2022   • Influenza Vaccine (1) 09/01/2022       Patient is due for topics listed above, he wishes to proceed with Immunization(s) Influenza, but is not proceeding with Immunization(s) COVID-19, Diabetes Eye Exam and Diabetes Foot Exam at this time.       Vaccine Information Statement(s) or the Emergency Use Authorization was given today. This has been reviewed, questions answered, and verbal consent given by Patient for injection(s) and administration of Influenza (Inactivated).    Patient tolerated without incident. See immunization grid for documentation.      BP was taken with automatic bp machine and it was 112/82 with a pulse a 96   Pt can stop paxil 20 mg daily and tomorrow start Cymbalta 30 mg daily. Call with update in 2 weeks and will increase medication if needed.   Thanks!  DM

## 2022-11-18 ENCOUNTER — TELEPHONE (OUTPATIENT)
Dept: CARDIOLOGY | Facility: CLINIC | Age: 82
End: 2022-11-18

## 2022-11-18 NOTE — TELEPHONE ENCOUNTER
Called patient to check in on home blood-pressures.    11/2 BP: 195/95  11/5 BP: 195/95  11/9        206/78  11/15      209/109  11/17      185/98  HR 74  11/18      176/91, 168/88  HR 67    Patient would like to talk to neurologist before making changes to blood pressure medications. Patient will call the clinic after talking with their neurologist on Monday 11/21/22.    Nabeel Minaya, Student Nurse

## 2022-11-21 ENCOUNTER — TELEPHONE (OUTPATIENT)
Dept: CARDIOLOGY | Facility: CLINIC | Age: 82
End: 2022-11-21

## 2022-11-21 NOTE — TELEPHONE ENCOUNTER
Health Call Center    Phone Message    May a detailed message be left on voicemail: yes     Reason for Call: Other: Pt wife Ladi called and stated she was to call back on Monday after pt nuerology appt about possibly changed his bp meds. Ladi said that pt bp was ok today. Bp still does get high and was thinking he may need a medication change. Wednesday would be the best time to call. Please review and call Ladi back.      Action Taken: Message routed to:  Other: Cardiology    Travel Screening: Not Applicable       Thank you!  [facility]

## 2022-11-21 NOTE — TELEPHONE ENCOUNTER
Called and talked with pt/wife. Pt's wife listed off the following additional BPs. She did talk with pt's neurologist, who didn't think neurology meds were impacting pt's BPs.    11/10: 173/78  11/14: 230/108  11/19: 225/124  11/20: 133/78  11/21: 149/85
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
## 2024-10-03 NOTE — PROGRESS NOTES
Clinic Care Coordination Contact    Situation: Patient chart reviewed by care coordinator.    Background: RN CC's initial assessment and enrollment to Care Coordination was 10/9/20.  Patient centered goals were developed with participation from patient.   CC handed patient off to CHW for continued outreach every 30 days.    Assessment: CHW has been in contact with pt's wife over the last 30 days. PT's wife gave an update how patient has been doing better with a home care nurse coming to visit 1x a week. Pt's wife shared that she was going to get connected with [facility] to see if there were any further services that pt would qualify for and benefit from.     Plan/Recommendations: Pt's wife will check in with the [facility] regarding benefits. CHW will continue to check in with pt every 30 days. SW'er will continue to check in with SW'er every 30 days.     MICH Anderson  Clinic Care Coordinator   [facility]  [phone]       socially

## (undated) DEVICE — PACK CATARACT CUSTOM ASC SEY15CPUMC

## (undated) DEVICE — PAD CHUX UNDERPAD 30X36" P3036C

## (undated) DEVICE — CATH LASER URETERAL 7.1FRX40CM G17797  022403-7.1-40

## (undated) DEVICE — EYE CANN IRR 27GA ANTERIOR CHAMBER 581280

## (undated) DEVICE — FILTER PIRANHA DISP 2228.901

## (undated) DEVICE — TUBING IRRIG TUR Y TYPE 96" LF 6543-01

## (undated) DEVICE — EYE KNIFE SLIT XSTAR VISITEC 2.5MM 45DEG BEVEL UP 373725

## (undated) DEVICE — DRAPE MAYO STAND 23X54 8337

## (undated) DEVICE — GLOVE PROTEXIS W/NEU-THERA 7.5  2D73TE75

## (undated) DEVICE — GLOVE PROTEXIS POWDER FREE SMT 6.5  2D72PT65X

## (undated) DEVICE — DRSG ABDOMINAL 07 1/2X8" 7197D

## (undated) DEVICE — CATH FOLEY 3WAY 22FR 30ML SIL 73022SI

## (undated) DEVICE — SYR 07ML EPIDURAL LOSS OF RESISTANCE PULSATOR 4905

## (undated) DEVICE — TUBING SET THERMEDX UROLOGY SGL USE LL0006

## (undated) DEVICE — NDL EPIDURAL TUOHY 22GA 3.5" 4911-22

## (undated) DEVICE — PREP CHLORAPREP W/ORANGE TINT 10.5ML 260715

## (undated) DEVICE — GLOVE PROTEXIS MICRO 7.5  2D73PM75

## (undated) DEVICE — NDL BLUNT 18GA 1" W/O FILTER 305181

## (undated) DEVICE — LINEN TOWEL PACK X5 5464

## (undated) DEVICE — BLADE MORCELLATOR WOLF PIRANHA 4.75X385MM 49700103

## (undated) DEVICE — SYR 50ML LL W/O NDL 309653

## (undated) DEVICE — TRAY PAIN INJECTION 97A 640

## (undated) DEVICE — SOL NACL 0.9% IRRIG 3000ML BAG 2B7477

## (undated) DEVICE — EYE SHIELD PLASTIC

## (undated) DEVICE — EYE KNIFE STILETTO VISITEC 1.1MM ANG 45DEG SIDEPORT 376620

## (undated) DEVICE — LINEN GOWN X4 5410

## (undated) DEVICE — TUBING SUCTION MEDI-VAC 1/4"X20' N620A

## (undated) DEVICE — LIGHT HANDLE X2

## (undated) DEVICE — EYE CANN IRR 25GA CYSTOTOME 581610

## (undated) DEVICE — TUBING EXTENSION SET MICROBORE 6" LL 2N1194

## (undated) DEVICE — SYR PISTON URETHRAL 60ML 68000

## (undated) DEVICE — NDL 22GA 1.5"

## (undated) DEVICE — TUBING SET PIRANHA 41702208

## (undated) DEVICE — LASER FIBER HOLMIUM MOSES 550 D/F/L AC-10030120

## (undated) DEVICE — EYE SOL BSS 500ML

## (undated) DEVICE — SOL NACL 0.9% IRRIG 1000ML BOTTLE 2F7124

## (undated) DEVICE — SUCTION MANIFOLD DORNOCH ULTRA CART UL-CL500

## (undated) DEVICE — SYR 01ML TBC SLIP TIP W/O NDL

## (undated) DEVICE — STRAP KNEE/BODY 31143004

## (undated) DEVICE — Device

## (undated) DEVICE — SUCTION WATERBUG FLOOR PUDDLE VAC 9321

## (undated) DEVICE — BAG URINARY DRAIN LUBRISIL IC 4000ML LF 253509A

## (undated) DEVICE — SPECIMEN TRAP TISSUE CONTAINER PIRANHA 2208120

## (undated) DEVICE — SOL WATER IRRIG 1000ML BOTTLE 2F7114

## (undated) DEVICE — EYE TIP IRRIGATION & ASPIRATION POLYMER 35D BENT 8065751511

## (undated) RX ORDER — LIDOCAINE HYDROCHLORIDE 10 MG/ML
INJECTION, SOLUTION EPIDURAL; INFILTRATION; INTRACAUDAL; PERINEURAL
Status: DISPENSED
Start: 2021-04-26

## (undated) RX ORDER — DIPHENHYDRAMINE HYDROCHLORIDE 50 MG/ML
INJECTION INTRAMUSCULAR; INTRAVENOUS
Status: DISPENSED
Start: 2019-02-07

## (undated) RX ORDER — DEXAMETHASONE SODIUM PHOSPHATE 10 MG/ML
INJECTION, SOLUTION INTRAMUSCULAR; INTRAVENOUS
Status: DISPENSED
Start: 2021-04-26

## (undated) RX ORDER — PROPOFOL 10 MG/ML
INJECTION, EMULSION INTRAVENOUS
Status: DISPENSED
Start: 2021-01-27

## (undated) RX ORDER — FENTANYL CITRATE-0.9 % NACL/PF 10 MCG/ML
PLASTIC BAG, INJECTION (ML) INTRAVENOUS
Status: DISPENSED
Start: 2021-01-27

## (undated) RX ORDER — ACETAMINOPHEN 325 MG/1
TABLET ORAL
Status: DISPENSED
Start: 2021-01-27

## (undated) RX ORDER — FENTANYL CITRATE 50 UG/ML
INJECTION, SOLUTION INTRAMUSCULAR; INTRAVENOUS
Status: DISPENSED
Start: 2019-02-07

## (undated) RX ORDER — PROPOFOL 10 MG/ML
INJECTION, EMULSION INTRAVENOUS
Status: DISPENSED
Start: 2018-05-30

## (undated) RX ORDER — CIPROFLOXACIN 500 MG/1
TABLET, FILM COATED ORAL
Status: DISPENSED
Start: 2020-12-22

## (undated) RX ORDER — LIDOCAINE HYDROCHLORIDE 20 MG/ML
INJECTION, SOLUTION EPIDURAL; INFILTRATION; INTRACAUDAL; PERINEURAL
Status: DISPENSED
Start: 2021-01-27

## (undated) RX ORDER — FENTANYL CITRATE 50 UG/ML
INJECTION, SOLUTION INTRAMUSCULAR; INTRAVENOUS
Status: DISPENSED
Start: 2021-01-27

## (undated) RX ORDER — ONDANSETRON 2 MG/ML
INJECTION INTRAMUSCULAR; INTRAVENOUS
Status: DISPENSED
Start: 2021-01-27

## (undated) RX ORDER — FENTANYL CITRATE 50 UG/ML
INJECTION, SOLUTION INTRAMUSCULAR; INTRAVENOUS
Status: DISPENSED
Start: 2019-08-26

## (undated) RX ORDER — VANCOMYCIN HYDROCHLORIDE 1 G/200ML
INJECTION, SOLUTION INTRAVENOUS
Status: DISPENSED
Start: 2021-01-27

## (undated) RX ORDER — SULFAMETHOXAZOLE/TRIMETHOPRIM 800-160 MG
TABLET ORAL
Status: DISPENSED
Start: 2020-12-10

## (undated) RX ORDER — DEXAMETHASONE SODIUM PHOSPHATE 4 MG/ML
INJECTION, SOLUTION INTRA-ARTICULAR; INTRALESIONAL; INTRAMUSCULAR; INTRAVENOUS; SOFT TISSUE
Status: DISPENSED
Start: 2021-01-27

## (undated) RX ORDER — ACETAMINOPHEN 325 MG/1
TABLET ORAL
Status: DISPENSED
Start: 2018-05-30

## (undated) RX ORDER — LEVOFLOXACIN 5 MG/ML
INJECTION, SOLUTION INTRAVENOUS
Status: DISPENSED
Start: 2021-01-27